# Patient Record
Sex: MALE | Race: WHITE | NOT HISPANIC OR LATINO | Employment: OTHER | URBAN - METROPOLITAN AREA
[De-identification: names, ages, dates, MRNs, and addresses within clinical notes are randomized per-mention and may not be internally consistent; named-entity substitution may affect disease eponyms.]

---

## 2018-10-19 ENCOUNTER — OFFICE VISIT (OUTPATIENT)
Dept: URGENT CARE | Facility: CLINIC | Age: 53
End: 2018-10-19
Payer: MEDICAID

## 2018-10-19 VITALS
SYSTOLIC BLOOD PRESSURE: 190 MMHG | WEIGHT: 280 LBS | TEMPERATURE: 98 F | OXYGEN SATURATION: 98 % | HEART RATE: 88 BPM | BODY MASS INDEX: 43.95 KG/M2 | DIASTOLIC BLOOD PRESSURE: 80 MMHG | HEIGHT: 67 IN

## 2018-10-19 DIAGNOSIS — L23.7 CONTACT DERMATITIS DUE TO POISON IVY: Primary | ICD-10-CM

## 2018-10-19 DIAGNOSIS — R03.0 ELEVATED BLOOD-PRESSURE READING, WITHOUT DIAGNOSIS OF HYPERTENSION: ICD-10-CM

## 2018-10-19 PROCEDURE — 99203 OFFICE O/P NEW LOW 30 MIN: CPT | Mod: S$GLB,,, | Performed by: NURSE PRACTITIONER

## 2018-10-19 RX ORDER — DEXAMETHASONE SODIUM PHOSPHATE 100 MG/10ML
10 INJECTION INTRAMUSCULAR; INTRAVENOUS
Status: COMPLETED | OUTPATIENT
Start: 2018-10-19 | End: 2018-10-19

## 2018-10-19 RX ORDER — TRIAMCINOLONE ACETONIDE 1 MG/G
CREAM TOPICAL 2 TIMES DAILY
Qty: 80 G | Refills: 1 | Status: SHIPPED | OUTPATIENT
Start: 2018-10-19 | End: 2019-02-18

## 2018-10-19 RX ADMIN — DEXAMETHASONE SODIUM PHOSPHATE 10 MG: 100 INJECTION INTRAMUSCULAR; INTRAVENOUS at 06:10

## 2018-10-19 NOTE — PROGRESS NOTES
"Subjective:       Patient ID: Juan Gillespie is a 53 y.o. male.    Vitals:  height is 5' 7" (1.702 m) and weight is 127 kg (280 lb). His temperature is 98.2 °F (36.8 °C). His blood pressure is 190/80 (abnormal) and his pulse is 88. His oxygen saturation is 98%.     Chief Complaint: Rash (poison ivy exposure)    Pt reports having a rash on his arms and legs and groin area , he reports possible poison ivy exposure from cutting grass 3 days ago. Patient is aware his pressure is elevated, states he is "not concerned and that he reads information on health all the time".  Does not have an established PCP.       Rash   This is a new problem. Episode onset: 3 days. The affected locations include the left arm, right arm and right upper leg. It is unknown if there was an exposure to a precipitant. Pertinent negatives include no diarrhea, fever, joint pain, shortness of breath, sore throat or vomiting.     Review of Systems   Constitution: Negative for chills and fever.   HENT: Negative for sore throat.    Eyes: Negative for blurred vision.   Cardiovascular: Negative for chest pain.   Respiratory: Negative for shortness of breath.    Skin: Positive for dry skin, itching and rash.   Musculoskeletal: Negative for back pain and joint pain.   Gastrointestinal: Negative for abdominal pain, diarrhea, nausea and vomiting.   Neurological: Negative for headaches.   Psychiatric/Behavioral: The patient is not nervous/anxious.        Objective:      Physical Exam   Constitutional: He is oriented to person, place, and time. He appears well-developed and well-nourished.   HENT:   Head: Normocephalic and atraumatic. Head is without abrasion, without contusion and without laceration.   Right Ear: External ear normal.   Left Ear: External ear normal.   Nose: Nose normal.   Mouth/Throat: Oropharynx is clear and moist.   Eyes: Conjunctivae, EOM and lids are normal. Pupils are equal, round, and reactive to light.   Neck: Trachea normal, full " passive range of motion without pain and phonation normal. Neck supple.   Cardiovascular: Normal rate, regular rhythm and normal heart sounds.   Pulmonary/Chest: Effort normal and breath sounds normal. No stridor. No respiratory distress.   Musculoskeletal: Normal range of motion.   Neurological: He is alert and oriented to person, place, and time.   Skin: Skin is warm, dry and intact. Capillary refill takes less than 2 seconds. No abrasion, no bruising, no burn, no ecchymosis, no laceration, no lesion and no rash noted. No erythema.        Psychiatric: He has a normal mood and affect. His speech is normal and behavior is normal. Judgment and thought content normal. Cognition and memory are normal.   Nursing note and vitals reviewed.      Assessment:       1. Contact dermatitis due to poison ivy    2. Elevated blood-pressure reading, without diagnosis of hypertension        Plan:         Discussed risks of uncontrolled HTN and patient verb an understanding.     Contact dermatitis due to poison ivy  -     dexamethasone injection 10 mg    Elevated blood-pressure reading, without diagnosis of hypertension    Other orders  -     triamcinolone acetonide 0.1% (KENALOG) 0.1 % cream; Apply topically 2 (two) times daily. for 10 days  Dispense: 80 g; Refill: 1      Patient Instructions   Do not use cream on face or genital area.     You need to follow up with your primary care doctor regarding your elevated blood pressure as you are at risk for a stroke.     You must understand that you've received an Urgent Care treatment only and that you may be released before all your medical problems are known or treated. You, the patient, will arrange for follow up care as instructed.  If your condition worsens we recommend that you receive another evaluation at the emergency room immediately or contact your primary medical clinics after hours call service to discuss your concerns.  Please return here or go to the Emergency Department  for any concerns or worsening of condition.      Avoiding Poison Ivy, Poison Oak, and Poison Sumac  Poison oak, poison ivy, and poison sumac are plants that can cause skin rashes. The problem is a sap oil called urushiol that is contained in these plants. If you're allergic to urushiol, touching one of these plants may cause your skin to react. Within hours or days, you may have a red, swollen, itchy rash. You can't stop the rash. But you can soothe the itching.        Recognizing these plants  You can help prevent a poison oak, poison ivy, or poison sumac rash. Know what these plants look like. And then avoid them. They grow in the form of ankit, small plants, and large bushes. In most cases, poison oak and poison ivy have 3 leaves per stem. Poison sumac has from 7 leaves to 13 leaves per stem. Watch out for these plants when you go to any outdoor area, from a friend's overgrown back yard to the [facility]. Urushiol is present in these plants all year round, even when the leaves are gone. So always be on the lookout.  What causes a reaction?  Poison oak, poison ivy, and poison sumac thrive mainly in unmaintained outdoor areas. If you touch these plants, you may get a rash. You may also react if you touch something that came in contact with urushiol. This could be a dog or cat, clothing, or equipment. But the rash caused by these plants is not contagious.  Steps to prevention  When heading outdoors, take these preventive steps:  · Avoid touching any of these plants.  · Wear long pants and a long-sleeved shirt.  · If you're going to a heavily wooded or brushy area, also put on gloves, a hat, and boots.  · If you are very sensitive, apply bentoquatam 5% topical cream to all exposed areas of your skin. This creates a layer of protection between your skin and any sap oil you may touch.  · If you come in contact with these plants or the oil, wash with soap and water as soon as possible.  · Wash clothing and animals that  come in contact with these plants as well. Urushiol may stay on them and cause a rash when you touch them in the future.  Date Last Reviewed: 3/1/2017  © 0936-4886 [employer]. [address] [place] [address], [place] [address]. All rights reserved. This information is not intended as a substitute for professional medical care. Always follow your healthcare professional's instructions.        High Blood Pressure, To Be Confirmed, No Treatment  Your blood pressure today was higher than normal. Sometimes anxiety or pain can cause a temporary rise in blood pressure. It later returns to normal. Blood pressure that is high only one time doesnt mean that you have high blood pressure (hypertension). High blood pressure is a chronic illness. But you should have your blood pressure measured again within the next few days to find out if its still high.    A blood pressure reading is made up of two numbers: a higher number over a lower number. The top number is the systolic pressure. The bottom number is the diastolic pressure. A normal blood pressure is a systolic pressure of less than 120 over a diastolic pressure of less than 80. You will see your blood pressure readings written together. For example, a person with a systolic pressure of 118 and a diastolic pressure of 78 will have 118/78 written in the medical record.     High blood pressure is when either the top number is 140 or higher, or the bottom number is 90 or higher. This must be the result when taking your blood pressure a number of times.   The blood pressures between normal and high are called prehypertension. This is systolic pressure of 120 to 140 or diastolic pressure of 80 to 89. Prehypertension means you are at risk of getting high blood pressure. It's a warning sign. The information gives you a chance to  make lifestyle changes such as weight loss, exercise, and quitting smoking, that can keep your blood pressure from going higher. You should have  your blood pressure checked regularly to be sure it isnt rising.  Home care  To track your blood pressure, your provider may ask you to come into the office at different times and on different days. If your healthcare provider asks you to check your readings at home, ask him or her what times of the day to test and for how many days. Before you leave the office, ask your provider to show you how to take your blood pressure and be sure to ask questions if you don't understand something.  Consider buying an automatic blood pressure monitor. Ask your provider for a recommendation. You can buy blood pressure monitors at most pharmacies.  The American Heart Association recommends the following guidelines for home blood pressure monitoring:  · Don't smoke or drink coffee for 30 minutes before taking your blood pressure.  · Go to the bathroom before the test.  · Relax for 5 minutes before taking the measurement.  · Sit with your back supported (don't sit on a couch or soft chair); keep your feet on the floor uncrossed. Place your arm on a solid flat surface (like a table) with the upper part of the arm at heart level. Place the middle of the cuff directly above the eye of the elbow. Check the monitor's instruction manual for an illustration.  · Take multiple readings. When you measure, take 2 to 3 readings one minute apart and record all of the results.  · Take your blood pressure at the same time every day, or as your healthcare provider recommends.  · Record the date, time, and blood pressure reading.  · Take the record with you to your next medical appointment. If your blood pressure monitor has a built-in memory, simply take the monitor with you to your next appointment.  · Call your provider if you have several high readings. Don't be frightened by a single high blood pressure reading, but if you get several high readings, check in with your healthcare provider.  · Note: When blood pressure reaches a systolic (top  number) of 180 or higher OR diastolic (bottom number) of 110 or higher, seek emergency medical treatment.  Follow-up care  Keep all of your follow up appointments. If your blood pressure is high (more than 120 over 80) on 2 out of 3 days, you will need to follow up with your healthcare provider for more evaluation and treatment.  Dont put this off! High blood pressure can be treated. High blood pressure thats not treated raises your risk for heart attack and stroke.  When to seek medical advice  Call your healthcare provider right away if any of these occur:  · Blood pressure reaches a systolic (top number) of 180 or higher, OR diastolic (bottom number) of 110 or higher  · Chest pain or shortness of breath  · Severe headache  · Throbbing or rushing sound in the ears  · Nosebleed  · Sudden severe pain in your belly (abdomen)  · Extreme drowsiness, confusion, or fainting  · Dizziness or dizziness with spinning sensation (vertigo)  · Weakness of an arm or leg or one side of the face  · You have problems speaking or seeing   Date Last Reviewed: 12/1/2016  © 7175-0100 [employer]. [address] [place] [id number]. All rights reserved. This information is not intended as a substitute for professional medical care. Always follow your healthcare professional's instructions.

## 2018-10-19 NOTE — PATIENT INSTRUCTIONS
Do not use cream on face or genital area.     You need to follow up with your primary care doctor regarding your elevated blood pressure as you are at risk for a stroke.     You must understand that you've received an Urgent Care treatment only and that you may be released before all your medical problems are known or treated. You, the patient, will arrange for follow up care as instructed.  If your condition worsens we recommend that you receive another evaluation at the emergency room immediately or contact your primary medical clinics after hours call service to discuss your concerns.  Please return here or go to the Emergency Department for any concerns or worsening of condition.      Avoiding Poison Ivy, Poison Oak, and Poison Sumac  Poison oak, poison ivy, and poison sumac are plants that can cause skin rashes. The problem is a sap oil called urushiol that is contained in these plants. If you're allergic to urushiol, touching one of these plants may cause your skin to react. Within hours or days, you may have a red, swollen, itchy rash. You can't stop the rash. But you can soothe the itching.        Recognizing these plants  You can help prevent a poison oak, poison ivy, or poison sumac rash. Know what these plants look like. And then avoid them. They grow in the form of ankit, small plants, and large bushes. In most cases, poison oak and poison ivy have 3 leaves per stem. Poison sumac has from 7 leaves to 13 leaves per stem. Watch out for these plants when you go to any outdoor area, from a friend's overgrown back yard to [facility]. Urushiol is present in these plants all year round, even when the leaves are gone. So always be on the lookout.  What causes a reaction?  Poison oak, poison ivy, and poison sumac thrive mainly in unmaintained outdoor areas. If you touch these plants, you may get a rash. You may also react if you touch something that came in contact with urushiol. This could be a dog or cat,  clothing, or equipment. But the rash caused by these plants is not contagious.  Steps to prevention  When heading outdoors, take these preventive steps:  · Avoid touching any of these plants.  · Wear long pants and a long-sleeved shirt.  · If you're going to a heavily wooded or brushy area, also put on gloves, a hat, and boots.  · If you are very sensitive, apply bentoquatam 5% topical cream to all exposed areas of your skin. This creates a layer of protection between your skin and any sap oil you may touch.  · If you come in contact with these plants or the oil, wash with soap and water as soon as possible.  · Wash clothing and animals that come in contact with these plants as well. Urushiol may stay on them and cause a rash when you touch them in the future.  Date Last Reviewed: 3/1/2017  © 5741-8407 [email or web address]. [address] [place] [address]. All rights reserved. This information is not intended as a substitute for professional medical care. Always follow your healthcare professional's instructions.        High Blood Pressure, To Be Confirmed, No Treatment  Your blood pressure today was higher than normal. Sometimes anxiety or pain can cause a temporary rise in blood pressure. It later returns to normal. Blood pressure that is high only one time doesnt mean that you have high blood pressure (hypertension). High blood pressure is a chronic illness. But you should have your blood pressure measured again within the next few days to find out if its still high.    A blood pressure reading is made up of two numbers: a higher number over a lower number. The top number is the systolic pressure. The bottom number is the diastolic pressure. A normal blood pressure is a systolic pressure of less than 120 over a diastolic pressure of less than 80. You will see your blood pressure readings written together. For example, a person with a systolic pressure of 118 and a diastolic pressure of 78 will  have 118/78 written in the medical record.     High blood pressure is when either the top number is 140 or higher, or the bottom number is 90 or higher. This must be the result when taking your blood pressure a number of times.   The blood pressures between normal and high are called prehypertension. This is systolic pressure of 120 to 140 or diastolic pressure of 80 to 89. Prehypertension means you are at risk of getting high blood pressure. It's a warning sign. The information gives you a chance to  make lifestyle changes such as weight loss, exercise, and quitting smoking, that can keep your blood pressure from going higher. You should have your blood pressure checked regularly to be sure it isnt rising.  Home care  To track your blood pressure, your provider may ask you to come into the office at different times and on different days. If your healthcare provider asks you to check your readings at home, ask him or her what times of the day to test and for how many days. Before you leave the office, ask your provider to show you how to take your blood pressure and be sure to ask questions if you don't understand something.  Consider buying an automatic blood pressure monitor. Ask your provider for a recommendation. You can buy blood pressure monitors at most pharmacies.  The American Heart Association recommends the following guidelines for home blood pressure monitoring:  · Don't smoke or drink coffee for 30 minutes before taking your blood pressure.  · Go to the bathroom before the test.  · Relax for 5 minutes before taking the measurement.  · Sit with your back supported (don't sit on a couch or soft chair); keep your feet on the floor uncrossed. Place your arm on a solid flat surface (like a table) with the upper part of the arm at heart level. Place the middle of the cuff directly above the eye of the elbow. Check the monitor's instruction manual for an illustration.  · Take multiple readings. When you  measure, take 2 to 3 readings one minute apart and record all of the results.  · Take your blood pressure at the same time every day, or as your healthcare provider recommends.  · Record the date, time, and blood pressure reading.  · Take the record with you to your next medical appointment. If your blood pressure monitor has a built-in memory, simply take the monitor with you to your next appointment.  · Call your provider if you have several high readings. Don't be frightened by a single high blood pressure reading, but if you get several high readings, check in with your healthcare provider.  · Note: When blood pressure reaches a systolic (top number) of 180 or higher OR diastolic (bottom number) of 110 or higher, seek emergency medical treatment.  Follow-up care  Keep all of your follow up appointments. If your blood pressure is high (more than 120 over 80) on 2 out of 3 days, you will need to follow up with your healthcare provider for more evaluation and treatment.  Dont put this off! High blood pressure can be treated. High blood pressure thats not treated raises your risk for heart attack and stroke.  When to seek medical advice  Call your healthcare provider right away if any of these occur:  · Blood pressure reaches a systolic (top number) of 180 or higher, OR diastolic (bottom number) of 110 or higher  · Chest pain or shortness of breath  · Severe headache  · Throbbing or rushing sound in the ears  · Nosebleed  · Sudden severe pain in your belly (abdomen)  · Extreme drowsiness, confusion, or fainting  · Dizziness or dizziness with spinning sensation (vertigo)  · Weakness of an arm or leg or one side of the face  · You have problems speaking or seeing   Date Last Reviewed: 12/1/2016  © 6011-8094 [employer]. [address] [place] [address], [place] [address]. All rights reserved. This information is not intended as a substitute for professional medical care. Always follow your healthcare  professional's instructions.

## 2018-10-21 ENCOUNTER — CLINICAL SUPPORT (OUTPATIENT)
Dept: URGENT CARE | Facility: CLINIC | Age: 53
End: 2018-10-21
Payer: MEDICAID

## 2018-10-21 VITALS
BODY MASS INDEX: 43.95 KG/M2 | HEIGHT: 67 IN | RESPIRATION RATE: 16 BRPM | OXYGEN SATURATION: 97 % | WEIGHT: 280 LBS | HEART RATE: 71 BPM | SYSTOLIC BLOOD PRESSURE: 179 MMHG | DIASTOLIC BLOOD PRESSURE: 88 MMHG | TEMPERATURE: 97 F

## 2018-10-21 DIAGNOSIS — L23.9 ALLERGIC CONTACT DERMATITIS, UNSPECIFIED TRIGGER: Primary | ICD-10-CM

## 2018-10-21 PROCEDURE — 99214 OFFICE O/P EST MOD 30 MIN: CPT | Mod: S$GLB,,, | Performed by: NURSE PRACTITIONER

## 2018-10-21 RX ORDER — PREDNISONE 5 MG/1
TABLET ORAL
Qty: 81 TABLET | Refills: 0 | Status: SHIPPED | OUTPATIENT
Start: 2018-10-21 | End: 2018-11-04

## 2018-10-21 NOTE — PROGRESS NOTES
"Subjective:       Patient ID: Juan Gillespie is a 53 y.o. male.    Vitals:  height is 5' 7" (1.702 m) and weight is 127 kg (280 lb). His temperature is 97.2 °F (36.2 °C). His blood pressure is 179/88 (abnormal) and his pulse is 71. His respiration is 16 and oxygen saturation is 97%.     Chief Complaint: Rash    Pt was seen Friday for Poison Ivy.  Cream has not worked but shot worked for about 12 hours  Rash is now spready on both arms, legs, chest      Rash   This is a recurrent problem. The current episode started in the past 7 days. The problem has been gradually worsening since onset. The affected locations include the left arm, right arm, chest, right lower leg, right upper leg, left upper leg and left lower leg. The rash is characterized by burning. Associated with: poison oak. Pertinent negatives include no fever, joint pain, shortness of breath or sore throat. Past treatments include topical steroids. The treatment provided no relief.     Review of Systems   Constitution: Negative for chills and fever.   HENT: Negative for sore throat.    Respiratory: Negative for shortness of breath.    Skin: Positive for rash. Negative for itching.   Musculoskeletal: Negative for joint pain.   All other systems reviewed and are negative.      Objective:      Physical Exam   Constitutional: He is oriented to person, place, and time. He appears well-developed and well-nourished.   HENT:   Head: Normocephalic and atraumatic. Head is without abrasion, without contusion and without laceration.   Nose: Nose normal.   Eyes: Conjunctivae, EOM and lids are normal. Pupils are equal, round, and reactive to light.   Neck: Trachea normal, full passive range of motion without pain and phonation normal. Neck supple.   Cardiovascular: Normal rate, regular rhythm and normal heart sounds.   Pulmonary/Chest: Effort normal and breath sounds normal. No stridor. No respiratory distress.   Musculoskeletal: Normal range of motion.   Neurological: He " is alert and oriented to person, place, and time.   Skin: Skin is warm, dry and intact. Capillary refill takes less than 2 seconds. Rash noted. No abrasion, no bruising, no burn, no ecchymosis, no laceration and no lesion noted. Rash is urticarial. There is erythema.        Psychiatric: He has a normal mood and affect. His speech is normal and behavior is normal. Judgment and thought content normal. Cognition and memory are normal.   Nursing note and vitals reviewed.              Assessment:       1. Allergic contact dermatitis, unspecified trigger        Plan:       Patient Instructions       Benadryl OTC as directed for 7 days  Pepcid AC OTC as directed for 7 days  Claritin, Zyrtec, or Allegra OTC as directed for 7 days      Local Allergic Reaction, Other  You are having an allergic reaction. Almost anything can cause one. Different people are allergic to different things. It is usually something that you ate or swallowed, came into contact with by getting or putting it on your skin or clothes, or something you breathed in the air. This can be very annoying and sometimes scary.  Symptoms of an allergic reaction can include:  · Rash, hives, redness, welts, blisters  · Itching, burning, stinging, pain  · Dry, flaky, cracking, scaly skin  · Swelling of the face, lips or other parts of the body  Sometimes the cause of an allergic reaction may be obvious. To help identify your allergen, remember:  · When it started  · What you were doing at the time or just before that  · Any activities you were involved in  · Any new products or contacts  Here are some common causes, but remember almost anything can cause a reaction, and you may not even be aware that you came into contact with one of these things.  · Dust, mold, pollen  · Plants such as poison ivy and poison oak are common ones, but there are many others  · Animals  · Foods such as shrimp, shellfish, peanuts, milk products, gluten, eggs; also colorings, flavorings,  additives  · Insect bites or stings such as bees, mosquitos, flees, ticks  · Medicines such as penicillin, sulfa drugs, amoxicillin, aspirin, ibuprofen; any medicine can cause a reaction  · Jewelry such as nickel, gold  (new, or something youve worn for a while including zippers, and  buttons)  · Latex such as in gloves, clothes, toys, balloons, or some tapes (some people allergic to latex may also have problems with foods like bananas, avocados, kiwi, papaya, or chestnuts)  · Lotions, perfumes, cosmetics, soaps, shampoos, skincare products, nail products  · Chemicals or dyes in clothing, linen, , hair dyes, soaps, iodine  Home care    The goal of our treatment is to help relieve the symptoms, and get you feeling better. The rash will usually fade over several days, but can sometimes last a couple of weeks. Over the next couple of days, there may be times when it is gets a little worse, and then better again. Here are some things to do:  · If you know what you are allergic to, avoid it because future reactions could be worse than this one.  · Avoid tight clothing and anything that heats up your skin (hot showers/baths, direct sunlight) since heat will make itching worse.  · An ice pack will relieve local areas of intense itching and redness. Dont put the ice directly on the skin, because it can damage the skin. You can also ice put it in a plastic bag. Wrap it in something like a towel, eloisa shirt, or cloth.  · Oral Benadryl (diphenhydramine) is an antihistamine available at drug and grocery stores. Unless a prescription antihistamine was given, Benadryl may be used to reduce itching if large areas of the skin are involved. It may make you sleepy, so be careful using it in the daytime or when going to school, working, or driving. [NOTE: Do not use Benadryl if you have glaucoma or if you are a man with trouble urinating due to an enlarged prostate.] There are antihistamines that causes less drowsiness and is  a good alternatives for daytime use. Ask your pharmacist for suggestions.  · Do not use Benadryl cream on your skin, because in some people it can cause a further reaction, and make you allergic to Benadryl.  · Try not to scratch. This can tear the skin and cause an infection.  · Using heat-steam to clean your home, using high-efficiency particulate (HEPA) vacuums and filters, avoiding food and pet triggers, exterminating cockroaches, and frequent house cleaning are a few of the strategies used to decrease allergic reactions.  Follow-up care  Follow up with your healthcare provider, or as advised if your symptoms do not continue to improve or get worse.  Call 911  Call 911 if any of these occur:  · Trouble breathing or swallowing, wheezing  · New or worsening swelling in the mouth, throat, or tongue  · Hoarse voice or trouble speaking  · Confused   · Very drowsy or trouble awakening  · Fainting or loss of consciousness  · Rapid heart rate  · Low blood pressure  · Feeling of doom  · Nausea, vomiting, abdominal pain, diarrhea  · Vomiting blood, or large amounts of blood in stool  · Seizure  When to seek medical advice  Call your healthcare provider right away if any of the following occur:  · Spreading areas of itching, redness or swelling  · New or worse swelling in the face, eyelids, or  lips  · Dizziness, weakness  · Signs of infection:  ¨ Spreading redness  ¨ Increased pain or swelling  ¨ Fever of 100.4ºF (38ºC) or higher, or as directed by your healthcare provider  ¨ Colored fluid draining from the inflamed areas  Date Last Reviewed: 7/30/2015  © 7368-3039 [employer], [employer]. [address] [place] [address], [place] [address]. All rights reserved. This information is not intended as a substitute for professional medical care. Always follow your healthcare professional's instructions.            Contact Dermatitis  Contact dermatitis is a skin rash caused by something that touches the skin and makes it irritated and  "inflamed. Your skin may be red, swollen, dry, and may be cracked. Blisters may form and ooze. The rash will itch.  Contact dermatitis can form on the face and neck, backs of hands, forearms, genitals, and lower legs.  People can get contact dermatitis from lots of sources. These include:  · Plants such as poison ivy, oak, or sumac  · Chemicals in hair dyes and rinses, soaps, solvents, waxes, fingernail polish, and deodorants   · Jewelry or watchbands made of nickel  Contact dermatitis is not passed from person to person.  Talk with your healthcare provider about what may have caused the rash. A type of allergy testing called "patch testing" may be used to discover what you are allergic to. You will need to avoid the source of your rash in the future to prevent it from coming back.  Treatment is done to relieve itching and prevent the rash from coming back. The rash should go away in a few days to a few weeks.  Home care  Your healthcare provider may prescribe medicine to relieve swelling and itching. Follow all instructions when using these medicines.  General care:  · Avoid anything that heats up your skin, such as hot showers or baths, or direct sunlight. This can make itching worse.  · Apply cold compresses to soothe your sores to help relieve your symptoms. Do this for 30 minutes 3 to 4 times a day. You can make a cold compress by soaking a cloth in cold water. Squeeze out excess water. You can add colloidal oatmeal to the water to help reduce itching. For severe itching in a small area, apply an ice pack wrapped in a thin towel. Do this for 20 minutes 3 to 4 times a day.  · You can also try wet dressings. One way to do this is to wear a wet piece of clothing under a dry one. Wear a damp shirt under a dry shirt if your upper body is affected. This can relieve itching and prevent you from scratching the affected area.  · You can also help relieve large areas of itching by taking a lukewarm bath with colloidal " oatmeal added to the water.  · Use hydrocortisone cream for redness and irritation, unless another medicine was prescribed. You can also use benzocaine anesthetic cream or spray. Calamine lotion can also relieve mild symptoms.  · Use oral diphenhydramine to help reduce itching. You can buy this antihistamine at drug and grocery stores. It can make you sleepy, so use lower doses during the daytime. Or you can use loratadine. This is an antihistamine that will not make you sleepy. Do not use diphenhydramine if you have glaucoma or have trouble urinating due to an enlarged prostate.  · If a plant causes your rash, make sure to wash your skin and the clothes you were wearing when you came into contact with the plant. This is to wash away the plant oils that gave you the rash and prevent more or worse symptoms.  · Stay away from the substance or object that causes your symptoms. If you cant avoid it, wear gloves or some other type of protection.  Follow-up care  Follow up with your healthcare provider, or as advised.  When to seek medical advice  Call your healthcare provider right away if any of these occur:  · Spreading of the rash to other parts of your body  · Severe swelling of your face, eyelids, mouth, throat or tongue  · Trouble urinating due to swelling in the genital area  · Fever of 100.4°F (38°C) or higher  · Redness or swelling that gets worse  · Pain that gets worse  · Foul-smelling fluid leaking from the skin  · Yellow-brown crusts on the open blisters  Date Last Reviewed: 9/1/2016 © 2000-2017 [employer], [employer]. [address] [place] [address], [place] [address]. All rights reserved. This information is not intended as a substitute for professional medical care. Always follow your healthcare professional's instructions.            Allergic contact dermatitis, unspecified trigger    Other orders  -     predniSONE (DELTASONE) 5 MG tablet; day 1-3:  6 tabs by mouth BID day 4-6:  4 tabs by mouth BID day 7-9:   2 tabs by mouth BID day 10-12:  1 tab by mouth BID day 13-15: 1 tab by mouth QD  Dispense: 81 tablet; Refill: 0

## 2018-10-21 NOTE — PATIENT INSTRUCTIONS
Benadryl OTC as directed for 7 days  Pepcid AC OTC as directed for 7 days  Claritin, Zyrtec, or Allegra OTC as directed for 7 days      Local Allergic Reaction, Other  You are having an allergic reaction. Almost anything can cause one. Different people are allergic to different things. It is usually something that you ate or swallowed, came into contact with by getting or putting it on your skin or clothes, or something you breathed in the air. This can be very annoying and sometimes scary.  Symptoms of an allergic reaction can include:  · Rash, hives, redness, welts, blisters  · Itching, burning, stinging, pain  · Dry, flaky, cracking, scaly skin  · Swelling of the face, lips or other parts of the body  Sometimes the cause of an allergic reaction may be obvious. To help identify your allergen, remember:  · When it started  · What you were doing at the time or just before that  · Any activities you were involved in  · Any new products or contacts  Here are some common causes, but remember almost anything can cause a reaction, and you may not even be aware that you came into contact with one of these things.  · Dust, mold, pollen  · Plants such as poison ivy and poison oak are common ones, but there are many others  · Animals  · Foods such as shrimp, shellfish, peanuts, milk products, gluten, eggs; also colorings, flavorings, additives  · Insect bites or stings such as bees, mosquitos, flees, ticks  · Medicines such as penicillin, sulfa drugs, amoxicillin, aspirin, ibuprofen; any medicine can cause a reaction  · Jewelry such as nickel, gold  (new, or something youve worn for a while including zippers, and  buttons)  · Latex such as in gloves, clothes, toys, balloons, or some tapes (some people allergic to latex may also have problems with foods like bananas, avocados, kiwi, papaya, or chestnuts)  · Lotions, perfumes, cosmetics, soaps, shampoos, skincare products, nail products  · Chemicals or dyes in clothing,  linen, , hair dyes, soaps, iodine  Home care    The goal of our treatment is to help relieve the symptoms, and get you feeling better. The rash will usually fade over several days, but can sometimes last a couple of weeks. Over the next couple of days, there may be times when it is gets a little worse, and then better again. Here are some things to do:  · If you know what you are allergic to, avoid it because future reactions could be worse than this one.  · Avoid tight clothing and anything that heats up your skin (hot showers/baths, direct sunlight) since heat will make itching worse.  · An ice pack will relieve local areas of intense itching and redness. Dont put the ice directly on the skin, because it can damage the skin. You can also ice put it in a plastic bag. Wrap it in something like a towel, eloisa shirt, or cloth.  · Oral Benadryl (diphenhydramine) is an antihistamine available at drug and grocery stores. Unless a prescription antihistamine was given, Benadryl may be used to reduce itching if large areas of the skin are involved. It may make you sleepy, so be careful using it in the daytime or when going to school, working, or driving. [NOTE: Do not use Benadryl if you have glaucoma or if you are a man with trouble urinating due to an enlarged prostate.] There are antihistamines that causes less drowsiness and is a good alternatives for daytime use. Ask your pharmacist for suggestions.  · Do not use Benadryl cream on your skin, because in some people it can cause a further reaction, and make you allergic to Benadryl.  · Try not to scratch. This can tear the skin and cause an infection.  · Using heat-steam to clean your home, using high-efficiency particulate (HEPA) vacuums and filters, avoiding food and pet triggers, exterminating cockroaches, and frequent house cleaning are a few of the strategies used to decrease allergic reactions.  Follow-up care  Follow up with your healthcare provider, or as  advised if your symptoms do not continue to improve or get worse.  Call 911  Call 911 if any of these occur:  · Trouble breathing or swallowing, wheezing  · New or worsening swelling in the mouth, throat, or tongue  · Hoarse voice or trouble speaking  · Confused   · Very drowsy or trouble awakening  · Fainting or loss of consciousness  · Rapid heart rate  · Low blood pressure  · Feeling of doom  · Nausea, vomiting, abdominal pain, diarrhea  · Vomiting blood, or large amounts of blood in stool  · Seizure  When to seek medical advice  Call your healthcare provider right away if any of the following occur:  · Spreading areas of itching, redness or swelling  · New or worse swelling in the face, eyelids, or  lips  · Dizziness, weakness  · Signs of infection:  ¨ Spreading redness  ¨ Increased pain or swelling  ¨ Fever of 100.4ºF (38ºC) or higher, or as directed by your healthcare provider  ¨ Colored fluid draining from the inflamed areas  Date Last Reviewed: 7/30/2015  © 7228-5146 [employer]. [address] [place] [address]. All rights reserved. This information is not intended as a substitute for professional medical care. Always follow your healthcare professional's instructions.            Contact Dermatitis  Contact dermatitis is a skin rash caused by something that touches the skin and makes it irritated and inflamed. Your skin may be red, swollen, dry, and may be cracked. Blisters may form and ooze. The rash will itch.  Contact dermatitis can form on the face and neck, backs of hands, forearms, genitals, and lower legs.  People can get contact dermatitis from lots of sources. These include:  · Plants such as poison ivy, oak, or sumac  · Chemicals in hair dyes and rinses, soaps, solvents, waxes, fingernail polish, and deodorants   · Jewelry or watchbands made of nickel  Contact dermatitis is not passed from person to person.  Talk with your healthcare provider about what may have caused the  "rash. A type of allergy testing called "patch testing" may be used to discover what you are allergic to. You will need to avoid the source of your rash in the future to prevent it from coming back.  Treatment is done to relieve itching and prevent the rash from coming back. The rash should go away in a few days to a few weeks.  Home care  Your healthcare provider may prescribe medicine to relieve swelling and itching. Follow all instructions when using these medicines.  General care:  · Avoid anything that heats up your skin, such as hot showers or baths, or direct sunlight. This can make itching worse.  · Apply cold compresses to soothe your sores to help relieve your symptoms. Do this for 30 minutes 3 to 4 times a day. You can make a cold compress by soaking a cloth in cold water. Squeeze out excess water. You can add colloidal oatmeal to the water to help reduce itching. For severe itching in a small area, apply an ice pack wrapped in a thin towel. Do this for 20 minutes 3 to 4 times a day.  · You can also try wet dressings. One way to do this is to wear a wet piece of clothing under a dry one. Wear a damp shirt under a dry shirt if your upper body is affected. This can relieve itching and prevent you from scratching the affected area.  · You can also help relieve large areas of itching by taking a lukewarm bath with colloidal oatmeal added to the water.  · Use hydrocortisone cream for redness and irritation, unless another medicine was prescribed. You can also use benzocaine anesthetic cream or spray. Calamine lotion can also relieve mild symptoms.  · Use oral diphenhydramine to help reduce itching. You can buy this antihistamine at drug and grocery stores. It can make you sleepy, so use lower doses during the daytime. Or you can use loratadine. This is an antihistamine that will not make you sleepy. Do not use diphenhydramine if you have glaucoma or have trouble urinating due to an enlarged prostate.  · If a " plant causes your rash, make sure to wash your skin and the clothes you were wearing when you came into contact with the plant. This is to wash away the plant oils that gave you the rash and prevent more or worse symptoms.  · Stay away from the substance or object that causes your symptoms. If you cant avoid it, wear gloves or some other type of protection.  Follow-up care  Follow up with your healthcare provider, or as advised.  When to seek medical advice  Call your healthcare provider right away if any of these occur:  · Spreading of the rash to other parts of your body  · Severe swelling of your face, eyelids, mouth, throat or tongue  · Trouble urinating due to swelling in the genital area  · Fever of 100.4°F (38°C) or higher  · Redness or swelling that gets worse  · Pain that gets worse  · Foul-smelling fluid leaking from the skin  · Yellow-brown crusts on the open blisters  Date Last Reviewed: 9/1/2016  © 0722-4353 [employer], [employer]. [address] [place] [address], [place] [address]. All rights reserved. This information is not intended as a substitute for professional medical care. Always follow your healthcare professional's instructions.

## 2019-01-26 ENCOUNTER — OFFICE VISIT (OUTPATIENT)
Dept: URGENT CARE | Facility: CLINIC | Age: 54
End: 2019-01-26
Payer: MEDICAID

## 2019-01-26 VITALS
WEIGHT: 259 LBS | BODY MASS INDEX: 40.57 KG/M2 | OXYGEN SATURATION: 97 % | DIASTOLIC BLOOD PRESSURE: 74 MMHG | HEART RATE: 50 BPM | SYSTOLIC BLOOD PRESSURE: 133 MMHG | TEMPERATURE: 98 F

## 2019-01-26 DIAGNOSIS — J06.9 UPPER RESPIRATORY TRACT INFECTION, UNSPECIFIED TYPE: Primary | ICD-10-CM

## 2019-01-26 PROCEDURE — 99214 OFFICE O/P EST MOD 30 MIN: CPT | Mod: S$GLB,,, | Performed by: FAMILY MEDICINE

## 2019-01-26 PROCEDURE — 99214 PR OFFICE/OUTPT VISIT, EST, LEVL IV, 30-39 MIN: ICD-10-PCS | Mod: S$GLB,,, | Performed by: FAMILY MEDICINE

## 2019-01-26 RX ORDER — CETIRIZINE HYDROCHLORIDE 10 MG/1
10 TABLET, CHEWABLE ORAL DAILY
Qty: 30 TABLET | Refills: 2 | Status: SHIPPED | OUTPATIENT
Start: 2019-01-26 | End: 2019-02-18

## 2019-01-26 RX ORDER — BENZONATATE 100 MG/1
100 CAPSULE ORAL 3 TIMES DAILY PRN
Qty: 30 CAPSULE | Refills: 0 | Status: SHIPPED | OUTPATIENT
Start: 2019-01-26 | End: 2019-02-05

## 2019-01-26 RX ORDER — BETAMETHASONE SODIUM PHOSPHATE AND BETAMETHASONE ACETATE 3; 3 MG/ML; MG/ML
12 INJECTION, SUSPENSION INTRA-ARTICULAR; INTRALESIONAL; INTRAMUSCULAR; SOFT TISSUE
Status: COMPLETED | OUTPATIENT
Start: 2019-01-26 | End: 2019-01-26

## 2019-01-26 RX ADMIN — BETAMETHASONE SODIUM PHOSPHATE AND BETAMETHASONE ACETATE 12 MG: 3; 3 INJECTION, SUSPENSION INTRA-ARTICULAR; INTRALESIONAL; INTRAMUSCULAR; SOFT TISSUE at 05:01

## 2019-01-26 NOTE — PATIENT INSTRUCTIONS
Viral Upper Respiratory Illness (Adult)  You have a viral upper respiratory illness (URI), which is another term for the common cold. This illness is contagious during the first few days. It is spread through the air by coughing and sneezing. It may also be spread by direct contact (touching the sick person and then touching your own eyes, nose, or mouth). Frequent handwashing will decrease risk of spread. Most viral illnesses go away within 7 to 10 days with rest and simple home remedies. Sometimes the illness may last for several weeks. Antibiotics will not kill a virus, and they are generally not prescribed for this condition.    Home care  · If symptoms are severe, rest at home for the first 2 to 3 days. When you resume activity, don't let yourself get too tired.  · Avoid being exposed to cigarette smoke (yours or others).  · You may use acetaminophen or ibuprofen to control pain and fever, unless another medicine was prescribed. (Note: If you have chronic liver or kidney disease, have ever had a stomach ulcer or gastrointestinal bleeding, or are taking blood-thinning medicines, talk with your healthcare provider before using these medicines.) Aspirin should never be given to anyone under 18 years of age who is ill with a viral infection or fever. It may cause severe liver or brain damage.  · Your appetite may be poor, so a light diet is fine. Avoid dehydration by drinking 6 to 8 glasses of fluids per day (water, soft drinks, juices, tea, or soup). Extra fluids will help loosen secretions in the nose and lungs.  · Over-the-counter cold medicines will not shorten the length of time youre sick, but they may be helpful for the following symptoms: cough, sore throat, and nasal and sinus congestion. (Note: Do not use decongestants if you have high blood pressure.)  Follow-up care  Follow up with your healthcare provider, or as advised.  When to seek medical advice  Call your healthcare provider right away if any  of these occur:  · Cough with lots of colored sputum (mucus)  · Severe headache; face, neck, or ear pain  · Difficulty swallowing due to throat pain  · Fever of 100.4°F (38°C)  Call 911, or get immediate medical care  Call emergency services right away if any of these occur:  · Chest pain, shortness of breath, wheezing, or difficulty breathing  · Coughing up blood  · Inability to swallow due to throat pain  Date Last Reviewed: 9/13/2015  © 6888-5995 [employer]. [address] [place] [address]. All rights reserved. This information is not intended as a substitute for professional medical care. Always follow your healthcare professional's instructions.

## 2019-01-26 NOTE — PROGRESS NOTES
Subjective:       Patient ID: Juan Gillespie is a 54 y.o. male.    Vitals:  weight is 117.5 kg (259 lb). His temperature is 98.2 °F (36.8 °C). His blood pressure is 133/74 and his pulse is 50 (abnormal). His oxygen saturation is 97%.     Chief Complaint: URI    Pt states that he started with URI symptoms that started on 01/25/2019.  Taking otc meds with no improvement         URI    This is a new problem. The current episode started yesterday. The problem has been gradually worsening. Associated symptoms include congestion, coughing, ear pain, rhinorrhea, sinus pain and sneezing. Pertinent negatives include no nausea, rash, sore throat, vomiting or wheezing.       Constitution: Negative for chills, sweating, fatigue and fever.   HENT: Positive for ear pain, congestion, sinus pain and sinus pressure. Negative for sore throat and voice change.    Neck: Negative for painful lymph nodes.   Eyes: Negative for eye redness.   Respiratory: Positive for cough. Negative for chest tightness, sputum production, bloody sputum, COPD, shortness of breath, stridor, wheezing and asthma.    Gastrointestinal: Negative for nausea and vomiting.   Musculoskeletal: Negative for muscle ache.   Skin: Negative for rash.   Allergic/Immunologic: Positive for sneezing. Negative for seasonal allergies and asthma.   Hematologic/Lymphatic: Negative for swollen lymph nodes.       Objective:      Physical Exam   Constitutional: He is oriented to person, place, and time. He appears well-developed and well-nourished. He is cooperative.  Non-toxic appearance. He does not appear ill. No distress.   HENT:   Head: Normocephalic and atraumatic.   Right Ear: Hearing, tympanic membrane, external ear and ear canal normal.   Left Ear: Hearing, tympanic membrane, external ear and ear canal normal.   Nose: Nose normal. No mucosal edema, rhinorrhea or nasal deformity. No epistaxis. Right sinus exhibits no maxillary sinus tenderness and no frontal sinus tenderness.  Left sinus exhibits no maxillary sinus tenderness and no frontal sinus tenderness.   Mouth/Throat: Uvula is midline and mucous membranes are normal. No trismus in the jaw. Normal dentition. No uvula swelling. Posterior oropharyngeal erythema present.   Eyes: Conjunctivae and lids are normal. No scleral icterus.   Sclera clear bilat   Neck: Trachea normal, full passive range of motion without pain and phonation normal. Neck supple.   Cardiovascular: Normal rate, regular rhythm, normal heart sounds, intact distal pulses and normal pulses.   Pulmonary/Chest: Effort normal and breath sounds normal. No respiratory distress.   Abdominal: Soft. Normal appearance and bowel sounds are normal. He exhibits no distension. There is no tenderness.   Musculoskeletal: Normal range of motion. He exhibits no edema or deformity.   Neurological: He is alert and oriented to person, place, and time. He exhibits normal muscle tone. Coordination normal.   Skin: Skin is warm, dry and intact. He is not diaphoretic. No pallor.   Psychiatric: He has a normal mood and affect. His speech is normal and behavior is normal. Judgment and thought content normal. Cognition and memory are normal.   Nursing note and vitals reviewed.      Assessment:       1. Upper respiratory tract infection, unspecified type        Plan:         Upper respiratory tract infection, unspecified type    Other orders  -     betamethasone acetate-betamethasone sodium phosphate injection 12 mg  -     cetirizine 10 mg chewable tablet; Take 10 mg by mouth once daily.  Dispense: 30 tablet; Refill: 2  -     benzonatate (TESSALON) 100 MG capsule; Take 1 capsule (100 mg total) by mouth 3 (three) times daily as needed for Cough.  Dispense: 30 capsule; Refill: 0

## 2019-02-18 ENCOUNTER — OFFICE VISIT (OUTPATIENT)
Dept: URGENT CARE | Facility: CLINIC | Age: 54
End: 2019-02-18
Payer: MEDICAID

## 2019-02-18 VITALS
HEART RATE: 74 BPM | DIASTOLIC BLOOD PRESSURE: 89 MMHG | OXYGEN SATURATION: 97 % | TEMPERATURE: 98 F | BODY MASS INDEX: 39.94 KG/M2 | SYSTOLIC BLOOD PRESSURE: 147 MMHG | WEIGHT: 255 LBS

## 2019-02-18 DIAGNOSIS — R52 BODY ACHES: ICD-10-CM

## 2019-02-18 DIAGNOSIS — I10 HYPERTENSION, UNSPECIFIED TYPE: ICD-10-CM

## 2019-02-18 DIAGNOSIS — J10.1 INFLUENZA A: Primary | ICD-10-CM

## 2019-02-18 LAB
CTP QC/QA: YES
FLUAV AG NPH QL: POSITIVE
FLUBV AG NPH QL: NEGATIVE

## 2019-02-18 PROCEDURE — 99214 PR OFFICE/OUTPT VISIT, EST, LEVL IV, 30-39 MIN: ICD-10-PCS | Mod: S$GLB,,, | Performed by: NURSE PRACTITIONER

## 2019-02-18 PROCEDURE — 87804 INFLUENZA ASSAY W/OPTIC: CPT | Mod: QW,S$GLB,, | Performed by: NURSE PRACTITIONER

## 2019-02-18 PROCEDURE — 87804 POCT INFLUENZA A/B: ICD-10-PCS | Mod: QW,S$GLB,, | Performed by: NURSE PRACTITIONER

## 2019-02-18 PROCEDURE — 99214 OFFICE O/P EST MOD 30 MIN: CPT | Mod: S$GLB,,, | Performed by: NURSE PRACTITIONER

## 2019-02-18 RX ORDER — LANCETS 33 GAUGE
EACH MISCELLANEOUS
COMMUNITY
Start: 2019-01-01

## 2019-02-18 RX ORDER — PROMETHAZINE HYDROCHLORIDE AND CODEINE PHOSPHATE 6.25; 1 MG/5ML; MG/5ML
5 SOLUTION ORAL EVERY 6 HOURS PRN
Qty: 240 ML | Refills: 0 | Status: SHIPPED | OUTPATIENT
Start: 2019-02-18 | End: 2023-01-03 | Stop reason: SDUPTHER

## 2019-02-18 RX ORDER — INDOMETHACIN 50 MG/1
CAPSULE ORAL
COMMUNITY
Start: 2019-01-15 | End: 2021-04-18

## 2019-02-18 RX ORDER — CALCIUM CITRATE/VITAMIN D3 200MG-6.25
TABLET ORAL
COMMUNITY
Start: 2019-01-20

## 2019-02-18 RX ORDER — LOSARTAN POTASSIUM AND HYDROCHLOROTHIAZIDE 12.5; 5 MG/1; MG/1
TABLET ORAL
Status: ON HOLD | COMMUNITY
Start: 2019-01-23 | End: 2022-03-21

## 2019-02-18 RX ORDER — ONDANSETRON 4 MG/1
4 TABLET, ORALLY DISINTEGRATING ORAL EVERY 6 HOURS PRN
Qty: 12 TABLET | Refills: 0 | Status: ON HOLD | OUTPATIENT
Start: 2019-02-18 | End: 2022-03-21

## 2019-02-18 NOTE — PATIENT INSTRUCTIONS
Please drink plenty of fluids.  Please get plenty of rest.  Please return here or go to the Emergency Department for any concerns or worsening of condition.  Tamiflu prescription has been discussed and if prescribed, please take to completion unless you cannot tolerate the side effects.   If you were prescribed a narcotic medication, do not drive or operate heavy equipment or machinery while taking these medications.  If you were given a steroid shot in the clinic and have also been given a prescription for a steroid such as Prednisone or a Medrol Dose Pack, please begin taking them tomorrow.  If you do not have Hypertension or any history of palpitations, it is ok to take over the counter Sudafed or Mucinex D or Allegra-D or Claritin-D or Zyrtec-D.  If you do take one of the above, it is ok to combine that with plain over the counter Mucinex or Allegra or Claritin or Zyrtec.  If for example you are taking Zyrtec -D, you can combine that with Mucinex, but not Mucinex-D.  If you are taking Mucinex-D, you can combine that with plain Allegra or Claritin or Zyrtec.   If you do have Hypertension or palpitations, it is safe to take Coricidin HBP for relief of sinus symptoms.  If not allergic, please take over the counter Tylenol (Acetaminophen) and/or Motrin (Ibuprofen) as directed for control of pain and/or fever.  Please follow up with your primary care doctor or specialist as needed.    If you  smoke, please stop smoking.    Influenza (Adult)    Influenza is also called the flu. It is a viral illness that affects the air passages of your lungs. It is different from the common cold. The flu can easily be passed from one to person to another. It may be spread through the air by coughing and sneezing. Or it can be spread by touching the sick person and then touching your own eyes, nose, or mouth.  The flu starts 1 to 3 days after you are exposed to the flu virus. It may last for 1 to 2 weeks but many people feel tired  or fatigued for many weeks afterward. You usually dont need to take antibiotics unless you have a complication. This might be an ear or sinus infection or pneumonia.  Symptoms of the flu may be mild or severe. They can include extreme tiredness (wanting to stay in bed all day), chills, fevers, muscle aches, soreness with eye movement, headache, and a dry, hacking cough.  Home care  Follow these guidelines when caring for yourself at home:  · Avoid being around cigarette smoke, whether yours or other peoples.  · Acetaminophen or ibuprofen will help ease your fever, muscle aches, and headache. Dont give aspirin to anyone younger than 18 who has the flu. Aspirin can harm the liver.  · Nausea and loss of appetite are common with the flu. Eat light meals. Drink 6 to 8 glasses of liquids every day. Good choices are water, sport drinks, soft drinks without caffeine, juices, tea, and soup. Extra fluids will also help loosen secretions in your nose and lungs.  · Over-the-counter cold medicines will not make the flu go away faster. But the medicines may help with coughing, sore throat, and congestion in your nose and sinuses. Dont use a decongestant if you have high blood pressure.  · Stay home until your fever has been gone for at least 24 hours without using medicine to reduce fever.  Follow-up care  Follow up with your healthcare provider, or as advised, if you are not getting better over the next week.  If you are age 65 or older, talk with your provider about getting a pneumococcal vaccine every 5 years. You should also get this vaccine if you have chronic asthma or COPD. All adults should get a flu vaccine every fall. Ask your provider about this.  When to seek medical advice  Call your healthcare provider right away if any of these occur:  · Cough with lots of colored mucus (sputum) or blood in your mucus  · Chest pain, shortness of breath, wheezing, or trouble breathing  · Severe headache, or face, neck, or ear  pain  · New rash with fever  · Fever of 100.4°F (38°C) or higher, or as directed by your healthcare provider  · Confusion, behavior change, or seizure  · Severe weakness or dizziness  · You get a new fever or cough after getting better for a few days  Date Last Reviewed: 1/1/2017  © 0999-9219 [employer]. [address] [place] [address], [place] [id number]. All rights reserved. This information is not intended as a substitute for professional medical care. Always follow your healthcare professional's instructions.

## 2019-02-18 NOTE — PROGRESS NOTES
Subjective:       Patient ID: Juan Gillespie is a 54 y.o. male.    Vitals:  weight is 115.7 kg (255 lb). His temperature is 98.3 °F (36.8 °C). His blood pressure is 147/89 (abnormal) and his pulse is 74. His oxygen saturation is 97%.     Chief Complaint: URI    Patient states he started feel that Friday night.  Had a temp max of 102.6.  Did not receive a flu vaccine this season.      URI    This is a new problem. The current episode started in the past 7 days (2-3 days). The problem has been gradually worsening. The maximum temperature recorded prior to his arrival was 102 - 102.9 F. The fever has been present for 1 to 2 days. Associated symptoms include congestion, coughing, headaches, rhinorrhea, sinus pain, sneezing and swollen glands. Pertinent negatives include no chest pain, diarrhea, dysuria, nausea, rash, sore throat or vomiting. He has tried antihistamine (mucinex) for the symptoms. The treatment provided mild relief.       Constitution: Positive for chills, fatigue, fever and generalized weakness.   HENT: Positive for congestion, postnasal drip, sinus pain and sinus pressure. Negative for sore throat.    Neck: Negative for painful lymph nodes.   Cardiovascular: Negative for chest pain and leg swelling.   Eyes: Negative for double vision and blurred vision.   Respiratory: Positive for cough. Negative for shortness of breath.    Gastrointestinal: Negative for nausea, vomiting and diarrhea.   Genitourinary: Negative for dysuria, frequency and urgency.   Musculoskeletal: Positive for muscle ache. Negative for joint pain, joint swelling and muscle cramps.   Skin: Negative for color change, pale and rash.   Allergic/Immunologic: Positive for sneezing. Negative for seasonal allergies.   Neurological: Positive for headaches. Negative for dizziness, history of vertigo, light-headedness and passing out.   Hematologic/Lymphatic: Negative for swollen lymph nodes, easy bruising/bleeding and history of blood clots. Does  not bruise/bleed easily.   Psychiatric/Behavioral: Negative for nervous/anxious, sleep disturbance and depression. The patient is not nervous/anxious.        Objective:      Physical Exam   Constitutional: He is oriented to person, place, and time. He appears well-developed and well-nourished. He is cooperative.  Non-toxic appearance. He appears ill. No distress.   HENT:   Head: Normocephalic and atraumatic.   Right Ear: Hearing, external ear and ear canal normal. A middle ear effusion is present.   Left Ear: Hearing, external ear and ear canal normal. A middle ear effusion is present.   Nose: Mucosal edema and rhinorrhea present. No nasal deformity. No epistaxis. Right sinus exhibits no maxillary sinus tenderness and no frontal sinus tenderness. Left sinus exhibits no maxillary sinus tenderness and no frontal sinus tenderness.   Mouth/Throat: Uvula is midline and mucous membranes are normal. No trismus in the jaw. Normal dentition. No uvula swelling. Posterior oropharyngeal erythema present.   Eyes: EOM and lids are normal. Pupils are equal, round, and reactive to light. Right conjunctiva is injected. Left conjunctiva is injected. No scleral icterus.   Sclera clear bilat   Neck: Trachea normal, normal range of motion, full passive range of motion without pain and phonation normal. Neck supple. No spinous process tenderness and no muscular tenderness present. No neck rigidity. Normal range of motion present.   Cardiovascular: Normal rate, regular rhythm, normal heart sounds, intact distal pulses and normal pulses.   Pulmonary/Chest: Effort normal and breath sounds normal. No respiratory distress.   Abdominal: Soft. Normal appearance and bowel sounds are normal. He exhibits no distension. There is no tenderness.   Musculoskeletal: Normal range of motion. He exhibits no edema or deformity.   Lymphadenopathy:     He has cervical adenopathy.        Right cervical: Superficial cervical adenopathy present.        Left  cervical: Superficial cervical adenopathy present.   Neurological: He is alert and oriented to person, place, and time. He exhibits normal muscle tone. Coordination normal.   Skin: Skin is warm, dry and intact. He is not diaphoretic. No pallor.   Psychiatric: He has a normal mood and affect. His speech is normal and behavior is normal. Judgment and thought content normal. Cognition and memory are normal.   Nursing note and vitals reviewed.      Results for orders placed or performed in visit on 02/18/19   POCT Influenza A/B   Result Value Ref Range    Rapid Influenza A Ag Positive (A) Negative    Rapid Influenza B Ag Negative Negative     Acceptable Yes      Assessment:       1. Influenza A    2. Body aches    3. Hypertension, unspecified type        Plan:         Influenza A  -     ondansetron (ZOFRAN-ODT) 4 MG TbDL; Take 1 tablet (4 mg total) by mouth every 6 (six) hours as needed (nausea).  Dispense: 12 tablet; Refill: 0  -     promethazine-codeine 6.25-10 mg/5 ml (PHENERGAN WITH CODEINE) 6.25-10 mg/5 mL syrup; Take 5 mLs by mouth every 6 (six) hours as needed for Cough.  Dispense: 240 mL; Refill: 0    Body aches  -     POCT Influenza A/B    Hypertension, unspecified type          Patient Instructions     Please drink plenty of fluids.  Please get plenty of rest.  Please return here or go to the Emergency Department for any concerns or worsening of condition.  Tamiflu prescription has been discussed and if prescribed, please take to completion unless you cannot tolerate the side effects.   If you were prescribed a narcotic medication, do not drive or operate heavy equipment or machinery while taking these medications.  If you were given a steroid shot in the clinic and have also been given a prescription for a steroid such as Prednisone or a Medrol Dose Pack, please begin taking them tomorrow.  If you do not have Hypertension or any history of palpitations, it is ok to take over the counter Sudafed  or Mucinex D or Allegra-D or Claritin-D or Zyrtec-D.  If you do take one of the above, it is ok to combine that with plain over the counter Mucinex or Allegra or Claritin or Zyrtec.  If for example you are taking Zyrtec -D, you can combine that with Mucinex, but not Mucinex-D.  If you are taking Mucinex-D, you can combine that with plain Allegra or Claritin or Zyrtec.   If you do have Hypertension or palpitations, it is safe to take Coricidin HBP for relief of sinus symptoms.  If not allergic, please take over the counter Tylenol (Acetaminophen) and/or Motrin (Ibuprofen) as directed for control of pain and/or fever.  Please follow up with your primary care doctor or specialist as needed.    If you  smoke, please stop smoking.    Influenza (Adult)    Influenza is also called the flu. It is a viral illness that affects the air passages of your lungs. It is different from the common cold. The flu can easily be passed from one to person to another. It may be spread through the air by coughing and sneezing. Or it can be spread by touching the sick person and then touching your own eyes, nose, or mouth.  The flu starts 1 to 3 days after you are exposed to the flu virus. It may last for 1 to 2 weeks but many people feel tired or fatigued for many weeks afterward. You usually dont need to take antibiotics unless you have a complication. This might be an ear or sinus infection or pneumonia.  Symptoms of the flu may be mild or severe. They can include extreme tiredness (wanting to stay in bed all day), chills, fevers, muscle aches, soreness with eye movement, headache, and a dry, hacking cough.  Home care  Follow these guidelines when caring for yourself at home:  · Avoid being around cigarette smoke, whether yours or other peoples.  · Acetaminophen or ibuprofen will help ease your fever, muscle aches, and headache. Dont give aspirin to anyone younger than 18 who has the flu. Aspirin can harm the liver.  · Nausea and loss  of appetite are common with the flu. Eat light meals. Drink 6 to 8 glasses of liquids every day. Good choices are water, sport drinks, soft drinks without caffeine, juices, tea, and soup. Extra fluids will also help loosen secretions in your nose and lungs.  · Over-the-counter cold medicines will not make the flu go away faster. But the medicines may help with coughing, sore throat, and congestion in your nose and sinuses. Dont use a decongestant if you have high blood pressure.  · Stay home until your fever has been gone for at least 24 hours without using medicine to reduce fever.  Follow-up care  Follow up with your healthcare provider, or as advised, if you are not getting better over the next week.  If you are age 65 or older, talk with your provider about getting a pneumococcal vaccine every 5 years. You should also get this vaccine if you have chronic asthma or COPD. All adults should get a flu vaccine every fall. Ask your provider about this.  When to seek medical advice  Call your healthcare provider right away if any of these occur:  · Cough with lots of colored mucus (sputum) or blood in your mucus  · Chest pain, shortness of breath, wheezing, or trouble breathing  · Severe headache, or face, neck, or ear pain  · New rash with fever  · Fever of 100.4°F (38°C) or higher, or as directed by your healthcare provider  · Confusion, behavior change, or seizure  · Severe weakness or dizziness  · You get a new fever or cough after getting better for a few days  Date Last Reviewed: 1/1/2017  © 1661-6337 [employer]. [address] [place] [address], [place] [address]. All rights reserved. This information is not intended as a substitute for professional medical care. Always follow your healthcare professional's instructions.

## 2021-04-18 ENCOUNTER — OFFICE VISIT (OUTPATIENT)
Dept: URGENT CARE | Facility: CLINIC | Age: 56
End: 2021-04-18
Payer: MEDICAID

## 2021-04-18 VITALS
OXYGEN SATURATION: 97 % | TEMPERATURE: 98 F | HEART RATE: 74 BPM | DIASTOLIC BLOOD PRESSURE: 107 MMHG | SYSTOLIC BLOOD PRESSURE: 188 MMHG

## 2021-04-18 DIAGNOSIS — L23.7 POISON IVY DERMATITIS: Primary | ICD-10-CM

## 2021-04-18 DIAGNOSIS — M10.9 ACUTE GOUT INVOLVING TOE OF RIGHT FOOT, UNSPECIFIED CAUSE: ICD-10-CM

## 2021-04-18 PROCEDURE — 99213 OFFICE O/P EST LOW 20 MIN: CPT | Mod: S$GLB,,, | Performed by: INTERNAL MEDICINE

## 2021-04-18 PROCEDURE — 99213 PR OFFICE/OUTPT VISIT, EST, LEVL III, 20-29 MIN: ICD-10-PCS | Mod: S$GLB,,, | Performed by: INTERNAL MEDICINE

## 2021-04-18 RX ORDER — DEXAMETHASONE SODIUM PHOSPHATE 100 MG/10ML
10 INJECTION INTRAMUSCULAR; INTRAVENOUS
Status: COMPLETED | OUTPATIENT
Start: 2021-04-18 | End: 2021-04-18

## 2021-04-18 RX ORDER — CLOBETASOL PROPIONATE 0.5 MG/G
CREAM TOPICAL 2 TIMES DAILY
Qty: 15 G | Refills: 0 | Status: ON HOLD | OUTPATIENT
Start: 2021-04-18 | End: 2022-03-21

## 2021-04-18 RX ORDER — INDOMETHACIN 50 MG/1
50 CAPSULE ORAL
Qty: 21 CAPSULE | Refills: 0 | Status: SHIPPED | OUTPATIENT
Start: 2021-04-18 | End: 2022-07-23 | Stop reason: SDUPTHER

## 2021-04-18 RX ADMIN — DEXAMETHASONE SODIUM PHOSPHATE 10 MG: 100 INJECTION INTRAMUSCULAR; INTRAVENOUS at 10:04

## 2022-03-19 ENCOUNTER — HOSPITAL ENCOUNTER (INPATIENT)
Facility: HOSPITAL | Age: 57
LOS: 5 days | Discharge: HOME | DRG: 180 | End: 2022-03-24
Attending: EMERGENCY MEDICINE | Admitting: EMERGENCY MEDICINE
Payer: MEDICAID

## 2022-03-19 DIAGNOSIS — R07.89 OTHER CHEST PAIN: ICD-10-CM

## 2022-03-19 DIAGNOSIS — E11.69 TYPE 2 DIABETES MELLITUS WITH OTHER SPECIFIED COMPLICATION, WITHOUT LONG-TERM CURRENT USE OF INSULIN: ICD-10-CM

## 2022-03-19 DIAGNOSIS — J98.59 MEDIASTINAL MASS: ICD-10-CM

## 2022-03-19 DIAGNOSIS — R91.8 LUNG MASS: ICD-10-CM

## 2022-03-19 DIAGNOSIS — E23.6 PITUITARY MASS: ICD-10-CM

## 2022-03-19 DIAGNOSIS — R06.02 SHORTNESS OF BREATH: ICD-10-CM

## 2022-03-19 DIAGNOSIS — J96.01 ACUTE HYPOXEMIC RESPIRATORY FAILURE: ICD-10-CM

## 2022-03-19 DIAGNOSIS — R09.1 PLEURISY: ICD-10-CM

## 2022-03-19 DIAGNOSIS — I10 PRIMARY HYPERTENSION: ICD-10-CM

## 2022-03-19 DIAGNOSIS — R59.0 MEDIASTINAL ADENOPATHY: ICD-10-CM

## 2022-03-19 DIAGNOSIS — R06.00 DYSPNEA, UNSPECIFIED TYPE: ICD-10-CM

## 2022-03-19 DIAGNOSIS — R05.9 COUGH: ICD-10-CM

## 2022-03-19 DIAGNOSIS — R07.9 CHEST PAIN: ICD-10-CM

## 2022-03-19 DIAGNOSIS — R06.02 SOB (SHORTNESS OF BREATH): ICD-10-CM

## 2022-03-19 DIAGNOSIS — R06.09 DOE (DYSPNEA ON EXERTION): Primary | ICD-10-CM

## 2022-03-19 LAB
ALBUMIN SERPL BCP-MCNC: 3.9 G/DL (ref 3.5–5.2)
ALP SERPL-CCNC: 88 U/L (ref 55–135)
ALT SERPL W/O P-5'-P-CCNC: 33 U/L (ref 10–44)
ANION GAP SERPL CALC-SCNC: 13 MMOL/L (ref 8–16)
AST SERPL-CCNC: 25 U/L (ref 10–40)
BASOPHILS # BLD AUTO: 0.15 K/UL (ref 0–0.2)
BASOPHILS NFR BLD: 1.2 % (ref 0–1.9)
BILIRUB SERPL-MCNC: 0.7 MG/DL (ref 0.1–1)
BNP SERPL-MCNC: <10 PG/ML (ref 0–99)
BUN SERPL-MCNC: 25 MG/DL (ref 6–20)
BUN SERPL-MCNC: 29 MG/DL (ref 6–30)
CALCIUM SERPL-MCNC: 9.9 MG/DL (ref 8.7–10.5)
CHLORIDE SERPL-SCNC: 100 MMOL/L (ref 95–110)
CHLORIDE SERPL-SCNC: 101 MMOL/L (ref 95–110)
CO2 SERPL-SCNC: 25 MMOL/L (ref 23–29)
CREAT SERPL-MCNC: 1.2 MG/DL (ref 0.5–1.4)
CREAT SERPL-MCNC: 1.3 MG/DL (ref 0.5–1.4)
CTP QC/QA: YES
DIFFERENTIAL METHOD: ABNORMAL
EOSINOPHIL # BLD AUTO: 0.6 K/UL (ref 0–0.5)
EOSINOPHIL NFR BLD: 5 % (ref 0–8)
ERYTHROCYTE [DISTWIDTH] IN BLOOD BY AUTOMATED COUNT: 12.6 % (ref 11.5–14.5)
EST. GFR  (AFRICAN AMERICAN): >60 ML/MIN/1.73 M^2
EST. GFR  (NON AFRICAN AMERICAN): >60 ML/MIN/1.73 M^2
GLUCOSE SERPL-MCNC: 135 MG/DL (ref 70–110)
GLUCOSE SERPL-MCNC: 140 MG/DL (ref 70–110)
HCT VFR BLD AUTO: 48.1 % (ref 40–54)
HCT VFR BLD CALC: 50 %PCV (ref 36–54)
HGB BLD-MCNC: 16.2 G/DL (ref 14–18)
IMM GRANULOCYTES # BLD AUTO: 0.21 K/UL (ref 0–0.04)
IMM GRANULOCYTES NFR BLD AUTO: 1.7 % (ref 0–0.5)
LYMPHOCYTES # BLD AUTO: 1.9 K/UL (ref 1–4.8)
LYMPHOCYTES NFR BLD: 15 % (ref 18–48)
MCH RBC QN AUTO: 29.9 PG (ref 27–31)
MCHC RBC AUTO-ENTMCNC: 33.7 G/DL (ref 32–36)
MCV RBC AUTO: 89 FL (ref 82–98)
MONOCYTES # BLD AUTO: 1 K/UL (ref 0.3–1)
MONOCYTES NFR BLD: 7.8 % (ref 4–15)
NEUTROPHILS # BLD AUTO: 8.6 K/UL (ref 1.8–7.7)
NEUTROPHILS NFR BLD: 69.3 % (ref 38–73)
NRBC BLD-RTO: 0 /100 WBC
PLATELET # BLD AUTO: 353 K/UL (ref 150–450)
PMV BLD AUTO: 10.2 FL (ref 9.2–12.9)
POC IONIZED CALCIUM: 1.17 MMOL/L (ref 1.06–1.42)
POC TCO2 (MEASURED): 26 MMOL/L (ref 23–29)
POTASSIUM BLD-SCNC: 4.1 MMOL/L (ref 3.5–5.1)
POTASSIUM SERPL-SCNC: 4.2 MMOL/L (ref 3.5–5.1)
PROT SERPL-MCNC: 7.7 G/DL (ref 6–8.4)
RBC # BLD AUTO: 5.42 M/UL (ref 4.6–6.2)
SAMPLE: ABNORMAL
SARS-COV-2 RDRP RESP QL NAA+PROBE: NEGATIVE
SODIUM BLD-SCNC: 138 MMOL/L (ref 136–145)
SODIUM SERPL-SCNC: 138 MMOL/L (ref 136–145)
TROPONIN I SERPL DL<=0.01 NG/ML-MCNC: <0.006 NG/ML (ref 0–0.03)
WBC # BLD AUTO: 12.42 K/UL (ref 3.9–12.7)

## 2022-03-19 PROCEDURE — 83880 ASSAY OF NATRIURETIC PEPTIDE: CPT | Performed by: EMERGENCY MEDICINE

## 2022-03-19 PROCEDURE — 20600001 HC STEP DOWN PRIVATE ROOM

## 2022-03-19 PROCEDURE — 85025 COMPLETE CBC W/AUTO DIFF WBC: CPT | Performed by: EMERGENCY MEDICINE

## 2022-03-19 PROCEDURE — 87040 BLOOD CULTURE FOR BACTERIA: CPT | Mod: 59 | Performed by: EMERGENCY MEDICINE

## 2022-03-19 PROCEDURE — 80047 BASIC METABLC PNL IONIZED CA: CPT

## 2022-03-19 PROCEDURE — 94761 N-INVAS EAR/PLS OXIMETRY MLT: CPT

## 2022-03-19 PROCEDURE — U0002 COVID-19 LAB TEST NON-CDC: HCPCS | Performed by: EMERGENCY MEDICINE

## 2022-03-19 PROCEDURE — G0378 HOSPITAL OBSERVATION PER HR: HCPCS

## 2022-03-19 PROCEDURE — 93010 ELECTROCARDIOGRAM REPORT: CPT | Mod: ,,, | Performed by: INTERNAL MEDICINE

## 2022-03-19 PROCEDURE — 84484 ASSAY OF TROPONIN QUANT: CPT | Performed by: EMERGENCY MEDICINE

## 2022-03-19 PROCEDURE — 25000242 PHARM REV CODE 250 ALT 637 W/ HCPCS: Performed by: EMERGENCY MEDICINE

## 2022-03-19 PROCEDURE — 93010 EKG 12-LEAD: ICD-10-PCS | Mod: ,,, | Performed by: INTERNAL MEDICINE

## 2022-03-19 PROCEDURE — 63600175 PHARM REV CODE 636 W HCPCS: Performed by: EMERGENCY MEDICINE

## 2022-03-19 PROCEDURE — 96374 THER/PROPH/DIAG INJ IV PUSH: CPT

## 2022-03-19 PROCEDURE — 81003 URINALYSIS AUTO W/O SCOPE: CPT | Performed by: EMERGENCY MEDICINE

## 2022-03-19 PROCEDURE — 93005 ELECTROCARDIOGRAM TRACING: CPT

## 2022-03-19 PROCEDURE — 99284 PR EMERGENCY DEPT VISIT,LEVEL IV: ICD-10-PCS | Mod: CS,,, | Performed by: EMERGENCY MEDICINE

## 2022-03-19 PROCEDURE — 94640 AIRWAY INHALATION TREATMENT: CPT

## 2022-03-19 PROCEDURE — 99284 EMERGENCY DEPT VISIT MOD MDM: CPT | Mod: CS,,, | Performed by: EMERGENCY MEDICINE

## 2022-03-19 PROCEDURE — 99285 EMERGENCY DEPT VISIT HI MDM: CPT | Mod: 25

## 2022-03-19 PROCEDURE — 80053 COMPREHEN METABOLIC PANEL: CPT | Performed by: EMERGENCY MEDICINE

## 2022-03-19 RX ORDER — METHYLPREDNISOLONE SOD SUCC 125 MG
125 VIAL (EA) INJECTION
Status: COMPLETED | OUTPATIENT
Start: 2022-03-19 | End: 2022-03-19

## 2022-03-19 RX ORDER — IPRATROPIUM BROMIDE AND ALBUTEROL SULFATE 2.5; .5 MG/3ML; MG/3ML
3 SOLUTION RESPIRATORY (INHALATION)
Status: DISCONTINUED | OUTPATIENT
Start: 2022-03-20 | End: 2022-03-21

## 2022-03-19 RX ORDER — AMLODIPINE BESYLATE 10 MG/1
10 TABLET ORAL DAILY
Status: DISCONTINUED | OUTPATIENT
Start: 2022-03-20 | End: 2022-03-24

## 2022-03-19 RX ORDER — SODIUM CHLORIDE 0.9 % (FLUSH) 0.9 %
10 SYRINGE (ML) INJECTION
Status: DISCONTINUED | OUTPATIENT
Start: 2022-03-19 | End: 2022-03-19

## 2022-03-19 RX ORDER — ENOXAPARIN SODIUM 100 MG/ML
40 INJECTION SUBCUTANEOUS EVERY 24 HOURS
Status: DISCONTINUED | OUTPATIENT
Start: 2022-03-20 | End: 2022-03-24 | Stop reason: HOSPADM

## 2022-03-19 RX ORDER — INSULIN ASPART 100 [IU]/ML
0-5 INJECTION, SOLUTION INTRAVENOUS; SUBCUTANEOUS
Status: DISCONTINUED | OUTPATIENT
Start: 2022-03-20 | End: 2022-03-23

## 2022-03-19 RX ORDER — TALC
6 POWDER (GRAM) TOPICAL NIGHTLY PRN
Status: DISCONTINUED | OUTPATIENT
Start: 2022-03-19 | End: 2022-03-24 | Stop reason: HOSPADM

## 2022-03-19 RX ORDER — GLUCAGON 1 MG
1 KIT INJECTION
Status: DISCONTINUED | OUTPATIENT
Start: 2022-03-20 | End: 2022-03-24 | Stop reason: HOSPADM

## 2022-03-19 RX ORDER — SODIUM CHLORIDE 0.9 % (FLUSH) 0.9 %
10 SYRINGE (ML) INJECTION EVERY 12 HOURS PRN
Status: DISCONTINUED | OUTPATIENT
Start: 2022-03-20 | End: 2022-03-24 | Stop reason: HOSPADM

## 2022-03-19 RX ORDER — IBUPROFEN 200 MG
16 TABLET ORAL
Status: DISCONTINUED | OUTPATIENT
Start: 2022-03-20 | End: 2022-03-19

## 2022-03-19 RX ORDER — NALOXONE HCL 0.4 MG/ML
0.02 VIAL (ML) INJECTION
Status: DISCONTINUED | OUTPATIENT
Start: 2022-03-20 | End: 2022-03-24 | Stop reason: HOSPADM

## 2022-03-19 RX ORDER — IPRATROPIUM BROMIDE AND ALBUTEROL SULFATE 2.5; .5 MG/3ML; MG/3ML
3 SOLUTION RESPIRATORY (INHALATION)
Status: COMPLETED | OUTPATIENT
Start: 2022-03-19 | End: 2022-03-19

## 2022-03-19 RX ORDER — BENZONATATE 100 MG/1
100 CAPSULE ORAL 3 TIMES DAILY PRN
Status: DISCONTINUED | OUTPATIENT
Start: 2022-03-19 | End: 2022-03-20

## 2022-03-19 RX ORDER — IBUPROFEN 200 MG
24 TABLET ORAL
Status: DISCONTINUED | OUTPATIENT
Start: 2022-03-20 | End: 2022-03-24 | Stop reason: HOSPADM

## 2022-03-19 RX ORDER — LOSARTAN POTASSIUM AND HYDROCHLOROTHIAZIDE 12.5; 5 MG/1; MG/1
1 TABLET ORAL DAILY
Status: DISCONTINUED | OUTPATIENT
Start: 2022-03-20 | End: 2022-03-21

## 2022-03-19 RX ADMIN — IOHEXOL 100 ML: 350 INJECTION, SOLUTION INTRAVENOUS at 11:03

## 2022-03-19 RX ADMIN — IPRATROPIUM BROMIDE AND ALBUTEROL SULFATE 3 ML: 2.5; .5 SOLUTION RESPIRATORY (INHALATION) at 08:03

## 2022-03-19 RX ADMIN — METHYLPREDNISOLONE SODIUM SUCCINATE 125 MG: 125 INJECTION, POWDER, FOR SOLUTION INTRAMUSCULAR; INTRAVENOUS at 08:03

## 2022-03-20 PROBLEM — R06.00 DYSPNEA: Status: ACTIVE | Noted: 2022-03-20

## 2022-03-20 PROBLEM — E11.9 TYPE 2 DIABETES MELLITUS, WITHOUT LONG-TERM CURRENT USE OF INSULIN: Status: ACTIVE | Noted: 2022-03-20

## 2022-03-20 PROBLEM — I10 PRIMARY HYPERTENSION: Status: ACTIVE | Noted: 2022-03-20

## 2022-03-20 LAB
ALBUMIN SERPL BCP-MCNC: 3.7 G/DL (ref 3.5–5.2)
ALP SERPL-CCNC: 85 U/L (ref 55–135)
ALT SERPL W/O P-5'-P-CCNC: 32 U/L (ref 10–44)
ANION GAP SERPL CALC-SCNC: 11 MMOL/L (ref 8–16)
ASCENDING AORTA: 3.12 CM
AST SERPL-CCNC: 20 U/L (ref 10–40)
AV INDEX (PROSTH): 0.84
AV MEAN GRADIENT: 5 MMHG
AV PEAK GRADIENT: 7 MMHG
AV VALVE AREA: 3.58 CM2
AV VELOCITY RATIO: 0.8
BASOPHILS # BLD AUTO: 0.03 K/UL (ref 0–0.2)
BASOPHILS NFR BLD: 0.4 % (ref 0–1.9)
BILIRUB SERPL-MCNC: 0.7 MG/DL (ref 0.1–1)
BILIRUB UR QL STRIP: NEGATIVE
BSA FOR ECHO PROCEDURE: 2.32 M2
BUN SERPL-MCNC: 25 MG/DL (ref 6–20)
CALCIUM SERPL-MCNC: 10 MG/DL (ref 8.7–10.5)
CHLORIDE SERPL-SCNC: 102 MMOL/L (ref 95–110)
CLARITY UR REFRACT.AUTO: CLEAR
CO2 SERPL-SCNC: 25 MMOL/L (ref 23–29)
COLOR UR AUTO: YELLOW
CREAT SERPL-MCNC: 1.3 MG/DL (ref 0.5–1.4)
CV ECHO LV RWT: 0.4 CM
DIFFERENTIAL METHOD: ABNORMAL
DOP CALC AO PEAK VEL: 1.36 M/S
DOP CALC AO VTI: 19.51 CM
DOP CALC LVOT AREA: 4.3 CM2
DOP CALC LVOT DIAMETER: 2.33 CM
DOP CALC LVOT PEAK VEL: 1.09 M/S
DOP CALC LVOT STROKE VOLUME: 69.81 CM3
DOP CALCLVOT PEAK VEL VTI: 16.38 CM
E WAVE DECELERATION TIME: 124.15 MSEC
E/A RATIO: 1.83
E/E' RATIO: 7.19 M/S
ECHO LV POSTERIOR WALL: 1.02 CM (ref 0.6–1.1)
EJECTION FRACTION: 55 %
EOSINOPHIL # BLD AUTO: 0 K/UL (ref 0–0.5)
EOSINOPHIL NFR BLD: 0.1 % (ref 0–8)
ERYTHROCYTE [DISTWIDTH] IN BLOOD BY AUTOMATED COUNT: 12.2 % (ref 11.5–14.5)
EST. GFR  (AFRICAN AMERICAN): >60 ML/MIN/1.73 M^2
EST. GFR  (NON AFRICAN AMERICAN): >60 ML/MIN/1.73 M^2
ESTIMATED AVG GLUCOSE: 128 MG/DL (ref 68–131)
FRACTIONAL SHORTENING: 39 % (ref 28–44)
GLUCOSE SERPL-MCNC: 241 MG/DL (ref 70–110)
GLUCOSE UR QL STRIP: NEGATIVE
HBA1C MFR BLD: 6.1 % (ref 4–5.6)
HCT VFR BLD AUTO: 47.7 % (ref 40–54)
HGB BLD-MCNC: 15.4 G/DL (ref 14–18)
HGB UR QL STRIP: NEGATIVE
IMM GRANULOCYTES # BLD AUTO: 0.11 K/UL (ref 0–0.04)
IMM GRANULOCYTES NFR BLD AUTO: 1.4 % (ref 0–0.5)
INTERVENTRICULAR SEPTUM: 0.87 CM (ref 0.6–1.1)
IVRT: 74.22 MSEC
KETONES UR QL STRIP: NEGATIVE
LA MAJOR: 5 CM
LA MINOR: 5.43 CM
LA WIDTH: 4.17 CM
LEFT ATRIUM SIZE: 4.02 CM
LEFT ATRIUM VOLUME INDEX MOD: 21.7 ML/M2
LEFT ATRIUM VOLUME INDEX: 33.4 ML/M2
LEFT ATRIUM VOLUME MOD: 48.07 CM3
LEFT ATRIUM VOLUME: 74.18 CM3
LEFT INTERNAL DIMENSION IN SYSTOLE: 3.12 CM (ref 2.1–4)
LEFT VENTRICLE DIASTOLIC VOLUME INDEX: 57.04 ML/M2
LEFT VENTRICLE DIASTOLIC VOLUME: 126.62 ML
LEFT VENTRICLE MASS INDEX: 80 G/M2
LEFT VENTRICLE SYSTOLIC VOLUME INDEX: 17.3 ML/M2
LEFT VENTRICLE SYSTOLIC VOLUME: 38.47 ML
LEFT VENTRICULAR INTERNAL DIMENSION IN DIASTOLE: 5.15 CM (ref 3.5–6)
LEFT VENTRICULAR MASS: 177.25 G
LEUKOCYTE ESTERASE UR QL STRIP: NEGATIVE
LV LATERAL E/E' RATIO: 6.93 M/S
LV SEPTAL E/E' RATIO: 7.46 M/S
LYMPHOCYTES # BLD AUTO: 0.5 K/UL (ref 1–4.8)
LYMPHOCYTES NFR BLD: 6.7 % (ref 18–48)
MAGNESIUM SERPL-MCNC: 2.2 MG/DL (ref 1.6–2.6)
MCH RBC QN AUTO: 29.3 PG (ref 27–31)
MCHC RBC AUTO-ENTMCNC: 32.3 G/DL (ref 32–36)
MCV RBC AUTO: 91 FL (ref 82–98)
MONOCYTES # BLD AUTO: 0.1 K/UL (ref 0.3–1)
MONOCYTES NFR BLD: 0.6 % (ref 4–15)
MV PEAK A VEL: 0.53 M/S
MV PEAK E VEL: 0.97 M/S
MV STENOSIS PRESSURE HALF TIME: 36 MS
MV VALVE AREA P 1/2 METHOD: 6.11 CM2
NEUTROPHILS # BLD AUTO: 7.2 K/UL (ref 1.8–7.7)
NEUTROPHILS NFR BLD: 90.8 % (ref 38–73)
NITRITE UR QL STRIP: NEGATIVE
NRBC BLD-RTO: 0 /100 WBC
PH UR STRIP: 5 [PH] (ref 5–8)
PHOSPHATE SERPL-MCNC: 2.4 MG/DL (ref 2.7–4.5)
PLATELET # BLD AUTO: 322 K/UL (ref 150–450)
PMV BLD AUTO: 10.4 FL (ref 9.2–12.9)
POCT GLUCOSE: 165 MG/DL (ref 70–110)
POCT GLUCOSE: 172 MG/DL (ref 70–110)
POCT GLUCOSE: 223 MG/DL (ref 70–110)
POCT GLUCOSE: 251 MG/DL (ref 70–110)
POTASSIUM SERPL-SCNC: 4.5 MMOL/L (ref 3.5–5.1)
PROCALCITONIN SERPL IA-MCNC: 0.07 NG/ML
PROT SERPL-MCNC: 7.4 G/DL (ref 6–8.4)
PROT UR QL STRIP: NEGATIVE
RA MAJOR: 4.3 CM
RA PRESSURE: 3 MMHG
RA WIDTH: 3.46 CM
RBC # BLD AUTO: 5.25 M/UL (ref 4.6–6.2)
RIGHT VENTRICULAR END-DIASTOLIC DIMENSION: 3.48 CM
RV TISSUE DOPPLER FREE WALL SYSTOLIC VELOCITY 1 (APICAL 4 CHAMBER VIEW): 19.76 CM/S
SINUS: 3.19 CM
SODIUM SERPL-SCNC: 138 MMOL/L (ref 136–145)
SP GR UR STRIP: >1.03 (ref 1–1.03)
STJ: 2.55 CM
TDI LATERAL: 0.14 M/S
TDI SEPTAL: 0.13 M/S
TDI: 0.14 M/S
TRICUSPID ANNULAR PLANE SYSTOLIC EXCURSION: 2.92 CM
URN SPEC COLLECT METH UR: ABNORMAL
WBC # BLD AUTO: 7.92 K/UL (ref 3.9–12.7)

## 2022-03-20 PROCEDURE — 99900035 HC TECH TIME PER 15 MIN (STAT)

## 2022-03-20 PROCEDURE — 99220 PR INITIAL OBSERVATION CARE,LEVL III: CPT | Mod: ,,, | Performed by: INTERNAL MEDICINE

## 2022-03-20 PROCEDURE — 94640 AIRWAY INHALATION TREATMENT: CPT

## 2022-03-20 PROCEDURE — 84145 PROCALCITONIN (PCT): CPT

## 2022-03-20 PROCEDURE — 83036 HEMOGLOBIN GLYCOSYLATED A1C: CPT

## 2022-03-20 PROCEDURE — 99204 PR OFFICE/OUTPT VISIT, NEW, LEVL IV, 45-59 MIN: ICD-10-PCS | Mod: ,,, | Performed by: INTERNAL MEDICINE

## 2022-03-20 PROCEDURE — 25500020 PHARM REV CODE 255: Performed by: HOSPITALIST

## 2022-03-20 PROCEDURE — C9399 UNCLASSIFIED DRUGS OR BIOLOG: HCPCS | Performed by: STUDENT IN AN ORGANIZED HEALTH CARE EDUCATION/TRAINING PROGRAM

## 2022-03-20 PROCEDURE — 99220 PR INITIAL OBSERVATION CARE,LEVL III: ICD-10-PCS | Mod: ,,, | Performed by: INTERNAL MEDICINE

## 2022-03-20 PROCEDURE — 87389 HIV-1 AG W/HIV-1&-2 AB AG IA: CPT

## 2022-03-20 PROCEDURE — 63600175 PHARM REV CODE 636 W HCPCS

## 2022-03-20 PROCEDURE — 85025 COMPLETE CBC W/AUTO DIFF WBC: CPT

## 2022-03-20 PROCEDURE — 83735 ASSAY OF MAGNESIUM: CPT

## 2022-03-20 PROCEDURE — 84100 ASSAY OF PHOSPHORUS: CPT

## 2022-03-20 PROCEDURE — 36415 COLL VENOUS BLD VENIPUNCTURE: CPT

## 2022-03-20 PROCEDURE — 20600001 HC STEP DOWN PRIVATE ROOM

## 2022-03-20 PROCEDURE — 80053 COMPREHEN METABOLIC PANEL: CPT

## 2022-03-20 PROCEDURE — 94761 N-INVAS EAR/PLS OXIMETRY MLT: CPT

## 2022-03-20 PROCEDURE — 25000003 PHARM REV CODE 250

## 2022-03-20 PROCEDURE — G0378 HOSPITAL OBSERVATION PER HR: HCPCS

## 2022-03-20 PROCEDURE — 96372 THER/PROPH/DIAG INJ SC/IM: CPT

## 2022-03-20 PROCEDURE — 25000003 PHARM REV CODE 250: Performed by: STUDENT IN AN ORGANIZED HEALTH CARE EDUCATION/TRAINING PROGRAM

## 2022-03-20 PROCEDURE — 25000242 PHARM REV CODE 250 ALT 637 W/ HCPCS

## 2022-03-20 PROCEDURE — 99204 OFFICE O/P NEW MOD 45 MIN: CPT | Mod: ,,, | Performed by: INTERNAL MEDICINE

## 2022-03-20 PROCEDURE — 96372 THER/PROPH/DIAG INJ SC/IM: CPT | Performed by: STUDENT IN AN ORGANIZED HEALTH CARE EDUCATION/TRAINING PROGRAM

## 2022-03-20 PROCEDURE — 27000221 HC OXYGEN, UP TO 24 HOURS

## 2022-03-20 RX ORDER — BENZONATATE 100 MG/1
200 CAPSULE ORAL 3 TIMES DAILY PRN
Status: DISCONTINUED | OUTPATIENT
Start: 2022-03-20 | End: 2022-03-24 | Stop reason: HOSPADM

## 2022-03-20 RX ORDER — FUROSEMIDE 20 MG/1
20 TABLET ORAL DAILY
Status: DISCONTINUED | OUTPATIENT
Start: 2022-03-20 | End: 2022-03-24 | Stop reason: HOSPADM

## 2022-03-20 RX ORDER — MONTELUKAST SODIUM 10 MG/1
10 TABLET ORAL DAILY
Status: DISCONTINUED | OUTPATIENT
Start: 2022-03-20 | End: 2022-03-24 | Stop reason: HOSPADM

## 2022-03-20 RX ORDER — ACETAMINOPHEN 325 MG/1
650 TABLET ORAL EVERY 4 HOURS PRN
Status: DISCONTINUED | OUTPATIENT
Start: 2022-03-20 | End: 2022-03-24 | Stop reason: HOSPADM

## 2022-03-20 RX ADMIN — AMLODIPINE BESYLATE 10 MG: 10 TABLET ORAL at 09:03

## 2022-03-20 RX ADMIN — MONTELUKAST 10 MG: 10 TABLET, FILM COATED ORAL at 09:03

## 2022-03-20 RX ADMIN — INSULIN DETEMIR 5 UNITS: 100 INJECTION, SOLUTION SUBCUTANEOUS at 09:03

## 2022-03-20 RX ADMIN — IPRATROPIUM BROMIDE AND ALBUTEROL SULFATE 3 ML: 2.5; .5 SOLUTION RESPIRATORY (INHALATION) at 12:03

## 2022-03-20 RX ADMIN — INSULIN ASPART 3 UNITS: 100 INJECTION, SOLUTION INTRAVENOUS; SUBCUTANEOUS at 09:03

## 2022-03-20 RX ADMIN — IPRATROPIUM BROMIDE AND ALBUTEROL SULFATE 3 ML: 2.5; .5 SOLUTION RESPIRATORY (INHALATION) at 08:03

## 2022-03-20 RX ADMIN — BENZONATATE 200 MG: 100 CAPSULE ORAL at 11:03

## 2022-03-20 RX ADMIN — LOSARTAN POTASSIUM AND HYDROCHLOROTHIAZIDE 1 TABLET: 50; 12.5 TABLET, FILM COATED ORAL at 10:03

## 2022-03-20 RX ADMIN — FUROSEMIDE 20 MG: 20 TABLET ORAL at 09:03

## 2022-03-20 RX ADMIN — BENZONATATE 200 MG: 100 CAPSULE ORAL at 03:03

## 2022-03-20 RX ADMIN — ENOXAPARIN SODIUM 40 MG: 100 INJECTION SUBCUTANEOUS at 04:03

## 2022-03-20 RX ADMIN — INSULIN ASPART 2 UNITS: 100 INJECTION, SOLUTION INTRAVENOUS; SUBCUTANEOUS at 12:03

## 2022-03-20 RX ADMIN — IPRATROPIUM BROMIDE AND ALBUTEROL SULFATE 3 ML: 2.5; .5 SOLUTION RESPIRATORY (INHALATION) at 09:03

## 2022-03-20 NOTE — SUBJECTIVE & OBJECTIVE
Past Medical History:   Diagnosis Date    Diabetes mellitus, type 2     Hypertension        No past surgical history on file.    Review of patient's allergies indicates:  No Known Allergies    No current facility-administered medications on file prior to encounter.     Current Outpatient Medications on File Prior to Encounter   Medication Sig    clobetasoL (TEMOVATE) 0.05 % cream Apply topically 2 (two) times daily.    losartan-hydrochlorothiazide 50-12.5 mg (HYZAAR) 50-12.5 mg per tablet     ondansetron (ZOFRAN-ODT) 4 MG TbDL Take 1 tablet (4 mg total) by mouth every 6 (six) hours as needed (nausea). (Patient not taking: Reported on 4/18/2021)    TRUE METRIX GLUCOSE TEST STRIP Strp     TRUEPLUS LANCETS 33 gauge Misc      Family History       Problem Relation (Age of Onset)    No Known Problems Mother, Father          Tobacco Use    Smoking status: Never Smoker    Smokeless tobacco: Never Used   Substance and Sexual Activity    Alcohol use: No    Drug use: No    Sexual activity: Not Currently     Review of Systems   Constitutional:  Negative for chills, fatigue, fever and unexpected weight change.   HENT:  Negative for congestion, sneezing, sore throat, trouble swallowing and voice change.    Respiratory:  Positive for cough, chest tightness and shortness of breath. Negative for choking and wheezing.    Cardiovascular:  Positive for chest pain. Negative for palpitations and leg swelling.   Gastrointestinal:  Negative for abdominal distention, abdominal pain, constipation, diarrhea and nausea.   Genitourinary:  Negative for difficulty urinating and dysuria.   Musculoskeletal: Negative.    Skin: Negative.    Neurological: Negative.    Psychiatric/Behavioral: Negative.     Objective:     Vital Signs (Most Recent):  Temp: 98.4 °F (36.9 °C) (03/20/22 0010)  Pulse: 84 (03/20/22 0010)  Resp: 16 (03/20/22 0010)  BP: (!) 157/82 (03/20/22 0010)  SpO2: (!) 94 % (03/20/22 0010)   Vital Signs (24h Range):  Temp:  [98.3 °F (36.8  °C)-98.4 °F (36.9 °C)] 98.4 °F (36.9 °C)  Pulse:  [77-98] 84  Resp:  [16-18] 16  SpO2:  [93 %-99 %] 94 %  BP: (157-171)/(82-87) 157/82     Weight: 113.4 kg (250 lb)  Body mass index is 39.16 kg/m².    Physical Exam  Vitals and nursing note reviewed.   Constitutional:       Appearance: Normal appearance.   HENT:      Head: Normocephalic and atraumatic.      Nose: Nose normal.      Mouth/Throat:      Mouth: Mucous membranes are moist.      Pharynx: Oropharynx is clear.   Eyes:      Extraocular Movements: Extraocular movements intact.   Cardiovascular:      Rate and Rhythm: Normal rate and regular rhythm.      Pulses: Normal pulses.   Pulmonary:      Effort: No tachypnea.      Breath sounds: No wheezing or rales.      Comments: Breath sounds with possible course crackles, auscultation limited by frequent coughing. No rales.  Abdominal:      General: Bowel sounds are normal. There is no distension.      Palpations: Abdomen is soft.      Tenderness: There is no abdominal tenderness.   Musculoskeletal:         General: Normal range of motion.      Right lower leg: No edema.      Left lower leg: No edema.   Skin:     General: Skin is warm and dry.   Neurological:      General: No focal deficit present.      Mental Status: He is alert and oriented to person, place, and time.   Psychiatric:         Mood and Affect: Mood normal.           Significant Labs: All pertinent labs within the past 24 hours have been reviewed.    Significant Imaging: I have reviewed all pertinent imaging results/findings within the past 24 hours.

## 2022-03-20 NOTE — NURSING
Patient arrived to the unit 14 wt alert and oriented x 4  Productive dry cough noted lasted about 2 minutes ambulated to the bathroom independently able to move all his extremities ,wife is present at bedside  refused covid vaccine , flu , pneumonia at this time .Patient is room air saturating about 93 to 94% , vitals signs are obtained .Patient is on the tele monitor MD is notified of patient admission .PERRLA is equal , missing teeth noted , skin integrity is checked no redness or open areas noted :

## 2022-03-20 NOTE — NURSING
_ Patient is AAO*4  -  Had bowel and bladder movement.   _ patient's Wife at the bed side through out the whole day .   _ Walked tot he bathroom .   _ Patient had cough , Prn meds given .   -Call light within reach , Safety  Precaution maintained , will continue monitoring .

## 2022-03-20 NOTE — PLAN OF CARE
Provided teaching about diabetes , healthy lifestyles , diet modification , exercise is part of the plan . Deep breathing exercise , walking , clustered activities to reduce shortness of breath

## 2022-03-20 NOTE — ED NOTES
Patient identifiers verified and correct.  C/C: Patient arrives with chronic dry cough that he feels is getting out of control. He is now having severely dry and sore throat. No sputum.   APPEARANCE: awake and alert in NAD.  SKIN: warm, dry and intact. No breakdown or bruising.  MUSCULOSKELETAL: Patient moving all extremities spontaneously, no obvious swelling or deformities noted. Ambulates independently.  CARDIAC: Denies CP, 2+ distal pulses; no peripheral edema  ABDOMEN: S/ND/NT, Denies nausea  : voids spontaneously, denies difficulty  Neurologic: AAO x 4; follows commands equal strength in all extremities; denies numbness/tingling. Denies dizziness.

## 2022-03-20 NOTE — H&P (VIEW-ONLY)
Consult Note  [facility]    Attending: Dk Barragan  Fellow: Zaria Ribera  Admit Date: 3/19/2022  Today's Date: 03/20/2022  Reason for Consult:  Mediastinal LAD    SUBJECTIVE:     HPI:  Mr. Gillespie is a 56yo M with PMHx of DMII, HTN and childhood asthma and COVID19 at the end of 2021 that presented to the ED with complaints of cough. Patient reports years of dyspnea that worsened following is diagnosis of COVID with new non productive cough and pleurisy. Reports history of allergic rhinitis and post nasal drip but denies current symptoms. Also denies heartburn. Has consumed OTC antitussive medications in addition to garlic and honey supplements to ease his cough without avail.     Interestingly, patient notes that he had childhood asthma and was diagnosed with emphysema some years back. 40 pack year history with smoking cessation ~26 years ago. No travel outside of the country, no fevers, chills, weight loss, night sweats or productive cough [sputum or hemoptysis]. No pets. Works as .    Worsening cough and over the past 2 months has been treated outpatient for CAP with oral antibiotics and steroids. Does not report relief with steroids but states that his dyspnea, wheezing, and cough improve with bronchodilators.      Review of patient's allergies indicates:  No Known Allergies    Past Medical History:   Diagnosis Date    Diabetes mellitus, type 2     Hypertension      Past Surgical History:   Procedure Laterality Date    KNEE SURGERY       Family History   Problem Relation Age of Onset    Diabetes Mellitus Mother     Pacemaker/defibrilator Father     Lung cancer Father      Social History     Tobacco Use    Smoking status: Never Smoker    Smokeless tobacco: Never Used    Tobacco comment: stop smoking 26 years ago   Substance Use Topics    Alcohol use: No    Drug use: No       All medications reviewed.    Review of Systems   Constitutional: Positive for chills.  Negative for fever.   Respiratory: Positive for cough, shortness of breath and wheezing. Negative for hemoptysis and sputum production.    Cardiovascular: Negative for chest pain.       OBJECTIVE:     Vital Signs Trends/Hx Reviewed  Vitals:    03/20/22 0330 03/20/22 0400 03/20/22 0802 03/20/22 1000   BP:    (!) 157/84   BP Location:       Patient Position:       Pulse: 84 86 86    Resp: 19 16 18    Temp:       TempSrc:       SpO2: (!) 91% (!) 92% (!) 93%    Weight:       Height:           Physical Exam:  General: NAD, cooperative & interactive.  HEENT: AT/NC, PERRL, EOMI, oral and nasal mucosa moist.   Neck: Supple without JVD or palpable LAD.   Cardiac: normal rate, regular rhythm, with no MRG with brisk cap refill and symmetric pulses in distal extremities.  Respiratory: Wheezing   Abdomen: Soft, NT/ND. +BS. No hepatosplenomegaly.   Extremities: No edema.   Neuro: Grossly intact to brief exam. Oriented x3 with appropriate mood/affect to situation.       Laboratory:  No results for input(s): PH, PCO2, PO2, HCO3, POCSATURATED, BE in the last 24 hours.  Recent Labs   Lab 03/20/22  0408   WBC 7.92   RBC 5.25   HGB 15.4   HCT 47.7      MCV 91   MCH 29.3   MCHC 32.3     Recent Labs   Lab 03/20/22  0408      K 4.5      CO2 25   BUN 25*   CREATININE 1.3   MG 2.2       Microbiology Data:   Microbiology Results (last 7 days)     Procedure Component Value Units Date/Time    Blood culture x two cultures. Draw prior to antibiotics. [[id number]] Collected: 03/19/22 2201    Order Status: Completed Specimen: Blood from Peripheral, Hand, Left Updated: 03/20/22 0545     Blood Culture, Routine No Growth to date    Narrative:      Aerobic and anaerobic    Blood culture x two cultures. Draw prior to antibiotics. [[id number]] Collected: 03/19/22 2201    Order Status: Completed Specimen: Blood from Peripheral, Antecubital, Right Updated: 03/20/22 0545     Blood Culture, Routine No Growth to date    Narrative:       Aerobic and anaerobic    Culture, Respiratory with Gram Stain [[id number]]     Order Status: No result Specimen: Respiratory            Chest Imaging:   CTA  Impression:     4.6 cm right middle lobe consolidative opacity with bulky mediastinal conglomerate lymphadenopathy.  Findings are favored to reflect sequela of granulomatous disease with broad differential including infectious (atypical mycobacterial, fungal, or bacterial) versus noninfectious inflammatory etiology such as sarcoidosis.  Neoplasms in particularly lymphoproliferative disorders are also included within the differential.  Consultation with pulmonary medicine is advised.     Apparent central low-attenuation within the liver.  While this could represent artifact due to morphology of the liver, low-density lesion in the liver such as primary or metastatic lesion is difficult to exclude.  Clinical correlation and further evaluation as warranted.     This report was flagged in Epic as abnormal.      Scheduled Medications:    albuterol-ipratropium  3 mL Nebulization Q6H WAKE    amLODIPine  10 mg Oral Daily    enoxaparin  40 mg Subcutaneous Daily    furosemide  20 mg Oral Daily    insulin detemir U-100  5 Units Subcutaneous QHS    losartan-hydrochlorothiazide 50-12.5 mg  1 tablet Oral Daily    montelukast  10 mg Oral Daily       PRN Medications:   acetaminophen, benzonatate, dextrose 10%, dextrose 10%, dextrose 10%, glucagon (human recombinant), glucose, insulin aspart U-100, melatonin, naloxone, sodium chloride 0.9%    Assessment & Plan:   Patient Active Problem List   Diagnosis    Dyspnea    Type 2 diabetes mellitus, without long-term current use of insulin    Primary hypertension       ASSESSMENT & RECOMMENDATIONS   Mr. Gillespie is a 56yo M with PMHx of HTN and chronic cough with bulky lymphadenopathy and progression of his cough and dyspnea. Broad ddx for his lymphadenopathy including inflammatory, infectious, granulomatous disease such as  sarcoidosis, or malignancy. It is likely a contributor to his cough -- given his reported history of asthma and emphysema, he has a component of reactive airways disease also contributing to his dyspnea.       · Would treat for COPD/asthma exacerbation with scheduled bronchodilators and systemic steroids with anti-tussive therapies.  · Will need formal PFTs outpatient.   · Discussed at length the need for biopsy in order to diagnose the cause of his mediastinal lymphadenopathy. Plan for EBUS at earliest this week pending schedule availability.         Thank you for allowing us to participate in the care of this patient. We will follow. Please call with questions.    Zaria Ribera M.D., PGY-VI  [employer] [facility] Fellow
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
## 2022-03-20 NOTE — ASSESSMENT & PLAN NOTE
Pt takes Almodipine 10mg qd and Losartan-HCTZ qd. Hypertensive on admission.    - Continue home regimen

## 2022-03-20 NOTE — ED NOTES
I-STAT Chem-8+ Results:   Value Reference Range   Sodium 138 136-145 mmol/L   Potassium  4.1 3.5-5.1 mmol/L   Chloride 101  mmol/L   Ionized Calcium 1.17 1.06-1.42 mmol/L   CO2 (measured) 26 23-29 mmol/L   Glucose 140  mg/dL   BUN 29 6-30 mg/dL   Creatinine 1.2 0.5-1.4 mg/dL   Hematocrit 50 36-54%
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
## 2022-03-20 NOTE — PLAN OF CARE
Problem: Adult Inpatient Plan of Care  Goal: Plan of Care Review  Outcome: Ongoing, Progressing  Goal: Patient-Specific Goal (Individualized)  Outcome: Ongoing, Progressing  Goal: Absence of Hospital-Acquired Illness or Injury  Outcome: Ongoing, Progressing  Goal: Optimal Comfort and Wellbeing  Outcome: Ongoing, Progressing  Goal: Readiness for Transition of Care  Outcome: Ongoing, Progressing     Problem: Fall Injury Risk  Goal: Absence of Fall and Fall-Related Injury  Outcome: Ongoing, Progressing     Problem: Diabetes Comorbidity  Goal: Blood Glucose Level Within Targeted Range  Outcome: Ongoing, Progressing     Problem: Hypertension Acute  Goal: Blood Pressure Within Desired Range  Outcome: Ongoing, Progressing     Problem: Fatigue  Goal: Improved Activity Tolerance  Outcome: Ongoing, Progressing     Problem: Pain Acute  Goal: Acceptable Pain Control and Functional Ability  Outcome: Ongoing, Progressing     Problem: Breathing Pattern Ineffective  Goal: Effective Breathing Pattern  Outcome: Ongoing, Progressing

## 2022-03-20 NOTE — CONSULTS
Consult Note  [facility]    Attending: Dk Barragan  Fellow: Zaria Ribera  Admit Date: 3/19/2022  Today's Date: 03/20/2022  Reason for Consult:  Mediastinal LAD    SUBJECTIVE:     HPI:  Mr. Gillespie is a 56yo M with PMHx of DMII, HTN and childhood asthma and COVID19 at the end of 2021 that presented to the ED with complaints of cough. Patient reports years of dyspnea that worsened following is diagnosis of COVID with new non productive cough and pleurisy. Reports history of allergic rhinitis and post nasal drip but denies current symptoms. Also denies heartburn. Has consumed OTC antitussive medications in addition to garlic and honey supplements to ease his cough without avail.     Interestingly, patient notes that he had childhood asthma and was diagnosed with emphysema some years back. 40 pack year history with smoking cessation ~26 years ago. No travel outside of the country, no fevers, chills, weight loss, night sweats or productive cough [sputum or hemoptysis]. No pets. Works as .    Worsening cough and over the past 2 months has been treated outpatient for CAP with oral antibiotics and steroids. Does not report relief with steroids but states that his dyspnea, wheezing, and cough improve with bronchodilators.      Review of patient's allergies indicates:  No Known Allergies    Past Medical History:   Diagnosis Date    Diabetes mellitus, type 2     Hypertension      Past Surgical History:   Procedure Laterality Date    KNEE SURGERY       Family History   Problem Relation Age of Onset    Diabetes Mellitus Mother     Pacemaker/defibrilator Father     Lung cancer Father      Social History     Tobacco Use    Smoking status: Never Smoker    Smokeless tobacco: Never Used    Tobacco comment: stop smoking 26 years ago   Substance Use Topics    Alcohol use: No    Drug use: No       All medications reviewed.    Review of Systems   Constitutional: Positive for chills.  Negative for fever.   Respiratory: Positive for cough, shortness of breath and wheezing. Negative for hemoptysis and sputum production.    Cardiovascular: Negative for chest pain.       OBJECTIVE:     Vital Signs Trends/Hx Reviewed  Vitals:    03/20/22 0330 03/20/22 0400 03/20/22 0802 03/20/22 1000   BP:    (!) 157/84   BP Location:       Patient Position:       Pulse: 84 86 86    Resp: 19 16 18    Temp:       TempSrc:       SpO2: (!) 91% (!) 92% (!) 93%    Weight:       Height:           Physical Exam:  General: NAD, cooperative & interactive.  HEENT: AT/NC, PERRL, EOMI, oral and nasal mucosa moist.   Neck: Supple without JVD or palpable LAD.   Cardiac: normal rate, regular rhythm, with no MRG with brisk cap refill and symmetric pulses in distal extremities.  Respiratory: Wheezing   Abdomen: Soft, NT/ND. +BS. No hepatosplenomegaly.   Extremities: No edema.   Neuro: Grossly intact to brief exam. Oriented x3 with appropriate mood/affect to situation.       Laboratory:  No results for input(s): PH, PCO2, PO2, HCO3, POCSATURATED, BE in the last 24 hours.  Recent Labs   Lab 03/20/22  0408   WBC 7.92   RBC 5.25   HGB 15.4   HCT 47.7      MCV 91   MCH 29.3   MCHC 32.3     Recent Labs   Lab 03/20/22  0408      K 4.5      CO2 25   BUN 25*   CREATININE 1.3   MG 2.2       Microbiology Data:   Microbiology Results (last 7 days)     Procedure Component Value Units Date/Time    Blood culture x two cultures. Draw prior to antibiotics. [[id number]] Collected: 03/19/22 2201    Order Status: Completed Specimen: Blood from Peripheral, Hand, Left Updated: 03/20/22 0545     Blood Culture, Routine No Growth to date    Narrative:      Aerobic and anaerobic    Blood culture x two cultures. Draw prior to antibiotics. [[id number]] Collected: 03/19/22 2201    Order Status: Completed Specimen: Blood from Peripheral, Antecubital, Right Updated: 03/20/22 0545     Blood Culture, Routine No Growth to date    Narrative:       Aerobic and anaerobic    Culture, Respiratory with Gram Stain [[id number]]     Order Status: No result Specimen: Respiratory            Chest Imaging:   CTA  Impression:     4.6 cm right middle lobe consolidative opacity with bulky mediastinal conglomerate lymphadenopathy.  Findings are favored to reflect sequela of granulomatous disease with broad differential including infectious (atypical mycobacterial, fungal, or bacterial) versus noninfectious inflammatory etiology such as sarcoidosis.  Neoplasms in particularly lymphoproliferative disorders are also included within the differential.  Consultation with pulmonary medicine is advised.     Apparent central low-attenuation within the liver.  While this could represent artifact due to morphology of the liver, low-density lesion in the liver such as primary or metastatic lesion is difficult to exclude.  Clinical correlation and further evaluation as warranted.     This report was flagged in Epic as abnormal.      Scheduled Medications:    albuterol-ipratropium  3 mL Nebulization Q6H WAKE    amLODIPine  10 mg Oral Daily    enoxaparin  40 mg Subcutaneous Daily    furosemide  20 mg Oral Daily    insulin detemir U-100  5 Units Subcutaneous QHS    losartan-hydrochlorothiazide 50-12.5 mg  1 tablet Oral Daily    montelukast  10 mg Oral Daily       PRN Medications:   acetaminophen, benzonatate, dextrose 10%, dextrose 10%, dextrose 10%, glucagon (human recombinant), glucose, insulin aspart U-100, melatonin, naloxone, sodium chloride 0.9%    Assessment & Plan:   Patient Active Problem List   Diagnosis    Dyspnea    Type 2 diabetes mellitus, without long-term current use of insulin    Primary hypertension       ASSESSMENT & RECOMMENDATIONS   Mr. Gillespie is a 56yo M with PMHx of HTN and chronic cough with bulky lymphadenopathy and progression of his cough and dyspnea. Broad ddx for his lymphadenopathy including inflammatory, infectious, granulomatous disease such as  sarcoidosis, or malignancy. It is likely a contributor to his cough -- given his reported history of asthma and emphysema, he has a component of reactive airways disease also contributing to his dyspnea.       · Would treat for COPD/asthma exacerbation with scheduled bronchodilators and systemic steroids with anti-tussive therapies.  · Will need formal PFTs outpatient.   · Discussed at length the need for biopsy in order to diagnose the cause of his mediastinal lymphadenopathy. Plan for EBUS at earliest this week pending schedule availability.         Thank you for allowing us to participate in the care of this patient. We will follow. Please call with questions.    Zaria Ribera M.D., PGY-VI  [employer] Pulmonary/Critical Care Fellow

## 2022-03-20 NOTE — ED PROVIDER NOTES
Encounter Date: 3/19/2022       History     Chief Complaint   Patient presents with    Cough     Since last year, non-productive     HPI   56 Y/O diabetic, hypertensive an ex-smoker M Smoker C/O ~ weeks of generalized malaise, dry cough with gradual associated dyspnea on exertion and development of pleuritic, radiating along left costal margin sharp chest pain.  No associated HA, dizziness, neck pain/stiffnes, dyspnea or paroxysmal nocturnal dyspnea reported. He affirms lack of nasal congestion, rhinorrhea or pressure to maxillary and frontal region.  He denies any sore throat, change in voice, drooling or dysphagia to solids or liquids. He assures no hemoptysis, recent travel and no Hx of TB or exposure to TB.  He reports all his symptoms initiated during COVID and have never improved despite multiple ED visits and multiple antibiotic courses, nebulized treatments, MDI inhalers and over-the-counter antitussive medications.  No N/V, abd/back pain. Otherwise, no change in PO intake and no diarrhea/change in BMs.    Review of patient's allergies indicates:  No Known Allergies  Past Medical History:   Diagnosis Date    Diabetes mellitus, type 2     Hypertension      No past surgical history on file.  Family History   Problem Relation Age of Onset    No Known Problems Mother     No Known Problems Father      Social History     Tobacco Use    Smoking status: Never Smoker    Smokeless tobacco: Never Used   Substance Use Topics    Alcohol use: No    Drug use: No     Review of Systems  HEENT:  No reported sore throat, ear pain, nasal congestion or non-purulent rhinorrhea; no headache, blurry vision or change in vision, eye pain, otorrhea, tooth pain, swelling, or voice changes.  NECK: No pain or stiffness, masses, trauma, or redness.  HEART: No pain, palpitations, diaphoresis, nausea, vomiting, or radiation of any pain symptoms.  LUNG: + Cough, but no SOB, MCMILLAN or other complaints.  ABDOMEN: No pain, nausea,  vomiting, diarrhea, constipation, or flank pain.  : No discharge, dysuria, urgency, hesitancy, frequency, lesions, rashes, masses, or sores.  EXTREMITIES: FROM and no swelling, redness, trauma, lesions, sores, weakness, numbness, or tingling.  SKIN: No lesions, rashes, trauma or other complaints.  NEURO: No dizziness, weakness, fatigue, tremors, nystagmus, headache, change in vision or disturbances of balance or coordination.    Physical Exam     Initial Vitals [03/19/22 1859]   BP Pulse Resp Temp SpO2   (!) 171/84 98 18 98.4 °F (36.9 °C) 97 %      MAP       --         Physical Exam  CONSTITUTIONAL: Calm. Cooperative. Non-toxic appearance. Well-developed and nourished. Sitting on chair in no acute distress.  HEAD: NC/AT.  MOUTH/THROAT: Speaking Full Sentences with no drooling, hoarseness/muffled voice.   EYES: Anicteric, but + Conjunctival Hyperemia.   NECK: Full passive range of motion without pain and phonation normal. No stridor.  HEART: Tachy Rate with Reg Rhythm; normal pulses.  PULMONARY/CHEST: No Tachypnea, Effort normal and breath sounds normal. No accessory muscle usage. No respiratory distress. Lungs CTA B/L with No W/R/R.  ABDOMEN: +BS, Soft. ND. No TTP.  MUSCULOSKELETAL: FROM.  NEURO: AAOx3. Answering Questions Appropriately.  SKIN: Skin is warm, dry and intact. No rash noted.      ED Course   Procedures  Labs Reviewed   CBC W/ AUTO DIFFERENTIAL - Abnormal; Notable for the following components:       Result Value    Immature Granulocytes 1.7 (*)     Gran # (ANC) 8.6 (*)     Immature Grans (Abs) 0.21 (*)     Eos # 0.6 (*)     Lymph % 15.0 (*)     All other components within normal limits   ISTAT PROCEDURE - Abnormal; Notable for the following components:    POC Glucose 140 (*)     All other components within normal limits   COMPREHENSIVE METABOLIC PANEL   TROPONIN I   B-TYPE NATRIURETIC PEPTIDE   SARS-COV-2 RDRP GENE    Narrative:     This test utilizes isothermal nucleic acid amplification   technology  to detect the SARS-CoV-2 RdRp nucleic acid segment.   The analytical sensitivity (limit of detection) is 125 genome   equivalents/mL.   A POSITIVE result implies infection with the SARS-CoV-2 virus;   the patient is presumed to be contagious.     A NEGATIVE result means that SARS-CoV-2 nucleic acids are not   present above the limit of detection. A NEGATIVE result should be   treated as presumptive. It does not rule out the possibility of   COVID-19 and should not be the sole basis for treatment decisions.   If COVID-19 is strongly suspected based on clinical and exposure   history, re-testing using an alternate molecular assay should be   considered.   This test is only for use under the Food and Drug   Administration s Emergency Use Authorization (EUA).   Commercial kits are provided by [employer]   Performance characteristics of the EUA have been independently   verified by [facility] Department of   Pathology and Laboratory Medicine.   _________________________________________________________________   The authorized Fact Sheet for Healthcare Providers and the authorized Fact   Sheet for Patients of the ID NOW COVID-19 are available on the FDA   website:     https://www.fda.gov/media/930742/download  https://www.fda.gov/media/608656/download          ISTAT CHEM8          Imaging Results          X-Ray Chest PA And Lateral (Final result)  Result time 03/19/22 20:23:26    Final result by Mateo Kramer MD (03/19/22 20:23:26)                 Impression:      Cardiomegaly with pulmonary vascular congestion, suggestive of pulmonary edema secondary to CHF.    Perihilar airspace opacities.  The findings may represent confluent pulmonary edema or superimposed pneumonia.  Suggest clinical correlation.      Electronically signed by: Mateo Kramer MD  Date:    03/19/2022  Time:    20:23             Narrative:    EXAMINATION:  XR CHEST PA AND LATERAL    CLINICAL HISTORY:  Cough,  unspecified    TECHNIQUE:  PA and lateral views of the chest were performed.    COMPARISON:  None    FINDINGS:  The trachea is unremarkable.  The cardiomediastinal silhouette is enlarged.  There is no evidence of free air beneath the hemidiaphragms.  There are no pleural effusions.  There is no evidence of a pneumothorax.  There is no evidence of pneumomediastinum.  There is pulmonary vascular congestion.  There are perihilar airspace opacities.  The osseous structures demonstrate degenerative changes.                                 Medications   methylPREDNISolone sodium succinate injection 125 mg (125 mg Intravenous Given 3/19/22 2023)   albuterol-ipratropium 2.5 mg-0.5 mg/3 mL nebulizer solution 3 mL (3 mLs Nebulization Given 3/19/22 2024)     Medical Decision Making:   History:   Old Medical Records: I decided to obtain old medical records.  Initial Assessment:   Afebrile here in the ED, but reporting subjective fevers and chills at home, atraumatic and hemodynamically stable reportedly immunocompetent male presenting with acute on subacute worsening versus persisting dry/nonproductive cough despite several courses of antibiotics, inhalers and OTC supportive care treatment for his cough, pleurisy and dyspnea on exertion.  No airway instability.  No upper respiratory symptoms:  Nasal congestion, postnasal drip ear pain or sore throat.  Lung exam with scattered expiratory wheezing and rhonchi, but no rales.  No JVD or hepatic jugular reflux appreciated.  No pitting edema.  He is yet to see pulmonology and they have no clear etiology to his persistent and on improving symptoms.  ____________________  Riley Henry MD, [facility]  Emergency Medicine Staff  9:30 PM 3/19/2022    Clinical Tests:   Lab Tests: Ordered  Radiological Study: Ordered  Medical Tests: Ordered                      Clinical Impression:   Final diagnoses:  [R06.02] Shortness of breath  [R05.9] Cough                 Luis Henry MD  03/19/22  [id number]
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
## 2022-03-21 ENCOUNTER — ANESTHESIA EVENT (OUTPATIENT)
Dept: SURGERY | Facility: HOSPITAL | Age: 57
DRG: 180 | End: 2022-03-21
Payer: MEDICAID

## 2022-03-21 PROBLEM — J98.59 MEDIASTINAL MASS: Status: ACTIVE | Noted: 2022-03-21

## 2022-03-21 LAB
ALBUMIN SERPL BCP-MCNC: 3.7 G/DL (ref 3.5–5.2)
ALP SERPL-CCNC: 86 U/L (ref 55–135)
ALT SERPL W/O P-5'-P-CCNC: 30 U/L (ref 10–44)
ANION GAP SERPL CALC-SCNC: 15 MMOL/L (ref 8–16)
AST SERPL-CCNC: 19 U/L (ref 10–40)
BASOPHILS # BLD AUTO: 0.06 K/UL (ref 0–0.2)
BASOPHILS NFR BLD: 0.3 % (ref 0–1.9)
BILIRUB SERPL-MCNC: 0.6 MG/DL (ref 0.1–1)
BUN SERPL-MCNC: 30 MG/DL (ref 6–20)
CALCIUM SERPL-MCNC: 10.1 MG/DL (ref 8.7–10.5)
CHLORIDE SERPL-SCNC: 101 MMOL/L (ref 95–110)
CO2 SERPL-SCNC: 24 MMOL/L (ref 23–29)
CREAT SERPL-MCNC: 1.1 MG/DL (ref 0.5–1.4)
DIFFERENTIAL METHOD: ABNORMAL
EOSINOPHIL # BLD AUTO: 0 K/UL (ref 0–0.5)
EOSINOPHIL NFR BLD: 0.1 % (ref 0–8)
ERYTHROCYTE [DISTWIDTH] IN BLOOD BY AUTOMATED COUNT: 12.5 % (ref 11.5–14.5)
EST. GFR  (AFRICAN AMERICAN): >60 ML/MIN/1.73 M^2
EST. GFR  (NON AFRICAN AMERICAN): >60 ML/MIN/1.73 M^2
GLUCOSE SERPL-MCNC: 132 MG/DL (ref 70–110)
HCT VFR BLD AUTO: 45.3 % (ref 40–54)
HGB BLD-MCNC: 14.8 G/DL (ref 14–18)
HIV 1+2 AB+HIV1 P24 AG SERPL QL IA: NEGATIVE
IMM GRANULOCYTES # BLD AUTO: 0.16 K/UL (ref 0–0.04)
IMM GRANULOCYTES NFR BLD AUTO: 0.9 % (ref 0–0.5)
LYMPHOCYTES # BLD AUTO: 1.3 K/UL (ref 1–4.8)
LYMPHOCYTES NFR BLD: 7.4 % (ref 18–48)
MAGNESIUM SERPL-MCNC: 2.2 MG/DL (ref 1.6–2.6)
MCH RBC QN AUTO: 29.7 PG (ref 27–31)
MCHC RBC AUTO-ENTMCNC: 32.7 G/DL (ref 32–36)
MCV RBC AUTO: 91 FL (ref 82–98)
MONOCYTES # BLD AUTO: 1.1 K/UL (ref 0.3–1)
MONOCYTES NFR BLD: 6.3 % (ref 4–15)
NEUTROPHILS # BLD AUTO: 14.9 K/UL (ref 1.8–7.7)
NEUTROPHILS NFR BLD: 85 % (ref 38–73)
NRBC BLD-RTO: 0 /100 WBC
PHOSPHATE SERPL-MCNC: 3.5 MG/DL (ref 2.7–4.5)
PLATELET # BLD AUTO: 401 K/UL (ref 150–450)
PMV BLD AUTO: 11 FL (ref 9.2–12.9)
POCT GLUCOSE: 162 MG/DL (ref 70–110)
POCT GLUCOSE: 259 MG/DL (ref 70–110)
POCT GLUCOSE: 284 MG/DL (ref 70–110)
POCT GLUCOSE: 291 MG/DL (ref 70–110)
POTASSIUM SERPL-SCNC: 3.9 MMOL/L (ref 3.5–5.1)
PROT SERPL-MCNC: 7.1 G/DL (ref 6–8.4)
RBC # BLD AUTO: 4.98 M/UL (ref 4.6–6.2)
SODIUM SERPL-SCNC: 140 MMOL/L (ref 136–145)
WBC # BLD AUTO: 17.49 K/UL (ref 3.9–12.7)

## 2022-03-21 PROCEDURE — 63600175 PHARM REV CODE 636 W HCPCS: Performed by: STUDENT IN AN ORGANIZED HEALTH CARE EDUCATION/TRAINING PROGRAM

## 2022-03-21 PROCEDURE — 99226 PR SUBSEQUENT OBSERVATION CARE,LEVEL III: CPT | Mod: ,,, | Performed by: INTERNAL MEDICINE

## 2022-03-21 PROCEDURE — 85025 COMPLETE CBC W/AUTO DIFF WBC: CPT

## 2022-03-21 PROCEDURE — 99226 PR SUBSEQUENT OBSERVATION CARE,LEVEL III: ICD-10-PCS | Mod: ,,, | Performed by: INTERNAL MEDICINE

## 2022-03-21 PROCEDURE — 25000003 PHARM REV CODE 250

## 2022-03-21 PROCEDURE — 94640 AIRWAY INHALATION TREATMENT: CPT

## 2022-03-21 PROCEDURE — G0378 HOSPITAL OBSERVATION PER HR: HCPCS

## 2022-03-21 PROCEDURE — 94761 N-INVAS EAR/PLS OXIMETRY MLT: CPT

## 2022-03-21 PROCEDURE — 80053 COMPREHEN METABOLIC PANEL: CPT

## 2022-03-21 PROCEDURE — 25000242 PHARM REV CODE 250 ALT 637 W/ HCPCS: Performed by: STUDENT IN AN ORGANIZED HEALTH CARE EDUCATION/TRAINING PROGRAM

## 2022-03-21 PROCEDURE — 25000003 PHARM REV CODE 250: Performed by: STUDENT IN AN ORGANIZED HEALTH CARE EDUCATION/TRAINING PROGRAM

## 2022-03-21 PROCEDURE — 20600001 HC STEP DOWN PRIVATE ROOM

## 2022-03-21 PROCEDURE — 36415 COLL VENOUS BLD VENIPUNCTURE: CPT

## 2022-03-21 PROCEDURE — 99214 PR OFFICE/OUTPT VISIT, EST, LEVL IV, 30-39 MIN: ICD-10-PCS | Mod: ,,, | Performed by: INTERNAL MEDICINE

## 2022-03-21 PROCEDURE — 25000242 PHARM REV CODE 250 ALT 637 W/ HCPCS

## 2022-03-21 PROCEDURE — 83735 ASSAY OF MAGNESIUM: CPT

## 2022-03-21 PROCEDURE — 99214 OFFICE O/P EST MOD 30 MIN: CPT | Mod: ,,, | Performed by: INTERNAL MEDICINE

## 2022-03-21 PROCEDURE — 63600175 PHARM REV CODE 636 W HCPCS

## 2022-03-21 PROCEDURE — 84100 ASSAY OF PHOSPHORUS: CPT

## 2022-03-21 PROCEDURE — 99900035 HC TECH TIME PER 15 MIN (STAT)

## 2022-03-21 PROCEDURE — 96372 THER/PROPH/DIAG INJ SC/IM: CPT

## 2022-03-21 RX ORDER — MONTELUKAST SODIUM 10 MG/1
10 TABLET ORAL NIGHTLY
COMMUNITY
End: 2023-06-08

## 2022-03-21 RX ORDER — LOSARTAN POTASSIUM 100 MG/1
100 TABLET ORAL DAILY
COMMUNITY
End: 2022-12-12 | Stop reason: SDUPTHER

## 2022-03-21 RX ORDER — IPRATROPIUM BROMIDE AND ALBUTEROL SULFATE 2.5; .5 MG/3ML; MG/3ML
3 SOLUTION RESPIRATORY (INHALATION) EVERY 4 HOURS
Status: DISCONTINUED | OUTPATIENT
Start: 2022-03-21 | End: 2022-03-24 | Stop reason: HOSPADM

## 2022-03-21 RX ORDER — AMLODIPINE BESYLATE 10 MG/1
10 TABLET ORAL DAILY
Status: ON HOLD | COMMUNITY
End: 2022-03-24 | Stop reason: HOSPADM

## 2022-03-21 RX ORDER — PROMETHAZINE HYDROCHLORIDE AND CODEINE PHOSPHATE 6.25; 1 MG/5ML; MG/5ML
5 SOLUTION ORAL EVERY 4 HOURS
Status: DISCONTINUED | OUTPATIENT
Start: 2022-03-21 | End: 2022-03-24 | Stop reason: HOSPADM

## 2022-03-21 RX ORDER — PROMETHAZINE HYDROCHLORIDE AND CODEINE PHOSPHATE 6.25; 1 MG/5ML; MG/5ML
5 SOLUTION ORAL EVERY 4 HOURS PRN
Status: DISCONTINUED | OUTPATIENT
Start: 2022-03-21 | End: 2022-03-21

## 2022-03-21 RX ORDER — PREDNISONE 20 MG/1
40 TABLET ORAL DAILY
Status: DISCONTINUED | OUTPATIENT
Start: 2022-03-21 | End: 2022-03-21

## 2022-03-21 RX ORDER — LOSARTAN POTASSIUM 50 MG/1
100 TABLET ORAL DAILY
Status: DISCONTINUED | OUTPATIENT
Start: 2022-03-22 | End: 2022-03-24 | Stop reason: HOSPADM

## 2022-03-21 RX ORDER — PROMETHAZINE HYDROCHLORIDE AND CODEINE PHOSPHATE 6.25; 1 MG/5ML; MG/5ML
5 SOLUTION ORAL 3 TIMES DAILY PRN
COMMUNITY
End: 2022-04-27 | Stop reason: SDUPTHER

## 2022-03-21 RX ORDER — FUROSEMIDE 20 MG/1
20 TABLET ORAL DAILY
Status: ON HOLD | COMMUNITY
End: 2022-03-24 | Stop reason: HOSPADM

## 2022-03-21 RX ORDER — ALBUTEROL SULFATE 1.25 MG/3ML
1.25 SOLUTION RESPIRATORY (INHALATION) 3 TIMES DAILY PRN
Status: ON HOLD | COMMUNITY
End: 2022-03-23 | Stop reason: SDUPTHER

## 2022-03-21 RX ORDER — AZELASTINE 1 MG/ML
1 SPRAY, METERED NASAL 2 TIMES DAILY
COMMUNITY
End: 2022-03-23

## 2022-03-21 RX ORDER — ATORVASTATIN CALCIUM 20 MG/1
20 TABLET, FILM COATED ORAL DAILY
COMMUNITY
End: 2022-10-18

## 2022-03-21 RX ADMIN — ACETAMINOPHEN 650 MG: 325 TABLET ORAL at 06:03

## 2022-03-21 RX ADMIN — METHYLPREDNISOLONE SODIUM SUCCINATE 60 MG: 40 INJECTION, POWDER, FOR SOLUTION INTRAMUSCULAR; INTRAVENOUS at 06:03

## 2022-03-21 RX ADMIN — MONTELUKAST 10 MG: 10 TABLET, FILM COATED ORAL at 08:03

## 2022-03-21 RX ADMIN — ACETAMINOPHEN 650 MG: 325 TABLET ORAL at 09:03

## 2022-03-21 RX ADMIN — INSULIN DETEMIR 5 UNITS: 100 INJECTION, SOLUTION SUBCUTANEOUS at 08:03

## 2022-03-21 RX ADMIN — METHYLPREDNISOLONE SODIUM SUCCINATE 60 MG: 40 INJECTION, POWDER, FOR SOLUTION INTRAMUSCULAR; INTRAVENOUS at 12:03

## 2022-03-21 RX ADMIN — IPRATROPIUM BROMIDE AND ALBUTEROL SULFATE 3 ML: 2.5; .5 SOLUTION RESPIRATORY (INHALATION) at 12:03

## 2022-03-21 RX ADMIN — INSULIN ASPART 3 UNITS: 100 INJECTION, SOLUTION INTRAVENOUS; SUBCUTANEOUS at 06:03

## 2022-03-21 RX ADMIN — METHYLPREDNISOLONE SODIUM SUCCINATE 60 MG: 40 INJECTION, POWDER, FOR SOLUTION INTRAMUSCULAR; INTRAVENOUS at 11:03

## 2022-03-21 RX ADMIN — FUROSEMIDE 20 MG: 20 TABLET ORAL at 08:03

## 2022-03-21 RX ADMIN — BENZONATATE 200 MG: 100 CAPSULE ORAL at 11:03

## 2022-03-21 RX ADMIN — IPRATROPIUM BROMIDE AND ALBUTEROL SULFATE 3 ML: 2.5; .5 SOLUTION RESPIRATORY (INHALATION) at 04:03

## 2022-03-21 RX ADMIN — ENOXAPARIN SODIUM 40 MG: 100 INJECTION SUBCUTANEOUS at 06:03

## 2022-03-21 RX ADMIN — INSULIN ASPART 3 UNITS: 100 INJECTION, SOLUTION INTRAVENOUS; SUBCUTANEOUS at 08:03

## 2022-03-21 RX ADMIN — AMLODIPINE BESYLATE 10 MG: 10 TABLET ORAL at 08:03

## 2022-03-21 RX ADMIN — PROMETHAZINE HYDROCHLORIDE AND CODEINE PHOSPHATE 5 ML: 10; 6.25 SOLUTION ORAL at 06:03

## 2022-03-21 RX ADMIN — BENZONATATE 200 MG: 100 CAPSULE ORAL at 09:03

## 2022-03-21 RX ADMIN — IPRATROPIUM BROMIDE AND ALBUTEROL SULFATE 3 ML: 2.5; .5 SOLUTION RESPIRATORY (INHALATION) at 08:03

## 2022-03-21 RX ADMIN — PROMETHAZINE HYDROCHLORIDE AND CODEINE PHOSPHATE 5 ML: 10; 6.25 SOLUTION ORAL at 01:03

## 2022-03-21 RX ADMIN — LOSARTAN POTASSIUM AND HYDROCHLOROTHIAZIDE 1 TABLET: 50; 12.5 TABLET, FILM COATED ORAL at 08:03

## 2022-03-21 RX ADMIN — PROMETHAZINE HYDROCHLORIDE AND CODEINE PHOSPHATE 5 ML: 10; 6.25 SOLUTION ORAL at 10:03

## 2022-03-21 RX ADMIN — IPRATROPIUM BROMIDE AND ALBUTEROL SULFATE 3 ML: 2.5; .5 SOLUTION RESPIRATORY (INHALATION) at 07:03

## 2022-03-21 NOTE — PLAN OF CARE
[facility] - Int[facility] ([place] [id number])  Initial Discharge Assessment       Primary Care Provider: Primary Doctor No    Admission Diagnosis: Shortness of breath [R06.02]  Cough [R05.9]  MCMILLAN (dyspnea on exertion) [R06.00]  Chest pain [R07.9]  Pleurisy [R09.1]    Admission Date: 3/19/2022  Expected Discharge Date: 3/22/2022    Discharge Barriers Identified: None    Payor: MEDICAID / Plan: [place] [facility] / Product Type: Managed Medicaid /     Extended Emergency Contact Information  Primary Emergency Contact: esequiel gillespie  Mobile Phone: [phone]  Relation: Spouse   needed? No    Discharge Plan A: Home with family  Discharge Plan B: Home with family      [facility] [place] - [address]  [address]  [place] [address]  Phone: [phone] Fax: [phone]      Initial Assessment (most recent)     Adult Discharge Assessment - 03/21/22 1017        Discharge Assessment    Assessment Type Discharge Planning Assessment     Confirmed/corrected address, phone number and insurance Yes     Confirmed Demographics Correct on Facesheet     Source of Information patient;family     Does patient/caregiver understand observation status Yes     Communicated EDYTA with patient/caregiver Date not available/Unable to determine     Reason For Admission Dyspnea     Lives With spouse;child(anival), adult     Facility Arrived From: Home     Do you expect to return to your current living situation? Yes     Do you have help at home or someone to help you manage your care at home? Yes     Who are your caregiver(s) and their phone number(s)? Esequiel Gillespie (spouse) [phone]     Prior to hospitilization cognitive status: Alert/Oriented     Current cognitive status: Alert/Oriented     Walking or Climbing Stairs Difficulty none     Dressing/Bathing Difficulty none     Home Layout Able to live on 1st floor     Equipment Currently Used at Home none     Readmission within 30 days? No     Patient currently being  followed by outpatient case management? No     Do you currently have service(s) that help you manage your care at home? No     Do you take prescription medications? Yes     Do you have prescription coverage? Yes     Coverage [facility] Medicaid     Do you have any problems affording any of your prescribed medications? No     Is the patient taking medications as prescribed? yes     Who is going to help you get home at discharge? Isabela Gillespie (spouse) [phone]     How do you get to doctors appointments? car, drives self;family or friend will provide     Are you on dialysis? No     Do you take coumadin? No     Discharge Plan A Home with family     Discharge Plan B Home with family     DME Needed Upon Discharge  other (see comments)   TBD    Discharge Plan discussed with: Patient;Spouse/sig other     Name(s) and Number(s) Isabela Allenet (spouse) [phone]     Discharge Barriers Identified None        Relationship/Environment    Name(s) of Who Lives With Patient Isabela Gillespie (spouse) [phone]                  SW met with patient and Isabela Gillespie (spouse) [phone] in room for Discharge Planning Assessment.  Patient was able to answer questions.  Per patient and Isabelajagdeep Gillespie (spouse) [phone], patient lives with his spouse and daughter in a single story residence with one step(s) to enter.   Per patient, he was independent with ADLS and used no DME for ambulation. Patient has a sleep study scheduled for 4/4/2022. Patient will have assistance from Isabela Gillespie (spouse) [phone] upon discharge.  All questions addressed.  Assigned SW/CM will follow for needs.    Vivian Interiano, [facility]  [phone]

## 2022-03-21 NOTE — ASSESSMENT & PLAN NOTE
Acute hypoxic respiratory failure    57 year old male with PMH of smoking, T2DM, HTN, past asymptomatic COVID infection in 08/21 admitted for admitted for dyspnea/cough and found to have significant hilar lymphadenopathy/mass. Broad differential including infectious, granulomatous disease, sarcoid, cancer, COPD, pneumonitis     -  Pulm consulted with plans for EBUS on 3/22.   - Started on solumedrol per pulm recs   - continue antitussives, added phenergan codeine today   - IS and scheduled dounebs

## 2022-03-21 NOTE — PROGRESS NOTES
"[facility] Pul[facility]/Critical Care Fellow Progress Note:    Brief Hx: 58 yo w/ PMHx of T2DM on insulin, HTN, smoking hx (quit 26 yrs ago) childhood asthma, COVID infection () admitted for cough and COPD exacerbation and found to have a lung mass with hilar LA .     Subjective:  Patient reports R sided sharp chest pain that is worse with palpation and significant cough.     Objective:  Last 24 Hour Vital Signs:  BP  Min: 135/70  Max: 164/82  Temp  Av.9 °F (36.6 °C)  Min: 97.4 °F (36.3 °C)  Max: 98.3 °F (36.8 °C)  Pulse  Av.5  Min: 65  Max: 108  Resp  Av.5  Min: 13  Max: 39  SpO2  Av.7 %  Min: 89 %  Max: 96 %  Height  Av' 7" (170.2 cm)  Min: 5' 7" (170.2 cm)  Max: 5' 7" (170.2 cm)  Weight  Av.4 kg (250 lb)  Min: 113.4 kg (250 lb)  Max: 113.4 kg (250 lb)  Body mass index is 39.16 kg/m².  I/O last 3 completed shifts:  In: 1380 [P.O.:1380]  Out: -       Physical Exam:  General: Alert and awake, with some distress with coughing  HENT:  NCAT; anicteric sclera; OP clear with MMM  Cardio:  Regular rate and rhythm with normal S1 and S2; no murmurs or rubs  Resp:  CTAB; respirations unlabored but coarse bilaterally, no appreciable wheezing  Abdom: Soft, NTND   Extrem: WWP with no clubbing, cyanosis or edema  Pulses: 2+ and symmetric distally  Neuro:  AAOx3; cooperative and pleasant with no focal deficits      Assessment & Plan:   58 yo male with PMHx relevant for tobacco use and childhood asthma initially admitted for concern for COPD exacerbation but found to have R lung mass and mediastinal LAD  - concern for malignancy given smoking history  - plan for OR tomorrow for EBUS with biopsy  - NPO at MN  - q4h scheduled codeine cough syrup  - solumedrol 60mg q6h for today, can switch back to PO prednisone post-procedure  - duo-nebs q4h scheduled   - plan discussed with Dr. Samuels as well as patient and his wife, consent with risks and benefits of procedure described to patient and his wife at " bedside   - patient is not on blood thinners and is not taking baby aspirin at home    Shireen Mora MD  Pulm/CC Fellow

## 2022-03-21 NOTE — ASSESSMENT & PLAN NOTE
Controlled with diet/weight managmement.    - detemir 5u qhs while on steroids   - LDSSI  - diabetic diet

## 2022-03-21 NOTE — SUBJECTIVE & OBJECTIVE
Interval History: still reporting significant cough when laying down. Tessalon pearls did not help. Also placed on oxygen 2L overnight but oxygen sats >92% on RA this AM. Added phenergan/codeine cough med. Also started on steroids. Plan for EBUS tomorrow     Review of Systems  Objective:     Vital Signs (Most Recent):  Temp: 97.2 °F (36.2 °C) (03/21/22 1200)  Pulse: 69 (03/21/22 1225)  Resp: 18 (03/21/22 1331)  BP: (!) 172/74 (03/21/22 1200)  SpO2: (!) 94 % (03/21/22 1225)   Vital Signs (24h Range):  Temp:  [97.2 °F (36.2 °C)-98.3 °F (36.8 °C)] 97.2 °F (36.2 °C)  Pulse:  [] 69  Resp:  [13-39] 18  SpO2:  [89 %-97 %] 94 %  BP: (135-172)/(65-90) 172/74     Weight: 113.4 kg (250 lb)  Body mass index is 39.16 kg/m².    Intake/Output Summary (Last 24 hours) at 3/21/2022 1417  Last data filed at 3/20/2022 2000  Gross per 24 hour   Intake 440 ml   Output --   Net 440 ml      Physical Exam    Significant Labs: All pertinent labs within the past 24 hours have been reviewed.  CBC:   Recent Labs   Lab 03/19/22 1957 03/19/22 2003 03/20/22 0408 03/21/22  0213   WBC 12.42  --  7.92 17.49*   HGB 16.2  --  15.4 14.8   HCT 48.1 50 47.7 45.3     --  322 401     CMP:   Recent Labs   Lab 03/19/22 1957 03/20/22  0408 03/21/22  0213    138 140   K 4.2 4.5 3.9    102 101   CO2 25 25 24   * 241* 132*   BUN 25* 25* 30*   CREATININE 1.3 1.3 1.1   CALCIUM 9.9 10.0 10.1   PROT 7.7 7.4 7.1   ALBUMIN 3.9 3.7 3.7   BILITOT 0.7 0.7 0.6   ALKPHOS 88 85 86   AST 25 20 19   ALT 33 32 30   ANIONGAP 13 11 15   EGFRNONAA >60.0 >60.0 >60.0       Significant Imaging: I have reviewed all pertinent imaging results/findings within the past 24 hours.

## 2022-03-21 NOTE — HOSPITAL COURSE
Patient admitted for dyspnea/cough and found to have significant hilar lymphadenopathy. Pulm consulted with plans for EBUS on 3/22. Started on steroids given concern for worsening hypoxia/symptoms and possible COPD/asthma ex. Also started on scheduled duonebs and cough syrup. Reports cough has improved; Pt also weaned off of oxygen, saturating 96% on RA. EBUS on 3/22; recommending solumedrol Q6h with wean to prednisone, duration of steroids TBD. 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 CT scan showing some liver lesions concerning for metastasis; no obvious metastases on MRI brain, however there is a pituitary lesion which could be a primary pituitary neoplasm vs metastases. Pt intermittently requiring O2 supplementation; will obtain 6 minute walk test to see if he qualifies for home O2. Will also start robaxin to help with muscle/rib pain associated with excessive coughing. Will coordinate with oncology and pulmonology about further recommendations/treatment plan - suitable for discharge from their perspectives, oncology will follow-up with him as an outpatient on 3/30. Qualifies for home oxygen per 6 minute walk test. Pt reporting difficulty swallowing thin liquids post-bronchoscopy; could be related to instrumentation and/or steroid use. Started on pantoprazole. Evaluated by speech pathology who recommended nectar think liquids and regular solids with strict aspiration precautions, no straws.

## 2022-03-21 NOTE — PROGRESS NOTES
Patient is left in bed alert and oriented x 4 HOB is elevated . Patient is remained with a non productive dry cough PRN medication is given as ordered , patient took a shower and ambulates to the bathroom unmeasured urine output noted. Report is given to the incoming nurse , patient oxygen saturation is remained about 90 to 94 % , normal sinus rhythm noted .

## 2022-03-21 NOTE — ANESTHESIA PREPROCEDURE EVALUATION
[facility]  Anesthesia Pre-Operative Evaluation         Patient Name: Juan Gillespie  YOB: 1965  MRN: [id number]    SUBJECTIVE:     Pre-operative evaluation for Procedure(s) (LRB):  ENDOBRONCHIAL ULTRASOUND (EBUS) (N/A)     03/21/2022    Juan Gillespie is a 57 y.o. male w/ a significant PMHx of T2DM (a1c 6.1), HTN, past asymptomatic COVID infection who presented to the ED w/ SOB, CP localized to the sternum and ribs. CXR with perihilar airspace opacities, interstitial markings, and cardiomegaly possibly suggestive of CHF. CT w/ bulky lymphadenopathy.   Pt now presents for the above procedure.    TTE 3/20/22  · The left ventricle is normal in size with normal systolic function. The estimated ejection fraction is 55%.  · Normal right ventricular size with normal right ventricular systolic function.  · Normal left ventricular diastolic function.  · Normal central venous pressure (3 mmHg).    LDA: Duration       Peripheral IV - Single Lumen 03/19/22 1957 22 G Left Hand 1 day     Prev airway: None documented.     Drips: None documented.    Patient Active Problem List   Diagnosis    Dyspnea    Type 2 diabetes mellitus, without long-term current use of insulin    Primary hypertension       Review of patient's allergies indicates:  No Known Allergies    Current Inpatient Medications:   albuterol-ipratropium  3 mL Nebulization Q4H    amLODIPine  10 mg Oral Daily    enoxaparin  40 mg Subcutaneous Daily    furosemide  20 mg Oral Daily    insulin detemir U-100  5 Units Subcutaneous QHS    [START ON 3/22/2022] losartan  100 mg Oral Daily    methylPREDNISolone sodium succinate injection  60 mg Intravenous Q6H    montelukast  10 mg Oral Daily    promethazine-codeine 6.25-10 mg/5 ml  5 mL Oral Q4H       No current facility-administered medications on file prior to encounter.     Current Outpatient Medications on File Prior to Encounter   Medication Sig Dispense Refill    clobetasoL  (TEMOVATE) 0.05 % cream Apply topically 2 (two) times daily. 15 g 0    losartan-hydrochlorothiazide 50-12.5 mg (HYZAAR) 50-12.5 mg per tablet       ondansetron (ZOFRAN-ODT) 4 MG TbDL Take 1 tablet (4 mg total) by mouth every 6 (six) hours as needed (nausea). (Patient not taking: Reported on 4/18/2021) 12 tablet 0    TRUE METRIX GLUCOSE TEST STRIP Strp       TRUEPLUS LANCETS 33 gauge Misc          Past Surgical History:   Procedure Laterality Date    KNEE SURGERY         Social History     Socioeconomic History    Marital status:    Tobacco Use    Smoking status: Never Smoker    Smokeless tobacco: Never Used    Tobacco comment: stop smoking 26 years ago   Substance and Sexual Activity    Alcohol use: No    Drug use: No    Sexual activity: Yes       OBJECTIVE:     Vital Signs Range (Last 24H):  Temp:  [36.3 °C (97.4 °F)-36.8 °C (98.3 °F)]   Pulse:  []   Resp:  [13-39]   BP: (135-164)/(65-90)   SpO2:  [89 %-96 %]       CBC:   Recent Labs     03/20/22  0408 03/21/22  0213   WBC 7.92 17.49*   RBC 5.25 4.98   HGB 15.4 14.8   HCT 47.7 45.3    401   MCV 91 91   MCH 29.3 29.7   MCHC 32.3 32.7       CMP:   Recent Labs     03/20/22  0408 03/21/22  0213    140   K 4.5 3.9    101   CO2 25 24   BUN 25* 30*   CREATININE 1.3 1.1   * 132*   MG 2.2 2.2   PHOS 2.4* 3.5   CALCIUM 10.0 10.1   ALBUMIN 3.7 3.7   PROT 7.4 7.1   ALKPHOS 85 86   ALT 32 30   AST 20 19   BILITOT 0.7 0.6       INR:  No results for input(s): PT, INR, PROTIME, APTT in the last 72 hours.    Diagnostic Studies: No relevant studies.    EKG:   Results for orders placed or performed during the hospital encounter of 03/19/22   EKG 12-lead    Collection Time: 03/19/22  7:48 AM    Narrative    Test Reason : R06.02,    Vent. Rate : 083 BPM     Atrial Rate : 083 BPM     P-R Int : 202 ms          QRS Dur : 108 ms      QT Int : 360 ms       P-R-T Axes : 046 060 -04 degrees     QTc Int : 423 ms    Normal sinus  rhythm  Abnormal QRS-T angle, consider primary T wave abnormality  Abnormal ECG  No previous ECGs available  Confirmed by Chetan Patterson MD ([id number]) on 3/20/2022 11:24:25 AM    Referred By: CHERIE   SELF           Confirmed By:Nilton Patterson MD        2D ECHO:   Results for orders placed during the hospital encounter of 03/19/22    Echo    Interpretation Summary  · The left ventricle is normal in size with normal systolic function. The estimated ejection fraction is 55%.  · Normal right ventricular size with normal right ventricular systolic function.  · Normal left ventricular diastolic function.  · Normal central venous pressure (3 mmHg).         ASSESSMENT/PLAN:       Pre-op Assessment    I have reviewed the Patient Summary Reports.     I have reviewed the Nursing Notes.    I have reviewed the Medications.     Review of Systems  Anesthesia Hx:  No problems with previous Anesthesia Denies Hx of Anesthetic complications  History of prior surgery of interest to airway management or planning: Denies Family Hx of Anesthesia complications.   Denies Personal Hx of Anesthesia complications.   Social:  Non-Smoker    Hematology/Oncology:        Current/Recent Cancer. (Lung CA )   Cardiovascular:   Hypertension  Denies Angina. ECG has been reviewed.    Pulmonary:   Denies Shortness of breath.  Denies Recent URI.    Renal/:  Renal/ Normal     Hepatic/GI:   Denies GERD. Denies Liver Disease.    Neurological:   Denies CVA. Denies Seizures.    Endocrine:   Diabetes, type 2  Obesity / BMI > 30      Physical Exam  General: Well nourished, Cooperative and Alert    Airway:  Mallampati: II   Mouth Opening: Normal  TM Distance: Normal  Tongue: Normal  Neck ROM: Normal ROM  Neck: Girth Increased    Dental:  Intact    Chest/Lungs:  Normal Respiratory Rate  Active cough  Heart:  Rate: Normal  Rhythm: Regular Rhythm  Sounds: Normal    Abdomen:  Nontender, Soft, Normal        Anesthesia Plan  Type of Anesthesia, risks & benefits  discussed:    Anesthesia Type: Gen Supraglottic Airway  Intra-op Monitoring Plan: Standard ASA Monitors  Post Op Pain Control Plan: multimodal analgesia  Induction:  IV  Airway Plan: Direct, Post-Induction  Informed Consent: Informed consent signed with the Patient and all parties understand the risks and agree with anesthesia plan.  All questions answered.   ASA Score: 3  Day of Surgery Review of History & Physical: I have interviewed and examined the patient. I have reviewed the patient's H&P dated:     Ready For Surgery From Anesthesia Perspective.     .
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
## 2022-03-21 NOTE — PLAN OF CARE
Patient is provided teaching about diabetes , diet modification, exercise , maintaining a healthy lifestyle deep breathing is encouraged patient will not experience any falls during hospital stay

## 2022-03-22 ENCOUNTER — ANESTHESIA (OUTPATIENT)
Dept: SURGERY | Facility: HOSPITAL | Age: 57
DRG: 180 | End: 2022-03-22
Payer: MEDICAID

## 2022-03-22 LAB
ALBUMIN SERPL BCP-MCNC: 3.7 G/DL (ref 3.5–5.2)
ALP SERPL-CCNC: 87 U/L (ref 55–135)
ALT SERPL W/O P-5'-P-CCNC: 35 U/L (ref 10–44)
ANION GAP SERPL CALC-SCNC: 16 MMOL/L (ref 8–16)
AST SERPL-CCNC: 20 U/L (ref 10–40)
BASOPHILS # BLD AUTO: 0.01 K/UL (ref 0–0.2)
BASOPHILS NFR BLD: 0.1 % (ref 0–1.9)
BILIRUB SERPL-MCNC: 0.6 MG/DL (ref 0.1–1)
BUN SERPL-MCNC: 30 MG/DL (ref 6–20)
CALCIUM SERPL-MCNC: 9.9 MG/DL (ref 8.7–10.5)
CHLORIDE SERPL-SCNC: 95 MMOL/L (ref 95–110)
CO2 SERPL-SCNC: 25 MMOL/L (ref 23–29)
CREAT SERPL-MCNC: 1.1 MG/DL (ref 0.5–1.4)
DIFFERENTIAL METHOD: ABNORMAL
EOSINOPHIL # BLD AUTO: 0 K/UL (ref 0–0.5)
EOSINOPHIL NFR BLD: 0 % (ref 0–8)
ERYTHROCYTE [DISTWIDTH] IN BLOOD BY AUTOMATED COUNT: 12.1 % (ref 11.5–14.5)
EST. GFR  (AFRICAN AMERICAN): >60 ML/MIN/1.73 M^2
EST. GFR  (NON AFRICAN AMERICAN): >60 ML/MIN/1.73 M^2
GLUCOSE SERPL-MCNC: 311 MG/DL (ref 70–110)
HCT VFR BLD AUTO: 44.6 % (ref 40–54)
HGB BLD-MCNC: 15.2 G/DL (ref 14–18)
IMM GRANULOCYTES # BLD AUTO: 0.12 K/UL (ref 0–0.04)
IMM GRANULOCYTES NFR BLD AUTO: 1 % (ref 0–0.5)
LYMPHOCYTES # BLD AUTO: 0.7 K/UL (ref 1–4.8)
LYMPHOCYTES NFR BLD: 5.6 % (ref 18–48)
MAGNESIUM SERPL-MCNC: 2.1 MG/DL (ref 1.6–2.6)
MCH RBC QN AUTO: 29.7 PG (ref 27–31)
MCHC RBC AUTO-ENTMCNC: 34.1 G/DL (ref 32–36)
MCV RBC AUTO: 87 FL (ref 82–98)
MONOCYTES # BLD AUTO: 0.1 K/UL (ref 0.3–1)
MONOCYTES NFR BLD: 0.7 % (ref 4–15)
NEUTROPHILS # BLD AUTO: 11.4 K/UL (ref 1.8–7.7)
NEUTROPHILS NFR BLD: 92.6 % (ref 38–73)
NRBC BLD-RTO: 0 /100 WBC
PHOSPHATE SERPL-MCNC: 3 MG/DL (ref 2.7–4.5)
PLATELET # BLD AUTO: 351 K/UL (ref 150–450)
PMV BLD AUTO: 10.1 FL (ref 9.2–12.9)
POCT GLUCOSE: 187 MG/DL (ref 70–110)
POCT GLUCOSE: 204 MG/DL (ref 70–110)
POCT GLUCOSE: 214 MG/DL (ref 70–110)
POCT GLUCOSE: 250 MG/DL (ref 70–110)
POCT GLUCOSE: 262 MG/DL (ref 70–110)
POTASSIUM SERPL-SCNC: 4 MMOL/L (ref 3.5–5.1)
PROT SERPL-MCNC: 7.4 G/DL (ref 6–8.4)
RBC # BLD AUTO: 5.12 M/UL (ref 4.6–6.2)
SODIUM SERPL-SCNC: 136 MMOL/L (ref 136–145)
WBC # BLD AUTO: 12.35 K/UL (ref 3.9–12.7)

## 2022-03-22 PROCEDURE — C1726 CATH, BAL DIL, NON-VASCULAR: HCPCS | Performed by: INTERNAL MEDICINE

## 2022-03-22 PROCEDURE — D9220A PRA ANESTHESIA: Mod: ,,, | Performed by: ANESTHESIOLOGY

## 2022-03-22 PROCEDURE — 25000003 PHARM REV CODE 250: Performed by: STUDENT IN AN ORGANIZED HEALTH CARE EDUCATION/TRAINING PROGRAM

## 2022-03-22 PROCEDURE — 63600175 PHARM REV CODE 636 W HCPCS: Performed by: STUDENT IN AN ORGANIZED HEALTH CARE EDUCATION/TRAINING PROGRAM

## 2022-03-22 PROCEDURE — 99232 PR SUBSEQUENT HOSPITAL CARE,LEVL II: ICD-10-PCS | Mod: 25,,, | Performed by: INTERNAL MEDICINE

## 2022-03-22 PROCEDURE — 88173 CYTOPATH EVAL FNA REPORT: CPT | Performed by: PATHOLOGY

## 2022-03-22 PROCEDURE — 31652 PR BRONCH W/ EBUS, SAMPLING 1 OR 2 NODES, INCL GUIDE: ICD-10-PCS | Mod: ,,, | Performed by: INTERNAL MEDICINE

## 2022-03-22 PROCEDURE — 88173 PR  INTERPRETATION OF FNA SMEAR: ICD-10-PCS | Mod: 26,,, | Performed by: PATHOLOGY

## 2022-03-22 PROCEDURE — 63600175 PHARM REV CODE 636 W HCPCS

## 2022-03-22 PROCEDURE — D9220A PRA ANESTHESIA: ICD-10-PCS | Mod: ,,, | Performed by: ANESTHESIOLOGY

## 2022-03-22 PROCEDURE — 82962 GLUCOSE BLOOD TEST: CPT | Performed by: INTERNAL MEDICINE

## 2022-03-22 PROCEDURE — 37000009 HC ANESTHESIA EA ADD 15 MINS: Performed by: INTERNAL MEDICINE

## 2022-03-22 PROCEDURE — 99900035 HC TECH TIME PER 15 MIN (STAT)

## 2022-03-22 PROCEDURE — 88173 CYTOPATH EVAL FNA REPORT: CPT | Mod: 26,,, | Performed by: PATHOLOGY

## 2022-03-22 PROCEDURE — 25000003 PHARM REV CODE 250

## 2022-03-22 PROCEDURE — 31625 BRONCHOSCOPY W/BIOPSY(S): CPT | Mod: 59,RT,, | Performed by: INTERNAL MEDICINE

## 2022-03-22 PROCEDURE — 88342 IMHCHEM/IMCYTCHM 1ST ANTB: CPT | Mod: 26,XU,, | Performed by: PATHOLOGY

## 2022-03-22 PROCEDURE — 20600001 HC STEP DOWN PRIVATE ROOM

## 2022-03-22 PROCEDURE — 88360 TUMOR IMMUNOHISTOCHEM/MANUAL: CPT | Performed by: PATHOLOGY

## 2022-03-22 PROCEDURE — 36000705 HC OR TIME LEV I EA ADD 15 MIN: Performed by: INTERNAL MEDICINE

## 2022-03-22 PROCEDURE — 99233 PR SUBSEQUENT HOSPITAL CARE,LEVL III: ICD-10-PCS | Mod: ,,, | Performed by: INTERNAL MEDICINE

## 2022-03-22 PROCEDURE — 88172 PR  EVALUATION OF FNA SMEAR TO DETERMINE ADEQUACY, FIRST EVAL: ICD-10-PCS | Mod: 26,,, | Performed by: PATHOLOGY

## 2022-03-22 PROCEDURE — 71000033 HC RECOVERY, INTIAL HOUR: Performed by: INTERNAL MEDICINE

## 2022-03-22 PROCEDURE — 88172 CYTP DX EVAL FNA 1ST EA SITE: CPT | Mod: 26,,, | Performed by: PATHOLOGY

## 2022-03-22 PROCEDURE — 99232 SBSQ HOSP IP/OBS MODERATE 35: CPT | Mod: 25,,, | Performed by: INTERNAL MEDICINE

## 2022-03-22 PROCEDURE — 88305 TISSUE EXAM BY PATHOLOGIST: CPT | Mod: 26,,, | Performed by: PATHOLOGY

## 2022-03-22 PROCEDURE — 31625 PR BRONCHOSCOPY,BIOPSY: ICD-10-PCS | Mod: 59,RT,, | Performed by: INTERNAL MEDICINE

## 2022-03-22 PROCEDURE — 88177 CYTP FNA EVAL EA ADDL: CPT | Mod: 26,,, | Performed by: PATHOLOGY

## 2022-03-22 PROCEDURE — 25000003 PHARM REV CODE 250: Performed by: INTERNAL MEDICINE

## 2022-03-22 PROCEDURE — 88360 PR  TUMOR IMMUNOHISTOCHEM/MANUAL: ICD-10-PCS | Mod: 26,,, | Performed by: PATHOLOGY

## 2022-03-22 PROCEDURE — 25000242 PHARM REV CODE 250 ALT 637 W/ HCPCS: Performed by: STUDENT IN AN ORGANIZED HEALTH CARE EDUCATION/TRAINING PROGRAM

## 2022-03-22 PROCEDURE — 88341 IMHCHEM/IMCYTCHM EA ADD ANTB: CPT | Mod: 26,59,, | Performed by: PATHOLOGY

## 2022-03-22 PROCEDURE — 31652 BRONCH EBUS SAMPLNG 1/2 NODE: CPT | Mod: ,,, | Performed by: INTERNAL MEDICINE

## 2022-03-22 PROCEDURE — 37000008 HC ANESTHESIA 1ST 15 MINUTES: Performed by: INTERNAL MEDICINE

## 2022-03-22 PROCEDURE — 36000704 HC OR TIME LEV I 1ST 15 MIN: Performed by: INTERNAL MEDICINE

## 2022-03-22 PROCEDURE — 88177 CYTP FNA EVAL EA ADDL: CPT | Performed by: PATHOLOGY

## 2022-03-22 PROCEDURE — 88177 PR  EVALUATION OF FNA SMEAR TO DETERMINE ADEQUACY, EA ADD EVAL: ICD-10-PCS | Mod: 26,,, | Performed by: PATHOLOGY

## 2022-03-22 PROCEDURE — 80053 COMPREHEN METABOLIC PANEL: CPT

## 2022-03-22 PROCEDURE — 94640 AIRWAY INHALATION TREATMENT: CPT

## 2022-03-22 PROCEDURE — 88305 TISSUE EXAM BY PATHOLOGIST: CPT | Mod: 59 | Performed by: PATHOLOGY

## 2022-03-22 PROCEDURE — 88342 CHG IMMUNOCYTOCHEMISTRY: ICD-10-PCS | Mod: 26,XU,, | Performed by: PATHOLOGY

## 2022-03-22 PROCEDURE — 88360 TUMOR IMMUNOHISTOCHEM/MANUAL: CPT | Mod: 26,,, | Performed by: PATHOLOGY

## 2022-03-22 PROCEDURE — 71000015 HC POSTOP RECOV 1ST HR: Performed by: INTERNAL MEDICINE

## 2022-03-22 PROCEDURE — 85025 COMPLETE CBC W/AUTO DIFF WBC: CPT

## 2022-03-22 PROCEDURE — 36415 COLL VENOUS BLD VENIPUNCTURE: CPT

## 2022-03-22 PROCEDURE — 83735 ASSAY OF MAGNESIUM: CPT

## 2022-03-22 PROCEDURE — 88172 CYTP DX EVAL FNA 1ST EA SITE: CPT | Mod: 59 | Performed by: PATHOLOGY

## 2022-03-22 PROCEDURE — 25500020 PHARM REV CODE 255: Performed by: INTERNAL MEDICINE

## 2022-03-22 PROCEDURE — 88341 IMHCHEM/IMCYTCHM EA ADD ANTB: CPT | Mod: 59 | Performed by: PATHOLOGY

## 2022-03-22 PROCEDURE — 84100 ASSAY OF PHOSPHORUS: CPT

## 2022-03-22 PROCEDURE — 99233 SBSQ HOSP IP/OBS HIGH 50: CPT | Mod: ,,, | Performed by: INTERNAL MEDICINE

## 2022-03-22 PROCEDURE — 94761 N-INVAS EAR/PLS OXIMETRY MLT: CPT

## 2022-03-22 PROCEDURE — 88341 PR IHC OR ICC EACH ADD'L SINGLE ANTIBODY  STAINPR: ICD-10-PCS | Mod: 26,59,, | Performed by: PATHOLOGY

## 2022-03-22 PROCEDURE — 88305 TISSUE EXAM BY PATHOLOGIST: ICD-10-PCS | Mod: 26,,, | Performed by: PATHOLOGY

## 2022-03-22 PROCEDURE — 27000221 HC OXYGEN, UP TO 24 HOURS

## 2022-03-22 PROCEDURE — 88342 IMHCHEM/IMCYTCHM 1ST ANTB: CPT | Performed by: PATHOLOGY

## 2022-03-22 RX ORDER — LIDOCAINE HCL/PF 100 MG/5ML
SYRINGE (ML) INTRAVENOUS
Status: DISCONTINUED | OUTPATIENT
Start: 2022-03-22 | End: 2022-03-22

## 2022-03-22 RX ORDER — ONDANSETRON 2 MG/ML
4 INJECTION INTRAMUSCULAR; INTRAVENOUS DAILY PRN
Status: DISCONTINUED | OUTPATIENT
Start: 2022-03-22 | End: 2022-03-22 | Stop reason: HOSPADM

## 2022-03-22 RX ORDER — HYDROXYZINE HYDROCHLORIDE 25 MG/1
25 TABLET, FILM COATED ORAL ONCE
Status: DISCONTINUED | OUTPATIENT
Start: 2022-03-22 | End: 2022-03-24

## 2022-03-22 RX ORDER — LIDOCAINE HYDROCHLORIDE 20 MG/ML
JELLY TOPICAL
Status: DISCONTINUED | OUTPATIENT
Start: 2022-03-22 | End: 2022-03-24 | Stop reason: HOSPADM

## 2022-03-22 RX ORDER — ONDANSETRON 2 MG/ML
INJECTION INTRAMUSCULAR; INTRAVENOUS
Status: DISCONTINUED | OUTPATIENT
Start: 2022-03-22 | End: 2022-03-22

## 2022-03-22 RX ORDER — KETAMINE HCL IN 0.9 % NACL 50 MG/5 ML
SYRINGE (ML) INTRAVENOUS
Status: DISCONTINUED | OUTPATIENT
Start: 2022-03-22 | End: 2022-03-22

## 2022-03-22 RX ORDER — PROMETHAZINE HYDROCHLORIDE AND CODEINE PHOSPHATE 6.25; 1 MG/5ML; MG/5ML
5 SOLUTION ORAL ONCE
Status: COMPLETED | OUTPATIENT
Start: 2022-03-22 | End: 2022-03-22

## 2022-03-22 RX ORDER — MIDAZOLAM HYDROCHLORIDE 1 MG/ML
INJECTION INTRAMUSCULAR; INTRAVENOUS
Status: DISCONTINUED | OUTPATIENT
Start: 2022-03-22 | End: 2022-03-22

## 2022-03-22 RX ORDER — FENTANYL CITRATE 50 UG/ML
INJECTION, SOLUTION INTRAMUSCULAR; INTRAVENOUS
Status: DISCONTINUED | OUTPATIENT
Start: 2022-03-22 | End: 2022-03-22

## 2022-03-22 RX ORDER — DEXMEDETOMIDINE HYDROCHLORIDE 100 UG/ML
INJECTION, SOLUTION INTRAVENOUS
Status: DISCONTINUED | OUTPATIENT
Start: 2022-03-22 | End: 2022-03-22

## 2022-03-22 RX ORDER — KETOROLAC TROMETHAMINE 15 MG/ML
15 INJECTION, SOLUTION INTRAMUSCULAR; INTRAVENOUS ONCE
Status: COMPLETED | OUTPATIENT
Start: 2022-03-22 | End: 2022-03-22

## 2022-03-22 RX ORDER — SUCCINYLCHOLINE CHLORIDE 20 MG/ML
INJECTION INTRAMUSCULAR; INTRAVENOUS
Status: DISCONTINUED | OUTPATIENT
Start: 2022-03-22 | End: 2022-03-22

## 2022-03-22 RX ORDER — GADOBUTROL 604.72 MG/ML
10 INJECTION INTRAVENOUS
Status: COMPLETED | OUTPATIENT
Start: 2022-03-23 | End: 2022-03-22

## 2022-03-22 RX ORDER — FENTANYL CITRATE 50 UG/ML
25 INJECTION, SOLUTION INTRAMUSCULAR; INTRAVENOUS EVERY 5 MIN PRN
Status: DISCONTINUED | OUTPATIENT
Start: 2022-03-22 | End: 2022-03-22 | Stop reason: HOSPADM

## 2022-03-22 RX ORDER — PROPOFOL 10 MG/ML
VIAL (ML) INTRAVENOUS CONTINUOUS PRN
Status: DISCONTINUED | OUTPATIENT
Start: 2022-03-22 | End: 2022-03-22

## 2022-03-22 RX ORDER — SODIUM CHLORIDE 9 MG/ML
INJECTION, SOLUTION INTRAVENOUS CONTINUOUS PRN
Status: DISCONTINUED | OUTPATIENT
Start: 2022-03-22 | End: 2022-03-22

## 2022-03-22 RX ORDER — HYDROMORPHONE HYDROCHLORIDE 1 MG/ML
0.2 INJECTION, SOLUTION INTRAMUSCULAR; INTRAVENOUS; SUBCUTANEOUS EVERY 5 MIN PRN
Status: DISCONTINUED | OUTPATIENT
Start: 2022-03-22 | End: 2022-03-22 | Stop reason: HOSPADM

## 2022-03-22 RX ORDER — DEXAMETHASONE SODIUM PHOSPHATE 4 MG/ML
INJECTION, SOLUTION INTRA-ARTICULAR; INTRALESIONAL; INTRAMUSCULAR; INTRAVENOUS; SOFT TISSUE
Status: DISCONTINUED | OUTPATIENT
Start: 2022-03-22 | End: 2022-03-22

## 2022-03-22 RX ORDER — LIDOCAINE HYDROCHLORIDE 10 MG/ML
INJECTION, SOLUTION EPIDURAL; INFILTRATION; INTRACAUDAL; PERINEURAL
Status: DISCONTINUED | OUTPATIENT
Start: 2022-03-22 | End: 2022-03-22 | Stop reason: HOSPADM

## 2022-03-22 RX ORDER — PROPOFOL 10 MG/ML
VIAL (ML) INTRAVENOUS
Status: DISCONTINUED | OUTPATIENT
Start: 2022-03-22 | End: 2022-03-22

## 2022-03-22 RX ORDER — MUPIROCIN 20 MG/G
OINTMENT TOPICAL 2 TIMES DAILY
Status: DISCONTINUED | OUTPATIENT
Start: 2022-03-22 | End: 2022-03-24 | Stop reason: HOSPADM

## 2022-03-22 RX ADMIN — ENOXAPARIN SODIUM 40 MG: 100 INJECTION SUBCUTANEOUS at 05:03

## 2022-03-22 RX ADMIN — FUROSEMIDE 20 MG: 20 TABLET ORAL at 08:03

## 2022-03-22 RX ADMIN — METHYLPREDNISOLONE SODIUM SUCCINATE 60 MG: 40 INJECTION, POWDER, FOR SOLUTION INTRAMUSCULAR; INTRAVENOUS at 11:03

## 2022-03-22 RX ADMIN — SUCCINYLCHOLINE CHLORIDE 20 MG: 20 INJECTION, SOLUTION INTRAMUSCULAR; INTRAVENOUS at 01:03

## 2022-03-22 RX ADMIN — IOHEXOL 100 ML: 350 INJECTION, SOLUTION INTRAVENOUS at 06:03

## 2022-03-22 RX ADMIN — Medication 30 MG: at 12:03

## 2022-03-22 RX ADMIN — IPRATROPIUM BROMIDE AND ALBUTEROL SULFATE 3 ML: 2.5; .5 SOLUTION RESPIRATORY (INHALATION) at 03:03

## 2022-03-22 RX ADMIN — METHYLPREDNISOLONE SODIUM SUCCINATE 60 MG: 40 INJECTION, POWDER, FOR SOLUTION INTRAMUSCULAR; INTRAVENOUS at 06:03

## 2022-03-22 RX ADMIN — DEXMEDETOMIDINE HYDROCHLORIDE 8 MCG: 100 INJECTION, SOLUTION, CONCENTRATE INTRAVENOUS at 12:03

## 2022-03-22 RX ADMIN — DEXAMETHASONE SODIUM PHOSPHATE 8 MG: 4 INJECTION, SOLUTION INTRAMUSCULAR; INTRAVENOUS at 12:03

## 2022-03-22 RX ADMIN — PROPOFOL 150 MG: 10 INJECTION, EMULSION INTRAVENOUS at 12:03

## 2022-03-22 RX ADMIN — LIDOCAINE HYDROCHLORIDE: 20 JELLY TOPICAL at 02:03

## 2022-03-22 RX ADMIN — INSULIN ASPART 1 UNITS: 100 INJECTION, SOLUTION INTRAVENOUS; SUBCUTANEOUS at 09:03

## 2022-03-22 RX ADMIN — IPRATROPIUM BROMIDE AND ALBUTEROL SULFATE 3 ML: 2.5; .5 SOLUTION RESPIRATORY (INHALATION) at 08:03

## 2022-03-22 RX ADMIN — SUCCINYLCHOLINE CHLORIDE 140 MG: 20 INJECTION, SOLUTION INTRAMUSCULAR; INTRAVENOUS at 12:03

## 2022-03-22 RX ADMIN — IPRATROPIUM BROMIDE AND ALBUTEROL SULFATE 3 ML: 2.5; .5 SOLUTION RESPIRATORY (INHALATION) at 02:03

## 2022-03-22 RX ADMIN — MIDAZOLAM HYDROCHLORIDE 2 MG: 1 INJECTION, SOLUTION INTRAMUSCULAR; INTRAVENOUS at 12:03

## 2022-03-22 RX ADMIN — LIDOCAINE HYDROCHLORIDE 100 MG: 20 INJECTION, SOLUTION INTRAVENOUS at 12:03

## 2022-03-22 RX ADMIN — FENTANYL CITRATE 100 MCG: 50 INJECTION, SOLUTION INTRAMUSCULAR; INTRAVENOUS at 12:03

## 2022-03-22 RX ADMIN — ACETAMINOPHEN 650 MG: 325 TABLET ORAL at 03:03

## 2022-03-22 RX ADMIN — SUCCINYLCHOLINE CHLORIDE 20 MG: 20 INJECTION, SOLUTION INTRAMUSCULAR; INTRAVENOUS at 12:03

## 2022-03-22 RX ADMIN — FENTANYL CITRATE 25 MCG: 50 INJECTION INTRAMUSCULAR; INTRAVENOUS at 02:03

## 2022-03-22 RX ADMIN — AMLODIPINE BESYLATE 10 MG: 10 TABLET ORAL at 08:03

## 2022-03-22 RX ADMIN — PROMETHAZINE HYDROCHLORIDE AND CODEINE PHOSPHATE 5 ML: 10; 6.25 SOLUTION ORAL at 11:03

## 2022-03-22 RX ADMIN — PROMETHAZINE HYDROCHLORIDE AND CODEINE PHOSPHATE 5 ML: 10; 6.25 SOLUTION ORAL at 03:03

## 2022-03-22 RX ADMIN — SODIUM CHLORIDE, SODIUM GLUCONATE, SODIUM ACETATE, POTASSIUM CHLORIDE, MAGNESIUM CHLORIDE, SODIUM PHOSPHATE, DIBASIC, AND POTASSIUM PHOSPHATE: .53; .5; .37; .037; .03; .012; .00082 INJECTION, SOLUTION INTRAVENOUS at 12:03

## 2022-03-22 RX ADMIN — PROMETHAZINE HYDROCHLORIDE AND CODEINE PHOSPHATE 5 ML: 10; 6.25 SOLUTION ORAL at 06:03

## 2022-03-22 RX ADMIN — LOSARTAN POTASSIUM 100 MG: 50 TABLET, FILM COATED ORAL at 08:03

## 2022-03-22 RX ADMIN — INSULIN DETEMIR 5 UNITS: 100 INJECTION, SOLUTION SUBCUTANEOUS at 09:03

## 2022-03-22 RX ADMIN — ONDANSETRON HYDROCHLORIDE 4 MG: 2 INJECTION INTRAMUSCULAR; INTRAVENOUS at 12:03

## 2022-03-22 RX ADMIN — DEXMEDETOMIDINE HYDROCHLORIDE 8 MCG: 100 INJECTION, SOLUTION, CONCENTRATE INTRAVENOUS at 01:03

## 2022-03-22 RX ADMIN — MUPIROCIN: 20 OINTMENT TOPICAL at 11:03

## 2022-03-22 RX ADMIN — METHYLPREDNISOLONE SODIUM SUCCINATE 60 MG: 40 INJECTION, POWDER, FOR SOLUTION INTRAMUSCULAR; INTRAVENOUS at 05:03

## 2022-03-22 RX ADMIN — Medication 20 MG: at 12:03

## 2022-03-22 RX ADMIN — IPRATROPIUM BROMIDE AND ALBUTEROL SULFATE 3 ML: 2.5; .5 SOLUTION RESPIRATORY (INHALATION) at 04:03

## 2022-03-22 RX ADMIN — INSULIN ASPART 2 UNITS: 100 INJECTION, SOLUTION INTRAVENOUS; SUBCUTANEOUS at 08:03

## 2022-03-22 RX ADMIN — PROMETHAZINE HYDROCHLORIDE AND CODEINE PHOSPHATE 5 ML: 10; 6.25 SOLUTION ORAL at 08:03

## 2022-03-22 RX ADMIN — INSULIN ASPART 2 UNITS: 100 INJECTION, SOLUTION INTRAVENOUS; SUBCUTANEOUS at 04:03

## 2022-03-22 RX ADMIN — PROPOFOL 50 MG: 10 INJECTION, EMULSION INTRAVENOUS at 12:03

## 2022-03-22 RX ADMIN — SODIUM CHLORIDE: 0.9 INJECTION, SOLUTION INTRAVENOUS at 12:03

## 2022-03-22 RX ADMIN — IPRATROPIUM BROMIDE AND ALBUTEROL SULFATE 3 ML: 2.5; .5 SOLUTION RESPIRATORY (INHALATION) at 01:03

## 2022-03-22 RX ADMIN — KETOROLAC TROMETHAMINE 15 MG: 15 INJECTION, SOLUTION INTRAMUSCULAR; INTRAVENOUS at 03:03

## 2022-03-22 RX ADMIN — PROPOFOL 100 MCG/KG/MIN: 10 INJECTION, EMULSION INTRAVENOUS at 12:03

## 2022-03-22 RX ADMIN — GADOBUTROL 10 ML: 604.72 INJECTION INTRAVENOUS at 11:03

## 2022-03-22 NOTE — PROGRESS NOTES
"Patient is asleep in bed, NPO as ordered , about 0300 patient complains of knee and back pain, patient is anxious pacing up and  Down  the hallway ,  patient states :"The pain is like a rubbing pain from laying too much on the bed can you ask the doctor for a cream :" author contacted the MD order for lidocaine is received and administered . After walk patient is coughing , dry non productive cough noted patient request a breathing treatment , RT is contacted . Patient complains of back pain the lidocaine did not help message the MD order for Toradol is received and given to the patient . 0400 patient is asleep full relief of pain , denies pain patient is place on 2L oxygen via nasal canula patient sat drop to the 85's order is received .Report is given to the incoming nurse   "

## 2022-03-22 NOTE — NURSING
0900 Gave report to OR nurse  0942 patient transported to the OR; vital signs stable at time of transfer; family at bedside

## 2022-03-22 NOTE — PLAN OF CARE
Patient is provided teaching about shortness , deep breathing exercise patient will have no falls during hospital stay . Blood sugar of the patient will be under controlled .

## 2022-03-22 NOTE — PLAN OF CARE
Problem: Adult Inpatient Plan of Care  Goal: Plan of Care Review  Outcome: Ongoing, Progressing  Goal: Patient-Specific Goal (Individualized)  Outcome: Ongoing, Progressing  Goal: Absence of Hospital-Acquired Illness or Injury  Outcome: Ongoing, Progressing  Goal: Optimal Comfort and Wellbeing  Outcome: Ongoing, Progressing  Goal: Readiness for Transition of Care  Outcome: Ongoing, Progressing     Problem: Fall Injury Risk  Goal: Absence of Fall and Fall-Related Injury  Outcome: Ongoing, Progressing     Problem: Diabetes Comorbidity  Goal: Blood Glucose Level Within Targeted Range  Outcome: Ongoing, Progressing     Problem: Hypertension Acute  Goal: Blood Pressure Within Desired Range  Outcome: Ongoing, Progressing     Problem: Fatigue  Goal: Improved Activity Tolerance  Outcome: Ongoing, Progressing     Problem: Pain Acute  Goal: Acceptable Pain Control and Functional Ability  Outcome: Ongoing, Progressing

## 2022-03-22 NOTE — PROGRESS NOTES
[facility] Fellow Progress Note:    Brief Hx: 58 yo w/ PMHx of T2DM on insulin, HTN, smoking hx (quit 26 yrs ago) childhood asthma, COVID infection () admitted for cough and COPD exacerbation and found to have a lung mass with hilar LA with plan for 3/22 EBUS for biopsy.     Subjective:  Patient reports feeling much better today than he did yesterday, cough is improved. Did require oxygen overnight but is off this AM. His complaints this morning are more chronic in nature with bilateral knee pain and back pain, which he relates to being in bed all day, and is something that he notes at home as well.     Objective:  Last 24 Hour Vital Signs:  BP  Min: 126/72  Max: 193/90  Temp  Av.6 °F (36.4 °C)  Min: 97.2 °F (36.2 °C)  Max: 98.1 °F (36.7 °C)  Pulse  Av.1  Min: 66  Max: 104  Resp  Av.3  Min: 14  Max: 43  SpO2  Av.9 %  Min: 91 %  Max: 98 %  Body mass index is 39.16 kg/m².  I/O last 3 completed shifts:  In: 480 [P.O.:480]  Out: 1600 [Urine:1600]      Physical Exam:  General: Alert and awake, in bed  HENT:  NCAT; anicteric sclera; OP clear with MMM  Cardio:  Regular rate and rhythm with normal S1 and S2; no murmurs or rubs  Resp:  CTAB; respirations unlabored with improvement in breath sounds today, no appreciable wheezing  Abdom: Soft, NTND   Extrem: WWP with no clubbing, cyanosis or edema  Pulses: 2+ and symmetric distally  Neuro:  AAOx3; cooperative and pleasant with no focal deficits      Assessment & Plan:   58 yo male with PMHx relevant for tobacco use and childhood asthma initially admitted for concern for COPD exacerbation but found to have R lung mass and mediastinal LAD  - concern for malignancy given smoking history  - plan for OR today at 1145hrs for EBUS/biopsy  - q4h scheduled codeine cough syrup for the next day at least as I suspect his cough will worsen post bronchoscopy  - solumedrol 60mg q6h for today, can switch back to PO prednisone post-procedure  -  duo-nebs q4h scheduled for the next 24 hrs post procedure to aid with cough and expectoration    Shireen Mora MD  Pulm/CC Fellow
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
## 2022-03-22 NOTE — INTERVAL H&P NOTE
The patient has been examined and the H&P has been reviewed:    I concur with the findings and no changes have occurred since H&P was written.    Anesthesia/Surgery risks, benefits and alternative options discussed and understood by patient/family.            Diagnostic bronchoscopy with EBUS to be performed.    I have explained the risks, benefits and alternatives of the procedure in detail.  The patient voices understanding and all questions have been answered.  The patient agrees to proceed as planned.

## 2022-03-22 NOTE — NURSING TRANSFER
Nursing Transfer Note      3/22/2022     Reason patient is being transferred: postop    Transfer To: [id number]    Transfer via stretcher    Transfer with     Transported by pacu pct    Medicines sent: n/a    Any special needs or follow-up needed:     Chart send with patient: Yes    Notified: spouse    Patient reassessed at: 3/22/22    Upon arrival to floor: Report given to Shikha Renee

## 2022-03-22 NOTE — PLAN OF CARE
Bronchoscopy complete, tolerated well.   Prelim is highly suspicious for small cell.  Would complete staging with MRI of brain and consult hematology/oncology.

## 2022-03-22 NOTE — CONSULTS
Heme/Onc Brief Consult Note    Patient is a 57-year-old male with history of T2DM, HTN, tobacco use who presented to the ED on 3/19/22 for cough, sob, and pleuritic chest pain.     CT Chest w/ con 3/20:  Mediastinal lymphadenopathy 7.5 cm.  Right middle lobe consolidative opacity 4.6 cm.  5.3 cm liver lesion.    Patient underwent diagnostic bronchoscopy with EBUS of RML mass and lymph nodes on 03/22:  Prelim path suspicious for small cell lung ca.     Assessment/Plan  Would rec to obtain CT A/P w/ con.   Pending path, will follow. If hemodynamically stable ok to follow outpatient w/ heme/onc to determine treatment plan.   I will arrange follow up outpt.  Will see patient once out of recovery.     Please call if qs.     Racheal Jennings MD  Hematology/Oncology Fellow PGY IV  [facility]          I have reviewed the notes, assessments, and/or procedures performed by the housestaff, as above.  I have personally interviewed and examined the patient at the beside, and rounded with the housestaff. I concur with her/his assessment and plan and the documentation of Juan Gillespie.  More than 50 mins total time were spent during this encounter.      Marcos Crisostomo M.D., M.S., F.A.C.P.  Hematology/Oncology Attending  [facility]

## 2022-03-22 NOTE — ANESTHESIA PROCEDURE NOTES
Intubation    Date/Time: 3/22/2022 12:32 PM  Performed by: Wilber Hamilton MD  Authorized by: Israel Pantoja MD     Intubation:     Induction:  Intravenous    Intubated:  Postinduction    Mask Ventilation:  Easy with oral airway    Attempts:  1    Attempted By:  Resident anesthesiologist    Method of Intubation:  Direct    Blade:  Sheela 3    Laryngeal View Grade: Grade IIb - only the arytenoids and epiglottis seen      Difficult Airway Encountered?: No      Complications:  None    Airway Device:  Oral endotracheal tube    Airway Device Size:  9.0    Style/Cuff Inflation:  Cuffed    Inflation Amount (mL):  6    Tube secured:  24    Secured at:  The lips    Placement Verified By:  Capnometry    Complicating Factors:  None, short neck and poor neck/head extension    Findings Post-Intubation:  BS equal bilateral

## 2022-03-22 NOTE — SUBJECTIVE & OBJECTIVE
Interval History: Cough improved overnight. Thinks steroids and cough syrup has helped. Now on RA saturating 96%. Awaiting EBUS.     Review of Systems   Constitutional:  Negative for fatigue and fever.   HENT:  Negative for congestion and rhinorrhea.    Eyes:  Negative for photophobia and visual disturbance.   Respiratory:  Negative for cough, shortness of breath and wheezing.    Cardiovascular:  Negative for chest pain, palpitations and leg swelling.   Gastrointestinal:  Negative for abdominal pain, nausea and vomiting.   Genitourinary:  Negative for dysuria and hematuria.   Musculoskeletal:  Negative for arthralgias and myalgias.   Neurological:  Negative for dizziness, light-headedness and headaches.   Psychiatric/Behavioral:  Negative for agitation and confusion.    Objective:     Vital Signs (Most Recent):  Temp: 97.9 °F (36.6 °C) (03/22/22 1337)  Pulse: 93 (03/22/22 1337)  Resp: (!) 24 (03/22/22 1006)  BP: (!) 182/84 (03/22/22 1337)  SpO2: 95 % (03/22/22 1337)   Vital Signs (24h Range):  Temp:  [97.5 °F (36.4 °C)-98.1 °F (36.7 °C)] 97.9 °F (36.6 °C)  Pulse:  [] 93  Resp:  [14-43] 24  SpO2:  [91 %-98 %] 95 %  BP: (126-193)/(72-90) 182/84     Weight: 113.4 kg (250 lb)  Body mass index is 39.16 kg/m².    Intake/Output Summary (Last 24 hours) at 3/22/2022 1355  Last data filed at 3/22/2022 1255  Gross per 24 hour   Intake 1240 ml   Output 2001 ml   Net -761 ml        Physical Exam  Vitals and nursing note reviewed.   Constitutional:       General: He is not in acute distress.     Appearance: He is obese. He is not ill-appearing.   HENT:      Head: Normocephalic and atraumatic.      Mouth/Throat:      Mouth: Mucous membranes are moist.   Eyes:      Extraocular Movements: Extraocular movements intact.      Pupils: Pupils are equal, round, and reactive to light.   Cardiovascular:      Rate and Rhythm: Normal rate and regular rhythm.      Pulses: Normal pulses.      Heart sounds: No murmur heard.  Pulmonary:       Breath sounds: Rhonchi (BL) present. No wheezing or rales.      Comments: On RA, saturating 96%  Abdominal:      General: Bowel sounds are normal. There is no distension.      Palpations: Abdomen is soft.      Tenderness: There is no abdominal tenderness. There is no right CVA tenderness or left CVA tenderness.   Musculoskeletal:         General: No tenderness.      Right lower leg: No edema.      Left lower leg: No edema.   Skin:     General: Skin is warm.      Capillary Refill: Capillary refill takes less than 2 seconds.      Findings: No erythema or rash.   Neurological:      General: No focal deficit present.      Mental Status: He is alert and oriented to person, place, and time.   Psychiatric:         Mood and Affect: Mood normal.         Thought Content: Thought content normal.       Significant Labs: All pertinent labs within the past 24 hours have been reviewed.  CBC:   Recent Labs   Lab 03/21/22 0213 03/22/22 0224   WBC 17.49* 12.35   HGB 14.8 15.2   HCT 45.3 44.6    351       CMP:   Recent Labs   Lab 03/21/22 0213 03/22/22 0224    136   K 3.9 4.0    95   CO2 24 25   * 311*   BUN 30* 30*   CREATININE 1.1 1.1   CALCIUM 10.1 9.9   PROT 7.1 7.4   ALBUMIN 3.7 3.7   BILITOT 0.6 0.6   ALKPHOS 86 87   AST 19 20   ALT 30 35   ANIONGAP 15 16   EGFRNONAA >60.0 >60.0       Significant Imaging: I have reviewed all pertinent imaging results/findings within the past 24 hours.

## 2022-03-22 NOTE — ASSESSMENT & PLAN NOTE
Acute hypoxic respiratory failure    57 year old male with PMH of smoking, T2DM, HTN, past asymptomatic COVID infection in 08/21 admitted for admitted for dyspnea/cough and found to have significant hilar lymphadenopathy/mass. Broad differential including infectious, granulomatous disease, sarcoid, cancer, COPD, pneumonitis     - Pulm consulted with plans for EBUS on 3/22 --> preliminary results showing SCLC; MRI brain ordered for staging  - Started on solumedrol per pulm recs   - continue antitussives, added phenergan codeine today   - IS and scheduled dounebs

## 2022-03-23 LAB
ALBUMIN SERPL BCP-MCNC: 3.5 G/DL (ref 3.5–5.2)
ALP SERPL-CCNC: 73 U/L (ref 55–135)
ALT SERPL W/O P-5'-P-CCNC: 32 U/L (ref 10–44)
ANION GAP SERPL CALC-SCNC: 10 MMOL/L (ref 8–16)
AST SERPL-CCNC: 17 U/L (ref 10–40)
BASOPHILS # BLD AUTO: 0.03 K/UL (ref 0–0.2)
BASOPHILS NFR BLD: 0.2 % (ref 0–1.9)
BILIRUB SERPL-MCNC: 0.4 MG/DL (ref 0.1–1)
BUN SERPL-MCNC: 27 MG/DL (ref 6–20)
CALCIUM SERPL-MCNC: 9.8 MG/DL (ref 8.7–10.5)
CHLORIDE SERPL-SCNC: 101 MMOL/L (ref 95–110)
CO2 SERPL-SCNC: 26 MMOL/L (ref 23–29)
CREAT SERPL-MCNC: 1.1 MG/DL (ref 0.5–1.4)
DIFFERENTIAL METHOD: ABNORMAL
EOSINOPHIL # BLD AUTO: 0 K/UL (ref 0–0.5)
EOSINOPHIL NFR BLD: 0 % (ref 0–8)
ERYTHROCYTE [DISTWIDTH] IN BLOOD BY AUTOMATED COUNT: 12.5 % (ref 11.5–14.5)
EST. GFR  (AFRICAN AMERICAN): >60 ML/MIN/1.73 M^2
EST. GFR  (NON AFRICAN AMERICAN): >60 ML/MIN/1.73 M^2
GLUCOSE SERPL-MCNC: 245 MG/DL (ref 70–110)
HCT VFR BLD AUTO: 51.3 % (ref 40–54)
HGB BLD-MCNC: 15.4 G/DL (ref 14–18)
IMM GRANULOCYTES # BLD AUTO: 0.12 K/UL (ref 0–0.04)
IMM GRANULOCYTES NFR BLD AUTO: 1 % (ref 0–0.5)
LYMPHOCYTES # BLD AUTO: 0.6 K/UL (ref 1–4.8)
LYMPHOCYTES NFR BLD: 4.4 % (ref 18–48)
MCH RBC QN AUTO: 29.7 PG (ref 27–31)
MCHC RBC AUTO-ENTMCNC: 30 G/DL (ref 32–36)
MCV RBC AUTO: 99 FL (ref 82–98)
MONOCYTES # BLD AUTO: 0.4 K/UL (ref 0.3–1)
MONOCYTES NFR BLD: 3.4 % (ref 4–15)
NEUTROPHILS # BLD AUTO: 11.5 K/UL (ref 1.8–7.7)
NEUTROPHILS NFR BLD: 91 % (ref 38–73)
NRBC BLD-RTO: 0 /100 WBC
PLATELET # BLD AUTO: 300 K/UL (ref 150–450)
PMV BLD AUTO: 10.2 FL (ref 9.2–12.9)
POCT GLUCOSE: 219 MG/DL (ref 70–110)
POCT GLUCOSE: 229 MG/DL (ref 70–110)
POCT GLUCOSE: 249 MG/DL (ref 70–110)
POCT GLUCOSE: 294 MG/DL (ref 70–110)
POTASSIUM SERPL-SCNC: 4.4 MMOL/L (ref 3.5–5.1)
PROT SERPL-MCNC: 6.9 G/DL (ref 6–8.4)
RBC # BLD AUTO: 5.18 M/UL (ref 4.6–6.2)
SODIUM SERPL-SCNC: 137 MMOL/L (ref 136–145)
WBC # BLD AUTO: 12.59 K/UL (ref 3.9–12.7)

## 2022-03-23 PROCEDURE — 25000003 PHARM REV CODE 250: Performed by: INTERNAL MEDICINE

## 2022-03-23 PROCEDURE — 94640 AIRWAY INHALATION TREATMENT: CPT

## 2022-03-23 PROCEDURE — 80053 COMPREHEN METABOLIC PANEL: CPT

## 2022-03-23 PROCEDURE — 99497 ADVNCD CARE PLAN 30 MIN: CPT | Mod: ,,, | Performed by: INTERNAL MEDICINE

## 2022-03-23 PROCEDURE — 27000221 HC OXYGEN, UP TO 24 HOURS

## 2022-03-23 PROCEDURE — 99233 SBSQ HOSP IP/OBS HIGH 50: CPT | Mod: ,,, | Performed by: INTERNAL MEDICINE

## 2022-03-23 PROCEDURE — 63600175 PHARM REV CODE 636 W HCPCS: Performed by: STUDENT IN AN ORGANIZED HEALTH CARE EDUCATION/TRAINING PROGRAM

## 2022-03-23 PROCEDURE — 99222 1ST HOSP IP/OBS MODERATE 55: CPT | Mod: ,,, | Performed by: INTERNAL MEDICINE

## 2022-03-23 PROCEDURE — 25000003 PHARM REV CODE 250: Performed by: EMERGENCY MEDICINE

## 2022-03-23 PROCEDURE — 25000003 PHARM REV CODE 250: Performed by: STUDENT IN AN ORGANIZED HEALTH CARE EDUCATION/TRAINING PROGRAM

## 2022-03-23 PROCEDURE — 63600175 PHARM REV CODE 636 W HCPCS

## 2022-03-23 PROCEDURE — 99900035 HC TECH TIME PER 15 MIN (STAT)

## 2022-03-23 PROCEDURE — 36415 COLL VENOUS BLD VENIPUNCTURE: CPT

## 2022-03-23 PROCEDURE — 25000003 PHARM REV CODE 250

## 2022-03-23 PROCEDURE — 99222 PR INITIAL HOSPITAL CARE,LEVL II: ICD-10-PCS | Mod: ,,, | Performed by: INTERNAL MEDICINE

## 2022-03-23 PROCEDURE — 99232 SBSQ HOSP IP/OBS MODERATE 35: CPT | Mod: ,,, | Performed by: INTERNAL MEDICINE

## 2022-03-23 PROCEDURE — 99232 PR SUBSEQUENT HOSPITAL CARE,LEVL II: ICD-10-PCS | Mod: ,,, | Performed by: INTERNAL MEDICINE

## 2022-03-23 PROCEDURE — 99497 PR ADVNCD CARE PLAN 30 MIN: ICD-10-PCS | Mod: ,,, | Performed by: INTERNAL MEDICINE

## 2022-03-23 PROCEDURE — 94761 N-INVAS EAR/PLS OXIMETRY MLT: CPT

## 2022-03-23 PROCEDURE — 85025 COMPLETE CBC W/AUTO DIFF WBC: CPT

## 2022-03-23 PROCEDURE — 25500020 PHARM REV CODE 255: Performed by: INTERNAL MEDICINE

## 2022-03-23 PROCEDURE — 25000242 PHARM REV CODE 250 ALT 637 W/ HCPCS: Performed by: STUDENT IN AN ORGANIZED HEALTH CARE EDUCATION/TRAINING PROGRAM

## 2022-03-23 PROCEDURE — 20600001 HC STEP DOWN PRIVATE ROOM

## 2022-03-23 PROCEDURE — A9585 GADOBUTROL INJECTION: HCPCS | Performed by: INTERNAL MEDICINE

## 2022-03-23 PROCEDURE — 99233 PR SUBSEQUENT HOSPITAL CARE,LEVL III: ICD-10-PCS | Mod: ,,, | Performed by: INTERNAL MEDICINE

## 2022-03-23 RX ORDER — METHOCARBAMOL 500 MG/1
500 TABLET, FILM COATED ORAL 3 TIMES DAILY
Status: DISCONTINUED | OUTPATIENT
Start: 2022-03-23 | End: 2022-03-24 | Stop reason: HOSPADM

## 2022-03-23 RX ORDER — INSULIN ASPART 100 [IU]/ML
3 INJECTION, SOLUTION INTRAVENOUS; SUBCUTANEOUS
Status: DISCONTINUED | OUTPATIENT
Start: 2022-03-23 | End: 2022-03-23

## 2022-03-23 RX ORDER — CYCLOBENZAPRINE HCL 5 MG
5 TABLET ORAL 3 TIMES DAILY PRN
Status: ON HOLD | COMMUNITY
End: 2022-03-23 | Stop reason: HOSPADM

## 2022-03-23 RX ORDER — PREDNISONE 20 MG/1
40 TABLET ORAL DAILY
Status: DISCONTINUED | OUTPATIENT
Start: 2022-03-23 | End: 2022-03-24 | Stop reason: HOSPADM

## 2022-03-23 RX ORDER — ALBUTEROL SULFATE 1.25 MG/3ML
1.25 SOLUTION RESPIRATORY (INHALATION) 3 TIMES DAILY PRN
Qty: 90 ML | Refills: 2 | Status: SHIPPED | OUTPATIENT
Start: 2022-03-23 | End: 2023-01-23 | Stop reason: SDUPTHER

## 2022-03-23 RX ORDER — PREDNISONE 20 MG/1
40 TABLET ORAL DAILY
Qty: 1 TABLET | Refills: 0 | Status: SHIPPED | OUTPATIENT
Start: 2022-03-24 | End: 2022-03-24 | Stop reason: HOSPADM

## 2022-03-23 RX ORDER — IPRATROPIUM BROMIDE AND ALBUTEROL SULFATE 2.5; .5 MG/3ML; MG/3ML
3 SOLUTION RESPIRATORY (INHALATION) EVERY 4 HOURS
Qty: 90 ML | Refills: 3 | Status: SHIPPED | OUTPATIENT
Start: 2022-03-23 | End: 2022-03-23 | Stop reason: HOSPADM

## 2022-03-23 RX ORDER — HYDRALAZINE HYDROCHLORIDE 10 MG/1
10 TABLET, FILM COATED ORAL EVERY 6 HOURS PRN
Status: DISCONTINUED | OUTPATIENT
Start: 2022-03-23 | End: 2022-03-24 | Stop reason: HOSPADM

## 2022-03-23 RX ORDER — LORAZEPAM 0.5 MG/1
1 TABLET ORAL EVERY 6 HOURS PRN
Status: DISCONTINUED | OUTPATIENT
Start: 2022-03-23 | End: 2022-03-24 | Stop reason: HOSPADM

## 2022-03-23 RX ORDER — BENZONATATE 200 MG/1
200 CAPSULE ORAL 3 TIMES DAILY PRN
Qty: 30 CAPSULE | Refills: 3 | Status: SHIPPED | OUTPATIENT
Start: 2022-03-23 | End: 2022-03-30 | Stop reason: SDUPTHER

## 2022-03-23 RX ORDER — LIDOCAINE HYDROCHLORIDE 20 MG/ML
JELLY TOPICAL
Qty: 30 ML | Refills: 0 | Status: SHIPPED | OUTPATIENT
Start: 2022-03-23 | End: 2022-03-23 | Stop reason: HOSPADM

## 2022-03-23 RX ORDER — HYDROCODONE BITARTRATE AND ACETAMINOPHEN 5; 325 MG/1; MG/1
1 TABLET ORAL EVERY 6 HOURS PRN
Status: DISCONTINUED | OUTPATIENT
Start: 2022-03-23 | End: 2022-03-24 | Stop reason: HOSPADM

## 2022-03-23 RX ORDER — INSULIN ASPART 100 [IU]/ML
0-10 INJECTION, SOLUTION INTRAVENOUS; SUBCUTANEOUS
Status: DISCONTINUED | OUTPATIENT
Start: 2022-03-23 | End: 2022-03-24 | Stop reason: HOSPADM

## 2022-03-23 RX ORDER — HYDROXYZINE HYDROCHLORIDE 25 MG/1
25 TABLET, FILM COATED ORAL 3 TIMES DAILY PRN
Qty: 21 TABLET | Refills: 0 | Status: ON HOLD | OUTPATIENT
Start: 2022-03-23 | End: 2022-12-10 | Stop reason: HOSPADM

## 2022-03-23 RX ORDER — METHOCARBAMOL 500 MG/1
500 TABLET, FILM COATED ORAL 3 TIMES DAILY
Qty: 30 TABLET | Refills: 0 | Status: SHIPPED | OUTPATIENT
Start: 2022-03-23 | End: 2022-04-03

## 2022-03-23 RX ADMIN — HYDROCODONE BITARTRATE AND ACETAMINOPHEN 1 TABLET: 5; 325 TABLET ORAL at 08:03

## 2022-03-23 RX ADMIN — Medication 6 MG: at 08:03

## 2022-03-23 RX ADMIN — HYDROCODONE BITARTRATE AND ACETAMINOPHEN 1 TABLET: 5; 325 TABLET ORAL at 02:03

## 2022-03-23 RX ADMIN — INSULIN ASPART 3 UNITS: 100 INJECTION, SOLUTION INTRAVENOUS; SUBCUTANEOUS at 08:03

## 2022-03-23 RX ADMIN — IPRATROPIUM BROMIDE AND ALBUTEROL SULFATE 3 ML: 2.5; .5 SOLUTION RESPIRATORY (INHALATION) at 11:03

## 2022-03-23 RX ADMIN — IPRATROPIUM BROMIDE AND ALBUTEROL SULFATE 3 ML: 2.5; .5 SOLUTION RESPIRATORY (INHALATION) at 03:03

## 2022-03-23 RX ADMIN — LORAZEPAM 1 MG: 0.5 TABLET ORAL at 11:03

## 2022-03-23 RX ADMIN — PROMETHAZINE HYDROCHLORIDE AND CODEINE PHOSPHATE 5 ML: 10; 6.25 SOLUTION ORAL at 05:03

## 2022-03-23 RX ADMIN — ENOXAPARIN SODIUM 40 MG: 100 INJECTION SUBCUTANEOUS at 05:03

## 2022-03-23 RX ADMIN — MUPIROCIN: 20 OINTMENT TOPICAL at 08:03

## 2022-03-23 RX ADMIN — PREDNISONE 40 MG: 20 TABLET ORAL at 12:03

## 2022-03-23 RX ADMIN — IPRATROPIUM BROMIDE AND ALBUTEROL SULFATE 3 ML: 2.5; .5 SOLUTION RESPIRATORY (INHALATION) at 04:03

## 2022-03-23 RX ADMIN — MONTELUKAST 10 MG: 10 TABLET, FILM COATED ORAL at 08:03

## 2022-03-23 RX ADMIN — FUROSEMIDE 20 MG: 20 TABLET ORAL at 08:03

## 2022-03-23 RX ADMIN — AMLODIPINE BESYLATE 10 MG: 10 TABLET ORAL at 08:03

## 2022-03-23 RX ADMIN — MUPIROCIN: 20 OINTMENT TOPICAL at 09:03

## 2022-03-23 RX ADMIN — IPRATROPIUM BROMIDE AND ALBUTEROL SULFATE 3 ML: 2.5; .5 SOLUTION RESPIRATORY (INHALATION) at 12:03

## 2022-03-23 RX ADMIN — INSULIN ASPART 3 UNITS: 100 INJECTION, SOLUTION INTRAVENOUS; SUBCUTANEOUS at 12:03

## 2022-03-23 RX ADMIN — ACETAMINOPHEN 650 MG: 325 TABLET ORAL at 09:03

## 2022-03-23 RX ADMIN — INSULIN ASPART 1 UNITS: 100 INJECTION, SOLUTION INTRAVENOUS; SUBCUTANEOUS at 08:03

## 2022-03-23 RX ADMIN — PROMETHAZINE HYDROCHLORIDE AND CODEINE PHOSPHATE 5 ML: 10; 6.25 SOLUTION ORAL at 09:03

## 2022-03-23 RX ADMIN — METHOCARBAMOL 500 MG: 500 TABLET ORAL at 02:03

## 2022-03-23 RX ADMIN — PROMETHAZINE HYDROCHLORIDE AND CODEINE PHOSPHATE 5 ML: 10; 6.25 SOLUTION ORAL at 11:03

## 2022-03-23 RX ADMIN — LORAZEPAM 1 MG: 0.5 TABLET ORAL at 03:03

## 2022-03-23 RX ADMIN — IPRATROPIUM BROMIDE AND ALBUTEROL SULFATE 3 ML: 2.5; .5 SOLUTION RESPIRATORY (INHALATION) at 07:03

## 2022-03-23 RX ADMIN — BENZONATATE 200 MG: 100 CAPSULE ORAL at 01:03

## 2022-03-23 RX ADMIN — METHYLPREDNISOLONE SODIUM SUCCINATE 60 MG: 40 INJECTION, POWDER, FOR SOLUTION INTRAMUSCULAR; INTRAVENOUS at 05:03

## 2022-03-23 RX ADMIN — PROMETHAZINE HYDROCHLORIDE AND CODEINE PHOSPHATE 5 ML: 10; 6.25 SOLUTION ORAL at 01:03

## 2022-03-23 RX ADMIN — METHOCARBAMOL 500 MG: 500 TABLET ORAL at 11:03

## 2022-03-23 RX ADMIN — LOSARTAN POTASSIUM 100 MG: 50 TABLET, FILM COATED ORAL at 08:03

## 2022-03-23 RX ADMIN — METHOCARBAMOL 500 MG: 500 TABLET ORAL at 08:03

## 2022-03-23 RX ADMIN — IPRATROPIUM BROMIDE AND ALBUTEROL SULFATE 3 ML: 2.5; .5 SOLUTION RESPIRATORY (INHALATION) at 01:03

## 2022-03-23 NOTE — PROGRESS NOTES
"Paged Med 4  to make team aware of pt having chest pain, non-radiating, pt denies crushing feeling. Pt verbalized pain moving from lower left rib to medial sternum to left upper chest. Pt verbalized feeling better when turning on side. VS BP (!) 161/72   Pulse 90   Temp 97.8 °F (36.6 °C) (Oral)   Resp (!) 21   Ht 5' 7" (1.702 m)   Wt 113.4 kg (250 lb)   SpO2 (!) 93%   BMI 39.16 kg/m²  Requesting 1 time promenthazine for cough next schedule dose not due at this time    Pt is stable. Awaiting call back will continue to monitor.   2027     returned page and made aware. ( See new orders)  "

## 2022-03-23 NOTE — ANESTHESIA POSTPROCEDURE EVALUATION
Anesthesia Post Evaluation    Patient: Juan Gillespie    Procedure(s) Performed: Procedure(s) (LRB):  ENDOBRONCHIAL ULTRASOUND (EBUS) (N/A)    Final Anesthesia Type: general      Patient location during evaluation: PACU  Patient participation: Yes- Able to Participate  Level of consciousness: awake and alert  Post-procedure vital signs: reviewed and stable  Pain management: adequate  Airway patency: patent    PONV status at discharge: No PONV  Anesthetic complications: no      Cardiovascular status: blood pressure returned to baseline  Respiratory status: unassisted  Hydration status: euvolemic  Follow-up not needed.          Vitals Value Taken Time   /62 03/23/22 0500   Temp 36.6 °C (97.8 °F) 03/23/22 0500   Pulse 84 03/23/22 0619   Resp 51 03/23/22 0619   SpO2 97 % 03/23/22 0619   Vitals shown include unvalidated device data.      Event Time   Out of Recovery 03/22/2022 14:30:00         Pain/Rob Score: Pain Rating Prior to Med Admin: 4 (3/22/2022  3:00 PM)  Pain Rating Post Med Admin: 0 (3/22/2022  4:00 PM)  Rob Score: 8 (3/22/2022  4:00 PM)

## 2022-03-23 NOTE — NURSING
Home Oxygen Evaluation    Date Performed: 3/23/2022    1) Patient's Home O2 Sat on room air, while at rest: 92%        If O2 sats on room air at rest are 88% or below, patient qualifies. No additional testing needed. Document N/A in steps 2 and 3. If 89% or above, complete steps 2.      2) Patient's O2 Sat on room air while exercisin%        If O2 sats on room air while exercising remain 89% or above patient does not qualify, no further testing needed Document N/A in step 3. If O2 sats on room air while exercising are 88% or below, continue to step 3.      3) Patient's O2 Sat while exercising on O2: 93% at 2 LPM         (Must show improvement from #2 for patients to qualify)    If O2 sats improve on oxygen, patient qualifies for portable oxygen. If not, the patient does not qualify.
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
## 2022-03-23 NOTE — NURSING
Patient family requested to speak to MD; patient also having uncontrollable pain in left lateral chest when coughing; notified MD of that as well; see orders; patient in route to speak with family; will continue to monitor

## 2022-03-23 NOTE — PROGRESS NOTES
SBAR hand off given to Shikha OSEGUERA. Pt s awake and alert in bed. Wife at bedside. NAD noted. Care transferred.

## 2022-03-23 NOTE — PROGRESS NOTES
[facility] Fellow Progress Note:    Brief Hx: 58 yo w/ PMHx of T2DM on insulin, HTN, smoking hx (quit 26 yrs ago) childhood asthma, COVID infection () admitted for cough and COPD exacerbation and found to have a lung mass with hilar LA with 3/22 EBUS with biopsy with concern for small cell on preliminary pathology.     Subjective:  Patient reports coughing a lot post-procedure and has pain in his L chest wall again.     Objective:  Last 24 Hour Vital Signs:  BP  Min: 131/62  Max: 188/92  Temp  Av °F (36.7 °C)  Min: 97.7 °F (36.5 °C)  Max: 98.6 °F (37 °C)  Pulse  Av.5  Min: 74  Max: 108  Resp  Av.6  Min: 16  Max: 26  SpO2  Av.3 %  Min: 92 %  Max: 97 %  Body mass index is 39.16 kg/m².  I/O last 3 completed shifts:  In:  [P.O.:1140; I.V.:1000]  Out:  [Urine:2150; Stool:1]      Physical Exam:  General: Alert and awake, in bed  HENT:  NCAT; anicteric sclera; OP clear with MMM  Cardio:  Regular rate and rhythm with normal S1 and S2; no murmurs or rubs  Resp:  CTAB; respirations unlabored with improvement in breath sounds today, no appreciable wheezing  Abdom: Soft, NTND   Extrem: WWP with no clubbing, cyanosis or edema  Pulses: 2+ and symmetric distally  Neuro:  AAOx3; cooperative and pleasant with no focal deficits      Assessment & Plan:   58 yo male with PMHx relevant for tobacco use and childhood asthma initially admitted for concern for COPD exacerbation but found to have R lung mass and mediastinal LAD  - s/p EBUS with biopsy on 3/22 with prelim findings concerning for small cell  - switched to PO 40 pred today, continue for total of 5 days steroids  - duo-nebs PRN if they improve expectoration  - please make sure patient has supply of cough syrup as he has significant pain from coughing along the L side of his chest wall, agree with muscle relaxant as well  - OK from pulm standpoint to discharge after walk test, can send on O2 if needed     Shireen Mora,  MD  Pulm/CC Fellow

## 2022-03-23 NOTE — ASSESSMENT & PLAN NOTE
Controlled with diet/weight managmement.    Plan:  - detemir 5u qhs while on steroids   - LDSSI  - diabetic diet

## 2022-03-23 NOTE — ASSESSMENT & PLAN NOTE
Acute hypoxic respiratory failure    57 year old male with PMH of smoking, T2DM, HTN, past asymptomatic COVID infection in 08/21 admitted for admitted for dyspnea/cough and found to have significant hilar lymphadenopathy/mass. Broad differential including infectious, granulomatous disease, sarcoid, cancer, COPD, pneumonitis     Plan:  - Pulm consulted with plans for EBUS on 3/22 --> preliminary results showing SCLC  - Oncology consulted --> recommended MRI brain and CT abdo/pelvis for staging; CT showing likely liver metastasis, no obvious metastasis on MRI brain but it did show a pituitary lesion that is either a primary pituitary lesion vs metastasis; will need endocrine f/u  - follow-up EBUS pathology and discuss with oncology to see if Pt requires inpatient chemotherapy initiation  - Started on solumedrol per pulm recs; tapered to PO prednisone 40mg, will clarify duration with pulmonology  - continue antitussives, phenergen  - incentive spirometry and scheduled dounebs  - wean O2 as able  - 6 minute walk test to assess if he qualifies for home O2  - start robaxin for muscle spasms/rib pain associated with excessive coughing

## 2022-03-23 NOTE — SUBJECTIVE & OBJECTIVE
Interval History: Issues overnight with ongoing cough and rib discomfort 2/2 the same. Has also intermittently required O2 supplementation. Discussed CT and MRI results. Pt would like to start inpatient treatment if possible. Will need to coordinate with oncology/pulm    Review of Systems   Constitutional:  Negative for fatigue and fever.   HENT:  Negative for congestion and rhinorrhea.    Eyes:  Negative for photophobia and visual disturbance.   Respiratory:  Positive for cough and shortness of breath. Negative for wheezing.    Cardiovascular:  Positive for chest pain. Negative for palpitations and leg swelling.   Gastrointestinal:  Negative for abdominal pain, nausea and vomiting.   Genitourinary:  Negative for dysuria and hematuria.   Musculoskeletal:  Negative for arthralgias and myalgias.   Neurological:  Negative for dizziness, light-headedness and headaches.   Psychiatric/Behavioral:  Negative for agitation and confusion.    Objective:     Vital Signs (Most Recent):  Temp: 97.9 °F (36.6 °C) (03/23/22 0800)  Pulse: 86 (03/23/22 1126)  Resp: (!) 22 (03/23/22 0800)  BP: (!) 188/92 (03/23/22 0800)  SpO2: (!) 94 % (03/23/22 1126)   Vital Signs (24h Range):  Temp:  [97.7 °F (36.5 °C)-98.6 °F (37 °C)] 97.9 °F (36.6 °C)  Pulse:  [] 86  Resp:  [16-26] 22  SpO2:  [92 %-97 %] 94 %  BP: (131-188)/(56-92) 188/92     Weight: 113.4 kg (250 lb)  Body mass index is 39.16 kg/m².    Intake/Output Summary (Last 24 hours) at 3/23/2022 1151  Last data filed at 3/23/2022 0800  Gross per 24 hour   Intake 1900 ml   Output 1550 ml   Net 350 ml        Physical Exam  Vitals and nursing note reviewed.   Constitutional:       General: He is not in acute distress.     Appearance: He is obese. He is not ill-appearing.   HENT:      Head: Normocephalic and atraumatic.      Mouth/Throat:      Mouth: Mucous membranes are moist.   Eyes:      Extraocular Movements: Extraocular movements intact.      Pupils: Pupils are equal, round, and  reactive to light.   Cardiovascular:      Rate and Rhythm: Normal rate and regular rhythm.      Pulses: Normal pulses.      Heart sounds: No murmur heard.  Pulmonary:      Breath sounds: Rhonchi (improved from before) present. No wheezing or rales.      Comments: On RA, saturating 96% however had 2L O2 use overnight   Abdominal:      General: Bowel sounds are normal. There is no distension.      Palpations: Abdomen is soft.      Tenderness: There is no abdominal tenderness. There is no right CVA tenderness or left CVA tenderness.   Musculoskeletal:         General: No tenderness.      Right lower leg: No edema.      Left lower leg: No edema.   Skin:     General: Skin is warm.      Capillary Refill: Capillary refill takes less than 2 seconds.      Findings: No erythema or rash.   Neurological:      General: No focal deficit present.      Mental Status: He is alert and oriented to person, place, and time.   Psychiatric:         Mood and Affect: Mood normal.         Thought Content: Thought content normal.       Significant Labs: All pertinent labs within the past 24 hours have been reviewed.  CBC:   Recent Labs   Lab 03/22/22 0224 03/23/22  0437   WBC 12.35 12.59   HGB 15.2 15.4   HCT 44.6 51.3    300       CMP:   Recent Labs   Lab 03/22/22 0224 03/23/22  0436    137   K 4.0 4.4   CL 95 101   CO2 25 26   * 245*   BUN 30* 27*   CREATININE 1.1 1.1   CALCIUM 9.9 9.8   PROT 7.4 6.9   ALBUMIN 3.7 3.5   BILITOT 0.6 0.4   ALKPHOS 87 73   AST 20 17   ALT 35 32   ANIONGAP 16 10   EGFRNONAA >60.0 >60.0       Significant Imaging: I have reviewed all pertinent imaging results/findings within the past 24 hours.

## 2022-03-23 NOTE — PROGRESS NOTES
SBAR hand off taken from Shikha OSEGUERA. Pt is awake and alert in bed. AOX4 able to make needs known. Family at bedside.  Pt denies having any pain at this time. Safety measures in place. NAD noted. Will continue to monitor.

## 2022-03-23 NOTE — PROGRESS NOTES
Pt transported to MRI by transportation in w/c. Pt awake and alert. NAD noted.        0000  Pt transported back to unit. Hooked to continuous monitoring. NAD noted.

## 2022-03-24 VITALS
OXYGEN SATURATION: 93 % | SYSTOLIC BLOOD PRESSURE: 181 MMHG | HEIGHT: 67 IN | RESPIRATION RATE: 24 BRPM | TEMPERATURE: 98 F | WEIGHT: 250 LBS | HEART RATE: 103 BPM | DIASTOLIC BLOOD PRESSURE: 91 MMHG | BODY MASS INDEX: 39.24 KG/M2

## 2022-03-24 PROBLEM — R13.10 DIFFICULTY SWALLOWING: Status: ACTIVE | Noted: 2022-03-24

## 2022-03-24 LAB
ALBUMIN SERPL BCP-MCNC: 3.4 G/DL (ref 3.5–5.2)
ALP SERPL-CCNC: 74 U/L (ref 55–135)
ALT SERPL W/O P-5'-P-CCNC: 30 U/L (ref 10–44)
ANION GAP SERPL CALC-SCNC: 11 MMOL/L (ref 8–16)
AST SERPL-CCNC: 17 U/L (ref 10–40)
BACTERIA BLD CULT: NORMAL
BACTERIA BLD CULT: NORMAL
BASOPHILS # BLD AUTO: 0.03 K/UL (ref 0–0.2)
BASOPHILS NFR BLD: 0.2 % (ref 0–1.9)
BILIRUB SERPL-MCNC: 0.4 MG/DL (ref 0.1–1)
BUN SERPL-MCNC: 34 MG/DL (ref 6–20)
CALCIUM SERPL-MCNC: 9.6 MG/DL (ref 8.7–10.5)
CHLORIDE SERPL-SCNC: 102 MMOL/L (ref 95–110)
CO2 SERPL-SCNC: 27 MMOL/L (ref 23–29)
CREAT SERPL-MCNC: 1 MG/DL (ref 0.5–1.4)
DIFFERENTIAL METHOD: ABNORMAL
EOSINOPHIL # BLD AUTO: 0 K/UL (ref 0–0.5)
EOSINOPHIL NFR BLD: 0 % (ref 0–8)
ERYTHROCYTE [DISTWIDTH] IN BLOOD BY AUTOMATED COUNT: 12.5 % (ref 11.5–14.5)
EST. GFR  (AFRICAN AMERICAN): >60 ML/MIN/1.73 M^2
EST. GFR  (NON AFRICAN AMERICAN): >60 ML/MIN/1.73 M^2
GLUCOSE SERPL-MCNC: 179 MG/DL (ref 70–110)
HCT VFR BLD AUTO: 45.1 % (ref 40–54)
HGB BLD-MCNC: 14.8 G/DL (ref 14–18)
IMM GRANULOCYTES # BLD AUTO: 0.22 K/UL (ref 0–0.04)
IMM GRANULOCYTES NFR BLD AUTO: 1.4 % (ref 0–0.5)
LYMPHOCYTES # BLD AUTO: 1 K/UL (ref 1–4.8)
LYMPHOCYTES NFR BLD: 6.3 % (ref 18–48)
MCH RBC QN AUTO: 29.6 PG (ref 27–31)
MCHC RBC AUTO-ENTMCNC: 32.8 G/DL (ref 32–36)
MCV RBC AUTO: 90 FL (ref 82–98)
MONOCYTES # BLD AUTO: 0.9 K/UL (ref 0.3–1)
MONOCYTES NFR BLD: 5.3 % (ref 4–15)
NEUTROPHILS # BLD AUTO: 13.9 K/UL (ref 1.8–7.7)
NEUTROPHILS NFR BLD: 86.8 % (ref 38–73)
NRBC BLD-RTO: 0 /100 WBC
PLATELET # BLD AUTO: 310 K/UL (ref 150–450)
PMV BLD AUTO: 10.7 FL (ref 9.2–12.9)
POCT GLUCOSE: 145 MG/DL (ref 70–110)
POCT GLUCOSE: 230 MG/DL (ref 70–110)
POTASSIUM SERPL-SCNC: 3.8 MMOL/L (ref 3.5–5.1)
PROT SERPL-MCNC: 6.5 G/DL (ref 6–8.4)
RBC # BLD AUTO: 5 M/UL (ref 4.6–6.2)
SODIUM SERPL-SCNC: 140 MMOL/L (ref 136–145)
WBC # BLD AUTO: 15.98 K/UL (ref 3.9–12.7)

## 2022-03-24 PROCEDURE — 80053 COMPREHEN METABOLIC PANEL: CPT

## 2022-03-24 PROCEDURE — 99239 PR HOSPITAL DISCHARGE DAY,>30 MIN: ICD-10-PCS | Mod: ,,, | Performed by: INTERNAL MEDICINE

## 2022-03-24 PROCEDURE — 25000242 PHARM REV CODE 250 ALT 637 W/ HCPCS: Performed by: STUDENT IN AN ORGANIZED HEALTH CARE EDUCATION/TRAINING PROGRAM

## 2022-03-24 PROCEDURE — 25000003 PHARM REV CODE 250: Performed by: STUDENT IN AN ORGANIZED HEALTH CARE EDUCATION/TRAINING PROGRAM

## 2022-03-24 PROCEDURE — 99900035 HC TECH TIME PER 15 MIN (STAT)

## 2022-03-24 PROCEDURE — 27000221 HC OXYGEN, UP TO 24 HOURS

## 2022-03-24 PROCEDURE — 94640 AIRWAY INHALATION TREATMENT: CPT

## 2022-03-24 PROCEDURE — 94761 N-INVAS EAR/PLS OXIMETRY MLT: CPT

## 2022-03-24 PROCEDURE — 99239 HOSP IP/OBS DSCHRG MGMT >30: CPT | Mod: ,,, | Performed by: INTERNAL MEDICINE

## 2022-03-24 PROCEDURE — 92610 EVALUATE SWALLOWING FUNCTION: CPT

## 2022-03-24 PROCEDURE — 36415 COLL VENOUS BLD VENIPUNCTURE: CPT

## 2022-03-24 PROCEDURE — 25000003 PHARM REV CODE 250: Performed by: INTERNAL MEDICINE

## 2022-03-24 PROCEDURE — 25000003 PHARM REV CODE 250

## 2022-03-24 PROCEDURE — 97535 SELF CARE MNGMENT TRAINING: CPT

## 2022-03-24 PROCEDURE — 85025 COMPLETE CBC W/AUTO DIFF WBC: CPT

## 2022-03-24 PROCEDURE — 63600175 PHARM REV CODE 636 W HCPCS: Performed by: STUDENT IN AN ORGANIZED HEALTH CARE EDUCATION/TRAINING PROGRAM

## 2022-03-24 RX ORDER — HYDROCODONE BITARTRATE AND ACETAMINOPHEN 5; 325 MG/1; MG/1
1 TABLET ORAL EVERY 6 HOURS PRN
Qty: 28 TABLET | Refills: 0 | Status: SHIPPED | OUTPATIENT
Start: 2022-03-24 | End: 2022-03-30

## 2022-03-24 RX ORDER — NIFEDIPINE 60 MG/1
60 TABLET, EXTENDED RELEASE ORAL DAILY
Qty: 30 TABLET | Refills: 3 | Status: ON HOLD | OUTPATIENT
Start: 2022-03-24 | End: 2022-12-10 | Stop reason: HOSPADM

## 2022-03-24 RX ORDER — PANTOPRAZOLE SODIUM 40 MG/1
40 TABLET, DELAYED RELEASE ORAL DAILY
Qty: 30 TABLET | Refills: 3 | Status: SHIPPED | OUTPATIENT
Start: 2022-03-24 | End: 2022-06-13 | Stop reason: SDUPTHER

## 2022-03-24 RX ORDER — PANTOPRAZOLE SODIUM 40 MG/1
40 TABLET, DELAYED RELEASE ORAL DAILY
Status: DISCONTINUED | OUTPATIENT
Start: 2022-03-24 | End: 2022-03-24 | Stop reason: HOSPADM

## 2022-03-24 RX ORDER — HYDROXYZINE HYDROCHLORIDE 25 MG/1
50 TABLET, FILM COATED ORAL 3 TIMES DAILY PRN
Status: DISCONTINUED | OUTPATIENT
Start: 2022-03-24 | End: 2022-03-24 | Stop reason: HOSPADM

## 2022-03-24 RX ORDER — HYDROXYZINE HYDROCHLORIDE 25 MG/1
25 TABLET, FILM COATED ORAL 3 TIMES DAILY PRN
Status: DISCONTINUED | OUTPATIENT
Start: 2022-03-24 | End: 2022-03-24

## 2022-03-24 RX ORDER — NIFEDIPINE 30 MG/1
60 TABLET, EXTENDED RELEASE ORAL DAILY
Status: DISCONTINUED | OUTPATIENT
Start: 2022-03-24 | End: 2022-03-24 | Stop reason: HOSPADM

## 2022-03-24 RX ADMIN — METHOCARBAMOL 500 MG: 500 TABLET ORAL at 02:03

## 2022-03-24 RX ADMIN — HYDROCODONE BITARTRATE AND ACETAMINOPHEN 1 TABLET: 5; 325 TABLET ORAL at 09:03

## 2022-03-24 RX ADMIN — IPRATROPIUM BROMIDE AND ALBUTEROL SULFATE 3 ML: 2.5; .5 SOLUTION RESPIRATORY (INHALATION) at 08:03

## 2022-03-24 RX ADMIN — LOSARTAN POTASSIUM 100 MG: 50 TABLET, FILM COATED ORAL at 09:03

## 2022-03-24 RX ADMIN — PANTOPRAZOLE SODIUM 40 MG: 40 TABLET, DELAYED RELEASE ORAL at 12:03

## 2022-03-24 RX ADMIN — NIFEDIPINE 60 MG: 30 TABLET, FILM COATED, EXTENDED RELEASE ORAL at 09:03

## 2022-03-24 RX ADMIN — MUPIROCIN: 20 OINTMENT TOPICAL at 09:03

## 2022-03-24 RX ADMIN — IPRATROPIUM BROMIDE AND ALBUTEROL SULFATE 3 ML: 2.5; .5 SOLUTION RESPIRATORY (INHALATION) at 03:03

## 2022-03-24 RX ADMIN — HYDRALAZINE HYDROCHLORIDE 10 MG: 10 TABLET, FILM COATED ORAL at 12:03

## 2022-03-24 RX ADMIN — MONTELUKAST 10 MG: 10 TABLET, FILM COATED ORAL at 09:03

## 2022-03-24 RX ADMIN — PROMETHAZINE HYDROCHLORIDE AND CODEINE PHOSPHATE 5 ML: 10; 6.25 SOLUTION ORAL at 01:03

## 2022-03-24 RX ADMIN — HYDROXYZINE HYDROCHLORIDE 50 MG: 25 TABLET ORAL at 01:03

## 2022-03-24 RX ADMIN — METHOCARBAMOL 500 MG: 500 TABLET ORAL at 09:03

## 2022-03-24 RX ADMIN — INSULIN ASPART 2 UNITS: 100 INJECTION, SOLUTION INTRAVENOUS; SUBCUTANEOUS at 02:03

## 2022-03-24 RX ADMIN — IPRATROPIUM BROMIDE AND ALBUTEROL SULFATE 3 ML: 2.5; .5 SOLUTION RESPIRATORY (INHALATION) at 11:03

## 2022-03-24 RX ADMIN — LORAZEPAM 1 MG: 0.5 TABLET ORAL at 09:03

## 2022-03-24 RX ADMIN — PREDNISONE 40 MG: 20 TABLET ORAL at 09:03

## 2022-03-24 RX ADMIN — PROMETHAZINE HYDROCHLORIDE AND CODEINE PHOSPHATE 5 ML: 10; 6.25 SOLUTION ORAL at 04:03

## 2022-03-24 RX ADMIN — FUROSEMIDE 20 MG: 20 TABLET ORAL at 09:03

## 2022-03-24 RX ADMIN — PROMETHAZINE HYDROCHLORIDE AND CODEINE PHOSPHATE 5 ML: 10; 6.25 SOLUTION ORAL at 09:03

## 2022-03-24 NOTE — PLAN OF CARE
03/24/22 0738   Discharge Reassessment   Assessment Type Discharge Planning Reassessment   Did the patient's condition or plan change since previous assessment? No   Discharge Plan discussed with: Patient;Spouse/sig other   Name(s) and Number(s) esequiel maurer (Spouse)   [phone]   Communicated EDYTA with patient/caregiver Yes   Discharge Plan A Home with family   DME Needed Upon Discharge  oxygen   Discharge Barriers Identified None   Why the patient remains in the hospital Requires continued medical care   Post-Acute Status   Post-Acute Authorization Other   Other Status No Post-Acute Service Needs   Discharge Delays None known at this time     Patient requires continued medical care. Reached out to [facility] to determine if oxygen would be covered by patient's insurance. Will continue to follow.    Vivian Interiano RENEE  [phone]

## 2022-03-24 NOTE — PLAN OF CARE
RENEE met with patient and spouse at bedside to discuss discharge needs. Reported that I was waiting to hear from [facility] about his oxygen and whether his insurance would cover it. Also advised them that I scheduled a PCP hospital follow up for next week. Will continue to follow for oxygen delivery/needs.    Vivian Interiano, [facility]  [phone]
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
## 2022-03-24 NOTE — SUBJECTIVE & OBJECTIVE
Interval History: Issues overnight with ongoing cough and rib discomfort 2/2 the same. Has also intermittently required O2 supplementation. Discussed CT and MRI results. Pt would like to start inpatient treatment if possible. Will need to coordinate with oncology/pulm    Review of Systems   Constitutional:  Negative for fatigue and fever.   HENT:  Negative for congestion and rhinorrhea.    Eyes:  Negative for photophobia and visual disturbance.   Respiratory:  Positive for cough and shortness of breath. Negative for wheezing.    Cardiovascular:  Positive for chest pain. Negative for palpitations and leg swelling.   Gastrointestinal:  Negative for abdominal pain, nausea and vomiting.   Genitourinary:  Negative for dysuria and hematuria.   Musculoskeletal:  Negative for arthralgias and myalgias.   Neurological:  Negative for dizziness, light-headedness and headaches.   Psychiatric/Behavioral:  Negative for agitation and confusion.    Objective:     Vital Signs (Most Recent):  Temp: 97.9 °F (36.6 °C) (03/24/22 0315)  Pulse: 69 (03/24/22 0434)  Resp: 12 (03/24/22 0411)  BP: (!) 178/87 (03/24/22 0315)  SpO2: 96 % (03/24/22 0434)   Vital Signs (24h Range):  Temp:  [97.7 °F (36.5 °C)-98.6 °F (37 °C)] 97.9 °F (36.6 °C)  Pulse:  [69-98] 69  Resp:  [12-45] 12  SpO2:  [93 %-96 %] 96 %  BP: (170-189)/(78-88) 178/87     Weight: 113.4 kg (250 lb)  Body mass index is 39.16 kg/m².    Intake/Output Summary (Last 24 hours) at 3/24/2022 0853  Last data filed at 3/24/2022 0425  Gross per 24 hour   Intake --   Output 1750 ml   Net -1750 ml        Physical Exam  Vitals and nursing note reviewed.   Constitutional:       General: He is not in acute distress.     Appearance: He is obese. He is not ill-appearing.   HENT:      Head: Normocephalic and atraumatic.      Mouth/Throat:      Mouth: Mucous membranes are moist.   Eyes:      Extraocular Movements: Extraocular movements intact.      Pupils: Pupils are equal, round, and reactive to  light.   Cardiovascular:      Rate and Rhythm: Normal rate and regular rhythm.      Pulses: Normal pulses.      Heart sounds: No murmur heard.  Pulmonary:      Breath sounds: Rhonchi (improved from before) present. No wheezing or rales.      Comments: Saturating 95% on 2L  Abdominal:      General: Bowel sounds are normal. There is no distension.      Palpations: Abdomen is soft.      Tenderness: There is no abdominal tenderness. There is no right CVA tenderness or left CVA tenderness.   Musculoskeletal:         General: No tenderness.      Right lower leg: No edema.      Left lower leg: No edema.   Skin:     General: Skin is warm.      Capillary Refill: Capillary refill takes less than 2 seconds.      Findings: No erythema or rash.   Neurological:      General: No focal deficit present.      Mental Status: He is alert and oriented to person, place, and time.   Psychiatric:         Mood and Affect: Mood normal.         Thought Content: Thought content normal.       Significant Labs: All pertinent labs within the past 24 hours have been reviewed.  CBC:   Recent Labs   Lab 03/23/22  0437 03/24/22  0308   WBC 12.59 15.98*   HGB 15.4 14.8   HCT 51.3 45.1    310       CMP:   Recent Labs   Lab 03/23/22  0436 03/24/22  0308    140   K 4.4 3.8    102   CO2 26 27   * 179*   BUN 27* 34*   CREATININE 1.1 1.0   CALCIUM 9.8 9.6   PROT 6.9 6.5   ALBUMIN 3.5 3.4*   BILITOT 0.4 0.4   ALKPHOS 73 74   AST 17 17   ALT 32 30   ANIONGAP 10 11   EGFRNONAA >60.0 >60.0       Significant Imaging: I have reviewed all pertinent imaging results/findings within the past 24 hours.

## 2022-03-24 NOTE — PROGRESS NOTES
Pt consistently coughing while drinking thin liquids. Pt educated on preventive measures for aspiration. Pt verbalized understanding of education. Pt may benefit from speech consult.

## 2022-03-24 NOTE — PROGRESS NOTES
SBAR hand off given to Shikha OSEGUERA. Pt s awake and alert in bed. Wife at bedside. O2 device on and ain place. NAD noted. Care transferred.

## 2022-03-24 NOTE — PLAN OF CARE
[facility] - Int[facility] Care ([facility])  Discharge Final Note    Primary Care Provider: Keri Batista NP  Expected Discharge Date: 3/24/2022    Patient medically ready for discharge to home with family.  Nurse can call report to n/a.  Transportation yes per spouse and son.   Is family/patient aware of discharge yes - patient and family.  Hospital follow up scheduled, yes, in AVS.  Final Discharge Note (most recent)       Final Note - 03/24/22 1458          Final Note    Assessment Type Final Discharge Note (P)      Anticipated Discharge Disposition Home or Self Care (P)      What phone number can be called within the next 1-3 days to see how you are doing after discharge? [phone] (P)      Hospital Resources/Appts/Education Provided Appointments scheduled and added to AVS (P)         Post-Acute Status    Post-Acute Authorization Other (P)      Coverage Medicaid [facility] (P)      Other Status No Post-Acute Service Needs (P)      Discharge Delays None known at this time (P)                      Important Message from Medicare         Referral Info (most recent)       Referral Info    No documentation.                 Contact Info       Keri Batista NP   Specialty: Family Medicine   Relationship: PCP - General    [place] [id number]   Phone: [phone]       Next Steps: Go on 3/29/2022    Instructions: Hospital follow up, Tuesday, at 10:15 AM. Bring discharge paperwork and current insurace card. Wear mask and no small children allowed in clinic.          Future Appointments   Date Time Provider Department Center   3/30/2022  8:30 AM Racheal Jennings MD [id number]  Case Management  Ext: [phone]  03/24/2022

## 2022-03-24 NOTE — PLAN OF CARE
Problem: SLP  Goal: SLP Goal  Description: Speech Language Pathology Goals  Goals expected to be met by 3/31  1. Pt will tolerate nectar thick liquids & regular solids without s/s aspiration   2. Ongoing swallowing assessment to ensure safety & that pt is on least restrictive PO consistencies   Outcome: Ongoing, Progressing    SLP Clinical Swallow Evaluation completed. See note for details.

## 2022-03-24 NOTE — ASSESSMENT & PLAN NOTE
Pt reported difficulty swallowing thin liquids post procedure  Could be related to instrumentation and airway irritation during bronchoscopy  Can also be related to recent steroid use - will start pantoprazole  Evaluated by speech pathology for swallowing difficulty who recommended nectar thick liquids and regular solids with strict aspiration precautions including no straws  Pt and wife verbalized understanding

## 2022-03-24 NOTE — ASSESSMENT & PLAN NOTE
Acute hypoxic respiratory failure    57 year old male with PMH of smoking, T2DM, HTN, past asymptomatic COVID infection in 08/21 admitted for admitted for dyspnea/cough and found to have significant hilar lymphadenopathy/mass. Broad differential including infectious, granulomatous disease, sarcoid, cancer, COPD, pneumonitis     Pulm consulted with plans for EBUS on 3/22 --> preliminary results showing SCLC  Oncology consulted --> recommended MRI brain and CT abdo/pelvis for staging; CT showing likely liver metastasis, no obvious metastasis on MRI brain but it did show a pituitary lesion that is either a primary pituitary lesion vs metastasis; will need endocrine f/u  - follow-up EBUS pathology and discuss with oncology to see if Pt requires inpatient chemotherapy initiation --> oncology will follow-up with him in the clinic on 3/30, no inpatient of initiation of chemotherapy    Plan:  - Started on solumedrol per pulm recs; tapered to PO prednisone 40mg. Will only need 5 days total, last dose on 3/24  - continue antitussives, phenergen  - incentive spirometry and scheduled dounebs  - 6 minute walk test to assess if he qualifies for home O2 --> qualifies so will discharge home with O2  - continue obaxin for muscle spasms/rib pain associated with excessive coughing  - outpatient follow-up with oncology on 3/30

## 2022-03-24 NOTE — ASSESSMENT & PLAN NOTE
Pt takes Almodipine 10mg qd and Losartan daily. Hypertensive on admission.  Has remained hypertensive during this admission  Will switch from amlodipine to nifedipine 60mg

## 2022-03-24 NOTE — DISCHARGE INSTRUCTIONS
Patient discharge instructions given to patient and his spouse; acknowledged understanding; patient vital signs stable; patient ambulated in hallway and tolerated well; spouse at bedside; son will be picking up at front door; patient belongings sent with family

## 2022-03-24 NOTE — ASSESSMENT & PLAN NOTE
Controlled with diet/weight managmement.    Plan:  - detemir 5u qhs while on steroids; will not continue on discharge   - LDSSI  - diabetic diet

## 2022-03-24 NOTE — PROGRESS NOTES
SBAR hand off taken from Shikha OSEGUERA. Pt is awake and alert in chair. AOX4 able to make needs known. Family at bedside. O2 NC @ 2L, SPO2 97%  Pt verbalized pain level 9 in chest. Known history. Pain  meds will be given. Safety measures in place. NAD noted. Will continue to monitor.

## 2022-03-24 NOTE — ASSESSMENT & PLAN NOTE
Pt takes Almodipine 10mg qd and Losartan-HCTZ qd. Hypertensive on admission.  Has remained hypertensive during this admission  Will switch from amlodipine to nifedipine 60mg

## 2022-03-24 NOTE — PT/OT/SLP EVAL
"Speech Language Pathology Evaluation  Bedside Swallow    Patient Name:  Juan Gillespie   MRN:  [id number]  Admitting Diagnosis: Mediastinal adenopathy    Recommendations:                 General Recommendations:  Dysphagia therapy  Diet recommendations:  Regular, Nectar Thick   Aspiration Precautions: 1 bite/sip at a time, Alternating bites/sips, Assistance with thickening liquids, Feed only when awake/alert, HOB to 90 degrees, Meds whole 1 at a time, Monitor for s/s of aspiration, No straws, Small bites/sips and Strict aspiration precautions   Recs communicated with team via secure chat   General Precautions: Standard, aspiration, fall, dental soft, nectar thick  Communication strategies:  none    History:     Past Medical History:   Diagnosis Date    Diabetes mellitus, type 2     Hypertension        Past Surgical History:   Procedure Laterality Date    ENDOBRONCHIAL ULTRASOUND N/A 3/22/2022    Procedure: ENDOBRONCHIAL ULTRASOUND (EBUS);  Surgeon: Kristin Samuels MD;  Location: [facility] OR [address] [place] [address];  Service: Endoscopy;  Laterality: N/A;    KNEE SURGERY         Chest X-Rays: 3/21 Other than low lung volumes, no significant change.  Right paratracheal and perihilar mass    Prior diet: regular    Subjective   Awake & alert. NSG, pt & family reports pt coughing on thin liquids. Pt with coughing episode prior to PO trials holding pillow 2/2 pain, audible breathing in pain.     Pain/Comfort:  · Pain Rating 1: 1/10  · Location 1: chest  · Pain Addressed 2: Distraction (pt reports pain is 10 rating when coughing)  · Pain Rating Post-Intervention 2: 1/10    Respiratory Status: Room air    Objective:     Oral Musculature Evaluation  · Oral Musculature:  (slight droop on left side during retraction), wife & pt report this is baseline, wife stating "he's always had a crooked smile"   · Dentition: present and adequate  · Oral Labial Strength and Mobility: impaired retraction, functional pursing  · Lingual Strength and Mobility: " WFL  · Volitional Cough: adequate  · Volitional Swallow: adequate  · Voice Prior to PO Intake: hoarse    Bedside Swallow Eval:   Consistencies Assessed:  · Nectar thick liquids cup sips x10  · Puree 1 tsp x2  · Solids 1 cracker   · Thins not assessed 2/2 known coughing which causes pt severe pain     Oral Phase:   · WFL    Pharyngeal Phase:   · no overt clinical signs/symptoms of aspiration when taking small cup sips of nectar & bites of solid & puree   · multiple spontaneous swallows    SLP educated pt on all recs, precautions, instructions for thickening, risks & s/s aspiration, role of SLP, POC, etc. Pt & wife verbalized understanding.     Assessment:     Juan Gillespie is a 57 y.o. male with an SLP diagnosis of Dysphagia.  He presents with risk of aspiration.    Goals:   Multidisciplinary Problems     SLP Goals        Problem: SLP    Goal Priority Disciplines Outcome   SLP Goal     SLP Ongoing, Progressing   Description: Speech Language Pathology Goals  Goals expected to be met by 3/31  1. Pt will tolerate nectar thick liquids & regular solids without s/s aspiration   2. Ongoing swallowing assessment to ensure safety & that pt is on least restrictive PO consistencies                    Plan:     · Patient to be seen:  4 x/week   · Plan of Care expires:  04/21/22  · Plan of Care reviewed with:  patient, spouse   · SLP Follow-Up:  Yes       Discharge recommendations:   (tbd)     Time Tracking:     SLP Treatment Date:   03/24/22  Speech Start Time:  1023  Speech Stop Time:  1044     Speech Total Time (min):  21 min    Billable Minutes: Treatment Swallowing Dysfunction 13 and Self Care/Home Management Training 8    03/24/2022

## 2022-03-25 DIAGNOSIS — R05.9 COUGH: Primary | ICD-10-CM

## 2022-03-25 RX ORDER — PROMETHAZINE HYDROCHLORIDE AND CODEINE PHOSPHATE 6.25; 1 MG/5ML; MG/5ML
5 SOLUTION ORAL 3 TIMES DAILY PRN
Qty: 473 ML | Refills: 1 | Status: CANCELLED | OUTPATIENT
Start: 2022-03-25

## 2022-03-28 LAB
ADEQUACY: ABNORMAL
COMMENT: ABNORMAL
FINAL PATHOLOGIC DIAGNOSIS: ABNORMAL
Lab: ABNORMAL

## 2022-03-29 NOTE — PHYSICIAN QUERY
PT Name: Juan Gillespie  MR #: [id number]    DOCUMENTATION CLARIFICATION     CDS/: Sima Man RN CCDS              Contact information:[email or web address]  This form is a permanent document in the medical record.     Query Date: March 29, 2022    By submitting this query, we are merely seeking further clarification of documentation.  Please utilize your independent clinical judgment when addressing the question(s) below.    The medical record contains the following:  Pathology Findings Location in Medical Record   1. LUNG, EBUS GUIDED ENDOBRONCHIAL BIOPSY:   -Small cell carcinoma, see comment.   2. LYMPH NODE, 4R, EBUS GUIDED FINE-NEEDLE ASPIRATION (CYTOLOGY AND CELL   BLOCK):   -Positive for malignancy, small cell carcinoma.   3.  LYMPH NODE, STATION 7, EBUS GUIDED FINE-NEEDLE ASPIRATION (CYTOLOGY AND   CELL BLOCK):   -No overt malignancy.   -Lymphocytes and reactive bronchial cells present    The carcinoma is positive for TTF1, chromogranin and synaptophysin.  Ki67   proliferation rate is 99%.  These results support the diagnosis of small cell   carcinoma.  All stains have appropriate controls. Pathology results  Collected 3/22  Resulted 3/28       Please clarify:  [x  ] Pathology findings noted above are ruled in/confirmed as diagnoses   [  ] Pathology findings noted above are not confirmed as diagnoses   [  ] Pathology findings noted above are incidental   [  ] Other diagnosis (please specify): ___________   [  ] Clinically Undetermined       Please document in your progress notes daily for the duration of treatment until resolved and include in your discharge summary.    Form No. [id number]

## 2022-03-30 ENCOUNTER — OFFICE VISIT (OUTPATIENT)
Dept: HEMATOLOGY/ONCOLOGY | Facility: CLINIC | Age: 57
End: 2022-03-30
Payer: MEDICAID

## 2022-03-30 VITALS
RESPIRATION RATE: 18 BRPM | SYSTOLIC BLOOD PRESSURE: 173 MMHG | WEIGHT: 259.25 LBS | HEART RATE: 81 BPM | TEMPERATURE: 98 F | OXYGEN SATURATION: 96 % | BODY MASS INDEX: 40.69 KG/M2 | DIASTOLIC BLOOD PRESSURE: 84 MMHG | HEIGHT: 67 IN

## 2022-03-30 DIAGNOSIS — C34.90 SMALL CELL LUNG CANCER: ICD-10-CM

## 2022-03-30 DIAGNOSIS — C78.7 SECONDARY MALIGNANT NEOPLASM OF LIVER: ICD-10-CM

## 2022-03-30 PROCEDURE — 1159F MED LIST DOCD IN RCRD: CPT | Mod: CPTII,,, | Performed by: INTERNAL MEDICINE

## 2022-03-30 PROCEDURE — 1111F PR DISCHARGE MEDS RECONCILED W/ CURRENT OUTPATIENT MED LIST: ICD-10-PCS | Mod: CPTII,,, | Performed by: INTERNAL MEDICINE

## 2022-03-30 PROCEDURE — 3008F BODY MASS INDEX DOCD: CPT | Mod: CPTII,,, | Performed by: INTERNAL MEDICINE

## 2022-03-30 PROCEDURE — 99999 PR PBB SHADOW E&M-EST. PATIENT-LVL V: CPT | Mod: PBBFAC,,, | Performed by: INTERNAL MEDICINE

## 2022-03-30 PROCEDURE — 1160F RVW MEDS BY RX/DR IN RCRD: CPT | Mod: CPTII,,, | Performed by: INTERNAL MEDICINE

## 2022-03-30 PROCEDURE — 1159F PR MEDICATION LIST DOCUMENTED IN MEDICAL RECORD: ICD-10-PCS | Mod: CPTII,,, | Performed by: INTERNAL MEDICINE

## 2022-03-30 PROCEDURE — 3044F PR MOST RECENT HEMOGLOBIN A1C LEVEL <7.0%: ICD-10-PCS | Mod: CPTII,,, | Performed by: INTERNAL MEDICINE

## 2022-03-30 PROCEDURE — 3079F DIAST BP 80-89 MM HG: CPT | Mod: CPTII,,, | Performed by: INTERNAL MEDICINE

## 2022-03-30 PROCEDURE — 99215 PR OFFICE/OUTPT VISIT, EST, LEVL V, 40-54 MIN: ICD-10-PCS | Mod: S$PBB,,, | Performed by: INTERNAL MEDICINE

## 2022-03-30 PROCEDURE — 3008F PR BODY MASS INDEX (BMI) DOCUMENTED: ICD-10-PCS | Mod: CPTII,,, | Performed by: INTERNAL MEDICINE

## 2022-03-30 PROCEDURE — 99999 PR PBB SHADOW E&M-EST. PATIENT-LVL V: ICD-10-PCS | Mod: PBBFAC,,, | Performed by: INTERNAL MEDICINE

## 2022-03-30 PROCEDURE — 3044F HG A1C LEVEL LT 7.0%: CPT | Mod: CPTII,,, | Performed by: INTERNAL MEDICINE

## 2022-03-30 PROCEDURE — 4010F PR ACE/ARB THEARPY RXD/TAKEN: ICD-10-PCS | Mod: CPTII,,, | Performed by: INTERNAL MEDICINE

## 2022-03-30 PROCEDURE — 3077F SYST BP >= 140 MM HG: CPT | Mod: CPTII,,, | Performed by: INTERNAL MEDICINE

## 2022-03-30 PROCEDURE — 3079F PR MOST RECENT DIASTOLIC BLOOD PRESSURE 80-89 MM HG: ICD-10-PCS | Mod: CPTII,,, | Performed by: INTERNAL MEDICINE

## 2022-03-30 PROCEDURE — 4010F ACE/ARB THERAPY RXD/TAKEN: CPT | Mod: CPTII,,, | Performed by: INTERNAL MEDICINE

## 2022-03-30 PROCEDURE — 99215 OFFICE O/P EST HI 40 MIN: CPT | Mod: PBBFAC | Performed by: INTERNAL MEDICINE

## 2022-03-30 PROCEDURE — 3077F PR MOST RECENT SYSTOLIC BLOOD PRESSURE >= 140 MM HG: ICD-10-PCS | Mod: CPTII,,, | Performed by: INTERNAL MEDICINE

## 2022-03-30 PROCEDURE — 1160F PR REVIEW ALL MEDS BY PRESCRIBER/CLIN PHARMACIST DOCUMENTED: ICD-10-PCS | Mod: CPTII,,, | Performed by: INTERNAL MEDICINE

## 2022-03-30 PROCEDURE — 1111F DSCHRG MED/CURRENT MED MERGE: CPT | Mod: CPTII,,, | Performed by: INTERNAL MEDICINE

## 2022-03-30 PROCEDURE — 99215 OFFICE O/P EST HI 40 MIN: CPT | Mod: S$PBB,,, | Performed by: INTERNAL MEDICINE

## 2022-03-30 RX ORDER — OXYCODONE HYDROCHLORIDE 5 MG/1
5 TABLET ORAL EVERY 4 HOURS PRN
Qty: 60 TABLET | Refills: 0 | Status: SHIPPED | OUTPATIENT
Start: 2022-03-30 | End: 2022-04-11 | Stop reason: SDUPTHER

## 2022-03-30 RX ORDER — ONDANSETRON HYDROCHLORIDE 8 MG/1
8 TABLET, FILM COATED ORAL EVERY 8 HOURS PRN
Qty: 30 TABLET | Refills: 3 | Status: SHIPPED | OUTPATIENT
Start: 2022-03-30 | End: 2022-05-18 | Stop reason: SDUPTHER

## 2022-03-30 RX ORDER — BENZONATATE 200 MG/1
200 CAPSULE ORAL 3 TIMES DAILY PRN
Qty: 30 CAPSULE | Refills: 3 | Status: SHIPPED | OUTPATIENT
Start: 2022-03-30 | End: 2023-01-23 | Stop reason: SDUPTHER

## 2022-03-30 NOTE — Clinical Note
-Follow up in 3 weeks on 4/20/22 with cbc and cmp then MD visit -Cycle 2 carbo/etop/atez 4/27/22 at [facility]

## 2022-03-30 NOTE — PLAN OF CARE
START ON PATHWAY REGIMEN - Small Cell Lung    [id number]        Atezolizumab (Tecentriq)       Carboplatin (Paraplatin)       Etoposide (Toposar)       Atezolizumab (Tecentriq)     **Always confirm dose/schedule in your pharmacy ordering system**    Patient Characteristics:  Newly Diagnosed, Preoperative or Nonsurgical Candidate (Clinical Staging), First   Line, Extensive Stage  Therapeutic Status: Newly Diagnosed, Preoperative or Nonsurgical Candidate   (Clinical Staging)  AJCC T Category: cTX  AJCC N Category: cN3  AJCC M Category: pM1  AJCC 8 Stage Grouping: IV  Stage Classification: Extensive    Intent of Therapy:  Non-Curative / Palliative Intent, Discussed with Patient

## 2022-03-30 NOTE — PROGRESS NOTES
[facility]  [facility]  Hematology/Medical Oncology Clinic       PATIENT: Juan Gillespie  MRN: [id number]  DATE: 3/29/2022    Reason for referral: Extensive stage small cell lung ca    Initial History: Patient is a 57-year-old male with history of T2DM, HTN, tobacco use who presented to the ED on 3/19/22 for cough, sob, and pleuritic chest pain.      CT Chest w/ con 3/20:  Mediastinal lymphadenopathy 7.5 cm.  Right middle lobe consolidative opacity 4.6 cm.  5.3 cm liver lesion.    CT A/P 3/23/22: Re-demonstration of hypoattenuating liver lesion, 4.5cm. Two other lesions 2.7 and 2.5 cm.     MRI Brain 3/23/22: Enchacning lesion in the pituitary gland 13mm. 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        Patient underwent diagnostic bronchoscopy with EBUS of RML mass and lymph nodes on 03/22:  Path w/ small cell lung ca.     Interval History:   Patient reports stable resp status, on 2 L O2 via NC, denies SOB. Denies fevers, chills, n/v, abd pain. Ankle edema.     Past Medical History:   Past Medical History:   Diagnosis Date    Diabetes mellitus, type 2     Hypertension        Past Surgical HIstory:   Past Surgical History:   Procedure Laterality Date    ENDOBRONCHIAL ULTRASOUND N/A 3/22/2022    Procedure: ENDOBRONCHIAL ULTRASOUND (EBUS);  Surgeon: Kristin Samuels MD;  Location: [address];  Service: Endoscopy;  Laterality: N/A;    KNEE SURGERY         Family History:   Family History   Problem Relation Age of Onset    Diabetes Mellitus Mother     Pacemaker/defibrilator Father     Lung cancer Father        Social History:  reports that he has never smoked. He has never used smokeless tobacco. He reports that he does not drink alcohol and does not use drugs.    Allergies:  Review of patient's allergies indicates:  No Known Allergies    Medications:  Current Outpatient Medications   Medication Sig Dispense Refill    albuterol (ACCUNEB) 1.25 mg/3 mL Nebu Use 1 vial (1.25 mg  total) by nebulization 3 (three) times daily as needed (shortness of breath). Rescue 90 mL 2    atorvastatin (LIPITOR) 20 MG tablet Take 20 mg by mouth once daily.      benzonatate (TESSALON) 200 MG capsule Take 1 capsule (200 mg total) by mouth 3 (three) times daily as needed for Cough. 30 capsule 3    HYDROcodone-acetaminophen (NORCO) 5-325 mg per tablet Take 1 tablet by mouth every 6 (six) hours as needed for Pain. 28 tablet 0    hydrOXYzine HCL (ATARAX) 25 MG tablet Take 1 tablet (25 mg total) by mouth 3 (three) times daily as needed for Itching. 21 tablet 0    losartan (COZAAR) 100 MG tablet Take 100 mg by mouth once daily.      methocarbamoL (ROBAXIN) 500 MG Tab Take 1 tablet (500 mg total) by mouth 3 (three) times daily. for 10 days 30 tablet 0    montelukast (SINGULAIR) 10 mg tablet Take 10 mg by mouth every evening.      NIFEdipine (PROCARDIA-XL) 60 MG (OSM) 24 hr tablet Take 1 tablet (60 mg total) by mouth once daily. 30 tablet 3    pantoprazole (PROTONIX) 40 MG tablet Take 1 tablet (40 mg total) by mouth once daily. 30 tablet 3    promethazine-codeine 6.25-10 mg/5 ml (PHENERGAN WITH CODEINE) 6.25-10 mg/5 mL syrup Take 5 mLs by mouth 3 (three) times daily as needed for Cough.      promethazine-codeine 6.25-10 mg/5 ml (PHENERGAN WITH CODEINE) 6.25-10 mg/5 mL syrup Take 5 ml by mouth every 4 hours as needed for cough 473 mL 0    TRUE METRIX GLUCOSE TEST STRIP Strp       TRUEPLUS LANCETS 33 gauge Misc        No current facility-administered medications for this visit.       Review of Systems  Constitutional: Negative for fatigue.  HENT: Negative for nosebleeds and sore throat.  + Hoarseness.   Respiratory: + Cough. Denies SOB.   Cardiovascular: Ankle edema. Negative for palpitations.   Gastrointestinal: Negative for constipation. Negative for abdominal pain, diarrhea, nausea and vomiting.     ECOG Performance Status: 1  Objective:      Vitals:   Vitals:    03/30/22 0829   BP: (!) 173/84   BP  "Location: Right arm   Patient Position: Sitting   BP Method: Large (Automatic)   Pulse: 81   Resp: 18   Temp: 98 °F (36.7 °C)   TempSrc: Oral   SpO2: 96%   Weight: 117.6 kg (259 lb 4.2 oz)   Height: 5' 7" (1.702 m)       Physical Exam  Constitutional:       Appearance: Appears SOB. Hoarse voice. On 2L O2 via NC.    HENT:      Head: Normocephalic and atraumatic.      Mouth/Throat:      Mouth: Mucous membranes are moist.   Eyes:      Extraocular Movements: Extraocular movements intact.   Cardiovascular:      Rate and Rhythm: Normal rate and regular rhythm.      Pulses: Normal pulses.      Heart sounds: Normal heart sounds.   Pulmonary:      Effort: Pulmonary effort is normal.      Breath sounds: Crackles over bases.   Abdominal:      Palpations: Abdomen is soft.   Musculoskeletal:      Cervical back: Normal range of motion and neck supple.   Skin:     General: Skin is warm.   Neurological:      General: No focal deficit present.      Mental Status: He is alert.    Laboratory Data:  No results displayed because visit has over 200 results.        Imaging: All pertinent imaging reviewed    Assessment and Plan        1. Small cell lung cancer    2. Secondary malignant neoplasm of liver        Extensive stage small cell lung ca w. mets to the liver   -Consented for carbo/etoposide/atezolizumab for 4-6 cycles over a 21 day period followed by atezolizumab maintenance   -Discussed this is not curative intent. Palliative for life prolongation and quality of life.   -Prescribed zofran for nausea  -On tessalon pearls + codeine syrup for cough  -Oxycodone IR 5mg q6h prn for pain  -Will refer to IR for port    Pituitary lesion  -MRI w/ pituitary lesion primary vs met  -Refer to rad on to discuss if there is a need of RT    HTN  -Increased losartan to 100mg, nefedipine 60mg daily  -Given chemo care companion     HLD  -Atorvastatin    Glucose intolerance  -Hgb A1c 6.1    Follow up  -Plan for cycle 1 at [facility]  -Follow up in 3 weeks on " 4/20/22 with cbc and cmp then MD visit  -Cycle 2 carbo/etop/atez 4/27/22 at [facility]    Racheal Jennings MD  Hematology/Oncology Fellow PGY IV  [facility]

## 2022-03-31 ENCOUNTER — TELEPHONE (OUTPATIENT)
Dept: HEMATOLOGY/ONCOLOGY | Facility: CLINIC | Age: 57
End: 2022-03-31
Payer: MEDICAID

## 2022-03-31 ENCOUNTER — DOCUMENTATION ONLY (OUTPATIENT)
Dept: ONCOLOGY | Facility: HOSPITAL | Age: 57
End: 2022-03-31
Payer: MEDICAID

## 2022-03-31 DIAGNOSIS — C34.90 SMALL CELL LUNG CANCER: Primary | ICD-10-CM

## 2022-03-31 NOTE — TELEPHONE ENCOUNTER
Inquiring about filing for disability as he is self-employed.  Will r/o to RENEE Calixto for advice.

## 2022-03-31 NOTE — TELEPHONE ENCOUNTER
"----- Message from Di Rush sent at 3/31/2022 11:11 AM CDT -----         Consult/Advisory:      Name Of Caller:Isabela   Contact Preference?:[phone]      What is the nature of the call?:She has questions regarding filing for disability       Additional Notes:  "Thank you for all that you do for our patients'"                           "

## 2022-03-31 NOTE — PROGRESS NOTES
RENEE received consult from Marcie Coyne RN. Patient is self employed but interested in disability.     SW spoke to patients wife. 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    RENEE passed referral to [email or web address]    and gave patient's wife his direct number.

## 2022-04-01 ENCOUNTER — NURSE TRIAGE (OUTPATIENT)
Dept: ADMINISTRATIVE | Facility: CLINIC | Age: 57
End: 2022-04-01
Payer: MEDICAID

## 2022-04-01 ENCOUNTER — HOSPITAL ENCOUNTER (OUTPATIENT)
Dept: INTERVENTIONAL RADIOLOGY/VASCULAR | Facility: HOSPITAL | Age: 57
Discharge: HOME | End: 2022-04-01
Attending: FAMILY MEDICINE
Payer: MEDICAID

## 2022-04-01 VITALS
OXYGEN SATURATION: 98 % | DIASTOLIC BLOOD PRESSURE: 68 MMHG | SYSTOLIC BLOOD PRESSURE: 155 MMHG | TEMPERATURE: 98 F | RESPIRATION RATE: 18 BRPM | HEIGHT: 67 IN | HEART RATE: 86 BPM | WEIGHT: 253 LBS | BODY MASS INDEX: 39.71 KG/M2

## 2022-04-01 DIAGNOSIS — C34.90 SMALL CELL LUNG CANCER: ICD-10-CM

## 2022-04-01 DIAGNOSIS — C34.90 LUNG CANCER: ICD-10-CM

## 2022-04-01 LAB
INR PPP: 1 (ref 0.8–1.2)
PROTHROMBIN TIME: 10.6 SEC (ref 9–12.5)

## 2022-04-01 PROCEDURE — 36561 INSERT TUNNELED CV CATH: CPT | Performed by: RADIOLOGY

## 2022-04-01 PROCEDURE — 76937 PR  US GUIDE, VASCULAR ACCESS: ICD-10-PCS | Mod: 26,,, | Performed by: RADIOLOGY

## 2022-04-01 PROCEDURE — 99152 MOD SED SAME PHYS/QHP 5/>YRS: CPT

## 2022-04-01 PROCEDURE — 85610 PROTHROMBIN TIME: CPT | Performed by: RADIOLOGY

## 2022-04-01 PROCEDURE — 25000003 PHARM REV CODE 250: Performed by: RADIOLOGY

## 2022-04-01 PROCEDURE — 99284 EMERGENCY DEPT VISIT MOD MDM: CPT | Mod: ,,, | Performed by: EMERGENCY MEDICINE

## 2022-04-01 PROCEDURE — 63600175 PHARM REV CODE 636 W HCPCS: Performed by: RADIOLOGY

## 2022-04-01 PROCEDURE — A4550 SURGICAL TRAYS: HCPCS

## 2022-04-01 PROCEDURE — 96374 THER/PROPH/DIAG INJ IV PUSH: CPT

## 2022-04-01 PROCEDURE — 77001 FLUOROGUIDE FOR VEIN DEVICE: CPT | Mod: TC | Performed by: RADIOLOGY

## 2022-04-01 PROCEDURE — 99285 EMERGENCY DEPT VISIT HI MDM: CPT | Mod: 25

## 2022-04-01 PROCEDURE — 36561 IR TUNNELED CATH PLACEMENT WITH PORT: ICD-10-PCS | Mod: RT,,, | Performed by: RADIOLOGY

## 2022-04-01 PROCEDURE — 99153 MOD SED SAME PHYS/QHP EA: CPT

## 2022-04-01 PROCEDURE — 99284 PR EMERGENCY DEPT VISIT,LEVEL IV: ICD-10-PCS | Mod: ,,, | Performed by: EMERGENCY MEDICINE

## 2022-04-01 PROCEDURE — 76937 US GUIDE VASCULAR ACCESS: CPT | Mod: TC | Performed by: RADIOLOGY

## 2022-04-01 PROCEDURE — 77001 CHG FLUOROGUIDE CNTRL VEN ACCESS,PLACE,REPLACE,REMOVE: ICD-10-PCS | Mod: 26,,, | Performed by: RADIOLOGY

## 2022-04-01 PROCEDURE — 77001 FLUOROGUIDE FOR VEIN DEVICE: CPT | Mod: 26,,, | Performed by: RADIOLOGY

## 2022-04-01 PROCEDURE — C1788 PORT, INDWELLING, IMP: HCPCS

## 2022-04-01 PROCEDURE — 76937 US GUIDE VASCULAR ACCESS: CPT | Mod: 26,,, | Performed by: RADIOLOGY

## 2022-04-01 RX ORDER — LIDOCAINE HYDROCHLORIDE 10 MG/ML
INJECTION INFILTRATION; PERINEURAL CODE/TRAUMA/SEDATION MEDICATION
Status: COMPLETED | OUTPATIENT
Start: 2022-04-01 | End: 2022-04-01

## 2022-04-01 RX ORDER — MIDAZOLAM HYDROCHLORIDE 1 MG/ML
INJECTION INTRAMUSCULAR; INTRAVENOUS CODE/TRAUMA/SEDATION MEDICATION
Status: COMPLETED | OUTPATIENT
Start: 2022-04-01 | End: 2022-04-01

## 2022-04-01 RX ORDER — CEFAZOLIN SODIUM 2 G/50ML
2 SOLUTION INTRAVENOUS
Status: COMPLETED | OUTPATIENT
Start: 2022-04-01 | End: 2022-04-01

## 2022-04-01 RX ORDER — SODIUM CHLORIDE 9 MG/ML
INJECTION, SOLUTION INTRAVENOUS
Status: COMPLETED | OUTPATIENT
Start: 2022-04-01 | End: 2022-04-01

## 2022-04-01 RX ORDER — CEFAZOLIN SODIUM 2 G/50ML
2 SOLUTION INTRAVENOUS ONCE
Status: DISCONTINUED | OUTPATIENT
Start: 2022-04-01 | End: 2022-04-01

## 2022-04-01 RX ORDER — FENTANYL CITRATE 50 UG/ML
INJECTION, SOLUTION INTRAMUSCULAR; INTRAVENOUS CODE/TRAUMA/SEDATION MEDICATION
Status: COMPLETED | OUTPATIENT
Start: 2022-04-01 | End: 2022-04-01

## 2022-04-01 RX ADMIN — LIDOCAINE HYDROCHLORIDE 10 ML: 10 INJECTION, SOLUTION INFILTRATION; PERINEURAL at 10:04

## 2022-04-01 RX ADMIN — FENTANYL CITRATE 25 MCG: 50 INJECTION, SOLUTION INTRAMUSCULAR; INTRAVENOUS at 10:04

## 2022-04-01 RX ADMIN — CEFAZOLIN SODIUM 2 G: 2 SOLUTION INTRAVENOUS at 10:04

## 2022-04-01 RX ADMIN — MIDAZOLAM HYDROCHLORIDE 0.5 MG: 1 INJECTION, SOLUTION INTRAMUSCULAR; INTRAVENOUS at 10:04

## 2022-04-01 RX ADMIN — SODIUM CHLORIDE 500 ML: 0.9 INJECTION, SOLUTION INTRAVENOUS at 10:04

## 2022-04-01 NOTE — DISCHARGE SUMMARY
Interventional Radiology Short Stay Discharge Summary      Admit Date: 4/1/2022  Discharge Date: 04/01/2022     Hospital Course: Uneventful    Discharge Diagnosis: Lung ca s/p chest port placement today    Discharge Condition: Stable    Discharge Disposition: Home    Diet: Resume prior diet    Activity: Activity as tolerated and no driving for today    Follow-up: With referring provider      [facility]  Pager [phone]

## 2022-04-01 NOTE — PROCEDURES
Interventional Radiology Immediate Post-Procedure Note    Pre-Op Diagnosis: Lung ca  Post-Op Diagnosis: Same    Procedure: Chest port placement    Procedure performed by: Emanuel Barrett MD  Assistants: None    Estimated Blood Loss: Minimal  Specimen Removed: No: NA    Findings/description of procedure:  8-Fr BARD CLEARVUE chest port placed via patent RIGHT IJ vein. Tip near the superior cavoatrial jxn. Ready for use.    No immediate complications. Patient tolerated procedure well. Please see full dictated procedure report for additional details and recommendations.      Emanuel Barrett MD  [facility]  Pager [phone]

## 2022-04-01 NOTE — H&P
Interventional Radiology Pre-Procedure History & Physical      Chief Complaint/Reason for Referral: Lung ca    History of Present Illness:  Juan Gillespie is a 57 y.o. male who presents with lung ca. Referred for chest port placement for chemo.    Past Medical History:   Diagnosis Date    Diabetes mellitus, type 2     Hypertension      Past Surgical History:   Procedure Laterality Date    ENDOBRONCHIAL ULTRASOUND N/A 3/22/2022    Procedure: ENDOBRONCHIAL ULTRASOUND (EBUS);  Surgeon: Kristin Samuels MD;  Location: [facility] OR [address] [place] [address];  Service: Endoscopy;  Laterality: N/A;    KNEE SURGERY         Allergies:   Review of patient's allergies indicates:  No Known Allergies     Home Meds:   Prior to Admission medications    Medication Sig Start Date End Date Taking? Authorizing Provider   albuterol (ACCUNEB) 1.25 mg/3 mL Nebu Use 1 vial (1.25 mg total) by nebulization 3 (three) times daily as needed (shortness of breath). Rescue 3/23/22  Yes Julee Irene MD   benzonatate (TESSALON) 200 MG capsule Take 1 capsule (200 mg total) by mouth 3 (three) times daily as needed for Cough. 3/30/22 4/9/22 Yes Racheal Jennings MD   hydrOXYzine HCL (ATARAX) 25 MG tablet Take 1 tablet (25 mg total) by mouth 3 (three) times daily as needed for Itching. 3/23/22  Yes Di Zamora MD   losartan (COZAAR) 100 MG tablet Take 100 mg by mouth once daily.   Yes Historical Provider   methocarbamoL (ROBAXIN) 500 MG Tab Take 1 tablet (500 mg total) by mouth 3 (three) times daily. for 10 days 3/23/22 4/3/22 Yes Di Zamora MD   montelukast (SINGULAIR) 10 mg tablet Take 10 mg by mouth every evening.   Yes Historical Provider   NIFEdipine (PROCARDIA-XL) 60 MG (OSM) 24 hr tablet Take 1 tablet (60 mg total) by mouth once daily. 3/24/22 3/24/23 Yes Di Zamora MD   oxyCODONE (ROXICODONE) 5 MG immediate release tablet Take 1 tablet (5 mg total) by mouth every 4 (four) hours as needed for Pain. 3/30/22  Yes Racheal Jennings MD   pantoprazole (PROTONIX) 40  MG tablet Take 1 tablet (40 mg total) by mouth once daily. 3/24/22 3/24/23 Yes Di Zamora MD   promethazine-codeine 6.25-10 mg/5 ml (PHENERGAN WITH CODEINE) 6.25-10 mg/5 mL syrup Take 5 mLs by mouth 3 (three) times daily as needed for Cough.   Yes Historical Provider   promethazine-codeine 6.25-10 mg/5 ml (PHENERGAN WITH CODEINE) 6.25-10 mg/5 mL syrup Take 5 ml by mouth every 4 hours as needed for cough 3/25/22  Yes Di Zamora MD   atorvastatin (LIPITOR) 20 MG tablet Take 20 mg by mouth once daily.    Historical Provider   ondansetron (ZOFRAN) 8 MG tablet Take 1 tablet (8 mg total) by mouth every 8 (eight) hours as needed for Nausea. 3/30/22   Racheal Jennings MD   TRUE METRIX GLUCOSE TEST STRIP Strp  1/20/19   Historical Provider   TRUEPLUS LANCETS 33 gauge Misc  1/1/19   Historical Provider   albuterol-ipratropium (DUO-NEB) 2.5 mg-0.5 mg/3 mL nebulizer solution Take 3 mLs by nebulization every 4 (four) hours. Rescue 3/23/22 3/23/22  Di Zamora MD   azelastine (ASTELIN) 137 mcg (0.1 %) nasal spray 1 spray by Nasal route 2 (two) times daily.  3/23/22  Historical Provider       Anticoagulation/Antiplatelet Meds: no anticoagulation    Review of Systems:   Hematological: no known coagulopathies  Respiratory: no shortness of breath  Cardiovascular: no chest pain  Gastrointestinal: no abdominal pain  Genitourinary: no dysuria  Musculoskeletal: negative  Neurological: no TIA or stroke symptoms     Physical Exam:  Temp: 97.8 °F (36.6 °C) (04/01/22 0943)  Pulse: 98 (04/01/22 0943)  Resp: 18 (04/01/22 0943)  BP: (!) 170/84 (04/01/22 1008)  SpO2: 99 % (04/01/22 0943)    General: NAD  HEENT: Normocephalic, sclera anicteric, oropharynx clear  Neck: Large palpable right supraclavicular nodes  Heart: RRR  Lungs: Symmetric excursions, breathing mildly labored  Abd: NTND  Extremities: CRISTOBAL  Neuro: Gross nonfocal    Laboratory:  Lab Results   Component Value Date    INR 1.0 04/01/2022       Lab Results   Component Value  Date    WBC 15.98 (H) 03/24/2022    HGB 14.8 03/24/2022    HCT 45.1 03/24/2022    MCV 90 03/24/2022     03/24/2022      Lab Results   Component Value Date     (H) 03/24/2022     03/24/2022    K 3.8 03/24/2022     03/24/2022    CO2 27 03/24/2022    BUN 34 (H) 03/24/2022    CREATININE 1.0 03/24/2022    CALCIUM 9.6 03/24/2022    MG 2.1 03/22/2022    ALT 30 03/24/2022    AST 17 03/24/2022    ALBUMIN 3.4 (L) 03/24/2022    BILITOT 0.4 03/24/2022       Imaging:  CT chest 3/19/22 reviewed.    Assessment/Plan:  57 y.o. male with lung ca. Will undergo chest port placement today.    Sedation:  Sedation history: have not been any systemic reactions  ASA: 3 / Mallampati: 3  Sedation plan: Up to moderate (Versed, fentanyl)     Risks (including, but not limited to, pain, bleeding, infection, damage to nearby structures, treatment failure/recurrence, and the need for additional procedures), potential benefits, and alternatives were discussed with the patient. All questions were answered to the best of my abilities. The patient wishes to proceed. Written informed consent was obtained.      Andrew Marsala MD [facility]  Pager [phone]

## 2022-04-01 NOTE — NURSING
IR PAC completed, VS stable denies pain, R chest wall dressing, D/I, remains on NC as hospital arrival, reviewed d/c instructions with wife, removed PIV, tip intact, brought pt to front of hospital via WC, with home oxygen, IR care complete

## 2022-04-02 ENCOUNTER — HOSPITAL ENCOUNTER (EMERGENCY)
Facility: HOSPITAL | Age: 57
Discharge: HOME | End: 2022-04-02
Attending: EMERGENCY MEDICINE
Payer: MEDICAID

## 2022-04-02 VITALS
OXYGEN SATURATION: 94 % | DIASTOLIC BLOOD PRESSURE: 78 MMHG | HEART RATE: 78 BPM | BODY MASS INDEX: 40.72 KG/M2 | TEMPERATURE: 98 F | SYSTOLIC BLOOD PRESSURE: 163 MMHG | WEIGHT: 260 LBS | RESPIRATION RATE: 20 BRPM

## 2022-04-02 DIAGNOSIS — C80.1 SMALL CELL CARCINOMA: ICD-10-CM

## 2022-04-02 DIAGNOSIS — R05.9 COUGH: ICD-10-CM

## 2022-04-02 DIAGNOSIS — Z99.81 O2 DEPENDENT: ICD-10-CM

## 2022-04-02 DIAGNOSIS — R60.9 EDEMA, UNSPECIFIED TYPE: Primary | ICD-10-CM

## 2022-04-02 LAB
ALBUMIN SERPL BCP-MCNC: 3.5 G/DL (ref 3.5–5.2)
ALP SERPL-CCNC: 92 U/L (ref 55–135)
ALT SERPL W/O P-5'-P-CCNC: 23 U/L (ref 10–44)
ANION GAP SERPL CALC-SCNC: 12 MMOL/L (ref 8–16)
AST SERPL-CCNC: 23 U/L (ref 10–40)
BASOPHILS # BLD AUTO: 0.11 K/UL (ref 0–0.2)
BASOPHILS NFR BLD: 0.9 % (ref 0–1.9)
BILIRUB SERPL-MCNC: 1.1 MG/DL (ref 0.1–1)
BILIRUB UR QL STRIP: NEGATIVE
BNP SERPL-MCNC: <10 PG/ML (ref 0–99)
BUN SERPL-MCNC: 22 MG/DL (ref 6–20)
CALCIUM SERPL-MCNC: 9.6 MG/DL (ref 8.7–10.5)
CHLORIDE SERPL-SCNC: 102 MMOL/L (ref 95–110)
CLARITY UR REFRACT.AUTO: CLEAR
CO2 SERPL-SCNC: 27 MMOL/L (ref 23–29)
COLOR UR AUTO: YELLOW
CREAT SERPL-MCNC: 1 MG/DL (ref 0.5–1.4)
DIFFERENTIAL METHOD: ABNORMAL
EOSINOPHIL # BLD AUTO: 0.3 K/UL (ref 0–0.5)
EOSINOPHIL NFR BLD: 2.8 % (ref 0–8)
ERYTHROCYTE [DISTWIDTH] IN BLOOD BY AUTOMATED COUNT: 13.2 % (ref 11.5–14.5)
EST. GFR  (AFRICAN AMERICAN): >60 ML/MIN/1.73 M^2
EST. GFR  (NON AFRICAN AMERICAN): >60 ML/MIN/1.73 M^2
GLUCOSE SERPL-MCNC: 122 MG/DL (ref 70–110)
GLUCOSE UR QL STRIP: NEGATIVE
HCT VFR BLD AUTO: 42 % (ref 40–54)
HGB BLD-MCNC: 13.8 G/DL (ref 14–18)
HGB UR QL STRIP: NEGATIVE
IMM GRANULOCYTES # BLD AUTO: 0.31 K/UL (ref 0–0.04)
IMM GRANULOCYTES NFR BLD AUTO: 2.6 % (ref 0–0.5)
KETONES UR QL STRIP: NEGATIVE
LEUKOCYTE ESTERASE UR QL STRIP: NEGATIVE
LYMPHOCYTES # BLD AUTO: 1.4 K/UL (ref 1–4.8)
LYMPHOCYTES NFR BLD: 11.3 % (ref 18–48)
MCH RBC QN AUTO: 29.9 PG (ref 27–31)
MCHC RBC AUTO-ENTMCNC: 32.9 G/DL (ref 32–36)
MCV RBC AUTO: 91 FL (ref 82–98)
MONOCYTES # BLD AUTO: 0.9 K/UL (ref 0.3–1)
MONOCYTES NFR BLD: 7.6 % (ref 4–15)
NEUTROPHILS # BLD AUTO: 9 K/UL (ref 1.8–7.7)
NEUTROPHILS NFR BLD: 74.8 % (ref 38–73)
NITRITE UR QL STRIP: NEGATIVE
NRBC BLD-RTO: 0 /100 WBC
PH UR STRIP: 5 [PH] (ref 5–8)
PLATELET # BLD AUTO: 249 K/UL (ref 150–450)
PMV BLD AUTO: 10.2 FL (ref 9.2–12.9)
POTASSIUM SERPL-SCNC: 4.2 MMOL/L (ref 3.5–5.1)
PROT SERPL-MCNC: 7 G/DL (ref 6–8.4)
PROT UR QL STRIP: NEGATIVE
RBC # BLD AUTO: 4.62 M/UL (ref 4.6–6.2)
SODIUM SERPL-SCNC: 141 MMOL/L (ref 136–145)
SP GR UR STRIP: 1.02 (ref 1–1.03)
URN SPEC COLLECT METH UR: NORMAL
WBC # BLD AUTO: 12.03 K/UL (ref 3.9–12.7)

## 2022-04-02 PROCEDURE — 25000003 PHARM REV CODE 250: Performed by: EMERGENCY MEDICINE

## 2022-04-02 PROCEDURE — 25500020 PHARM REV CODE 255: Performed by: EMERGENCY MEDICINE

## 2022-04-02 PROCEDURE — 85025 COMPLETE CBC W/AUTO DIFF WBC: CPT | Performed by: EMERGENCY MEDICINE

## 2022-04-02 PROCEDURE — 86803 HEPATITIS C AB TEST: CPT | Performed by: EMERGENCY MEDICINE

## 2022-04-02 PROCEDURE — 80053 COMPREHEN METABOLIC PANEL: CPT | Performed by: EMERGENCY MEDICINE

## 2022-04-02 PROCEDURE — 81003 URINALYSIS AUTO W/O SCOPE: CPT | Performed by: EMERGENCY MEDICINE

## 2022-04-02 PROCEDURE — 83880 ASSAY OF NATRIURETIC PEPTIDE: CPT | Performed by: EMERGENCY MEDICINE

## 2022-04-02 PROCEDURE — 63600175 PHARM REV CODE 636 W HCPCS: Performed by: EMERGENCY MEDICINE

## 2022-04-02 RX ORDER — FUROSEMIDE 10 MG/ML
40 INJECTION INTRAMUSCULAR; INTRAVENOUS
Status: COMPLETED | OUTPATIENT
Start: 2022-04-02 | End: 2022-04-02

## 2022-04-02 RX ORDER — OXYCODONE HYDROCHLORIDE 5 MG/1
5 TABLET ORAL
Status: COMPLETED | OUTPATIENT
Start: 2022-04-02 | End: 2022-04-02

## 2022-04-02 RX ORDER — PROMETHAZINE HYDROCHLORIDE AND CODEINE PHOSPHATE 6.25; 1 MG/5ML; MG/5ML
5 SOLUTION ORAL
Status: COMPLETED | OUTPATIENT
Start: 2022-04-02 | End: 2022-04-02

## 2022-04-02 RX ADMIN — IOHEXOL 100 ML: 350 INJECTION, SOLUTION INTRAVENOUS at 05:04

## 2022-04-02 RX ADMIN — OXYCODONE 5 MG: 5 TABLET ORAL at 05:04

## 2022-04-02 RX ADMIN — PROMETHAZINE HYDROCHLORIDE AND CODEINE PHOSPHATE 5 ML: 10; 6.25 SOLUTION ORAL at 06:04

## 2022-04-02 RX ADMIN — FUROSEMIDE 40 MG: 10 INJECTION, SOLUTION INTRAMUSCULAR; INTRAVENOUS at 03:04

## 2022-04-02 NOTE — ED NOTES
Pt in the semi- fowlers position,resting w/ eyes closed and in no respiratory distress. Will not disturb. Respirations are even and unlabored. Skin is warm, dry and pink. VS. Wife at BS. Will continue to monitor closely.

## 2022-04-02 NOTE — ED PROVIDER NOTES
"Encounter Date: 4/1/2022       History     Chief Complaint   Patient presents with    Swelling     Pt c/o swelling to bilateral legs, arms, hands, feet, neck, and "everywhere" x2 days. States it started w/ feet. Hx of lung cancer. Port placed today. Hasn't started chemo yet. Denies chest pain. Arrives on home O2 @2L NC.      Juan Gillespie is a 57 M history of diabetes, hypertension, recent diagnosis of metastatic small cell lung CA, starting chemotherapy on Monday presenting today with swelling.  He had a port placed yesterday morning without complication.  Around 8:00 p.m. last night he started noticing swelling in his hands, feet and neck.  He denies shortness of breath or increased oxygen requirements.  Currently on 2 L which is his baseline.  Denies fevers or chills.  Has chest discomfort while coughing.  No orthopnea.  Typically uses a recliner with his legs elevated.  Denies unilateral symptoms.  Wife at bedside states that patient was on Lasix previously for something similar during his last hospitalization but was discontinued when his echocardiogram was normal.        Review of patient's allergies indicates:  No Known Allergies  Past Medical History:   Diagnosis Date    Diabetes mellitus, type 2     Hypertension      Past Surgical History:   Procedure Laterality Date    ENDOBRONCHIAL ULTRASOUND N/A 3/22/2022    Procedure: ENDOBRONCHIAL ULTRASOUND (EBUS);  Surgeon: Kristin Samuels MD;  Location: [facility] OR [address] [place] [address];  Service: Endoscopy;  Laterality: N/A;    KNEE SURGERY       Family History   Problem Relation Age of Onset    Diabetes Mellitus Mother     Pacemaker/defibrilator Father     Lung cancer Father      Social History     Tobacco Use    Smoking status: Never Smoker    Smokeless tobacco: Never Used    Tobacco comment: stop smoking 26 years ago   Substance Use Topics    Alcohol use: No    Drug use: No     Review of Systems   Constitutional: Negative for chills and fever.   HENT: Negative for sore " throat.    Respiratory: Positive for cough and shortness of breath.    Cardiovascular: Negative for chest pain.   Gastrointestinal: Negative for abdominal pain, nausea and vomiting.   Genitourinary: Negative for dysuria.        (+) dark urine     Musculoskeletal: Positive for joint swelling (hands, feet, neck). Negative for back pain.   Skin: Negative for rash.   Neurological: Negative for weakness.   Hematological: Does not bruise/bleed easily.   Psychiatric/Behavioral: Negative for confusion.       Physical Exam     Initial Vitals [04/02/22 0001]   BP Pulse Resp Temp SpO2   (!) 165/86 90 (!) 22 98.4 °F (36.9 °C) 98 %      MAP       --         Physical Exam    Nursing note and vitals reviewed.  Constitutional: He appears well-developed and well-nourished. No distress.   HENT:   Mouth/Throat: Oropharynx is clear and moist.   Eyes: Conjunctivae are normal.   Neck: Neck supple.   No JVD or crepitus   Cardiovascular: Normal rate, regular rhythm and intact distal pulses.   Pulmonary/Chest: Breath sounds normal. He has no wheezes. He has no rales.   2 L NC  Right anterior chest wall dressing, clean dry and intact   Abdominal: Abdomen is soft. He exhibits no distension. There is no abdominal tenderness. There is no rebound and no guarding.   Musculoskeletal:         General: Edema (trace edema to lower extremities) present.      Cervical back: Neck supple.     Lymphadenopathy:     He has no cervical adenopathy.   Neurological: He is alert and oriented to person, place, and time.   Skin: Skin is warm. Capillary refill takes less than 2 seconds. No rash noted.   Psychiatric: He has a normal mood and affect.         ED Course   Procedures  Labs Reviewed   CBC W/ AUTO DIFFERENTIAL - Abnormal; Notable for the following components:       Result Value    Hemoglobin 13.8 (*)     Immature Granulocytes 2.6 (*)     Gran # (ANC) 9.0 (*)     Immature Grans (Abs) 0.31 (*)     Gran % 74.8 (*)     Lymph % 11.3 (*)     All other  components within normal limits   COMPREHENSIVE METABOLIC PANEL - Abnormal; Notable for the following components:    Glucose 122 (*)     BUN 22 (*)     Total Bilirubin 1.1 (*)     All other components within normal limits   URINALYSIS, REFLEX TO URINE CULTURE    Narrative:     Specimen Source->Urine   B-TYPE NATRIURETIC PEPTIDE   HEPATITIS C ANTIBODY          Imaging Results          CTA Chest Non-Coronary (PE Study) (Final result)  Result time 04/02/22 07:12:33    Final result by Felipe Coffman MD (04/02/22 07:12:33)                 Impression:      1. No saddle embolus or convincing evidence of pulmonary thromboembolism through the proximal segmental levels.  No evidence of right heart strain.  2. Stable appearance of pulmonary mass within the right middle lobe, biopsy-proven small cell carcinoma.  3. Stable appearance of mediastinal shilpi metastasis.  4. Small right pleural effusion with adjacent atelectasis, stable.  5. Grossly stable few additional findings as above.    Electronically signed by resident: Les Bautista  Date:    04/02/2022  Time:    06:10    Electronically signed by: Felipe Coffman MD  Date:    04/02/2022  Time:    07:12             Narrative:    EXAMINATION:  CTA CHEST NON CORONARY    CLINICAL HISTORY:  Pulmonary embolism (PE) suspected, high prob;    TECHNIQUE:  Low dose axial images, sagittal and coronal reformations were obtained from the thoracic inlet to the lung bases following the IV administration of 100 mL of Omnipaque 350.  Contrast timing was optimized to evaluate the pulmonary arteries.  MIP images were performed.    COMPARISON:  CT chest 03/19/2022.  CT abdomen pelvis 03/22/2022.    FINDINGS:  Examination of the soft tissue and vascular structures at the base of the neck is unremarkable.  Recent placement of right-sided chest port with catheter tip terminating in the distal SVC.    The thoracic aorta maintains normal caliber, contour, and course with atherosclerotic calcification.   There is no evidence of aneurysmal dilation or dissection.    The pulmonary arteries distribute normally without evidence of filling defect to indicate pulmonary thromboembolism.  Streak artifact limits evaluation several segmental and subsegmental branches bilaterally.    The trachea and proximal airways remain patent.    Stable appearance of pulmonary mass within the right middle lobe measuring approximately 5.0 x 3.3 cm.  No new pulmonary mass.  Small right pleural effusion with adjacent atelectasis.  No pneumothorax.  There is bilateral nonspecific pulmonary mosaic attenuation not simply changed from prior.  Scattered bandlike opacities consistent with platelike scarring versus atelectasis.  No new consolidation or nodule.  No left pleural effusion.    The heart is not enlarged.  No pericardial effusion.    Large mediastinal conglomerate lymphadenopathy measuring approximately 7.6 x 5.6 cm in maximal transverse dimension and appears to encase the distal trachea, abuts the aortic arch, encases the right pulmonary artery, and slightly narrows the proximal right upper lobe pulmonary artery.  Additional prevascular lymph nodes, the largest measuring approximately 2.5 cm.    The esophagus maintains a normal course and caliber, noting portions inseparable from the mediastinal lymphadenopathy.    Limited evaluation upper abdomen demonstrates multiple hepatic lesions, the largest measuring approximately 4.5 cm, better characterized on recent CT abdomen pelvis.  There are also a few mildly enlarged kathi hepatis lymph nodes measuring up to 12 mm in maximum short axis.    No acute fracture or aggressive osseous lesions identified.  Few foci of air adjacent to right chest wall port, likely related to recent procedure.  Bilateral punctate nonobstructing nephrolithiasis.  The thoracic soft tissues are otherwise unremarkable.                                 Medications   furosemide injection 40 mg (40 mg Intravenous Given  4/2/22 0309)   iohexoL (OMNIPAQUE 350) injection 100 mL (100 mLs Intravenous Given 4/2/22 0526)   promethazine-codeine 6.25-10 mg/5 ml syrup 5 mL (5 mLs Oral Given 4/2/22 0609)   oxyCODONE immediate release tablet 5 mg (5 mg Oral Given 4/2/22 0541)     Medical Decision Making:   History:   Old Medical Records: I decided to obtain old medical records.  Initial Assessment:   Emergent evaluation 57 M here for extremity swelling, , neck swelling s/p port placement earlier today.    Denies shortness of breath or increased oxygen requirements.      Differential Diagnosis:   Decompensated liver failure, PE, subclavian syndrome,   Independently Interpreted Test(s):   I have ordered and independently interpreted X-rays - see prior notes.  I have ordered and independently interpreted EKG Reading(s) - see prior notes  Clinical Tests:   Lab Tests: Reviewed and Ordered  Radiological Study: Ordered and Reviewed  Medical Tests: Ordered and Reviewed  ED Management:  - labs  - CTA  - lasix 40 mg             ED Course as of 04/02/22 1209   Sat Apr 02, 2022   0346 BNP: <10 [GM]   0346 BUN(!): 22 [GM]   0346 Creatinine: 1.0 [GM]   0346 WBC: 12.03 [GM]   0549 Good urine output after Lasix.  Patient reports improvement of symptoms.  Pending CTA.    Patient signed out to Dr. Henry in stable condition pending CTA, re-evaluation and final disposition. [GM]   0559 BNP: <10 [GM]      ED Course User Index  [GM] Payton Cox MD             Clinical Impression:   Final diagnoses:  [R60.9] Edema, unspecified type (Primary)  [R05.9] Cough  [C80.1] Small cell carcinoma  [Z99.81] O2 dependent          ED Disposition Condition    Discharge Stable        ED Prescriptions     None        Follow-up Information     Follow up With Specialties Details Why Contact Info    Keri Batista NP Family Medicine Schedule an appointment as soon as possible for a visit   [address] [address]  [phone]      [facility] - Emergency Dept Emergency  Medicine  If symptoms worsen [address]  [facility] [address]  [phone]           Payton Cox MD  04/02/22 6513

## 2022-04-02 NOTE — ED NOTES
Pt lying L lateral recumbent, resting w/ eyes closed and in no distress. Respirations are tachypneic, even and unlabored. Skin is warm, dry and pink. VSS. Will not disturb. Wife remains at BS. Will continue to monitor closely.

## 2022-04-02 NOTE — ED NOTES
Pt states he is breathing easier after off- loading 1600 cc's urine. Pt requesting some water. MD approved. Pt given a cup of ice water.

## 2022-04-02 NOTE — TELEPHONE ENCOUNTER
Reason for Disposition   SEVERE swelling of the entire face    Additional Information   Negative: [1] Life-threatening reaction (anaphylaxis) in the past to similar substance (e.g., food, insect bite/sting, chemical, etc.) AND [2] < 2 hours since exposure   Negative: Unresponsive, passed out or very weak   Negative: Swollen tongue   Negative: Difficulty breathing or wheezing   Negative: Sounds like a life-threatening emergency to the triager   Negative: Followed a face injury   Negative: [1] Bee sting AND [2] within last 24 hours   Negative: Insect bite suspected   Negative: Swelling mainly of lip(s)   Negative: Swelling mainly around the eyes   Negative: [1] SEVERE swelling of entire face AND [2] < 2 hours since exposure to high-risk allergen (e.g., peanuts, tree nuts, fish, shellfish or 1st dose of drug) AND [3] no serious symptoms AND [4] no serious allergic reaction in the past   Negative: Fever   Negative: Taking an ACE Inhibitor medication  (e.g., benazepril/LOTENSIN, captopril/CAPOTEN, enalapril/VASOTEC, lisinopril/ZESTRIL)   Negative: Patient sounds very sick or weak to the triager   Negative: Pregnant 20 or more weeks   Negative: Postpartum (from 0 to 6 weeks after delivery)    Protocols used: FACE SWELLING-A-AH  spouse called re pt with stage IV lung ca. on oxy. had port placed today. feet swelling on wed now feet and hand and face swelling. diff breathing at baseline with cancer. some diff eating and choked a few times while eating. no SOB. oximeter 96 % on 2L . T99.3, bandage over port. on losartan , on chemo mon - thurs. seeing radiologist on Friday. 165/84, BV=773. has CP due to coughing for months. rec to see dr within 4 hours or option to get in touch with dr. Spoke with dr carlos genao above. MD holley transferred to speak with pt/spouse. Call back with questions.

## 2022-04-02 NOTE — DISCHARGE INSTRUCTIONS
Imaging Results              CTA Chest Non-Coronary (PE Study) (Final result)  Result time 04/02/22 07:12:33      Final result by Felipe Coffman MD (04/02/22 07:12:33)                   Impression:      1. No saddle embolus or convincing evidence of pulmonary thromboembolism through the proximal segmental levels.  No evidence of right heart strain.  2. Stable appearance of pulmonary mass within the right middle lobe, biopsy-proven small cell carcinoma.  3. Stable appearance of mediastinal shilpi metastasis.  4. Small right pleural effusion with adjacent atelectasis, stable.  5. Grossly stable few additional findings as above.    Electronically signed by resident: Les Bautista  Date:    04/02/2022  Time:    06:10    Electronically signed by: Felipe Coffman MD  Date:    04/02/2022  Time:    07:12               Narrative:    EXAMINATION:  CTA CHEST NON CORONARY    CLINICAL HISTORY:  Pulmonary embolism (PE) suspected, high prob;    TECHNIQUE:  Low dose axial images, sagittal and coronal reformations were obtained from the thoracic inlet to the lung bases following the IV administration of 100 mL of Omnipaque 350.  Contrast timing was optimized to evaluate the pulmonary arteries.  MIP images were performed.    COMPARISON:  CT chest 03/19/2022.  CT abdomen pelvis 03/22/2022.    FINDINGS:  Examination of the soft tissue and vascular structures at the base of the neck is unremarkable.  Recent placement of right-sided chest port with catheter tip terminating in the distal SVC.    The thoracic aorta maintains normal caliber, contour, and course with atherosclerotic calcification.  There is no evidence of aneurysmal dilation or dissection.    The pulmonary arteries distribute normally without evidence of filling defect to indicate pulmonary thromboembolism.  Streak artifact limits evaluation several segmental and subsegmental branches bilaterally.    The trachea and proximal airways remain patent.    Stable appearance of  pulmonary mass within the right middle lobe measuring approximately 5.0 x 3.3 cm.  No new pulmonary mass.  Small right pleural effusion with adjacent atelectasis.  No pneumothorax.  There is bilateral nonspecific pulmonary mosaic attenuation not simply changed from prior.  Scattered bandlike opacities consistent with platelike scarring versus atelectasis.  No new consolidation or nodule.  No left pleural effusion.    The heart is not enlarged.  No pericardial effusion.    Large mediastinal conglomerate lymphadenopathy measuring approximately 7.6 x 5.6 cm in maximal transverse dimension and appears to encase the distal trachea, abuts the aortic arch, encases the right pulmonary artery, and slightly narrows the proximal right upper lobe pulmonary artery.  Additional prevascular lymph nodes, the largest measuring approximately 2.5 cm.    The esophagus maintains a normal course and caliber, noting portions inseparable from the mediastinal lymphadenopathy.    Limited evaluation upper abdomen demonstrates multiple hepatic lesions, the largest measuring approximately 4.5 cm, better characterized on recent CT abdomen pelvis.  There are also a few mildly enlarged kathi hepatis lymph nodes measuring up to 12 mm in maximum short axis.    No acute fracture or aggressive osseous lesions identified.  Few foci of air adjacent to right chest wall port, likely related to recent procedure.  Bilateral punctate nonobstructing nephrolithiasis.  The thoracic soft tissues are otherwise unremarkable.

## 2022-04-02 NOTE — ED NOTES
Pt c/o swelling to lower exts since earlier in the week and facial swelling since earlier yesterday PM. Pt had a R upper chest port placed yesterday for chemo starting in a few days for Stage IV lung CA. Pt is on home 02 at 2 LPM via NC. Pt has had a persistant cough x 4 months.Pt A & O x 3, denies SOB, fever, chills, cough and N/V/D. Respirations are even and unlabored. Skin is warm, dry and pink. VS. IGGY x 3mm. BBS- CTA. Abd- SNT. PSM x 4 exts. Pt is connected to the pulse ox, B/P cuff and EKG monitor. Bed is locked and in the low position w/ the side rails up and locked for pt safety. Call bell and wife @ the BS. Will continue to monitor closely.

## 2022-04-02 NOTE — PROVIDER PROGRESS NOTES - EMERGENCY DEPT.
Encounter Date: 4/1/2022    ED Physician Progress Notes           STAFF PHYSICIAN F/U NOTE:  Juan Gillespie has been evaluated and treated. He reports much improvement/complete resolution of Sx and is ready to return home. Currently patient reports no new Sx and is tolerating PO challenge. We discussed Sx warranting immediate ED return, which were acknowledged. I recommended F/U and discussion of ED visit with primary care physician.    Imaging Results          CTA Chest Non-Coronary (PE Study) (Final result)  Result time 04/02/22 07:12:33    Final result by Felipe Coffman MD (04/02/22 07:12:33)                 Impression:      1. No saddle embolus or convincing evidence of pulmonary thromboembolism through the proximal segmental levels.  No evidence of right heart strain.  2. Stable appearance of pulmonary mass within the right middle lobe, biopsy-proven small cell carcinoma.  3. Stable appearance of mediastinal shilpi metastasis.  4. Small right pleural effusion with adjacent atelectasis, stable.  5. Grossly stable few additional findings as above.    Electronically signed by resident: Les Bautista  Date:    04/02/2022  Time:    06:10    Electronically signed by: Felipe Coffman MD  Date:    04/02/2022  Time:    07:12             Narrative:    EXAMINATION:  CTA CHEST NON CORONARY    CLINICAL HISTORY:  Pulmonary embolism (PE) suspected, high prob;    TECHNIQUE:  Low dose axial images, sagittal and coronal reformations were obtained from the thoracic inlet to the lung bases following the IV administration of 100 mL of Omnipaque 350.  Contrast timing was optimized to evaluate the pulmonary arteries.  MIP images were performed.    COMPARISON:  CT chest 03/19/2022.  CT abdomen pelvis 03/22/2022.    FINDINGS:  Examination of the soft tissue and vascular structures at the base of the neck is unremarkable.  Recent placement of right-sided chest port with catheter tip terminating in the distal SVC.    The thoracic aorta  maintains normal caliber, contour, and course with atherosclerotic calcification.  There is no evidence of aneurysmal dilation or dissection.    The pulmonary arteries distribute normally without evidence of filling defect to indicate pulmonary thromboembolism.  Streak artifact limits evaluation several segmental and subsegmental branches bilaterally.    The trachea and proximal airways remain patent.    Stable appearance of pulmonary mass within the right middle lobe measuring approximately 5.0 x 3.3 cm.  No new pulmonary mass.  Small right pleural effusion with adjacent atelectasis.  No pneumothorax.  There is bilateral nonspecific pulmonary mosaic attenuation not simply changed from prior.  Scattered bandlike opacities consistent with platelike scarring versus atelectasis.  No new consolidation or nodule.  No left pleural effusion.    The heart is not enlarged.  No pericardial effusion.    Large mediastinal conglomerate lymphadenopathy measuring approximately 7.6 x 5.6 cm in maximal transverse dimension and appears to encase the distal trachea, abuts the aortic arch, encases the right pulmonary artery, and slightly narrows the proximal right upper lobe pulmonary artery.  Additional prevascular lymph nodes, the largest measuring approximately 2.5 cm.    The esophagus maintains a normal course and caliber, noting portions inseparable from the mediastinal lymphadenopathy.    Limited evaluation upper abdomen demonstrates multiple hepatic lesions, the largest measuring approximately 4.5 cm, better characterized on recent CT abdomen pelvis.  There are also a few mildly enlarged kathi hepatis lymph nodes measuring up to 12 mm in maximum short axis.    No acute fracture or aggressive osseous lesions identified.  Few foci of air adjacent to right chest wall port, likely related to recent procedure.  Bilateral punctate nonobstructing nephrolithiasis.  The thoracic soft tissues are otherwise unremarkable.                               Ambulating and in NAD with ABC instability.  ____________________  Riley Henry MD, [facility]  Emergency Medicine Staff  8:39 AM 4/2/2022

## 2022-04-04 ENCOUNTER — TELEPHONE (OUTPATIENT)
Dept: HEMATOLOGY/ONCOLOGY | Facility: CLINIC | Age: 57
End: 2022-04-04
Payer: MEDICAID

## 2022-04-04 LAB — POCT GLUCOSE: 160 MG/DL (ref 70–110)

## 2022-04-04 RX ORDER — SODIUM CHLORIDE 0.9 % (FLUSH) 0.9 %
10 SYRINGE (ML) INJECTION
Status: CANCELLED | OUTPATIENT
Start: 2022-04-07

## 2022-04-04 RX ORDER — SODIUM CHLORIDE 0.9 % (FLUSH) 0.9 %
10 SYRINGE (ML) INJECTION
Status: CANCELLED | OUTPATIENT
Start: 2022-04-04

## 2022-04-04 RX ORDER — HEPARIN 100 UNIT/ML
500 SYRINGE INTRAVENOUS
Status: CANCELLED | OUTPATIENT
Start: 2022-04-06

## 2022-04-04 RX ORDER — HEPARIN 100 UNIT/ML
500 SYRINGE INTRAVENOUS
Status: CANCELLED | OUTPATIENT
Start: 2022-04-04

## 2022-04-04 RX ORDER — SODIUM CHLORIDE 0.9 % (FLUSH) 0.9 %
10 SYRINGE (ML) INJECTION
Status: CANCELLED | OUTPATIENT
Start: 2022-04-06

## 2022-04-04 RX ORDER — HEPARIN 100 UNIT/ML
500 SYRINGE INTRAVENOUS
Status: CANCELLED | OUTPATIENT
Start: 2022-04-07

## 2022-04-04 NOTE — TELEPHONE ENCOUNTER
This message was sent to  our clinic  It appears he is to go to infusion.   Please route accordingly Thank you NellaierOUMAR  ===View-only below this line===  ----- Message -----  From: Love Matthews  Sent: 4/4/2022   9:21 AM CDT  To: Love Matthews, [facility] Hem Onc Clinical Staff  Subject: pt needing chemo scheduled                       Good morning,   Can you please get pt scheduled for upcoming cycles.    Thank you!  Shashank Guevara chart:  -Follow up in 3 weeks on 4/20/22 with cbc and cmp then MD visit   -Cycle 2 carbo/etop/atez 4/27/22 at [facility]

## 2022-04-05 ENCOUNTER — INFUSION (OUTPATIENT)
Dept: INFUSION THERAPY | Facility: HOSPITAL | Age: 57
End: 2022-04-05
Attending: INTERNAL MEDICINE
Payer: MEDICAID

## 2022-04-05 VITALS
OXYGEN SATURATION: 96 % | DIASTOLIC BLOOD PRESSURE: 62 MMHG | RESPIRATION RATE: 16 BRPM | SYSTOLIC BLOOD PRESSURE: 118 MMHG | TEMPERATURE: 99 F | HEART RATE: 81 BPM

## 2022-04-05 DIAGNOSIS — C34.90 SMALL CELL LUNG CANCER: Primary | ICD-10-CM

## 2022-04-05 DIAGNOSIS — C78.7 SECONDARY MALIGNANT NEOPLASM OF LIVER: ICD-10-CM

## 2022-04-05 LAB — HCV AB SERPL QL IA: NEGATIVE

## 2022-04-05 PROCEDURE — 25000003 PHARM REV CODE 250: Performed by: INTERNAL MEDICINE

## 2022-04-05 PROCEDURE — 96375 TX/PRO/DX INJ NEW DRUG ADDON: CPT

## 2022-04-05 PROCEDURE — 96417 CHEMO IV INFUS EACH ADDL SEQ: CPT

## 2022-04-05 PROCEDURE — 63600175 PHARM REV CODE 636 W HCPCS: Performed by: INTERNAL MEDICINE

## 2022-04-05 PROCEDURE — 96413 CHEMO IV INFUSION 1 HR: CPT

## 2022-04-05 PROCEDURE — A4216 STERILE WATER/SALINE, 10 ML: HCPCS | Performed by: INTERNAL MEDICINE

## 2022-04-05 PROCEDURE — 96367 TX/PROPH/DG ADDL SEQ IV INF: CPT

## 2022-04-05 RX ORDER — HYDROMORPHONE HYDROCHLORIDE 2 MG/ML
1 INJECTION, SOLUTION INTRAMUSCULAR; INTRAVENOUS; SUBCUTANEOUS
Status: COMPLETED | OUTPATIENT
Start: 2022-04-05 | End: 2022-04-05

## 2022-04-05 RX ORDER — HYDROMORPHONE HYDROCHLORIDE 2 MG/ML
0.5 INJECTION, SOLUTION INTRAMUSCULAR; INTRAVENOUS; SUBCUTANEOUS
Status: COMPLETED | OUTPATIENT
Start: 2022-04-05 | End: 2022-04-05

## 2022-04-05 RX ORDER — SODIUM CHLORIDE 0.9 % (FLUSH) 0.9 %
10 SYRINGE (ML) INJECTION
Status: DISCONTINUED | OUTPATIENT
Start: 2022-04-05 | End: 2022-04-13 | Stop reason: HOSPADM

## 2022-04-05 RX ORDER — HEPARIN 100 UNIT/ML
500 SYRINGE INTRAVENOUS
Status: DISCONTINUED | OUTPATIENT
Start: 2022-04-05 | End: 2022-04-13 | Stop reason: HOSPADM

## 2022-04-05 RX ADMIN — APREPITANT 130 MG: 130 INJECTION, EMULSION INTRAVENOUS at 10:04

## 2022-04-05 RX ADMIN — Medication 10 ML: at 01:04

## 2022-04-05 RX ADMIN — HYDROMORPHONE HYDROCHLORIDE 1 MG: 2 INJECTION, SOLUTION INTRAMUSCULAR; INTRAVENOUS; SUBCUTANEOUS at 10:04

## 2022-04-05 RX ADMIN — HEPARIN 500 UNITS: 100 SYRINGE at 01:04

## 2022-04-05 RX ADMIN — ETOPOSIDE 236 MG: 20 INJECTION INTRAVENOUS at 12:04

## 2022-04-05 RX ADMIN — CARBOPLATIN 750 MG: 10 INJECTION, SOLUTION INTRAVENOUS at 11:04

## 2022-04-05 RX ADMIN — ATEZOLIZUMAB 1200 MG: 1200 INJECTION, SOLUTION INTRAVENOUS at 09:04

## 2022-04-05 RX ADMIN — DEXAMETHASONE SODIUM PHOSPHATE 0.25 MG: 10 INJECTION, SOLUTION INTRAMUSCULAR; INTRAVENOUS at 10:04

## 2022-04-05 RX ADMIN — HYDROMORPHONE HYDROCHLORIDE 0.5 MG: 2 INJECTION, SOLUTION INTRAMUSCULAR; INTRAVENOUS; SUBCUTANEOUS at 01:04

## 2022-04-06 ENCOUNTER — INFUSION (OUTPATIENT)
Dept: INFUSION THERAPY | Facility: HOSPITAL | Age: 57
End: 2022-04-06
Attending: INTERNAL MEDICINE
Payer: MEDICAID

## 2022-04-06 VITALS
TEMPERATURE: 98 F | OXYGEN SATURATION: 95 % | RESPIRATION RATE: 16 BRPM | HEART RATE: 85 BPM | SYSTOLIC BLOOD PRESSURE: 125 MMHG | DIASTOLIC BLOOD PRESSURE: 60 MMHG

## 2022-04-06 DIAGNOSIS — C34.90 SMALL CELL LUNG CANCER: Primary | ICD-10-CM

## 2022-04-06 DIAGNOSIS — C78.7 SECONDARY MALIGNANT NEOPLASM OF LIVER: ICD-10-CM

## 2022-04-06 PROCEDURE — 63600175 PHARM REV CODE 636 W HCPCS: Performed by: INTERNAL MEDICINE

## 2022-04-06 PROCEDURE — A4216 STERILE WATER/SALINE, 10 ML: HCPCS | Performed by: INTERNAL MEDICINE

## 2022-04-06 PROCEDURE — 25000003 PHARM REV CODE 250: Performed by: INTERNAL MEDICINE

## 2022-04-06 PROCEDURE — 96375 TX/PRO/DX INJ NEW DRUG ADDON: CPT

## 2022-04-06 PROCEDURE — 96413 CHEMO IV INFUSION 1 HR: CPT

## 2022-04-06 RX ORDER — SODIUM CHLORIDE 0.9 % (FLUSH) 0.9 %
10 SYRINGE (ML) INJECTION
Status: DISCONTINUED | OUTPATIENT
Start: 2022-04-06 | End: 2022-04-06 | Stop reason: HOSPADM

## 2022-04-06 RX ORDER — HEPARIN 100 UNIT/ML
500 SYRINGE INTRAVENOUS
Status: DISCONTINUED | OUTPATIENT
Start: 2022-04-06 | End: 2022-04-06 | Stop reason: HOSPADM

## 2022-04-06 RX ORDER — HYDROMORPHONE HYDROCHLORIDE 2 MG/ML
1 INJECTION, SOLUTION INTRAMUSCULAR; INTRAVENOUS; SUBCUTANEOUS ONCE
Status: COMPLETED | OUTPATIENT
Start: 2022-04-06 | End: 2022-04-06

## 2022-04-06 RX ADMIN — Medication 10 ML: at 01:04

## 2022-04-06 RX ADMIN — ETOPOSIDE 236 MG: 20 INJECTION INTRAVENOUS at 11:04

## 2022-04-06 RX ADMIN — HYDROMORPHONE HYDROCHLORIDE 1 MG: 2 INJECTION, SOLUTION INTRAMUSCULAR; INTRAVENOUS; SUBCUTANEOUS at 11:04

## 2022-04-06 RX ADMIN — HEPARIN 500 UNITS: 100 SYRINGE at 01:04

## 2022-04-06 NOTE — PLAN OF CARE
Patient arrived to unit in w/c with 2L O2 per n/c  for C1D2 Etoposide infusion. Pt denies n/v/d/ or reactions to initial chemo. Pt continues with painful cough and SOB. Pt endorses some confusion and dizziness due to his pain meds. Wife at chairside. Plan of care reviewed, patient agreeable to plan. Patient tolerated Etoposide infusion well. Dilaudid 1 mg administered due to coughing spell resulting in increased pain and SOB. 1200 pt verbalized relief of pain, was able to eat lunch without issue.VSS. Discharge instructions reviewed, patient instructed to return 4/7 for C1D3 Etoposide. Patient left off unit in w/c with portable O2 tank at 2L per n/c escorted by wife and MA. Patient in NAD at time of discharge.

## 2022-04-07 ENCOUNTER — INFUSION (OUTPATIENT)
Dept: INFUSION THERAPY | Facility: HOSPITAL | Age: 57
End: 2022-04-07
Attending: INTERNAL MEDICINE
Payer: MEDICAID

## 2022-04-07 DIAGNOSIS — C34.90 SMALL CELL LUNG CANCER: Primary | ICD-10-CM

## 2022-04-07 DIAGNOSIS — C78.7 SECONDARY MALIGNANT NEOPLASM OF LIVER: ICD-10-CM

## 2022-04-07 PROCEDURE — 63600175 PHARM REV CODE 636 W HCPCS: Performed by: INTERNAL MEDICINE

## 2022-04-07 PROCEDURE — 96375 TX/PRO/DX INJ NEW DRUG ADDON: CPT

## 2022-04-07 PROCEDURE — A4216 STERILE WATER/SALINE, 10 ML: HCPCS | Performed by: INTERNAL MEDICINE

## 2022-04-07 PROCEDURE — 25000003 PHARM REV CODE 250: Performed by: INTERNAL MEDICINE

## 2022-04-07 PROCEDURE — 96413 CHEMO IV INFUSION 1 HR: CPT

## 2022-04-07 PROCEDURE — 96374 THER/PROPH/DIAG INJ IV PUSH: CPT

## 2022-04-07 RX ORDER — SODIUM CHLORIDE 0.9 % (FLUSH) 0.9 %
10 SYRINGE (ML) INJECTION
Status: DISCONTINUED | OUTPATIENT
Start: 2022-04-07 | End: 2022-04-07 | Stop reason: HOSPADM

## 2022-04-07 RX ORDER — HEPARIN 100 UNIT/ML
500 SYRINGE INTRAVENOUS
Status: DISCONTINUED | OUTPATIENT
Start: 2022-04-07 | End: 2022-04-07 | Stop reason: HOSPADM

## 2022-04-07 RX ORDER — HYDROMORPHONE HYDROCHLORIDE 2 MG/ML
1 INJECTION, SOLUTION INTRAMUSCULAR; INTRAVENOUS; SUBCUTANEOUS ONCE
Status: COMPLETED | OUTPATIENT
Start: 2022-04-07 | End: 2022-04-07

## 2022-04-07 RX ADMIN — HEPARIN 500 UNITS: 100 SYRINGE at 02:04

## 2022-04-07 RX ADMIN — HYDROMORPHONE HYDROCHLORIDE 1 MG: 2 INJECTION, SOLUTION INTRAMUSCULAR; INTRAVENOUS; SUBCUTANEOUS at 12:04

## 2022-04-07 RX ADMIN — Medication 10 ML: at 02:04

## 2022-04-07 RX ADMIN — ETOPOSIDE 236 MG: 20 INJECTION INTRAVENOUS at 01:04

## 2022-04-07 NOTE — PLAN OF CARE
Pt arrived to unit for C1D3 of VP16. Pt endorsing generalized fatigue along with pain to his left rib cage area. On 2L oxygen by NC. Per ramin Alegria for pt to receive 1mg Dilaudid IV. Pt rating pain 7/10. Etoposide given. Tolerated well over one hour. Pt reports pain went to a 0 after receiving Dilaudid. Instructed to return tomorrow for Fulphila injection. Verbalized understanding. Left unit in wheelchair accompanied by spouse in NAD at time of discharge.

## 2022-04-08 ENCOUNTER — OFFICE VISIT (OUTPATIENT)
Dept: RADIATION ONCOLOGY | Facility: CLINIC | Age: 57
End: 2022-04-08
Payer: MEDICAID

## 2022-04-08 ENCOUNTER — INFUSION (OUTPATIENT)
Dept: INFUSION THERAPY | Facility: HOSPITAL | Age: 57
End: 2022-04-08
Attending: INTERNAL MEDICINE
Payer: MEDICAID

## 2022-04-08 VITALS
TEMPERATURE: 98 F | OXYGEN SATURATION: 98 % | HEART RATE: 81 BPM | SYSTOLIC BLOOD PRESSURE: 134 MMHG | DIASTOLIC BLOOD PRESSURE: 74 MMHG | RESPIRATION RATE: 16 BRPM

## 2022-04-08 VITALS
HEIGHT: 67 IN | DIASTOLIC BLOOD PRESSURE: 67 MMHG | HEART RATE: 76 BPM | WEIGHT: 250 LBS | BODY MASS INDEX: 39.24 KG/M2 | OXYGEN SATURATION: 98 % | RESPIRATION RATE: 18 BRPM | SYSTOLIC BLOOD PRESSURE: 143 MMHG

## 2022-04-08 DIAGNOSIS — C34.90 SMALL CELL LUNG CANCER: ICD-10-CM

## 2022-04-08 DIAGNOSIS — D35.2 PITUITARY ADENOMA: Primary | ICD-10-CM

## 2022-04-08 DIAGNOSIS — C78.7 SECONDARY MALIGNANT NEOPLASM OF LIVER: ICD-10-CM

## 2022-04-08 DIAGNOSIS — C34.90 SMALL CELL LUNG CANCER: Primary | ICD-10-CM

## 2022-04-08 PROCEDURE — 4010F PR ACE/ARB THEARPY RXD/TAKEN: ICD-10-PCS | Mod: CPTII,,, | Performed by: RADIOLOGY

## 2022-04-08 PROCEDURE — 3078F DIAST BP <80 MM HG: CPT | Mod: CPTII,,, | Performed by: RADIOLOGY

## 2022-04-08 PROCEDURE — 99999 PR PBB SHADOW E&M-EST. PATIENT-LVL V: CPT | Mod: PBBFAC,,, | Performed by: RADIOLOGY

## 2022-04-08 PROCEDURE — 3044F HG A1C LEVEL LT 7.0%: CPT | Mod: CPTII,,, | Performed by: RADIOLOGY

## 2022-04-08 PROCEDURE — 1111F PR DISCHARGE MEDS RECONCILED W/ CURRENT OUTPATIENT MED LIST: ICD-10-PCS | Mod: CPTII,,, | Performed by: RADIOLOGY

## 2022-04-08 PROCEDURE — 1159F PR MEDICATION LIST DOCUMENTED IN MEDICAL RECORD: ICD-10-PCS | Mod: CPTII,,, | Performed by: RADIOLOGY

## 2022-04-08 PROCEDURE — 99999 PR PBB SHADOW E&M-EST. PATIENT-LVL V: ICD-10-PCS | Mod: PBBFAC,,, | Performed by: RADIOLOGY

## 2022-04-08 PROCEDURE — 99215 OFFICE O/P EST HI 40 MIN: CPT | Mod: PBBFAC | Performed by: RADIOLOGY

## 2022-04-08 PROCEDURE — 4010F ACE/ARB THERAPY RXD/TAKEN: CPT | Mod: CPTII,,, | Performed by: RADIOLOGY

## 2022-04-08 PROCEDURE — 99204 OFFICE O/P NEW MOD 45 MIN: CPT | Mod: S$PBB,,, | Performed by: RADIOLOGY

## 2022-04-08 PROCEDURE — 63600175 PHARM REV CODE 636 W HCPCS: Mod: TB | Performed by: INTERNAL MEDICINE

## 2022-04-08 PROCEDURE — 3077F SYST BP >= 140 MM HG: CPT | Mod: CPTII,,, | Performed by: RADIOLOGY

## 2022-04-08 PROCEDURE — 3008F BODY MASS INDEX DOCD: CPT | Mod: CPTII,,, | Performed by: RADIOLOGY

## 2022-04-08 PROCEDURE — 3078F PR MOST RECENT DIASTOLIC BLOOD PRESSURE < 80 MM HG: ICD-10-PCS | Mod: CPTII,,, | Performed by: RADIOLOGY

## 2022-04-08 PROCEDURE — 96372 THER/PROPH/DIAG INJ SC/IM: CPT

## 2022-04-08 PROCEDURE — 99204 PR OFFICE/OUTPT VISIT, NEW, LEVL IV, 45-59 MIN: ICD-10-PCS | Mod: S$PBB,,, | Performed by: RADIOLOGY

## 2022-04-08 PROCEDURE — 1159F MED LIST DOCD IN RCRD: CPT | Mod: CPTII,,, | Performed by: RADIOLOGY

## 2022-04-08 PROCEDURE — 1111F DSCHRG MED/CURRENT MED MERGE: CPT | Mod: CPTII,,, | Performed by: RADIOLOGY

## 2022-04-08 PROCEDURE — 3008F PR BODY MASS INDEX (BMI) DOCUMENTED: ICD-10-PCS | Mod: CPTII,,, | Performed by: RADIOLOGY

## 2022-04-08 PROCEDURE — 3044F PR MOST RECENT HEMOGLOBIN A1C LEVEL <7.0%: ICD-10-PCS | Mod: CPTII,,, | Performed by: RADIOLOGY

## 2022-04-08 PROCEDURE — 3077F PR MOST RECENT SYSTOLIC BLOOD PRESSURE >= 140 MM HG: ICD-10-PCS | Mod: CPTII,,, | Performed by: RADIOLOGY

## 2022-04-08 RX ADMIN — PEGFILGRASTIM 6 MG: 6 INJECTION SUBCUTANEOUS at 12:04

## 2022-04-08 NOTE — PLAN OF CARE
Pt arrived to unit for C1D4 of Fulphila. No new or worsening complaints to report. Feeling well overall today. VSS. Tolerated injection. Pt has next appointments available through [facility]. Ambulated off unit accompanied by spouse in NAD at time of discharge.

## 2022-04-08 NOTE — PROGRESS NOTES
04/08/2022    Radiation Oncology Consultation    Assessment   This is a 57 y.o. 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 Staging MRI Brain demonstrated an enhancing sellar mass c/w pituitary adenoma. He is referred for consideration of treatment options.     I discussed the natural history and treatment options available for pituitary adenoma. Since he does not appear to have gross visual field deficit at this time, observation would be a consideration in light of his synchronous diagnosis of metastatic small cell lung cancer. We discussed referral to the multidisciplinary pituitary clinic to be evaluated by Dr. Scruggs (Neurosurgery) and Dr. Hillman (Endocrinology); since he will be monitored with serial brain imaging for his SCLC, he would prefer to continue following with me. As long as the pituitary lesion remains stable, I will plan observation.          Plan   1) F/U in 3 months with MRI Brain to monitor for brain mets and assess stability of pituitary adenoma.   2) F/U with Medical Oncology as scheduled for continued systemic management.        CHIEF COMPLAINT: Pituitary lesion on MRI    HPI: Mr. Gillespie is a 56 y/o male who presented to ED on 3/19/22 with progressive cough, dyspnea on exertion, and pleuritic chest pain. CT Chest demonstrated a 7.5 cm mediastinal shilpi conglomerate and 4.6 cm RML opacity concerning for malignancy. CT A/P 3/22/22 demonstrated hypodensities within the liver c/w metastases. He underwent EBUS 3/22/22 with biopsy for 4R node revealing small cell carcinoma. MRI Brain 3/22/22 demonstrated a 1.3 cm enhancing lesion within the sella with only minor suprasellar extension. He is referred for consideration of pituitary mass.     In clinic today, he is accompanied by his wife. He is on supplemental O2 since initial presentation last month. He does feel that chest pain has improved  since starting chemo. Denies HA, visual change or visual field deficit, focal weakness or numbness.     Prior Radiation History: None    Past Medical History:   Diagnosis Date    Diabetes mellitus, type 2     Hypertension        Past Surgical History:   Procedure Laterality Date    ENDOBRONCHIAL ULTRASOUND N/A 3/22/2022    Procedure: ENDOBRONCHIAL ULTRASOUND (EBUS);  Surgeon: Kristin Samuels MD;  Location: [facility] OR [address] [place] [address];  Service: Endoscopy;  Laterality: N/A;    KNEE SURGERY         Social History     Tobacco Use    Smoking status: Never Smoker    Smokeless tobacco: Never Used    Tobacco comment: stop smoking 26 years ago   Substance Use Topics    Alcohol use: No    Drug use: No       Cancer-related family history includes Lung cancer in his father.    Current Outpatient Medications on File Prior to Visit   Medication Sig Dispense Refill    albuterol (ACCUNEB) 1.25 mg/3 mL Nebu Use 1 vial (1.25 mg total) by nebulization 3 (three) times daily as needed (shortness of breath). Rescue 90 mL 2    atorvastatin (LIPITOR) 20 MG tablet Take 20 mg by mouth once daily.      benzonatate (TESSALON) 200 MG capsule Take 1 capsule (200 mg total) by mouth 3 (three) times daily as needed for Cough. 30 capsule 3    hydrOXYzine HCL (ATARAX) 25 MG tablet Take 1 tablet (25 mg total) by mouth 3 (three) times daily as needed for Itching. 21 tablet 0    losartan (COZAAR) 100 MG tablet Take 100 mg by mouth once daily.      montelukast (SINGULAIR) 10 mg tablet Take 10 mg by mouth every evening.      NIFEdipine (PROCARDIA-XL) 60 MG (OSM) 24 hr tablet Take 1 tablet (60 mg total) by mouth once daily. 30 tablet 3    ondansetron (ZOFRAN) 8 MG tablet Take 1 tablet (8 mg total) by mouth every 8 (eight) hours as needed for Nausea. 30 tablet 3    oxyCODONE (ROXICODONE) 5 MG immediate release tablet Take 1 tablet (5 mg total) by mouth every 4 (four) hours as needed for Pain. 60 tablet 0    pantoprazole (PROTONIX) 40 MG tablet Take 1  tablet (40 mg total) by mouth once daily. 30 tablet 3    promethazine-codeine 6.25-10 mg/5 ml (PHENERGAN WITH CODEINE) 6.25-10 mg/5 mL syrup Take 5 mLs by mouth 3 (three) times daily as needed for Cough.      promethazine-codeine 6.25-10 mg/5 ml (PHENERGAN WITH CODEINE) 6.25-10 mg/5 mL syrup Take 5 ml by mouth every 4 hours as needed for cough 473 mL 0    TRUE METRIX GLUCOSE TEST STRIP Strp       TRUEPLUS LANCETS 33 gauge Misc       [DISCONTINUED] albuterol-ipratropium (DUO-NEB) 2.5 mg-0.5 mg/3 mL nebulizer solution Take 3 mLs by nebulization every 4 (four) hours. Rescue 90 mL 3    [DISCONTINUED] azelastine (ASTELIN) 137 mcg (0.1 %) nasal spray 1 spray by Nasal route 2 (two) times daily.       Current Facility-Administered Medications on File Prior to Visit   Medication Dose Route Frequency Provider Last Rate Last Admin    [COMPLETED] etoposide (VEPESID) 100 mg/m2 = 236 mg in sodium chloride 0.9% 500 mL chemo infusion  100 mg/m2 (Treatment Plan Recorded) Intravenous 1 time in Clinic/HOD Trish Ellison MD   Stopped at 04/07/22 1405    heparin, porcine (PF) 100 unit/mL injection flush 500 Units  500 Units Intravenous PRN Trish Ellison MD   500 Units at 04/05/22 1340    [COMPLETED] HYDROmorphone (PF) injection 1 mg  1 mg Intravenous Once Racheal Jennings MD   1 mg at 04/07/22 1248    sodium chloride 0.9% flush 10 mL  10 mL Intravenous PRN Trish Ellison MD   10 mL at 04/05/22 1340    [DISCONTINUED] heparin, porcine (PF) 100 unit/mL injection flush 500 Units  500 Units Intravenous PRN Trish Ellison MD   500 Units at 04/07/22 1420    [DISCONTINUED] sodium chloride 0.9% flush 10 mL  10 mL Intravenous PRN Trish Ellison MD   10 mL at 04/07/22 1420       Review of patient's allergies indicates:  No Known Allergies    Review of Systems   Constitutional: Negative for fever and weight loss.   HENT: Negative for ear pain and sore throat.    Eyes: Negative for blurred vision and double vision.   Respiratory: Positive for cough  "and shortness of breath. Negative for hemoptysis.    Cardiovascular: Positive for chest pain and leg swelling.   Gastrointestinal: Positive for constipation. Negative for abdominal pain, diarrhea, heartburn and nausea.   Genitourinary: Negative for dysuria and hematuria.   Musculoskeletal: Negative for falls and joint pain.   Neurological: Positive for speech change. Negative for tingling, focal weakness, seizures and headaches.   Psychiatric/Behavioral: Negative for depression. The patient is not nervous/anxious.         Vital Signs: BP (!) 143/67 (BP Location: Right arm, Patient Position: Sitting)   Pulse 76   Resp 18   Ht 5' 7" (1.702 m)   Wt 113.4 kg (250 lb)   SpO2 98%   BMI 39.16 kg/m²     ECOG Performance Status: 2 - Ambulates, capable of self care only    Physical Exam  Vitals reviewed.   Constitutional:       Appearance: Normal appearance.   HENT:      Head: Normocephalic and atraumatic.      Comments: +Dysphonia  Eyes:      General: No scleral icterus.     Extraocular Movements: Extraocular movements intact.   Pulmonary:      Effort: Pulmonary effort is normal. No respiratory distress.      Comments: Supplemental O2 via NC  Abdominal:      General: There is no distension.   Musculoskeletal:      Cervical back: Neck supple.   Lymphadenopathy:      Cervical: No cervical adenopathy.   Skin:     General: Skin is warm and dry.   Neurological:      General: No focal deficit present.      Mental Status: He is alert and oriented to person, place, and time.      Cranial Nerves: No cranial nerve deficit.      Comments: In wheelchair   Psychiatric:         Mood and Affect: Mood normal.         Behavior: Behavior normal.         Judgment: Judgment normal.          Labs:    Imaging: I have personally reviewed the patient's available images and reports and summarized pertinent findings above in HPI.     Pathology: I have personally reviewed the patient's available pathology and summarized pertinent findings above " in HPI.       - Thank you for allowing me to participate in the care of your patient.    Stephen Jacome MD, PhD

## 2022-04-11 ENCOUNTER — PATIENT MESSAGE (OUTPATIENT)
Dept: INTERNAL MEDICINE | Facility: CLINIC | Age: 57
End: 2022-04-11
Payer: MEDICAID

## 2022-04-11 RX ORDER — PROMETHAZINE HYDROCHLORIDE AND CODEINE PHOSPHATE 6.25; 1 MG/5ML; MG/5ML
SOLUTION ORAL
Qty: 473 ML | Refills: 0 | Status: CANCELLED | OUTPATIENT
Start: 2022-04-11

## 2022-04-13 NOTE — PLAN OF CARE
Patient completed C1D1 tecentriq, carboplatin, etoposide. Tolerated treatment well. VSS. Discharged from unit in NAD.

## 2022-04-25 DIAGNOSIS — C34.90 SMALL CELL LUNG CANCER: ICD-10-CM

## 2022-04-25 RX ORDER — OXYCODONE HYDROCHLORIDE 5 MG/1
5 TABLET ORAL EVERY 4 HOURS PRN
Qty: 100 TABLET | Refills: 0 | Status: SHIPPED | OUTPATIENT
Start: 2022-04-25 | End: 2022-08-15

## 2022-04-27 ENCOUNTER — INFUSION (OUTPATIENT)
Dept: INFUSION THERAPY | Facility: HOSPITAL | Age: 57
End: 2022-04-27
Attending: INTERNAL MEDICINE
Payer: MEDICAID

## 2022-04-27 ENCOUNTER — OFFICE VISIT (OUTPATIENT)
Dept: HEMATOLOGY/ONCOLOGY | Facility: CLINIC | Age: 57
End: 2022-04-27
Payer: MEDICAID

## 2022-04-27 VITALS
SYSTOLIC BLOOD PRESSURE: 139 MMHG | HEART RATE: 85 BPM | OXYGEN SATURATION: 98 % | TEMPERATURE: 98 F | DIASTOLIC BLOOD PRESSURE: 77 MMHG | RESPIRATION RATE: 16 BRPM

## 2022-04-27 VITALS
HEIGHT: 67 IN | DIASTOLIC BLOOD PRESSURE: 80 MMHG | HEART RATE: 78 BPM | OXYGEN SATURATION: 96 % | TEMPERATURE: 99 F | SYSTOLIC BLOOD PRESSURE: 154 MMHG | WEIGHT: 248.88 LBS | RESPIRATION RATE: 16 BRPM | BODY MASS INDEX: 39.06 KG/M2

## 2022-04-27 DIAGNOSIS — C78.7 SECONDARY MALIGNANT NEOPLASM OF LIVER: ICD-10-CM

## 2022-04-27 DIAGNOSIS — I10 PRIMARY HYPERTENSION: ICD-10-CM

## 2022-04-27 DIAGNOSIS — D84.81 IMMUNODEFICIENCY SECONDARY TO NEOPLASM: ICD-10-CM

## 2022-04-27 DIAGNOSIS — D49.9 IMMUNODEFICIENCY SECONDARY TO NEOPLASM: ICD-10-CM

## 2022-04-27 DIAGNOSIS — Z79.899 IMMUNODEFICIENCY SECONDARY TO CHEMOTHERAPY: ICD-10-CM

## 2022-04-27 DIAGNOSIS — D84.821 IMMUNODEFICIENCY SECONDARY TO CHEMOTHERAPY: ICD-10-CM

## 2022-04-27 DIAGNOSIS — C34.90 SMALL CELL LUNG CANCER: Primary | ICD-10-CM

## 2022-04-27 DIAGNOSIS — E11.69 TYPE 2 DIABETES MELLITUS WITH OTHER SPECIFIED COMPLICATION, WITHOUT LONG-TERM CURRENT USE OF INSULIN: ICD-10-CM

## 2022-04-27 DIAGNOSIS — T45.1X5A IMMUNODEFICIENCY SECONDARY TO CHEMOTHERAPY: ICD-10-CM

## 2022-04-27 PROCEDURE — 4010F PR ACE/ARB THEARPY RXD/TAKEN: ICD-10-PCS | Mod: CPTII,,, | Performed by: INTERNAL MEDICINE

## 2022-04-27 PROCEDURE — 63600175 PHARM REV CODE 636 W HCPCS: Mod: JG | Performed by: INTERNAL MEDICINE

## 2022-04-27 PROCEDURE — 99999 PR PBB SHADOW E&M-EST. PATIENT-LVL IV: ICD-10-PCS | Mod: PBBFAC,,, | Performed by: INTERNAL MEDICINE

## 2022-04-27 PROCEDURE — 96413 CHEMO IV INFUSION 1 HR: CPT

## 2022-04-27 PROCEDURE — 3044F PR MOST RECENT HEMOGLOBIN A1C LEVEL <7.0%: ICD-10-PCS | Mod: CPTII,,, | Performed by: INTERNAL MEDICINE

## 2022-04-27 PROCEDURE — A4216 STERILE WATER/SALINE, 10 ML: HCPCS | Performed by: INTERNAL MEDICINE

## 2022-04-27 PROCEDURE — 25000003 PHARM REV CODE 250: Performed by: INTERNAL MEDICINE

## 2022-04-27 PROCEDURE — 3008F BODY MASS INDEX DOCD: CPT | Mod: CPTII,,, | Performed by: INTERNAL MEDICINE

## 2022-04-27 PROCEDURE — 3077F SYST BP >= 140 MM HG: CPT | Mod: CPTII,,, | Performed by: INTERNAL MEDICINE

## 2022-04-27 PROCEDURE — 3008F PR BODY MASS INDEX (BMI) DOCUMENTED: ICD-10-PCS | Mod: CPTII,,, | Performed by: INTERNAL MEDICINE

## 2022-04-27 PROCEDURE — 3044F HG A1C LEVEL LT 7.0%: CPT | Mod: CPTII,,, | Performed by: INTERNAL MEDICINE

## 2022-04-27 PROCEDURE — 4010F ACE/ARB THERAPY RXD/TAKEN: CPT | Mod: CPTII,,, | Performed by: INTERNAL MEDICINE

## 2022-04-27 PROCEDURE — 96367 TX/PROPH/DG ADDL SEQ IV INF: CPT

## 2022-04-27 PROCEDURE — 3079F DIAST BP 80-89 MM HG: CPT | Mod: CPTII,,, | Performed by: INTERNAL MEDICINE

## 2022-04-27 PROCEDURE — 96375 TX/PRO/DX INJ NEW DRUG ADDON: CPT

## 2022-04-27 PROCEDURE — 96365 THER/PROPH/DIAG IV INF INIT: CPT

## 2022-04-27 PROCEDURE — 99215 OFFICE O/P EST HI 40 MIN: CPT | Mod: S$PBB,,, | Performed by: INTERNAL MEDICINE

## 2022-04-27 PROCEDURE — 3079F PR MOST RECENT DIASTOLIC BLOOD PRESSURE 80-89 MM HG: ICD-10-PCS | Mod: CPTII,,, | Performed by: INTERNAL MEDICINE

## 2022-04-27 PROCEDURE — 99215 PR OFFICE/OUTPT VISIT, EST, LEVL V, 40-54 MIN: ICD-10-PCS | Mod: S$PBB,,, | Performed by: INTERNAL MEDICINE

## 2022-04-27 PROCEDURE — 96417 CHEMO IV INFUS EACH ADDL SEQ: CPT

## 2022-04-27 PROCEDURE — 3077F PR MOST RECENT SYSTOLIC BLOOD PRESSURE >= 140 MM HG: ICD-10-PCS | Mod: CPTII,,, | Performed by: INTERNAL MEDICINE

## 2022-04-27 PROCEDURE — 99214 OFFICE O/P EST MOD 30 MIN: CPT | Mod: PBBFAC,25 | Performed by: INTERNAL MEDICINE

## 2022-04-27 PROCEDURE — 99999 PR PBB SHADOW E&M-EST. PATIENT-LVL IV: CPT | Mod: PBBFAC,,, | Performed by: INTERNAL MEDICINE

## 2022-04-27 PROCEDURE — 96374 THER/PROPH/DIAG INJ IV PUSH: CPT

## 2022-04-27 RX ORDER — ALLOPURINOL 100 MG/1
100 TABLET ORAL DAILY
COMMUNITY
Start: 2022-02-15 | End: 2022-06-13 | Stop reason: SDUPTHER

## 2022-04-27 RX ORDER — SODIUM CHLORIDE 0.9 % (FLUSH) 0.9 %
10 SYRINGE (ML) INJECTION
Status: DISCONTINUED | OUTPATIENT
Start: 2022-04-27 | End: 2022-04-27 | Stop reason: HOSPADM

## 2022-04-27 RX ORDER — AMLODIPINE BESYLATE 5 MG/1
5 TABLET ORAL DAILY
COMMUNITY
Start: 2022-02-15 | End: 2022-09-27

## 2022-04-27 RX ORDER — PROMETHAZINE HYDROCHLORIDE AND CODEINE PHOSPHATE 6.25; 1 MG/5ML; MG/5ML
5 SOLUTION ORAL 3 TIMES DAILY PRN
Qty: 500 ML | Refills: 3 | Status: CANCELLED | OUTPATIENT
Start: 2022-04-27

## 2022-04-27 RX ORDER — PROMETHAZINE HYDROCHLORIDE AND CODEINE PHOSPHATE 6.25; 1 MG/5ML; MG/5ML
5 SOLUTION ORAL 3 TIMES DAILY PRN
Qty: 500 ML | Refills: 0 | Status: SHIPPED | OUTPATIENT
Start: 2022-04-27 | End: 2022-04-28 | Stop reason: SDUPTHER

## 2022-04-27 RX ORDER — HEPARIN 100 UNIT/ML
500 SYRINGE INTRAVENOUS
Status: CANCELLED | OUTPATIENT
Start: 2022-04-27

## 2022-04-27 RX ORDER — SODIUM CHLORIDE 0.9 % (FLUSH) 0.9 %
10 SYRINGE (ML) INJECTION
Status: CANCELLED | OUTPATIENT
Start: 2022-04-28

## 2022-04-27 RX ORDER — HEPARIN 100 UNIT/ML
500 SYRINGE INTRAVENOUS
Status: DISCONTINUED | OUTPATIENT
Start: 2022-04-27 | End: 2022-04-27 | Stop reason: HOSPADM

## 2022-04-27 RX ORDER — HEPARIN 100 UNIT/ML
500 SYRINGE INTRAVENOUS
Status: CANCELLED | OUTPATIENT
Start: 2022-04-29

## 2022-04-27 RX ORDER — GARLIC 1000 MG
2000 CAPSULE ORAL
COMMUNITY
End: 2022-10-18

## 2022-04-27 RX ORDER — SODIUM CHLORIDE 0.9 % (FLUSH) 0.9 %
10 SYRINGE (ML) INJECTION
Status: CANCELLED | OUTPATIENT
Start: 2022-04-29

## 2022-04-27 RX ORDER — SODIUM CHLORIDE 0.9 % (FLUSH) 0.9 %
10 SYRINGE (ML) INJECTION
Status: CANCELLED | OUTPATIENT
Start: 2022-04-27

## 2022-04-27 RX ORDER — HEPARIN 100 UNIT/ML
500 SYRINGE INTRAVENOUS
Status: CANCELLED | OUTPATIENT
Start: 2022-04-28

## 2022-04-27 RX ADMIN — ETOPOSIDE 236 MG: 20 INJECTION INTRAVENOUS at 12:04

## 2022-04-27 RX ADMIN — DEXAMETHASONE SODIUM PHOSPHATE 0.25 MG: 10 INJECTION, SOLUTION INTRAMUSCULAR; INTRAVENOUS at 11:04

## 2022-04-27 RX ADMIN — CARBOPLATIN 640 MG: 10 INJECTION INTRAVENOUS at 11:04

## 2022-04-27 RX ADMIN — APREPITANT 130 MG: 130 INJECTION, EMULSION INTRAVENOUS at 11:04

## 2022-04-27 RX ADMIN — HEPARIN 500 UNITS: 100 SYRINGE at 01:04

## 2022-04-27 RX ADMIN — Medication 10 ML: at 01:04

## 2022-04-27 RX ADMIN — ATEZOLIZUMAB 1200 MG: 1200 INJECTION, SOLUTION INTRAVENOUS at 10:04

## 2022-04-27 NOTE — Clinical Note
-Please order TSH and T4 today or tomorrow while at [facility]  -Follow up in 3 weeks on 5/18/22 with cbc, cmp, TSH, Free T4 then MD visit -Cycle 3 carbo/etop/atez 5/18/22 at [facility] [place]

## 2022-04-27 NOTE — PROGRESS NOTES
[facility]  [facility]  Hematology/Medical Oncology Clinic       PATIENT: Juan Gillespie  MRN: [id number]  DATE: 4/27/2022    Reason for referral: Extensive stage small cell lung ca    Initial History: Patient is a 57-year-old male with history of T2DM, HTN, tobacco use who presented to the ED on 3/19/22 for cough, sob, and pleuritic chest pain.      CT Chest w/ con 3/20:  Mediastinal lymphadenopathy 7.5 cm.  Right middle lobe consolidative opacity 4.6 cm.  5.3 cm liver lesion.    CT A/P 3/23/22: Re-demonstration of hypoattenuating liver lesion, 4.5cm. Two other lesions 2.7 and 2.5 cm.     MRI Brain 3/23/22: Enchacning lesion in the pituitary gland 13mm. 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        Patient underwent diagnostic bronchoscopy with EBUS of RML mass and lymph nodes on 03/22:  Path w/ small cell lung ca.     Oncological History:  - C1 Carbo/Etop/Atez 4/5/22  -C2 Carbo/Etop/Atez 4/27/22    Interval History:   Patient presents prior to cycle 2 of carbo/etop/atez. Patient reports stable resp status, on 2 L O2 via NC, denies SOB. Denies fevers, chills, n/v, abd pain. Does report fatigue and frequent naps.  Cough and pleuritic chest pain improved.     Labs with stable hemoglobin.  Thrombocytosis.  Elevated alk-phos.    Past Medical History:   Past Medical History:   Diagnosis Date    Diabetes mellitus, type 2     Hypertension        Past Surgical HIstory:   Past Surgical History:   Procedure Laterality Date    ENDOBRONCHIAL ULTRASOUND N/A 3/22/2022    Procedure: ENDOBRONCHIAL ULTRASOUND (EBUS);  Surgeon: Kristin Samuels MD;  Location: [facility] OR [address] [place] [address];  Service: Endoscopy;  Laterality: N/A;    KNEE SURGERY         Family History:   Family History   Problem Relation Age of Onset    Diabetes Mellitus Mother     Pacemaker/defibrilator Father     Lung cancer Father        Social History:  reports that he has never smoked. He has never used smokeless tobacco. He reports  that he does not drink alcohol and does not use drugs.    Allergies:  Review of patient's allergies indicates:  No Known Allergies    Medications:  Current Outpatient Medications   Medication Sig Dispense Refill    albuterol (ACCUNEB) 1.25 mg/3 mL Nebu Use 1 vial (1.25 mg total) by nebulization 3 (three) times daily as needed (shortness of breath). Rescue 90 mL 2    allopurinoL (ZYLOPRIM) 100 MG tablet Take 100 mg by mouth once daily.      amLODIPine (NORVASC) 5 MG tablet Take 5 mg by mouth once daily.      atorvastatin (LIPITOR) 20 MG tablet Take 20 mg by mouth once daily.      garlic 1,000 mg Cap Take 2,000 mg by mouth.      hydrOXYzine HCL (ATARAX) 25 MG tablet Take 1 tablet (25 mg total) by mouth 3 (three) times daily as needed for Itching. 21 tablet 0    losartan (COZAAR) 100 MG tablet Take 100 mg by mouth once daily.      montelukast (SINGULAIR) 10 mg tablet Take 10 mg by mouth every evening.      NIFEdipine (PROCARDIA-XL) 60 MG (OSM) 24 hr tablet Take 1 tablet (60 mg total) by mouth once daily. 30 tablet 3    ondansetron (ZOFRAN) 8 MG tablet Take 1 tablet (8 mg total) by mouth every 8 (eight) hours as needed for Nausea. 30 tablet 3    oxyCODONE (ROXICODONE) 5 MG immediate release tablet Take 1 tablet (5 mg total) by mouth every 4 (four) hours as needed for Pain. 100 tablet 0    pantoprazole (PROTONIX) 40 MG tablet Take 1 tablet (40 mg total) by mouth once daily. 30 tablet 3    promethazine-codeine 6.25-10 mg/5 ml (PHENERGAN WITH CODEINE) 6.25-10 mg/5 mL syrup Take 5 mLs by mouth 3 (three) times daily as needed for Cough.      promethazine-codeine 6.25-10 mg/5 ml (PHENERGAN WITH CODEINE) 6.25-10 mg/5 mL syrup Take 5 ml by mouth every 4 hours as needed for cough 473 mL 0    promethazine-codeine 6.25-10 mg/5 ml (PHENERGAN WITH CODEINE) 6.25-10 mg/5 mL syrup Take 5ml by mouth every 4 hours as needed for cough 473 mL 0    TRUE METRIX GLUCOSE TEST STRIP Strp       TRUEPLUS LANCETS 33 gauge Misc     "    No current facility-administered medications for this visit.       Review of Systems  Constitutional:  Reports fatigue and frequent naps.  Denies fevers, chills.  HENT: Negative for nosebleeds and sore throat.  Hoarseness improved.   Respiratory: Cough improved. Denies SOB.   Cardiovascular: Ankle edema. Negative for palpitations.   Gastrointestinal: Negative for constipation. Negative for abdominal pain, diarrhea, nausea and vomiting.     ECOG Performance Status: 1  Objective:      Vitals:   Vitals:    04/27/22 0843   BP: (!) 154/80   BP Location: Left arm   Patient Position: Sitting   BP Method: Large (Automatic)   Pulse: 78   Resp: 16   Temp: 98.5 °F (36.9 °C)   TempSrc: Oral   SpO2: 96%   Weight: 112.9 kg (248 lb 14.4 oz)   Height: 5' 7" (1.702 m)       Physical Exam  Constitutional:       Appearance: Hoarse voice. On 2L O2 via NC.  Able to speak in full sentences.  Appears comfortable.  HENT:      Head: Normocephalic and atraumatic.      Mouth/Throat:      Mouth: Mucous membranes are moist.   Eyes:      Extraocular Movements: Extraocular movements intact.   Cardiovascular:      Rate and Rhythm: Normal rate and regular rhythm.      Pulses: Normal pulses.      Heart sounds: Normal heart sounds.   Pulmonary:      Effort: Pulmonary effort is normal.      Breath sounds: Crackles over bases.   Abdominal:      Palpations: Abdomen is soft.   Musculoskeletal:      Cervical back: Normal range of motion and neck supple.   Skin:     General: Skin is warm.   Neurological:      General: No focal deficit present.      Mental Status: He is alert.    Laboratory Data:  Lab Visit on 04/27/2022   Component Date Value Ref Range Status    WBC 04/27/2022 12.63  3.90 - 12.70 K/uL Final    RBC 04/27/2022 4.63  4.60 - 6.20 M/uL Final    Hemoglobin 04/27/2022 13.1 (A) 14.0 - 18.0 g/dL Final    Hematocrit 04/27/2022 39.6 (A) 40.0 - 54.0 % Final    MCV 04/27/2022 86  82 - 98 fL Final    MCH 04/27/2022 28.3  27.0 - 31.0 pg Final "    MCHC 04/27/2022 33.1  32.0 - 36.0 g/dL Final    RDW 04/27/2022 13.3  11.5 - 14.5 % Final    Platelets 04/27/2022 503 (A) 150 - 450 K/uL Final    MPV 04/27/2022 9.3  9.2 - 12.9 fL Final    Immature Granulocytes 04/27/2022 3.0 (A) 0.0 - 0.5 % Final    Gran # (ANC) 04/27/2022 8.7 (A) 1.8 - 7.7 K/uL Final    Immature Grans (Abs) 04/27/2022 0.38 (A) 0.00 - 0.04 K/uL Final    Comment: Mild elevation in immature granulocytes is non specific and   can be seen in a variety of conditions including stress response,   acute inflammation, trauma and pregnancy. Correlation with other   laboratory and clinical findings is essential.      Lymph # 04/27/2022 2.2  1.0 - 4.8 K/uL Final    Mono # 04/27/2022 0.9  0.3 - 1.0 K/uL Final    Eos # 04/27/2022 0.1  0.0 - 0.5 K/uL Final    Baso # 04/27/2022 0.25 (A) 0.00 - 0.20 K/uL Final    nRBC 04/27/2022 0  0 /100 WBC Final    Gran % 04/27/2022 69.2  38.0 - 73.0 % Final    Lymph % 04/27/2022 17.7 (A) 18.0 - 48.0 % Final    Mono % 04/27/2022 7.4  4.0 - 15.0 % Final    Eosinophil % 04/27/2022 0.7  0.0 - 8.0 % Final    Basophil % 04/27/2022 2.0 (A) 0.0 - 1.9 % Final    Differential Method 04/27/2022 Automated   Final    Sodium 04/27/2022 139  136 - 145 mmol/L Final    Potassium 04/27/2022 4.7  3.5 - 5.1 mmol/L Final    Chloride 04/27/2022 102  95 - 110 mmol/L Final    CO2 04/27/2022 26  23 - 29 mmol/L Final    Glucose 04/27/2022 125 (A) 70 - 110 mg/dL Final    BUN 04/27/2022 19  6 - 20 mg/dL Final    Creatinine 04/27/2022 1.3  0.5 - 1.4 mg/dL Final    Calcium 04/27/2022 9.9  8.7 - 10.5 mg/dL Final    Total Protein 04/27/2022 7.5  6.0 - 8.4 g/dL Final    Albumin 04/27/2022 3.7  3.5 - 5.2 g/dL Final    Total Bilirubin 04/27/2022 0.5  0.1 - 1.0 mg/dL Final    Comment: For infants and newborns, interpretation of results should be based  on gestational age, weight and in agreement with clinical  observations.    Premature Infant recommended reference ranges:  Up to 24  hours.............<8.0 mg/dL  Up to 48 hours............<12.0 mg/dL  3-5 days..................<15.0 mg/dL  6-29 days.................<15.0 mg/dL      Alkaline Phosphatase 04/27/2022 274 (A) 55 - 135 U/L Final    AST 04/27/2022 16  10 - 40 U/L Final    ALT 04/27/2022 15  10 - 44 U/L Final    Anion Gap 04/27/2022 11  8 - 16 mmol/L Final    eGFR if African American 04/27/2022 >60.0  >60 mL/min/1.73 m^2 Final    eGFR if non African American 04/27/2022 >60.0  >60 mL/min/1.73 m^2 Final    Comment: Calculation used to obtain the estimated glomerular filtration  rate (eGFR) is the CKD-EPI equation.        Imaging: All pertinent imaging reviewed    Assessment and Plan        1. Small cell lung cancer      Extensive stage small cell lung ca w. mets to the liver   -Consented for carbo/etoposide/atezolizumab for 4-6 cycles over a 21 day period followed by atezolizumab maintenance   -Discussed this is not curative intent. Palliative for life prolongation and quality of life.   -Prescribed zofran for nausea  -On tessalon pearls + codeine syrup for cough  -Oxycodone IR 5mg q6h prn for pain  -Port placement with IR 4/1/22    Pituitary lesion, likely pituitary adenoma  -Referred to rad onc - will observe, q3m MRI brain    HTN  -Increased losartan to 100mg, nefedipine 60mg daily  -Given chemo care companion     Anxiety  - Hydroxyzine TID prn    Gout  -On Allopurinol    HLD  -Atorvastatin qhs    Glucose intolerance  -Hgb A1c 6.1    Follow up  -Please order TSH and T4 today or tomorrow while at [facility] [place]   -Follow up in 3 weeks on 5/18/22 with cbc, cmp, TSH, Free T4 then MD visit  -Cycle 3 carbo/etop/atez 5/18/22 at [facility] [place] [place]    Racheal Jennings MD  Hematology/Oncology Fellow PGY IV  [facility]

## 2022-04-27 NOTE — PLAN OF CARE
Pt arrived to unit for C2D1 of Atezo, Carbo, and VP16. Labs along with plan of care reviewed. Pt okay to proceed with second cycle. Pt saw  over at [facility] just prior to. Pt and wife reports he did well after his first cycle. No issues with n/v/d to report. Pt still endorsing lack of appetite. States he is less dependent on oxygen but still wears 2L O2 by NC as needed. Denies any pain today. VSS. Received Tecentriq over 30 minutes. Then given pre-meds of Cinvanti IVP and Aloxi/Dex. Then received Carbo and Etoposide. Tolerated all infusions well. Instructed to return tomorrow for D2. Verbalized understanding. Left unit accompanied by spouse in NAD at time of discharge.

## 2022-04-28 ENCOUNTER — INFUSION (OUTPATIENT)
Dept: INFUSION THERAPY | Facility: HOSPITAL | Age: 57
End: 2022-04-28
Attending: INTERNAL MEDICINE
Payer: MEDICAID

## 2022-04-28 DIAGNOSIS — C78.7 SECONDARY MALIGNANT NEOPLASM OF LIVER: ICD-10-CM

## 2022-04-28 DIAGNOSIS — C34.90 SMALL CELL LUNG CANCER: Primary | ICD-10-CM

## 2022-04-28 DIAGNOSIS — C34.90 SMALL CELL LUNG CANCER: ICD-10-CM

## 2022-04-28 PROCEDURE — 96375 TX/PRO/DX INJ NEW DRUG ADDON: CPT

## 2022-04-28 PROCEDURE — 63600175 PHARM REV CODE 636 W HCPCS: Performed by: INTERNAL MEDICINE

## 2022-04-28 PROCEDURE — A4216 STERILE WATER/SALINE, 10 ML: HCPCS | Performed by: INTERNAL MEDICINE

## 2022-04-28 PROCEDURE — 96413 CHEMO IV INFUSION 1 HR: CPT

## 2022-04-28 PROCEDURE — 96374 THER/PROPH/DIAG INJ IV PUSH: CPT

## 2022-04-28 PROCEDURE — 25000003 PHARM REV CODE 250: Performed by: INTERNAL MEDICINE

## 2022-04-28 RX ORDER — ONDANSETRON 2 MG/ML
8 INJECTION INTRAMUSCULAR; INTRAVENOUS ONCE
Status: COMPLETED | OUTPATIENT
Start: 2022-04-28 | End: 2022-04-28

## 2022-04-28 RX ORDER — PROMETHAZINE HYDROCHLORIDE AND CODEINE PHOSPHATE 6.25; 1 MG/5ML; MG/5ML
5 SOLUTION ORAL 3 TIMES DAILY PRN
Qty: 500 ML | Refills: 0 | Status: SHIPPED | OUTPATIENT
Start: 2022-04-28 | End: 2022-05-27 | Stop reason: SDUPTHER

## 2022-04-28 RX ORDER — SODIUM CHLORIDE 0.9 % (FLUSH) 0.9 %
10 SYRINGE (ML) INJECTION
Status: DISCONTINUED | OUTPATIENT
Start: 2022-04-28 | End: 2022-04-28 | Stop reason: HOSPADM

## 2022-04-28 RX ORDER — HEPARIN 100 UNIT/ML
500 SYRINGE INTRAVENOUS
Status: DISCONTINUED | OUTPATIENT
Start: 2022-04-28 | End: 2022-04-28 | Stop reason: HOSPADM

## 2022-04-28 RX ADMIN — ONDANSETRON 8 MG: 2 INJECTION INTRAMUSCULAR; INTRAVENOUS at 12:04

## 2022-04-28 RX ADMIN — Medication 10 ML: at 02:04

## 2022-04-28 RX ADMIN — HEPARIN 500 UNITS: 100 SYRINGE at 02:04

## 2022-04-28 RX ADMIN — ETOPOSIDE 236 MG: 20 INJECTION INTRAVENOUS at 12:04

## 2022-04-28 NOTE — PLAN OF CARE
Pt arrived to unit for C2D2 of VP16. Reports doing well after receiving D1 of tx yesterday. Endorsing some generalized fatigue along with lightheadedness and nausea. VSS. Additional 8mg of Zofran IVP given. Pt also put on 2L O2 by NC. Reports wearing oxygen as needed and hasn't worn it since last night. Pt reporting feeling better. Etoposide given over 1 hour. Tolerated well without issues. Will return tomorrow for D3 of tx. Left unit accompanied by spouse in NAD at time of discharge.

## 2022-04-29 ENCOUNTER — INFUSION (OUTPATIENT)
Dept: INFUSION THERAPY | Facility: HOSPITAL | Age: 57
End: 2022-04-29
Attending: INTERNAL MEDICINE
Payer: MEDICAID

## 2022-04-29 VITALS
DIASTOLIC BLOOD PRESSURE: 66 MMHG | OXYGEN SATURATION: 97 % | SYSTOLIC BLOOD PRESSURE: 127 MMHG | TEMPERATURE: 98 F | HEART RATE: 76 BPM | RESPIRATION RATE: 16 BRPM

## 2022-04-29 DIAGNOSIS — C34.90 SMALL CELL LUNG CANCER: Primary | ICD-10-CM

## 2022-04-29 DIAGNOSIS — C78.7 SECONDARY MALIGNANT NEOPLASM OF LIVER: ICD-10-CM

## 2022-04-29 PROCEDURE — 25000003 PHARM REV CODE 250: Performed by: INTERNAL MEDICINE

## 2022-04-29 PROCEDURE — 96413 CHEMO IV INFUSION 1 HR: CPT

## 2022-04-29 PROCEDURE — A4216 STERILE WATER/SALINE, 10 ML: HCPCS | Performed by: INTERNAL MEDICINE

## 2022-04-29 PROCEDURE — 63600175 PHARM REV CODE 636 W HCPCS: Performed by: INTERNAL MEDICINE

## 2022-04-29 RX ORDER — SODIUM CHLORIDE 0.9 % (FLUSH) 0.9 %
10 SYRINGE (ML) INJECTION
Status: DISCONTINUED | OUTPATIENT
Start: 2022-04-29 | End: 2022-04-29 | Stop reason: HOSPADM

## 2022-04-29 RX ORDER — HEPARIN 100 UNIT/ML
500 SYRINGE INTRAVENOUS
Status: DISCONTINUED | OUTPATIENT
Start: 2022-04-29 | End: 2022-04-29 | Stop reason: HOSPADM

## 2022-04-29 RX ADMIN — Medication 10 ML: at 01:04

## 2022-04-29 RX ADMIN — ETOPOSIDE 236 MG: 20 INJECTION INTRAVENOUS at 12:04

## 2022-04-29 RX ADMIN — HEPARIN 500 UNITS: 100 SYRINGE at 01:04

## 2022-04-29 NOTE — PLAN OF CARE
Pt arrived to unit for C2D3 of VP16. No new or worsening complaints to report. Feeling well overall today. VSS. Put on 2L oxygen by NC. Etoposide given over 1 hour. Tolerated tx well. Pt instructed to return tomorrow for C2D4 of Fulphila injection. Verbalized understanding. Left unit in NAD at time of discharge.

## 2022-04-30 ENCOUNTER — INFUSION (OUTPATIENT)
Dept: INFUSION THERAPY | Facility: HOSPITAL | Age: 57
End: 2022-04-30
Attending: INTERNAL MEDICINE
Payer: MEDICAID

## 2022-04-30 DIAGNOSIS — C78.7 SECONDARY MALIGNANT NEOPLASM OF LIVER: ICD-10-CM

## 2022-04-30 DIAGNOSIS — C34.90 SMALL CELL LUNG CANCER: Primary | ICD-10-CM

## 2022-04-30 PROCEDURE — 63600175 PHARM REV CODE 636 W HCPCS: Mod: TB | Performed by: INTERNAL MEDICINE

## 2022-04-30 PROCEDURE — 96372 THER/PROPH/DIAG INJ SC/IM: CPT

## 2022-04-30 RX ADMIN — PEGFILGRASTIM 6 MG: 6 INJECTION SUBCUTANEOUS at 09:04

## 2022-04-30 NOTE — PLAN OF CARE
"Pt arrived to unit for C2D4 of Fulphila. Endorsing fatigue along with "knots in his stomach" and lack of appetite. States he did eat some grits this morning for breakfast. Received injection. Tolerated well. Pt and spouse have next appointments through [facility]. Left unit in NAD at time of discharge.  "

## 2022-05-03 ENCOUNTER — PATIENT MESSAGE (OUTPATIENT)
Dept: HEMATOLOGY/ONCOLOGY | Facility: CLINIC | Age: 57
End: 2022-05-03
Payer: MEDICAID

## 2022-05-18 ENCOUNTER — INFUSION (OUTPATIENT)
Dept: INFUSION THERAPY | Facility: HOSPITAL | Age: 57
End: 2022-05-18
Attending: INTERNAL MEDICINE
Payer: MEDICAID

## 2022-05-18 ENCOUNTER — OFFICE VISIT (OUTPATIENT)
Dept: HEMATOLOGY/ONCOLOGY | Facility: CLINIC | Age: 57
End: 2022-05-18
Payer: MEDICAID

## 2022-05-18 VITALS
OXYGEN SATURATION: 97 % | HEART RATE: 85 BPM | TEMPERATURE: 98 F | RESPIRATION RATE: 16 BRPM | DIASTOLIC BLOOD PRESSURE: 61 MMHG | SYSTOLIC BLOOD PRESSURE: 118 MMHG

## 2022-05-18 VITALS
OXYGEN SATURATION: 96 % | SYSTOLIC BLOOD PRESSURE: 171 MMHG | DIASTOLIC BLOOD PRESSURE: 88 MMHG | WEIGHT: 249.31 LBS | RESPIRATION RATE: 18 BRPM | HEIGHT: 67 IN | BODY MASS INDEX: 39.13 KG/M2 | HEART RATE: 86 BPM | TEMPERATURE: 98 F

## 2022-05-18 DIAGNOSIS — C34.90 SMALL CELL LUNG CANCER: Primary | ICD-10-CM

## 2022-05-18 DIAGNOSIS — C78.7 SECONDARY MALIGNANT NEOPLASM OF LIVER: ICD-10-CM

## 2022-05-18 DIAGNOSIS — C34.90 SMALL CELL LUNG CANCER: ICD-10-CM

## 2022-05-18 PROCEDURE — 96365 THER/PROPH/DIAG IV INF INIT: CPT

## 2022-05-18 PROCEDURE — 96374 THER/PROPH/DIAG INJ IV PUSH: CPT

## 2022-05-18 PROCEDURE — 1159F PR MEDICATION LIST DOCUMENTED IN MEDICAL RECORD: ICD-10-PCS | Mod: CPTII,,, | Performed by: INTERNAL MEDICINE

## 2022-05-18 PROCEDURE — 96417 CHEMO IV INFUS EACH ADDL SEQ: CPT

## 2022-05-18 PROCEDURE — 4010F ACE/ARB THERAPY RXD/TAKEN: CPT | Mod: CPTII,,, | Performed by: INTERNAL MEDICINE

## 2022-05-18 PROCEDURE — 4010F PR ACE/ARB THEARPY RXD/TAKEN: ICD-10-PCS | Mod: CPTII,,, | Performed by: INTERNAL MEDICINE

## 2022-05-18 PROCEDURE — 96413 CHEMO IV INFUSION 1 HR: CPT

## 2022-05-18 PROCEDURE — 99213 OFFICE O/P EST LOW 20 MIN: CPT | Mod: PBBFAC | Performed by: INTERNAL MEDICINE

## 2022-05-18 PROCEDURE — 3044F PR MOST RECENT HEMOGLOBIN A1C LEVEL <7.0%: ICD-10-PCS | Mod: CPTII,,, | Performed by: INTERNAL MEDICINE

## 2022-05-18 PROCEDURE — 63600175 PHARM REV CODE 636 W HCPCS: Mod: JG | Performed by: INTERNAL MEDICINE

## 2022-05-18 PROCEDURE — 99214 OFFICE O/P EST MOD 30 MIN: CPT | Mod: S$PBB,,, | Performed by: INTERNAL MEDICINE

## 2022-05-18 PROCEDURE — 25000003 PHARM REV CODE 250: Performed by: INTERNAL MEDICINE

## 2022-05-18 PROCEDURE — 99214 PR OFFICE/OUTPT VISIT, EST, LEVL IV, 30-39 MIN: ICD-10-PCS | Mod: S$PBB,,, | Performed by: INTERNAL MEDICINE

## 2022-05-18 PROCEDURE — 3077F SYST BP >= 140 MM HG: CPT | Mod: CPTII,,, | Performed by: INTERNAL MEDICINE

## 2022-05-18 PROCEDURE — 3079F PR MOST RECENT DIASTOLIC BLOOD PRESSURE 80-89 MM HG: ICD-10-PCS | Mod: CPTII,,, | Performed by: INTERNAL MEDICINE

## 2022-05-18 PROCEDURE — 96367 TX/PROPH/DG ADDL SEQ IV INF: CPT

## 2022-05-18 PROCEDURE — 99999 PR PBB SHADOW E&M-EST. PATIENT-LVL III: ICD-10-PCS | Mod: PBBFAC,,, | Performed by: INTERNAL MEDICINE

## 2022-05-18 PROCEDURE — 3077F PR MOST RECENT SYSTOLIC BLOOD PRESSURE >= 140 MM HG: ICD-10-PCS | Mod: CPTII,,, | Performed by: INTERNAL MEDICINE

## 2022-05-18 PROCEDURE — 3008F PR BODY MASS INDEX (BMI) DOCUMENTED: ICD-10-PCS | Mod: CPTII,,, | Performed by: INTERNAL MEDICINE

## 2022-05-18 PROCEDURE — 3079F DIAST BP 80-89 MM HG: CPT | Mod: CPTII,,, | Performed by: INTERNAL MEDICINE

## 2022-05-18 PROCEDURE — 96375 TX/PRO/DX INJ NEW DRUG ADDON: CPT

## 2022-05-18 PROCEDURE — 3044F HG A1C LEVEL LT 7.0%: CPT | Mod: CPTII,,, | Performed by: INTERNAL MEDICINE

## 2022-05-18 PROCEDURE — A4216 STERILE WATER/SALINE, 10 ML: HCPCS | Performed by: INTERNAL MEDICINE

## 2022-05-18 PROCEDURE — 1159F MED LIST DOCD IN RCRD: CPT | Mod: CPTII,,, | Performed by: INTERNAL MEDICINE

## 2022-05-18 PROCEDURE — 99999 PR PBB SHADOW E&M-EST. PATIENT-LVL III: CPT | Mod: PBBFAC,,, | Performed by: INTERNAL MEDICINE

## 2022-05-18 PROCEDURE — 3008F BODY MASS INDEX DOCD: CPT | Mod: CPTII,,, | Performed by: INTERNAL MEDICINE

## 2022-05-18 RX ORDER — SODIUM CHLORIDE 0.9 % (FLUSH) 0.9 %
10 SYRINGE (ML) INJECTION
Status: DISCONTINUED | OUTPATIENT
Start: 2022-05-18 | End: 2022-05-18 | Stop reason: HOSPADM

## 2022-05-18 RX ORDER — HEPARIN 100 UNIT/ML
500 SYRINGE INTRAVENOUS
Status: DISCONTINUED | OUTPATIENT
Start: 2022-05-18 | End: 2022-05-18 | Stop reason: HOSPADM

## 2022-05-18 RX ORDER — HEPARIN 100 UNIT/ML
500 SYRINGE INTRAVENOUS
Status: CANCELLED | OUTPATIENT
Start: 2022-05-19

## 2022-05-18 RX ORDER — SODIUM CHLORIDE 0.9 % (FLUSH) 0.9 %
10 SYRINGE (ML) INJECTION
Status: CANCELLED | OUTPATIENT
Start: 2022-05-20

## 2022-05-18 RX ORDER — BENZONATATE 200 MG/1
200 CAPSULE ORAL 3 TIMES DAILY PRN
Qty: 30 CAPSULE | Refills: 3 | Status: SHIPPED | OUTPATIENT
Start: 2022-05-18 | End: 2022-05-28

## 2022-05-18 RX ORDER — SODIUM CHLORIDE 0.9 % (FLUSH) 0.9 %
10 SYRINGE (ML) INJECTION
Status: CANCELLED | OUTPATIENT
Start: 2022-05-19

## 2022-05-18 RX ORDER — SODIUM CHLORIDE 0.9 % (FLUSH) 0.9 %
10 SYRINGE (ML) INJECTION
Status: CANCELLED | OUTPATIENT
Start: 2022-05-18

## 2022-05-18 RX ORDER — HEPARIN 100 UNIT/ML
500 SYRINGE INTRAVENOUS
Status: CANCELLED | OUTPATIENT
Start: 2022-05-18

## 2022-05-18 RX ORDER — ONDANSETRON HYDROCHLORIDE 8 MG/1
8 TABLET, FILM COATED ORAL EVERY 8 HOURS PRN
Qty: 30 TABLET | Refills: 3 | Status: SHIPPED | OUTPATIENT
Start: 2022-05-18 | End: 2022-06-18 | Stop reason: SDUPTHER

## 2022-05-18 RX ORDER — HEPARIN 100 UNIT/ML
500 SYRINGE INTRAVENOUS
Status: CANCELLED | OUTPATIENT
Start: 2022-05-20

## 2022-05-18 RX ADMIN — ETOPOSIDE 236 MG: 20 INJECTION INTRAVENOUS at 12:05

## 2022-05-18 RX ADMIN — ATEZOLIZUMAB 1200 MG: 1200 INJECTION, SOLUTION INTRAVENOUS at 11:05

## 2022-05-18 RX ADMIN — CARBOPLATIN 640 MG: 10 INJECTION, SOLUTION INTRAVENOUS at 12:05

## 2022-05-18 RX ADMIN — HEPARIN 500 UNITS: 100 SYRINGE at 01:05

## 2022-05-18 RX ADMIN — Medication 10 ML: at 01:05

## 2022-05-18 RX ADMIN — DEXAMETHASONE SODIUM PHOSPHATE 0.25 MG: 10 INJECTION, SOLUTION INTRAMUSCULAR; INTRAVENOUS at 11:05

## 2022-05-18 RX ADMIN — APREPITANT 130 MG: 130 INJECTION, EMULSION INTRAVENOUS at 11:05

## 2022-05-18 NOTE — PROGRESS NOTES
[facility]  Hematology/Medical Oncology Clinic       PATIENT: Juan Gillespie  MRN: [id number]  DATE: 5/18/2022    Reason for referral: Extensive stage small cell lung ca    Initial History: Patient is a 57-year-old male with history of T2DM, HTN, tobacco use who presented to the ED on 3/19/22 for cough, sob, and pleuritic chest pain.      CT Chest w/ con 3/20:  Mediastinal lymphadenopathy 7.5 cm.  Right middle lobe consolidative opacity 4.6 cm.  5.3 cm liver lesion.    CT A/P 3/23/22: Re-demonstration of hypoattenuating liver lesion, 4.5cm. Two other lesions 2.7 and 2.5 cm.     MRI Brain 3/23/22: Enchacning lesion in the pituitary gland 13mm. 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        Patient underwent diagnostic bronchoscopy with EBUS of RML mass and lymph nodes on 03/22:  Path w/ small cell lung ca.     Oncological History:  -C1 Carbo/Etop/Atez 4/5/22  -C2 Carbo/Etop/Atez 4/27/22  -C3 Carbo/Etop/Atez 5/18/22    Interval History:   Patient presents prior to cycle 3 of carbo/etop/atez. Patient reports stable resp status, on 2 L O2 via NC with activity, 1 L at rest, denies SOB. Denies fevers, chills, n/v, abd pain.  Cough and pleuritic chest pain improved.     Labs with leukocytosis. Stable hemoglobin.  Isolated elevated alk phos. Normal thyroid studies.     Past Medical History:   Past Medical History:   Diagnosis Date    Diabetes mellitus, type 2     Hypertension        Past Surgical HIstory:   Past Surgical History:   Procedure Laterality Date    ENDOBRONCHIAL ULTRASOUND N/A 3/22/2022    Procedure: ENDOBRONCHIAL ULTRASOUND (EBUS);  Surgeon: Kristin Samuels MD;  Location: [facility] OR [address] [place] [address];  Service: Endoscopy;  Laterality: N/A;    KNEE SURGERY         Family History:   Family History   Problem Relation Age of Onset    Diabetes Mellitus Mother     Pacemaker/defibrilator Father     Lung cancer Father        Social History:  reports that he has never smoked. He  has never used smokeless tobacco. He reports that he does not drink alcohol and does not use drugs.    Allergies:  Review of patient's allergies indicates:  No Known Allergies    Medications:  Current Outpatient Medications   Medication Sig Dispense Refill    albuterol (ACCUNEB) 1.25 mg/3 mL Nebu Use 1 vial (1.25 mg total) by nebulization 3 (three) times daily as needed (shortness of breath). Rescue 90 mL 2    allopurinoL (ZYLOPRIM) 100 MG tablet Take 100 mg by mouth once daily.      amLODIPine (NORVASC) 5 MG tablet Take 5 mg by mouth once daily.      atorvastatin (LIPITOR) 20 MG tablet Take 20 mg by mouth once daily.      garlic 1,000 mg Cap Take 2,000 mg by mouth.      hydrOXYzine HCL (ATARAX) 25 MG tablet Take 1 tablet (25 mg total) by mouth 3 (three) times daily as needed for Itching. 21 tablet 0    losartan (COZAAR) 100 MG tablet Take 100 mg by mouth once daily.      montelukast (SINGULAIR) 10 mg tablet Take 10 mg by mouth every evening.      NIFEdipine (PROCARDIA-XL) 60 MG (OSM) 24 hr tablet Take 1 tablet (60 mg total) by mouth once daily. 30 tablet 3    ondansetron (ZOFRAN) 8 MG tablet Take 1 tablet (8 mg total) by mouth every 8 (eight) hours as needed for Nausea. 30 tablet 3    oxyCODONE (ROXICODONE) 5 MG immediate release tablet Take 1 tablet (5 mg total) by mouth every 4 (four) hours as needed for Pain. 100 tablet 0    pantoprazole (PROTONIX) 40 MG tablet Take 1 tablet (40 mg total) by mouth once daily. 30 tablet 3    promethazine-codeine 6.25-10 mg/5 ml (PHENERGAN WITH CODEINE) 6.25-10 mg/5 mL syrup Take 5 mLs by mouth 3 (three) times daily as needed for Cough. 500 mL 0    TRUE METRIX GLUCOSE TEST STRIP Strp       TRUEPLUS LANCETS 33 gauge Misc        No current facility-administered medications for this visit.       Review of Systems  Constitutional: Reports feeling well. Denies fevers, chills.  HENT: Negative for nosebleeds and sore throat.  Hoarseness improved.   Respiratory: Cough  "improved. Denies SOB.   Cardiovascular:  Negative for palpitations.   Gastrointestinal: Negative for constipation. Negative for abdominal pain, diarrhea, nausea and vomiting.     ECOG Performance Status: 1  Objective:      Vitals:   Vitals:    05/18/22 0907   BP: (!) 171/88   Pulse: 86   Resp: 18   Temp: 98 °F (36.7 °C)   TempSrc: Oral   SpO2: 96%   Weight: 113.1 kg (249 lb 5.4 oz)   Height: 5' 7" (1.702 m)       Physical Exam  Constitutional:       Appearance: Hoarse voice. On 2L O2 via NC.  Able to speak in full sentences.  Appears comfortable.  HENT:      Head: Normocephalic and atraumatic.      Mouth/Throat:      Mouth: Mucous membranes are moist.   Eyes:      Extraocular Movements: Extraocular movements intact.   Cardiovascular:      Rate and Rhythm: Normal rate and regular rhythm.      Pulses: Normal pulses.      Heart sounds: Normal heart sounds.   Pulmonary:      Effort: Pulmonary effort is normal.      Breath sounds: Crackles over bases.   Abdominal:      Palpations: Abdomen is soft.   Musculoskeletal:      Cervical back: Normal range of motion and neck supple.   Skin:     General: Skin is warm.   Neurological:      General: No focal deficit present.      Mental Status: He is alert.    Laboratory Data:  Lab Visit on 05/18/2022   Component Date Value Ref Range Status    WBC 05/18/2022 13.64 (A) 3.90 - 12.70 K/uL Final    RBC 05/18/2022 4.60  4.60 - 6.20 M/uL Final    Hemoglobin 05/18/2022 13.4 (A) 14.0 - 18.0 g/dL Final    Hematocrit 05/18/2022 40.2  40.0 - 54.0 % Final    MCV 05/18/2022 87  82 - 98 fL Final    MCH 05/18/2022 29.1  27.0 - 31.0 pg Final    MCHC 05/18/2022 33.3  32.0 - 36.0 g/dL Final    RDW 05/18/2022 15.9 (A) 11.5 - 14.5 % Final    Platelets 05/18/2022 305  150 - 450 K/uL Final    MPV 05/18/2022 9.7  9.2 - 12.9 fL Final    Immature Granulocytes 05/18/2022 4.9 (A) 0.0 - 0.5 % Final    Gran # (ANC) 05/18/2022 8.2 (A) 1.8 - 7.7 K/uL Final    Immature Grans (Abs) 05/18/2022 0.67 (A) " 0.00 - 0.04 K/uL Final    Comment: Mild elevation in immature granulocytes is non specific and   can be seen in a variety of conditions including stress response,   acute inflammation, trauma and pregnancy. Correlation with other   laboratory and clinical findings is essential.      Lymph # 05/18/2022 2.7  1.0 - 4.8 K/uL Final    Mono # 05/18/2022 1.3 (A) 0.3 - 1.0 K/uL Final    Eos # 05/18/2022 0.5  0.0 - 0.5 K/uL Final    Baso # 05/18/2022 0.33 (A) 0.00 - 0.20 K/uL Final    nRBC 05/18/2022 0  0 /100 WBC Final    Gran % 05/18/2022 60.0  38.0 - 73.0 % Final    Lymph % 05/18/2022 19.5  18.0 - 48.0 % Final    Mono % 05/18/2022 9.4  4.0 - 15.0 % Final    Eosinophil % 05/18/2022 3.8  0.0 - 8.0 % Final    Basophil % 05/18/2022 2.4 (A) 0.0 - 1.9 % Final    Differential Method 05/18/2022 Automated   Final     Imaging: All pertinent imaging reviewed    Assessment and Plan        No diagnosis found.  Extensive stage small cell lung ca w. mets to the liver   -Consented for carbo/etoposide/atezolizumab for 4-6 cycles over a 21 day period followed by atezolizumab maintenance   -Discussed this is not curative intent. Palliative for life prolongation and quality of life.   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

## 2022-05-18 NOTE — PLAN OF CARE
Pt arrived to unit for C3D1 of Atezo, Carbo, and VP16. Labs along with plan of care reviewed. Pt okay to proceed with third cycle. Pt saw  over at [facility] just prior to. Pt and wife reports he did well after his second cycle. Pt reports having more energy and an increased appetite. States he is less dependent on oxygen but still wears 2L O2 by NC as needed. Denies any pain today. VSS. Received Tecentriq over 30 minutes. Then given pre-meds of Cinvanti IVP and Aloxi/Dex. Then received Carbo and Etoposide. Tolerated all infusions well. Instructed to return tomorrow for D2. Verbalized understanding. Left unit accompanied by spouse in NAD at time of discharge.

## 2022-05-18 NOTE — Clinical Note
-Follow up in 3 weeks on 6/8/22 with cbc, cmp, TSH, Free T4 then MD visit -Cycle 4 carbo/etop/atez 5/18/22 at [facility]

## 2022-05-19 ENCOUNTER — INFUSION (OUTPATIENT)
Dept: INFUSION THERAPY | Facility: HOSPITAL | Age: 57
End: 2022-05-19
Attending: INTERNAL MEDICINE
Payer: MEDICAID

## 2022-05-19 VITALS
HEART RATE: 90 BPM | DIASTOLIC BLOOD PRESSURE: 65 MMHG | OXYGEN SATURATION: 96 % | TEMPERATURE: 98 F | RESPIRATION RATE: 16 BRPM | SYSTOLIC BLOOD PRESSURE: 129 MMHG

## 2022-05-19 DIAGNOSIS — C34.90 SMALL CELL LUNG CANCER: Primary | ICD-10-CM

## 2022-05-19 DIAGNOSIS — C78.7 SECONDARY MALIGNANT NEOPLASM OF LIVER: ICD-10-CM

## 2022-05-19 PROCEDURE — 63600175 PHARM REV CODE 636 W HCPCS: Performed by: INTERNAL MEDICINE

## 2022-05-19 PROCEDURE — A4216 STERILE WATER/SALINE, 10 ML: HCPCS | Performed by: INTERNAL MEDICINE

## 2022-05-19 PROCEDURE — 25000003 PHARM REV CODE 250: Performed by: INTERNAL MEDICINE

## 2022-05-19 PROCEDURE — 96413 CHEMO IV INFUSION 1 HR: CPT

## 2022-05-19 RX ORDER — HEPARIN 100 UNIT/ML
500 SYRINGE INTRAVENOUS
Status: DISCONTINUED | OUTPATIENT
Start: 2022-05-19 | End: 2022-05-19 | Stop reason: HOSPADM

## 2022-05-19 RX ORDER — SODIUM CHLORIDE 0.9 % (FLUSH) 0.9 %
10 SYRINGE (ML) INJECTION
Status: DISCONTINUED | OUTPATIENT
Start: 2022-05-19 | End: 2022-05-19 | Stop reason: HOSPADM

## 2022-05-19 RX ADMIN — ETOPOSIDE 236 MG: 20 INJECTION INTRAVENOUS at 12:05

## 2022-05-19 RX ADMIN — HEPARIN 500 UNITS: 100 SYRINGE at 01:05

## 2022-05-19 RX ADMIN — Medication 10 ML: at 01:05

## 2022-05-19 NOTE — PLAN OF CARE
Pt arrived to unit for C3D2 of VP16. Reports doing well after receiving D1 of tx yesterday. VSS. Feeling well overall today. Etoposide given over 1 hour. Tolerated well without issues. Will return tomorrow for D3 of VP16. Left unit in NAD at time of discharge.

## 2022-05-20 ENCOUNTER — INFUSION (OUTPATIENT)
Dept: INFUSION THERAPY | Facility: HOSPITAL | Age: 57
End: 2022-05-20
Attending: INTERNAL MEDICINE
Payer: MEDICAID

## 2022-05-20 DIAGNOSIS — C78.7 SECONDARY MALIGNANT NEOPLASM OF LIVER: ICD-10-CM

## 2022-05-20 DIAGNOSIS — C34.90 SMALL CELL LUNG CANCER: Primary | ICD-10-CM

## 2022-05-20 PROCEDURE — 96413 CHEMO IV INFUSION 1 HR: CPT

## 2022-05-20 PROCEDURE — 96375 TX/PRO/DX INJ NEW DRUG ADDON: CPT

## 2022-05-20 PROCEDURE — 63600175 PHARM REV CODE 636 W HCPCS: Performed by: INTERNAL MEDICINE

## 2022-05-20 PROCEDURE — 25000003 PHARM REV CODE 250: Performed by: INTERNAL MEDICINE

## 2022-05-20 PROCEDURE — 96374 THER/PROPH/DIAG INJ IV PUSH: CPT

## 2022-05-20 RX ORDER — ONDANSETRON 2 MG/ML
8 INJECTION INTRAMUSCULAR; INTRAVENOUS ONCE
Status: COMPLETED | OUTPATIENT
Start: 2022-05-20 | End: 2022-05-20

## 2022-05-20 RX ORDER — SODIUM CHLORIDE 0.9 % (FLUSH) 0.9 %
10 SYRINGE (ML) INJECTION
Status: DISCONTINUED | OUTPATIENT
Start: 2022-05-20 | End: 2022-05-23 | Stop reason: HOSPADM

## 2022-05-20 RX ORDER — HEPARIN 100 UNIT/ML
500 SYRINGE INTRAVENOUS
Status: DISCONTINUED | OUTPATIENT
Start: 2022-05-20 | End: 2022-05-23 | Stop reason: HOSPADM

## 2022-05-20 RX ADMIN — ONDANSETRON 8 MG: 2 INJECTION INTRAMUSCULAR; INTRAVENOUS at 12:05

## 2022-05-20 RX ADMIN — ETOPOSIDE 236 MG: 20 INJECTION INTRAVENOUS at 01:05

## 2022-05-21 ENCOUNTER — INFUSION (OUTPATIENT)
Dept: INFUSION THERAPY | Facility: HOSPITAL | Age: 57
End: 2022-05-21
Attending: INTERNAL MEDICINE
Payer: MEDICAID

## 2022-05-21 DIAGNOSIS — C78.7 SECONDARY MALIGNANT NEOPLASM OF LIVER: ICD-10-CM

## 2022-05-21 DIAGNOSIS — C34.90 SMALL CELL LUNG CANCER: Primary | ICD-10-CM

## 2022-05-21 PROCEDURE — 63600175 PHARM REV CODE 636 W HCPCS: Mod: TB | Performed by: INTERNAL MEDICINE

## 2022-05-21 RX ADMIN — PEGFILGRASTIM 6 MG: 6 INJECTION SUBCUTANEOUS at 09:05

## 2022-05-23 NOTE — PLAN OF CARE
Pt arrived to unit for C3D3 of VP16. Reports doing well after receiving D1 of tx yesterday. VSS. Feeling well overall today. Etoposide given over 1 hour. Tolerated well without issues. Will return tomorrow for Fulphila injection. Left unit in NAD at time of discharge.

## 2022-05-27 DIAGNOSIS — C34.90 SMALL CELL LUNG CANCER: ICD-10-CM

## 2022-05-30 RX ORDER — PROMETHAZINE HYDROCHLORIDE AND CODEINE PHOSPHATE 6.25; 1 MG/5ML; MG/5ML
5 SOLUTION ORAL 3 TIMES DAILY PRN
Qty: 500 ML | Refills: 0 | Status: ON HOLD | OUTPATIENT
Start: 2022-05-30 | End: 2022-12-10 | Stop reason: HOSPADM

## 2022-06-01 ENCOUNTER — PATIENT MESSAGE (OUTPATIENT)
Dept: HEMATOLOGY/ONCOLOGY | Facility: CLINIC | Age: 57
End: 2022-06-01
Payer: MEDICAID

## 2022-06-02 DIAGNOSIS — R09.82 POST-NASAL DRIP: Primary | ICD-10-CM

## 2022-06-02 RX ORDER — FLUTICASONE PROPIONATE 50 MCG
1 SPRAY, SUSPENSION (ML) NASAL DAILY
Qty: 15 G | Refills: 2 | Status: ON HOLD | OUTPATIENT
Start: 2022-06-02 | End: 2022-12-10 | Stop reason: HOSPADM

## 2022-06-07 ENCOUNTER — HOSPITAL ENCOUNTER (EMERGENCY)
Facility: HOSPITAL | Age: 57
Discharge: HOME | End: 2022-06-07
Attending: EMERGENCY MEDICINE
Payer: MEDICAID

## 2022-06-07 ENCOUNTER — PATIENT MESSAGE (OUTPATIENT)
Dept: HEMATOLOGY/ONCOLOGY | Facility: CLINIC | Age: 57
End: 2022-06-07
Payer: MEDICAID

## 2022-06-07 ENCOUNTER — NURSE TRIAGE (OUTPATIENT)
Dept: ADMINISTRATIVE | Facility: CLINIC | Age: 57
End: 2022-06-07
Payer: MEDICAID

## 2022-06-07 VITALS
OXYGEN SATURATION: 100 % | BODY MASS INDEX: 38.84 KG/M2 | DIASTOLIC BLOOD PRESSURE: 71 MMHG | HEART RATE: 77 BPM | SYSTOLIC BLOOD PRESSURE: 140 MMHG | WEIGHT: 248 LBS | RESPIRATION RATE: 16 BRPM | TEMPERATURE: 99 F

## 2022-06-07 DIAGNOSIS — L03.211 FACIAL CELLULITIS: Primary | ICD-10-CM

## 2022-06-07 LAB
ALBUMIN SERPL-MCNC: 3.8 G/DL (ref 3.3–5.5)
ALP SERPL-CCNC: 182 U/L (ref 42–141)
BILIRUB SERPL-MCNC: 0.8 MG/DL (ref 0.2–1.6)
BUN SERPL-MCNC: 13 MG/DL (ref 7–22)
CALCIUM SERPL-MCNC: 9.9 MG/DL (ref 8–10.3)
CHLORIDE SERPL-SCNC: 99 MMOL/L (ref 98–108)
CREAT SERPL-MCNC: 0.9 MG/DL (ref 0.6–1.2)
GLUCOSE SERPL-MCNC: 109 MG/DL (ref 73–118)
POC ALT (SGPT): 31 U/L (ref 10–47)
POC AST (SGOT): 30 U/L (ref 11–38)
POC TCO2: 26 MMOL/L (ref 18–33)
POTASSIUM BLD-SCNC: 4.3 MMOL/L (ref 3.6–5.1)
PROTEIN, POC: 7.4 G/DL (ref 6.4–8.1)
SODIUM BLD-SCNC: 140 MMOL/L (ref 128–145)

## 2022-06-07 PROCEDURE — 96365 THER/PROPH/DIAG IV INF INIT: CPT | Mod: ER

## 2022-06-07 PROCEDURE — 25500020 PHARM REV CODE 255: Mod: ER | Performed by: EMERGENCY MEDICINE

## 2022-06-07 PROCEDURE — 80053 COMPREHEN METABOLIC PANEL: CPT | Mod: ER

## 2022-06-07 PROCEDURE — 25000003 PHARM REV CODE 250: Mod: ER | Performed by: EMERGENCY MEDICINE

## 2022-06-07 PROCEDURE — 85025 COMPLETE CBC W/AUTO DIFF WBC: CPT | Mod: ER

## 2022-06-07 PROCEDURE — 99285 EMERGENCY DEPT VISIT HI MDM: CPT | Mod: 25,ER

## 2022-06-07 RX ORDER — CLINDAMYCIN HYDROCHLORIDE 150 MG/1
450 CAPSULE ORAL EVERY 8 HOURS
Qty: 45 CAPSULE | Refills: 0 | Status: SHIPPED | OUTPATIENT
Start: 2022-06-07 | End: 2022-06-10 | Stop reason: SDUPTHER

## 2022-06-07 RX ORDER — OXYCODONE AND ACETAMINOPHEN 5; 325 MG/1; MG/1
1 TABLET ORAL
Status: DISCONTINUED | OUTPATIENT
Start: 2022-06-07 | End: 2022-06-07 | Stop reason: HOSPADM

## 2022-06-07 RX ORDER — CLINDAMYCIN PHOSPHATE 600 MG/50ML
600 INJECTION, SOLUTION INTRAVENOUS
Status: COMPLETED | OUTPATIENT
Start: 2022-06-07 | End: 2022-06-07

## 2022-06-07 RX ADMIN — IOHEXOL 50 ML: 350 INJECTION, SOLUTION INTRAVENOUS at 01:06

## 2022-06-07 RX ADMIN — CLINDAMYCIN IN 5 PERCENT DEXTROSE 600 MG: 12 INJECTION, SOLUTION INTRAVENOUS at 02:06

## 2022-06-07 NOTE — ED PROVIDER NOTES
"Encounter Date: 6/7/2022    SCRIBE #1 NOTE: I, Michell Dong, am scribing for, and in the presence of,  Kasey Turcios MD. I have scribed the following portions of the note - Other sections scribed: HPI, ROS, PE.       History     Chief Complaint   Patient presents with    Abscess     Pt states," I have a bad tooth ,I need medications.."     57 y.o. male with Hx of DM type 2, HTN, and Stage 4 small cell lung cancer who presents to the ED with chief complaint of chronic right upper dental pain for years with acute swelling to the right cheek and gingiva since yesterday. Pain endorses mild pain to the teeth and gingiva. Wife notes he has a tumor to his right cheek that was previously removed, but grew back. He begins round 4 of chemotherapy tomorrow. Denies fever or chills. No further complaints at this time.    The history is provided by the patient and the spouse. No  was used.     Review of patient's allergies indicates:  No Known Allergies  Past Medical History:   Diagnosis Date    Diabetes mellitus, type 2     Hypertension      Past Surgical History:   Procedure Laterality Date    ENDOBRONCHIAL ULTRASOUND N/A 3/22/2022    Procedure: ENDOBRONCHIAL ULTRASOUND (EBUS);  Surgeon: Kristin Samuels MD;  Location: [facility] OR [address] [place] [address];  Service: Endoscopy;  Laterality: N/A;    KNEE SURGERY       Family History   Problem Relation Age of Onset    Diabetes Mellitus Mother     Pacemaker/defibrilator Father     Lung cancer Father      Social History     Tobacco Use    Smoking status: Never Smoker    Smokeless tobacco: Never Used    Tobacco comment: stop smoking 26 years ago   Substance Use Topics    Alcohol use: No    Drug use: No     Review of Systems   Constitutional: Negative for chills, fatigue and fever.   HENT: Positive for dental problem and facial swelling.    Eyes: Negative for pain.   Respiratory: Negative for shortness of breath.    Cardiovascular: Negative for chest pain.   Gastrointestinal: " Negative for abdominal pain, nausea and vomiting.   Genitourinary: Negative for dysuria.   Musculoskeletal: Negative for back pain.   Skin: Negative for color change.   Neurological: Negative for headaches.   Hematological: Does not bruise/bleed easily.   Psychiatric/Behavioral: Negative for behavioral problems.   All other systems reviewed and are negative.      Physical Exam     Initial Vitals [06/07/22 1028]   BP Pulse Resp Temp SpO2   (!) 174/112 89 16 99.3 °F (37.4 °C) 99 %      MAP       --         Physical Exam    Nursing note and vitals reviewed.  Constitutional: He appears well-developed.   HENT:   Head: Normocephalic.   Mouth/Throat: Dental caries present.   Mild erythema to the right cheek. No induration or fluctuance. Right 1st molar and canine surrounding gumline with erythema. No drainage.   Eyes: Conjunctivae are normal.   Neck: Neck supple.   Cardiovascular: Regular rhythm and normal heart sounds.   Pulmonary/Chest: Breath sounds normal. No respiratory distress. He has no wheezes.   Abdominal: He exhibits no distension.   Musculoskeletal:         General: Normal range of motion.      Cervical back: Neck supple.     Neurological: He is alert.   Skin: Skin is warm and dry.   Psychiatric: He has a normal mood and affect.         ED Course   Procedures  Labs Reviewed   POCT CMP - Abnormal; Notable for the following components:       Result Value    Alkaline Phosphatase,  (*)     All other components within normal limits   POCT CBC   POCT CMP              Imaging Results          CT Maxillofacial With Contrast (Final result)  Result time 06/07/22 13:40:22    Final result by Willis Shabazz MD (06/07/22 13:40:22)                 Impression:      Scattered dental caries and periodontal disease.  Right-sided facial soft tissue edema may represent odontogenic cellulitis.  No convincing drainable abscess.    Chronic appearing paranasal sinus disease.      Electronically signed by: Willis Shabazz  MD  Date:    06/07/2022  Time:    13:40             Narrative:    EXAMINATION:  CT MAXILLOFACIAL WITH CONTRAST    CLINICAL HISTORY:  Maxillary/facial abscess;vs cellulitis, RIGHT face;    TECHNIQUE:  CT images of the maxillofacial region obtained after the administration of 75 cc Omnipaque 350 IV contrast.  Axial, coronal, and sagittal reconstructions were created from the source data.    COMPARISON:  None.    FINDINGS:  Exam quality is limited by motion and suboptimal bolus timing.    There are multiple scattered dental caries bilaterally, most notable in the right maxillary and mandibular molar teeth with additional dental caries in the right 1st premolar tooth.  There is asymmetric right-sided facial soft tissue edema without discrete drainable abscess.  Mildly enlarged right cervical and submandibular chain lymph nodes measuring up to 1 cm in short axis.  Limited intracranial evaluation is unremarkable.    There is mucosal thickening in the maxillary sinuses, right side greater than left.  Mild mucosal thickening in the ethmoid air cells and frontal sinuses.  No air-fluid levels.                                 Medications   oxyCODONE-acetaminophen 5-325 mg per tablet 1 tablet (has no administration in time range)   iohexoL (OMNIPAQUE 350) injection 50 mL (50 mLs Intravenous Given 6/7/22 1300)   clindamycin in D5W 600 mg/50 mL IVPB 600 mg (600 mg Intravenous New Bag 6/7/22 1450)     Medical Decision Making:   History:   Old Medical Records: I decided to obtain old medical records.  Initial Assessment:   This is an emergent evaluation of a 57-year-old man who presented to the emergency department for evaluation of right sided facial swelling and erythema.  Differential Diagnosis:   Facial cellulitis, abscess, amongst others  Clinical Tests:   Lab Tests: Ordered and Reviewed  ED Management:  On physical examination, patient was in no acute distress.  Heart and lung sounds were within normal limits.  He did have  mild edema noted to the right face without significant induration and no drainage.  Examination of the teeth revealed dental caries but no drainable abscess.  Labs were obtained and imaging has been ordered.  Disposition is pending.    Kasey Turcios MD  1:00 PM  6/7/2022     Patient's labs returned.  He had a mild leukocytosis.  CMP was reassuring.  CT maxillary face showed scattered dental caries and periodontal disease with right-sided facial soft tissue edema most likely representing odontogenic cellulitis without convincing will drainable abscess per Radiology interpretation.  Patient was treated in the emergency department with IV clindamycin.  He will be discharged home with oral antibiotics and close follow-up with his oncologist tomorrow as well as with his dentist this week.  He has been advised he will need close dental follow-up given this is most likely the source of his infectious process today.  He has been given return precautions including development of induration to the cheek, worsening erythema, worsening edema, or for any other concerning symptoms.    Kasey Turcios MD  3:15 PM  6/7/2022           Scribe Attestation:   Scribe #1: I performed the above scribed service and the documentation accurately describes the services I performed. I attest to the accuracy of the note.               I, Kasey Turcios MD, personally performed the services described in the documentation.  All medical records entries made by the scribe were made at my direction and in my presence.  I have reviewed the chart and agree that the record reflects my personal performance and is accurate and complete.    Clinical Impression:   Final diagnoses:  [L03.211] Facial cellulitis (Primary)          ED Disposition Condition    Discharge Stable        ED Prescriptions     Medication Sig Dispense Start Date End Date Auth. Provider    clindamycin (CLEOCIN) 150 MG capsule Take 3 capsules (450 mg total) by mouth every 8 (eight) hours. for 5  days 45 capsule 6/7/2022 6/12/2022 Kasey Turcios MD        Follow-up Information     Follow up With Specialties Details Why Contact Info    Your oncologist and your dentist  Schedule an appointment as soon as possible for a visit              Kasey Turcios MD  06/07/22 9884

## 2022-06-07 NOTE — DISCHARGE INSTRUCTIONS

## 2022-06-07 NOTE — TELEPHONE ENCOUNTER
OOC RN  Wife Isabela calling about , Juan.   to start 4th chemo session tomorrow.  Now, he has toothache,  This morning is swollen right side of face, probably and dental abscess.   JESUS Blood    Has Broken Tooth upper right side.  See  Today,  For new or worsening symptoms to call back OOC RN.  I SM Dr. Mcneal, who advised that while patient is on chemo, is okay to receive ATB for Tooth.    Reason for Disposition   Face is swollen    Additional Information   Negative: Pale cold skin and very weak (can't stand)   Negative: Similar pain previously and it was from 'heart attack'   Negative: Similar pain previously and it was from 'angina' and not relieved by nitroglycerin   Negative: Sounds like a life-threatening emergency to the triager   Negative: Patient sounds very sick or weak to the triager    Protocols used: ST TOOTHACHE-A-OH

## 2022-06-07 NOTE — FIRST PROVIDER EVALUATION
" Emergency Department TeleTriage Encounter Note      CHIEF COMPLAINT    Chief Complaint   Patient presents with    Abscess     Pt states," I have a bad tooth ,I need medications.."       VITAL SIGNS   Initial Vitals [06/07/22 1028]   BP Pulse Resp Temp SpO2   (!) 174/112 89 16 99.3 °F (37.4 °C) 99 %      MAP       --            ALLERGIES    Review of patient's allergies indicates:  No Known Allergies    PROVIDER TRIAGE NOTE  This is a teletriage evaluation of a 57 y.o. male presenting to the ED complaining of right upper dental pain and facial swelling starting yesterday. Denies fever. Pt on chemo.    Well-appearing. No distress.     Initial orders will be placed and care will be transferred to an alternate provider when patient is roomed for a full evaluation. Any additional orders and the final disposition will be determined by that provider.         ORDERS  Labs Reviewed   POCT CBC   POCT CMP       ED Orders (720h ago, onward)    Start Ordered     Status Ordering Provider    06/07/22 1037 06/07/22 1036  POCT CBC  Once         Ordered MUNIR VILLARREAL.    06/07/22 1037 06/07/22 1036  POCT CMP  Once         Ordered MUNIR VILLARREAL.    06/07/22 1037 06/07/22 1036  Insert Saline lock IV  Once         Ordered MUNIR VILLARREAL            Virtual Visit Note: The provider triage portion of this emergency department evaluation and documentation was performed via [employer], a HIPAA-compliant telemedicine application, in concert with a tele-presenter in the room. A face to face patient evaluation with one of my colleagues will occur once the patient is placed in an emergency department room.      DISCLAIMER: This note was prepared with [employer] voice recognition transcription software. Garbled syntax, mangled pronouns, and other bizarre constructions may be attributed to that software system.  "

## 2022-06-08 ENCOUNTER — LAB VISIT (OUTPATIENT)
Dept: LAB | Facility: HOSPITAL | Age: 57
End: 2022-06-08
Attending: NURSE PRACTITIONER
Payer: MEDICAID

## 2022-06-08 ENCOUNTER — TELEPHONE (OUTPATIENT)
Dept: HEMATOLOGY/ONCOLOGY | Facility: CLINIC | Age: 57
End: 2022-06-08
Payer: MEDICAID

## 2022-06-08 DIAGNOSIS — C34.90 SMALL CELL LUNG CANCER: ICD-10-CM

## 2022-06-08 LAB
ALBUMIN SERPL BCP-MCNC: 3.5 G/DL (ref 3.5–5.2)
ALP SERPL-CCNC: 191 U/L (ref 55–135)
ALT SERPL W/O P-5'-P-CCNC: 23 U/L (ref 10–44)
ANION GAP SERPL CALC-SCNC: 11 MMOL/L (ref 8–16)
AST SERPL-CCNC: 16 U/L (ref 10–40)
BASOPHILS # BLD AUTO: 0.15 K/UL (ref 0–0.2)
BASOPHILS NFR BLD: 1.1 % (ref 0–1.9)
BILIRUB SERPL-MCNC: 0.5 MG/DL (ref 0.1–1)
BUN SERPL-MCNC: 12 MG/DL (ref 6–20)
CALCIUM SERPL-MCNC: 9.7 MG/DL (ref 8.7–10.5)
CHLORIDE SERPL-SCNC: 103 MMOL/L (ref 95–110)
CO2 SERPL-SCNC: 23 MMOL/L (ref 23–29)
CREAT SERPL-MCNC: 1.3 MG/DL (ref 0.5–1.4)
DIFFERENTIAL METHOD: ABNORMAL
EOSINOPHIL # BLD AUTO: 0.3 K/UL (ref 0–0.5)
EOSINOPHIL NFR BLD: 2.4 % (ref 0–8)
ERYTHROCYTE [DISTWIDTH] IN BLOOD BY AUTOMATED COUNT: 16.6 % (ref 11.5–14.5)
EST. GFR  (AFRICAN AMERICAN): >60 ML/MIN/1.73 M^2
EST. GFR  (NON AFRICAN AMERICAN): >60 ML/MIN/1.73 M^2
GLUCOSE SERPL-MCNC: 226 MG/DL (ref 70–110)
HCT VFR BLD AUTO: 36.7 % (ref 40–54)
HGB BLD-MCNC: 11.6 G/DL (ref 14–18)
IMM GRANULOCYTES # BLD AUTO: 0.3 K/UL (ref 0–0.04)
IMM GRANULOCYTES NFR BLD AUTO: 2.2 % (ref 0–0.5)
LYMPHOCYTES # BLD AUTO: 1.8 K/UL (ref 1–4.8)
LYMPHOCYTES NFR BLD: 13.3 % (ref 18–48)
MCH RBC QN AUTO: 30 PG (ref 27–31)
MCHC RBC AUTO-ENTMCNC: 31.6 G/DL (ref 32–36)
MCV RBC AUTO: 95 FL (ref 82–98)
MONOCYTES # BLD AUTO: 1 K/UL (ref 0.3–1)
MONOCYTES NFR BLD: 7.6 % (ref 4–15)
NEUTROPHILS # BLD AUTO: 9.9 K/UL (ref 1.8–7.7)
NEUTROPHILS NFR BLD: 73.4 % (ref 38–73)
NRBC BLD-RTO: 0 /100 WBC
PLATELET # BLD AUTO: 402 K/UL (ref 150–450)
PMV BLD AUTO: 9.4 FL (ref 9.2–12.9)
POTASSIUM SERPL-SCNC: 4.5 MMOL/L (ref 3.5–5.1)
PROT SERPL-MCNC: 7.2 G/DL (ref 6–8.4)
RBC # BLD AUTO: 3.87 M/UL (ref 4.6–6.2)
SODIUM SERPL-SCNC: 137 MMOL/L (ref 136–145)
T4 FREE SERPL-MCNC: 0.78 NG/DL (ref 0.71–1.51)
TSH SERPL DL<=0.005 MIU/L-ACNC: 1.06 UIU/ML (ref 0.4–4)
WBC # BLD AUTO: 13.5 K/UL (ref 3.9–12.7)

## 2022-06-08 PROCEDURE — 85025 COMPLETE CBC W/AUTO DIFF WBC: CPT | Performed by: INTERNAL MEDICINE

## 2022-06-08 PROCEDURE — 84443 ASSAY THYROID STIM HORMONE: CPT | Performed by: INTERNAL MEDICINE

## 2022-06-08 PROCEDURE — 36415 COLL VENOUS BLD VENIPUNCTURE: CPT | Performed by: INTERNAL MEDICINE

## 2022-06-08 PROCEDURE — 80053 COMPREHEN METABOLIC PANEL: CPT | Performed by: INTERNAL MEDICINE

## 2022-06-08 PROCEDURE — 84439 ASSAY OF FREE THYROXINE: CPT | Performed by: INTERNAL MEDICINE

## 2022-06-08 NOTE — TELEPHONE ENCOUNTER
"----- Message from Zack Goodrich sent at 6/8/2022 11:45 AM CDT -----  Consult/Advisory:          Name Of Caller: Rebeka ([facility])      Contact Preference?: [phone]       Provider Name: Scot      Does patient feel the need to be seen today? No      What is the nature of the call?: Requesting clearance for extraction          Additional Notes: The pt is currently present at that office for extraction      "Thank you for all that you do for our patients"      "

## 2022-06-10 ENCOUNTER — PATIENT MESSAGE (OUTPATIENT)
Dept: HEMATOLOGY/ONCOLOGY | Facility: CLINIC | Age: 57
End: 2022-06-10
Payer: MEDICAID

## 2022-06-10 DIAGNOSIS — K04.7 DENTAL INFECTION: ICD-10-CM

## 2022-06-13 ENCOUNTER — OFFICE VISIT (OUTPATIENT)
Dept: HEMATOLOGY/ONCOLOGY | Facility: CLINIC | Age: 57
End: 2022-06-13
Payer: MEDICAID

## 2022-06-13 ENCOUNTER — LAB VISIT (OUTPATIENT)
Dept: LAB | Facility: HOSPITAL | Age: 57
End: 2022-06-13
Payer: MEDICAID

## 2022-06-13 VITALS
HEART RATE: 79 BPM | DIASTOLIC BLOOD PRESSURE: 65 MMHG | BODY MASS INDEX: 39.93 KG/M2 | WEIGHT: 254.44 LBS | RESPIRATION RATE: 16 BRPM | HEIGHT: 67 IN | OXYGEN SATURATION: 99 % | SYSTOLIC BLOOD PRESSURE: 136 MMHG

## 2022-06-13 DIAGNOSIS — D84.81 IMMUNODEFICIENCY SECONDARY TO NEOPLASM: ICD-10-CM

## 2022-06-13 DIAGNOSIS — T45.1X5A IMMUNODEFICIENCY SECONDARY TO CHEMOTHERAPY: ICD-10-CM

## 2022-06-13 DIAGNOSIS — D49.9 IMMUNODEFICIENCY SECONDARY TO NEOPLASM: ICD-10-CM

## 2022-06-13 DIAGNOSIS — M10.9 GOUT, UNSPECIFIED CAUSE, UNSPECIFIED CHRONICITY, UNSPECIFIED SITE: ICD-10-CM

## 2022-06-13 DIAGNOSIS — C34.90 SMALL CELL LUNG CANCER: Primary | ICD-10-CM

## 2022-06-13 DIAGNOSIS — C78.7 SECONDARY MALIGNANT NEOPLASM OF LIVER: ICD-10-CM

## 2022-06-13 DIAGNOSIS — C34.90 SMALL CELL LUNG CANCER: ICD-10-CM

## 2022-06-13 DIAGNOSIS — Z79.899 IMMUNODEFICIENCY SECONDARY TO CHEMOTHERAPY: ICD-10-CM

## 2022-06-13 DIAGNOSIS — D84.821 IMMUNODEFICIENCY SECONDARY TO CHEMOTHERAPY: ICD-10-CM

## 2022-06-13 DIAGNOSIS — K04.7 DENTAL INFECTION: ICD-10-CM

## 2022-06-13 LAB
ALBUMIN SERPL BCP-MCNC: 3.7 G/DL (ref 3.5–5.2)
ALP SERPL-CCNC: 166 U/L (ref 55–135)
ALT SERPL W/O P-5'-P-CCNC: 22 U/L (ref 10–44)
ANION GAP SERPL CALC-SCNC: 12 MMOL/L (ref 8–16)
AST SERPL-CCNC: 17 U/L (ref 10–40)
BASOPHILS # BLD AUTO: 0.15 K/UL (ref 0–0.2)
BASOPHILS NFR BLD: 1.1 % (ref 0–1.9)
BILIRUB SERPL-MCNC: 0.6 MG/DL (ref 0.1–1)
BUN SERPL-MCNC: 15 MG/DL (ref 6–20)
CALCIUM SERPL-MCNC: 9.4 MG/DL (ref 8.7–10.5)
CHLORIDE SERPL-SCNC: 101 MMOL/L (ref 95–110)
CO2 SERPL-SCNC: 23 MMOL/L (ref 23–29)
CREAT SERPL-MCNC: 1 MG/DL (ref 0.5–1.4)
DIFFERENTIAL METHOD: ABNORMAL
EOSINOPHIL # BLD AUTO: 0.2 K/UL (ref 0–0.5)
EOSINOPHIL NFR BLD: 1.6 % (ref 0–8)
ERYTHROCYTE [DISTWIDTH] IN BLOOD BY AUTOMATED COUNT: 16.2 % (ref 11.5–14.5)
EST. GFR  (AFRICAN AMERICAN): >60 ML/MIN/1.73 M^2
EST. GFR  (NON AFRICAN AMERICAN): >60 ML/MIN/1.73 M^2
GLUCOSE SERPL-MCNC: 108 MG/DL (ref 70–110)
HCT VFR BLD AUTO: 36.7 % (ref 40–54)
HGB BLD-MCNC: 11.8 G/DL (ref 14–18)
IMM GRANULOCYTES # BLD AUTO: 0.09 K/UL (ref 0–0.04)
IMM GRANULOCYTES NFR BLD AUTO: 0.7 % (ref 0–0.5)
LYMPHOCYTES # BLD AUTO: 1.6 K/UL (ref 1–4.8)
LYMPHOCYTES NFR BLD: 11.8 % (ref 18–48)
MCH RBC QN AUTO: 30.4 PG (ref 27–31)
MCHC RBC AUTO-ENTMCNC: 32.2 G/DL (ref 32–36)
MCV RBC AUTO: 95 FL (ref 82–98)
MONOCYTES # BLD AUTO: 1.3 K/UL (ref 0.3–1)
MONOCYTES NFR BLD: 9.6 % (ref 4–15)
NEUTROPHILS # BLD AUTO: 10.2 K/UL (ref 1.8–7.7)
NEUTROPHILS NFR BLD: 75.2 % (ref 38–73)
NRBC BLD-RTO: 0 /100 WBC
PLATELET # BLD AUTO: 386 K/UL (ref 150–450)
PMV BLD AUTO: 9.5 FL (ref 9.2–12.9)
POTASSIUM SERPL-SCNC: 4.4 MMOL/L (ref 3.5–5.1)
PROT SERPL-MCNC: 7.4 G/DL (ref 6–8.4)
RBC # BLD AUTO: 3.88 M/UL (ref 4.6–6.2)
SODIUM SERPL-SCNC: 136 MMOL/L (ref 136–145)
WBC # BLD AUTO: 13.6 K/UL (ref 3.9–12.7)

## 2022-06-13 PROCEDURE — 3078F DIAST BP <80 MM HG: CPT | Mod: CPTII,,, | Performed by: STUDENT IN AN ORGANIZED HEALTH CARE EDUCATION/TRAINING PROGRAM

## 2022-06-13 PROCEDURE — 36415 COLL VENOUS BLD VENIPUNCTURE: CPT | Performed by: STUDENT IN AN ORGANIZED HEALTH CARE EDUCATION/TRAINING PROGRAM

## 2022-06-13 PROCEDURE — 85025 COMPLETE CBC W/AUTO DIFF WBC: CPT | Performed by: STUDENT IN AN ORGANIZED HEALTH CARE EDUCATION/TRAINING PROGRAM

## 2022-06-13 PROCEDURE — 99214 PR OFFICE/OUTPT VISIT, EST, LEVL IV, 30-39 MIN: ICD-10-PCS | Mod: S$PBB,,, | Performed by: STUDENT IN AN ORGANIZED HEALTH CARE EDUCATION/TRAINING PROGRAM

## 2022-06-13 PROCEDURE — 3078F PR MOST RECENT DIASTOLIC BLOOD PRESSURE < 80 MM HG: ICD-10-PCS | Mod: CPTII,,, | Performed by: STUDENT IN AN ORGANIZED HEALTH CARE EDUCATION/TRAINING PROGRAM

## 2022-06-13 PROCEDURE — 99999 PR PBB SHADOW E&M-EST. PATIENT-LVL IV: CPT | Mod: PBBFAC,,, | Performed by: STUDENT IN AN ORGANIZED HEALTH CARE EDUCATION/TRAINING PROGRAM

## 2022-06-13 PROCEDURE — 99214 OFFICE O/P EST MOD 30 MIN: CPT | Mod: PBBFAC | Performed by: STUDENT IN AN ORGANIZED HEALTH CARE EDUCATION/TRAINING PROGRAM

## 2022-06-13 PROCEDURE — 1159F PR MEDICATION LIST DOCUMENTED IN MEDICAL RECORD: ICD-10-PCS | Mod: CPTII,,, | Performed by: STUDENT IN AN ORGANIZED HEALTH CARE EDUCATION/TRAINING PROGRAM

## 2022-06-13 PROCEDURE — 1160F RVW MEDS BY RX/DR IN RCRD: CPT | Mod: CPTII,,, | Performed by: STUDENT IN AN ORGANIZED HEALTH CARE EDUCATION/TRAINING PROGRAM

## 2022-06-13 PROCEDURE — 3075F PR MOST RECENT SYSTOLIC BLOOD PRESS GE 130-139MM HG: ICD-10-PCS | Mod: CPTII,,, | Performed by: STUDENT IN AN ORGANIZED HEALTH CARE EDUCATION/TRAINING PROGRAM

## 2022-06-13 PROCEDURE — 4010F ACE/ARB THERAPY RXD/TAKEN: CPT | Mod: CPTII,,, | Performed by: STUDENT IN AN ORGANIZED HEALTH CARE EDUCATION/TRAINING PROGRAM

## 2022-06-13 PROCEDURE — 4010F PR ACE/ARB THEARPY RXD/TAKEN: ICD-10-PCS | Mod: CPTII,,, | Performed by: STUDENT IN AN ORGANIZED HEALTH CARE EDUCATION/TRAINING PROGRAM

## 2022-06-13 PROCEDURE — 1160F PR REVIEW ALL MEDS BY PRESCRIBER/CLIN PHARMACIST DOCUMENTED: ICD-10-PCS | Mod: CPTII,,, | Performed by: STUDENT IN AN ORGANIZED HEALTH CARE EDUCATION/TRAINING PROGRAM

## 2022-06-13 PROCEDURE — 3008F BODY MASS INDEX DOCD: CPT | Mod: CPTII,,, | Performed by: STUDENT IN AN ORGANIZED HEALTH CARE EDUCATION/TRAINING PROGRAM

## 2022-06-13 PROCEDURE — 3044F HG A1C LEVEL LT 7.0%: CPT | Mod: CPTII,,, | Performed by: STUDENT IN AN ORGANIZED HEALTH CARE EDUCATION/TRAINING PROGRAM

## 2022-06-13 PROCEDURE — 3008F PR BODY MASS INDEX (BMI) DOCUMENTED: ICD-10-PCS | Mod: CPTII,,, | Performed by: STUDENT IN AN ORGANIZED HEALTH CARE EDUCATION/TRAINING PROGRAM

## 2022-06-13 PROCEDURE — 3044F PR MOST RECENT HEMOGLOBIN A1C LEVEL <7.0%: ICD-10-PCS | Mod: CPTII,,, | Performed by: STUDENT IN AN ORGANIZED HEALTH CARE EDUCATION/TRAINING PROGRAM

## 2022-06-13 PROCEDURE — 3075F SYST BP GE 130 - 139MM HG: CPT | Mod: CPTII,,, | Performed by: STUDENT IN AN ORGANIZED HEALTH CARE EDUCATION/TRAINING PROGRAM

## 2022-06-13 PROCEDURE — 99214 OFFICE O/P EST MOD 30 MIN: CPT | Mod: S$PBB,,, | Performed by: STUDENT IN AN ORGANIZED HEALTH CARE EDUCATION/TRAINING PROGRAM

## 2022-06-13 PROCEDURE — 80053 COMPREHEN METABOLIC PANEL: CPT | Performed by: STUDENT IN AN ORGANIZED HEALTH CARE EDUCATION/TRAINING PROGRAM

## 2022-06-13 PROCEDURE — 99999 PR PBB SHADOW E&M-EST. PATIENT-LVL IV: ICD-10-PCS | Mod: PBBFAC,,, | Performed by: STUDENT IN AN ORGANIZED HEALTH CARE EDUCATION/TRAINING PROGRAM

## 2022-06-13 PROCEDURE — 1159F MED LIST DOCD IN RCRD: CPT | Mod: CPTII,,, | Performed by: STUDENT IN AN ORGANIZED HEALTH CARE EDUCATION/TRAINING PROGRAM

## 2022-06-13 RX ORDER — SODIUM CHLORIDE 0.9 % (FLUSH) 0.9 %
10 SYRINGE (ML) INJECTION
Status: CANCELLED | OUTPATIENT
Start: 2022-06-14

## 2022-06-13 RX ORDER — CLINDAMYCIN HYDROCHLORIDE 150 MG/1
450 CAPSULE ORAL EVERY 8 HOURS
Qty: 45 CAPSULE | Refills: 0 | Status: SHIPPED | OUTPATIENT
Start: 2022-06-13 | End: 2022-06-18

## 2022-06-13 RX ORDER — INDOMETHACIN 50 MG/1
50 CAPSULE ORAL
Qty: 15 CAPSULE | Refills: 0 | Status: SHIPPED | OUTPATIENT
Start: 2022-06-13 | End: 2022-06-18

## 2022-06-13 RX ORDER — HEPARIN 100 UNIT/ML
500 SYRINGE INTRAVENOUS
Status: CANCELLED | OUTPATIENT
Start: 2022-06-16

## 2022-06-13 RX ORDER — PANTOPRAZOLE SODIUM 40 MG/1
40 TABLET, DELAYED RELEASE ORAL DAILY
Qty: 30 TABLET | Refills: 3 | Status: ON HOLD | OUTPATIENT
Start: 2022-06-13 | End: 2022-12-10 | Stop reason: HOSPADM

## 2022-06-13 RX ORDER — HEPARIN 100 UNIT/ML
500 SYRINGE INTRAVENOUS
Status: CANCELLED | OUTPATIENT
Start: 2022-06-14

## 2022-06-13 RX ORDER — HEPARIN 100 UNIT/ML
500 SYRINGE INTRAVENOUS
Status: CANCELLED | OUTPATIENT
Start: 2022-06-15

## 2022-06-13 RX ORDER — SODIUM CHLORIDE 0.9 % (FLUSH) 0.9 %
10 SYRINGE (ML) INJECTION
Status: CANCELLED | OUTPATIENT
Start: 2022-06-16

## 2022-06-13 RX ORDER — ALLOPURINOL 300 MG/1
300 TABLET ORAL DAILY
Qty: 60 TABLET | Refills: 3 | Status: SHIPPED | OUTPATIENT
Start: 2022-06-13 | End: 2023-04-26 | Stop reason: SDUPTHER

## 2022-06-13 RX ORDER — SODIUM CHLORIDE 0.9 % (FLUSH) 0.9 %
10 SYRINGE (ML) INJECTION
Status: CANCELLED | OUTPATIENT
Start: 2022-06-15

## 2022-06-13 NOTE — Clinical Note
Hello,   Follow up -Follow up in 3 weeks on 7/4/22 with cbc, cmp, TSH, Free T4 then MD visit with Dr. Sosa - Schedule cycle 5 carbo/etop/atez on 7/5, 7/6, 7/7 and neulasta on 7/8 at [facility].

## 2022-06-13 NOTE — PROGRESS NOTES
[facility]  Hematology/Medical Oncology Clinic       PATIENT: Juan Gillespie  MRN: [id number]  DATE: 6/13/2022    Reason for referral: Extensive stage small cell lung ca    Initial History: Juan Gillespie is a 57 year old man with extensive stage SCLC who presents to clinic for evaluation prior to treatment with C4 of Carboplatin, etoposide and atezo. His oncologist is Dr. Racheal Jennings.    Oncology history per Dr. Jennings's clinic notes : Patient is a 57-year-old male with history of T2DM, HTN, tobacco use who presented to the ED on 3/19/22 for cough, sob, and pleuritic chest pain.      CT Chest w/ con 3/20:  Mediastinal lymphadenopathy 7.5 cm.  Right middle lobe consolidative opacity 4.6 cm.  5.3 cm liver lesion.    CT A/P 3/23/22: Re-demonstration of hypoattenuating liver lesion, 4.5cm. Two other lesions 2.7 and 2.5 cm.     MRI Brain 3/23/22: Enchacning lesion in the pituitary gland 13mm. 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        Patient underwent diagnostic bronchoscopy with EBUS of RML mass and lymph nodes on 03/22:  Path w/ small cell lung ca.     Oncological History:  -C1 Carbo/Etop/Atez 4/5/22  -C2 Carbo/Etop/Atez 4/27/22  -C3 Carbo/Etop/Atez 5/18/22    Interval History:   Patient presented on 6/8/22 last week but was found to have a dental infection being treated with Clindamycin. Treatment was delayed due to the infection. He has had the affected tooth extracted by his dentist and today reports feeling well apart from right ankle swelling and pain similar to previous gout flares. He ate a big bowl of shrimp which he believes precipitated the attack. His facial swelling has resolved, he has no mouth pain, no fevers or chills.     Past Medical History:   Past Medical History:   Diagnosis Date    Diabetes mellitus, type 2     Hypertension        Past Surgical HIstory:   Past Surgical History:   Procedure Laterality Date    ENDOBRONCHIAL ULTRASOUND N/A  3/22/2022    Procedure: ENDOBRONCHIAL ULTRASOUND (EBUS);  Surgeon: Kristin Samuels MD;  Location: [facility] OR [address] [place] [address];  Service: Endoscopy;  Laterality: N/A;    KNEE SURGERY         Family History:   Family History   Problem Relation Age of Onset    Diabetes Mellitus Mother     Pacemaker/defibrilator Father     Lung cancer Father        Social History:  reports that he has never smoked. He has never used smokeless tobacco. He reports that he does not drink alcohol and does not use drugs.    Allergies:  Review of patient's allergies indicates:  No Known Allergies    Medications:  Current Outpatient Medications   Medication Sig Dispense Refill    albuterol (ACCUNEB) 1.25 mg/3 mL Nebu Use 1 vial (1.25 mg total) by nebulization 3 (three) times daily as needed (shortness of breath). Rescue 90 mL 2    allopurinoL (ZYLOPRIM) 300 MG tablet Take 1 tablet (300 mg total) by mouth once daily. 60 tablet 3    fluticasone propionate (FLONASE) 50 mcg/actuation nasal spray 1 spray (50 mcg total) by Each Nostril route once daily. 15 g 2    hydrOXYzine HCL (ATARAX) 25 MG tablet Take 1 tablet (25 mg total) by mouth 3 (three) times daily as needed for Itching. 21 tablet 0    losartan (COZAAR) 100 MG tablet Take 100 mg by mouth once daily.      NIFEdipine (PROCARDIA-XL) 60 MG (OSM) 24 hr tablet Take 1 tablet (60 mg total) by mouth once daily. 30 tablet 3    ondansetron (ZOFRAN) 8 MG tablet Take 1 tablet (8 mg total) by mouth every 8 (eight) hours as needed for Nausea. 30 tablet 3    TRUE METRIX GLUCOSE TEST STRIP Strp       TRUEPLUS LANCETS 33 gauge Misc       amLODIPine (NORVASC) 5 MG tablet Take 5 mg by mouth once daily.      atorvastatin (LIPITOR) 20 MG tablet Take 20 mg by mouth once daily.      azithromycin (Z-ZEINAB) 250 MG tablet Take by mouth.      clindamycin (CLEOCIN) 150 MG capsule Take 3 capsules (450 mg total) by mouth every 8 (eight) hours. for 5 days 45 capsule 0    colchicine (COLCRYS) 0.6 mg tablet Take by  mouth.      garlic 1,000 mg Cap Take 2,000 mg by mouth.      indomethacin (INDOCIN) 50 MG capsule Take 1 capsule (50 mg total) by mouth 3 (three) times daily with meals. for 5 days 15 capsule 0    montelukast (SINGULAIR) 10 mg tablet Take 10 mg by mouth every evening.      oxyCODONE (ROXICODONE) 5 MG immediate release tablet Take 1 tablet (5 mg total) by mouth every 4 (four) hours as needed for Pain. (Patient not taking: No sig reported) 100 tablet 0    pantoprazole (PROTONIX) 40 MG tablet Take 1 tablet (40 mg total) by mouth once daily. 30 tablet 3    promethazine-codeine 6.25-10 mg/5 ml (PHENERGAN WITH CODEINE) 6.25-10 mg/5 mL syrup Take 5 mLs by mouth 3 (three) times daily as needed for Cough. (Patient not taking: Reported on 6/13/2022) 500 mL 0     No current facility-administered medications for this visit.       Review of Systems   Constitutional: Positive for fatigue. Negative for chills and fever.   HENT: Negative for congestion.    Respiratory: Negative for chest tightness and shortness of breath.    Gastrointestinal: Negative for abdominal pain and blood in stool.   Endocrine: Negative for cold intolerance and heat intolerance.   Genitourinary: Negative for hematuria.   Musculoskeletal: Positive for arthralgias.   Skin: Negative for rash.   Neurological: Negative for weakness.   Hematological: Negative for adenopathy. Does not bruise/bleed easily.   Psychiatric/Behavioral: The patient is not nervous/anxious.      Constitutional: Reports feeling well. Denies fevers, chills.  HENT: Negative for nosebleeds and sore throat.  Hoarseness improved.   Respiratory: Cough improved. Denies SOB.   Cardiovascular:  Negative for palpitations.   Gastrointestinal: Negative for constipation. Negative for abdominal pain, diarrhea, nausea and vomiting.     ECOG Performance Status: 1  Objective:      Vitals:   Vitals:    06/13/22 1435   BP: 136/65   BP Location: Right arm   Patient Position: Sitting   BP Method: Large  "(Automatic)   Pulse: 79   Resp: 16   SpO2: 99%   Weight: 115.4 kg (254 lb 6.6 oz)   Height: 5' 7" (1.702 m)       Physical Exam  Constitutional:       General: He is not in acute distress.     Appearance: He is not ill-appearing.   HENT:      Head: Normocephalic and atraumatic.      Mouth/Throat:      Mouth: Mucous membranes are moist.      Comments: Right side of face less swollen than last week.   Eyes:      General: No scleral icterus.  Cardiovascular:      Rate and Rhythm: Normal rate and regular rhythm.   Pulmonary:      Effort: Pulmonary effort is normal. No respiratory distress.   Abdominal:      General: Abdomen is flat. There is no distension.      Tenderness: There is no abdominal tenderness.   Musculoskeletal:         General: Normal range of motion.      Cervical back: Normal range of motion.      Comments: Right ankle swollen   Lymphadenopathy:      Cervical: No cervical adenopathy.   Skin:     General: Skin is warm and dry.   Neurological:      General: No focal deficit present.      Mental Status: He is alert and oriented to person, place, and time. Mental status is at baseline.      Motor: No weakness.   Psychiatric:         Mood and Affect: Mood normal.       Constitutional:       Appearance: Hoarse voice. On 2L O2 via NC.  Able to speak in full sentences.  Appears comfortable.  HENT:      Head: Normocephalic and atraumatic.      Mouth/Throat:      Mouth: Mucous membranes are moist.   Eyes:      Extraocular Movements: Extraocular movements intact.   Cardiovascular:      Rate and Rhythm: Normal rate and regular rhythm.      Pulses: Normal pulses.      Heart sounds: Normal heart sounds.   Pulmonary:      Effort: Pulmonary effort is normal.      Breath sounds: Crackles over bases.   Abdominal:      Palpations: Abdomen is soft.   Musculoskeletal:      Cervical back: Normal range of motion and neck supple.   Skin:     General: Skin is warm.   Neurological:      General: No focal deficit present.      " Mental Status: He is alert.    Laboratory Data:  Lab Visit on 06/13/2022   Component Date Value Ref Range Status    WBC 06/13/2022 13.60 (A) 3.90 - 12.70 K/uL Final    RBC 06/13/2022 3.88 (A) 4.60 - 6.20 M/uL Final    Hemoglobin 06/13/2022 11.8 (A) 14.0 - 18.0 g/dL Final    Hematocrit 06/13/2022 36.7 (A) 40.0 - 54.0 % Final    MCV 06/13/2022 95  82 - 98 fL Final    MCH 06/13/2022 30.4  27.0 - 31.0 pg Final    MCHC 06/13/2022 32.2  32.0 - 36.0 g/dL Final    RDW 06/13/2022 16.2 (A) 11.5 - 14.5 % Final    Platelets 06/13/2022 386  150 - 450 K/uL Final    MPV 06/13/2022 9.5  9.2 - 12.9 fL Final    Immature Granulocytes 06/13/2022 0.7 (A) 0.0 - 0.5 % Final    Gran # (ANC) 06/13/2022 10.2 (A) 1.8 - 7.7 K/uL Final    Immature Grans (Abs) 06/13/2022 0.09 (A) 0.00 - 0.04 K/uL Final    Comment: Mild elevation in immature granulocytes is non specific and   can be seen in a variety of conditions including stress response,   acute inflammation, trauma and pregnancy. Correlation with other   laboratory and clinical findings is essential.      Lymph # 06/13/2022 1.6  1.0 - 4.8 K/uL Final    Mono # 06/13/2022 1.3 (A) 0.3 - 1.0 K/uL Final    Eos # 06/13/2022 0.2  0.0 - 0.5 K/uL Final    Baso # 06/13/2022 0.15  0.00 - 0.20 K/uL Final    nRBC 06/13/2022 0  0 /100 WBC Final    Gran % 06/13/2022 75.2 (A) 38.0 - 73.0 % Final    Lymph % 06/13/2022 11.8 (A) 18.0 - 48.0 % Final    Mono % 06/13/2022 9.6  4.0 - 15.0 % Final    Eosinophil % 06/13/2022 1.6  0.0 - 8.0 % Final    Basophil % 06/13/2022 1.1  0.0 - 1.9 % Final    Differential Method 06/13/2022 Automated   Final    Sodium 06/13/2022 136  136 - 145 mmol/L Final    Potassium 06/13/2022 4.4  3.5 - 5.1 mmol/L Final    Chloride 06/13/2022 101  95 - 110 mmol/L Final    CO2 06/13/2022 23  23 - 29 mmol/L Final    Glucose 06/13/2022 108  70 - 110 mg/dL Final    BUN 06/13/2022 15  6 - 20 mg/dL Final    Creatinine 06/13/2022 1.0  0.5 - 1.4 mg/dL Final    Calcium  06/13/2022 9.4  8.7 - 10.5 mg/dL Final    Total Protein 06/13/2022 7.4  6.0 - 8.4 g/dL Final    Albumin 06/13/2022 3.7  3.5 - 5.2 g/dL Final    Total Bilirubin 06/13/2022 0.6  0.1 - 1.0 mg/dL Final    Comment: For infants and newborns, interpretation of results should be based  on gestational age, weight and in agreement with clinical  observations.    Premature Infant recommended reference ranges:  Up to 24 hours.............<8.0 mg/dL  Up to 48 hours............<12.0 mg/dL  3-5 days..................<15.0 mg/dL  6-29 days.................<15.0 mg/dL      Alkaline Phosphatase 06/13/2022 166 (A) 55 - 135 U/L Final    AST 06/13/2022 17  10 - 40 U/L Final    ALT 06/13/2022 22  10 - 44 U/L Final    Anion Gap 06/13/2022 12  8 - 16 mmol/L Final    eGFR if African American 06/13/2022 >60.0  >60 mL/min/1.73 m^2 Final    eGFR if non African American 06/13/2022 >60.0  >60 mL/min/1.73 m^2 Final    Comment: Calculation used to obtain the estimated glomerular filtration  rate (eGFR) is the CKD-EPI equation.      Lab Visit on 06/08/2022   Component Date Value Ref Range Status    WBC 06/08/2022 13.50 (A) 3.90 - 12.70 K/uL Final    RBC 06/08/2022 3.87 (A) 4.60 - 6.20 M/uL Final    Hemoglobin 06/08/2022 11.6 (A) 14.0 - 18.0 g/dL Final    Hematocrit 06/08/2022 36.7 (A) 40.0 - 54.0 % Final    MCV 06/08/2022 95  82 - 98 fL Final    MCH 06/08/2022 30.0  27.0 - 31.0 pg Final    MCHC 06/08/2022 31.6 (A) 32.0 - 36.0 g/dL Final    RDW 06/08/2022 16.6 (A) 11.5 - 14.5 % Final    Platelets 06/08/2022 402  150 - 450 K/uL Final    MPV 06/08/2022 9.4  9.2 - 12.9 fL Final    Immature Granulocytes 06/08/2022 2.2 (A) 0.0 - 0.5 % Final    Gran # (ANC) 06/08/2022 9.9 (A) 1.8 - 7.7 K/uL Final    Immature Grans (Abs) 06/08/2022 0.30 (A) 0.00 - 0.04 K/uL Final    Comment: Mild elevation in immature granulocytes is non specific and   can be seen in a variety of conditions including stress response,   acute inflammation, trauma and  pregnancy. Correlation with other   laboratory and clinical findings is essential.      Lymph # 06/08/2022 1.8  1.0 - 4.8 K/uL Final    Mono # 06/08/2022 1.0  0.3 - 1.0 K/uL Final    Eos # 06/08/2022 0.3  0.0 - 0.5 K/uL Final    Baso # 06/08/2022 0.15  0.00 - 0.20 K/uL Final    nRBC 06/08/2022 0  0 /100 WBC Final    Gran % 06/08/2022 73.4 (A) 38.0 - 73.0 % Final    Lymph % 06/08/2022 13.3 (A) 18.0 - 48.0 % Final    Mono % 06/08/2022 7.6  4.0 - 15.0 % Final    Eosinophil % 06/08/2022 2.4  0.0 - 8.0 % Final    Basophil % 06/08/2022 1.1  0.0 - 1.9 % Final    Differential Method 06/08/2022 Automated   Final    Sodium 06/08/2022 137  136 - 145 mmol/L Final    Potassium 06/08/2022 4.5  3.5 - 5.1 mmol/L Final    Chloride 06/08/2022 103  95 - 110 mmol/L Final    CO2 06/08/2022 23  23 - 29 mmol/L Final    Glucose 06/08/2022 226 (A) 70 - 110 mg/dL Final    BUN 06/08/2022 12  6 - 20 mg/dL Final    Creatinine 06/08/2022 1.3  0.5 - 1.4 mg/dL Final    Calcium 06/08/2022 9.7  8.7 - 10.5 mg/dL Final    Total Protein 06/08/2022 7.2  6.0 - 8.4 g/dL Final    Albumin 06/08/2022 3.5  3.5 - 5.2 g/dL Final    Total Bilirubin 06/08/2022 0.5  0.1 - 1.0 mg/dL Final    Comment: For infants and newborns, interpretation of results should be based  on gestational age, weight and in agreement with clinical  observations.    Premature Infant recommended reference ranges:  Up to 24 hours.............<8.0 mg/dL  Up to 48 hours............<12.0 mg/dL  3-5 days..................<15.0 mg/dL  6-29 days.................<15.0 mg/dL      Alkaline Phosphatase 06/08/2022 191 (A) 55 - 135 U/L Final    AST 06/08/2022 16  10 - 40 U/L Final    ALT 06/08/2022 23  10 - 44 U/L Final    Anion Gap 06/08/2022 11  8 - 16 mmol/L Final    eGFR if African American 06/08/2022 >60.0  >60 mL/min/1.73 m^2 Final    eGFR if non African American 06/08/2022 >60.0  >60 mL/min/1.73 m^2 Final    Comment: Calculation used to obtain the estimated glomerular  filtration  rate (eGFR) is the CKD-EPI equation.       TSH 06/08/2022 1.057  0.400 - 4.000 uIU/mL Final    Free T4 06/08/2022 0.78  0.71 - 1.51 ng/dL Final   Admission on 06/07/2022, Discharged on 06/07/2022   Component Date Value Ref Range Status    Albumin, POC 06/07/2022 3.8  3.3 - 5.5 g/dL Final    Alkaline Phosphatase, POC 06/07/2022 182 (A) 42 - 141 U/L Final    ALT (SGPT), POC 06/07/2022 31  10 - 47 U/L Final    AST (SGOT), POC 06/07/2022 30  11 - 38 U/L Final    POC BUN 06/07/2022 13  7 - 22 mg/dL Final    Calcium, POC 06/07/2022 9.9  8.0 - 10.3 mg/dL Final    POC Chloride 06/07/2022 99  98 - 108 mmol/L Final    POC Creatinine 06/07/2022 0.9  0.6 - 1.2 mg/dL Final    POC Glucose 06/07/2022 109  73 - 118 mg/dL Final    POC Potassium 06/07/2022 4.3  3.6 - 5.1 mmol/L Final    POC Sodium 06/07/2022 140  128 - 145 mmol/L Final    Bilirubin, POC 06/07/2022 0.8  0.2 - 1.6 mg/dL Final    POC TCO2 06/07/2022 26  18 - 33 mmol/L Final    Protein, POC 06/07/2022 7.4  6.4 - 8.1 g/dL Final     Imaging: All pertinent imaging reviewed    Assessment and Plan        1. Small cell lung cancer    2. Secondary malignant neoplasm of liver    3. 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 Dental infection    6. Gout, unspecified cause, unspecified chronicity, unspecified site      1 , 2 , 3 , 4 ,  Extensive stage small cell lung ca w. mets to the liver     - At initial visit with Dr. Jennings, pt was consented for carbo/etoposide/atezolizumab for 6 cycles over a 21 day period followed by atezolizumab maintenance   - Discussed this is not curative intent. Palliative for life prolongation and quality of life.   - Prescribed zofran for nausea  -Oxycodone IR 5mg q6h prn for pain  -Port placement with IR 4/1/22  - Infection has improved - OK to proceed with C4 of carbo/etoposide/atezolizumab on 6/14. He will receive neulasta on day 4.    Dental Infection    Improved, sp 5  out of 7d course of clindamycin  Tooth has been extracted by dentist.     Gout Flare    3-5d course of Indomethacin 500mg TID.   Refilled Allopurinol.   Steroids tomorrow with infusion.  Advised dietary adherence.     Follow up  -Follow up in 3 weeks on 7/4/22 with cbc, cmp, TSH, Free T4 then MD visit with Dr. Sosa  - Schedule cycle 5 carbo/etop/atez on 7/5, 7/6, 7/7 and neulasta on 7/8.    The patient was discussed and examined with the attending physician Dr. Murray.     Kiera Byers MD  Clinical Fellow  Hematology and Medical Oncology.  06/13/2022

## 2022-06-13 NOTE — Clinical Note
Samir Sosa, this is rubys pt with extensive stage sclc who I saw this week before c4 of carbo/etop/atezo.  Is it possible for you to see him in your clinic before C5? Would need to be seen on 7/4? If OK I will go ahead and ask schedulers to book.  Thanks - chetan

## 2022-06-14 ENCOUNTER — INFUSION (OUTPATIENT)
Dept: INFUSION THERAPY | Facility: HOSPITAL | Age: 57
End: 2022-06-14
Attending: INTERNAL MEDICINE
Payer: MEDICAID

## 2022-06-14 VITALS
RESPIRATION RATE: 16 BRPM | DIASTOLIC BLOOD PRESSURE: 83 MMHG | TEMPERATURE: 99 F | SYSTOLIC BLOOD PRESSURE: 154 MMHG | HEART RATE: 80 BPM | OXYGEN SATURATION: 98 %

## 2022-06-14 DIAGNOSIS — C34.90 SMALL CELL LUNG CANCER: Primary | ICD-10-CM

## 2022-06-14 DIAGNOSIS — C78.7 SECONDARY MALIGNANT NEOPLASM OF LIVER: ICD-10-CM

## 2022-06-14 PROCEDURE — 25000003 PHARM REV CODE 250: Performed by: INTERNAL MEDICINE

## 2022-06-14 PROCEDURE — 96365 THER/PROPH/DIAG IV INF INIT: CPT

## 2022-06-14 PROCEDURE — 96417 CHEMO IV INFUS EACH ADDL SEQ: CPT

## 2022-06-14 PROCEDURE — 63600175 PHARM REV CODE 636 W HCPCS: Performed by: INTERNAL MEDICINE

## 2022-06-14 PROCEDURE — 96375 TX/PRO/DX INJ NEW DRUG ADDON: CPT

## 2022-06-14 PROCEDURE — 96374 THER/PROPH/DIAG INJ IV PUSH: CPT

## 2022-06-14 PROCEDURE — 96367 TX/PROPH/DG ADDL SEQ IV INF: CPT

## 2022-06-14 PROCEDURE — A4216 STERILE WATER/SALINE, 10 ML: HCPCS | Performed by: INTERNAL MEDICINE

## 2022-06-14 PROCEDURE — 96413 CHEMO IV INFUSION 1 HR: CPT

## 2022-06-14 RX ORDER — HEPARIN 100 UNIT/ML
500 SYRINGE INTRAVENOUS
Status: DISCONTINUED | OUTPATIENT
Start: 2022-06-14 | End: 2022-06-14 | Stop reason: HOSPADM

## 2022-06-14 RX ORDER — SODIUM CHLORIDE 0.9 % (FLUSH) 0.9 %
10 SYRINGE (ML) INJECTION
Status: DISCONTINUED | OUTPATIENT
Start: 2022-06-14 | End: 2022-06-14 | Stop reason: HOSPADM

## 2022-06-14 RX ADMIN — HEPARIN 500 UNITS: 100 SYRINGE at 01:06

## 2022-06-14 RX ADMIN — APREPITANT 130 MG: 130 INJECTION, EMULSION INTRAVENOUS at 10:06

## 2022-06-14 RX ADMIN — CARBOPLATIN 750 MG: 10 INJECTION, SOLUTION INTRAVENOUS at 11:06

## 2022-06-14 RX ADMIN — Medication 10 ML: at 01:06

## 2022-06-14 RX ADMIN — ATEZOLIZUMAB 1200 MG: 1200 INJECTION, SOLUTION INTRAVENOUS at 10:06

## 2022-06-14 RX ADMIN — ETOPOSIDE 236 MG: 20 INJECTION INTRAVENOUS at 12:06

## 2022-06-14 RX ADMIN — DEXAMETHASONE SODIUM PHOSPHATE 10 MG: 10 INJECTION, SOLUTION INTRAMUSCULAR; INTRAVENOUS at 11:06

## 2022-06-14 RX ADMIN — DEXAMETHASONE SODIUM PHOSPHATE 0.25 MG: 10 INJECTION, SOLUTION INTRAMUSCULAR; INTRAVENOUS at 10:06

## 2022-06-14 NOTE — PLAN OF CARE
Pt arrived to unit for C4D1 of Atezo, Carbo, and VP16. Tx was delayed from last week due to pt dealing with facial swelling due to a chipped tooth. Was given antibiotics and completed. Also reports pain in his ankles due to a gout flare up. Pt received extra dose of IV Dexamethasone while here. VSS. Received Tecentriq over 30 minutes. Then given pre-meds of Cinvanti IVP and Aloxi/Dex. Then received Carbo and Etoposide. Tolerated all infusions well. Instructed to return tomorrow for D2. Verbalized understanding. Left unit accompanied by spouse in NAD at time of discharge.

## 2022-06-15 ENCOUNTER — INFUSION (OUTPATIENT)
Dept: INFUSION THERAPY | Facility: HOSPITAL | Age: 57
End: 2022-06-15
Attending: INTERNAL MEDICINE
Payer: MEDICAID

## 2022-06-15 VITALS
TEMPERATURE: 98 F | HEART RATE: 90 BPM | OXYGEN SATURATION: 98 % | DIASTOLIC BLOOD PRESSURE: 60 MMHG | RESPIRATION RATE: 16 BRPM | SYSTOLIC BLOOD PRESSURE: 128 MMHG

## 2022-06-15 DIAGNOSIS — C34.90 SMALL CELL LUNG CANCER: Primary | ICD-10-CM

## 2022-06-15 DIAGNOSIS — C78.7 SECONDARY MALIGNANT NEOPLASM OF LIVER: ICD-10-CM

## 2022-06-15 PROCEDURE — A4216 STERILE WATER/SALINE, 10 ML: HCPCS | Performed by: INTERNAL MEDICINE

## 2022-06-15 PROCEDURE — 25000003 PHARM REV CODE 250: Performed by: INTERNAL MEDICINE

## 2022-06-15 PROCEDURE — 63600175 PHARM REV CODE 636 W HCPCS: Performed by: INTERNAL MEDICINE

## 2022-06-15 PROCEDURE — 96413 CHEMO IV INFUSION 1 HR: CPT

## 2022-06-15 RX ORDER — SODIUM CHLORIDE 0.9 % (FLUSH) 0.9 %
10 SYRINGE (ML) INJECTION
Status: DISCONTINUED | OUTPATIENT
Start: 2022-06-15 | End: 2022-06-15 | Stop reason: HOSPADM

## 2022-06-15 RX ORDER — HEPARIN 100 UNIT/ML
500 SYRINGE INTRAVENOUS
Status: DISCONTINUED | OUTPATIENT
Start: 2022-06-15 | End: 2022-06-15 | Stop reason: HOSPADM

## 2022-06-15 RX ADMIN — ETOPOSIDE 236 MG: 20 INJECTION INTRAVENOUS at 12:06

## 2022-06-15 RX ADMIN — HEPARIN 500 UNITS: 100 SYRINGE at 01:06

## 2022-06-15 RX ADMIN — Medication 10 ML: at 01:06

## 2022-06-15 NOTE — PLAN OF CARE
Pt arrived to unit for C4D2 of Etoposide. Endorsing nausea today. VSS. Etoposide given over 1 hour. Tolerated well without issues. Instructed to return tomorrow for D3 of chemo. Discharged from unit in NAD.

## 2022-06-16 ENCOUNTER — INFUSION (OUTPATIENT)
Dept: INFUSION THERAPY | Facility: HOSPITAL | Age: 57
End: 2022-06-16
Attending: INTERNAL MEDICINE
Payer: MEDICAID

## 2022-06-16 VITALS
TEMPERATURE: 99 F | HEART RATE: 78 BPM | RESPIRATION RATE: 16 BRPM | DIASTOLIC BLOOD PRESSURE: 63 MMHG | OXYGEN SATURATION: 96 % | SYSTOLIC BLOOD PRESSURE: 131 MMHG

## 2022-06-16 DIAGNOSIS — C34.90 SMALL CELL LUNG CANCER: Primary | ICD-10-CM

## 2022-06-16 DIAGNOSIS — C78.7 SECONDARY MALIGNANT NEOPLASM OF LIVER: ICD-10-CM

## 2022-06-16 PROCEDURE — 63600175 PHARM REV CODE 636 W HCPCS: Performed by: INTERNAL MEDICINE

## 2022-06-16 PROCEDURE — 96374 THER/PROPH/DIAG INJ IV PUSH: CPT

## 2022-06-16 PROCEDURE — A4216 STERILE WATER/SALINE, 10 ML: HCPCS | Performed by: INTERNAL MEDICINE

## 2022-06-16 PROCEDURE — 25000003 PHARM REV CODE 250: Performed by: INTERNAL MEDICINE

## 2022-06-16 PROCEDURE — 96413 CHEMO IV INFUSION 1 HR: CPT

## 2022-06-16 PROCEDURE — 96375 TX/PRO/DX INJ NEW DRUG ADDON: CPT

## 2022-06-16 RX ORDER — HEPARIN 100 UNIT/ML
500 SYRINGE INTRAVENOUS
Status: DISCONTINUED | OUTPATIENT
Start: 2022-06-16 | End: 2022-06-16 | Stop reason: HOSPADM

## 2022-06-16 RX ORDER — SODIUM CHLORIDE 0.9 % (FLUSH) 0.9 %
10 SYRINGE (ML) INJECTION
Status: DISCONTINUED | OUTPATIENT
Start: 2022-06-16 | End: 2022-06-16 | Stop reason: HOSPADM

## 2022-06-16 RX ORDER — ONDANSETRON 2 MG/ML
8 INJECTION INTRAMUSCULAR; INTRAVENOUS ONCE
Status: COMPLETED | OUTPATIENT
Start: 2022-06-16 | End: 2022-06-16

## 2022-06-16 RX ADMIN — Medication 10 ML: at 01:06

## 2022-06-16 RX ADMIN — ONDANSETRON 8 MG: 2 INJECTION INTRAMUSCULAR; INTRAVENOUS at 12:06

## 2022-06-16 RX ADMIN — ETOPOSIDE 236 MG: 20 INJECTION INTRAVENOUS at 12:06

## 2022-06-16 RX ADMIN — HEPARIN 500 UNITS: 100 SYRINGE at 01:06

## 2022-06-16 NOTE — PLAN OF CARE
Pt arrived to unit for C4D3 of Etoposide. Endorsing nausea today. VSS. Etoposide given over 1 hour. Tolerated well without issues. Instructed to return tomorrow for D4 for his Fulphila injection. Discharged from unit in NAD.

## 2022-06-17 ENCOUNTER — INFUSION (OUTPATIENT)
Dept: INFUSION THERAPY | Facility: HOSPITAL | Age: 57
End: 2022-06-17
Attending: INTERNAL MEDICINE
Payer: MEDICAID

## 2022-06-17 DIAGNOSIS — C78.7 SECONDARY MALIGNANT NEOPLASM OF LIVER: ICD-10-CM

## 2022-06-17 DIAGNOSIS — C34.90 SMALL CELL LUNG CANCER: Primary | ICD-10-CM

## 2022-06-17 PROCEDURE — 96372 THER/PROPH/DIAG INJ SC/IM: CPT

## 2022-06-17 PROCEDURE — 63600175 PHARM REV CODE 636 W HCPCS: Mod: TB | Performed by: INTERNAL MEDICINE

## 2022-06-17 RX ADMIN — PEGFILGRASTIM 6 MG: 6 INJECTION SUBCUTANEOUS at 01:06

## 2022-06-17 NOTE — PLAN OF CARE
Pt received C4D4 of his Fulphila injection. Endorsing generalized fatigue along with occasional weakness today. VSS. Tolerated injection well. [facility] Discharged from unit in [facility].

## 2022-06-24 ENCOUNTER — PATIENT MESSAGE (OUTPATIENT)
Dept: HEMATOLOGY/ONCOLOGY | Facility: CLINIC | Age: 57
End: 2022-06-24
Payer: MEDICAID

## 2022-06-30 ENCOUNTER — LAB VISIT (OUTPATIENT)
Dept: LAB | Facility: HOSPITAL | Age: 57
End: 2022-06-30
Attending: INTERNAL MEDICINE
Payer: MEDICAID

## 2022-06-30 DIAGNOSIS — C34.90 SMALL CELL LUNG CANCER: ICD-10-CM

## 2022-06-30 LAB
ALBUMIN SERPL BCP-MCNC: 3.8 G/DL (ref 3.5–5.2)
ALP SERPL-CCNC: 220 U/L (ref 55–135)
ALT SERPL W/O P-5'-P-CCNC: 29 U/L (ref 10–44)
ANION GAP SERPL CALC-SCNC: 6 MMOL/L (ref 8–16)
AST SERPL-CCNC: 22 U/L (ref 10–40)
BASOPHILS # BLD AUTO: ABNORMAL K/UL (ref 0–0.2)
BASOPHILS NFR BLD: 3 % (ref 0–1.9)
BILIRUB SERPL-MCNC: 0.4 MG/DL (ref 0.1–1)
BUN SERPL-MCNC: 17 MG/DL (ref 6–20)
CALCIUM SERPL-MCNC: 9.4 MG/DL (ref 8.7–10.5)
CHLORIDE SERPL-SCNC: 102 MMOL/L (ref 95–110)
CO2 SERPL-SCNC: 29 MMOL/L (ref 23–29)
CREAT SERPL-MCNC: 1.1 MG/DL (ref 0.5–1.4)
DIFFERENTIAL METHOD: ABNORMAL
EOSINOPHIL # BLD AUTO: ABNORMAL K/UL (ref 0–0.5)
EOSINOPHIL NFR BLD: 2 % (ref 0–8)
ERYTHROCYTE [DISTWIDTH] IN BLOOD BY AUTOMATED COUNT: 16.1 % (ref 11.5–14.5)
EST. GFR  (AFRICAN AMERICAN): >60 ML/MIN/1.73 M^2
EST. GFR  (NON AFRICAN AMERICAN): >60 ML/MIN/1.73 M^2
GLUCOSE SERPL-MCNC: 132 MG/DL (ref 70–110)
HCT VFR BLD AUTO: 36.8 % (ref 40–54)
HGB BLD-MCNC: 11.8 G/DL (ref 14–18)
IMM GRANULOCYTES # BLD AUTO: ABNORMAL K/UL (ref 0–0.04)
IMM GRANULOCYTES NFR BLD AUTO: ABNORMAL % (ref 0–0.5)
LYMPHOCYTES # BLD AUTO: ABNORMAL K/UL (ref 1–4.8)
LYMPHOCYTES NFR BLD: 18 % (ref 18–48)
MCH RBC QN AUTO: 30.6 PG (ref 27–31)
MCHC RBC AUTO-ENTMCNC: 32.1 G/DL (ref 32–36)
MCV RBC AUTO: 95 FL (ref 82–98)
METAMYELOCYTES NFR BLD MANUAL: 4 %
MONOCYTES # BLD AUTO: ABNORMAL K/UL (ref 0.3–1)
MONOCYTES NFR BLD: 6 % (ref 4–15)
NEUTROPHILS NFR BLD: 60 % (ref 38–73)
NEUTS BAND NFR BLD MANUAL: 7 %
NRBC BLD-RTO: 0 /100 WBC
PLATELET # BLD AUTO: 283 K/UL (ref 150–450)
PLATELET BLD QL SMEAR: ABNORMAL
PMV BLD AUTO: 9.1 FL (ref 9.2–12.9)
POLYCHROMASIA BLD QL SMEAR: ABNORMAL
POTASSIUM SERPL-SCNC: 5.1 MMOL/L (ref 3.5–5.1)
PROT SERPL-MCNC: 7.4 G/DL (ref 6–8.4)
RBC # BLD AUTO: 3.86 M/UL (ref 4.6–6.2)
SODIUM SERPL-SCNC: 137 MMOL/L (ref 136–145)
T4 FREE SERPL-MCNC: 0.99 NG/DL (ref 0.71–1.51)
TSH SERPL DL<=0.005 MIU/L-ACNC: 1.4 UIU/ML (ref 0.4–4)
WBC # BLD AUTO: 10.9 K/UL (ref 3.9–12.7)

## 2022-06-30 PROCEDURE — 84439 ASSAY OF FREE THYROXINE: CPT | Performed by: INTERNAL MEDICINE

## 2022-06-30 PROCEDURE — 36415 COLL VENOUS BLD VENIPUNCTURE: CPT | Performed by: INTERNAL MEDICINE

## 2022-06-30 PROCEDURE — 84443 ASSAY THYROID STIM HORMONE: CPT | Performed by: INTERNAL MEDICINE

## 2022-06-30 PROCEDURE — 80053 COMPREHEN METABOLIC PANEL: CPT | Performed by: INTERNAL MEDICINE

## 2022-06-30 PROCEDURE — 85007 BL SMEAR W/DIFF WBC COUNT: CPT | Performed by: INTERNAL MEDICINE

## 2022-06-30 PROCEDURE — 85027 COMPLETE CBC AUTOMATED: CPT | Performed by: INTERNAL MEDICINE

## 2022-07-01 ENCOUNTER — OFFICE VISIT (OUTPATIENT)
Dept: HEMATOLOGY/ONCOLOGY | Facility: CLINIC | Age: 57
End: 2022-07-01
Payer: MEDICAID

## 2022-07-01 VITALS
BODY MASS INDEX: 39.65 KG/M2 | WEIGHT: 252.63 LBS | SYSTOLIC BLOOD PRESSURE: 191 MMHG | OXYGEN SATURATION: 98 % | DIASTOLIC BLOOD PRESSURE: 98 MMHG | TEMPERATURE: 98 F | HEIGHT: 67 IN | HEART RATE: 92 BPM

## 2022-07-01 DIAGNOSIS — J96.01 ACUTE HYPOXEMIC RESPIRATORY FAILURE: ICD-10-CM

## 2022-07-01 DIAGNOSIS — C34.90 SMALL CELL LUNG CANCER: Primary | ICD-10-CM

## 2022-07-01 DIAGNOSIS — C77.1 SECONDARY AND UNSPECIFIED MALIGNANT NEOPLASM OF INTRATHORACIC LYMPH NODES: ICD-10-CM

## 2022-07-01 DIAGNOSIS — C78.7 SECONDARY MALIGNANT NEOPLASM OF LIVER: ICD-10-CM

## 2022-07-01 PROCEDURE — 3077F PR MOST RECENT SYSTOLIC BLOOD PRESSURE >= 140 MM HG: ICD-10-PCS | Mod: CPTII,,, | Performed by: STUDENT IN AN ORGANIZED HEALTH CARE EDUCATION/TRAINING PROGRAM

## 2022-07-01 PROCEDURE — 3077F SYST BP >= 140 MM HG: CPT | Mod: CPTII,,, | Performed by: STUDENT IN AN ORGANIZED HEALTH CARE EDUCATION/TRAINING PROGRAM

## 2022-07-01 PROCEDURE — 99215 PR OFFICE/OUTPT VISIT, EST, LEVL V, 40-54 MIN: ICD-10-PCS | Mod: S$PBB,,, | Performed by: STUDENT IN AN ORGANIZED HEALTH CARE EDUCATION/TRAINING PROGRAM

## 2022-07-01 PROCEDURE — 4010F PR ACE/ARB THEARPY RXD/TAKEN: ICD-10-PCS | Mod: CPTII,,, | Performed by: STUDENT IN AN ORGANIZED HEALTH CARE EDUCATION/TRAINING PROGRAM

## 2022-07-01 PROCEDURE — 1159F MED LIST DOCD IN RCRD: CPT | Mod: CPTII,,, | Performed by: STUDENT IN AN ORGANIZED HEALTH CARE EDUCATION/TRAINING PROGRAM

## 2022-07-01 PROCEDURE — 99215 OFFICE O/P EST HI 40 MIN: CPT | Mod: PBBFAC | Performed by: STUDENT IN AN ORGANIZED HEALTH CARE EDUCATION/TRAINING PROGRAM

## 2022-07-01 PROCEDURE — 3080F DIAST BP >= 90 MM HG: CPT | Mod: CPTII,,, | Performed by: STUDENT IN AN ORGANIZED HEALTH CARE EDUCATION/TRAINING PROGRAM

## 2022-07-01 PROCEDURE — 3044F HG A1C LEVEL LT 7.0%: CPT | Mod: CPTII,,, | Performed by: STUDENT IN AN ORGANIZED HEALTH CARE EDUCATION/TRAINING PROGRAM

## 2022-07-01 PROCEDURE — 3044F PR MOST RECENT HEMOGLOBIN A1C LEVEL <7.0%: ICD-10-PCS | Mod: CPTII,,, | Performed by: STUDENT IN AN ORGANIZED HEALTH CARE EDUCATION/TRAINING PROGRAM

## 2022-07-01 PROCEDURE — 99215 OFFICE O/P EST HI 40 MIN: CPT | Mod: S$PBB,,, | Performed by: STUDENT IN AN ORGANIZED HEALTH CARE EDUCATION/TRAINING PROGRAM

## 2022-07-01 PROCEDURE — 99999 PR PBB SHADOW E&M-EST. PATIENT-LVL V: CPT | Mod: PBBFAC,,, | Performed by: STUDENT IN AN ORGANIZED HEALTH CARE EDUCATION/TRAINING PROGRAM

## 2022-07-01 PROCEDURE — 99999 PR PBB SHADOW E&M-EST. PATIENT-LVL V: ICD-10-PCS | Mod: PBBFAC,,, | Performed by: STUDENT IN AN ORGANIZED HEALTH CARE EDUCATION/TRAINING PROGRAM

## 2022-07-01 PROCEDURE — 1159F PR MEDICATION LIST DOCUMENTED IN MEDICAL RECORD: ICD-10-PCS | Mod: CPTII,,, | Performed by: STUDENT IN AN ORGANIZED HEALTH CARE EDUCATION/TRAINING PROGRAM

## 2022-07-01 PROCEDURE — 3008F BODY MASS INDEX DOCD: CPT | Mod: CPTII,,, | Performed by: STUDENT IN AN ORGANIZED HEALTH CARE EDUCATION/TRAINING PROGRAM

## 2022-07-01 PROCEDURE — 3080F PR MOST RECENT DIASTOLIC BLOOD PRESSURE >= 90 MM HG: ICD-10-PCS | Mod: CPTII,,, | Performed by: STUDENT IN AN ORGANIZED HEALTH CARE EDUCATION/TRAINING PROGRAM

## 2022-07-01 PROCEDURE — 4010F ACE/ARB THERAPY RXD/TAKEN: CPT | Mod: CPTII,,, | Performed by: STUDENT IN AN ORGANIZED HEALTH CARE EDUCATION/TRAINING PROGRAM

## 2022-07-01 PROCEDURE — 1160F RVW MEDS BY RX/DR IN RCRD: CPT | Mod: CPTII,,, | Performed by: STUDENT IN AN ORGANIZED HEALTH CARE EDUCATION/TRAINING PROGRAM

## 2022-07-01 PROCEDURE — 1160F PR REVIEW ALL MEDS BY PRESCRIBER/CLIN PHARMACIST DOCUMENTED: ICD-10-PCS | Mod: CPTII,,, | Performed by: STUDENT IN AN ORGANIZED HEALTH CARE EDUCATION/TRAINING PROGRAM

## 2022-07-01 PROCEDURE — 3008F PR BODY MASS INDEX (BMI) DOCUMENTED: ICD-10-PCS | Mod: CPTII,,, | Performed by: STUDENT IN AN ORGANIZED HEALTH CARE EDUCATION/TRAINING PROGRAM

## 2022-07-01 RX ORDER — SODIUM CHLORIDE 0.9 % (FLUSH) 0.9 %
10 SYRINGE (ML) INJECTION
Status: CANCELLED | OUTPATIENT
Start: 2022-07-05

## 2022-07-01 RX ORDER — ONDANSETRON 2 MG/ML
8 INJECTION INTRAMUSCULAR; INTRAVENOUS
Status: CANCELLED | OUTPATIENT
Start: 2022-07-05

## 2022-07-01 RX ORDER — HEPARIN 100 UNIT/ML
500 SYRINGE INTRAVENOUS
Status: CANCELLED | OUTPATIENT
Start: 2022-07-05

## 2022-07-01 NOTE — Clinical Note
When to follow up: 7/26 virtual visit (prior to chemo, add to main campus schedule) Labs needed: [facility] 7/26 port draw CBC, Comprehensive Metabolic Panel, and TSH  Images: 7/26 ct chest abd pelvis ([facility]) Chemotherapy Regimen:  Next Treatment Date Upcoming Treatment Dates - OP ATEZOLIZUMAB maintenance Q3W 7/5/2022      atezolizumab (TECENTRIQ) 1,200 mg in sodium chloride 0.9% 270 mL infusion 7/26/2022      atezolizumab (TECENTRIQ) 1,200 mg in sodium chloride 0.9% 270 mL infusion 8/16/2022      atezolizumab (TECENTRIQ) 1,200 mg in sodium chloride 0.9% 270 mL infusion  Provider: Sheila

## 2022-07-02 PROBLEM — C77.1 SECONDARY AND UNSPECIFIED MALIGNANT NEOPLASM OF INTRATHORACIC LYMPH NODES: Status: ACTIVE | Noted: 2022-07-02

## 2022-07-02 NOTE — PROGRESS NOTES
[facility]  Hematology/Medical Oncology Clinic       PATIENT: Juan Gillespie  MRN: [id number]  DATE: 7/2/2022    Reason for referral: Extensive stage small cell lung ca    Initial History: Juan Gillespie is a 57 year old man with extensive stage SCLC who presents to clinic for evaluation prior to treatment with C4 of Carboplatin, etoposide and atezo. His oncologist is Dr. Racheal Jennings.    Oncology history per Dr. Jennings's clinic notes : Patient is a 57-year-old male with history of T2DM, HTN, tobacco use who presented to the ED on 3/19/22 for cough, sob, and pleuritic chest pain.      CT Chest w/ con 3/20:  Mediastinal lymphadenopathy 7.5 cm.  Right middle lobe consolidative opacity 4.6 cm.  5.3 cm liver lesion.    CT A/P 3/23/22: Re-demonstration of hypoattenuating liver lesion, 4.5cm. Two other lesions 2.7 and 2.5 cm.     MRI Brain 3/23/22: Enchacning lesion in the pituitary gland 13mm. 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        Patient underwent diagnostic bronchoscopy with EBUS of RML mass and lymph nodes on 03/22:  Path w/ small cell lung ca.     Oncological History:  -C1 Carbo/Etop/Atez 4/5/22  -C2 Carbo/Etop/Atez 4/27/22  -C3 Carbo/Etop/Atez 5/18/22    Interval History:   He is doing well since his last treatment.  No new symptoms to report.  Has questions about maintenance therapy.  He did not take his blood pressure medications prior to his visit.  His wife accompanies him at this visit.  Past Medical History:   Past Medical History:   Diagnosis Date    Diabetes mellitus, type 2     Hypertension        Past Surgical HIstory:   Past Surgical History:   Procedure Laterality Date    ENDOBRONCHIAL ULTRASOUND N/A 3/22/2022    Procedure: ENDOBRONCHIAL ULTRASOUND (EBUS);  Surgeon: Krisitn Samuels MD;  Location: [facility] OR [address] [place] [address];  Service: Endoscopy;  Laterality: N/A;    KNEE SURGERY         Family History:   Family History   Problem Relation Age of Onset     Diabetes Mellitus Mother     Pacemaker/defibrilator Father     Lung cancer Father        Social History:  reports that he has never smoked. He has never used smokeless tobacco. He reports that he does not drink alcohol and does not use drugs.    Allergies:  Review of patient's allergies indicates:  No Known Allergies    Medications:  Current Outpatient Medications   Medication Sig Dispense Refill    albuterol (ACCUNEB) 1.25 mg/3 mL Nebu Use 1 vial (1.25 mg total) by nebulization 3 (three) times daily as needed (shortness of breath). Rescue 90 mL 2    allopurinoL (ZYLOPRIM) 300 MG tablet Take 1 tablet (300 mg total) by mouth once daily. 60 tablet 3    amLODIPine (NORVASC) 5 MG tablet Take 5 mg by mouth once daily.      atorvastatin (LIPITOR) 20 MG tablet Take 20 mg by mouth once daily.      azithromycin (Z-ZEINAB) 250 MG tablet Take by mouth.      colchicine (COLCRYS) 0.6 mg tablet Take by mouth.      fluticasone propionate (FLONASE) 50 mcg/actuation nasal spray 1 spray (50 mcg total) by Each Nostril route once daily. 15 g 2    garlic 1,000 mg Cap Take 2,000 mg by mouth.      hydrOXYzine HCL (ATARAX) 25 MG tablet Take 1 tablet (25 mg total) by mouth 3 (three) times daily as needed for Itching. 21 tablet 0    losartan (COZAAR) 100 MG tablet Take 100 mg by mouth once daily.      montelukast (SINGULAIR) 10 mg tablet Take 10 mg by mouth every evening.      NIFEdipine (PROCARDIA-XL) 60 MG (OSM) 24 hr tablet Take 1 tablet (60 mg total) by mouth once daily. 30 tablet 3    ondansetron (ZOFRAN) 8 MG tablet Take 1 tablet (8 mg total) by mouth every 8 (eight) hours as needed for Nausea. 30 tablet 3    oxyCODONE (ROXICODONE) 5 MG immediate release tablet Take 1 tablet (5 mg total) by mouth every 4 (four) hours as needed for Pain. 100 tablet 0    pantoprazole (PROTONIX) 40 MG tablet Take 1 tablet (40 mg total) by mouth once daily. 30 tablet 3    promethazine-codeine 6.25-10 mg/5 ml (PHENERGAN WITH CODEINE) 6.25-10  "mg/5 mL syrup Take 5 mLs by mouth 3 (three) times daily as needed for Cough. 500 mL 0    TRUE METRIX GLUCOSE TEST STRIP Strp       TRUEPLUS LANCETS 33 gauge Misc        No current facility-administered medications for this visit.       Review of Systems   Constitutional: Positive for fatigue. Negative for chills and fever.   HENT: Negative for congestion.    Respiratory: Positive for shortness of breath. Negative for chest tightness.    Gastrointestinal: Negative for abdominal pain and blood in stool.   Endocrine: Negative for cold intolerance and heat intolerance.   Genitourinary: Negative for hematuria.   Musculoskeletal: Positive for arthralgias.   Skin: Negative for rash.   Neurological: Negative for weakness.   Hematological: Negative for adenopathy. Does not bruise/bleed easily.   Psychiatric/Behavioral: The patient is not nervous/anxious.      ECOG Performance Status:   ECOG SCORE    1 - Restricted in strenuous activity-ambulatory and able to carry out work of a light nature         Objective:      Vitals:   Vitals:    07/01/22 1440   BP: (!) 191/98   BP Location: Left arm   Patient Position: Sitting   Pulse: 92   Temp: 98.1 °F (36.7 °C)   TempSrc: Oral   SpO2: 98%   Weight: 114.6 kg (252 lb 10.4 oz)   Height: 5' 7" (1.702 m)       Physical Exam  Constitutional:       General: He is not in acute distress.     Appearance: He is not ill-appearing.   HENT:      Head: Normocephalic and atraumatic.      Mouth/Throat:      Mouth: Mucous membranes are moist.   Eyes:      General: No scleral icterus.  Cardiovascular:      Rate and Rhythm: Normal rate and regular rhythm.      Heart sounds: Normal heart sounds. No murmur heard.  Pulmonary:      Effort: Pulmonary effort is normal. No respiratory distress.   Abdominal:      General: Abdomen is flat. There is no distension.      Tenderness: There is no abdominal tenderness.   Musculoskeletal:         General: Normal range of motion.      Cervical back: Normal range of " motion.   Lymphadenopathy:      Cervical: No cervical adenopathy.   Skin:     General: Skin is warm and dry.   Neurological:      General: No focal deficit present.      Mental Status: He is alert and oriented to person, place, and time. Mental status is at baseline.      Motor: No weakness.   Psychiatric:         Mood and Affect: Mood normal.       Laboratory Data:  Lab Visit on 06/30/2022   Component Date Value Ref Range Status    WBC 06/30/2022 10.90  3.90 - 12.70 K/uL Final    RBC 06/30/2022 3.86 (A) 4.60 - 6.20 M/uL Final    Hemoglobin 06/30/2022 11.8 (A) 14.0 - 18.0 g/dL Final    Hematocrit 06/30/2022 36.8 (A) 40.0 - 54.0 % Final    MCV 06/30/2022 95  82 - 98 fL Final    MCH 06/30/2022 30.6  27.0 - 31.0 pg Final    MCHC 06/30/2022 32.1  32.0 - 36.0 g/dL Final    RDW 06/30/2022 16.1 (A) 11.5 - 14.5 % Final    Platelets 06/30/2022 283  150 - 450 K/uL Final    MPV 06/30/2022 9.1 (A) 9.2 - 12.9 fL Final    Immature Granulocytes 06/30/2022 CANCELED  0.0 - 0.5 % Final    Result canceled by the ancillary.    Immature Grans (Abs) 06/30/2022 CANCELED  0.00 - 0.04 K/uL Final    Comment: Mild elevation in immature granulocytes is non specific and   can be seen in a variety of conditions including stress response,   acute inflammation, trauma and pregnancy. Correlation with other   laboratory and clinical findings is essential.    Result canceled by the ancillary.      Lymph # 06/30/2022 CANCELED  1.0 - 4.8 K/uL Final    Result canceled by the ancillary.    Mono # 06/30/2022 CANCELED  0.3 - 1.0 K/uL Final    Result canceled by the ancillary.    Eos # 06/30/2022 CANCELED  0.0 - 0.5 K/uL Final    Result canceled by the ancillary.    Baso # 06/30/2022 CANCELED  0.00 - 0.20 K/uL Final    Result canceled by the ancillary.    nRBC 06/30/2022 0  0 /100 WBC Final    Gran % 06/30/2022 60.0  38.0 - 73.0 % Final    Lymph % 06/30/2022 18.0  18.0 - 48.0 % Final    Mono % 06/30/2022 6.0  4.0 - 15.0 % Final     Eosinophil % 06/30/2022 2.0  0.0 - 8.0 % Final    Basophil % 06/30/2022 3.0 (A) 0.0 - 1.9 % Final    Bands 06/30/2022 7.0  % Final    Metamyelocytes 06/30/2022 4.0  % Final    Platelet Estimate 06/30/2022 Appears normal   Final    Poly 06/30/2022 Occasional   Final    Differential Method 06/30/2022 Manual   Final    Sodium 06/30/2022 137  136 - 145 mmol/L Final    Potassium 06/30/2022 5.1  3.5 - 5.1 mmol/L Final    Chloride 06/30/2022 102  95 - 110 mmol/L Final    CO2 06/30/2022 29  23 - 29 mmol/L Final    Glucose 06/30/2022 132 (A) 70 - 110 mg/dL Final    BUN 06/30/2022 17  6 - 20 mg/dL Final    Creatinine 06/30/2022 1.1  0.5 - 1.4 mg/dL Final    Calcium 06/30/2022 9.4  8.7 - 10.5 mg/dL Final    Total Protein 06/30/2022 7.4  6.0 - 8.4 g/dL Final    Albumin 06/30/2022 3.8  3.5 - 5.2 g/dL Final    Total Bilirubin 06/30/2022 0.4  0.1 - 1.0 mg/dL Final    Comment: For infants and newborns, interpretation of results should be based  on gestational age, weight and in agreement with clinical  observations.    Premature Infant recommended reference ranges:  Up to 24 hours.............<8.0 mg/dL  Up to 48 hours............<12.0 mg/dL  3-5 days..................<15.0 mg/dL  6-29 days.................<15.0 mg/dL      Alkaline Phosphatase 06/30/2022 220 (A) 55 - 135 U/L Final    AST 06/30/2022 22  10 - 40 U/L Final    ALT 06/30/2022 29  10 - 44 U/L Final    Anion Gap 06/30/2022 6 (A) 8 - 16 mmol/L Final    eGFR if African American 06/30/2022 >60  >60 mL/min/1.73 m^2 Final    eGFR if non African American 06/30/2022 >60  >60 mL/min/1.73 m^2 Final    Comment: Calculation used to obtain the estimated glomerular filtration  rate (eGFR) is the CKD-EPI equation.       TSH 06/30/2022 1.402  0.400 - 4.000 uIU/mL Final    Free T4 06/30/2022 0.99  0.71 - 1.51 ng/dL Final     Imaging: CT Maxillofacial With Contrast    Result Date: 6/7/2022  EXAMINATION: CT MAXILLOFACIAL WITH CONTRAST CLINICAL HISTORY:  Maxillary/facial abscess;vs cellulitis, RIGHT face; TECHNIQUE: CT images of the maxillofacial region obtained after the administration of 75 cc Omnipaque 350 IV contrast.  Axial, coronal, and sagittal reconstructions were created from the source data. COMPARISON: None. FINDINGS: Exam quality is limited by motion and suboptimal bolus timing. There are multiple scattered dental caries bilaterally, most notable in the right maxillary and mandibular molar teeth with additional dental caries in the right 1st premolar tooth.  There is asymmetric right-sided facial soft tissue edema without discrete drainable abscess.  Mildly enlarged right cervical and submandibular chain lymph nodes measuring up to 1 cm in short axis.  Limited intracranial evaluation is unremarkable. There is mucosal thickening in the maxillary sinuses, right side greater than left.  Mild mucosal thickening in the ethmoid air cells and frontal sinuses.  No air-fluid levels.     Scattered dental caries and periodontal disease.  Right-sided facial soft tissue edema may represent odontogenic cellulitis.  No convincing drainable abscess. Chronic appearing paranasal sinus disease. Electronically signed by: Willis Shabazz MD Date:    06/07/2022 Time:    13:40      Assessment and Plan        1. Small cell lung cancer    2. Secondary malignant neoplasm of liver    3. 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 Currently on maintenance atezolizumab  -labs reviewed; ok to proceed with treatment  -labs, scans, and follow up with me prior to next  treatment.  -continue follow up with rad onc.  If scans show continued treatment response, will ask rad onc if thoracic RT indicated.           Secondary malignant neoplasm of liver    Secondary malignant neoplasm of intrathoracic lymph nodes        Antolin Sosa MD  Hematology Oncology

## 2022-07-02 NOTE — ASSESSMENT & PLAN NOTE
Extensive stage small cell lung cancer with hepatic metastases. Currently on maintenance atezolizumab  -labs reviewed; ok to proceed with treatment  -labs, scans, and follow up with me prior to next treatment.  -continue follow up with rad onc.  If scans show continued treatment response, will ask rad onc if thoracic RT indicated.

## 2022-07-05 ENCOUNTER — INFUSION (OUTPATIENT)
Dept: INFUSION THERAPY | Facility: HOSPITAL | Age: 57
End: 2022-07-05
Attending: INTERNAL MEDICINE
Payer: MEDICAID

## 2022-07-05 DIAGNOSIS — C78.7 SECONDARY MALIGNANT NEOPLASM OF LIVER: ICD-10-CM

## 2022-07-05 DIAGNOSIS — C34.90 SMALL CELL LUNG CANCER: Primary | ICD-10-CM

## 2022-07-05 PROCEDURE — 96374 THER/PROPH/DIAG INJ IV PUSH: CPT

## 2022-07-05 PROCEDURE — A4216 STERILE WATER/SALINE, 10 ML: HCPCS | Performed by: STUDENT IN AN ORGANIZED HEALTH CARE EDUCATION/TRAINING PROGRAM

## 2022-07-05 PROCEDURE — 96413 CHEMO IV INFUSION 1 HR: CPT

## 2022-07-05 PROCEDURE — 63600175 PHARM REV CODE 636 W HCPCS: Performed by: STUDENT IN AN ORGANIZED HEALTH CARE EDUCATION/TRAINING PROGRAM

## 2022-07-05 PROCEDURE — 96375 TX/PRO/DX INJ NEW DRUG ADDON: CPT

## 2022-07-05 PROCEDURE — 25000003 PHARM REV CODE 250: Performed by: STUDENT IN AN ORGANIZED HEALTH CARE EDUCATION/TRAINING PROGRAM

## 2022-07-05 RX ORDER — HEPARIN 100 UNIT/ML
500 SYRINGE INTRAVENOUS
Status: DISCONTINUED | OUTPATIENT
Start: 2022-07-05 | End: 2022-07-05 | Stop reason: HOSPADM

## 2022-07-05 RX ORDER — SODIUM CHLORIDE 0.9 % (FLUSH) 0.9 %
10 SYRINGE (ML) INJECTION
Status: DISCONTINUED | OUTPATIENT
Start: 2022-07-05 | End: 2022-07-05 | Stop reason: HOSPADM

## 2022-07-05 RX ORDER — ONDANSETRON 2 MG/ML
8 INJECTION INTRAMUSCULAR; INTRAVENOUS
Status: COMPLETED | OUTPATIENT
Start: 2022-07-05 | End: 2022-07-05

## 2022-07-05 RX ADMIN — ATEZOLIZUMAB 1200 MG: 1200 INJECTION, SOLUTION INTRAVENOUS at 12:07

## 2022-07-05 RX ADMIN — ONDANSETRON 8 MG: 2 INJECTION INTRAMUSCULAR; INTRAVENOUS at 12:07

## 2022-07-05 RX ADMIN — HEPARIN 500 UNITS: 100 SYRINGE at 01:07

## 2022-07-05 RX ADMIN — Medication 10 ML: at 01:07

## 2022-07-05 NOTE — PLAN OF CARE
Pt arrived to unit for C5 of maintenance Tecentriq. Labs along with plan of care reviewed. Pt okay to proceed with tx today. No new or worsening complaints to report. Received 8mg Zofran IVP. Tecentriq given over 30 minutes. Pt tolerated well. [facility] Discharged from unit in NAD.

## 2022-07-12 ENCOUNTER — OFFICE VISIT (OUTPATIENT)
Dept: RADIATION ONCOLOGY | Facility: CLINIC | Age: 57
End: 2022-07-12
Payer: MEDICAID

## 2022-07-12 ENCOUNTER — HOSPITAL ENCOUNTER (OUTPATIENT)
Dept: RADIOLOGY | Facility: HOSPITAL | Age: 57
Discharge: HOME | End: 2022-07-12
Attending: RADIOLOGY
Payer: MEDICAID

## 2022-07-12 VITALS
DIASTOLIC BLOOD PRESSURE: 87 MMHG | HEIGHT: 67 IN | RESPIRATION RATE: 18 BRPM | HEART RATE: 79 BPM | TEMPERATURE: 98 F | OXYGEN SATURATION: 99 % | SYSTOLIC BLOOD PRESSURE: 154 MMHG | BODY MASS INDEX: 40.66 KG/M2 | WEIGHT: 259.06 LBS

## 2022-07-12 DIAGNOSIS — C34.90 SMALL CELL LUNG CANCER: Primary | ICD-10-CM

## 2022-07-12 DIAGNOSIS — C34.90 SMALL CELL LUNG CANCER: ICD-10-CM

## 2022-07-12 DIAGNOSIS — D35.2 PITUITARY ADENOMA: ICD-10-CM

## 2022-07-12 PROCEDURE — A9585 GADOBUTROL INJECTION: HCPCS | Performed by: RADIOLOGY

## 2022-07-12 PROCEDURE — 3008F PR BODY MASS INDEX (BMI) DOCUMENTED: ICD-10-PCS | Mod: CPTII,,, | Performed by: RADIOLOGY

## 2022-07-12 PROCEDURE — 1159F MED LIST DOCD IN RCRD: CPT | Mod: CPTII,,, | Performed by: RADIOLOGY

## 2022-07-12 PROCEDURE — 3077F PR MOST RECENT SYSTOLIC BLOOD PRESSURE >= 140 MM HG: ICD-10-PCS | Mod: CPTII,,, | Performed by: RADIOLOGY

## 2022-07-12 PROCEDURE — 25500020 PHARM REV CODE 255: Performed by: RADIOLOGY

## 2022-07-12 PROCEDURE — 3079F DIAST BP 80-89 MM HG: CPT | Mod: CPTII,,, | Performed by: RADIOLOGY

## 2022-07-12 PROCEDURE — 4010F ACE/ARB THERAPY RXD/TAKEN: CPT | Mod: CPTII,,, | Performed by: RADIOLOGY

## 2022-07-12 PROCEDURE — 70553 MRI BRAIN W WO CONTRAST: ICD-10-PCS | Mod: 26,,, | Performed by: RADIOLOGY

## 2022-07-12 PROCEDURE — 99999 PR PBB SHADOW E&M-EST. PATIENT-LVL V: CPT | Mod: PBBFAC,,, | Performed by: RADIOLOGY

## 2022-07-12 PROCEDURE — 99999 PR PBB SHADOW E&M-EST. PATIENT-LVL V: ICD-10-PCS | Mod: PBBFAC,,, | Performed by: RADIOLOGY

## 2022-07-12 PROCEDURE — 99213 PR OFFICE/OUTPT VISIT, EST, LEVL III, 20-29 MIN: ICD-10-PCS | Mod: S$PBB,,, | Performed by: RADIOLOGY

## 2022-07-12 PROCEDURE — 99213 OFFICE O/P EST LOW 20 MIN: CPT | Mod: S$PBB,,, | Performed by: RADIOLOGY

## 2022-07-12 PROCEDURE — 3044F PR MOST RECENT HEMOGLOBIN A1C LEVEL <7.0%: ICD-10-PCS | Mod: CPTII,,, | Performed by: RADIOLOGY

## 2022-07-12 PROCEDURE — 99215 OFFICE O/P EST HI 40 MIN: CPT | Mod: PBBFAC,25 | Performed by: RADIOLOGY

## 2022-07-12 PROCEDURE — 70553 MRI BRAIN STEM W/O & W/DYE: CPT | Mod: 26,,, | Performed by: RADIOLOGY

## 2022-07-12 PROCEDURE — 70553 MRI BRAIN STEM W/O & W/DYE: CPT | Mod: TC

## 2022-07-12 PROCEDURE — 1159F PR MEDICATION LIST DOCUMENTED IN MEDICAL RECORD: ICD-10-PCS | Mod: CPTII,,, | Performed by: RADIOLOGY

## 2022-07-12 PROCEDURE — 3077F SYST BP >= 140 MM HG: CPT | Mod: CPTII,,, | Performed by: RADIOLOGY

## 2022-07-12 PROCEDURE — 3044F HG A1C LEVEL LT 7.0%: CPT | Mod: CPTII,,, | Performed by: RADIOLOGY

## 2022-07-12 PROCEDURE — 3079F PR MOST RECENT DIASTOLIC BLOOD PRESSURE 80-89 MM HG: ICD-10-PCS | Mod: CPTII,,, | Performed by: RADIOLOGY

## 2022-07-12 PROCEDURE — 3008F BODY MASS INDEX DOCD: CPT | Mod: CPTII,,, | Performed by: RADIOLOGY

## 2022-07-12 PROCEDURE — 4010F PR ACE/ARB THEARPY RXD/TAKEN: ICD-10-PCS | Mod: CPTII,,, | Performed by: RADIOLOGY

## 2022-07-12 RX ORDER — GADOBUTROL 604.72 MG/ML
10 INJECTION INTRAVENOUS
Status: COMPLETED | OUTPATIENT
Start: 2022-07-12 | End: 2022-07-12

## 2022-07-12 RX ADMIN — GADOBUTROL 10 ML: 604.72 INJECTION INTRAVENOUS at 09:07

## 2022-07-23 ENCOUNTER — OFFICE VISIT (OUTPATIENT)
Dept: URGENT CARE | Facility: CLINIC | Age: 57
End: 2022-07-23
Payer: MEDICAID

## 2022-07-23 VITALS
HEIGHT: 67 IN | HEART RATE: 73 BPM | DIASTOLIC BLOOD PRESSURE: 79 MMHG | SYSTOLIC BLOOD PRESSURE: 126 MMHG | TEMPERATURE: 97 F | RESPIRATION RATE: 16 BRPM | BODY MASS INDEX: 40.65 KG/M2 | WEIGHT: 259 LBS | OXYGEN SATURATION: 97 %

## 2022-07-23 DIAGNOSIS — M10.9 ACUTE GOUT OF MULTIPLE SITES, UNSPECIFIED CAUSE: Primary | ICD-10-CM

## 2022-07-23 DIAGNOSIS — R60.0 EDEMA OF BOTH FEET: ICD-10-CM

## 2022-07-23 PROCEDURE — 99213 PR OFFICE/OUTPT VISIT, EST, LEVL III, 20-29 MIN: ICD-10-PCS | Mod: S$GLB,,, | Performed by: NURSE PRACTITIONER

## 2022-07-23 PROCEDURE — 3074F PR MOST RECENT SYSTOLIC BLOOD PRESSURE < 130 MM HG: ICD-10-PCS | Mod: CPTII,S$GLB,, | Performed by: NURSE PRACTITIONER

## 2022-07-23 PROCEDURE — 3044F PR MOST RECENT HEMOGLOBIN A1C LEVEL <7.0%: ICD-10-PCS | Mod: CPTII,S$GLB,, | Performed by: NURSE PRACTITIONER

## 2022-07-23 PROCEDURE — 1160F RVW MEDS BY RX/DR IN RCRD: CPT | Mod: CPTII,S$GLB,, | Performed by: NURSE PRACTITIONER

## 2022-07-23 PROCEDURE — 3078F PR MOST RECENT DIASTOLIC BLOOD PRESSURE < 80 MM HG: ICD-10-PCS | Mod: CPTII,S$GLB,, | Performed by: NURSE PRACTITIONER

## 2022-07-23 PROCEDURE — 4010F PR ACE/ARB THEARPY RXD/TAKEN: ICD-10-PCS | Mod: CPTII,S$GLB,, | Performed by: NURSE PRACTITIONER

## 2022-07-23 PROCEDURE — 3008F PR BODY MASS INDEX (BMI) DOCUMENTED: ICD-10-PCS | Mod: CPTII,S$GLB,, | Performed by: NURSE PRACTITIONER

## 2022-07-23 PROCEDURE — 99213 OFFICE O/P EST LOW 20 MIN: CPT | Mod: S$GLB,,, | Performed by: NURSE PRACTITIONER

## 2022-07-23 PROCEDURE — 1159F PR MEDICATION LIST DOCUMENTED IN MEDICAL RECORD: ICD-10-PCS | Mod: CPTII,S$GLB,, | Performed by: NURSE PRACTITIONER

## 2022-07-23 PROCEDURE — 3044F HG A1C LEVEL LT 7.0%: CPT | Mod: CPTII,S$GLB,, | Performed by: NURSE PRACTITIONER

## 2022-07-23 PROCEDURE — 1159F MED LIST DOCD IN RCRD: CPT | Mod: CPTII,S$GLB,, | Performed by: NURSE PRACTITIONER

## 2022-07-23 PROCEDURE — 3078F DIAST BP <80 MM HG: CPT | Mod: CPTII,S$GLB,, | Performed by: NURSE PRACTITIONER

## 2022-07-23 PROCEDURE — 3008F BODY MASS INDEX DOCD: CPT | Mod: CPTII,S$GLB,, | Performed by: NURSE PRACTITIONER

## 2022-07-23 PROCEDURE — 4010F ACE/ARB THERAPY RXD/TAKEN: CPT | Mod: CPTII,S$GLB,, | Performed by: NURSE PRACTITIONER

## 2022-07-23 PROCEDURE — 1160F PR REVIEW ALL MEDS BY PRESCRIBER/CLIN PHARMACIST DOCUMENTED: ICD-10-PCS | Mod: CPTII,S$GLB,, | Performed by: NURSE PRACTITIONER

## 2022-07-23 PROCEDURE — 3074F SYST BP LT 130 MM HG: CPT | Mod: CPTII,S$GLB,, | Performed by: NURSE PRACTITIONER

## 2022-07-23 RX ORDER — INDOMETHACIN 50 MG/1
50 CAPSULE ORAL
Qty: 21 CAPSULE | Refills: 0 | Status: SHIPPED | OUTPATIENT
Start: 2022-07-23 | End: 2022-07-26 | Stop reason: SDUPTHER

## 2022-07-23 NOTE — PROGRESS NOTES
"Subjective:       Patient ID: Juan Gillespie is a 57 y.o. male.    Vitals:  height is 5' 7" (1.702 m) and weight is 117.5 kg (259 lb). His temperature is 97.2 °F (36.2 °C). His blood pressure is 126/79 and his pulse is 73. His respiration is 16 and oxygen saturation is 97%.     Chief Complaint: Foot Swelling    57-year-old male with medical history as listed below presents to clinic for evaluation of acute gout flare.  Patient states that he is having pain to his left index finger, and upon waking this morning he reports redness, swelling, and pain to bilateral feet.  He states that this is consistent with previous gout flares.  He is currently being treated for lung cancer, on chemotherapy, and states that he believes that the chemo could be leading to recent flare.  He states that he typically monitors his food intake, though his wife reports patient eating chocolate recently.  Patient is awake and alert, answers questions appropriately.  He denies chest pain, or any new shortness of breath.  No acute distress noted on today's visit.    Past Medical History:  No date: Diabetes mellitus, type 2  No date: Hypertension          Other  This is a new problem. The current episode started yesterday. The problem occurs constantly. The problem has been gradually worsening. Associated symptoms include arthralgias and joint swelling. Pertinent negatives include no chest pain, chills, coughing, diaphoresis, fatigue, fever or sore throat. Nothing aggravates the symptoms. Treatments tried: gout medication  The treatment provided no relief.       Constitution: Negative for activity change, appetite change, chills, sweating, fatigue and fever.   HENT: Negative.  Negative for sore throat.    Cardiovascular: Positive for sob on exertion (chronic). Negative for chest pain.   Eyes: Negative.    Respiratory: Negative for cough.    Genitourinary: Negative.    Musculoskeletal: Positive for joint pain and joint swelling.   Skin: Positive " for erythema.   Neurological: Negative for dizziness.       Objective:      Physical Exam   Constitutional: He is oriented to person, place, and time. He appears well-developed. No distress.   HENT:   Head: Normocephalic and atraumatic. Head is without abrasion, without contusion and without laceration.   Ears:   Right Ear: External ear normal.   Left Ear: External ear normal.   Nose: Nose normal.   Mouth/Throat: Oropharynx is clear and moist and mucous membranes are normal.   Eyes: Conjunctivae, EOM and lids are normal. Right eye exhibits no discharge. Left eye exhibits no discharge.   Neck: Trachea normal and phonation normal. Neck supple.   Cardiovascular: Normal rate, regular rhythm and normal heart sounds.   Pulmonary/Chest: Effort normal and breath sounds normal. No stridor. No respiratory distress. He has no wheezes.         Comments: 1l of O2 via NC    Abdominal: Normal appearance.   Musculoskeletal: Normal range of motion.         General: Swelling and tenderness present. No deformity or signs of injury. Normal range of motion.      Right lower leg: Edema (foot) present.      Left lower leg: Left lower leg edema: foot.      Right foot: Tenderness and swelling present.      Left foot: Tenderness and swelling present.   Neurological: He is alert and oriented to person, place, and time.   Skin: Skin is warm, dry, intact and no rash. erythema No abrasion, No burn, No bruising and No ecchymosis   Psychiatric: His speech is normal and behavior is normal. Mood, judgment and thought content normal.   Nursing note and vitals reviewed.        Assessment:       1. Acute gout of multiple sites, unspecified cause    2. Edema of both feet          Plan:         Acute gout of multiple sites, unspecified cause  -     indomethacin (INDOCIN) 50 MG capsule; Take 1 capsule (50 mg total) by mouth 3 (three) times daily with meals. for 7 days  Dispense: 21 capsule; Refill: 0    Edema of both feet    - There is erythema with  tenderness noted bilaterally to feet, however there is evidence of mild edema.  Discussed with patient that this could be related to gout flare, or could be a combination of gout and edema related to chemo.  Patient does have a history of lower leg edema, states that he has been on Lasix in the past.  Patient would like prescription of Indocin refilled, states this works best for acute gout flares.  Discussed with patient to elevate legs, and continue to monitor swelling, and follow-up with PCP on Monday.  ER precautions given if swelling progressively worsens.  Both patient and wife verbalized understanding, and both in agreement with plan.    Patient Instructions   - You must understand that you have received an Urgent Care treatment only and that you may be released before all of your medical problems are known or treated.   - You, the patient, will arrange for follow up care as instructed.   - If your condition worsens or fails to improve we recommend that you receive another evaluation at the ER immediately or contact your PCP to discuss your concerns or return here.

## 2022-07-26 ENCOUNTER — INFUSION (OUTPATIENT)
Dept: INFUSION THERAPY | Facility: HOSPITAL | Age: 57
End: 2022-07-26
Attending: INTERNAL MEDICINE
Payer: MEDICAID

## 2022-07-26 ENCOUNTER — OFFICE VISIT (OUTPATIENT)
Dept: HEMATOLOGY/ONCOLOGY | Facility: CLINIC | Age: 57
End: 2022-07-26
Payer: MEDICAID

## 2022-07-26 ENCOUNTER — HOSPITAL ENCOUNTER (OUTPATIENT)
Dept: RADIOLOGY | Facility: HOSPITAL | Age: 57
Discharge: HOME | End: 2022-07-26
Attending: STUDENT IN AN ORGANIZED HEALTH CARE EDUCATION/TRAINING PROGRAM
Payer: MEDICAID

## 2022-07-26 ENCOUNTER — TELEPHONE (OUTPATIENT)
Dept: HEMATOLOGY/ONCOLOGY | Facility: CLINIC | Age: 57
End: 2022-07-26
Payer: MEDICAID

## 2022-07-26 VITALS
SYSTOLIC BLOOD PRESSURE: 157 MMHG | HEART RATE: 72 BPM | TEMPERATURE: 99 F | DIASTOLIC BLOOD PRESSURE: 81 MMHG | RESPIRATION RATE: 16 BRPM | OXYGEN SATURATION: 96 %

## 2022-07-26 DIAGNOSIS — C77.1 SECONDARY AND UNSPECIFIED MALIGNANT NEOPLASM OF INTRATHORACIC LYMPH NODES: ICD-10-CM

## 2022-07-26 DIAGNOSIS — M10.29 ACUTE DRUG-INDUCED GOUT OF MULTIPLE SITES: ICD-10-CM

## 2022-07-26 DIAGNOSIS — C78.7 SECONDARY MALIGNANT NEOPLASM OF LIVER: ICD-10-CM

## 2022-07-26 DIAGNOSIS — C34.90 SMALL CELL LUNG CANCER: Primary | ICD-10-CM

## 2022-07-26 DIAGNOSIS — R07.82 INTERCOSTAL PAIN: ICD-10-CM

## 2022-07-26 DIAGNOSIS — C34.90 SMALL CELL LUNG CANCER: ICD-10-CM

## 2022-07-26 DIAGNOSIS — M10.9 ACUTE GOUT OF MULTIPLE SITES, UNSPECIFIED CAUSE: ICD-10-CM

## 2022-07-26 PROCEDURE — 4010F ACE/ARB THERAPY RXD/TAKEN: CPT | Mod: CPTII,95,, | Performed by: STUDENT IN AN ORGANIZED HEALTH CARE EDUCATION/TRAINING PROGRAM

## 2022-07-26 PROCEDURE — 1159F MED LIST DOCD IN RCRD: CPT | Mod: CPTII,95,, | Performed by: STUDENT IN AN ORGANIZED HEALTH CARE EDUCATION/TRAINING PROGRAM

## 2022-07-26 PROCEDURE — 71260 CT THORAX DX C+: CPT | Mod: TC

## 2022-07-26 PROCEDURE — 96375 TX/PRO/DX INJ NEW DRUG ADDON: CPT

## 2022-07-26 PROCEDURE — 71260 CT CHEST ABDOMEN PELVIS WITH CONTRAST (XPD): ICD-10-PCS | Mod: 26,,, | Performed by: RADIOLOGY

## 2022-07-26 PROCEDURE — 99215 OFFICE O/P EST HI 40 MIN: CPT | Mod: 95,,, | Performed by: STUDENT IN AN ORGANIZED HEALTH CARE EDUCATION/TRAINING PROGRAM

## 2022-07-26 PROCEDURE — 25000003 PHARM REV CODE 250: Performed by: STUDENT IN AN ORGANIZED HEALTH CARE EDUCATION/TRAINING PROGRAM

## 2022-07-26 PROCEDURE — A4216 STERILE WATER/SALINE, 10 ML: HCPCS | Performed by: STUDENT IN AN ORGANIZED HEALTH CARE EDUCATION/TRAINING PROGRAM

## 2022-07-26 PROCEDURE — 3044F HG A1C LEVEL LT 7.0%: CPT | Mod: CPTII,95,, | Performed by: STUDENT IN AN ORGANIZED HEALTH CARE EDUCATION/TRAINING PROGRAM

## 2022-07-26 PROCEDURE — 71260 CT THORAX DX C+: CPT | Mod: 26,,, | Performed by: RADIOLOGY

## 2022-07-26 PROCEDURE — 99215 PR OFFICE/OUTPT VISIT, EST, LEVL V, 40-54 MIN: ICD-10-PCS | Mod: 95,,, | Performed by: STUDENT IN AN ORGANIZED HEALTH CARE EDUCATION/TRAINING PROGRAM

## 2022-07-26 PROCEDURE — A9698 NON-RAD CONTRAST MATERIALNOC: HCPCS | Performed by: STUDENT IN AN ORGANIZED HEALTH CARE EDUCATION/TRAINING PROGRAM

## 2022-07-26 PROCEDURE — 74177 CT ABD & PELVIS W/CONTRAST: CPT | Mod: TC

## 2022-07-26 PROCEDURE — 74177 CT CHEST ABDOMEN PELVIS WITH CONTRAST (XPD): ICD-10-PCS | Mod: 26,,, | Performed by: RADIOLOGY

## 2022-07-26 PROCEDURE — 3044F PR MOST RECENT HEMOGLOBIN A1C LEVEL <7.0%: ICD-10-PCS | Mod: CPTII,95,, | Performed by: STUDENT IN AN ORGANIZED HEALTH CARE EDUCATION/TRAINING PROGRAM

## 2022-07-26 PROCEDURE — 4010F PR ACE/ARB THEARPY RXD/TAKEN: ICD-10-PCS | Mod: CPTII,95,, | Performed by: STUDENT IN AN ORGANIZED HEALTH CARE EDUCATION/TRAINING PROGRAM

## 2022-07-26 PROCEDURE — 74177 CT ABD & PELVIS W/CONTRAST: CPT | Mod: 26,,, | Performed by: RADIOLOGY

## 2022-07-26 PROCEDURE — 1160F RVW MEDS BY RX/DR IN RCRD: CPT | Mod: CPTII,95,, | Performed by: STUDENT IN AN ORGANIZED HEALTH CARE EDUCATION/TRAINING PROGRAM

## 2022-07-26 PROCEDURE — 63600175 PHARM REV CODE 636 W HCPCS: Performed by: STUDENT IN AN ORGANIZED HEALTH CARE EDUCATION/TRAINING PROGRAM

## 2022-07-26 PROCEDURE — 1159F PR MEDICATION LIST DOCUMENTED IN MEDICAL RECORD: ICD-10-PCS | Mod: CPTII,95,, | Performed by: STUDENT IN AN ORGANIZED HEALTH CARE EDUCATION/TRAINING PROGRAM

## 2022-07-26 PROCEDURE — 25500020 PHARM REV CODE 255: Performed by: STUDENT IN AN ORGANIZED HEALTH CARE EDUCATION/TRAINING PROGRAM

## 2022-07-26 PROCEDURE — 1160F PR REVIEW ALL MEDS BY PRESCRIBER/CLIN PHARMACIST DOCUMENTED: ICD-10-PCS | Mod: CPTII,95,, | Performed by: STUDENT IN AN ORGANIZED HEALTH CARE EDUCATION/TRAINING PROGRAM

## 2022-07-26 PROCEDURE — 96413 CHEMO IV INFUSION 1 HR: CPT

## 2022-07-26 RX ORDER — SODIUM CHLORIDE 0.9 % (FLUSH) 0.9 %
10 SYRINGE (ML) INJECTION
Status: DISCONTINUED | OUTPATIENT
Start: 2022-07-26 | End: 2022-07-26 | Stop reason: HOSPADM

## 2022-07-26 RX ORDER — ONDANSETRON 2 MG/ML
8 INJECTION INTRAMUSCULAR; INTRAVENOUS
Status: CANCELLED | OUTPATIENT
Start: 2022-07-26

## 2022-07-26 RX ORDER — HEPARIN 100 UNIT/ML
500 SYRINGE INTRAVENOUS
Status: CANCELLED | OUTPATIENT
Start: 2022-07-26

## 2022-07-26 RX ORDER — COLCHICINE 0.6 MG/1
0.6 TABLET ORAL DAILY
Qty: 30 TABLET | Refills: 11 | Status: ON HOLD | OUTPATIENT
Start: 2022-07-26 | End: 2022-12-10 | Stop reason: HOSPADM

## 2022-07-26 RX ORDER — HEPARIN 100 UNIT/ML
500 SYRINGE INTRAVENOUS
Status: DISCONTINUED | OUTPATIENT
Start: 2022-07-26 | End: 2022-07-26 | Stop reason: HOSPADM

## 2022-07-26 RX ORDER — PREDNISONE 20 MG/1
40 TABLET ORAL DAILY
Qty: 10 TABLET | Refills: 0 | Status: SHIPPED | OUTPATIENT
Start: 2022-07-26 | End: 2022-07-31

## 2022-07-26 RX ORDER — ONDANSETRON 2 MG/ML
8 INJECTION INTRAMUSCULAR; INTRAVENOUS
Status: COMPLETED | OUTPATIENT
Start: 2022-07-26 | End: 2022-07-26

## 2022-07-26 RX ORDER — INDOMETHACIN 50 MG/1
50 CAPSULE ORAL
Qty: 21 CAPSULE | Refills: 0 | Status: SHIPPED | OUTPATIENT
Start: 2022-07-26 | End: 2022-11-16

## 2022-07-26 RX ORDER — SODIUM CHLORIDE 0.9 % (FLUSH) 0.9 %
10 SYRINGE (ML) INJECTION
Status: CANCELLED | OUTPATIENT
Start: 2022-07-26

## 2022-07-26 RX ADMIN — IOHEXOL 100 ML: 350 INJECTION, SOLUTION INTRAVENOUS at 09:07

## 2022-07-26 RX ADMIN — HEPARIN 500 UNITS: 100 SYRINGE at 03:07

## 2022-07-26 RX ADMIN — Medication 10 ML: at 03:07

## 2022-07-26 RX ADMIN — ONDANSETRON 8 MG: 2 INJECTION INTRAMUSCULAR; INTRAVENOUS at 02:07

## 2022-07-26 RX ADMIN — BARIUM SULFATE 450 ML: 20 SUSPENSION ORAL at 08:07

## 2022-07-26 RX ADMIN — SODIUM CHLORIDE 1200 MG: 9 INJECTION, SOLUTION INTRAVENOUS at 03:07

## 2022-07-26 NOTE — PLAN OF CARE
Patient arrived to unit for Tecentriq infusion. Pt reports less pain in chest, breathing easier, only using 1L O2 per n/c prn. Labs reviewed, pt cleared for Tecentriq per Dr. Jennings. Plan of care reviewed, patient agreeable to plan. Zofran administered prior to Tecentiq. Patient tolerated infusion well. VSS. Discharge instructions reviewed, patient instructed to return 8/16. Patient ambulated off unit accompanied by spouse. Patient in NAD at time of discharge.

## 2022-07-26 NOTE — ASSESSMENT & PLAN NOTE
Extensive stage small cell lung cancer with hepatic metastases.  7/26/22 CT scan shows continued response.  He has intermittent flares of sternal/intercostal discomfort but denies any significant pain.  -labs reviewed, ok to proceed with treatment  -labs and follow up in 3 weeks prior to treatment  -will reach out to rad onc to see if eligible for thoracic radiation.

## 2022-07-26 NOTE — TELEPHONE ENCOUNTER
OUMAR Eng RN           Previous Messages       ----- Message -----   From: Chantel Salgado   Sent: 7/26/2022  11:47 AM CDT   To: Kendy Kay RN, Asher Asencio, RN, *     Good morning,     Patient's spouse reached out in regards to virtual appt today at 2pm and patient is upset that it was scheduled that way. Patient wanted to see Dr. Sosa today in person. He does have his chemo treatment today for 2:30pm. If someone from the clinical staff could call him.     Thanks,   Chantel      Message received from Taylor Truong    TC to pt and spouse on speaker phone  He stated he has been holding since 2:00 for a virtual visit that was at [facility]  Nurse contacted Dr Sosa  He stated unfortunately he was behind but would join them in a few minutes  Requested they continue to hold  They were advised of such and continued to hold  They agreed to do as requested   Taylor Kay RN  & Asher Asencio RN advised of above

## 2022-07-26 NOTE — PROGRESS NOTES
[facility]  Hematology/Medical Oncology Clinic       PATIENT: Juan Gillespie  MRN: [id number]  DATE: 7/26/2022    Reason for referral: Extensive stage small cell lung ca    Initial History: Juan Gillespie is a 57 year old man with extensive stage SCLC who presents to clinic for evaluation prior to treatment with C4 of Carboplatin, etoposide and atezo. His oncologist is Dr. Racheal Jennings.    Oncology history per Dr. Jennings's clinic notes : Patient is a 57-year-old male with history of T2DM, HTN, tobacco use who presented to the ED on 3/19/22 for cough, sob, and pleuritic chest pain.      CT Chest w/ con 3/20:  Mediastinal lymphadenopathy 7.5 cm.  Right middle lobe consolidative opacity 4.6 cm.  5.3 cm liver lesion.    CT A/P 3/23/22: Re-demonstration of hypoattenuating liver lesion, 4.5cm. Two other lesions 2.7 and 2.5 cm.     MRI Brain 3/23/22: Enchacning lesion in the pituitary gland 13mm. 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        Patient underwent diagnostic bronchoscopy with EBUS of RML mass and lymph nodes on 03/22:  Path w/ small cell lung ca.     Oncological History:  -Started Carbo/Etop/Atez 4/5/22    Interval History:   Since his last visit, he has had a gout flare unfortunately.  Pain somewhat controlled.  Has been having intermittent sternal discomfort.  He has questions if it is related to his lung cancer.  Overall he doesn't think it is really all that painful or debilitating; he's just curious as to what is causing it.  No new symptoms specific to his infusion therapy.  His wife accompanies him at this visit.  Past Medical History:   Past Medical History:   Diagnosis Date    Diabetes mellitus, type 2     Hypertension        Past Surgical HIstory:   Past Surgical History:   Procedure Laterality Date    ENDOBRONCHIAL ULTRASOUND N/A 3/22/2022    Procedure: ENDOBRONCHIAL ULTRASOUND (EBUS);  Surgeon: Kristin Samuels MD;  Location: [facility] OR [address] [place] [address];  Service:  Endoscopy;  Laterality: N/A;    KNEE SURGERY         Family History:   Family History   Problem Relation Age of Onset    Diabetes Mellitus Mother     Pacemaker/defibrilator Father     Lung cancer Father        Social History:  reports that he has never smoked. He has never used smokeless tobacco. He reports that he does not drink alcohol and does not use drugs.    Allergies:  Review of patient's allergies indicates:  No Known Allergies    Medications:  Current Outpatient Medications   Medication Sig Dispense Refill    albuterol (ACCUNEB) 1.25 mg/3 mL Nebu Use 1 vial (1.25 mg total) by nebulization 3 (three) times daily as needed (shortness of breath). Rescue 90 mL 2    allopurinoL (ZYLOPRIM) 300 MG tablet Take 1 tablet (300 mg total) by mouth once daily. 60 tablet 3    amLODIPine (NORVASC) 5 MG tablet Take 5 mg by mouth once daily.      atorvastatin (LIPITOR) 20 MG tablet Take 20 mg by mouth once daily.      azithromycin (Z-ZEINAB) 250 MG tablet Take by mouth.      colchicine (COLCRYS) 0.6 mg tablet Take 1 tablet (0.6 mg total) by mouth once daily. This is for gout. Take every day for treatment and prevention. 30 tablet 11    fluticasone propionate (FLONASE) 50 mcg/actuation nasal spray 1 spray (50 mcg total) by Each Nostril route once daily. (Patient not taking: Reported on 7/23/2022) 15 g 2    garlic 1,000 mg Cap Take 2,000 mg by mouth.      hydrOXYzine HCL (ATARAX) 25 MG tablet Take 1 tablet (25 mg total) by mouth 3 (three) times daily as needed for Itching. 21 tablet 0    indomethacin (INDOCIN) 50 MG capsule Take 1 capsule (50 mg total) by mouth 3 (three) times daily with meals. for 7 days 21 capsule 0    losartan (COZAAR) 100 MG tablet Take 100 mg by mouth once daily.      montelukast (SINGULAIR) 10 mg tablet Take 10 mg by mouth every evening.      NIFEdipine (PROCARDIA-XL) 60 MG (OSM) 24 hr tablet Take 1 tablet (60 mg total) by mouth once daily. 30 tablet 3    ondansetron (ZOFRAN) 8 MG tablet  Take 1 tablet (8 mg total) by mouth every 8 (eight) hours as needed for Nausea. 30 tablet 3    oxyCODONE (ROXICODONE) 5 MG immediate release tablet Take 1 tablet (5 mg total) by mouth every 4 (four) hours as needed for Pain. 100 tablet 0    pantoprazole (PROTONIX) 40 MG tablet Take 1 tablet (40 mg total) by mouth once daily. 30 tablet 3    predniSONE (DELTASONE) 20 MG tablet Take 2 tablets (40 mg total) by mouth once daily. Take for your current gout flare for 5 days 10 tablet 0    promethazine-codeine 6.25-10 mg/5 ml (PHENERGAN WITH CODEINE) 6.25-10 mg/5 mL syrup Take 5 mLs by mouth 3 (three) times daily as needed for Cough. 500 mL 0    TRUE METRIX GLUCOSE TEST STRIP Strp       TRUEPLUS LANCETS 33 gauge Misc        No current facility-administered medications for this visit.     Facility-Administered Medications Ordered in Other Visits   Medication Dose Route Frequency Provider Last Rate Last Admin    heparin, porcine (PF) 100 unit/mL injection flush 500 Units  500 Units Intravenous PRN Antolin Sosa MD   500 Units at 07/26/22 1536    sodium chloride 0.9% flush 10 mL  10 mL Intravenous PRN Antolin Sosa MD   10 mL at 07/26/22 1536       Review of Systems   Constitutional: Positive for fatigue. Negative for chills and fever.   HENT: Negative for congestion.    Respiratory: Positive for shortness of breath. Negative for chest tightness.    Cardiovascular: Positive for chest pain (sternal intermittent).   Gastrointestinal: Negative for abdominal pain and blood in stool.   Endocrine: Negative for cold intolerance and heat intolerance.   Genitourinary: Negative for hematuria.   Musculoskeletal: Positive for arthralgias.   Skin: Negative for rash.   Neurological: Negative for weakness.   Hematological: Negative for adenopathy. Does not bruise/bleed easily.   Psychiatric/Behavioral: The patient is not nervous/anxious.      ECOG Performance Status:   ECOG SCORE    1 - Restricted in strenuous activity-ambulatory and  able to carry out work of a light nature         Objective:      Vitals:   There were no vitals filed for this visit.telemedicine    Physical Exam  Constitutional:       General: He is not in acute distress.     Appearance: Normal appearance. He is not ill-appearing.   HENT:      Head: Normocephalic and atraumatic.      Nose: Nose normal.      Mouth/Throat:      Mouth: Mucous membranes are moist.      Pharynx: Oropharynx is clear. No oropharyngeal exudate or posterior oropharyngeal erythema.   Eyes:      General: No scleral icterus.     Extraocular Movements: Extraocular movements intact.      Conjunctiva/sclera: Conjunctivae normal.      Pupils: Pupils are equal, round, and reactive to light.   Pulmonary:      Effort: Pulmonary effort is normal. No respiratory distress.   Abdominal:      General: Abdomen is flat.   Musculoskeletal:         General: No swelling. Normal range of motion.      Cervical back: Normal range of motion.      Right lower leg: No edema.      Left lower leg: No edema.   Skin:     General: Skin is warm and dry.      Coloration: Skin is not jaundiced.   Neurological:      General: No focal deficit present.      Mental Status: He is alert.   Psychiatric:         Mood and Affect: Mood normal.       Laboratory Data:  Lab Visit on 07/26/2022   Component Date Value Ref Range Status    WBC 07/26/2022 6.62  3.90 - 12.70 K/uL Final    RBC 07/26/2022 4.52 (A) 4.60 - 6.20 M/uL Final    Hemoglobin 07/26/2022 13.9 (A) 14.0 - 18.0 g/dL Final    Hematocrit 07/26/2022 41.5  40.0 - 54.0 % Final    MCV 07/26/2022 92  82 - 98 fL Final    MCH 07/26/2022 30.8  27.0 - 31.0 pg Final    MCHC 07/26/2022 33.5  32.0 - 36.0 g/dL Final    RDW 07/26/2022 13.4  11.5 - 14.5 % Final    Platelets 07/26/2022 169  150 - 450 K/uL Final    MPV 07/26/2022 9.7  9.2 - 12.9 fL Final    Immature Granulocytes 07/26/2022 0.9 (A) 0.0 - 0.5 % Final    Gran # (ANC) 07/26/2022 4.5  1.8 - 7.7 K/uL Final    Immature Grans (Abs)  07/26/2022 0.06 (A) 0.00 - 0.04 K/uL Final    Comment: Mild elevation in immature granulocytes is non specific and   can be seen in a variety of conditions including stress response,   acute inflammation, trauma and pregnancy. Correlation with other   laboratory and clinical findings is essential.      Lymph # 07/26/2022 1.1  1.0 - 4.8 K/uL Final    Mono # 07/26/2022 0.5  0.3 - 1.0 K/uL Final    Eos # 07/26/2022 0.4  0.0 - 0.5 K/uL Final    Baso # 07/26/2022 0.10  0.00 - 0.20 K/uL Final    nRBC 07/26/2022 0  0 /100 WBC Final    Gran % 07/26/2022 67.5  38.0 - 73.0 % Final    Lymph % 07/26/2022 16.8 (A) 18.0 - 48.0 % Final    Mono % 07/26/2022 8.0  4.0 - 15.0 % Final    Eosinophil % 07/26/2022 5.3  0.0 - 8.0 % Final    Basophil % 07/26/2022 1.5  0.0 - 1.9 % Final    Differential Method 07/26/2022 Automated   Final    Sodium 07/26/2022 138  136 - 145 mmol/L Final    Potassium 07/26/2022 4.5  3.5 - 5.1 mmol/L Final    Chloride 07/26/2022 103  95 - 110 mmol/L Final    CO2 07/26/2022 29  23 - 29 mmol/L Final    Glucose 07/26/2022 165 (A) 70 - 110 mg/dL Final    BUN 07/26/2022 24 (A) 6 - 20 mg/dL Final    Creatinine 07/26/2022 1.2  0.5 - 1.4 mg/dL Final    Calcium 07/26/2022 9.4  8.7 - 10.5 mg/dL Final    Total Protein 07/26/2022 7.2  6.0 - 8.4 g/dL Final    Albumin 07/26/2022 3.8  3.5 - 5.2 g/dL Final    Total Bilirubin 07/26/2022 0.5  0.1 - 1.0 mg/dL Final    Comment: For infants and newborns, interpretation of results should be based  on gestational age, weight and in agreement with clinical  observations.    Premature Infant recommended reference ranges:  Up to 24 hours.............<8.0 mg/dL  Up to 48 hours............<12.0 mg/dL  3-5 days..................<15.0 mg/dL  6-29 days.................<15.0 mg/dL      Alkaline Phosphatase 07/26/2022 137 (A) 55 - 135 U/L Final    AST 07/26/2022 21  10 - 40 U/L Final    ALT 07/26/2022 33  10 - 44 U/L Final    Anion Gap 07/26/2022 6 (A) 8 - 16 mmol/L  Final    eGFR if African American 07/26/2022 >60  >60 mL/min/1.73 m^2 Final    eGFR if non African American 07/26/2022 >60  >60 mL/min/1.73 m^2 Final    Comment: Calculation used to obtain the estimated glomerular filtration  rate (eGFR) is the CKD-EPI equation.       TSH 07/26/2022 1.308  0.400 - 4.000 uIU/mL Final     Imaging:   MRI Brain W WO Contrast    Result Date: 7/12/2022  EXAMINATION: MRI BRAIN W WO CONTRAST CLINICAL HISTORY: Small cell lung cancer, re-staging;.  Malignant neoplasm of unspecified part of unspecified bronchus or lung TECHNIQUE: Multiplanar multisequence MR imaging of the brain was performed before and after the administration of 10 mL Gadavist  intravenous contrast. COMPARISON: CT maxillofacial: 06/07/2022.  MRI brain: 03/22/2022. FINDINGS: Ventricles are stable in size.  There is no evidence of hydrocephalus.  Moderate cerebellar volume loss which is somewhat disproportionate to the degree of cerebral volume loss. Mild patchy T2/FLAIR hyperintensity in the supratentorial white matter, nonspecific but most likely reflecting chronic small vessel ischemic changes. No recent or remote major vascular distribution infarct. No recent or remote hemorrhage.  No mass effect or midline shift. Pituitary gland is enlarged with heterogeneous signal including eccentric left-sided T2 hyperintensity.  Lesion measuring up to 1.3 cm in craniocaudal extent with mild suprasellar extension.  No overt mass effect on the optic apparatus or apparent extension of the cavernous sinus, but would be better delineated with dedicated sella/pituitary MRI. No extra-axial blood or fluid collections.  However, there is asymmetric prominent vasculature throughout the sulci of the left cerebellar hemisphere. Bone marrow signal intensity appears within normal limits. There is a focal diffusion restriction and filling defect on post contrast images in the right internal jugular vein just below level of skull base. There is a  partially visualized enhancing lesion overlying the right masseter muscle, measuring up to 2.6 cm.  There is asymmetry of the right parotid gland, with prominent accessory tissue minimal normal tissue at the expected location the parotid bed.  Question postsurgical change. Findings were relayed to the ordering provider (Ayaz) via the epic secure chat system at approximately 12:45.     Stable heterogeneous enhancing sellar lesion with mild suprasellar mass effect.  Differential considerations again include pituitary adenoma or metastasis. No new enhancing intracranial mass. Partially visualized enhancing lesion overlying the right masseter muscle.  This could reflect a metastasis, but primary parotid neoplasm recurrence possible in light of apparent postsurgical change of the adjacent parotid gland.  Correlation with clinical history is essential.  Dedicated MRI of the face would be helpful for further evaluation. Thrombus in the right internal jugular vein just below level of the skull base. Asymmetric prominent vascularity overlying the left cerebellar hemisphere, concerning for underlying AV fistula or less likely vascular malformation.  Time-of-flight MRA recommended for further evaluation. Moderate cerebellar atrophy. This report was flagged in Epic as abnormal. Electronically signed by resident: Durga Hassan Date:    07/12/2022 Time:    09:30 Electronically signed by: Emanuel Hoffmann MD Date:    07/12/2022 Time:    12:54    CT Chest Abdomen Pelvis With Contrast (xpd)    Result Date: 7/26/2022  EXAMINATION: CT CHEST ABDOMEN PELVIS WITH CONTRAST (XPD) CLINICAL HISTORY: extensive stage small cell lung cancer s/p 5 cycles chemo, eval response; Malignant neoplasm of unspecified part of unspecified bronchus or lung TECHNIQUE: Low dose axial images, sagittal and coronal reformations were obtained from the thoracic inlet to the pubic symphysis following the IV administration of 100 mL of Omnipaque 350 and the oral  administration of 450 ml of Readi-Cat suspension. COMPARISON: CT of the chest dated 03/19/2022 and CT abdomen and pelvis with contrast dated 03/22/2022. FINDINGS: There is a right internal jugular Port-A-Cath present with tip of the catheter within the right atrium near the junction with the superior vena cava.  There is a filling defect within the right internal jugular vein which may represent thrombus cephalad to the right internal jugular catheter.  Structures at the base of the neck otherwise appear unremarkable.  The heart is not enlarged.  There is a small amount of atherosclerotic calcification identified within the coronary arteries in a multi-vessel distribution.  Atherosclerotic calcification is also present within the thoracic aorta which is normal in caliber without aneurysmal dilatation.  There is right paratracheal adenopathy present with the conglomerate mass now measuring 3.0 x 1.7 x 4.1 cm (images 31, series 3 and 91, series 605) compared to 7.3 x 5.5 x 7.5 cm on the prior examination.  The right middle lobe mass has also decreased in size now measuring 3.4 x 1.6 x 2.1 cm (images 64, series 3 and 56, series 605) compared to 4.5 x 2.9 x 4.6 cm on the prior examination.  There has also been marked improvement in the additional mediastinal adenopathy noted on the prior examination.  No axillary adenopathy is identified.  There are mild dependent atelectatic changes.  No new pulmonary nodules are identified.  There is no evidence for pneumothorax or pleural effusions.  There are innumerable sclerotic foci identified throughout the visualized bones consistent with diffuse osseous metastatic disease.  The sclerotic appearance of these bone lesions which were not clearly evident on the prior examinations may be related to healing bone metastases.  There appear to be fractures of the anterior costal cartilage to the left of the lower sternum involving at least 3 adjacent levels and this was better  appreciated on the prior examination dated 03/19/2022. Multiple low-density liver masses are again identified, decreased in size when compared to the prior examination.  There is a mass within the dome of the liver measuring 1.9 cm (image 88, series 3), previously 4.5 cm.  A hypodense mass is identified at the lateral hepatic dome measuring 1.2 cm (image 77, series 3), previously 2.5 cm.  There is a 1.0 cm mass at the inferior right hepatic lobe (image 131, series 3), previously 2.7 cm.  No new liver lesions are identified.  The gallbladder is present and appears unremarkable.  There is no evidence for intrahepatic or extrahepatic biliary dilatation.  The spleen, stomach, and pancreas appear unremarkable.  The adrenal glands are not enlarged.  The kidneys are normal in size and are noted to concentrate contrast symmetrically.  There are punctate nonobstructing renal calculi.  There is no evidence for hydronephrosis.  There is a subcentimeter hypodensity within the lower pole of the right kidney, likely a small cyst.  Atherosclerotic calcification is present within the abdominal aorta without aneurysmal dilatation.  No para-aortic lymphadenopathy is identified.  No dilated loops of bowel are evident.  There is no evidence for pelvic or inguinal lymphadenopathy.  No ascites is identified.     Decreasing size of right middle lobe lung mass as detailed above. Decreasing conglomerate mediastinal adenopathy. Decreasing hepatic masses consistent with improving metastatic disease. Extensive sclerotic bone lesions are now identified.  While this is a new finding, this likely represents demonstration of treated bone metastases given the sclerotic appearance of the widespread bone lesions. Multiple fractures involving the anterior costal cartilage to the left of the lower sternum, decreased in prominence when compared to CT chest dated 03/19/2022. Nonobstructing renal calculi. Probable small amount of nonobstructing thrombus  within the right internal jugular vein. RECIST SUMMARY: Date of prior examination for comparison: CT chest dated 03/19/2022 and CT abdomen and pelvis dated 03/22/2022 Lesion 1: Right middle lobe lung mass.  3.4 cm. Series 3 image 64.  Prior measurement 4.5 cm. Lesion 2: Right paratracheal (conglomerate) adenopathy.  1.7 cm. Series 3 image 31.  Prior measurement 5.5 cm. Lesion 3: Dome of liver.  1.9 cm. Series 3 image 88.  Prior measurement 4.5 cm. Lesion 4: Caudal right lobe of liver.  1.0 cm. Series 3 image 131.  Prior measurement 2.7 cm. Electronically signed by: Enmanuel Sagastume MD Date:    07/26/2022 Time:    11:45      Assessment and Plan        1. Small cell lung cancer    2. 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 Acute gout of multiple sites, unspecified cause    5. Intercostal pain    6. Acute drug-induced gout of multiple sites      Problem List Items Addressed This Visit        Cardiac/Vascular    Chest pain       Oncology    Small cell lung cancer - Primary    Current Assessment & Plan     Extensive stage small cell lung cancer with hepatic metastases.  7/26/22 CT scan shows continued response.  He has intermittent flares of sternal/intercostal discomfort but denies any significant pain.  -labs reviewed, ok to proceed with treatment  -labs and follow up in 3 weeks prior to treatment  -will reach out to rad onc to see if eligible for thoracic radiation.           Secondary malignant neoplasm of liver    Secondary malignant neoplasm of intrathoracic lymph nodes       Orthopedic    Acute drug-induced gout of multiple sites    Overview     Secondary to certain foods (ie. Seafood, alcohol)           Current Assessment & Plan     Recent flare up with poor control of his symptoms.  -refill indomethacin, restart colchicine, and start short course of prednisone             Other Visit Diagnoses     Acute gout of multiple sites, unspecified cause         Relevant Medications    indomethacin (INDOCIN) 50 MG capsule    predniSONE (DELTASONE) 20 MG tablet    colchicine (COLCRYS) 0.6 mg tablet        Antolin Sosa MD  Hematology Oncology    Route Chart for Scheduling    Med Onc Chart Routing      Follow up with physician 3 weeks. Virtual visit 8/16 prior to chemo   Follow up with [facility]    Infusion scheduling note 8/16 atezolizumab   Injection scheduling note    Labs CMP, CBC and TSH   Lab interval:  8/15 [facility] labs cmp cbc tsh   Imaging None      Pharmacy appointment No pharmacy appointment needed      Other referrals No additional referrals needed         Treatment Plan Information   OP ATEZOLIZUMAB CARBOPLATIN ETOPOSIDE Q3W   Trish Ellison MD   Upcoming Treatment Dates - OP ATEZOLIZUMAB CARBOPLATIN ETOPOSIDE Q3W    8/16/2022       Chemotherapy       atezolizumab (TECENTRIQ) 1,200 mg in sodium chloride 0.9% 270 mL infusion       Antiemetics       ondansetron injection 8 mg  9/6/2022       Chemotherapy       atezolizumab (TECENTRIQ) 1,200 mg in sodium chloride 0.9% 270 mL infusion       Antiemetics       ondansetron injection 8 mg  9/27/2022       Chemotherapy       atezolizumab (TECENTRIQ) 1,200 mg in sodium chloride 0.9% 270 mL infusion       Antiemetics       ondansetron injection 8 mg  10/18/2022       Chemotherapy       atezolizumab (TECENTRIQ) 1,200 mg in sodium chloride 0.9% 270 mL infusion       Antiemetics       ondansetron injection 8 mg    The patient location is: car  The chief complaint leading to consultation is: small cell lung cancer    Visit type: audiovisual    Face to Face time with patient: 20 minutes  40 minutes of total time spent on the encounter, which includes face to face time and non-face to face time preparing to see the patient (eg, review of tests), Obtaining and/or reviewing separately obtained history, Documenting clinical information in the electronic or other health record, Independently interpreting results (not separately reported)  and communicating results to the patient/family/caregiver, or Care coordination (not separately reported).         Each patient to whom he or she provides medical services by telemedicine is:  (1) informed of the relationship between the physician and patient and the respective role of any other health care provider with respect to management of the patient; and (2) notified that he or she may decline to receive medical services by telemedicine and may withdraw from such care at any time.    Notes:

## 2022-07-26 NOTE — ASSESSMENT & PLAN NOTE
Recent flare up with poor control of his symptoms.  -refill indomethacin, restart colchicine, and start short course of prednisone

## 2022-07-28 ENCOUNTER — HOSPITAL ENCOUNTER (OUTPATIENT)
Dept: RADIATION THERAPY | Facility: HOSPITAL | Age: 57
Discharge: HOME | End: 2022-07-28
Attending: RADIOLOGY
Payer: MEDICAID

## 2022-07-28 ENCOUNTER — OFFICE VISIT (OUTPATIENT)
Dept: RADIATION ONCOLOGY | Facility: CLINIC | Age: 57
End: 2022-07-28
Payer: MEDICAID

## 2022-07-28 VITALS
DIASTOLIC BLOOD PRESSURE: 78 MMHG | TEMPERATURE: 99 F | WEIGHT: 267 LBS | SYSTOLIC BLOOD PRESSURE: 157 MMHG | BODY MASS INDEX: 41.91 KG/M2 | HEIGHT: 67 IN | HEART RATE: 86 BPM

## 2022-07-28 DIAGNOSIS — C77.1 SECONDARY AND UNSPECIFIED MALIGNANT NEOPLASM OF INTRATHORACIC LYMPH NODES: Primary | ICD-10-CM

## 2022-07-28 DIAGNOSIS — C34.90 SMALL CELL LUNG CANCER: ICD-10-CM

## 2022-07-28 PROCEDURE — 77334 RADIATION TREATMENT AID(S): CPT | Mod: TC | Performed by: RADIOLOGY

## 2022-07-28 PROCEDURE — 3008F PR BODY MASS INDEX (BMI) DOCUMENTED: ICD-10-PCS | Mod: CPTII,,, | Performed by: RADIOLOGY

## 2022-07-28 PROCEDURE — 77263 PR  RADIATION THERAPY PLAN COMPLEX: ICD-10-PCS | Mod: ,,, | Performed by: RADIOLOGY

## 2022-07-28 PROCEDURE — 77334 RADIATION TREATMENT AID(S): CPT | Mod: 26,,, | Performed by: RADIOLOGY

## 2022-07-28 PROCEDURE — 99214 PR OFFICE/OUTPT VISIT, EST, LEVL IV, 30-39 MIN: ICD-10-PCS | Mod: S$PBB,25,, | Performed by: RADIOLOGY

## 2022-07-28 PROCEDURE — 77290 THER RAD SIMULAJ FIELD CPLX: CPT | Mod: TC | Performed by: RADIOLOGY

## 2022-07-28 PROCEDURE — 77290 THER RAD SIMULAJ FIELD CPLX: CPT | Mod: 26,,, | Performed by: RADIOLOGY

## 2022-07-28 PROCEDURE — 99999 PR PBB SHADOW E&M-EST. PATIENT-LVL II: CPT | Mod: PBBFAC,,, | Performed by: RADIOLOGY

## 2022-07-28 PROCEDURE — 99999 PR PBB SHADOW E&M-EST. PATIENT-LVL II: ICD-10-PCS | Mod: PBBFAC,,, | Performed by: RADIOLOGY

## 2022-07-28 PROCEDURE — 77014 HC CT GUIDANCE RADIATION THERAPY FLDS PLACEMENT: CPT | Mod: TC | Performed by: RADIOLOGY

## 2022-07-28 PROCEDURE — 77290 PR  SET RADN THERAPY FIELD COMPLEX: ICD-10-PCS | Mod: 26,,, | Performed by: RADIOLOGY

## 2022-07-28 PROCEDURE — 3044F HG A1C LEVEL LT 7.0%: CPT | Mod: CPTII,,, | Performed by: RADIOLOGY

## 2022-07-28 PROCEDURE — 3078F DIAST BP <80 MM HG: CPT | Mod: CPTII,,, | Performed by: RADIOLOGY

## 2022-07-28 PROCEDURE — 99214 OFFICE O/P EST MOD 30 MIN: CPT | Mod: S$PBB,25,, | Performed by: RADIOLOGY

## 2022-07-28 PROCEDURE — 3077F PR MOST RECENT SYSTOLIC BLOOD PRESSURE >= 140 MM HG: ICD-10-PCS | Mod: CPTII,,, | Performed by: RADIOLOGY

## 2022-07-28 PROCEDURE — 3077F SYST BP >= 140 MM HG: CPT | Mod: CPTII,,, | Performed by: RADIOLOGY

## 2022-07-28 PROCEDURE — 3008F BODY MASS INDEX DOCD: CPT | Mod: CPTII,,, | Performed by: RADIOLOGY

## 2022-07-28 PROCEDURE — 3078F PR MOST RECENT DIASTOLIC BLOOD PRESSURE < 80 MM HG: ICD-10-PCS | Mod: CPTII,,, | Performed by: RADIOLOGY

## 2022-07-28 PROCEDURE — 77334 PR  RADN TREATMENT AID(S) COMPLX: ICD-10-PCS | Mod: 26,,, | Performed by: RADIOLOGY

## 2022-07-28 PROCEDURE — 77263 THER RADIOLOGY TX PLNG CPLX: CPT | Mod: ,,, | Performed by: RADIOLOGY

## 2022-07-28 PROCEDURE — 3044F PR MOST RECENT HEMOGLOBIN A1C LEVEL <7.0%: ICD-10-PCS | Mod: CPTII,,, | Performed by: RADIOLOGY

## 2022-07-28 PROCEDURE — 99212 OFFICE O/P EST SF 10 MIN: CPT | Mod: PBBFAC,25 | Performed by: RADIOLOGY

## 2022-07-28 PROCEDURE — 4010F PR ACE/ARB THEARPY RXD/TAKEN: ICD-10-PCS | Mod: CPTII,,, | Performed by: RADIOLOGY

## 2022-07-28 PROCEDURE — 4010F ACE/ARB THERAPY RXD/TAKEN: CPT | Mod: CPTII,,, | Performed by: RADIOLOGY

## 2022-07-28 RX ORDER — IBUPROFEN 800 MG/1
800 TABLET ORAL EVERY 6 HOURS PRN
COMMUNITY
Start: 2022-06-09 | End: 2022-08-15

## 2022-07-28 NOTE — PROGRESS NOTES
07/28/2022    Radiation Oncology Follow-Up Visit    Prior Radiation History: None    Assessment   This is a 57 y.o. 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 Staging MRI Brain demonstrated an enhancing sellar mass c/w pituitary adenoma. He received chemo-immunotherapy. Re-staging extracranial imaging CT C/A/P 7/26/22 demonstrated partial response with decrease in size of RML lung primary and mediastinal adenopathy. He has completed chemotherapy and is now on maintenance immunotherapy. He is referred back for consideration of thoracic consolidation radiation.     I discussed the role of consolidation thoracic radiation therapy for patients with ES-SCLC. For patients who did not achieve a complete response after chemotherapy, there is a benefit of adding consolidative RT. Since re-staging scans demonstrated residual thoracic disease, I recommend treating to 30 Gy on 10 fractions. Potential short- and long-term side effects of thoracic irradiation were reviewed.     In regards to prophylactic cranial irradiation (PCI), there has historically been a demonstrated benefit in reducing intracranial metastases. Potential short- and long-term side effects of PCI/WBRT were reviewed. More recent data suggest that patients may be safely monitored with serial MRI and treated with WBRT at the time of intracranial progression without compromising survival. I recommend continued close surveillance with q3 month MRI Brain for him.        Plan   1) CT Simulation today for thoracic radiation treatment planning.    2) F/U with Medical Oncology as scheduled for continued systemic management.          CHIEF COMPLAINT: F/U for consideration of thoracic consolidation RT    HPI: Re-staging extracranial imaging CT C/A/P 7/26/22 demonstrated partial response with decrease in size of RML lung primary and mediastinal adenopathy. He has  completed chemotherapy and is now on maintenance immunotherapy. He is referred back for consideration of thoracic consolidation radiation.     He is accompanied by his wife. Uses supplemental O2 while traveling (1L), but otherwise does not use at home. Reports he walks 3+ miles daily. Feeling well overall.       Past Medical History:   Diagnosis Date    Diabetes mellitus, type 2     Hypertension        Past Surgical History:   Procedure Laterality Date    ENDOBRONCHIAL ULTRASOUND N/A 3/22/2022    Procedure: ENDOBRONCHIAL ULTRASOUND (EBUS);  Surgeon: Kristin Samuels MD;  Location: [facility] OR [address] [place] [address];  Service: Endoscopy;  Laterality: N/A;    KNEE SURGERY         Social History     Tobacco Use    Smoking status: Never Smoker    Smokeless tobacco: Never Used    Tobacco comment: stop smoking 26 years ago   Substance Use Topics    Alcohol use: No    Drug use: No       Cancer-related family history includes Lung cancer in his father.    Current Outpatient Medications on File Prior to Visit   Medication Sig Dispense Refill    albuterol (ACCUNEB) 1.25 mg/3 mL Nebu Use 1 vial (1.25 mg total) by nebulization 3 (three) times daily as needed (shortness of breath). Rescue 90 mL 2    allopurinoL (ZYLOPRIM) 300 MG tablet Take 1 tablet (300 mg total) by mouth once daily. 60 tablet 3    amLODIPine (NORVASC) 5 MG tablet Take 5 mg by mouth once daily.      atorvastatin (LIPITOR) 20 MG tablet Take 20 mg by mouth once daily.      azithromycin (Z-ZEINAB) 250 MG tablet Take by mouth.      colchicine (COLCRYS) 0.6 mg tablet Take 1 tablet (0.6 mg total) by mouth once daily. This is for gout. Take every day for treatment and prevention. 30 tablet 11    fluticasone propionate (FLONASE) 50 mcg/actuation nasal spray 1 spray (50 mcg total) by Each Nostril route once daily. 15 g 2    garlic 1,000 mg Cap Take 2,000 mg by mouth.      hydrOXYzine HCL (ATARAX) 25 MG tablet Take 1 tablet (25 mg total) by mouth 3 (three) times daily as  needed for Itching. 21 tablet 0    ibuprofen (ADVIL,MOTRIN) 800 MG tablet Take 800 mg by mouth every 6 (six) hours as needed.      indomethacin (INDOCIN) 50 MG capsule Take 1 capsule (50 mg total) by mouth 3 (three) times daily with meals. for 7 days 21 capsule 0    losartan (COZAAR) 100 MG tablet Take 100 mg by mouth once daily.      montelukast (SINGULAIR) 10 mg tablet Take 10 mg by mouth every evening.      NIFEdipine (PROCARDIA-XL) 60 MG (OSM) 24 hr tablet Take 1 tablet (60 mg total) by mouth once daily. 30 tablet 3    ondansetron (ZOFRAN) 8 MG tablet Take 1 tablet (8 mg total) by mouth every 8 (eight) hours as needed for Nausea. 30 tablet 3    oxyCODONE (ROXICODONE) 5 MG immediate release tablet Take 1 tablet (5 mg total) by mouth every 4 (four) hours as needed for Pain. 100 tablet 0    pantoprazole (PROTONIX) 40 MG tablet Take 1 tablet (40 mg total) by mouth once daily. 30 tablet 3    predniSONE (DELTASONE) 20 MG tablet Take 2 tablets (40 mg total) by mouth once daily. Take for your current gout flare for 5 days 10 tablet 0    promethazine-codeine 6.25-10 mg/5 ml (PHENERGAN WITH CODEINE) 6.25-10 mg/5 mL syrup Take 5 mLs by mouth 3 (three) times daily as needed for Cough. 500 mL 0    TRUE METRIX GLUCOSE TEST STRIP Strp       TRUEPLUS LANCETS 33 gauge Misc       [DISCONTINUED] albuterol-ipratropium (DUO-NEB) 2.5 mg-0.5 mg/3 mL nebulizer solution Take 3 mLs by nebulization every 4 (four) hours. Rescue 90 mL 3    [DISCONTINUED] azelastine (ASTELIN) 137 mcg (0.1 %) nasal spray 1 spray by Nasal route 2 (two) times daily.       No current facility-administered medications on file prior to visit.       Review of patient's allergies indicates:  No Known Allergies    Review of Systems   Constitutional: Negative for fever, malaise/fatigue and weight loss.   HENT: Negative for ear pain and sore throat.    Eyes: Negative for blurred vision and double vision.   Respiratory: Positive for cough. Negative for  "hemoptysis and shortness of breath.    Cardiovascular: Negative for chest pain and leg swelling.   Gastrointestinal: Negative for abdominal pain, constipation, diarrhea, heartburn and nausea.   Genitourinary: Negative for dysuria and hematuria.   Musculoskeletal: Positive for joint pain. Negative for falls.   Skin: Negative for itching.   Neurological: Negative for tingling, speech change, focal weakness, seizures and headaches.   Psychiatric/Behavioral: Negative for depression. The patient is not nervous/anxious.         Vital Signs: BP (!) 157/78 (BP Location: Right arm, Patient Position: Sitting, BP Method: Large (Automatic))   Pulse 86   Temp 99.1 °F (37.3 °C) (Oral)   Ht 5' 7" (1.702 m)   Wt 121.1 kg (267 lb)   BMI 41.82 kg/m²     ECOG Performance Status: 1    Physical Exam  Vitals reviewed.   Constitutional:       Appearance: Normal appearance.   HENT:      Head: Normocephalic and atraumatic.   Eyes:      General: No scleral icterus.     Extraocular Movements: Extraocular movements intact.   Pulmonary:      Effort: Pulmonary effort is normal. No respiratory distress.      Comments: On supplemental O2 via [place]  Abdominal:      General: There is no distension.   Musculoskeletal:      Cervical back: Neck supple.   Lymphadenopathy:      Cervical: No cervical adenopathy.   Skin:     General: Skin is warm and dry.   Neurological:      General: No focal deficit present.      Mental Status: He is alert and oriented to person, place, and time.      Cranial Nerves: No cranial nerve deficit.   Psychiatric:         Mood and Affect: Mood normal.         Behavior: Behavior normal.         Judgment: Judgment normal.          Labs:    Imaging: I have personally reviewed the patient's available images and reports and summarized pertinent findings above in HPI.     Pathology: No new path        "

## 2022-08-01 ENCOUNTER — TELEPHONE (OUTPATIENT)
Dept: HEMATOLOGY/ONCOLOGY | Facility: CLINIC | Age: 57
End: 2022-08-01
Payer: MEDICAID

## 2022-08-01 ENCOUNTER — HOSPITAL ENCOUNTER (OUTPATIENT)
Dept: RADIATION THERAPY | Facility: HOSPITAL | Age: 57
Discharge: HOME | End: 2022-08-01
Attending: RADIOLOGY
Payer: MEDICAID

## 2022-08-02 PROCEDURE — 77295 PR 3D RADIOTHERAPY PLAN: ICD-10-PCS | Mod: 26,,, | Performed by: RADIOLOGY

## 2022-08-02 PROCEDURE — 77300 RADIATION THERAPY DOSE PLAN: CPT | Mod: 26,,, | Performed by: RADIOLOGY

## 2022-08-02 PROCEDURE — 77300 RADIATION THERAPY DOSE PLAN: CPT | Mod: TC | Performed by: RADIOLOGY

## 2022-08-02 PROCEDURE — 77334 RADIATION TREATMENT AID(S): CPT | Mod: 26,,, | Performed by: RADIOLOGY

## 2022-08-02 PROCEDURE — 77334 RADIATION TREATMENT AID(S): CPT | Mod: TC | Performed by: RADIOLOGY

## 2022-08-02 PROCEDURE — 77295 3-D RADIOTHERAPY PLAN: CPT | Mod: 26,,, | Performed by: RADIOLOGY

## 2022-08-02 PROCEDURE — 77334 PR  RADN TREATMENT AID(S) COMPLX: ICD-10-PCS | Mod: 26,,, | Performed by: RADIOLOGY

## 2022-08-02 PROCEDURE — 77300 PR RADIATION THERAPY,DOSIMETRY PLAN: ICD-10-PCS | Mod: 26,,, | Performed by: RADIOLOGY

## 2022-08-02 PROCEDURE — 77295 3-D RADIOTHERAPY PLAN: CPT | Mod: TC | Performed by: RADIOLOGY

## 2022-08-04 PROCEDURE — 77387 PR GUIDANCE FOR RADIATION TREATMENT DELIVERY: ICD-10-PCS | Mod: 26,,, | Performed by: RADIOLOGY

## 2022-08-04 PROCEDURE — 77387 GUIDANCE FOR RADJ TX DLVR: CPT | Mod: 26,,, | Performed by: RADIOLOGY

## 2022-08-04 PROCEDURE — 77417 THER RADIOLOGY PORT IMAGE(S): CPT | Performed by: RADIOLOGY

## 2022-08-04 PROCEDURE — 77412 RADIATION TX DELIVERY LVL 3: CPT | Performed by: RADIOLOGY

## 2022-08-04 PROCEDURE — 77387 GUIDANCE FOR RADJ TX DLVR: CPT | Mod: TC | Performed by: RADIOLOGY

## 2022-08-05 PROCEDURE — 77412 RADIATION TX DELIVERY LVL 3: CPT | Performed by: RADIOLOGY

## 2022-08-05 PROCEDURE — 77387 GUIDANCE FOR RADJ TX DLVR: CPT | Mod: TC | Performed by: RADIOLOGY

## 2022-08-05 PROCEDURE — 77387 GUIDANCE FOR RADJ TX DLVR: CPT | Mod: 26,,, | Performed by: RADIOLOGY

## 2022-08-05 PROCEDURE — 77387 PR GUIDANCE FOR RADIATION TREATMENT DELIVERY: ICD-10-PCS | Mod: 26,,, | Performed by: RADIOLOGY

## 2022-08-08 PROCEDURE — 77387 GUIDANCE FOR RADJ TX DLVR: CPT | Mod: 26,,, | Performed by: RADIOLOGY

## 2022-08-08 PROCEDURE — 77412 RADIATION TX DELIVERY LVL 3: CPT | Performed by: RADIOLOGY

## 2022-08-08 PROCEDURE — 77387 GUIDANCE FOR RADJ TX DLVR: CPT | Mod: TC | Performed by: RADIOLOGY

## 2022-08-08 PROCEDURE — 77387 PR GUIDANCE FOR RADIATION TREATMENT DELIVERY: ICD-10-PCS | Mod: 26,,, | Performed by: RADIOLOGY

## 2022-08-09 PROCEDURE — 77387 GUIDANCE FOR RADJ TX DLVR: CPT | Mod: TC | Performed by: RADIOLOGY

## 2022-08-09 PROCEDURE — 77387 GUIDANCE FOR RADJ TX DLVR: CPT | Mod: 26,,, | Performed by: RADIOLOGY

## 2022-08-09 PROCEDURE — 77387 PR GUIDANCE FOR RADIATION TREATMENT DELIVERY: ICD-10-PCS | Mod: 26,,, | Performed by: RADIOLOGY

## 2022-08-09 PROCEDURE — 77412 RADIATION TX DELIVERY LVL 3: CPT | Performed by: RADIOLOGY

## 2022-08-11 ENCOUNTER — DOCUMENTATION ONLY (OUTPATIENT)
Dept: RADIATION ONCOLOGY | Facility: CLINIC | Age: 57
End: 2022-08-11
Payer: MEDICAID

## 2022-08-11 PROCEDURE — 77387 GUIDANCE FOR RADJ TX DLVR: CPT | Mod: 26,,, | Performed by: RADIOLOGY

## 2022-08-11 PROCEDURE — 77336 RADIATION PHYSICS CONSULT: CPT | Performed by: RADIOLOGY

## 2022-08-11 PROCEDURE — 77387 GUIDANCE FOR RADJ TX DLVR: CPT | Mod: TC | Performed by: RADIOLOGY

## 2022-08-11 PROCEDURE — 77387 PR GUIDANCE FOR RADIATION TREATMENT DELIVERY: ICD-10-PCS | Mod: 26,,, | Performed by: RADIOLOGY

## 2022-08-11 PROCEDURE — 77412 RADIATION TX DELIVERY LVL 3: CPT | Performed by: RADIOLOGY

## 2022-08-12 PROCEDURE — 77412 RADIATION TX DELIVERY LVL 3: CPT | Performed by: RADIOLOGY

## 2022-08-12 PROCEDURE — 77387 GUIDANCE FOR RADJ TX DLVR: CPT | Mod: TC | Performed by: RADIOLOGY

## 2022-08-15 ENCOUNTER — OFFICE VISIT (OUTPATIENT)
Dept: HEMATOLOGY/ONCOLOGY | Facility: CLINIC | Age: 57
End: 2022-08-15
Payer: MEDICAID

## 2022-08-15 ENCOUNTER — LAB VISIT (OUTPATIENT)
Dept: LAB | Facility: HOSPITAL | Age: 57
End: 2022-08-15
Attending: STUDENT IN AN ORGANIZED HEALTH CARE EDUCATION/TRAINING PROGRAM
Payer: MEDICAID

## 2022-08-15 DIAGNOSIS — C34.90 SMALL CELL LUNG CANCER: Primary | ICD-10-CM

## 2022-08-15 DIAGNOSIS — C77.1 SECONDARY AND UNSPECIFIED MALIGNANT NEOPLASM OF INTRATHORACIC LYMPH NODES: ICD-10-CM

## 2022-08-15 DIAGNOSIS — C78.7 SECONDARY MALIGNANT NEOPLASM OF LIVER: ICD-10-CM

## 2022-08-15 DIAGNOSIS — C34.90 SMALL CELL LUNG CANCER: ICD-10-CM

## 2022-08-15 LAB
ALBUMIN SERPL BCP-MCNC: 4.1 G/DL (ref 3.5–5.2)
ALP SERPL-CCNC: 105 U/L (ref 55–135)
ALT SERPL W/O P-5'-P-CCNC: 38 U/L (ref 10–44)
ANION GAP SERPL CALC-SCNC: 9 MMOL/L (ref 8–16)
AST SERPL-CCNC: 24 U/L (ref 10–40)
BASOPHILS # BLD AUTO: 0.04 K/UL (ref 0–0.2)
BASOPHILS NFR BLD: 0.6 % (ref 0–1.9)
BILIRUB SERPL-MCNC: 0.8 MG/DL (ref 0.1–1)
BUN SERPL-MCNC: 20 MG/DL (ref 6–20)
CALCIUM SERPL-MCNC: 9.8 MG/DL (ref 8.7–10.5)
CHLORIDE SERPL-SCNC: 106 MMOL/L (ref 95–110)
CO2 SERPL-SCNC: 26 MMOL/L (ref 23–29)
CREAT SERPL-MCNC: 1.3 MG/DL (ref 0.5–1.4)
DIFFERENTIAL METHOD: ABNORMAL
EOSINOPHIL # BLD AUTO: 0.2 K/UL (ref 0–0.5)
EOSINOPHIL NFR BLD: 3.4 % (ref 0–8)
ERYTHROCYTE [DISTWIDTH] IN BLOOD BY AUTOMATED COUNT: 12.8 % (ref 11.5–14.5)
EST. GFR  (NO RACE VARIABLE): >60 ML/MIN/1.73 M^2
GLUCOSE SERPL-MCNC: 137 MG/DL (ref 70–110)
HCT VFR BLD AUTO: 45.4 % (ref 40–54)
HGB BLD-MCNC: 15.3 G/DL (ref 14–18)
IMM GRANULOCYTES # BLD AUTO: 0.03 K/UL (ref 0–0.04)
IMM GRANULOCYTES NFR BLD AUTO: 0.4 % (ref 0–0.5)
LYMPHOCYTES # BLD AUTO: 0.8 K/UL (ref 1–4.8)
LYMPHOCYTES NFR BLD: 11.2 % (ref 18–48)
MCH RBC QN AUTO: 30.9 PG (ref 27–31)
MCHC RBC AUTO-ENTMCNC: 33.7 G/DL (ref 32–36)
MCV RBC AUTO: 92 FL (ref 82–98)
MONOCYTES # BLD AUTO: 0.6 K/UL (ref 0.3–1)
MONOCYTES NFR BLD: 8 % (ref 4–15)
NEUTROPHILS # BLD AUTO: 5.3 K/UL (ref 1.8–7.7)
NEUTROPHILS NFR BLD: 76.4 % (ref 38–73)
NRBC BLD-RTO: 0 /100 WBC
PLATELET # BLD AUTO: 215 K/UL (ref 150–450)
PMV BLD AUTO: 9.5 FL (ref 9.2–12.9)
POTASSIUM SERPL-SCNC: 4.5 MMOL/L (ref 3.5–5.1)
PROT SERPL-MCNC: 7.5 G/DL (ref 6–8.4)
RBC # BLD AUTO: 4.95 M/UL (ref 4.6–6.2)
SODIUM SERPL-SCNC: 141 MMOL/L (ref 136–145)
TSH SERPL DL<=0.005 MIU/L-ACNC: 1.08 UIU/ML (ref 0.4–4)
WBC # BLD AUTO: 6.98 K/UL (ref 3.9–12.7)

## 2022-08-15 PROCEDURE — 77387 GUIDANCE FOR RADJ TX DLVR: CPT | Mod: 26,,, | Performed by: RADIOLOGY

## 2022-08-15 PROCEDURE — 4010F ACE/ARB THERAPY RXD/TAKEN: CPT | Mod: CPTII,95,, | Performed by: STUDENT IN AN ORGANIZED HEALTH CARE EDUCATION/TRAINING PROGRAM

## 2022-08-15 PROCEDURE — 77387 GUIDANCE FOR RADJ TX DLVR: CPT | Mod: TC | Performed by: RADIOLOGY

## 2022-08-15 PROCEDURE — 1159F PR MEDICATION LIST DOCUMENTED IN MEDICAL RECORD: ICD-10-PCS | Mod: CPTII,95,, | Performed by: STUDENT IN AN ORGANIZED HEALTH CARE EDUCATION/TRAINING PROGRAM

## 2022-08-15 PROCEDURE — 85025 COMPLETE CBC W/AUTO DIFF WBC: CPT | Performed by: STUDENT IN AN ORGANIZED HEALTH CARE EDUCATION/TRAINING PROGRAM

## 2022-08-15 PROCEDURE — 80053 COMPREHEN METABOLIC PANEL: CPT | Performed by: STUDENT IN AN ORGANIZED HEALTH CARE EDUCATION/TRAINING PROGRAM

## 2022-08-15 PROCEDURE — 3044F PR MOST RECENT HEMOGLOBIN A1C LEVEL <7.0%: ICD-10-PCS | Mod: CPTII,95,, | Performed by: STUDENT IN AN ORGANIZED HEALTH CARE EDUCATION/TRAINING PROGRAM

## 2022-08-15 PROCEDURE — 99214 PR OFFICE/OUTPT VISIT, EST, LEVL IV, 30-39 MIN: ICD-10-PCS | Mod: 95,,, | Performed by: STUDENT IN AN ORGANIZED HEALTH CARE EDUCATION/TRAINING PROGRAM

## 2022-08-15 PROCEDURE — 36415 COLL VENOUS BLD VENIPUNCTURE: CPT | Performed by: STUDENT IN AN ORGANIZED HEALTH CARE EDUCATION/TRAINING PROGRAM

## 2022-08-15 PROCEDURE — 4010F PR ACE/ARB THEARPY RXD/TAKEN: ICD-10-PCS | Mod: CPTII,95,, | Performed by: STUDENT IN AN ORGANIZED HEALTH CARE EDUCATION/TRAINING PROGRAM

## 2022-08-15 PROCEDURE — 84443 ASSAY THYROID STIM HORMONE: CPT | Performed by: STUDENT IN AN ORGANIZED HEALTH CARE EDUCATION/TRAINING PROGRAM

## 2022-08-15 PROCEDURE — 99214 OFFICE O/P EST MOD 30 MIN: CPT | Mod: 95,,, | Performed by: STUDENT IN AN ORGANIZED HEALTH CARE EDUCATION/TRAINING PROGRAM

## 2022-08-15 PROCEDURE — 1160F RVW MEDS BY RX/DR IN RCRD: CPT | Mod: CPTII,95,, | Performed by: STUDENT IN AN ORGANIZED HEALTH CARE EDUCATION/TRAINING PROGRAM

## 2022-08-15 PROCEDURE — 77387 PR GUIDANCE FOR RADIATION TREATMENT DELIVERY: ICD-10-PCS | Mod: 26,,, | Performed by: RADIOLOGY

## 2022-08-15 PROCEDURE — 3044F HG A1C LEVEL LT 7.0%: CPT | Mod: CPTII,95,, | Performed by: STUDENT IN AN ORGANIZED HEALTH CARE EDUCATION/TRAINING PROGRAM

## 2022-08-15 PROCEDURE — 1159F MED LIST DOCD IN RCRD: CPT | Mod: CPTII,95,, | Performed by: STUDENT IN AN ORGANIZED HEALTH CARE EDUCATION/TRAINING PROGRAM

## 2022-08-15 PROCEDURE — 77412 RADIATION TX DELIVERY LVL 3: CPT | Performed by: RADIOLOGY

## 2022-08-15 PROCEDURE — 1160F PR REVIEW ALL MEDS BY PRESCRIBER/CLIN PHARMACIST DOCUMENTED: ICD-10-PCS | Mod: CPTII,95,, | Performed by: STUDENT IN AN ORGANIZED HEALTH CARE EDUCATION/TRAINING PROGRAM

## 2022-08-15 RX ORDER — SODIUM CHLORIDE 0.9 % (FLUSH) 0.9 %
10 SYRINGE (ML) INJECTION
Status: CANCELLED | OUTPATIENT
Start: 2022-08-16

## 2022-08-15 RX ORDER — ONDANSETRON 2 MG/ML
8 INJECTION INTRAMUSCULAR; INTRAVENOUS
Status: CANCELLED | OUTPATIENT
Start: 2022-08-16

## 2022-08-15 RX ORDER — HEPARIN 100 UNIT/ML
500 SYRINGE INTRAVENOUS
Status: CANCELLED | OUTPATIENT
Start: 2022-08-16

## 2022-08-15 NOTE — PROGRESS NOTES
[facility]  Hematology/Medical Oncology Clinic       PATIENT: Juan Gillespie  MRN: [id number]  DATE: 8/15/2022    Reason for referral: Extensive stage small cell lung ca    Initial History: Juan Gillespie is a 57 year old man with extensive stage SCLC who presents to clinic for evaluation prior to treatment with C4 of Carboplatin, etoposide and atezo. His oncologist is Dr. Racheal Jennings.    Oncology history per Dr. Jennings's clinic notes : Patient is a 57-year-old male with history of T2DM, HTN, tobacco use who presented to the ED on 3/19/22 for cough, sob, and pleuritic chest pain.      CT Chest w/ con 3/20:  Mediastinal lymphadenopathy 7.5 cm.  Right middle lobe consolidative opacity 4.6 cm.  5.3 cm liver lesion.    CT A/P 3/23/22: Re-demonstration of hypoattenuating liver lesion, 4.5cm. Two other lesions 2.7 and 2.5 cm.     MRI Brain 3/23/22: Enchacning lesion in the pituitary gland 13mm. 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        Patient underwent diagnostic bronchoscopy with EBUS of RML mass and lymph nodes on 03/22:  Path w/ small cell lung ca.     Oncological History:  -Started Carbo/Etop/Atez 4/5/22  -Consolidative thoracic radiation 8/4/22-8/18/22    Interval History:   Since his last visit, he has started consolidative thoracic radiation therapy.  Side effects include flare of sternal chest pain, dysphagia, and coughing at night.  Overall it is manageable as many of the side effects are self-limited and do not last more than a few minutes.    No new symptoms specific to his infusion therapy.  His wife accompanies him at this visit.  Past Medical History:   Past Medical History:   Diagnosis Date    Diabetes mellitus, type 2     Hypertension        Past Surgical HIstory:   Past Surgical History:   Procedure Laterality Date    ENDOBRONCHIAL ULTRASOUND N/A 3/22/2022    Procedure: ENDOBRONCHIAL ULTRASOUND (EBUS);  Surgeon: Kristin Samuels MD;  Location: [facility] OR  2ND FLR;  Service: Endoscopy;  Laterality: N/A;    KNEE SURGERY         Family History:   Family History   Problem Relation Age of Onset    Diabetes Mellitus Mother     Pacemaker/defibrilator Father     Lung cancer Father        Social History:  reports that he has never smoked. He has never used smokeless tobacco. He reports that he does not drink alcohol and does not use drugs.    Allergies:  Review of patient's allergies indicates:  No Known Allergies    Medications:  Current Outpatient Medications   Medication Sig Dispense Refill    albuterol (ACCUNEB) 1.25 mg/3 mL Nebu Use 1 vial (1.25 mg total) by nebulization 3 (three) times daily as needed (shortness of breath). Rescue 90 mL 2    allopurinoL (ZYLOPRIM) 300 MG tablet Take 1 tablet (300 mg total) by mouth once daily. 60 tablet 3    amLODIPine (NORVASC) 5 MG tablet Take 5 mg by mouth once daily.      atorvastatin (LIPITOR) 20 MG tablet Take 20 mg by mouth once daily.      colchicine (COLCRYS) 0.6 mg tablet Take 1 tablet (0.6 mg total) by mouth once daily. This is for gout. Take every day for treatment and prevention. 30 tablet 11    fluticasone propionate (FLONASE) 50 mcg/actuation nasal spray 1 spray (50 mcg total) by Each Nostril route once daily. 15 g 2    garlic 1,000 mg Cap Take 2,000 mg by mouth.      hydrOXYzine HCL (ATARAX) 25 MG tablet Take 1 tablet (25 mg total) by mouth 3 (three) times daily as needed for Itching. 21 tablet 0    losartan (COZAAR) 100 MG tablet Take 100 mg by mouth once daily.      montelukast (SINGULAIR) 10 mg tablet Take 10 mg by mouth every evening.      NIFEdipine (PROCARDIA-XL) 60 MG (OSM) 24 hr tablet Take 1 tablet (60 mg total) by mouth once daily. 30 tablet 3    ondansetron (ZOFRAN) 8 MG tablet Take 1 tablet (8 mg total) by mouth every 8 (eight) hours as needed for Nausea. 30 tablet 3    pantoprazole (PROTONIX) 40 MG tablet Take 1 tablet (40 mg total) by mouth once daily. 30 tablet 3    promethazine-codeine  6.25-10 mg/5 ml (PHENERGAN WITH CODEINE) 6.25-10 mg/5 mL syrup Take 5 mLs by mouth 3 (three) times daily as needed for Cough. 500 mL 0    TRUE METRIX GLUCOSE TEST STRIP Strp       TRUEPLUS LANCETS 33 gauge Misc        No current facility-administered medications for this visit.       Review of Systems   Constitutional: Positive for fatigue. Negative for chills and fever.   HENT: Positive for trouble swallowing. Negative for congestion.    Respiratory: Positive for cough and shortness of breath. Negative for chest tightness.    Cardiovascular: Positive for chest pain (sternal intermittent).   Gastrointestinal: Negative for abdominal pain and blood in stool.   Endocrine: Negative for cold intolerance and heat intolerance.   Genitourinary: Negative for hematuria.   Musculoskeletal: Positive for arthralgias.   Skin: Negative for rash.   Neurological: Negative for weakness.   Hematological: Negative for adenopathy. Does not bruise/bleed easily.   Psychiatric/Behavioral: The patient is not nervous/anxious.      ECOG Performance Status:   ECOG SCORE    1 - Restricted in strenuous activity-ambulatory and able to carry out work of a light nature         Objective:      Vitals:   There were no vitals filed for this visit.telemedicine    Physical Exam  Constitutional:       General: He is not in acute distress.     Appearance: Normal appearance. He is not ill-appearing.   HENT:      Head: Normocephalic and atraumatic.      Nose: Nose normal.      Mouth/Throat:      Mouth: Mucous membranes are moist.      Pharynx: Oropharynx is clear. No oropharyngeal exudate or posterior oropharyngeal erythema.   Eyes:      General: No scleral icterus.     Extraocular Movements: Extraocular movements intact.      Conjunctiva/sclera: Conjunctivae normal.      Pupils: Pupils are equal, round, and reactive to light.   Pulmonary:      Effort: Pulmonary effort is normal. No respiratory distress.   Abdominal:      General: Abdomen is flat.    Musculoskeletal:         General: No swelling. Normal range of motion.      Cervical back: Normal range of motion.      Right lower leg: No edema.      Left lower leg: No edema.   Skin:     General: Skin is warm and dry.      Coloration: Skin is not jaundiced.   Neurological:      General: No focal deficit present.      Mental Status: He is alert.   Psychiatric:         Mood and Affect: Mood normal.       Laboratory Data:  Lab Visit on 08/15/2022   Component Date Value Ref Range Status    TSH 08/15/2022 1.078  0.400 - 4.000 uIU/mL Final    WBC 08/15/2022 6.98  3.90 - 12.70 K/uL Final    RBC 08/15/2022 4.95  4.60 - 6.20 M/uL Final    Hemoglobin 08/15/2022 15.3  14.0 - 18.0 g/dL Final    Hematocrit 08/15/2022 45.4  40.0 - 54.0 % Final    MCV 08/15/2022 92  82 - 98 fL Final    MCH 08/15/2022 30.9  27.0 - 31.0 pg Final    MCHC 08/15/2022 33.7  32.0 - 36.0 g/dL Final    RDW 08/15/2022 12.8  11.5 - 14.5 % Final    Platelets 08/15/2022 215  150 - 450 K/uL Final    MPV 08/15/2022 9.5  9.2 - 12.9 fL Final    Immature Granulocytes 08/15/2022 0.4  0.0 - 0.5 % Final    Gran # (ANC) 08/15/2022 5.3  1.8 - 7.7 K/uL Final    Immature Grans (Abs) 08/15/2022 0.03  0.00 - 0.04 K/uL Final    Comment: Mild elevation in immature granulocytes is non specific and   can be seen in a variety of conditions including stress response,   acute inflammation, trauma and pregnancy. Correlation with other   laboratory and clinical findings is essential.      Lymph # 08/15/2022 0.8 (A) 1.0 - 4.8 K/uL Final    Mono # 08/15/2022 0.6  0.3 - 1.0 K/uL Final    Eos # 08/15/2022 0.2  0.0 - 0.5 K/uL Final    Baso # 08/15/2022 0.04  0.00 - 0.20 K/uL Final    nRBC 08/15/2022 0  0 /100 WBC Final    Gran % 08/15/2022 76.4 (A) 38.0 - 73.0 % Final    Lymph % 08/15/2022 11.2 (A) 18.0 - 48.0 % Final    Mono % 08/15/2022 8.0  4.0 - 15.0 % Final    Eosinophil % 08/15/2022 3.4  0.0 - 8.0 % Final    Basophil % 08/15/2022 0.6  0.0 - 1.9 % Final     Differential Method 08/15/2022 Automated   Final    Sodium 08/15/2022 141  136 - 145 mmol/L Final    Potassium 08/15/2022 4.5  3.5 - 5.1 mmol/L Final    Chloride 08/15/2022 106  95 - 110 mmol/L Final    CO2 08/15/2022 26  23 - 29 mmol/L Final    Glucose 08/15/2022 137 (A) 70 - 110 mg/dL Final    BUN 08/15/2022 20  6 - 20 mg/dL Final    Creatinine 08/15/2022 1.3  0.5 - 1.4 mg/dL Final    Calcium 08/15/2022 9.8  8.7 - 10.5 mg/dL Final    Total Protein 08/15/2022 7.5  6.0 - 8.4 g/dL Final    Albumin 08/15/2022 4.1  3.5 - 5.2 g/dL Final    Total Bilirubin 08/15/2022 0.8  0.1 - 1.0 mg/dL Final    Comment: For infants and newborns, interpretation of results should be based  on gestational age, weight and in agreement with clinical  observations.    Premature Infant recommended reference ranges:  Up to 24 hours.............<8.0 mg/dL  Up to 48 hours............<12.0 mg/dL  3-5 days..................<15.0 mg/dL  6-29 days.................<15.0 mg/dL      Alkaline Phosphatase 08/15/2022 105  55 - 135 U/L Final    AST 08/15/2022 24  10 - 40 U/L Final    ALT 08/15/2022 38  10 - 44 U/L Final    Anion Gap 08/15/2022 9  8 - 16 mmol/L Final    eGFR 08/15/2022 >60  >60 mL/min/1.73 m^2 Final     Imaging:   MRI Brain W WO Contrast    Result Date: 7/12/2022  EXAMINATION: MRI BRAIN W WO CONTRAST CLINICAL HISTORY: Small cell lung cancer, re-staging;.  Malignant neoplasm of unspecified part of unspecified bronchus or lung TECHNIQUE: Multiplanar multisequence MR imaging of the brain was performed before and after the administration of 10 mL Gadavist  intravenous contrast. COMPARISON: CT maxillofacial: 06/07/2022.  MRI brain: 03/22/2022. FINDINGS: Ventricles are stable in size.  There is no evidence of hydrocephalus.  Moderate cerebellar volume loss which is somewhat disproportionate to the degree of cerebral volume loss. Mild patchy T2/FLAIR hyperintensity in the supratentorial white matter, nonspecific but most likely  reflecting chronic small vessel ischemic changes. No recent or remote major vascular distribution infarct. No recent or remote hemorrhage.  No mass effect or midline shift. Pituitary gland is enlarged with heterogeneous signal including eccentric left-sided T2 hyperintensity.  Lesion measuring up to 1.3 cm in craniocaudal extent with mild suprasellar extension.  No overt mass effect on the optic apparatus or apparent extension of the cavernous sinus, but would be better delineated with dedicated sella/pituitary MRI. No extra-axial blood or fluid collections.  However, there is asymmetric prominent vasculature throughout the sulci of the left cerebellar hemisphere. Bone marrow signal intensity appears within normal limits. There is a focal diffusion restriction and filling defect on post contrast images in the right internal jugular vein just below level of skull base. There is a partially visualized enhancing lesion overlying the right masseter muscle, measuring up to 2.6 cm.  There is asymmetry of the right parotid gland, with prominent accessory tissue minimal normal tissue at the expected location the parotid bed.  Question postsurgical change. Findings were relayed to the ordering provider (Ayaz) via the epic secure chat system at approximately 12:45.     Stable heterogeneous enhancing sellar lesion with mild suprasellar mass effect.  Differential considerations again include pituitary adenoma or metastasis. No new enhancing intracranial mass. Partially visualized enhancing lesion overlying the right masseter muscle.  This could reflect a metastasis, but primary parotid neoplasm recurrence possible in light of apparent postsurgical change of the adjacent parotid gland.  Correlation with clinical history is essential.  Dedicated MRI of the face would be helpful for further evaluation. Thrombus in the right internal jugular vein just below level of the skull base. Asymmetric prominent vascularity overlying the  left cerebellar hemisphere, concerning for underlying AV fistula or less likely vascular malformation.  Time-of-flight MRA recommended for further evaluation. Moderate cerebellar atrophy. This report was flagged in Epic as abnormal. Electronically signed by resident: Durga Hassan Date:    07/12/2022 Time:    09:30 Electronically signed by: Emanuel Hoffmann MD Date:    07/12/2022 Time:    12:54    CT Chest Abdomen Pelvis With Contrast (xpd)    Result Date: 7/26/2022  EXAMINATION: CT CHEST ABDOMEN PELVIS WITH CONTRAST (XPD) CLINICAL HISTORY: extensive stage small cell lung cancer s/p 5 cycles chemo, eval response; Malignant neoplasm of unspecified part of unspecified bronchus or lung TECHNIQUE: Low dose axial images, sagittal and coronal reformations were obtained from the thoracic inlet to the pubic symphysis following the IV administration of 100 mL of Omnipaque 350 and the oral administration of 450 ml of Readi-Cat suspension. COMPARISON: CT of the chest dated 03/19/2022 and CT abdomen and pelvis with contrast dated 03/22/2022. FINDINGS: There is a right internal jugular Port-A-Cath present with tip of the catheter within the right atrium near the junction with the superior vena cava.  There is a filling defect within the right internal jugular vein which may represent thrombus cephalad to the right internal jugular catheter.  Structures at the base of the neck otherwise appear unremarkable.  The heart is not enlarged.  There is a small amount of atherosclerotic calcification identified within the coronary arteries in a multi-vessel distribution.  Atherosclerotic calcification is also present within the thoracic aorta which is normal in caliber without aneurysmal dilatation.  There is right paratracheal adenopathy present with the conglomerate mass now measuring 3.0 x 1.7 x 4.1 cm (images 31, series 3 and 91, series 605) compared to 7.3 x 5.5 x 7.5 cm on the prior examination.  The right middle lobe mass has  also decreased in size now measuring 3.4 x 1.6 x 2.1 cm (images 64, series 3 and 56, series 605) compared to 4.5 x 2.9 x 4.6 cm on the prior examination.  There has also been marked improvement in the additional mediastinal adenopathy noted on the prior examination.  No axillary adenopathy is identified.  There are mild dependent atelectatic changes.  No new pulmonary nodules are identified.  There is no evidence for pneumothorax or pleural effusions.  There are innumerable sclerotic foci identified throughout the visualized bones consistent with diffuse osseous metastatic disease.  The sclerotic appearance of these bone lesions which were not clearly evident on the prior examinations may be related to healing bone metastases.  There appear to be fractures of the anterior costal cartilage to the left of the lower sternum involving at least 3 adjacent levels and this was better appreciated on the prior examination dated 03/19/2022. Multiple low-density liver masses are again identified, decreased in size when compared to the prior examination.  There is a mass within the dome of the liver measuring 1.9 cm (image 88, series 3), previously 4.5 cm.  A hypodense mass is identified at the lateral hepatic dome measuring 1.2 cm (image 77, series 3), previously 2.5 cm.  There is a 1.0 cm mass at the inferior right hepatic lobe (image 131, series 3), previously 2.7 cm.  No new liver lesions are identified.  The gallbladder is present and appears unremarkable.  There is no evidence for intrahepatic or extrahepatic biliary dilatation.  The spleen, stomach, and pancreas appear unremarkable.  The adrenal glands are not enlarged.  The kidneys are normal in size and are noted to concentrate contrast symmetrically.  There are punctate nonobstructing renal calculi.  There is no evidence for hydronephrosis.  There is a subcentimeter hypodensity within the lower pole of the right kidney, likely a small cyst.  Atherosclerotic  calcification is present within the abdominal aorta without aneurysmal dilatation.  No para-aortic lymphadenopathy is identified.  No dilated loops of bowel are evident.  There is no evidence for pelvic or inguinal lymphadenopathy.  No ascites is identified.     Decreasing size of right middle lobe lung mass as detailed above. Decreasing conglomerate mediastinal adenopathy. Decreasing hepatic masses consistent with improving metastatic disease. Extensive sclerotic bone lesions are now identified.  While this is a new finding, this likely represents demonstration of treated bone metastases given the sclerotic appearance of the widespread bone lesions. Multiple fractures involving the anterior costal cartilage to the left of the lower sternum, decreased in prominence when compared to CT chest dated 03/19/2022. Nonobstructing renal calculi. Probable small amount of nonobstructing thrombus within the right internal jugular vein. RECIST SUMMARY: Date of prior examination for comparison: CT chest dated 03/19/2022 and CT abdomen and pelvis dated 03/22/2022 Lesion 1: Right middle lobe lung mass.  3.4 cm. Series 3 image 64.  Prior measurement 4.5 cm. Lesion 2: Right paratracheal (conglomerate) adenopathy.  1.7 cm. Series 3 image 31.  Prior measurement 5.5 cm. Lesion 3: Dome of liver.  1.9 cm. Series 3 image 88.  Prior measurement 4.5 cm. Lesion 4: Caudal right lobe of liver.  1.0 cm. Series 3 image 131.  Prior measurement 2.7 cm. Electronically signed by: Enmanuel Sagastume MD Date:    07/26/2022 Time:    11:45      Assessment and Plan        1. Small cell lung cancer    2. 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 Currently experiencing expected RT side effects  (chest pain, cough, dysphagia) but all are manageable. Chest pain lasts about a 1 minute or two and is associated with radiation. Cough mostly at night when he's lying down.  Dysphagia is also associated with radiation and lasts only a brief amount of time.  No post-infusion side effects.  -labs reviewed; ok to proceed with atezolizumab  -labs and virtual follow up prior to next cycle.  -for his cough he has prn cough syrup. Also said that sometimes RT precipitates reflux, which may manifest as a cough so he may consider taking protonix once or twice a day as another way to treat it.           Secondary malignant neoplasm of liver    Secondary malignant neoplasm of intrathoracic lymph nodes        Antolin Sosa MD  Hematology Oncology    Route Chart for Scheduling    Med Onc Chart Routing      Follow up with physician . Virtual visit 9/5 prior to chemo (ok to book in afternoon during hospital service)   Follow up with YANCY    Infusion scheduling note 9/6 atezolizumab ([facility] chemo)   Injection scheduling note    Labs CMP and CBC   Lab interval:  9/5 [facility] labs cmp cbc   Imaging None      Pharmacy appointment No pharmacy appointment needed      Other referrals No additional referrals needed         Treatment Plan Information   OP ATEZOLIZUMAB CARBOPLATIN ETOPOSIDE Q3W   Antolin Sosa MD   Upcoming Treatment Dates - OP ATEZOLIZUMAB CARBOPLATIN ETOPOSIDE Q3W    8/16/2022       Chemotherapy       atezolizumab (TECENTRIQ) 1,200 mg in sodium chloride 0.9% 270 mL infusion       Antiemetics       ondansetron injection 8 mg  9/6/2022       Chemotherapy       atezolizumab (TECENTRIQ) 1,200 mg in sodium chloride 0.9% 270 mL infusion       Antiemetics       ondansetron injection 8 mg  9/27/2022       Chemotherapy       atezolizumab (TECENTRIQ) 1,200 mg in sodium chloride 0.9% 270 mL infusion       Antiemetics       ondansetron injection 8 mg  10/18/2022       Chemotherapy       atezolizumab (TECENTRIQ) 1,200 mg in sodium  chloride 0.9% 270 mL infusion       Antiemetics       ondansetron injection 8 mg    The patient location is: car  The chief complaint leading to consultation is: small cell lung cancer    Visit type: audiovisual    Face to Face time with patient: 10 minutes  30 minutes of total time spent on the encounter, which includes face to face time and non-face to face time preparing to see the patient (eg, review of tests), Obtaining and/or reviewing separately obtained history, Documenting clinical information in the electronic or other health record, Independently interpreting results (not separately reported) and communicating results to the patient/family/caregiver, or Care coordination (not separately reported).         Each patient to whom he or she provides medical services by telemedicine is:  (1) informed of the relationship between the physician and patient and the respective role of any other health care provider with respect to management of the patient; and (2) notified that he or she may decline to receive medical services by telemedicine and may withdraw from such care at any time.    Notes:

## 2022-08-15 NOTE — ASSESSMENT & PLAN NOTE
8/4/22 started consolidative thoracic radiation with plans to finish 8/18/22. Currently experiencing expected RT side effects (chest pain, cough, dysphagia) but all are manageable. Chest pain lasts about a 1 minute or two and is associated with radiation. Cough mostly at night when he's lying down.  Dysphagia is also associated with radiation and lasts only a brief amount of time.  No post-infusion side effects.  -labs reviewed; ok to proceed with atezolizumab  -labs and virtual follow up prior to next cycle.  -for his cough he has prn cough syrup. Also said that sometimes RT precipitates reflux, which may manifest as a cough so he may consider taking protonix once or twice a day as another way to treat it.

## 2022-08-16 ENCOUNTER — INFUSION (OUTPATIENT)
Dept: INFUSION THERAPY | Facility: HOSPITAL | Age: 57
End: 2022-08-16
Attending: INTERNAL MEDICINE
Payer: MEDICAID

## 2022-08-16 VITALS
RESPIRATION RATE: 16 BRPM | OXYGEN SATURATION: 97 % | DIASTOLIC BLOOD PRESSURE: 75 MMHG | TEMPERATURE: 98 F | SYSTOLIC BLOOD PRESSURE: 128 MMHG | HEART RATE: 86 BPM

## 2022-08-16 DIAGNOSIS — C34.90 SMALL CELL LUNG CANCER: Primary | ICD-10-CM

## 2022-08-16 DIAGNOSIS — C78.7 SECONDARY MALIGNANT NEOPLASM OF LIVER: ICD-10-CM

## 2022-08-16 PROCEDURE — 77387 GUIDANCE FOR RADJ TX DLVR: CPT | Mod: 26,,, | Performed by: RADIOLOGY

## 2022-08-16 PROCEDURE — A4216 STERILE WATER/SALINE, 10 ML: HCPCS | Performed by: STUDENT IN AN ORGANIZED HEALTH CARE EDUCATION/TRAINING PROGRAM

## 2022-08-16 PROCEDURE — 25000003 PHARM REV CODE 250: Performed by: STUDENT IN AN ORGANIZED HEALTH CARE EDUCATION/TRAINING PROGRAM

## 2022-08-16 PROCEDURE — 96375 TX/PRO/DX INJ NEW DRUG ADDON: CPT

## 2022-08-16 PROCEDURE — 63600175 PHARM REV CODE 636 W HCPCS: Performed by: STUDENT IN AN ORGANIZED HEALTH CARE EDUCATION/TRAINING PROGRAM

## 2022-08-16 PROCEDURE — 77387 PR GUIDANCE FOR RADIATION TREATMENT DELIVERY: ICD-10-PCS | Mod: 26,,, | Performed by: RADIOLOGY

## 2022-08-16 PROCEDURE — 96413 CHEMO IV INFUSION 1 HR: CPT

## 2022-08-16 PROCEDURE — 77387 GUIDANCE FOR RADJ TX DLVR: CPT | Mod: TC | Performed by: RADIOLOGY

## 2022-08-16 PROCEDURE — 77412 RADIATION TX DELIVERY LVL 3: CPT | Performed by: RADIOLOGY

## 2022-08-16 RX ORDER — SODIUM CHLORIDE 0.9 % (FLUSH) 0.9 %
10 SYRINGE (ML) INJECTION
Status: DISCONTINUED | OUTPATIENT
Start: 2022-08-16 | End: 2022-08-16 | Stop reason: HOSPADM

## 2022-08-16 RX ORDER — ONDANSETRON 2 MG/ML
8 INJECTION INTRAMUSCULAR; INTRAVENOUS
Status: COMPLETED | OUTPATIENT
Start: 2022-08-16 | End: 2022-08-16

## 2022-08-16 RX ORDER — HEPARIN 100 UNIT/ML
500 SYRINGE INTRAVENOUS
Status: DISCONTINUED | OUTPATIENT
Start: 2022-08-16 | End: 2022-08-16 | Stop reason: HOSPADM

## 2022-08-16 RX ADMIN — HEPARIN 500 UNITS: 100 SYRINGE at 01:08

## 2022-08-16 RX ADMIN — ATEZOLIZUMAB 1200 MG: 1200 INJECTION, SOLUTION INTRAVENOUS at 12:08

## 2022-08-16 RX ADMIN — Medication 10 ML: at 01:08

## 2022-08-16 RX ADMIN — ONDANSETRON 8 MG: 2 INJECTION INTRAMUSCULAR; INTRAVENOUS at 12:08

## 2022-08-16 NOTE — PLAN OF CARE
Patient arrived to unit for C7 Atezo infusion.Pt continues Radiation Therapy, reports he is able to walk/jog a few times a week, and having difficulty swallowing food/fluids at some times.  Plan of care reviewed, patient agreeable to plan. Zofran administered prior to Atezo; administered over 30 minutes.Patient tolerated infusion well. Pt was able to eat lunch during visit.VSS. Discharge instructions reviewed, patient instructed to return 9/6. Patient ambulated off unit accompanied by spouse. Patient in NAD at time of discharge.

## 2022-08-17 PROCEDURE — 77387 GUIDANCE FOR RADJ TX DLVR: CPT | Mod: TC | Performed by: RADIOLOGY

## 2022-08-17 PROCEDURE — 77387 PR GUIDANCE FOR RADIATION TREATMENT DELIVERY: ICD-10-PCS | Mod: 26,,, | Performed by: RADIOLOGY

## 2022-08-17 PROCEDURE — 77412 RADIATION TX DELIVERY LVL 3: CPT | Performed by: RADIOLOGY

## 2022-08-17 PROCEDURE — 77387 GUIDANCE FOR RADJ TX DLVR: CPT | Mod: 26,,, | Performed by: RADIOLOGY

## 2022-08-18 ENCOUNTER — DOCUMENTATION ONLY (OUTPATIENT)
Dept: RADIATION ONCOLOGY | Facility: CLINIC | Age: 57
End: 2022-08-18
Payer: MEDICAID

## 2022-08-18 DIAGNOSIS — K20.80 RADIATION ESOPHAGITIS: Primary | ICD-10-CM

## 2022-08-18 DIAGNOSIS — T66.XXXA RADIATION ESOPHAGITIS: Primary | ICD-10-CM

## 2022-08-18 PROCEDURE — 77387 GUIDANCE FOR RADJ TX DLVR: CPT | Mod: TC | Performed by: RADIOLOGY

## 2022-08-18 PROCEDURE — 77412 RADIATION TX DELIVERY LVL 3: CPT | Performed by: RADIOLOGY

## 2022-08-18 PROCEDURE — 77336 RADIATION PHYSICS CONSULT: CPT | Performed by: RADIOLOGY

## 2022-08-18 PROCEDURE — 77387 GUIDANCE FOR RADJ TX DLVR: CPT | Mod: 26,,, | Performed by: RADIOLOGY

## 2022-08-18 PROCEDURE — 77387 PR GUIDANCE FOR RADIATION TREATMENT DELIVERY: ICD-10-PCS | Mod: 26,,, | Performed by: RADIOLOGY

## 2022-09-01 NOTE — PROGRESS NOTES
MEDICAL ONCOLOGY FOLLOW-UP VISIT.     Reason for visit: Juan Gillespie is a 57 y.o. male diagnosed with extensive stage small cell lung cancer in 03/2022 who was treated with carbo/etop/atezolizumab from 04/05/2022 - 06/14/22 with partial response. He subsequently completed consolidation thoracic RT 8/4/22 - 8/18/22, and he continues on maintenance atezolizumab. Telehealth visit conducted for evaluation prior to next cycle of atezolizumab. Recent course notable for sternal chest pain, cough, and dysphagia worsened during RT treatment.    Best Contact Phone Number(s): [phone] (home)      Cancer/Stage/TNM:    Cancer Staging   Small cell lung cancer  Staging form: Lung, AJCC 8th Edition  - Clinical stage from 4/8/2022: Stage IVB (cT2b, cN0, pM1c) - Signed by Stephen Jacome MD on 4/8/2022       Oncology History   Small cell lung cancer   3/30/2022 Initial Diagnosis    Small cell lung cancer     4/5/2022 -  Chemotherapy    Treatment Summary   Plan Name: OP ATEZOLIZUMAB CARBOPLATIN ETOPOSIDE Q3W  Treatment Goal: Palliative  Status: Active  Start Date: 4/5/2022  End Date: 3/14/2023 (Planned)  Provider: Antolin Sosa MD  Chemotherapy: CARBOplatin (PARAPLATIN) 750 mg in sodium chloride 0.9% 250 mL chemo infusion, 750 mg (100 % of original dose 750 mg), Intravenous, Clinic/[facility] 1 time, 4 of 4 cycles  Dose modification:   (original dose 750 mg, Cycle 1)  Administration: 750 mg (4/5/2022), 640 mg (4/27/2022), 640 mg (5/18/2022), 750 mg (6/14/2022)  etoposide (VEPESID) 100 mg/m2 = 236 mg in sodium chloride 0.9% 500 mL chemo infusion, 100 mg/m2 = 236 mg, Intravenous, Clinic/HOD 1 time, 4 of 4 cycles  Administration: 236 mg (4/5/2022), 236 mg (4/6/2022), 236 mg (4/27/2022), 236 mg (4/28/2022), 236 mg (4/7/2022), 236 mg (4/29/2022), 236 mg (5/18/2022), 236 mg (5/19/2022), 236 mg (5/20/2022), 236 mg (6/14/2022), 236 mg (6/15/2022), 236 mg (6/16/2022)  atezolizumab (TECENTRIQ) 1,200 mg in sodium chloride 0.9% 270 mL infusion,  1,200 mg, Intravenous, Clinic/HOD 1 time, 7 of 17 cycles  Administration: 1,200 mg (4/5/2022), 1,200 mg (4/27/2022), 1,200 mg (7/5/2022), 1,200 mg (5/18/2022), 1,200 mg (6/14/2022), 1,200 mg (7/26/2022), 1,200 mg (8/16/2022)       4/8/2022 Cancer Staged    Staging form: Lung, AJCC 8th Edition  - Clinical stage from 4/8/2022: Stage IVB (cT2b, cN0, pM1c)       8/4/2022 - 8/18/2022 Radiation Therapy    Treating physician: Stephen Jacome    Site Technique Energy Dose/Fx (Gy) #Fx Total Dose (Gy)   Mediastinum 3D 18X 3 10 / 10 30      Secondary malignant neoplasm of liver   3/30/2022 Initial Diagnosis    Secondary malignant neoplasm of liver     4/5/2022 -  Chemotherapy    Treatment Summary   Plan Name: OP ATEZOLIZUMAB CARBOPLATIN ETOPOSIDE Q3W  Treatment Goal: Palliative  Status: Active  Start Date: 4/5/2022  End Date: 3/14/2023 (Planned)  Provider: Antolin Sosa MD  Chemotherapy: CARBOplatin (PARAPLATIN) 750 mg in sodium chloride 0.9% 250 mL chemo infusion, 750 mg (100 % of original dose 750 mg), Intravenous, Clinic/HOD 1 time, 4 of 4 cycles  Dose modification:   (original dose 750 mg, Cycle 1)  Administration: 750 mg (4/5/2022), 640 mg (4/27/2022), 640 mg (5/18/2022), 750 mg (6/14/2022)  etoposide (VEPESID) 100 mg/m2 = 236 mg in sodium chloride 0.9% 500 mL chemo infusion, 100 mg/m2 = 236 mg, Intravenous, Clinic/HOD 1 time, 4 of 4 cycles  Administration: 236 mg (4/5/2022), 236 mg (4/6/2022), 236 mg (4/27/2022), 236 mg (4/28/2022), 236 mg (4/7/2022), 236 mg (4/29/2022), 236 mg (5/18/2022), 236 mg (5/19/2022), 236 mg (5/20/2022), 236 mg (6/14/2022), 236 mg (6/15/2022), 236 mg (6/16/2022)  atezolizumab (TECENTRIQ) 1,200 mg in sodium chloride 0.9% 270 mL infusion, 1,200 mg, Intravenous, Clinic/[facility] 1 time, 7 of 17 cycles  Administration: 1,200 mg (4/5/2022), 1,200 mg (4/27/2022), 1,200 mg (7/5/2022), 1,200 mg (5/18/2022), 1,200 mg (6/14/2022), 1,200 mg (7/26/2022), 1,200 mg (8/16/2022)            Interval History:   Today  patient reports ongoing positional chest pain that is slightly improved since finishing RT. Primarily occurs when lying down and expanding his chest. No pain if sitting normally. Not taking analgesics. No shortness of breath, and he walks several miles a day. Mild cough is stable. No new rash. No abdominal pain or diarrhea. No N/V. He does have intermittent gout flares in his bilateral big toes - last was last week. He takes colchicine and allopurinol.     He denies any recent EtOH or APAP. Denies hepatotoxic supplement use.    ROS:  Review of Systems   Constitutional:  Negative for appetite change, chills and fever.   HENT:  Negative for hearing loss, mouth sores, nosebleeds, sore throat and trouble swallowing.    Eyes:  Negative for pain and visual disturbance.   Respiratory:  Positive for cough (stable). Negative for shortness of breath.    Cardiovascular:  Positive for chest pain (Positional sternal pain - is stable to improving after RT). Negative for palpitations and leg swelling.   Gastrointestinal:  Negative for abdominal pain, blood in stool, constipation, diarrhea, nausea and vomiting.   Genitourinary:  Negative for difficulty urinating and dysuria.   Musculoskeletal:  Positive for arthralgias (intermittent gout flares).   Skin:  Negative for rash.   Neurological:  Negative for dizziness, weakness, light-headedness and headaches.   Hematological:  Negative for adenopathy. Does not bruise/bleed easily.    A complete 12-point review of systems was reviewed and is negative except as mentioned above.     Past Medical History:   Past Medical History:   Diagnosis Date    Diabetes mellitus, type 2     Hypertension         Allergies:   Review of patient's allergies indicates:  No Known Allergies     Medications:   Current Outpatient Medications   Medication Sig Dispense Refill    allopurinoL (ZYLOPRIM) 300 MG tablet Take 1 tablet (300 mg total) by mouth once daily. 60 tablet 3    colchicine (COLCRYS) 0.6 mg tablet  Take 1 tablet (0.6 mg total) by mouth once daily. This is for gout. Take every day for treatment and prevention. 30 tablet 11    losartan (COZAAR) 100 MG tablet Take 100 mg by mouth once daily.      albuterol (ACCUNEB) 1.25 mg/3 mL Nebu Use 1 vial (1.25 mg total) by nebulization 3 (three) times daily as needed (shortness of breath). Rescue 90 mL 2    amLODIPine (NORVASC) 5 MG tablet Take 5 mg by mouth once daily.      atorvastatin (LIPITOR) 20 MG tablet Take 20 mg by mouth once daily.      duke's soln (benadryl 30 mL, mylanta 30 mL, LIDOcaine 30 mL, nystatin 30 mL) 120mL Take 10 mLs by mouth 4 (four) times daily. 480 mL 2    fluticasone propionate (FLONASE) 50 mcg/actuation nasal spray 1 spray (50 mcg total) by Each Nostril route once daily. 15 g 2    garlic 1,000 mg Cap Take 2,000 mg by mouth.      hydrOXYzine HCL (ATARAX) 25 MG tablet Take 1 tablet (25 mg total) by mouth 3 (three) times daily as needed for Itching. 21 tablet 0    montelukast (SINGULAIR) 10 mg tablet Take 10 mg by mouth every evening.      NIFEdipine (PROCARDIA-XL) 60 MG (OSM) 24 hr tablet Take 1 tablet (60 mg total) by mouth once daily. 30 tablet 3    ondansetron (ZOFRAN) 8 MG tablet Take 1 tablet (8 mg total) by mouth every 8 (eight) hours as needed for Nausea. 30 tablet 3    pantoprazole (PROTONIX) 40 MG tablet Take 1 tablet (40 mg total) by mouth once daily. 30 tablet 3    promethazine-codeine 6.25-10 mg/5 ml (PHENERGAN WITH CODEINE) 6.25-10 mg/5 mL syrup Take 5 mLs by mouth 3 (three) times daily as needed for Cough. 500 mL 0    TRUE METRIX GLUCOSE TEST STRIP Strp       TRUEPLUS LANCETS 33 gauge Misc        No current facility-administered medications for this visit.        Physical Exam:   There were no vitals taken for this visit.     ECOG Performance Status: (foot note - ECOG PS provided by [employer]cology [employer]) 1 - Symptomatic but completely ambulatory    Physical Exam  Constitutional:       Appearance: Normal appearance.    Pulmonary:      Effort: Pulmonary effort is normal. No respiratory distress.   Neurological:      Mental Status: He is alert and oriented to person, place, and time. Mental status is at baseline.   Psychiatric:         Mood and Affect: Mood normal.         Behavior: Behavior normal.         Thought Content: Thought content normal.         Labs:   Recent Results (from the past 48 hour(s))   CBC Auto Differential    Collection Time: 09/02/22 10:05 AM   Result Value Ref Range    WBC 6.11 3.90 - 12.70 K/uL    RBC 5.33 4.60 - 6.20 M/uL    Hemoglobin 15.8 14.0 - 18.0 g/dL    Hematocrit 47.8 40.0 - 54.0 %    MCV 90 82 - 98 fL    MCH 29.6 27.0 - 31.0 pg    MCHC 33.1 32.0 - 36.0 g/dL    RDW 12.4 11.5 - 14.5 %    Platelets 192 150 - 450 K/uL    MPV 8.8 (L) 9.2 - 12.9 fL    Immature Granulocytes 0.8 (H) 0.0 - 0.5 %    Gran # (ANC) 4.7 1.8 - 7.7 K/uL    Immature Grans (Abs) 0.05 (H) 0.00 - 0.04 K/uL    Lymph # 0.6 (L) 1.0 - 4.8 K/uL    Mono # 0.5 0.3 - 1.0 K/uL    Eos # 0.3 0.0 - 0.5 K/uL    Baso # 0.04 0.00 - 0.20 K/uL    nRBC 0 0 /100 WBC    Gran % 76.1 (H) 38.0 - 73.0 %    Lymph % 10.3 (L) 18.0 - 48.0 %    Mono % 8.0 4.0 - 15.0 %    Eosinophil % 4.1 0.0 - 8.0 %    Basophil % 0.7 0.0 - 1.9 %    Differential Method Automated    Comprehensive Metabolic Panel    Collection Time: 09/02/22 10:05 AM   Result Value Ref Range    Sodium 140 136 - 145 mmol/L    Potassium 4.4 3.5 - 5.1 mmol/L    Chloride 102 95 - 110 mmol/L    CO2 26 23 - 29 mmol/L    Glucose 162 (H) 70 - 110 mg/dL    BUN 20 6 - 20 mg/dL    Creatinine 1.2 0.5 - 1.4 mg/dL    Calcium 9.9 8.7 - 10.5 mg/dL    Total Protein 7.3 6.0 - 8.4 g/dL    Albumin 4.1 3.5 - 5.2 g/dL    Total Bilirubin 0.8 0.1 - 1.0 mg/dL    Alkaline Phosphatase 122 55 - 135 U/L    AST 31 10 - 40 U/L    ALT 55 (H) 10 - 44 U/L    Anion Gap 12 8 - 16 mmol/L    eGFR >60 >60 mL/min/1.73 m^2      I have reviewed above labs significant for nl CBC. ALT mildly elevated at 55.    Imaging: I have personally  reviewed patient's CT torso from 7/26 imaging showing partial response to all sites of known disease.    CT Chest Abdomen Pelvis With Contrast (xpd)  Narrative: EXAMINATION:  CT CHEST ABDOMEN PELVIS WITH CONTRAST (XPD)    CLINICAL HISTORY:  extensive stage small cell lung cancer s/p 5 cycles chemo, eval response; Malignant neoplasm of unspecified part of unspecified bronchus or lung    TECHNIQUE:  Low dose axial images, sagittal and coronal reformations were obtained from the thoracic inlet to the pubic symphysis following the IV administration of 100 mL of Omnipaque 350 and the oral administration of 450 ml of Readi-Cat suspension.    COMPARISON:  CT of the chest dated 03/19/2022 and CT abdomen and pelvis with contrast dated 03/22/2022.    FINDINGS:  There is a right internal jugular Port-A-Cath present with tip of the catheter within the right atrium near the junction with the superior vena cava.  There is a filling defect within the right internal jugular vein which may represent thrombus cephalad to the right internal jugular catheter.  Structures at the base of the neck otherwise appear unremarkable.  The heart is not enlarged.  There is a small amount of atherosclerotic calcification identified within the coronary arteries in a multi-vessel distribution.  Atherosclerotic calcification is also present within the thoracic aorta which is normal in caliber without aneurysmal dilatation.  There is right paratracheal adenopathy present with the conglomerate mass now measuring 3.0 x 1.7 x 4.1 cm (images 31, series 3 and 91, series 605) compared to 7.3 x 5.5 x 7.5 cm on the prior examination.  The right middle lobe mass has also decreased in size now measuring 3.4 x 1.6 x 2.1 cm (images 64, series 3 and 56, series 605) compared to 4.5  x 2.9 x 4.6 cm on the prior examination.  There has also been marked improvement in the additional mediastinal adenopathy noted on the prior examination.  No axillary adenopathy is  identified.  There are mild dependent atelectatic changes.  No new pulmonary nodules are identified.  There is no evidence for pneumothorax or pleural effusions.  There are innumerable sclerotic foci identified throughout the visualized bones consistent with diffuse osseous metastatic disease.  The sclerotic appearance of these bone lesions which were not clearly evident on the prior examinations may be related to healing bone metastases.  There appear to be fractures of the anterior costal cartilage to the left of the lower sternum involving at least 3 adjacent levels and this was better appreciated on the prior examination dated 03/19/2022.    Multiple low-density liver masses are again identified, decreased in size when compared to the prior examination.  There is a mass within the dome of the liver measuring 1.9 cm (image 88, series 3), previously 4.5 cm.  A hypodense mass is identified at the lateral hepatic dome measuring 1.2 cm (image 77, series 3), previously 2.5 cm.  There is a 1.0 cm mass at the inferior right hepatic lobe (image 131, series 3), previously 2.7 cm.  No new liver lesions are identified.  The gallbladder is present and appears unremarkable.  There is no evidence for intrahepatic or extrahepatic biliary dilatation.  The spleen, stomach, and pancreas appear unremarkable.  The adrenal glands are not enlarged.  The kidneys are normal in size and are noted to concentrate contrast symmetrically.  There are punctate nonobstructing renal calculi.  There is no evidence for hydronephrosis.  There is a subcentimeter hypodensity within the lower pole of the right kidney, likely a small cyst.  Atherosclerotic  calcification is present within the abdominal aorta without aneurysmal dilatation.  No para-aortic lymphadenopathy is identified.  No dilated loops of bowel are evident.  There is no evidence for pelvic or inguinal lymphadenopathy.  No ascites is identified.  Impression: Decreasing size of right  middle lobe lung mass as detailed above.    Decreasing conglomerate mediastinal adenopathy.    Decreasing hepatic masses consistent with improving metastatic disease.    Extensive sclerotic bone lesions are now identified.  While this is a new finding, this likely represents demonstration of treated bone metastases given the sclerotic appearance of the widespread bone lesions.    Multiple fractures involving the anterior costal cartilage to the left of the lower sternum, decreased in prominence when compared to CT chest dated 03/19/2022.    Nonobstructing renal calculi.    Probable small amount of nonobstructing thrombus within the right internal jugular vein.    RECIST SUMMARY:    Date of prior examination for comparison: CT chest dated 03/19/2022 and CT abdomen and pelvis dated 03/22/2022    Lesion 1: Right middle lobe lung mass.  3.4 cm. Series 3 image 64.  Prior measurement 4.5 cm.    Lesion 2: Right paratracheal (conglomerate) adenopathy.  1.7 cm. Series 3 image 31.  Prior measurement 5.5 cm.    Lesion 3: Dome of liver.  1.9 cm. Series 3 image 88.  Prior measurement 4.5 cm.    Lesion 4: Caudal right lobe of liver.  1.0 cm. Series 3 image 131.  Prior measurement 2.7 cm.    Electronically signed by: Enmanuel Sagastume MD  Date:    07/26/2022  Time:    11:45            Diagnoses:       1. Small cell lung cancer    2. Elevated alanine aminotransferase (ALT) level          Assessment and Plan:     1. Small cell lung cancer  Overview:  Initially diagnosed with extensive stage small cell lung cancer in 03/2022 who was treated with carbo/etop/atezolizumab from 04/05/2022 - 06/14/22 with partial response. He subsequently completed consolidation thoracic RT 8/4/22 - 8/18/22, and he continues on maintenance atezolizumab.     Assessment & Plan:  - Note made of mild ALT elevation  - Given <3x ULN will proceed with atezolizumab as scheduled on 9/6/22  - FU with Dr. Sosa in 3 weeks for his next treatment cycle  - Has MR brain  scheduled per radiation oncology; anticipate next imaging in 10/2022        2. Elevated alanine aminotransferase (ALT) level  Overview:  Mild elevated of ALT (55) noted on 9/2/22    Assessment & Plan:  - Since elevation <3x ULN, OK to proceed with treatment  - Close monitoring with subsequent cycles  - Asked patient to avoid EtOH, APAP, and hepatotoxic supplements      Route Chart for Scheduling    Med Onc Chart Routing      Follow up with physician 3 weeks. FU with Dr. Sosa on 9/26/22; next atezo already schedueld for 9/27   Follow up with YANCY    Infusion scheduling note    Injection scheduling note    Labs CMP, TSH and CBC   Lab interval:     Imaging    Pharmacy appointment    Other referrals        Treatment Plan Information   OP ATEZOLIZUMAB CARBOPLATIN ETOPOSIDE Q3W   Antolin Sosa MD   Upcoming Treatment Dates - OP ATEZOLIZUMAB CARBOPLATIN ETOPOSIDE Q3W    9/6/2022       Chemotherapy       atezolizumab (TECENTRIQ) 1,200 mg in sodium chloride 0.9% 270 mL infusion       Antiemetics       ondansetron injection 8 mg  9/27/2022       Chemotherapy       atezolizumab (TECENTRIQ) 1,200 mg in sodium chloride 0.9% 270 mL infusion       Antiemetics       ondansetron injection 8 mg  10/18/2022       Chemotherapy       atezolizumab (TECENTRIQ) 1,200 mg in sodium chloride 0.9% 270 mL infusion       Antiemetics       ondansetron injection 8 mg  11/8/2022       Chemotherapy       atezolizumab (TECENTRIQ) 1,200 mg in sodium chloride 0.9% 270 mL infusion       Antiemetics       ondansetron injection 8 mg     The patient location is: [place] / [place], [place]  The chief complaint leading to consultation is: follow up SCLC prior to next cycle of atezolizumab    Visit type: audiovisual    Face to Face time with patient: 15  30 minutes of total time spent on the encounter, which includes face to face time and non-face to face time preparing to see the patient (eg, review of tests), Obtaining and/or reviewing separately obtained history,  Documenting clinical information in the electronic or other health record, Independently interpreting results (not separately reported) and communicating results to the patient/family/caregiver, or Care coordination (not separately reported).         Each patient to whom he or she provides medical services by telemedicine is:  (1) informed of the relationship between the physician and patient and the respective role of any other health care provider with respect to management of the patient; and (2) notified that he or she may decline to receive medical services by telemedicine and may withdraw from such care at any time.    Notes:        Felipe Block MD  Hematology/Oncology  [facility]

## 2022-09-01 NOTE — ASSESSMENT & PLAN NOTE
- Note made of mild ALT elevation  - Given <3x ULN will proceed with atezolizumab as scheduled on 9/6/22  - FU with Dr. Sosa in 3 weeks for his next treatment cycle  - Has MR brain scheduled per radiation oncology; anticipate next imaging in 10/2022

## 2022-09-02 ENCOUNTER — TELEPHONE (OUTPATIENT)
Dept: RADIATION ONCOLOGY | Facility: CLINIC | Age: 57
End: 2022-09-02
Payer: MEDICAID

## 2022-09-02 ENCOUNTER — OFFICE VISIT (OUTPATIENT)
Dept: HEMATOLOGY/ONCOLOGY | Facility: CLINIC | Age: 57
End: 2022-09-02
Payer: MEDICAID

## 2022-09-02 ENCOUNTER — LAB VISIT (OUTPATIENT)
Dept: LAB | Facility: HOSPITAL | Age: 57
End: 2022-09-02
Attending: STUDENT IN AN ORGANIZED HEALTH CARE EDUCATION/TRAINING PROGRAM
Payer: MEDICAID

## 2022-09-02 DIAGNOSIS — C34.90 SMALL CELL LUNG CANCER: Primary | ICD-10-CM

## 2022-09-02 DIAGNOSIS — R74.01 ELEVATED ALANINE AMINOTRANSFERASE (ALT) LEVEL: ICD-10-CM

## 2022-09-02 DIAGNOSIS — C34.90 SMALL CELL LUNG CANCER: ICD-10-CM

## 2022-09-02 LAB
ALBUMIN SERPL BCP-MCNC: 4.1 G/DL (ref 3.5–5.2)
ALP SERPL-CCNC: 122 U/L (ref 55–135)
ALT SERPL W/O P-5'-P-CCNC: 55 U/L (ref 10–44)
ANION GAP SERPL CALC-SCNC: 12 MMOL/L (ref 8–16)
AST SERPL-CCNC: 31 U/L (ref 10–40)
BASOPHILS # BLD AUTO: 0.04 K/UL (ref 0–0.2)
BASOPHILS NFR BLD: 0.7 % (ref 0–1.9)
BILIRUB SERPL-MCNC: 0.8 MG/DL (ref 0.1–1)
BUN SERPL-MCNC: 20 MG/DL (ref 6–20)
CALCIUM SERPL-MCNC: 9.9 MG/DL (ref 8.7–10.5)
CHLORIDE SERPL-SCNC: 102 MMOL/L (ref 95–110)
CO2 SERPL-SCNC: 26 MMOL/L (ref 23–29)
CREAT SERPL-MCNC: 1.2 MG/DL (ref 0.5–1.4)
DIFFERENTIAL METHOD: ABNORMAL
EOSINOPHIL # BLD AUTO: 0.3 K/UL (ref 0–0.5)
EOSINOPHIL NFR BLD: 4.1 % (ref 0–8)
ERYTHROCYTE [DISTWIDTH] IN BLOOD BY AUTOMATED COUNT: 12.4 % (ref 11.5–14.5)
EST. GFR  (NO RACE VARIABLE): >60 ML/MIN/1.73 M^2
GLUCOSE SERPL-MCNC: 162 MG/DL (ref 70–110)
HCT VFR BLD AUTO: 47.8 % (ref 40–54)
HGB BLD-MCNC: 15.8 G/DL (ref 14–18)
IMM GRANULOCYTES # BLD AUTO: 0.05 K/UL (ref 0–0.04)
IMM GRANULOCYTES NFR BLD AUTO: 0.8 % (ref 0–0.5)
LYMPHOCYTES # BLD AUTO: 0.6 K/UL (ref 1–4.8)
LYMPHOCYTES NFR BLD: 10.3 % (ref 18–48)
MCH RBC QN AUTO: 29.6 PG (ref 27–31)
MCHC RBC AUTO-ENTMCNC: 33.1 G/DL (ref 32–36)
MCV RBC AUTO: 90 FL (ref 82–98)
MONOCYTES # BLD AUTO: 0.5 K/UL (ref 0.3–1)
MONOCYTES NFR BLD: 8 % (ref 4–15)
NEUTROPHILS # BLD AUTO: 4.7 K/UL (ref 1.8–7.7)
NEUTROPHILS NFR BLD: 76.1 % (ref 38–73)
NRBC BLD-RTO: 0 /100 WBC
PLATELET # BLD AUTO: 192 K/UL (ref 150–450)
PMV BLD AUTO: 8.8 FL (ref 9.2–12.9)
POTASSIUM SERPL-SCNC: 4.4 MMOL/L (ref 3.5–5.1)
PROT SERPL-MCNC: 7.3 G/DL (ref 6–8.4)
RBC # BLD AUTO: 5.33 M/UL (ref 4.6–6.2)
SODIUM SERPL-SCNC: 140 MMOL/L (ref 136–145)
WBC # BLD AUTO: 6.11 K/UL (ref 3.9–12.7)

## 2022-09-02 PROCEDURE — 3044F HG A1C LEVEL LT 7.0%: CPT | Mod: CPTII,95,, | Performed by: HOSPITALIST

## 2022-09-02 PROCEDURE — 36415 COLL VENOUS BLD VENIPUNCTURE: CPT | Performed by: STUDENT IN AN ORGANIZED HEALTH CARE EDUCATION/TRAINING PROGRAM

## 2022-09-02 PROCEDURE — 1159F MED LIST DOCD IN RCRD: CPT | Mod: CPTII,95,, | Performed by: HOSPITALIST

## 2022-09-02 PROCEDURE — 3044F PR MOST RECENT HEMOGLOBIN A1C LEVEL <7.0%: ICD-10-PCS | Mod: CPTII,95,, | Performed by: HOSPITALIST

## 2022-09-02 PROCEDURE — 99214 OFFICE O/P EST MOD 30 MIN: CPT | Mod: 95,,, | Performed by: HOSPITALIST

## 2022-09-02 PROCEDURE — 4010F ACE/ARB THERAPY RXD/TAKEN: CPT | Mod: CPTII,95,, | Performed by: HOSPITALIST

## 2022-09-02 PROCEDURE — 4010F PR ACE/ARB THEARPY RXD/TAKEN: ICD-10-PCS | Mod: CPTII,95,, | Performed by: HOSPITALIST

## 2022-09-02 PROCEDURE — 1159F PR MEDICATION LIST DOCUMENTED IN MEDICAL RECORD: ICD-10-PCS | Mod: CPTII,95,, | Performed by: HOSPITALIST

## 2022-09-02 PROCEDURE — 99214 PR OFFICE/OUTPT VISIT, EST, LEVL IV, 30-39 MIN: ICD-10-PCS | Mod: 95,,, | Performed by: HOSPITALIST

## 2022-09-02 PROCEDURE — 85025 COMPLETE CBC W/AUTO DIFF WBC: CPT | Performed by: STUDENT IN AN ORGANIZED HEALTH CARE EDUCATION/TRAINING PROGRAM

## 2022-09-02 PROCEDURE — 1160F PR REVIEW ALL MEDS BY PRESCRIBER/CLIN PHARMACIST DOCUMENTED: ICD-10-PCS | Mod: CPTII,95,, | Performed by: HOSPITALIST

## 2022-09-02 PROCEDURE — 1160F RVW MEDS BY RX/DR IN RCRD: CPT | Mod: CPTII,95,, | Performed by: HOSPITALIST

## 2022-09-02 PROCEDURE — 80053 COMPREHEN METABOLIC PANEL: CPT | Performed by: STUDENT IN AN ORGANIZED HEALTH CARE EDUCATION/TRAINING PROGRAM

## 2022-09-02 NOTE — TELEPHONE ENCOUNTER
Return call to patient after completing radiation to the lung  His wife states he is doing well  f/u appt confirmed

## 2022-09-02 NOTE — ASSESSMENT & PLAN NOTE
- Since elevation <3x ULN, OK to proceed with treatment  - Close monitoring with subsequent cycles  - Asked patient to avoid EtOH, APAP, and hepatotoxic supplements

## 2022-09-06 ENCOUNTER — INFUSION (OUTPATIENT)
Dept: INFUSION THERAPY | Facility: HOSPITAL | Age: 57
End: 2022-09-06
Attending: INTERNAL MEDICINE
Payer: MEDICAID

## 2022-09-06 VITALS
HEIGHT: 67 IN | RESPIRATION RATE: 16 BRPM | SYSTOLIC BLOOD PRESSURE: 161 MMHG | BODY MASS INDEX: 41.18 KG/M2 | TEMPERATURE: 98 F | OXYGEN SATURATION: 98 % | WEIGHT: 262.38 LBS | HEART RATE: 80 BPM | DIASTOLIC BLOOD PRESSURE: 90 MMHG

## 2022-09-06 DIAGNOSIS — C34.90 SMALL CELL LUNG CANCER: Primary | ICD-10-CM

## 2022-09-06 DIAGNOSIS — C78.7 SECONDARY MALIGNANT NEOPLASM OF LIVER: ICD-10-CM

## 2022-09-06 PROCEDURE — 25000003 PHARM REV CODE 250: Performed by: STUDENT IN AN ORGANIZED HEALTH CARE EDUCATION/TRAINING PROGRAM

## 2022-09-06 PROCEDURE — A4216 STERILE WATER/SALINE, 10 ML: HCPCS | Performed by: STUDENT IN AN ORGANIZED HEALTH CARE EDUCATION/TRAINING PROGRAM

## 2022-09-06 PROCEDURE — 96413 CHEMO IV INFUSION 1 HR: CPT

## 2022-09-06 PROCEDURE — 63600175 PHARM REV CODE 636 W HCPCS: Performed by: STUDENT IN AN ORGANIZED HEALTH CARE EDUCATION/TRAINING PROGRAM

## 2022-09-06 RX ORDER — HEPARIN 100 UNIT/ML
500 SYRINGE INTRAVENOUS
Status: DISCONTINUED | OUTPATIENT
Start: 2022-09-06 | End: 2022-09-06 | Stop reason: HOSPADM

## 2022-09-06 RX ORDER — ONDANSETRON 2 MG/ML
8 INJECTION INTRAMUSCULAR; INTRAVENOUS
Status: COMPLETED | OUTPATIENT
Start: 2022-09-06 | End: 2022-09-06

## 2022-09-06 RX ORDER — SODIUM CHLORIDE 0.9 % (FLUSH) 0.9 %
10 SYRINGE (ML) INJECTION
Status: DISCONTINUED | OUTPATIENT
Start: 2022-09-06 | End: 2022-09-06 | Stop reason: HOSPADM

## 2022-09-06 RX ADMIN — HEPARIN 500 UNITS: 100 SYRINGE at 02:09

## 2022-09-06 RX ADMIN — Medication 10 ML: at 02:09

## 2022-09-06 RX ADMIN — SODIUM CHLORIDE: 0.9 INJECTION, SOLUTION INTRAVENOUS at 12:09

## 2022-09-06 RX ADMIN — ONDANSETRON 8 MG: 2 INJECTION INTRAMUSCULAR; INTRAVENOUS at 12:09

## 2022-09-06 RX ADMIN — ATEZOLIZUMAB 1200 MG: 1200 INJECTION, SOLUTION INTRAVENOUS at 01:09

## 2022-09-06 NOTE — PLAN OF CARE
1215 Patient seated in recliner chair. VSS and patient has no current complaints. Port accessed, flushed and NS hung at 25ml/hr. Labs, hx and meds reviewed. Orders signed by MD. Zofran IVP given.

## 2022-09-06 NOTE — PLAN OF CARE
1410 Patient tolerated Tecentriq with no adverse SE. VSS throughout treatment. Wife declined AVS. Port deaccessed after flushing and heparin locked. Bandaid to port site. Patient ambulated out with wife.

## 2022-09-19 ENCOUNTER — TELEPHONE (OUTPATIENT)
Dept: HEMATOLOGY/ONCOLOGY | Facility: CLINIC | Age: 57
End: 2022-09-19
Payer: MEDICAID

## 2022-09-19 NOTE — TELEPHONE ENCOUNTER
This patient is following Dr Sosa at [facility]     ----- Message from Pooja Pizano sent at 9/19/2022 12:07 PM CDT -----  Regarding: Authorization Request  Contact: [phone]  Authorization Request    Name of Caller: Magui with [place] [facility]    Prescription Name: Oxygen    Best Call Back Number: [phone]

## 2022-09-19 NOTE — TELEPHONE ENCOUNTER
This patient is following Dr Sosa at [facility]      ----- Message from Pooja Pizano sent at 9/19/2022 12:07 PM CDT -----  Regarding: Authorization Request  Contact: [phone]  Authorization Request     Name of Caller: Magui with [place] [facility]     Prescription Name: Oxygen     Best Call Back Number: [phone]

## 2022-09-26 ENCOUNTER — LAB VISIT (OUTPATIENT)
Dept: LAB | Facility: HOSPITAL | Age: 57
End: 2022-09-26
Attending: STUDENT IN AN ORGANIZED HEALTH CARE EDUCATION/TRAINING PROGRAM
Payer: MEDICAID

## 2022-09-26 DIAGNOSIS — C34.90 SMALL CELL LUNG CANCER: ICD-10-CM

## 2022-09-26 LAB
ALBUMIN SERPL BCP-MCNC: 4.2 G/DL (ref 3.5–5.2)
ALP SERPL-CCNC: 114 U/L (ref 55–135)
ALT SERPL W/O P-5'-P-CCNC: 51 U/L (ref 10–44)
ANION GAP SERPL CALC-SCNC: 9 MMOL/L (ref 8–16)
AST SERPL-CCNC: 31 U/L (ref 10–40)
BASOPHILS # BLD AUTO: 0.03 K/UL (ref 0–0.2)
BASOPHILS NFR BLD: 0.4 % (ref 0–1.9)
BILIRUB SERPL-MCNC: 1 MG/DL (ref 0.1–1)
BUN SERPL-MCNC: 20 MG/DL (ref 6–20)
CALCIUM SERPL-MCNC: 10.2 MG/DL (ref 8.7–10.5)
CHLORIDE SERPL-SCNC: 106 MMOL/L (ref 95–110)
CO2 SERPL-SCNC: 27 MMOL/L (ref 23–29)
CREAT SERPL-MCNC: 1.3 MG/DL (ref 0.5–1.4)
DIFFERENTIAL METHOD: ABNORMAL
EOSINOPHIL # BLD AUTO: 0.3 K/UL (ref 0–0.5)
EOSINOPHIL NFR BLD: 3.7 % (ref 0–8)
ERYTHROCYTE [DISTWIDTH] IN BLOOD BY AUTOMATED COUNT: 12.3 % (ref 11.5–14.5)
EST. GFR  (NO RACE VARIABLE): >60 ML/MIN/1.73 M^2
GLUCOSE SERPL-MCNC: 129 MG/DL (ref 70–110)
HCT VFR BLD AUTO: 45.7 % (ref 40–54)
HGB BLD-MCNC: 15.2 G/DL (ref 14–18)
IMM GRANULOCYTES # BLD AUTO: 0.05 K/UL (ref 0–0.04)
IMM GRANULOCYTES NFR BLD AUTO: 0.7 % (ref 0–0.5)
LYMPHOCYTES # BLD AUTO: 0.7 K/UL (ref 1–4.8)
LYMPHOCYTES NFR BLD: 10.1 % (ref 18–48)
MCH RBC QN AUTO: 28.9 PG (ref 27–31)
MCHC RBC AUTO-ENTMCNC: 33.3 G/DL (ref 32–36)
MCV RBC AUTO: 87 FL (ref 82–98)
MONOCYTES # BLD AUTO: 0.4 K/UL (ref 0.3–1)
MONOCYTES NFR BLD: 6.1 % (ref 4–15)
NEUTROPHILS # BLD AUTO: 5.3 K/UL (ref 1.8–7.7)
NEUTROPHILS NFR BLD: 79 % (ref 38–73)
NRBC BLD-RTO: 0 /100 WBC
PLATELET # BLD AUTO: 191 K/UL (ref 150–450)
PMV BLD AUTO: 9.6 FL (ref 9.2–12.9)
POTASSIUM SERPL-SCNC: 4.4 MMOL/L (ref 3.5–5.1)
PROT SERPL-MCNC: 7.3 G/DL (ref 6–8.4)
RBC # BLD AUTO: 5.26 M/UL (ref 4.6–6.2)
SODIUM SERPL-SCNC: 142 MMOL/L (ref 136–145)
TSH SERPL DL<=0.005 MIU/L-ACNC: 1.12 UIU/ML (ref 0.4–4)
WBC # BLD AUTO: 6.72 K/UL (ref 3.9–12.7)

## 2022-09-26 PROCEDURE — 85025 COMPLETE CBC W/AUTO DIFF WBC: CPT | Performed by: STUDENT IN AN ORGANIZED HEALTH CARE EDUCATION/TRAINING PROGRAM

## 2022-09-26 PROCEDURE — 80053 COMPREHEN METABOLIC PANEL: CPT | Performed by: STUDENT IN AN ORGANIZED HEALTH CARE EDUCATION/TRAINING PROGRAM

## 2022-09-26 PROCEDURE — 84443 ASSAY THYROID STIM HORMONE: CPT | Performed by: INTERNAL MEDICINE

## 2022-09-26 PROCEDURE — 36415 COLL VENOUS BLD VENIPUNCTURE: CPT | Performed by: INTERNAL MEDICINE

## 2022-09-27 ENCOUNTER — OFFICE VISIT (OUTPATIENT)
Dept: HEMATOLOGY/ONCOLOGY | Facility: CLINIC | Age: 57
End: 2022-09-27
Payer: MEDICAID

## 2022-09-27 ENCOUNTER — INFUSION (OUTPATIENT)
Dept: INFUSION THERAPY | Facility: HOSPITAL | Age: 57
End: 2022-09-27
Attending: INTERNAL MEDICINE
Payer: MEDICAID

## 2022-09-27 VITALS
SYSTOLIC BLOOD PRESSURE: 143 MMHG | HEART RATE: 80 BPM | TEMPERATURE: 98 F | RESPIRATION RATE: 16 BRPM | OXYGEN SATURATION: 96 % | DIASTOLIC BLOOD PRESSURE: 70 MMHG

## 2022-09-27 DIAGNOSIS — C78.7 SECONDARY MALIGNANT NEOPLASM OF LIVER: ICD-10-CM

## 2022-09-27 DIAGNOSIS — C34.90 SMALL CELL LUNG CANCER: Primary | ICD-10-CM

## 2022-09-27 DIAGNOSIS — C77.1 SECONDARY AND UNSPECIFIED MALIGNANT NEOPLASM OF INTRATHORACIC LYMPH NODES: ICD-10-CM

## 2022-09-27 DIAGNOSIS — C34.90 MALIGNANT NEOPLASM OF UNSPECIFIED PART OF UNSPECIFIED BRONCHUS OR LUNG: ICD-10-CM

## 2022-09-27 DIAGNOSIS — R74.01 ELEVATED ALANINE AMINOTRANSFERASE (ALT) LEVEL: ICD-10-CM

## 2022-09-27 DIAGNOSIS — C34.90 SMALL CELL LUNG CANCER: Primary | Chronic | ICD-10-CM

## 2022-09-27 PROBLEM — R13.10 DIFFICULTY SWALLOWING: Status: RESOLVED | Noted: 2022-03-24 | Resolved: 2022-09-27

## 2022-09-27 PROCEDURE — 96374 THER/PROPH/DIAG INJ IV PUSH: CPT

## 2022-09-27 PROCEDURE — 99214 OFFICE O/P EST MOD 30 MIN: CPT | Mod: 95,,, | Performed by: STUDENT IN AN ORGANIZED HEALTH CARE EDUCATION/TRAINING PROGRAM

## 2022-09-27 PROCEDURE — 63600175 PHARM REV CODE 636 W HCPCS: Performed by: STUDENT IN AN ORGANIZED HEALTH CARE EDUCATION/TRAINING PROGRAM

## 2022-09-27 PROCEDURE — 96413 CHEMO IV INFUSION 1 HR: CPT

## 2022-09-27 PROCEDURE — 3044F PR MOST RECENT HEMOGLOBIN A1C LEVEL <7.0%: ICD-10-PCS | Mod: CPTII,95,, | Performed by: STUDENT IN AN ORGANIZED HEALTH CARE EDUCATION/TRAINING PROGRAM

## 2022-09-27 PROCEDURE — 99214 PR OFFICE/OUTPT VISIT, EST, LEVL IV, 30-39 MIN: ICD-10-PCS | Mod: 95,,, | Performed by: STUDENT IN AN ORGANIZED HEALTH CARE EDUCATION/TRAINING PROGRAM

## 2022-09-27 PROCEDURE — 25000003 PHARM REV CODE 250: Performed by: STUDENT IN AN ORGANIZED HEALTH CARE EDUCATION/TRAINING PROGRAM

## 2022-09-27 PROCEDURE — 4010F ACE/ARB THERAPY RXD/TAKEN: CPT | Mod: CPTII,95,, | Performed by: STUDENT IN AN ORGANIZED HEALTH CARE EDUCATION/TRAINING PROGRAM

## 2022-09-27 PROCEDURE — A4216 STERILE WATER/SALINE, 10 ML: HCPCS | Performed by: STUDENT IN AN ORGANIZED HEALTH CARE EDUCATION/TRAINING PROGRAM

## 2022-09-27 PROCEDURE — 4010F PR ACE/ARB THEARPY RXD/TAKEN: ICD-10-PCS | Mod: CPTII,95,, | Performed by: STUDENT IN AN ORGANIZED HEALTH CARE EDUCATION/TRAINING PROGRAM

## 2022-09-27 PROCEDURE — 3044F HG A1C LEVEL LT 7.0%: CPT | Mod: CPTII,95,, | Performed by: STUDENT IN AN ORGANIZED HEALTH CARE EDUCATION/TRAINING PROGRAM

## 2022-09-27 PROCEDURE — 96375 TX/PRO/DX INJ NEW DRUG ADDON: CPT

## 2022-09-27 RX ORDER — HEPARIN 100 UNIT/ML
500 SYRINGE INTRAVENOUS
Status: DISCONTINUED | OUTPATIENT
Start: 2022-09-27 | End: 2022-09-27 | Stop reason: HOSPADM

## 2022-09-27 RX ORDER — SODIUM CHLORIDE 0.9 % (FLUSH) 0.9 %
10 SYRINGE (ML) INJECTION
Status: DISCONTINUED | OUTPATIENT
Start: 2022-09-27 | End: 2022-09-27 | Stop reason: HOSPADM

## 2022-09-27 RX ORDER — SODIUM CHLORIDE 0.9 % (FLUSH) 0.9 %
10 SYRINGE (ML) INJECTION
Status: CANCELLED | OUTPATIENT
Start: 2022-09-27

## 2022-09-27 RX ORDER — HEPARIN 100 UNIT/ML
500 SYRINGE INTRAVENOUS
Status: CANCELLED | OUTPATIENT
Start: 2022-09-27

## 2022-09-27 RX ORDER — ONDANSETRON 2 MG/ML
8 INJECTION INTRAMUSCULAR; INTRAVENOUS
Status: CANCELLED | OUTPATIENT
Start: 2022-09-27

## 2022-09-27 RX ORDER — ONDANSETRON 2 MG/ML
8 INJECTION INTRAMUSCULAR; INTRAVENOUS
Status: COMPLETED | OUTPATIENT
Start: 2022-09-27 | End: 2022-09-27

## 2022-09-27 RX ADMIN — ATEZOLIZUMAB 1200 MG: 1200 INJECTION, SOLUTION INTRAVENOUS at 12:09

## 2022-09-27 RX ADMIN — ONDANSETRON 8 MG: 2 INJECTION INTRAMUSCULAR; INTRAVENOUS at 12:09

## 2022-09-27 RX ADMIN — HEPARIN 500 UNITS: 100 SYRINGE at 12:09

## 2022-09-27 RX ADMIN — Medication 10 ML: at 12:09

## 2022-09-27 NOTE — PLAN OF CARE
Pt arrived to unit for C9 of maintenance Tecentriq. Labs along with plan of care reviewed. Pt okay to proceed with tx today. No new or worsening complaints to report. Received 8mg Zofran IVP. Tecentriq given over 30 minutes. Pt tolerated well. Receives appointments through [facility]. Discharged from unit in NAD.

## 2022-09-27 NOTE — PROGRESS NOTES
[facility]  Hematology/Medical Oncology Clinic       PATIENT: Juan Gillespie  MRN: [id number]  DATE: 9/27/2022    Reason for referral: Extensive stage small cell lung ca    Initial History: Juan Gillespie is a 57 year old man with extensive stage SCLC who presents to clinic for evaluation prior to treatment with C4 of Carboplatin, etoposide and atezo. His oncologist is Dr. Racheal Jennings.    Oncology history per Dr. Jennings's clinic notes : Patient is a 57-year-old male with history of T2DM, HTN, tobacco use who presented to the ED on 3/19/22 for cough, sob, and pleuritic chest pain.      CT Chest w/ con 3/20:  Mediastinal lymphadenopathy 7.5 cm.  Right middle lobe consolidative opacity 4.6 cm.  5.3 cm liver lesion.    CT A/P 3/23/22: Re-demonstration of hypoattenuating liver lesion, 4.5cm. Two other lesions 2.7 and 2.5 cm.     MRI Brain 3/23/22: Enchacning lesion in the pituitary gland 13mm. 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        Patient underwent diagnostic bronchoscopy with EBUS of RML mass and lymph nodes on 03/22:  Path w/ small cell lung ca.     Oncological History:  -Started Carbo/Etop/Atez 4/5/22  -Maintenance atezolizumab 7/5/22  -Consolidative thoracic radiation 8/4/22-8/18/22    Interval History:   Since his last visit, improvement in positional sternal chest pain and coughing.  No dysphagia. Has been having post-infusion fatigue and most recently had flare up of arthralgias of right hip and leg.  Otherwise doing well.    His wife accompanies him at this visit.  Past Medical History:   Past Medical History:   Diagnosis Date    Diabetes mellitus, type 2     Hypertension        Past Surgical HIstory:   Past Surgical History:   Procedure Laterality Date    ENDOBRONCHIAL ULTRASOUND N/A 3/22/2022    Procedure: ENDOBRONCHIAL ULTRASOUND (EBUS);  Surgeon: Kristin Samuels MD;  Location: [facility] OR [address] [place] [address];  Service: Endoscopy;  Laterality: N/A;    KNEE SURGERY          Family History:   Family History   Problem Relation Age of Onset    Diabetes Mellitus Mother     Pacemaker/defibrilator Father     Lung cancer Father        Social History:  reports that he has never smoked. He has never used smokeless tobacco. He reports that he does not drink alcohol and does not use drugs.    Allergies:  Review of patient's allergies indicates:  No Known Allergies    Medications:  Current Outpatient Medications   Medication Sig Dispense Refill    albuterol (ACCUNEB) 1.25 mg/3 mL Nebu Use 1 vial (1.25 mg total) by nebulization 3 (three) times daily as needed (shortness of breath). Rescue 90 mL 2    allopurinoL (ZYLOPRIM) 300 MG tablet Take 1 tablet (300 mg total) by mouth once daily. 60 tablet 3    atorvastatin (LIPITOR) 20 MG tablet Take 20 mg by mouth once daily.      colchicine (COLCRYS) 0.6 mg tablet Take 1 tablet (0.6 mg total) by mouth once daily. This is for gout. Take every day for treatment and prevention. 30 tablet 11    duke's soln (benadryl 30 mL, mylanta 30 mL, LIDOcaine 30 mL, nystatin 30 mL) 120mL Take 10 mLs by mouth 4 (four) times daily. 480 mL 2    fluticasone propionate (FLONASE) 50 mcg/actuation nasal spray 1 spray (50 mcg total) by Each Nostril route once daily. 15 g 2    garlic 1,000 mg Cap Take 2,000 mg by mouth.      hydrOXYzine HCL (ATARAX) 25 MG tablet Take 1 tablet (25 mg total) by mouth 3 (three) times daily as needed for Itching. 21 tablet 0    losartan (COZAAR) 100 MG tablet Take 100 mg by mouth once daily.      montelukast (SINGULAIR) 10 mg tablet Take 10 mg by mouth every evening.      NIFEdipine (PROCARDIA-XL) 60 MG (OSM) 24 hr tablet Take 1 tablet (60 mg total) by mouth once daily. 30 tablet 3    ondansetron (ZOFRAN) 8 MG tablet Take 1 tablet (8 mg total) by mouth every 8 (eight) hours as needed for Nausea. 30 tablet 3    pantoprazole (PROTONIX) 40 MG tablet Take 1 tablet (40 mg total) by mouth once daily. 30 tablet 3    promethazine-codeine 6.25-10 mg/5 ml  (PHENERGAN WITH CODEINE) 6.25-10 mg/5 mL syrup Take 5 mLs by mouth 3 (three) times daily as needed for Cough. 500 mL 0    TRUE METRIX GLUCOSE TEST STRIP Strp       TRUEPLUS LANCETS 33 gauge Misc        No current facility-administered medications for this visit.       Review of Systems   Constitutional:  Positive for fatigue. Negative for chills and fever.   HENT:  Negative for congestion and trouble swallowing.    Respiratory:  Positive for cough and shortness of breath. Negative for chest tightness.    Cardiovascular:  Positive for chest pain (sternal intermittent).   Gastrointestinal:  Negative for abdominal pain and blood in stool.   Endocrine: Negative for cold intolerance and heat intolerance.   Genitourinary:  Negative for hematuria.   Musculoskeletal:  Positive for arthralgias.   Skin:  Negative for rash.   Neurological:  Negative for weakness.   Hematological:  Negative for adenopathy. Does not bruise/bleed easily.   Psychiatric/Behavioral:  The patient is not nervous/anxious.    ECOG Performance Status:   ECOG SCORE    1 - Restricted in strenuous activity-ambulatory and able to carry out work of a light nature         Objective:      Vitals:   There were no vitals filed for this visit.telemedicine    Physical Exam  Constitutional:       General: He is not in acute distress.     Appearance: Normal appearance. He is not ill-appearing.   HENT:      Head: Normocephalic and atraumatic.      Nose: Nose normal.      Mouth/Throat:      Mouth: Mucous membranes are moist.      Pharynx: Oropharynx is clear. No oropharyngeal exudate or posterior oropharyngeal erythema.   Eyes:      General: No scleral icterus.     Extraocular Movements: Extraocular movements intact.      Conjunctiva/sclera: Conjunctivae normal.      Pupils: Pupils are equal, round, and reactive to light.   Pulmonary:      Effort: Pulmonary effort is normal. No respiratory distress.   Musculoskeletal:         General: No swelling. Normal range of  motion.      Cervical back: Normal range of motion.      Right lower leg: No edema.      Left lower leg: No edema.   Skin:     General: Skin is warm and dry.      Coloration: Skin is not jaundiced.   Neurological:      General: No focal deficit present.      Mental Status: He is alert.   Psychiatric:         Mood and Affect: Mood normal.     Laboratory Data:  Lab Visit on 09/26/2022   Component Date Value Ref Range Status    TSH 09/26/2022 1.124  0.400 - 4.000 uIU/mL Final    WBC 09/26/2022 6.72  3.90 - 12.70 K/uL Final    RBC 09/26/2022 5.26  4.60 - 6.20 M/uL Final    Hemoglobin 09/26/2022 15.2  14.0 - 18.0 g/dL Final    Hematocrit 09/26/2022 45.7  40.0 - 54.0 % Final    MCV 09/26/2022 87  82 - 98 fL Final    MCH 09/26/2022 28.9  27.0 - 31.0 pg Final    MCHC 09/26/2022 33.3  32.0 - 36.0 g/dL Final    RDW 09/26/2022 12.3  11.5 - 14.5 % Final    Platelets 09/26/2022 191  150 - 450 K/uL Final    MPV 09/26/2022 9.6  9.2 - 12.9 fL Final    Immature Granulocytes 09/26/2022 0.7 (H)  0.0 - 0.5 % Final    Gran # (ANC) 09/26/2022 5.3  1.8 - 7.7 K/uL Final    Immature Grans (Abs) 09/26/2022 0.05 (H)  0.00 - 0.04 K/uL Final    Comment: Mild elevation in immature granulocytes is non specific and   can be seen in a variety of conditions including stress response,   acute inflammation, trauma and pregnancy. Correlation with other   laboratory and clinical findings is essential.      Lymph # 09/26/2022 0.7 (L)  1.0 - 4.8 K/uL Final    Mono # 09/26/2022 0.4  0.3 - 1.0 K/uL Final    Eos # 09/26/2022 0.3  0.0 - 0.5 K/uL Final    Baso # 09/26/2022 0.03  0.00 - 0.20 K/uL Final    nRBC 09/26/2022 0  0 /100 WBC Final    Gran % 09/26/2022 79.0 (H)  38.0 - 73.0 % Final    Lymph % 09/26/2022 10.1 (L)  18.0 - 48.0 % Final    Mono % 09/26/2022 6.1  4.0 - 15.0 % Final    Eosinophil % 09/26/2022 3.7  0.0 - 8.0 % Final    Basophil % 09/26/2022 0.4  0.0 - 1.9 % Final    Differential Method 09/26/2022 Automated   Final    Sodium 09/26/2022 142   136 - 145 mmol/L Final    Potassium 09/26/2022 4.4  3.5 - 5.1 mmol/L Final    Chloride 09/26/2022 106  95 - 110 mmol/L Final    CO2 09/26/2022 27  23 - 29 mmol/L Final    Glucose 09/26/2022 129 (H)  70 - 110 mg/dL Final    BUN 09/26/2022 20  6 - 20 mg/dL Final    Creatinine 09/26/2022 1.3  0.5 - 1.4 mg/dL Final    Calcium 09/26/2022 10.2  8.7 - 10.5 mg/dL Final    Total Protein 09/26/2022 7.3  6.0 - 8.4 g/dL Final    Albumin 09/26/2022 4.2  3.5 - 5.2 g/dL Final    Total Bilirubin 09/26/2022 1.0  0.1 - 1.0 mg/dL Final    Comment: For infants and newborns, interpretation of results should be based  on gestational age, weight and in agreement with clinical  observations.    Premature Infant recommended reference ranges:  Up to 24 hours.............<8.0 mg/dL  Up to 48 hours............<12.0 mg/dL  3-5 days..................<15.0 mg/dL  6-29 days.................<15.0 mg/dL      Alkaline Phosphatase 09/26/2022 114  55 - 135 U/L Final    AST 09/26/2022 31  10 - 40 U/L Final    ALT 09/26/2022 51 (H)  10 - 44 U/L Final    Anion Gap 09/26/2022 9  8 - 16 mmol/L Final    eGFR 09/26/2022 >60  >60 mL/min/1.73 m^2 Final     Imaging:       Assessment and Plan        1. Small cell lung cancer    2. Malignant neoplasm of unspecified part of unspecified bronchus or lung    3. 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 Elevated alanine aminotransferase (ALT) level      Problem List Items Addressed This Visit          Oncology    Small cell lung cancer - Primary (Chronic)    Overview     Initially diagnosed with extensive stage small cell lung cancer in 03/2022 who was treated with carbo/etop/atezolizumab from 04/05/2022 - 06/14/22 with partial response. He subsequently completed consolidation thoracic RT 8/4/22 - 8/18/22, and he continues on maintenance atezolizumab.          Current Assessment & Plan     Overall is better since my last visit with him. Symptoms of chest  pain and cough are improved.  Had new arthralgias of right hip and leg with this past cycle.  Also has been having fatigue.  -discussed option of modafinil or methylphenidate for fatigue; he wants to hold off for now  -labs reviewed; ok to proceed with treatment  -schedule scans to assess treatment response  -labs and follow up prior to next cycle.         Relevant Orders    CT Chest With Contrast    CT Abdomen Pelvis W Wo Contrast    Secondary malignant neoplasm of liver    Secondary malignant neoplasm of intrathoracic lymph nodes       GI    Elevated alanine aminotransferase (ALT) level    Current Assessment & Plan     Mild elevated of ALT (55) noted on 9/2/22, slightly downtrending  -monitor cmp  -no dose adjustment needed          Other Visit Diagnoses       Malignant neoplasm of unspecified part of unspecified bronchus or lung        Relevant Orders    CT Chest With Contrast          Antolin Sosa MD  Hematology Oncology    Route Chart for Scheduling    Med Onc Chart Routing  Urgent    Follow up with physician . Virtual visit 10/18 prior to chemo   Follow up with YANCY    Infusion scheduling note 10/18 atezolizumab ([facility] chemo)   Injection scheduling note    Labs CMP, CBC and TSH   Lab interval:  10/17 [facility] labs cmp cbc tsh   Imaging CT chest abdomen pelvis   ct chest abd pelvis anytime within the next 2 weeks   Pharmacy appointment No pharmacy appointment needed      Other referrals No additional referrals needed       Treatment Plan Information   OP ATEZOLIZUMAB CARBOPLATIN ETOPOSIDE Q3W   Antolin Sosa MD   Upcoming Treatment Dates - OP ATEZOLIZUMAB CARBOPLATIN ETOPOSIDE Q3W    10/18/2022       Chemotherapy       atezolizumab (TECENTRIQ) 1,200 mg in sodium chloride 0.9% 270 mL infusion       Antiemetics       ondansetron injection 8 mg  11/8/2022       Chemotherapy       atezolizumab (TECENTRIQ) 1,200 mg in sodium chloride 0.9% 270 mL infusion       Antiemetics       ondansetron injection 8  mg  11/29/2022       Chemotherapy       atezolizumab (TECENTRIQ) 1,200 mg in sodium chloride 0.9% 270 mL infusion       Antiemetics       ondansetron injection 8 mg  12/20/2022       Chemotherapy       atezolizumab (TECENTRIQ) 1,200 mg in sodium chloride 0.9% 270 mL infusion       Antiemetics       ondansetron injection 8 mg    The patient location is: car  The chief complaint leading to consultation is: small cell lung cancer    Visit type: audiovisual    Face to Face time with patient: 10 minutes  34 minutes of total time spent on the encounter, which includes face to face time and non-face to face time preparing to see the patient (eg, review of tests), Obtaining and/or reviewing separately obtained history, Documenting clinical information in the electronic or other health record, Independently interpreting results (not separately reported) and communicating results to the patient/family/caregiver, or Care coordination (not separately reported).         Each patient to whom he or she provides medical services by telemedicine is:  (1) informed of the relationship between the physician and patient and the respective role of any other health care provider with respect to management of the patient; and (2) notified that he or she may decline to receive medical services by telemedicine and may withdraw from such care at any time.    Notes:

## 2022-09-27 NOTE — ASSESSMENT & PLAN NOTE
Overall is better since my last visit with him. Symptoms of chest pain and cough are improved.  Had new arthralgias of right hip and leg with this past cycle.  Also has been having fatigue.  -discussed option of modafinil or methylphenidate for fatigue; he wants to hold off for now  -labs reviewed; ok to proceed with treatment  -schedule scans to assess treatment response  -labs and follow up prior to next cycle.

## 2022-09-27 NOTE — ASSESSMENT & PLAN NOTE
Mild elevated of ALT (55) noted on 9/2/22, slightly downtrending  -monitor cmp  -no dose adjustment needed

## 2022-09-29 ENCOUNTER — PATIENT MESSAGE (OUTPATIENT)
Dept: HEMATOLOGY/ONCOLOGY | Facility: CLINIC | Age: 57
End: 2022-09-29
Payer: MEDICAID

## 2022-09-30 DIAGNOSIS — C34.90 SMALL CELL LUNG CANCER: Primary | ICD-10-CM

## 2022-10-03 ENCOUNTER — DOCUMENTATION ONLY (OUTPATIENT)
Dept: HEMATOLOGY/ONCOLOGY | Facility: CLINIC | Age: 57
End: 2022-10-03
Payer: MEDICAID

## 2022-10-03 NOTE — PROGRESS NOTES
Received referral from the clinic to have patient's home oxygen discontinued. He no longer needs. Orders in and faxed to current provider [facility].

## 2022-10-11 ENCOUNTER — HOSPITAL ENCOUNTER (OUTPATIENT)
Dept: RADIOLOGY | Facility: HOSPITAL | Age: 57
Discharge: HOME | End: 2022-10-11
Attending: RADIOLOGY
Payer: MEDICAID

## 2022-10-11 ENCOUNTER — OFFICE VISIT (OUTPATIENT)
Dept: RADIATION ONCOLOGY | Facility: CLINIC | Age: 57
End: 2022-10-11
Payer: MEDICAID

## 2022-10-11 VITALS
RESPIRATION RATE: 20 BRPM | OXYGEN SATURATION: 94 % | DIASTOLIC BLOOD PRESSURE: 98 MMHG | HEART RATE: 75 BPM | BODY MASS INDEX: 41.92 KG/M2 | WEIGHT: 267.63 LBS | SYSTOLIC BLOOD PRESSURE: 185 MMHG

## 2022-10-11 DIAGNOSIS — C79.31 SECONDARY MALIGNANT NEOPLASM OF BRAIN: ICD-10-CM

## 2022-10-11 DIAGNOSIS — C34.90 SMALL CELL LUNG CANCER: ICD-10-CM

## 2022-10-11 PROCEDURE — 3077F PR MOST RECENT SYSTOLIC BLOOD PRESSURE >= 140 MM HG: ICD-10-PCS | Mod: CPTII,,, | Performed by: RADIOLOGY

## 2022-10-11 PROCEDURE — 70553 MRI BRAIN STEM W/O & W/DYE: CPT | Mod: TC

## 2022-10-11 PROCEDURE — 70553 MRI BRAIN W WO CONTRAST: ICD-10-PCS | Mod: 26,,, | Performed by: RADIOLOGY

## 2022-10-11 PROCEDURE — 1159F MED LIST DOCD IN RCRD: CPT | Mod: CPTII,,, | Performed by: RADIOLOGY

## 2022-10-11 PROCEDURE — 3044F PR MOST RECENT HEMOGLOBIN A1C LEVEL <7.0%: ICD-10-PCS | Mod: CPTII,,, | Performed by: RADIOLOGY

## 2022-10-11 PROCEDURE — 1159F PR MEDICATION LIST DOCUMENTED IN MEDICAL RECORD: ICD-10-PCS | Mod: CPTII,,, | Performed by: RADIOLOGY

## 2022-10-11 PROCEDURE — 99024 PR POST-OP FOLLOW-UP VISIT: ICD-10-PCS | Mod: ,,, | Performed by: RADIOLOGY

## 2022-10-11 PROCEDURE — 99999 PR PBB SHADOW E&M-EST. PATIENT-LVL III: CPT | Mod: PBBFAC,,, | Performed by: RADIOLOGY

## 2022-10-11 PROCEDURE — 4010F PR ACE/ARB THEARPY RXD/TAKEN: ICD-10-PCS | Mod: CPTII,,, | Performed by: RADIOLOGY

## 2022-10-11 PROCEDURE — 99024 POSTOP FOLLOW-UP VISIT: CPT | Mod: ,,, | Performed by: RADIOLOGY

## 2022-10-11 PROCEDURE — 3008F PR BODY MASS INDEX (BMI) DOCUMENTED: ICD-10-PCS | Mod: CPTII,,, | Performed by: RADIOLOGY

## 2022-10-11 PROCEDURE — 99999 PR PBB SHADOW E&M-EST. PATIENT-LVL III: ICD-10-PCS | Mod: PBBFAC,,, | Performed by: RADIOLOGY

## 2022-10-11 PROCEDURE — 99213 OFFICE O/P EST LOW 20 MIN: CPT | Mod: PBBFAC,25 | Performed by: RADIOLOGY

## 2022-10-11 PROCEDURE — 4010F ACE/ARB THERAPY RXD/TAKEN: CPT | Mod: CPTII,,, | Performed by: RADIOLOGY

## 2022-10-11 PROCEDURE — 70553 MRI BRAIN STEM W/O & W/DYE: CPT | Mod: 26,,, | Performed by: RADIOLOGY

## 2022-10-11 PROCEDURE — 25500020 PHARM REV CODE 255: Performed by: RADIOLOGY

## 2022-10-11 PROCEDURE — 3044F HG A1C LEVEL LT 7.0%: CPT | Mod: CPTII,,, | Performed by: RADIOLOGY

## 2022-10-11 PROCEDURE — A9585 GADOBUTROL INJECTION: HCPCS | Performed by: RADIOLOGY

## 2022-10-11 PROCEDURE — 3008F BODY MASS INDEX DOCD: CPT | Mod: CPTII,,, | Performed by: RADIOLOGY

## 2022-10-11 PROCEDURE — 3080F DIAST BP >= 90 MM HG: CPT | Mod: CPTII,,, | Performed by: RADIOLOGY

## 2022-10-11 PROCEDURE — 3077F SYST BP >= 140 MM HG: CPT | Mod: CPTII,,, | Performed by: RADIOLOGY

## 2022-10-11 PROCEDURE — 3080F PR MOST RECENT DIASTOLIC BLOOD PRESSURE >= 90 MM HG: ICD-10-PCS | Mod: CPTII,,, | Performed by: RADIOLOGY

## 2022-10-11 RX ORDER — GADOBUTROL 604.72 MG/ML
10 INJECTION INTRAVENOUS
Status: COMPLETED | OUTPATIENT
Start: 2022-10-11 | End: 2022-10-11

## 2022-10-11 RX ADMIN — GADOBUTROL 10 ML: 604.72 INJECTION INTRAVENOUS at 09:10

## 2022-10-11 NOTE — PROGRESS NOTES
10/11/2022    Radiation Oncology Follow-Up Visit    Prior Radiation History:    Site  Technique  Energy  Dose/Fx (Gy)  #Fx  Total Dose (Gy)  Start Date  End Date  Elapsed Days    Mediastinum  3D  18X  3  10 / 10  30  8/4/2022 8/18/2022  14        Assessment   This is a 57 y.o. 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 Staging MRI Brain demonstrated an enhancing sellar mass c/w pituitary adenoma. He received chemo-immunotherapy. Re-staging extracranial imaging CT C/A/P 7/26/22 demonstrated partial response with decrease in size of RML lung primary and mediastinal adenopathy. He has completed chemotherapy and is now on maintenance immunotherapy. He completed thoracic consolidation RT 30 Gy in 10 fx on 8/18/22.     Reports arthritis lasting about 1 week after each immunotherapy infusion, otherwise doing well. Esophagitis at end of RT has resolved. MRI Brain today 10/11/22 demonstrates a new 1.2 cm Left frontal enhancing lesion consistent with metastasis. I recommend treating this with radiosurgery ~20 Gy x1 fraction. Potential side effects of radiosurgery to the brain including radiation necrosis were reviewed. At the end of our discussion, he wished to proceed with the recommended treatment. .        Plan   1) CT Simulation for radiosurgery treatment planning.          CHIEF COMPLAINT: F/U for intracranial surveillance.     HPI: HPI    Past Medical History:   Diagnosis Date    Diabetes mellitus, type 2     Hypertension        Past Surgical History:   Procedure Laterality Date    ENDOBRONCHIAL ULTRASOUND N/A 3/22/2022    Procedure: ENDOBRONCHIAL ULTRASOUND (EBUS);  Surgeon: Kristin Samuels MD;  Location: [facility] OR [address] [place] [address];  Service: Endoscopy;  Laterality: N/A;    KNEE SURGERY         Social History     Tobacco Use    Smoking status: Never    Smokeless tobacco: Never    Tobacco comments:     stop smoking 26  years ago   Substance Use Topics    Alcohol use: No    Drug use: No       Cancer-related family history includes Lung cancer in his father.    Current Outpatient Medications on File Prior to Visit   Medication Sig Dispense Refill    albuterol (ACCUNEB) 1.25 mg/3 mL Nebu Use 1 vial (1.25 mg total) by nebulization 3 (three) times daily as needed (shortness of breath). Rescue 90 mL 2    atorvastatin (LIPITOR) 20 MG tablet Take 20 mg by mouth once daily.      losartan (COZAAR) 100 MG tablet Take 100 mg by mouth once daily.      allopurinoL (ZYLOPRIM) 300 MG tablet Take 1 tablet (300 mg total) by mouth once daily. 60 tablet 3    colchicine (COLCRYS) 0.6 mg tablet Take 1 tablet (0.6 mg total) by mouth once daily. This is for gout. Take every day for treatment and prevention. (Patient not taking: Reported on 10/11/2022) 30 tablet 11    duke's soln (benadryl 30 mL, mylanta 30 mL, LIDOcaine 30 mL, nystatin 30 mL) 120mL Take 10 mLs by mouth 4 (four) times daily. (Patient not taking: Reported on 10/11/2022) 480 mL 2    fluticasone propionate (FLONASE) 50 mcg/actuation nasal spray 1 spray (50 mcg total) by Each Nostril route once daily. (Patient not taking: Reported on 10/11/2022) 15 g 2    garlic 1,000 mg Cap Take 2,000 mg by mouth.      hydrOXYzine HCL (ATARAX) 25 MG tablet Take 1 tablet (25 mg total) by mouth 3 (three) times daily as needed for Itching. (Patient not taking: Reported on 10/11/2022) 21 tablet 0    montelukast (SINGULAIR) 10 mg tablet Take 10 mg by mouth every evening.      NIFEdipine (PROCARDIA-XL) 60 MG (OSM) 24 hr tablet Take 1 tablet (60 mg total) by mouth once daily. (Patient not taking: Reported on 10/11/2022) 30 tablet 3    ondansetron (ZOFRAN) 8 MG tablet Take 1 tablet (8 mg total) by mouth every 8 (eight) hours as needed for Nausea. (Patient not taking: Reported on 10/11/2022) 30 tablet 3    pantoprazole (PROTONIX) 40 MG tablet Take 1 tablet (40 mg total) by mouth once daily. (Patient not taking:  Reported on 10/11/2022) 30 tablet 3    promethazine-codeine 6.25-10 mg/5 ml (PHENERGAN WITH CODEINE) 6.25-10 mg/5 mL syrup Take 5 mLs by mouth 3 (three) times daily as needed for Cough. (Patient not taking: Reported on 10/11/2022) 500 mL 0    TRUE METRIX GLUCOSE TEST STRIP Strp       TRUEPLUS LANCETS 33 gauge Misc       [DISCONTINUED] albuterol-ipratropium (DUO-NEB) 2.5 mg-0.5 mg/3 mL nebulizer solution Take 3 mLs by nebulization every 4 (four) hours. Rescue 90 mL 3    [DISCONTINUED] azelastine (ASTELIN) 137 mcg (0.1 %) nasal spray 1 spray by Nasal route 2 (two) times daily.       Current Facility-Administered Medications on File Prior to Visit   Medication Dose Route Frequency Provider Last Rate Last Admin    [COMPLETED] gadobutroL (GADAVIST) injection 10 mL  10 mL Intravenous ONCE PRN Stephen Jacome MD   10 mL at 10/11/22 0936       Review of patient's allergies indicates:  No Known Allergies    Review of Systems   Constitutional:  Negative for fever, malaise/fatigue and weight loss.   HENT:  Negative for ear pain and sore throat.    Eyes:  Negative for blurred vision and double vision.   Respiratory:  Positive for cough. Negative for hemoptysis and shortness of breath.    Cardiovascular:  Negative for chest pain and leg swelling.   Gastrointestinal:  Negative for abdominal pain, constipation, diarrhea, heartburn and nausea.   Genitourinary:  Negative for dysuria and hematuria.   Musculoskeletal:  Positive for back pain and joint pain. Negative for falls.   Skin:  Negative for itching.   Neurological:  Negative for tingling, speech change, focal weakness, seizures and headaches.   Psychiatric/Behavioral:  Negative for depression. The patient is not nervous/anxious.       Vital Signs: BP (!) 185/98   Pulse 75   Resp 20   Wt 121.4 kg (267 lb 10.2 oz)   SpO2 (!) 94%   BMI 41.92 kg/m²     ECOG Performance Status: 1    Physical Exam  Vitals reviewed.   Constitutional:       Appearance: Normal appearance.    HENT:      Head: Normocephalic and atraumatic.   Eyes:      General: No scleral icterus.     Extraocular Movements: Extraocular movements intact.   Pulmonary:      Effort: Pulmonary effort is normal. No respiratory distress.   Abdominal:      General: There is no distension.   Musculoskeletal:      Cervical back: Neck supple.   Lymphadenopathy:      Cervical: No cervical adenopathy.   Skin:     General: Skin is warm and dry.   Neurological:      General: No focal deficit present.      Mental Status: He is alert and oriented to person, place, and time.      Cranial Nerves: No cranial nerve deficit.   Psychiatric:         Mood and Affect: Mood normal.         Behavior: Behavior normal.         Judgment: Judgment normal.        Labs:    Imaging: I have personally reviewed the patient's available images and reports and summarized pertinent findings above in HPI.     Pathology: No new path

## 2022-10-17 ENCOUNTER — HOSPITAL ENCOUNTER (OUTPATIENT)
Dept: RADIATION THERAPY | Facility: HOSPITAL | Age: 57
Discharge: HOME | End: 2022-10-17
Attending: RADIOLOGY
Payer: MEDICAID

## 2022-10-17 ENCOUNTER — HOSPITAL ENCOUNTER (OUTPATIENT)
Dept: RADIOLOGY | Facility: HOSPITAL | Age: 57
Discharge: HOME | End: 2022-10-17
Attending: STUDENT IN AN ORGANIZED HEALTH CARE EDUCATION/TRAINING PROGRAM
Payer: MEDICAID

## 2022-10-17 DIAGNOSIS — C34.90 SMALL CELL LUNG CANCER: ICD-10-CM

## 2022-10-17 DIAGNOSIS — C34.90 MALIGNANT NEOPLASM OF UNSPECIFIED PART OF UNSPECIFIED BRONCHUS OR LUNG: ICD-10-CM

## 2022-10-17 DIAGNOSIS — C34.90 SMALL CELL LUNG CANCER: Chronic | ICD-10-CM

## 2022-10-17 PROCEDURE — A9698 NON-RAD CONTRAST MATERIALNOC: HCPCS | Performed by: STUDENT IN AN ORGANIZED HEALTH CARE EDUCATION/TRAINING PROGRAM

## 2022-10-17 PROCEDURE — 77014 PR  CT GUIDANCE PLACEMENT RAD THERAPY FIELDS: ICD-10-PCS | Mod: 26,,, | Performed by: RADIOLOGY

## 2022-10-17 PROCEDURE — 77014 PR  CT GUIDANCE PLACEMENT RAD THERAPY FIELDS: CPT | Mod: 26,,, | Performed by: RADIOLOGY

## 2022-10-17 PROCEDURE — 25500020 PHARM REV CODE 255: Performed by: STUDENT IN AN ORGANIZED HEALTH CARE EDUCATION/TRAINING PROGRAM

## 2022-10-17 PROCEDURE — 77263 THER RADIOLOGY TX PLNG CPLX: CPT | Mod: ,,, | Performed by: RADIOLOGY

## 2022-10-17 PROCEDURE — 71260 CT CHEST ABDOMEN PELVIS WITH CONTRAST (XPD): ICD-10-PCS | Mod: 26,,, | Performed by: RADIOLOGY

## 2022-10-17 PROCEDURE — 71260 CT THORAX DX C+: CPT | Mod: 26,,, | Performed by: RADIOLOGY

## 2022-10-17 PROCEDURE — 77334 PR  RADN TREATMENT AID(S) COMPLX: ICD-10-PCS | Mod: 26,,, | Performed by: RADIOLOGY

## 2022-10-17 PROCEDURE — 77014 HC CT GUIDANCE RADIATION THERAPY FLDS PLACEMENT: CPT | Mod: TC | Performed by: RADIOLOGY

## 2022-10-17 PROCEDURE — 74177 CT ABD & PELVIS W/CONTRAST: CPT | Mod: 26,,, | Performed by: RADIOLOGY

## 2022-10-17 PROCEDURE — 77334 RADIATION TREATMENT AID(S): CPT | Mod: 26,,, | Performed by: RADIOLOGY

## 2022-10-17 PROCEDURE — 74177 CT ABD & PELVIS W/CONTRAST: CPT | Mod: TC

## 2022-10-17 PROCEDURE — 77263 PR  RADIATION THERAPY PLAN COMPLEX: ICD-10-PCS | Mod: ,,, | Performed by: RADIOLOGY

## 2022-10-17 PROCEDURE — 74177 CT CHEST ABDOMEN PELVIS WITH CONTRAST (XPD): ICD-10-PCS | Mod: 26,,, | Performed by: RADIOLOGY

## 2022-10-17 PROCEDURE — 71260 CT THORAX DX C+: CPT | Mod: TC

## 2022-10-17 PROCEDURE — 77334 RADIATION TREATMENT AID(S): CPT | Mod: TC | Performed by: RADIOLOGY

## 2022-10-17 RX ADMIN — IOHEXOL 100 ML: 350 INJECTION, SOLUTION INTRAVENOUS at 10:10

## 2022-10-17 RX ADMIN — BARIUM SULFATE 450 ML: 20 SUSPENSION ORAL at 10:10

## 2022-10-18 ENCOUNTER — OFFICE VISIT (OUTPATIENT)
Dept: HEMATOLOGY/ONCOLOGY | Facility: CLINIC | Age: 57
End: 2022-10-18
Payer: MEDICAID

## 2022-10-18 VITALS — HEIGHT: 67 IN | WEIGHT: 267 LBS | BODY MASS INDEX: 41.91 KG/M2

## 2022-10-18 DIAGNOSIS — R74.01 ELEVATED ALANINE AMINOTRANSFERASE (ALT) LEVEL: ICD-10-CM

## 2022-10-18 DIAGNOSIS — C77.1 SECONDARY AND UNSPECIFIED MALIGNANT NEOPLASM OF INTRATHORACIC LYMPH NODES: ICD-10-CM

## 2022-10-18 DIAGNOSIS — C78.7 SECONDARY MALIGNANT NEOPLASM OF LIVER: ICD-10-CM

## 2022-10-18 DIAGNOSIS — J96.01 ACUTE HYPOXEMIC RESPIRATORY FAILURE: ICD-10-CM

## 2022-10-18 DIAGNOSIS — N52.8 OTHER MALE ERECTILE DYSFUNCTION: ICD-10-CM

## 2022-10-18 DIAGNOSIS — C79.31 SECONDARY MALIGNANT NEOPLASM OF BRAIN: ICD-10-CM

## 2022-10-18 DIAGNOSIS — C34.90 SMALL CELL LUNG CANCER: Primary | Chronic | ICD-10-CM

## 2022-10-18 PROCEDURE — 4010F ACE/ARB THERAPY RXD/TAKEN: CPT | Mod: CPTII,95,, | Performed by: STUDENT IN AN ORGANIZED HEALTH CARE EDUCATION/TRAINING PROGRAM

## 2022-10-18 PROCEDURE — 1159F MED LIST DOCD IN RCRD: CPT | Mod: CPTII,95,, | Performed by: STUDENT IN AN ORGANIZED HEALTH CARE EDUCATION/TRAINING PROGRAM

## 2022-10-18 PROCEDURE — 1160F RVW MEDS BY RX/DR IN RCRD: CPT | Mod: CPTII,95,, | Performed by: STUDENT IN AN ORGANIZED HEALTH CARE EDUCATION/TRAINING PROGRAM

## 2022-10-18 PROCEDURE — 99215 OFFICE O/P EST HI 40 MIN: CPT | Mod: 95,,, | Performed by: STUDENT IN AN ORGANIZED HEALTH CARE EDUCATION/TRAINING PROGRAM

## 2022-10-18 PROCEDURE — 3044F PR MOST RECENT HEMOGLOBIN A1C LEVEL <7.0%: ICD-10-PCS | Mod: CPTII,95,, | Performed by: STUDENT IN AN ORGANIZED HEALTH CARE EDUCATION/TRAINING PROGRAM

## 2022-10-18 PROCEDURE — 3044F HG A1C LEVEL LT 7.0%: CPT | Mod: CPTII,95,, | Performed by: STUDENT IN AN ORGANIZED HEALTH CARE EDUCATION/TRAINING PROGRAM

## 2022-10-18 PROCEDURE — 1159F PR MEDICATION LIST DOCUMENTED IN MEDICAL RECORD: ICD-10-PCS | Mod: CPTII,95,, | Performed by: STUDENT IN AN ORGANIZED HEALTH CARE EDUCATION/TRAINING PROGRAM

## 2022-10-18 PROCEDURE — 4010F PR ACE/ARB THEARPY RXD/TAKEN: ICD-10-PCS | Mod: CPTII,95,, | Performed by: STUDENT IN AN ORGANIZED HEALTH CARE EDUCATION/TRAINING PROGRAM

## 2022-10-18 PROCEDURE — 99215 PR OFFICE/OUTPT VISIT, EST, LEVL V, 40-54 MIN: ICD-10-PCS | Mod: 95,,, | Performed by: STUDENT IN AN ORGANIZED HEALTH CARE EDUCATION/TRAINING PROGRAM

## 2022-10-18 PROCEDURE — 1160F PR REVIEW ALL MEDS BY PRESCRIBER/CLIN PHARMACIST DOCUMENTED: ICD-10-PCS | Mod: CPTII,95,, | Performed by: STUDENT IN AN ORGANIZED HEALTH CARE EDUCATION/TRAINING PROGRAM

## 2022-10-18 RX ORDER — SODIUM CHLORIDE 0.9 % (FLUSH) 0.9 %
10 SYRINGE (ML) INJECTION
Status: CANCELLED | OUTPATIENT
Start: 2022-10-25

## 2022-10-18 RX ORDER — HEPARIN 100 UNIT/ML
500 SYRINGE INTRAVENOUS
Status: CANCELLED | OUTPATIENT
Start: 2022-10-25

## 2022-10-18 RX ORDER — HEPARIN 100 UNIT/ML
500 SYRINGE INTRAVENOUS
Status: CANCELLED | OUTPATIENT
Start: 2022-10-24

## 2022-10-18 RX ORDER — SILDENAFIL 50 MG/1
50 TABLET, FILM COATED ORAL DAILY PRN
Qty: 30 TABLET | Refills: 0 | Status: SHIPPED | OUTPATIENT
Start: 2022-10-18 | End: 2023-03-30

## 2022-10-18 RX ORDER — SODIUM CHLORIDE 0.9 % (FLUSH) 0.9 %
10 SYRINGE (ML) INJECTION
Status: CANCELLED | OUTPATIENT
Start: 2022-10-26

## 2022-10-18 RX ORDER — SODIUM CHLORIDE 0.9 % (FLUSH) 0.9 %
10 SYRINGE (ML) INJECTION
Status: CANCELLED | OUTPATIENT
Start: 2022-10-24

## 2022-10-18 RX ORDER — HEPARIN 100 UNIT/ML
500 SYRINGE INTRAVENOUS
Status: CANCELLED | OUTPATIENT
Start: 2022-10-27

## 2022-10-18 RX ORDER — HEPARIN 100 UNIT/ML
500 SYRINGE INTRAVENOUS
Status: CANCELLED | OUTPATIENT
Start: 2022-10-26

## 2022-10-18 RX ORDER — SODIUM CHLORIDE 0.9 % (FLUSH) 0.9 %
10 SYRINGE (ML) INJECTION
Status: CANCELLED | OUTPATIENT
Start: 2022-10-27

## 2022-10-18 NOTE — PROGRESS NOTES
[facility]  Hematology/Medical Oncology Clinic       PATIENT: Juan Gillespie  MRN: [id number]  DATE: 10/18/2022    Reason for referral: Extensive stage small cell lung ca    Initial History: Juan Gillespie is a 57 year old man with extensive stage SCLC who presents to clinic for evaluation prior to treatment with C4 of Carboplatin, etoposide and atezo. His oncologist is Dr. Racheal Jennings.    Oncology history per Dr. Jennings's clinic notes : Patient is a 57-year-old male with history of T2DM, HTN, tobacco use who presented to the ED on 3/19/22 for cough, sob, and pleuritic chest pain.      CT Chest w/ con 3/20:  Mediastinal lymphadenopathy 7.5 cm.  Right middle lobe consolidative opacity 4.6 cm.  5.3 cm liver lesion.    CT A/P 3/23/22: Re-demonstration of hypoattenuating liver lesion, 4.5cm. Two other lesions 2.7 and 2.5 cm.     MRI Brain 3/23/22: Enchacning lesion in the pituitary gland 13mm. 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        Patient underwent diagnostic bronchoscopy with EBUS of RML mass and lymph nodes on 03/22:  Path w/ small cell lung ca.     Oncological History:  -Started Carbo/Etop/Atez 4/5/22  -Maintenance atezolizumab 7/5/22  -Consolidative thoracic radiation 8/4/22-8/18/22    Interval History:   No new symptoms since his last visit.  He is here to review CT scan.  He just had his mask fitting completed and plans for radiation for new CNS lesion next Tuesday.    His wife accompanies him at this visit.  Past Medical History:   Past Medical History:   Diagnosis Date    Diabetes mellitus, type 2     Hypertension        Past Surgical HIstory:   Past Surgical History:   Procedure Laterality Date    ENDOBRONCHIAL ULTRASOUND N/A 3/22/2022    Procedure: ENDOBRONCHIAL ULTRASOUND (EBUS);  Surgeon: Kristin Samuels MD;  Location: [facility] OR [address] [place] [address];  Service: Endoscopy;  Laterality: N/A;    KNEE SURGERY         Family History:   Family History   Problem Relation Age of  Onset    Diabetes Mellitus Mother     Pacemaker/defibrilator Father     Lung cancer Father        Social History:  reports that he has never smoked. He has never used smokeless tobacco. He reports that he does not drink alcohol and does not use drugs.    Allergies:  Review of patient's allergies indicates:  No Known Allergies    Medications:  Current Outpatient Medications   Medication Sig Dispense Refill    albuterol (ACCUNEB) 1.25 mg/3 mL Nebu Use 1 vial (1.25 mg total) by nebulization 3 (three) times daily as needed (shortness of breath). Rescue 90 mL 2    allopurinoL (ZYLOPRIM) 300 MG tablet Take 1 tablet (300 mg total) by mouth once daily. 60 tablet 3    atorvastatin (LIPITOR) 20 MG tablet Take 20 mg by mouth once daily.      colchicine (COLCRYS) 0.6 mg tablet Take 1 tablet (0.6 mg total) by mouth once daily. This is for gout. Take every day for treatment and prevention. (Patient not taking: Reported on 10/11/2022) 30 tablet 11    duke's soln (benadryl 30 mL, mylanta 30 mL, LIDOcaine 30 mL, nystatin 30 mL) 120mL Take 10 mLs by mouth 4 (four) times daily. (Patient not taking: Reported on 10/11/2022) 480 mL 2    fluticasone propionate (FLONASE) 50 mcg/actuation nasal spray 1 spray (50 mcg total) by Each Nostril route once daily. (Patient not taking: Reported on 10/11/2022) 15 g 2    hydrOXYzine HCL (ATARAX) 25 MG tablet Take 1 tablet (25 mg total) by mouth 3 (three) times daily as needed for Itching. (Patient not taking: Reported on 10/11/2022) 21 tablet 0    losartan (COZAAR) 100 MG tablet Take 100 mg by mouth once daily.      montelukast (SINGULAIR) 10 mg tablet Take 10 mg by mouth every evening.      NIFEdipine (PROCARDIA-XL) 60 MG (OSM) 24 hr tablet Take 1 tablet (60 mg total) by mouth once daily. (Patient not taking: Reported on 10/11/2022) 30 tablet 3    ondansetron (ZOFRAN) 8 MG tablet Take 1 tablet (8 mg total) by mouth every 8 (eight) hours as needed for Nausea. (Patient not taking: Reported on  "10/11/2022) 30 tablet 3    pantoprazole (PROTONIX) 40 MG tablet Take 1 tablet (40 mg total) by mouth once daily. (Patient not taking: Reported on 10/11/2022) 30 tablet 3    promethazine-codeine 6.25-10 mg/5 ml (PHENERGAN WITH CODEINE) 6.25-10 mg/5 mL syrup Take 5 mLs by mouth 3 (three) times daily as needed for Cough. (Patient not taking: Reported on 10/11/2022) 500 mL 0    sildenafiL (VIAGRA) 50 MG tablet Take 1 tablet (50 mg total) by mouth daily as needed for Erectile Dysfunction. 30 tablet 0    TRUE METRIX GLUCOSE TEST STRIP Strp       TRUEPLUS LANCETS 33 gauge Misc        No current facility-administered medications for this visit.       Review of Systems   Constitutional:  Positive for fatigue. Negative for chills and fever.   HENT:  Negative for congestion and trouble swallowing.    Respiratory:  Positive for cough and shortness of breath. Negative for chest tightness.    Cardiovascular:  Positive for chest pain (sternal intermittent).   Gastrointestinal:  Negative for abdominal pain and blood in stool.   Endocrine: Negative for cold intolerance and heat intolerance.   Genitourinary:  Negative for hematuria.   Musculoskeletal:  Positive for arthralgias.   Skin:  Negative for rash.   Neurological:  Negative for weakness.   Hematological:  Negative for adenopathy. Does not bruise/bleed easily.   Psychiatric/Behavioral:  Positive for dysphoric mood. The patient is not nervous/anxious.    ECOG Performance Status:   ECOG SCORE    1 - Restricted in strenuous activity-ambulatory and able to carry out work of a light nature         Objective:      Vitals:   Vitals:    10/18/22 1016   Weight: 121.1 kg (267 lb)   Height: 5' 7" (1.702 m)   telemedicine    Physical Exam  Constitutional:       General: He is not in acute distress.     Appearance: Normal appearance. He is not ill-appearing.   HENT:      Head: Normocephalic and atraumatic.      Nose: Nose normal.      Mouth/Throat:      Mouth: Mucous membranes are moist.    "   Pharynx: Oropharynx is clear. No oropharyngeal exudate or posterior oropharyngeal erythema.   Eyes:      General: No scleral icterus.     Extraocular Movements: Extraocular movements intact.      Conjunctiva/sclera: Conjunctivae normal.      Pupils: Pupils are equal, round, and reactive to light.   Pulmonary:      Effort: Pulmonary effort is normal. No respiratory distress.   Musculoskeletal:         General: No swelling. Normal range of motion.      Cervical back: Normal range of motion.      Right lower leg: No edema.      Left lower leg: No edema.   Skin:     General: Skin is warm and dry.      Coloration: Skin is not jaundiced.   Neurological:      General: No focal deficit present.      Mental Status: He is alert.   Psychiatric:         Mood and Affect: Mood normal.     Laboratory Data:  Recent Results (from the past 168 hour(s))   CBC Auto Differential    Collection Time: 10/17/22 11:16 AM   Result Value Ref Range    WBC 7.52 3.90 - 12.70 K/uL    RBC 5.42 4.60 - 6.20 M/uL    Hemoglobin 16.0 14.0 - 18.0 g/dL    Hematocrit 47.3 40.0 - 54.0 %    MCV 87 82 - 98 fL    MCH 29.5 27.0 - 31.0 pg    MCHC 33.8 32.0 - 36.0 g/dL    RDW 13.2 11.5 - 14.5 %    Platelets 220 150 - 450 K/uL    MPV 9.6 9.2 - 12.9 fL    Immature Granulocytes 0.9 (H) 0.0 - 0.5 %    Gran # (ANC) 6.0 1.8 - 7.7 K/uL    Immature Grans (Abs) 0.07 (H) 0.00 - 0.04 K/uL    Lymph # 0.7 (L) 1.0 - 4.8 K/uL    Mono # 0.5 0.3 - 1.0 K/uL    Eos # 0.3 0.0 - 0.5 K/uL    Baso # 0.05 0.00 - 0.20 K/uL    nRBC 0 0 /100 WBC    Gran % 79.4 (H) 38.0 - 73.0 %    Lymph % 9.2 (L) 18.0 - 48.0 %    Mono % 6.3 4.0 - 15.0 %    Eosinophil % 3.5 0.0 - 8.0 %    Basophil % 0.7 0.0 - 1.9 %    Differential Method Automated    Comprehensive Metabolic Panel    Collection Time: 10/17/22 11:16 AM   Result Value Ref Range    Sodium 136 136 - 145 mmol/L    Potassium 5.1 3.5 - 5.1 mmol/L    Chloride 104 95 - 110 mmol/L    CO2 23 23 - 29 mmol/L    Glucose 109 70 - 110 mg/dL    BUN 20 6 -  20 mg/dL    Creatinine 1.2 0.5 - 1.4 mg/dL    Calcium 9.6 8.7 - 10.5 mg/dL    Total Protein 7.2 6.0 - 8.4 g/dL    Albumin 4.0 3.5 - 5.2 g/dL    Total Bilirubin 0.7 0.1 - 1.0 mg/dL    Alkaline Phosphatase 117 55 - 135 U/L    AST 32 10 - 40 U/L    ALT 48 (H) 10 - 44 U/L    Anion Gap 9 8 - 16 mmol/L    eGFR >60 >60 mL/min/1.73 m^2   TSH    Collection Time: 10/17/22 11:16 AM   Result Value Ref Range    TSH 1.220 0.400 - 4.000 uIU/mL       Imaging:   MRI Brain W WO Contrast    Result Date: 10/11/2022  EXAMINATION: MRI BRAIN W WO CONTRAST CLINICAL HISTORY: Small cell lung cancer, brain re-staging; Malignant neoplasm of unspecified part of unspecified bronchus or lung TECHNIQUE: Sagittal and axial T1, axial T2, axial FLAIR, axial gradient, axial diffusion imaging of the whole brain pre-contrast with postcontrast axial T1 and axial spoiled gradient imaging reformatted in the coronal and sagittal planes.. Ten ml of Gadavist injected intravenously. COMPARISON: 07/12/2022 FINDINGS: Interval 1.2 cm enhancing lesion within the left anterior inferior frontal lobe which prominently the ring-enhancing measuring 1.2 x 0.9 x 0.9 cm in AP by TV by CC dimensions respectively in light of history concerning for parenchymal metastases the with question trace susceptibility associated with this lesion. Previously identified heterogeneous enhancing sellar lesion is again seen allowing for routine brain technique with lesion measuring approximately 1.3 cm craniocaudal this may represent pituitary adenoma though indeterminate.  Previous intraluminal filling defect within the right jugular foramen is no longer seen.  There is however diffusion signal abnormality with ill-defined T1 hypointensity within the right occipital condyle concerning for possible osseous metastases the clinical correlation and further evaluation with nuclear medicine imaging advised. There is continued slight prominent leptomeningeal enhancement along the left  cerebellum which raises concern for possible underlying vascular malformation such as a dural AV fistula with collateral vascular engorgement.  There is no restricted diffusion to suggest acute infarction.  Ventricles stable without hydrocephalus.  There is mild edema signal surrounding the left frontal enhancing lesion with continued few scattered punctate size regions of T2 FLAIR signal hyperintensity elsewhere supratentorial white matter which are nonspecific and may represent mild chronic ischemic change. This report was flagged in Epic as abnormal.     Interval development of a small ring-enhancing lesion left anterior inferior frontal lobe concerning for parenchymal metastases in light of history.  Clinical correlation and follow-up advised There is continue though relatively stable heterogeneous enhancement and enlargement of the material within the sella which may represent pituitary adenoma though indeterminate. Previous right internal jugular vein thrombus no longer seen. Abnormal signal along the right occipital condyle concerning for possible osseous metastases clinical correlation and further evaluation with nuclear medicine imaging advised Continued prominent vascularity left cerebellar folia raising concern for possible underlying vascular malformation unchanged.  Further evaluation with noncontrast MRA head may be of further diagnostic value. Electronically signed by: Jonel Lawrence DO Date:    10/11/2022 Time:    10:05    CT Chest Abdomen Pelvis With Contrast (xpd)    Result Date: 10/18/2022  EXAMINATION: CT CHEST ABDOMEN PELVIS WITH CONTRAST (XPD) CLINICAL HISTORY: metastatic small cell lung cancer on maintenance immunotherapy, eval response; Malignant neoplasm of unspecified part of unspecified bronchus or lung TECHNIQUE: Low dose axial images, sagittal and coronal reformations were obtained from the thoracic inlet to the pubic symphysis following the IV administration of 100 mL of Omnipaque 350  COMPARISON: 07/26/2022 FINDINGS: Right IJ venous shunt tip superior aspect right atrium, absence of clot in included upper right IJ. Fatty infiltration of liver, additional diminished attenuation space-occupying lesions of liver, largest central right hepatic lobe 3.6 cm image 103 series 3.  Stable.  Spleen, adrenal glands, pancreas, gallbladder and biliary tree normal. Urinary bladder normal.  Prostate gland very small 4 cm versus postop prostatectomy.  No free fluid or retroperitoneal adenopathy.  GI track normal. Tiny nonobstructive right lower and left upper renal pole stones. New lymph node left retro manubrial 3.4 cm image 28 series 3 appearing in the interim.  Paratracheal conglomerate adenopathy 2.1 x 2.5 cm, was 1.6 x 3 cm.  Subcarinal adenopathy stable.  No new hilar or mediastinal adenopathy. Degenerative disc spondylosis cervicothoracic junction., focal osteoblastic opacities involve humeral heads both clavicle superimposed on erosive DJD more prominent on right.  Additional focal osteoblastic opacities sternum, ribs, dorsal lumbar sacral spine pelvis and both femoral head and trochanteric regions.  Moderate anterior spondylosis dorsal spine and degenerative disc spondylosis facet joint arthropathy lumbosacral junction with DJD SI joints.  Fracture defects left lower anterior chest wall stable. Scattered calcified plaque great vessels aorta iliac femoral arteries. Dominant medial segment right middle lobe mass 1.1 x 2.7 cm image 69 series 3, was 1.6 x 3.4 cm.  Additional patchy nodular lesions right lower lobe posterior basilar segments, largest 1.4 cm image 66 series 3 suspicious for metastatic disease and/or superimposed inflammatory and infectious process.  Additional patchy infiltrates medial posterior segment right upper lobe and right lower lobe particularly medial basilar segment image 91 series 3.  No new pleural reaction.  Tracheobronchial tree normal. .     1. New presumed metastatic node  anterior superior left mediastinum, paratracheal adenopathy otherwise stable. 2. Decreased size in right middle lobe mass with new evidence of metastatic disease right lower lobe versus superimposed inflammatory infectious process discussed above. 3. New developing metastatic lesions liver. 4. Bony universal metastatic disease stable. 5. Recist summary: 6. Compare with previous CT chest abdomen pelvis 07/26/2022 7. Lesion 1: Right middle lobe mass 1.1 x 2.7 cm was 1.6 x 13.4 cm. 8. Lesion 2: Right paratracheal conned Willett a adenopathy 2.1 x 2.5 cm, was 1.6 x 3 cm. 9. Lesion 3: Dome 1.9 cm stable.  (New progressive liver metastatic disease noted elsewhere) 10. Lesion 4: Caudal aspect right hepatic lobe 1 cm, stable 11.  This report was flagged in Epic as abnormal. Electronically signed by: Dewayne Hutchinson MD Date:    10/18/2022 Time:    09:28       Assessment and Plan        1. Small cell lung cancer    2. Secondary malignant neoplasm of liver    3. Secondary malignant neoplasm of brain    4. Elevated alanine aminotransferase (ALT) level    5. 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 Acute hypoxemic respiratory failure      Problem List Items Addressed This Visit          Pulmonary    RESOLVED: Acute hypoxemic respiratory failure    Current Assessment & Plan     No longer requiring supplemental o2, resolved.            Oncology    Small cell lung cancer - Primary (Chronic)    Overview     Initially diagnosed with extensive stage small cell lung cancer in 03/2022 who was treated with carbo/etop/atezolizumab from 04/05/2022 - 06/14/22 with partial response. He subsequently completed consolidation thoracic RT 8/4/22 - 8/18/22, and he was on maintenance atezolizumab.          Current Assessment & Plan     10/18/22 showing progression of disease in lungs and liver.  Asymptomatic.  -discussed with him plan for topotecan  -virtual consent for topotecan  -labs  reviewed; ok to proceed with cycle 1 topotecan when approved  -labs and virtual visit with me prior to cycle 2  -of note, he will be leaving out of town 11/25/22 and will be gone for 1 week         Secondary malignant neoplasm of liver    Secondary malignant neoplasm of intrathoracic lymph nodes    Secondary malignant neoplasm of brain    Current Assessment & Plan     10/11/22 brain MRI showing progression of disease  -plan for radiation 10/25/22            GI    Elevated alanine aminotransferase (ALT) level    Current Assessment & Plan     Stable, grade 1, asymptomatic. May be secondary to known liver mets  -monitor cmp          Other Visit Diagnoses       Other male erectile dysfunction        Relevant Medications    sildenafiL (VIAGRA) 50 MG tablet          Antolin Sosa MD  Hematology Oncology    Route Chart for Scheduling    Med Onc Chart Routing  Urgent    Follow up with physician . 11/14 virtual visit prior to cycle 2   Follow up with YANCY    Infusion scheduling note topotecan + gcsf needs expedited auth and scheduling for [facility]. days 1-4 chemo, day 5 gcsf   Injection scheduling note    Labs CBC and CMP   Lab interval:  [facility] labs 11/11 cmp cbc   Imaging None      Pharmacy appointment No pharmacy appointment needed      Other referrals No additional referrals needed         Treatment Plan Information   OP SCLC TOPOTECAN Q3W   Antolin Sosa MD   Upcoming Treatment Dates - OP SCLC TOPOTECAN Q3W    10/24/2022       Pre-Medications       dexAMETHasone (DECADRON) 12 mg in sodium chloride 0.9% 50 mL IVPB       Chemotherapy       topotecan (HYCAMTIN) 3.59 mg in sodium chloride 0.9% 100 mL chemo infusion  10/25/2022       Pre-Medications       dexAMETHasone (DECADRON) 12 mg in sodium chloride 0.9% 50 mL IVPB       Chemotherapy       topotecan (HYCAMTIN) 3.59 mg in sodium chloride 0.9% 100 mL chemo infusion  10/26/2022       Pre-Medications       dexAMETHasone (DECADRON) 12 mg in sodium chloride 0.9% 50 mL IVPB        Chemotherapy       topotecan (HYCAMTIN) 3.59 mg in sodium chloride 0.9% 100 mL chemo infusion  10/27/2022       Pre-Medications       dexAMETHasone (DECADRON) 12 mg in sodium chloride 0.9% 50 mL IVPB       Chemotherapy       topotecan (HYCAMTIN) 3.59 mg in sodium chloride 0.9% 100 mL chemo infusion    The patient location is: car  The chief complaint leading to consultation is: small cell lung cancer    Visit type: audiovisual    Face to Face time with patient: 18 minutes  45 minutes of total time spent on the encounter, which includes face to face time and non-face to face time preparing to see the patient (eg, review of tests), Obtaining and/or reviewing separately obtained history, Documenting clinical information in the electronic or other health record, Independently interpreting results (not separately reported) and communicating results to the patient/family/caregiver, or Care coordination (not separately reported).         Each patient to whom he or she provides medical services by telemedicine is:  (1) informed of the relationship between the physician and patient and the respective role of any other health care provider with respect to management of the patient; and (2) notified that he or she may decline to receive medical services by telemedicine and may withdraw from such care at any time.    Notes:

## 2022-10-18 NOTE — PLAN OF CARE
DISCONTINUE ON PATHWAY REGIMEN - Small Cell Lung    [id number]        Atezolizumab (Tecentriq)       Carboplatin (Paraplatin)       Etoposide (Toposar)       Atezolizumab (Tecentriq)     **Always confirm dose/schedule in your pharmacy ordering system**    REASON: Disease Progression  PRIOR TREATMENT: [id number]  TREATMENT RESPONSE: Partial Response ([place])    START ON PATHWAY REGIMEN - Small Cell Lung    KSQ912        Topotecan (Hycamtin)           Additional Orders: Risk of febrile neutropenia with this regimen is   greater than or equal to 17%. Appropriate interventions should be taken per   physician order.    **Always confirm dose/schedule in your pharmacy ordering system**    Patient Characteristics:  Relapsed or Progressive Disease, Second Line, Relapse ? 6 Months  Therapeutic Status: Relapsed or Progressive Disease  Line of Therapy: Second Line  Time to Relapse: Relapse ? 6 Months  Intent of Therapy:  Non-Curative / Palliative Intent, Discussed with Patient

## 2022-10-18 NOTE — ASSESSMENT & PLAN NOTE
10/18/22 showing progression of disease in lungs and liver.  Asymptomatic.  -discussed with him plan for topotecan  -virtual consent for topotecan  -labs reviewed; ok to proceed with cycle 1 topotecan when approved  -labs and virtual visit with me prior to cycle 2  -of note, he will be leaving out of town 11/25/22 and will be gone for 1 week

## 2022-10-18 NOTE — Clinical Note
Maico can we get this approved asap for next week? Thanks -e Chemotherapy Regimen:  Next Treatment Date Upcoming Treatment Dates - OP SCLC TOPOTECAN Q3W  10/24/2022      topotecan (HYCAMTIN) 3.59 mg in sodium chloride 0.9% 100 mL chemo infusion 10/25/2022      topotecan (HYCAMTIN) 3.59 mg in sodium chloride 0.9% 100 mL chemo infusion 10/26/2022      topotecan (HYCAMTIN) 3.59 mg in sodium chloride 0.9% 100 mL chemo infusion  Provider: Sheila

## 2022-10-20 PROCEDURE — 77301 PR  INTEN MOD RADIOTHER PLAN W/DOSE VOL HIST: ICD-10-PCS | Mod: 26,,, | Performed by: RADIOLOGY

## 2022-10-20 PROCEDURE — 77301 RADIOTHERAPY DOSE PLAN IMRT: CPT | Mod: 26,,, | Performed by: RADIOLOGY

## 2022-10-20 PROCEDURE — 77301 RADIOTHERAPY DOSE PLAN IMRT: CPT | Mod: TC | Performed by: RADIOLOGY

## 2022-10-24 ENCOUNTER — INFUSION (OUTPATIENT)
Dept: INFUSION THERAPY | Facility: HOSPITAL | Age: 57
End: 2022-10-24
Attending: STUDENT IN AN ORGANIZED HEALTH CARE EDUCATION/TRAINING PROGRAM
Payer: MEDICAID

## 2022-10-24 VITALS
TEMPERATURE: 98 F | SYSTOLIC BLOOD PRESSURE: 146 MMHG | DIASTOLIC BLOOD PRESSURE: 88 MMHG | HEART RATE: 92 BPM | OXYGEN SATURATION: 97 % | RESPIRATION RATE: 17 BRPM

## 2022-10-24 DIAGNOSIS — C78.7 SECONDARY MALIGNANT NEOPLASM OF LIVER: ICD-10-CM

## 2022-10-24 DIAGNOSIS — C34.90 SMALL CELL LUNG CANCER: Primary | ICD-10-CM

## 2022-10-24 PROCEDURE — 77370 RADIATION PHYSICS CONSULT: CPT | Performed by: RADIOLOGY

## 2022-10-24 PROCEDURE — 77338 DESIGN MLC DEVICE FOR IMRT: CPT | Mod: TC | Performed by: RADIOLOGY

## 2022-10-24 PROCEDURE — 77470 PR  SPECIAL RADIATION TREATMENT: ICD-10-PCS | Mod: 26,59,, | Performed by: RADIOLOGY

## 2022-10-24 PROCEDURE — 77470 SPECIAL RADIATION TREATMENT: CPT | Mod: 59,TC | Performed by: RADIOLOGY

## 2022-10-24 PROCEDURE — 96413 CHEMO IV INFUSION 1 HR: CPT

## 2022-10-24 PROCEDURE — A4216 STERILE WATER/SALINE, 10 ML: HCPCS | Performed by: STUDENT IN AN ORGANIZED HEALTH CARE EDUCATION/TRAINING PROGRAM

## 2022-10-24 PROCEDURE — 77300 RADIATION THERAPY DOSE PLAN: CPT | Mod: 26,,, | Performed by: RADIOLOGY

## 2022-10-24 PROCEDURE — 63600175 PHARM REV CODE 636 W HCPCS: Performed by: STUDENT IN AN ORGANIZED HEALTH CARE EDUCATION/TRAINING PROGRAM

## 2022-10-24 PROCEDURE — 77470 SPECIAL RADIATION TREATMENT: CPT | Mod: 26,59,, | Performed by: RADIOLOGY

## 2022-10-24 PROCEDURE — 25000003 PHARM REV CODE 250: Performed by: STUDENT IN AN ORGANIZED HEALTH CARE EDUCATION/TRAINING PROGRAM

## 2022-10-24 PROCEDURE — 77338 DESIGN MLC DEVICE FOR IMRT: CPT | Mod: 26,,, | Performed by: RADIOLOGY

## 2022-10-24 PROCEDURE — 77338 PR  MLC IMRT DESIGN & CONSTRUCTION PER IMRT PLAN: ICD-10-PCS | Mod: 26,,, | Performed by: RADIOLOGY

## 2022-10-24 PROCEDURE — 77300 PR RADIATION THERAPY,DOSIMETRY PLAN: ICD-10-PCS | Mod: 26,,, | Performed by: RADIOLOGY

## 2022-10-24 PROCEDURE — 96367 TX/PROPH/DG ADDL SEQ IV INF: CPT

## 2022-10-24 PROCEDURE — 77300 RADIATION THERAPY DOSE PLAN: CPT | Mod: TC | Performed by: RADIOLOGY

## 2022-10-24 RX ORDER — SODIUM CHLORIDE 0.9 % (FLUSH) 0.9 %
10 SYRINGE (ML) INJECTION
Status: DISCONTINUED | OUTPATIENT
Start: 2022-10-24 | End: 2022-10-24 | Stop reason: HOSPADM

## 2022-10-24 RX ORDER — HEPARIN 100 UNIT/ML
500 SYRINGE INTRAVENOUS
Status: DISCONTINUED | OUTPATIENT
Start: 2022-10-24 | End: 2022-10-24 | Stop reason: HOSPADM

## 2022-10-24 RX ADMIN — DEXAMETHASONE SODIUM PHOSPHATE 12 MG: 10 INJECTION, SOLUTION INTRAMUSCULAR; INTRAVENOUS at 01:10

## 2022-10-24 RX ADMIN — TOPOTECAN HYDROCHLORIDE 3.59 MG: 4 INJECTION, POWDER, LYOPHILIZED, FOR SOLUTION INTRAVENOUS at 01:10

## 2022-10-24 RX ADMIN — HEPARIN 500 UNITS: 100 SYRINGE at 02:10

## 2022-10-24 RX ADMIN — Medication 10 ML: at 02:10

## 2022-10-24 NOTE — PLAN OF CARE
Patient arrived to unit for C1D1 Topetecan infusion.Reviewed signs and symptoms of allergic reaction, pt verbalized understanding.Labs reviewed, pt cleared for chemo per Dr. Dr. Irving. Plan of care reviewed, patient agreeable to plan.Decadron IVPB administered prior to Topetecan. Patient tolerated infusion well. Pt prefers to have needle removed today and reinserted for tomorrow's tx.  Printed,Reviewed, highlighted and gave pt drug info sheets on Topetecan.VSS. Discharge instructions reviewed, patient instructed to return 10/25. Patient ambulated off unit accompanied by spouse. Patient in NAD at time of discharge.

## 2022-10-25 ENCOUNTER — INFUSION (OUTPATIENT)
Dept: INFUSION THERAPY | Facility: HOSPITAL | Age: 57
End: 2022-10-25
Attending: STUDENT IN AN ORGANIZED HEALTH CARE EDUCATION/TRAINING PROGRAM
Payer: MEDICAID

## 2022-10-25 VITALS
RESPIRATION RATE: 17 BRPM | DIASTOLIC BLOOD PRESSURE: 73 MMHG | TEMPERATURE: 98 F | HEART RATE: 96 BPM | OXYGEN SATURATION: 97 % | SYSTOLIC BLOOD PRESSURE: 144 MMHG

## 2022-10-25 DIAGNOSIS — C79.31 SECONDARY MALIGNANT NEOPLASM OF BRAIN: Primary | ICD-10-CM

## 2022-10-25 DIAGNOSIS — C78.7 SECONDARY MALIGNANT NEOPLASM OF LIVER: ICD-10-CM

## 2022-10-25 DIAGNOSIS — C34.90 SMALL CELL LUNG CANCER: Primary | ICD-10-CM

## 2022-10-25 PROCEDURE — 96413 CHEMO IV INFUSION 1 HR: CPT

## 2022-10-25 PROCEDURE — 63600175 PHARM REV CODE 636 W HCPCS: Performed by: STUDENT IN AN ORGANIZED HEALTH CARE EDUCATION/TRAINING PROGRAM

## 2022-10-25 PROCEDURE — 77014 PR  CT GUIDANCE PLACEMENT RAD THERAPY FIELDS: CPT | Mod: 26,,, | Performed by: RADIOLOGY

## 2022-10-25 PROCEDURE — 96367 TX/PROPH/DG ADDL SEQ IV INF: CPT

## 2022-10-25 PROCEDURE — 96365 THER/PROPH/DIAG IV INF INIT: CPT

## 2022-10-25 PROCEDURE — 77336 RADIATION PHYSICS CONSULT: CPT | Performed by: RADIOLOGY

## 2022-10-25 PROCEDURE — 77014 PR  CT GUIDANCE PLACEMENT RAD THERAPY FIELDS: ICD-10-PCS | Mod: 26,,, | Performed by: RADIOLOGY

## 2022-10-25 PROCEDURE — 77372 SRS LINEAR BASED: CPT | Performed by: RADIOLOGY

## 2022-10-25 PROCEDURE — 25000003 PHARM REV CODE 250: Performed by: STUDENT IN AN ORGANIZED HEALTH CARE EDUCATION/TRAINING PROGRAM

## 2022-10-25 PROCEDURE — 77014 HC CT GUIDANCE RADIATION THERAPY FLDS PLACEMENT: CPT | Mod: TC | Performed by: RADIOLOGY

## 2022-10-25 PROCEDURE — A4216 STERILE WATER/SALINE, 10 ML: HCPCS | Performed by: STUDENT IN AN ORGANIZED HEALTH CARE EDUCATION/TRAINING PROGRAM

## 2022-10-25 RX ORDER — SODIUM CHLORIDE 0.9 % (FLUSH) 0.9 %
10 SYRINGE (ML) INJECTION
Status: DISCONTINUED | OUTPATIENT
Start: 2022-10-25 | End: 2022-10-25 | Stop reason: HOSPADM

## 2022-10-25 RX ORDER — HEPARIN 100 UNIT/ML
500 SYRINGE INTRAVENOUS
Status: DISCONTINUED | OUTPATIENT
Start: 2022-10-25 | End: 2022-10-25 | Stop reason: HOSPADM

## 2022-10-25 RX ADMIN — HEPARIN 500 UNITS: 100 SYRINGE at 10:10

## 2022-10-25 RX ADMIN — TOPOTECAN HYDROCHLORIDE 3.59 MG: 4 INJECTION, POWDER, LYOPHILIZED, FOR SOLUTION INTRAVENOUS at 10:10

## 2022-10-25 RX ADMIN — Medication 10 ML: at 10:10

## 2022-10-25 RX ADMIN — DEXAMETHASONE SODIUM PHOSPHATE 12 MG: 10 INJECTION, SOLUTION INTRAMUSCULAR; INTRAVENOUS at 09:10

## 2022-10-25 NOTE — PLAN OF CARE
Pt completed C1D2 of Topotecan. Besides some minor diarrhea this morning, pt did well after receiving his first day of tx. Feels well overall this morning. VSS. Given pre-med of Dexamethasone. Topotecan infused over 30 minutes. No issues noted. Pt will return tomorrow for D3. Discharged from unit in [facility].

## 2022-10-26 ENCOUNTER — DOCUMENTATION ONLY (OUTPATIENT)
Dept: PHARMACY | Facility: HOSPITAL | Age: 57
End: 2022-10-26
Payer: MEDICAID

## 2022-10-26 ENCOUNTER — INFUSION (OUTPATIENT)
Dept: INFUSION THERAPY | Facility: HOSPITAL | Age: 57
End: 2022-10-26
Payer: MEDICAID

## 2022-10-26 VITALS
DIASTOLIC BLOOD PRESSURE: 70 MMHG | TEMPERATURE: 98 F | SYSTOLIC BLOOD PRESSURE: 142 MMHG | OXYGEN SATURATION: 96 % | RESPIRATION RATE: 16 BRPM | HEART RATE: 91 BPM

## 2022-10-26 DIAGNOSIS — C78.7 SECONDARY MALIGNANT NEOPLASM OF LIVER: ICD-10-CM

## 2022-10-26 DIAGNOSIS — C34.90 SMALL CELL LUNG CANCER: Primary | ICD-10-CM

## 2022-10-26 PROCEDURE — 63600175 PHARM REV CODE 636 W HCPCS: Performed by: STUDENT IN AN ORGANIZED HEALTH CARE EDUCATION/TRAINING PROGRAM

## 2022-10-26 PROCEDURE — 25000003 PHARM REV CODE 250: Performed by: STUDENT IN AN ORGANIZED HEALTH CARE EDUCATION/TRAINING PROGRAM

## 2022-10-26 PROCEDURE — A4216 STERILE WATER/SALINE, 10 ML: HCPCS | Performed by: STUDENT IN AN ORGANIZED HEALTH CARE EDUCATION/TRAINING PROGRAM

## 2022-10-26 PROCEDURE — 96413 CHEMO IV INFUSION 1 HR: CPT

## 2022-10-26 PROCEDURE — 96367 TX/PROPH/DG ADDL SEQ IV INF: CPT

## 2022-10-26 RX ORDER — SODIUM CHLORIDE 0.9 % (FLUSH) 0.9 %
10 SYRINGE (ML) INJECTION
Status: DISCONTINUED | OUTPATIENT
Start: 2022-10-26 | End: 2022-10-26 | Stop reason: HOSPADM

## 2022-10-26 RX ORDER — HEPARIN 100 UNIT/ML
500 SYRINGE INTRAVENOUS
Status: DISCONTINUED | OUTPATIENT
Start: 2022-10-26 | End: 2022-10-26 | Stop reason: HOSPADM

## 2022-10-26 RX ADMIN — Medication 10 ML: at 03:10

## 2022-10-26 RX ADMIN — TOPOTECAN HYDROCHLORIDE 3.59 MG: 4 INJECTION, POWDER, LYOPHILIZED, FOR SOLUTION INTRAVENOUS at 02:10

## 2022-10-26 RX ADMIN — DEXAMETHASONE SODIUM PHOSPHATE 12 MG: 10 INJECTION, SOLUTION INTRAMUSCULAR; INTRAVENOUS at 01:10

## 2022-10-26 RX ADMIN — HEPARIN 500 UNITS: 100 SYRINGE at 03:10

## 2022-10-26 NOTE — PLAN OF CARE
Patient arrived to unit for C1D3 Topetecan infusion. No new or worsening symptoms to report today. Plan of care reviewed, patient agreeable to plan.Decadron IVPB administered prior to chemo. Patient tolerated Topetecan infusion well. Pt stated he is starting to do his daily walks again. VSS. Discharge instructions reviewed, patient instructed to return 10/27. Patient ambulated off unit accompanied by spouse. Patient in NAD at time of discharge.

## 2022-10-27 ENCOUNTER — INFUSION (OUTPATIENT)
Dept: INFUSION THERAPY | Facility: HOSPITAL | Age: 57
End: 2022-10-27
Attending: STUDENT IN AN ORGANIZED HEALTH CARE EDUCATION/TRAINING PROGRAM
Payer: MEDICAID

## 2022-10-27 VITALS
SYSTOLIC BLOOD PRESSURE: 155 MMHG | RESPIRATION RATE: 17 BRPM | HEART RATE: 97 BPM | DIASTOLIC BLOOD PRESSURE: 82 MMHG | TEMPERATURE: 98 F | OXYGEN SATURATION: 98 %

## 2022-10-27 DIAGNOSIS — C78.7 SECONDARY MALIGNANT NEOPLASM OF LIVER: ICD-10-CM

## 2022-10-27 DIAGNOSIS — C34.90 SMALL CELL LUNG CANCER: Primary | ICD-10-CM

## 2022-10-27 PROCEDURE — 96365 THER/PROPH/DIAG IV INF INIT: CPT

## 2022-10-27 PROCEDURE — 63600175 PHARM REV CODE 636 W HCPCS: Performed by: STUDENT IN AN ORGANIZED HEALTH CARE EDUCATION/TRAINING PROGRAM

## 2022-10-27 PROCEDURE — 96367 TX/PROPH/DG ADDL SEQ IV INF: CPT

## 2022-10-27 PROCEDURE — A4216 STERILE WATER/SALINE, 10 ML: HCPCS | Performed by: STUDENT IN AN ORGANIZED HEALTH CARE EDUCATION/TRAINING PROGRAM

## 2022-10-27 PROCEDURE — 25000003 PHARM REV CODE 250: Performed by: STUDENT IN AN ORGANIZED HEALTH CARE EDUCATION/TRAINING PROGRAM

## 2022-10-27 PROCEDURE — 96413 CHEMO IV INFUSION 1 HR: CPT

## 2022-10-27 RX ORDER — HEPARIN 100 UNIT/ML
500 SYRINGE INTRAVENOUS
Status: DISCONTINUED | OUTPATIENT
Start: 2022-10-27 | End: 2022-10-27 | Stop reason: HOSPADM

## 2022-10-27 RX ORDER — SODIUM CHLORIDE 0.9 % (FLUSH) 0.9 %
10 SYRINGE (ML) INJECTION
Status: DISCONTINUED | OUTPATIENT
Start: 2022-10-27 | End: 2022-10-27 | Stop reason: HOSPADM

## 2022-10-27 RX ADMIN — HEPARIN 500 UNITS: 100 SYRINGE at 02:10

## 2022-10-27 RX ADMIN — DEXAMETHASONE SODIUM PHOSPHATE 12 MG: 10 INJECTION, SOLUTION INTRAMUSCULAR; INTRAVENOUS at 01:10

## 2022-10-27 RX ADMIN — TOPOTECAN HYDROCHLORIDE 3.59 MG: 4 INJECTION, POWDER, LYOPHILIZED, FOR SOLUTION INTRAVENOUS at 01:10

## 2022-10-27 RX ADMIN — Medication 10 ML: at 02:10

## 2022-10-27 NOTE — PLAN OF CARE
Pt completed C1D4 of Topotecan. Continues with lower back pain and being unable to sleep because of it. VSS. Given pre-med of Dexamethasone. Topotecan infused over 30 minutes. No issues noted. Pt will return tomorrow for D5 of his Fulphila. Discharged from unit in [facility].

## 2022-10-28 ENCOUNTER — INFUSION (OUTPATIENT)
Dept: INFUSION THERAPY | Facility: HOSPITAL | Age: 57
End: 2022-10-28
Attending: STUDENT IN AN ORGANIZED HEALTH CARE EDUCATION/TRAINING PROGRAM
Payer: MEDICAID

## 2022-10-28 VITALS
SYSTOLIC BLOOD PRESSURE: 148 MMHG | TEMPERATURE: 98 F | RESPIRATION RATE: 16 BRPM | HEART RATE: 103 BPM | DIASTOLIC BLOOD PRESSURE: 85 MMHG | OXYGEN SATURATION: 95 %

## 2022-10-28 DIAGNOSIS — C78.7 SECONDARY MALIGNANT NEOPLASM OF LIVER: ICD-10-CM

## 2022-10-28 DIAGNOSIS — C34.90 SMALL CELL LUNG CANCER: Primary | ICD-10-CM

## 2022-10-28 PROCEDURE — 63600175 PHARM REV CODE 636 W HCPCS: Mod: TB | Performed by: STUDENT IN AN ORGANIZED HEALTH CARE EDUCATION/TRAINING PROGRAM

## 2022-10-28 PROCEDURE — 96372 THER/PROPH/DIAG INJ SC/IM: CPT

## 2022-10-28 RX ADMIN — PEGFILGRASTIM 6 MG: 6 INJECTION SUBCUTANEOUS at 02:10

## 2022-10-28 NOTE — PLAN OF CARE
Pt received C1D5 of Fulphila. No new or worsening complaints to report today. VSS. Tolerated injection well. Pt and spouse have next upcoming appointments through [facility]. Discharged from unit in [facility].

## 2022-11-05 ENCOUNTER — PATIENT MESSAGE (OUTPATIENT)
Dept: HEMATOLOGY/ONCOLOGY | Facility: CLINIC | Age: 57
End: 2022-11-05
Payer: MEDICAID

## 2022-11-07 NOTE — TELEPHONE ENCOUNTER
Pt called and asked about skipping next cycle of chemo so he would not feel bad on vacation.  Since pt has advanced disease and is being treated palliatively this is acceptable.  Pt educated on symptoms to watch out for and would like to reschedule labs and next chemo cycle on the week of Dec 5th.      Soledad rIving MD  Heme/Onc

## 2022-11-10 ENCOUNTER — TELEPHONE (OUTPATIENT)
Dept: RADIATION ONCOLOGY | Facility: CLINIC | Age: 57
End: 2022-11-10
Payer: MEDICAID

## 2022-11-10 NOTE — TELEPHONE ENCOUNTER
----- Message from Cailin Julian RN sent at 10/25/2022 12:42 PM CDT -----  Srs x 1 f/u made CS 25

## 2022-11-10 NOTE — TELEPHONE ENCOUNTER
Followup call after completing radiation to the brain His wife states he is feeling well  F/U appt with scans confirmed

## 2022-11-30 ENCOUNTER — PATIENT MESSAGE (OUTPATIENT)
Dept: HEMATOLOGY/ONCOLOGY | Facility: CLINIC | Age: 57
End: 2022-11-30
Payer: MEDICAID

## 2022-11-30 DIAGNOSIS — Z51.5 PALLIATIVE CARE BY SPECIALIST: ICD-10-CM

## 2022-11-30 DIAGNOSIS — C34.90 SMALL CELL LUNG CANCER: Primary | ICD-10-CM

## 2022-11-30 RX ORDER — OXYCODONE HYDROCHLORIDE 5 MG/1
5 TABLET ORAL EVERY 6 HOURS PRN
Qty: 12 TABLET | Refills: 0 | Status: SHIPPED | OUTPATIENT
Start: 2022-11-30 | End: 2022-12-20 | Stop reason: SDUPTHER

## 2022-12-07 ENCOUNTER — HOSPITAL ENCOUNTER (INPATIENT)
Facility: HOSPITAL | Age: 57
LOS: 3 days | Discharge: HOME | DRG: 948 | End: 2022-12-10
Attending: STUDENT IN AN ORGANIZED HEALTH CARE EDUCATION/TRAINING PROGRAM | Admitting: STUDENT IN AN ORGANIZED HEALTH CARE EDUCATION/TRAINING PROGRAM
Payer: MEDICAID

## 2022-12-07 ENCOUNTER — OFFICE VISIT (OUTPATIENT)
Dept: HEMATOLOGY/ONCOLOGY | Facility: CLINIC | Age: 57
DRG: 948 | End: 2022-12-07
Payer: MEDICAID

## 2022-12-07 VITALS
HEART RATE: 75 BPM | TEMPERATURE: 98 F | RESPIRATION RATE: 18 BRPM | SYSTOLIC BLOOD PRESSURE: 171 MMHG | WEIGHT: 263 LBS | DIASTOLIC BLOOD PRESSURE: 91 MMHG | BODY MASS INDEX: 41.28 KG/M2 | HEIGHT: 67 IN | OXYGEN SATURATION: 96 %

## 2022-12-07 DIAGNOSIS — C34.90 SMALL CELL LUNG CANCER: Primary | ICD-10-CM

## 2022-12-07 DIAGNOSIS — G89.3 CANCER RELATED PAIN: ICD-10-CM

## 2022-12-07 DIAGNOSIS — C34.90 SMALL CELL LUNG CANCER: Chronic | ICD-10-CM

## 2022-12-07 DIAGNOSIS — C79.31 SECONDARY MALIGNANT NEOPLASM OF BRAIN: Primary | ICD-10-CM

## 2022-12-07 DIAGNOSIS — R07.9 CHEST PAIN: ICD-10-CM

## 2022-12-07 DIAGNOSIS — Z51.5 PALLIATIVE CARE BY SPECIALIST: ICD-10-CM

## 2022-12-07 DIAGNOSIS — Z09 FOLLOW-UP EXAM: ICD-10-CM

## 2022-12-07 DIAGNOSIS — E11.69 TYPE 2 DIABETES MELLITUS WITH OTHER SPECIFIED COMPLICATION, WITHOUT LONG-TERM CURRENT USE OF INSULIN: ICD-10-CM

## 2022-12-07 DIAGNOSIS — I10 PRIMARY HYPERTENSION: ICD-10-CM

## 2022-12-07 LAB
ESTIMATED AVG GLUCOSE: 151 MG/DL (ref 68–131)
HBA1C MFR BLD: 6.9 % (ref 4–5.6)
POCT GLUCOSE: 108 MG/DL (ref 70–110)

## 2022-12-07 PROCEDURE — 1159F MED LIST DOCD IN RCRD: CPT | Mod: CPTII,,, | Performed by: STUDENT IN AN ORGANIZED HEALTH CARE EDUCATION/TRAINING PROGRAM

## 2022-12-07 PROCEDURE — 3044F PR MOST RECENT HEMOGLOBIN A1C LEVEL <7.0%: ICD-10-PCS | Mod: CPTII,,, | Performed by: STUDENT IN AN ORGANIZED HEALTH CARE EDUCATION/TRAINING PROGRAM

## 2022-12-07 PROCEDURE — 83036 HEMOGLOBIN GLYCOSYLATED A1C: CPT

## 2022-12-07 PROCEDURE — 25000003 PHARM REV CODE 250

## 2022-12-07 PROCEDURE — 20600001 HC STEP DOWN PRIVATE ROOM

## 2022-12-07 PROCEDURE — 3080F PR MOST RECENT DIASTOLIC BLOOD PRESSURE >= 90 MM HG: ICD-10-PCS | Mod: CPTII,,, | Performed by: STUDENT IN AN ORGANIZED HEALTH CARE EDUCATION/TRAINING PROGRAM

## 2022-12-07 PROCEDURE — 4010F ACE/ARB THERAPY RXD/TAKEN: CPT | Mod: CPTII,,, | Performed by: STUDENT IN AN ORGANIZED HEALTH CARE EDUCATION/TRAINING PROGRAM

## 2022-12-07 PROCEDURE — 3008F PR BODY MASS INDEX (BMI) DOCUMENTED: ICD-10-PCS | Mod: CPTII,,, | Performed by: STUDENT IN AN ORGANIZED HEALTH CARE EDUCATION/TRAINING PROGRAM

## 2022-12-07 PROCEDURE — A9585 GADOBUTROL INJECTION: HCPCS | Performed by: HOSPITALIST

## 2022-12-07 PROCEDURE — 99214 OFFICE O/P EST MOD 30 MIN: CPT | Mod: PBBFAC,25 | Performed by: STUDENT IN AN ORGANIZED HEALTH CARE EDUCATION/TRAINING PROGRAM

## 2022-12-07 PROCEDURE — 99215 PR OFFICE/OUTPT VISIT, EST, LEVL V, 40-54 MIN: ICD-10-PCS | Mod: S$PBB,,, | Performed by: STUDENT IN AN ORGANIZED HEALTH CARE EDUCATION/TRAINING PROGRAM

## 2022-12-07 PROCEDURE — 3077F SYST BP >= 140 MM HG: CPT | Mod: CPTII,,, | Performed by: STUDENT IN AN ORGANIZED HEALTH CARE EDUCATION/TRAINING PROGRAM

## 2022-12-07 PROCEDURE — 1159F PR MEDICATION LIST DOCUMENTED IN MEDICAL RECORD: ICD-10-PCS | Mod: CPTII,,, | Performed by: STUDENT IN AN ORGANIZED HEALTH CARE EDUCATION/TRAINING PROGRAM

## 2022-12-07 PROCEDURE — 3077F PR MOST RECENT SYSTOLIC BLOOD PRESSURE >= 140 MM HG: ICD-10-PCS | Mod: CPTII,,, | Performed by: STUDENT IN AN ORGANIZED HEALTH CARE EDUCATION/TRAINING PROGRAM

## 2022-12-07 PROCEDURE — 99999 PR PBB SHADOW E&M-EST. PATIENT-LVL IV: CPT | Mod: PBBFAC,,, | Performed by: STUDENT IN AN ORGANIZED HEALTH CARE EDUCATION/TRAINING PROGRAM

## 2022-12-07 PROCEDURE — 63600175 PHARM REV CODE 636 W HCPCS: Performed by: INTERNAL MEDICINE

## 2022-12-07 PROCEDURE — 99223 1ST HOSP IP/OBS HIGH 75: CPT | Mod: ,,, | Performed by: HOSPITALIST

## 2022-12-07 PROCEDURE — 3080F DIAST BP >= 90 MM HG: CPT | Mod: CPTII,,, | Performed by: STUDENT IN AN ORGANIZED HEALTH CARE EDUCATION/TRAINING PROGRAM

## 2022-12-07 PROCEDURE — 3008F BODY MASS INDEX DOCD: CPT | Mod: CPTII,,, | Performed by: STUDENT IN AN ORGANIZED HEALTH CARE EDUCATION/TRAINING PROGRAM

## 2022-12-07 PROCEDURE — 36415 COLL VENOUS BLD VENIPUNCTURE: CPT

## 2022-12-07 PROCEDURE — 99999 PR PBB SHADOW E&M-EST. PATIENT-LVL IV: ICD-10-PCS | Mod: PBBFAC,,, | Performed by: STUDENT IN AN ORGANIZED HEALTH CARE EDUCATION/TRAINING PROGRAM

## 2022-12-07 PROCEDURE — 63600175 PHARM REV CODE 636 W HCPCS

## 2022-12-07 PROCEDURE — 99223 PR INITIAL HOSPITAL CARE,LEVL III: ICD-10-PCS | Mod: ,,, | Performed by: HOSPITALIST

## 2022-12-07 PROCEDURE — 3044F HG A1C LEVEL LT 7.0%: CPT | Mod: CPTII,,, | Performed by: STUDENT IN AN ORGANIZED HEALTH CARE EDUCATION/TRAINING PROGRAM

## 2022-12-07 PROCEDURE — 25500020 PHARM REV CODE 255: Performed by: HOSPITALIST

## 2022-12-07 PROCEDURE — 4010F PR ACE/ARB THEARPY RXD/TAKEN: ICD-10-PCS | Mod: CPTII,,, | Performed by: STUDENT IN AN ORGANIZED HEALTH CARE EDUCATION/TRAINING PROGRAM

## 2022-12-07 PROCEDURE — 99215 OFFICE O/P EST HI 40 MIN: CPT | Mod: S$PBB,,, | Performed by: STUDENT IN AN ORGANIZED HEALTH CARE EDUCATION/TRAINING PROGRAM

## 2022-12-07 RX ORDER — HYDROMORPHONE HYDROCHLORIDE 1 MG/ML
0.5 INJECTION, SOLUTION INTRAMUSCULAR; INTRAVENOUS; SUBCUTANEOUS EVERY 4 HOURS PRN
Status: DISCONTINUED | OUTPATIENT
Start: 2022-12-07 | End: 2022-12-07

## 2022-12-07 RX ORDER — OXYCODONE HYDROCHLORIDE 10 MG/1
10 TABLET ORAL EVERY 6 HOURS PRN
Status: DISCONTINUED | OUTPATIENT
Start: 2022-12-07 | End: 2022-12-08

## 2022-12-07 RX ORDER — GADOBUTROL 604.72 MG/ML
10 INJECTION INTRAVENOUS
Status: COMPLETED | OUTPATIENT
Start: 2022-12-07 | End: 2022-12-07

## 2022-12-07 RX ORDER — MORPHINE SULFATE 2 MG/ML
2 INJECTION, SOLUTION INTRAMUSCULAR; INTRAVENOUS ONCE
Status: COMPLETED | OUTPATIENT
Start: 2022-12-07 | End: 2022-12-07

## 2022-12-07 RX ORDER — IBUPROFEN 200 MG
24 TABLET ORAL
Status: DISCONTINUED | OUTPATIENT
Start: 2022-12-07 | End: 2022-12-08

## 2022-12-07 RX ORDER — LOSARTAN POTASSIUM 50 MG/1
100 TABLET ORAL DAILY
Status: DISCONTINUED | OUTPATIENT
Start: 2022-12-08 | End: 2022-12-10 | Stop reason: HOSPADM

## 2022-12-07 RX ORDER — MORPHINE SULFATE 2 MG/ML
2 INJECTION, SOLUTION INTRAMUSCULAR; INTRAVENOUS EVERY 6 HOURS PRN
Status: DISCONTINUED | OUTPATIENT
Start: 2022-12-07 | End: 2022-12-07

## 2022-12-07 RX ORDER — SODIUM CHLORIDE 0.9 % (FLUSH) 0.9 %
10 SYRINGE (ML) INJECTION EVERY 12 HOURS PRN
Status: DISCONTINUED | OUTPATIENT
Start: 2022-12-07 | End: 2022-12-10 | Stop reason: HOSPADM

## 2022-12-07 RX ORDER — HYDROMORPHONE HYDROCHLORIDE 1 MG/ML
1 INJECTION, SOLUTION INTRAMUSCULAR; INTRAVENOUS; SUBCUTANEOUS EVERY 4 HOURS PRN
Status: DISCONTINUED | OUTPATIENT
Start: 2022-12-07 | End: 2022-12-07

## 2022-12-07 RX ORDER — ALLOPURINOL 300 MG/1
300 TABLET ORAL DAILY
Status: DISCONTINUED | OUTPATIENT
Start: 2022-12-08 | End: 2022-12-10 | Stop reason: HOSPADM

## 2022-12-07 RX ORDER — IBUPROFEN 200 MG
16 TABLET ORAL
Status: DISCONTINUED | OUTPATIENT
Start: 2022-12-07 | End: 2022-12-10 | Stop reason: HOSPADM

## 2022-12-07 RX ORDER — HYDROMORPHONE HYDROCHLORIDE 1 MG/ML
0.5 INJECTION, SOLUTION INTRAMUSCULAR; INTRAVENOUS; SUBCUTANEOUS EVERY 4 HOURS PRN
Status: DISCONTINUED | OUTPATIENT
Start: 2022-12-07 | End: 2022-12-09

## 2022-12-07 RX ORDER — ACETAMINOPHEN 325 MG/1
650 TABLET ORAL EVERY 6 HOURS PRN
Status: DISCONTINUED | OUTPATIENT
Start: 2022-12-07 | End: 2022-12-10 | Stop reason: HOSPADM

## 2022-12-07 RX ORDER — NALOXONE HCL 0.4 MG/ML
0.02 VIAL (ML) INJECTION
Status: DISCONTINUED | OUTPATIENT
Start: 2022-12-07 | End: 2022-12-10 | Stop reason: HOSPADM

## 2022-12-07 RX ORDER — ENOXAPARIN SODIUM 100 MG/ML
40 INJECTION SUBCUTANEOUS EVERY 24 HOURS
Status: DISCONTINUED | OUTPATIENT
Start: 2022-12-07 | End: 2022-12-08

## 2022-12-07 RX ORDER — INSULIN ASPART 100 [IU]/ML
0-5 INJECTION, SOLUTION INTRAVENOUS; SUBCUTANEOUS
Status: DISCONTINUED | OUTPATIENT
Start: 2022-12-07 | End: 2022-12-10 | Stop reason: HOSPADM

## 2022-12-07 RX ORDER — GLUCAGON 1 MG
1 KIT INJECTION
Status: DISCONTINUED | OUTPATIENT
Start: 2022-12-07 | End: 2022-12-10 | Stop reason: HOSPADM

## 2022-12-07 RX ORDER — GLUCAGON 1 MG
1 KIT INJECTION
Status: DISCONTINUED | OUTPATIENT
Start: 2022-12-07 | End: 2022-12-08

## 2022-12-07 RX ORDER — IBUPROFEN 200 MG
24 TABLET ORAL
Status: DISCONTINUED | OUTPATIENT
Start: 2022-12-07 | End: 2022-12-10 | Stop reason: HOSPADM

## 2022-12-07 RX ORDER — IBUPROFEN 200 MG
16 TABLET ORAL
Status: DISCONTINUED | OUTPATIENT
Start: 2022-12-07 | End: 2022-12-08

## 2022-12-07 RX ORDER — OXYCODONE HYDROCHLORIDE 10 MG/1
10 TABLET ORAL EVERY 6 HOURS PRN
Status: DISCONTINUED | OUTPATIENT
Start: 2022-12-07 | End: 2022-12-07

## 2022-12-07 RX ORDER — HYDROMORPHONE HYDROCHLORIDE 1 MG/ML
1 INJECTION, SOLUTION INTRAMUSCULAR; INTRAVENOUS; SUBCUTANEOUS EVERY 4 HOURS
Status: DISCONTINUED | OUTPATIENT
Start: 2022-12-07 | End: 2022-12-07

## 2022-12-07 RX ADMIN — HYDROMORPHONE HYDROCHLORIDE 0.5 MG: 1 INJECTION, SOLUTION INTRAMUSCULAR; INTRAVENOUS; SUBCUTANEOUS at 07:12

## 2022-12-07 RX ADMIN — ACETAMINOPHEN 650 MG: 325 TABLET ORAL at 11:12

## 2022-12-07 RX ADMIN — ENOXAPARIN SODIUM 40 MG: 40 INJECTION SUBCUTANEOUS at 05:12

## 2022-12-07 RX ADMIN — OXYCODONE HYDROCHLORIDE 10 MG: 10 TABLET ORAL at 11:12

## 2022-12-07 RX ADMIN — OXYCODONE HYDROCHLORIDE 10 MG: 10 TABLET ORAL at 05:12

## 2022-12-07 RX ADMIN — GADOBUTROL 10 ML: 604.72 INJECTION INTRAVENOUS at 09:12

## 2022-12-07 RX ADMIN — MORPHINE SULFATE 2 MG: 2 INJECTION, SOLUTION INTRAMUSCULAR; INTRAVENOUS at 02:12

## 2022-12-07 NOTE — HPI
Mr. 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 Patient was sent to inpatient heme/onc service from clinic after being found in severe pain. Patient states his pain is burning ans sharp, originates in right flank and travels down to right thigh/calv/ankle. He states the pain switches from left to right, but currently pain is on right. Also has occasional suprapubic abdominal pain associated with leg pain. He reports using oxycodone 30mg without relief at home (not on med rec). Only thing that appears to help is massaging and warm baths before sleep. Patients family states they have noticed large (sometimes golf ball sized) knots on back, and more frequently on right hamstring. Massaging knots provides relief. Patient also reports bilateral lower extremity weakness, but feels it could be due to the pain. He normally walks multiple miles a day, but now can only walk a couple houses down the street. Patient also reports mild headache which has been present since admission. He denies CP, SOB, changes in vision, hematochezia, melena, nausea/vomiting, other weakness/tingling/numbness.     Labs on admission remarkable for worsened transaminitis, otherwise unremarkable. Pan scan imaging pending    Fhx:   Mother: DM  Father un-diagnosed Cancer    Social:  Tobacco: Quit 27 years ago  Alcohol: none  Drugs: none    Oncological History:  CT Chest w/ con 3/20:  Mediastinal lymphadenopathy 7.5 cm.  Right middle lobe consolidative opacity 4.6 cm.  5.3 cm liver lesion.  CT A/P 3/23/22: Re-demonstration of hypoattenuating liver lesion, 4.5cm. Two other lesions 2.7 and 2.5 cm.   MRI Brain 3/23/22: Enchacning lesion in the pituitary gland 13mm. 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     Patient underwent diagnostic bronchoscopy with EBUS of [facility]  mass and lymph nodes on 03/22:  Path w/ small cell lung ca.      -Started Carbo/Etop/Atez 4/5/22  -Maintenance atezolizumab 7/5/22  -Consolidative thoracic radiation 8/4/22-8/18/22

## 2022-12-07 NOTE — ASSESSMENT & PLAN NOTE
Glucose 200 on admit  Last A1c 6.1 3/2022    Not on home insulin/oral meds, diet controlled    -POCT glucose AC, HS  -LDSSI

## 2022-12-07 NOTE — Clinical Note
-Admit pt today -Keep pt on schedule for tx next week just in case -RTC next week for MD and labs:CBC, CMP and Mg visit with Dr. Irving -Pt will follow up for MD and tx at [facility]

## 2022-12-07 NOTE — SUBJECTIVE & OBJECTIVE
Oncology Treatment Plan:   OP SCLC TOPOTECAN Q3W    Medications:  Continuous Infusions:  Scheduled Meds:   [START ON 12/8/2022] allopurinoL  300 mg Oral Daily    enoxaparin  40 mg Subcutaneous Daily    [START ON 12/8/2022] losartan  100 mg Oral Daily     PRN Meds:dextrose 10%, dextrose 10%, glucagon (human recombinant), glucose, glucose, HYDROmorphone, naloxone, oxyCODONE, sodium chloride 0.9%     Review of patient's allergies indicates:  No Known Allergies     Past Medical History:   Diagnosis Date    Diabetes mellitus, type 2     Hypertension      Past Surgical History:   Procedure Laterality Date    ENDOBRONCHIAL ULTRASOUND N/A 3/22/2022    Procedure: ENDOBRONCHIAL ULTRASOUND (EBUS);  Surgeon: Kristin Samuels MD;  Location: [facility] OR [address] [place] [address];  Service: Endoscopy;  Laterality: N/A;    KNEE SURGERY       Family History       Problem Relation (Age of Onset)    Diabetes Mellitus Mother    Lung cancer Father    Pacemaker/defibrilator Father          Tobacco Use    Smoking status: Never    Smokeless tobacco: Never    Tobacco comments:     stop smoking 26 years ago   Substance and Sexual Activity    Alcohol use: No    Drug use: No    Sexual activity: Yes       Review of Systems   Constitutional:  Negative for chills, fatigue and fever.   HENT:  Negative for rhinorrhea, sore throat and trouble swallowing.    Eyes:  Negative for pain and visual disturbance.   Respiratory:  Positive for cough. Negative for shortness of breath.    Cardiovascular:  Negative for chest pain, palpitations and leg swelling.   Gastrointestinal:  Positive for abdominal pain and constipation. Negative for diarrhea, nausea and vomiting.   Genitourinary:  Positive for flank pain. Negative for difficulty urinating, dysuria and hematuria.   Musculoskeletal:  Positive for back pain.   Skin:  Negative for color change and pallor.   Neurological:  Positive for headaches. Negative for dizziness, tremors, seizures, syncope, weakness, light-headedness and  numbness.   Psychiatric/Behavioral:  Negative for agitation and confusion.    Objective:     Vital Signs (Most Recent):  Temp: 97.9 °F (36.6 °C) (12/07/22 1702)  Pulse: 84 (12/07/22 1702)  Resp: 18 (12/07/22 1702)  BP: 137/75 (12/07/22 1702)  SpO2: (!) 94 % (12/07/22 1702)   Vital Signs (24h Range):  Temp:  [97.7 °F (36.5 °C)-97.9 °F (36.6 °C)] 97.9 °F (36.6 °C)  Pulse:  [75-84] 84  Resp:  [18] 18  SpO2:  [91 %-96 %] 94 %  BP: (137-171)/(75-91) 137/75     Weight: 119.3 kg (263 lb 0.1 oz)  Body mass index is 41.19 kg/m².  Body surface area is 2.37 meters squared.    No intake or output data in the 24 hours ending 12/07/22 1704    Physical Exam  Constitutional:       General: He is in acute distress.      Appearance: He is obese.   HENT:      Head: Normocephalic and atraumatic.      Right Ear: External ear normal.      Left Ear: External ear normal.      Mouth/Throat:      Mouth: Mucous membranes are moist.   Eyes:      General:         Right eye: No discharge.         Left eye: No discharge.      Extraocular Movements: Extraocular movements intact.   Cardiovascular:      Rate and Rhythm: Normal rate and regular rhythm.      Heart sounds: Normal heart sounds. No murmur heard.  Pulmonary:      Effort: No respiratory distress.      Breath sounds: No wheezing.   Abdominal:      General: There is no distension.      Palpations: Abdomen is soft.      Tenderness: There is abdominal tenderness.   Musculoskeletal:         General: Tenderness present.      Right lower leg: No edema.      Left lower leg: No edema.   Skin:     Findings: No bruising.   Neurological:      General: No focal deficit present.      Mental Status: He is oriented to person, place, and time.      Cranial Nerves: No cranial nerve deficit.      Comments: Weakness noted on right leg, reduced strength   Psychiatric:         Mood and Affect: Mood normal.         Behavior: Behavior normal.       Significant Labs:   CBC:   Recent Labs   Lab 12/07/22  0930   WBC  9.41   HGB 13.5*   HCT 42.3       and CMP:   Recent Labs   Lab 12/07/22  0930      K 4.6      CO2 29   *   BUN 17   CREATININE 1.2   CALCIUM 9.7   PROT 7.0   ALBUMIN 3.6   BILITOT 0.8   ALKPHOS 223*   AST 58*   ALT 82*   ANIONGAP 7*       Diagnostic Results:  I have reviewed all pertinent imaging results/findings within the past 24 hours.

## 2022-12-07 NOTE — ASSESSMENT & PLAN NOTE
Plan Name: OP ATEZOLIZUMAB CARBOPLATIN ETOPOSIDE Q3W  Treatment Goal: Palliative  Status: Inactive  Start Date: 4/5/2022  End Date: 9/27/2022  Provider: Antolin Sosa MD

## 2022-12-07 NOTE — H&P
[facility] - Telemetry Stepdown  Hematology/Oncology  H&P    Patient Name: Juan Gillespie  MRN: [id number]  Admission Date: 12/7/2022  Code Status: Full Code   Attending Provider: Nai Byers MD  Primary Care Physician: Keri Batista NP  Principal Problem:Cancer related pain    Subjective:     HPI: Mr. 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 Patient was sent to inpatient heme/onc service from clinic after being found in severe pain. Patient states his pain is burning ans sharp, originates in right flank and travels down to right thigh/calv/ankle. He states the pain switches from left to right, but currently pain is on right. Also has occasional suprapubic abdominal pain associated with leg pain. He reports using oxycodone 30mg without relief at home (not on med rec). Only thing that appears to help is massaging and warm baths before sleep. Patients family states they have noticed large (sometimes golf ball sized) knots on back, and more frequently on right hamstring. Massaging knots provides relief. Patient also reports bilateral lower extremity weakness, but feels it could be due to the pain. He normally walks multiple miles a day, but now can only walk a couple houses down the street. Patient also reports mild headache which has been present since admission. He denies CP, SOB, changes in vision, hematochezia, melena, nausea/vomiting, other weakness/tingling/numbness.     Labs on admission remarkable for worsened transaminitis, otherwise unremarkable. Pan scan imaging pending    Fhx:   Mother: DM  Father un-diagnosed Cancer    Social:  Tobacco: Quit 27 years ago  Alcohol: none  Drugs: none    Oncological History:  CT Chest w/ con 3/20:  Mediastinal lymphadenopathy 7.5 cm.  Right middle lobe consolidative opacity 4.6 cm.  5.3  cm liver lesion.  CT A/P 3/23/22: Re-demonstration of hypoattenuating liver lesion, 4.5cm. Two other lesions 2.7 and 2.5 cm.   MRI Brain 3/23/22: Enchacning lesion in the pituitary gland 13mm. 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     Patient underwent diagnostic bronchoscopy with EBUS of RML mass and lymph nodes on 03/22:  Path w/ small cell lung ca.      -Started Carbo/Etop/Atez 4/5/22  -Maintenance atezolizumab 7/5/22  -Consolidative thoracic radiation 8/4/22-8/18/22       Oncology Treatment Plan:   OP SCLC TOPOTECAN Q3W    Medications:  Continuous Infusions:  Scheduled Meds:   [START ON 12/8/2022] allopurinoL  300 mg Oral Daily    enoxaparin  40 mg Subcutaneous Daily    [START ON 12/8/2022] losartan  100 mg Oral Daily     PRN Meds:dextrose 10%, dextrose 10%, glucagon (human recombinant), glucose, glucose, HYDROmorphone, naloxone, oxyCODONE, sodium chloride 0.9%     Review of patient's allergies indicates:  No Known Allergies     Past Medical History:   Diagnosis Date    Diabetes mellitus, type 2     Hypertension      Past Surgical History:   Procedure Laterality Date    ENDOBRONCHIAL ULTRASOUND N/A 3/22/2022    Procedure: ENDOBRONCHIAL ULTRASOUND (EBUS);  Surgeon: Kristin Samuels MD;  Location: [facility] OR [address] [place] [address];  Service: Endoscopy;  Laterality: N/A;    KNEE SURGERY       Family History       Problem Relation (Age of Onset)    Diabetes Mellitus Mother    Lung cancer Father    Pacemaker/defibrilator Father          Tobacco Use    Smoking status: Never    Smokeless tobacco: Never    Tobacco comments:     stop smoking 26 years ago   Substance and Sexual Activity    Alcohol use: No    Drug use: No    Sexual activity: Yes       Review of Systems   Constitutional:  Negative for chills, fatigue and fever.   HENT:  Negative for rhinorrhea, sore throat and trouble swallowing.    Eyes:  Negative for pain and visual disturbance.   Respiratory:  Positive for cough. Negative for shortness of breath.     Cardiovascular:  Negative for chest pain, palpitations and leg swelling.   Gastrointestinal:  Positive for abdominal pain and constipation. Negative for diarrhea, nausea and vomiting.   Genitourinary:  Positive for flank pain. Negative for difficulty urinating, dysuria and hematuria.   Musculoskeletal:  Positive for back pain.   Skin:  Negative for color change and pallor.   Neurological:  Positive for headaches. Negative for dizziness, tremors, seizures, syncope, weakness, light-headedness and numbness.   Psychiatric/Behavioral:  Negative for agitation and confusion.    Objective:     Vital Signs (Most Recent):  Temp: 97.9 °F (36.6 °C) (12/07/22 1702)  Pulse: 84 (12/07/22 1702)  Resp: 18 (12/07/22 1702)  BP: 137/75 (12/07/22 1702)  SpO2: (!) 94 % (12/07/22 1702)   Vital Signs (24h Range):  Temp:  [97.7 °F (36.5 °C)-97.9 °F (36.6 °C)] 97.9 °F (36.6 °C)  Pulse:  [75-84] 84  Resp:  [18] 18  SpO2:  [91 %-96 %] 94 %  BP: (137-171)/(75-91) 137/75     Weight: 119.3 kg (263 lb 0.1 oz)  Body mass index is 41.19 kg/m².  Body surface area is 2.37 meters squared.    No intake or output data in the 24 hours ending 12/07/22 1704    Physical Exam  Constitutional:       General: He is in acute distress.      Appearance: He is obese.   HENT:      Head: Normocephalic and atraumatic.      Right Ear: External ear normal.      Left Ear: External ear normal.      Mouth/Throat:      Mouth: Mucous membranes are moist.   Eyes:      General:         Right eye: No discharge.         Left eye: No discharge.      Extraocular Movements: Extraocular movements intact.   Cardiovascular:      Rate and Rhythm: Normal rate and regular rhythm.      Heart sounds: Normal heart sounds. No murmur heard.  Pulmonary:      Effort: No respiratory distress.      Breath sounds: No wheezing.   Abdominal:      General: There is no distension.      Palpations: Abdomen is soft.      Tenderness: There is abdominal tenderness.   Musculoskeletal:         General:  Tenderness present.      Right lower leg: No edema.      Left lower leg: No edema.   Skin:     Findings: No bruising.   Neurological:      General: No focal deficit present.      Mental Status: He is oriented to person, place, and time.      Cranial Nerves: No cranial nerve deficit.      Comments: Weakness noted on right leg, reduced strength   Psychiatric:         Mood and Affect: Mood normal.         Behavior: Behavior normal.       Significant Labs:   CBC:   Recent Labs   Lab 12/07/22  0930   WBC 9.41   HGB 13.5*   HCT 42.3       and CMP:   Recent Labs   Lab 12/07/22  0930      K 4.6      CO2 29   *   BUN 17   CREATININE 1.2   CALCIUM 9.7   PROT 7.0   ALBUMIN 3.6   BILITOT 0.8   ALKPHOS 223*   AST 58*   ALT 82*   ANIONGAP 7*       Diagnostic Results:  I have reviewed all pertinent imaging results/findings within the past 24 hours.    Assessment/Plan:     * Cancer related pain  Patient with back/leg pain onset following chemo 10/24/22. Not well controlled. States he takes oxy 30mg as needed without relief. Missed most recent scheduled chemotherapy due to pain. Also with new onset lower extremirty weakness. Strength subjectively weak in R leg, patient states difficult to move 2/2 pain.    - Oxy 10mg Q6 PRN  - dilaudid 0.5mg Q4 PRN as needed for breakthrough  - will consider palliative consult  - CT ab/pelv, MRI brain, MRI spine pending for complete evaluation of cancer progression    Elevated alanine aminotransferase (ALT) level  See liver metastasis    Acute drug-induced gout of multiple sites  Continue allopurinal    Secondary malignant neoplasm of liver  Likely cause of transaminitis  Pan scan pending for further evaluation    Small cell lung cancer  Plan Name: OP ATEZOLIZUMAB CARBOPLATIN ETOPOSIDE Q3W  Treatment Goal: Palliative  Status: Inactive  Start Date: 4/5/2022  End Date: 9/27/2022  Provider: Antolin Sosa MD    Primary hypertension  BP well controlled   Kidney function at  baseline    -continue home lisinopril    Type 2 diabetes mellitus, without long-term current use of insulin  Glucose 200 on admit  Last A1c 6.1 3/2022    Not on home insulin/oral meds, diet controlled    -POCT glucose STU MONTOYA  -LDSSI          Alexis Kumar MD  Hematology/Oncology  [facility] - Telemetry Stepdown

## 2022-12-07 NOTE — PROGRESS NOTES
[facility]  Hematology/Medical Oncology Clinic       PATIENT: Juan Gillespie  MRN: [id number]  DATE: 12/7/2022    Reason for referral: Extensive stage small cell lung ca    Initial History: Juan Gillespie is a 57 year old man with extensive stage SCLC who presents to clinic for evaluation prior to treatment with C4 of Carboplatin, etoposide and atezo. His oncologist is Dr. Racheal Jennings.    Oncology history per Dr. Jennings's clinic notes : Patient is a 57-year-old male with history of T2DM, HTN, tobacco use who presented to the ED on 3/19/22 for cough, sob, and pleuritic chest pain.      CT Chest w/ con 3/20:  Mediastinal lymphadenopathy 7.5 cm.  Right middle lobe consolidative opacity 4.6 cm.  5.3 cm liver lesion.    CT A/P 3/23/22: Re-demonstration of hypoattenuating liver lesion, 4.5cm. Two other lesions 2.7 and 2.5 cm.     MRI Brain 3/23/22: Enchacning lesion in the pituitary gland 13mm. 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        Patient underwent diagnostic bronchoscopy with EBUS of RML mass and lymph nodes on 03/22:  Path w/ small cell lung ca.     Oncological History:  -Started Carbo/Etop/Atez 4/5/22  -Maintenance atezolizumab 7/5/22  -Consolidative thoracic radiation 8/4/22-8/18/22    Interval History:     Pt presents for MD chahal prior to C2 topetecan that was delayed due to pt being out of town.  Pt's pain meds were sent to [place] as he was running low but states that over the past 2 weeks they are no longer helping and he is having worsening lower back pain and LE weakness.  Pt was in significant distress during exam and agreeable to be admitted for pain control and further eval.  If spine bone mets found pt agreeable to additional XRT if needed.      His wife accompanies him at this visit.  Past Medical History:   Past Medical History:   Diagnosis Date    Diabetes mellitus, type 2     Hypertension        Past Surgical HIstory:   Past Surgical History:    Procedure Laterality Date    ENDOBRONCHIAL ULTRASOUND N/A 3/22/2022    Procedure: ENDOBRONCHIAL ULTRASOUND (EBUS);  Surgeon: Kristin Samuels MD;  Location: [facility] OR [address] [place] [address];  Service: Endoscopy;  Laterality: N/A;    KNEE SURGERY         Family History:   Family History   Problem Relation Age of Onset    Diabetes Mellitus Mother     Pacemaker/defibrilator Father     Lung cancer Father        Social History:  reports that he has never smoked. He has never used smokeless tobacco. He reports that he does not drink alcohol and does not use drugs.    Allergies:  Review of patient's allergies indicates:  No Known Allergies    Medications:  Current Outpatient Medications   Medication Sig Dispense Refill    albuterol (ACCUNEB) 1.25 mg/3 mL Nebu Use 1 vial (1.25 mg total) by nebulization 3 (three) times daily as needed (shortness of breath). Rescue 90 mL 2    allopurinoL (ZYLOPRIM) 300 MG tablet Take 1 tablet (300 mg total) by mouth once daily. 60 tablet 3    colchicine (COLCRYS) 0.6 mg tablet Take 1 tablet (0.6 mg total) by mouth once daily. This is for gout. Take every day for treatment and prevention. (Patient not taking: Reported on 10/11/2022) 30 tablet 11    duke's soln (benadryl 30 mL, mylanta 30 mL, LIDOcaine 30 mL, nystatin 30 mL) 120mL Take 10 mLs by mouth 4 (four) times daily. (Patient not taking: Reported on 10/11/2022) 480 mL 2    fluticasone propionate (FLONASE) 50 mcg/actuation nasal spray 1 spray (50 mcg total) by Each Nostril route once daily. (Patient not taking: Reported on 10/11/2022) 15 g 2    hydrOXYzine HCL (ATARAX) 25 MG tablet Take 1 tablet (25 mg total) by mouth 3 (three) times daily as needed for Itching. (Patient not taking: Reported on 10/11/2022) 21 tablet 0    indomethacin (INDOCIN) 50 MG capsule TAKE 1 CAPSULE BY MOUTH THREE TIMES DAILY WITH MEALS FOR 7 DAYS 21 capsule 0    losartan (COZAAR) 100 MG tablet Take 100 mg by mouth once daily.      montelukast (SINGULAIR) 10 mg tablet Take 10 mg by  mouth every evening.      NIFEdipine (PROCARDIA-XL) 60 MG (OSM) 24 hr tablet Take 1 tablet (60 mg total) by mouth once daily. (Patient not taking: Reported on 10/11/2022) 30 tablet 3    ondansetron (ZOFRAN) 8 MG tablet Take 1 tablet (8 mg total) by mouth every 8 (eight) hours as needed for Nausea. (Patient not taking: Reported on 10/11/2022) 30 tablet 3    pantoprazole (PROTONIX) 40 MG tablet Take 1 tablet (40 mg total) by mouth once daily. (Patient not taking: Reported on 10/11/2022) 30 tablet 3    promethazine-codeine 6.25-10 mg/5 ml (PHENERGAN WITH CODEINE) 6.25-10 mg/5 mL syrup Take 5 mLs by mouth 3 (three) times daily as needed for Cough. (Patient not taking: Reported on 10/11/2022) 500 mL 0    sildenafiL (VIAGRA) 50 MG tablet Take 1 tablet (50 mg total) by mouth daily as needed for Erectile Dysfunction. 30 tablet 0    TRUE METRIX GLUCOSE TEST STRIP Strp       TRUEPLUS LANCETS 33 gauge Misc        No current facility-administered medications for this visit.       Review of Systems   Constitutional:  Negative for chills, fatigue and fever.   HENT:  Negative for congestion and trouble swallowing.    Respiratory:  Negative for cough, chest tightness and shortness of breath.    Cardiovascular:  Negative for chest pain.   Gastrointestinal:  Negative for abdominal pain and blood in stool.   Endocrine: Negative for cold intolerance and heat intolerance.   Genitourinary:  Negative for hematuria.   Musculoskeletal:  Positive for arthralgias, back pain and myalgias.   Skin:  Negative for rash.   Neurological:  Negative for weakness.   Hematological:  Negative for adenopathy. Does not bruise/bleed easily.   Psychiatric/Behavioral:  Positive for dysphoric mood. The patient is nervous/anxious.    ECOG Performance Status:   ECOG SCORE             Objective:      Vitals:   Vitals:    12/07/22 0957   BP: (!) 171/91   BP Location: Left arm   Patient Position: Sitting   BP Method: Large (Automatic)   Pulse: 75   Resp: 18   Temp:  "97.9 °F (36.6 °C)   TempSrc: Oral   SpO2: 96%   Weight: 119.3 kg (263 lb 0.1 oz)   Height: 5' 7" (1.702 m)   telemedicine    Physical Exam  Constitutional:       General: He is in acute distress.      Appearance: Normal appearance. He is not ill-appearing.   HENT:      Head: Normocephalic and atraumatic.      Nose: Nose normal.      Mouth/Throat:      Mouth: Mucous membranes are moist.      Pharynx: Oropharynx is clear. No oropharyngeal exudate or posterior oropharyngeal erythema.   Eyes:      General: No scleral icterus.     Extraocular Movements: Extraocular movements intact.      Conjunctiva/sclera: Conjunctivae normal.      Pupils: Pupils are equal, round, and reactive to light.   Pulmonary:      Effort: Pulmonary effort is normal. No respiratory distress.   Musculoskeletal:         General: No swelling. Normal range of motion.      Cervical back: Normal range of motion.      Right lower leg: No edema.      Left lower leg: No edema.   Skin:     General: Skin is warm and dry.      Coloration: Skin is not jaundiced.   Neurological:      General: No focal deficit present.      Mental Status: He is alert.   Psychiatric:      Comments: Anxious and tearful     Laboratory Data:  Recent Results (from the past 168 hour(s))   CBC Auto Differential    Collection Time: 12/07/22  9:30 AM   Result Value Ref Range    WBC 9.41 3.90 - 12.70 K/uL    RBC 4.72 4.60 - 6.20 M/uL    Hemoglobin 13.5 (L) 14.0 - 18.0 g/dL    Hematocrit 42.3 40.0 - 54.0 %    MCV 90 82 - 98 fL    MCH 28.6 27.0 - 31.0 pg    MCHC 31.9 (L) 32.0 - 36.0 g/dL    RDW 14.1 11.5 - 14.5 %    Platelets 188 150 - 450 K/uL    MPV 10.1 9.2 - 12.9 fL    Immature Granulocytes 2.0 (H) 0.0 - 0.5 %    Gran # (ANC) 7.0 1.8 - 7.7 K/uL    Immature Grans (Abs) 0.19 (H) 0.00 - 0.04 K/uL    Lymph # 0.8 (L) 1.0 - 4.8 K/uL    Mono # 0.4 0.3 - 1.0 K/uL    Eos # 0.9 (H) 0.0 - 0.5 K/uL    Baso # 0.09 0.00 - 0.20 K/uL    nRBC 0 0 /100 WBC    Gran % 74.7 (H) 38.0 - 73.0 %    Lymph % 8.7 " (L) 18.0 - 48.0 %    Mono % 4.5 4.0 - 15.0 %    Eosinophil % 9.1 (H) 0.0 - 8.0 %    Basophil % 1.0 0.0 - 1.9 %    Differential Method Automated    Comprehensive Metabolic Panel    Collection Time: 12/07/22  9:30 AM   Result Value Ref Range    Sodium 136 136 - 145 mmol/L    Potassium 4.6 3.5 - 5.1 mmol/L    Chloride 100 95 - 110 mmol/L    CO2 29 23 - 29 mmol/L    Glucose 196 (H) 70 - 110 mg/dL    BUN 17 6 - 20 mg/dL    Creatinine 1.2 0.5 - 1.4 mg/dL    Calcium 9.7 8.7 - 10.5 mg/dL    Total Protein 7.0 6.0 - 8.4 g/dL    Albumin 3.6 3.5 - 5.2 g/dL    Total Bilirubin 0.8 0.1 - 1.0 mg/dL    Alkaline Phosphatase 223 (H) 55 - 135 U/L    AST 58 (H) 10 - 40 U/L    ALT 82 (H) 10 - 44 U/L    Anion Gap 7 (L) 8 - 16 mmol/L    eGFR >60.0 >60 mL/min/1.73 m^2       Imaging:   MRI Brain W WO Contrast    Result Date: 10/11/2022  EXAMINATION: MRI BRAIN W WO CONTRAST CLINICAL HISTORY: Small cell lung cancer, brain re-staging; Malignant neoplasm of unspecified part of unspecified bronchus or lung TECHNIQUE: Sagittal and axial T1, axial T2, axial FLAIR, axial gradient, axial diffusion imaging of the whole brain pre-contrast with postcontrast axial T1 and axial spoiled gradient imaging reformatted in the coronal and sagittal planes.. Ten ml of Gadavist injected intravenously. COMPARISON: 07/12/2022 FINDINGS: Interval 1.2 cm enhancing lesion within the left anterior inferior frontal lobe which prominently the ring-enhancing measuring 1.2 x 0.9 x 0.9 cm in AP by TV by CC dimensions respectively in light of history concerning for parenchymal metastases the with question trace susceptibility associated with this lesion. Previously identified heterogeneous enhancing sellar lesion is again seen allowing for routine brain technique with lesion measuring approximately 1.3 cm craniocaudal this may represent pituitary adenoma though indeterminate.  Previous intraluminal filling defect within the right jugular foramen is no longer seen.  There is  however diffusion signal abnormality with ill-defined T1 hypointensity within the right occipital condyle concerning for possible osseous metastases the clinical correlation and further evaluation with nuclear medicine imaging advised. There is continued slight prominent leptomeningeal enhancement along the left cerebellum which raises concern for possible underlying vascular malformation such as a dural AV fistula with collateral vascular engorgement.  There is no restricted diffusion to suggest acute infarction.  Ventricles stable without hydrocephalus.  There is mild edema signal surrounding the left frontal enhancing lesion with continued few scattered punctate size regions of T2 FLAIR signal hyperintensity elsewhere supratentorial white matter which are nonspecific and may represent mild chronic ischemic change. This report was flagged in Epic as abnormal.     Interval development of a small ring-enhancing lesion left anterior inferior frontal lobe concerning for parenchymal metastases in light of history.  Clinical correlation and follow-up advised There is continue though relatively stable heterogeneous enhancement and enlargement of the material within the sella which may represent pituitary adenoma though indeterminate. Previous right internal jugular vein thrombus no longer seen. Abnormal signal along the right occipital condyle concerning for possible osseous metastases clinical correlation and further evaluation with nuclear medicine imaging advised Continued prominent vascularity left cerebellar folia raising concern for possible underlying vascular malformation unchanged.  Further evaluation with noncontrast MRA head may be of further diagnostic value. Electronically signed by: Jonel Lawrence DO Date:    10/11/2022 Time:    10:05    CT Chest Abdomen Pelvis With Contrast (xpd)    Result Date: 10/18/2022  EXAMINATION: CT CHEST ABDOMEN PELVIS WITH CONTRAST (XPD) CLINICAL HISTORY: metastatic small cell lung  cancer on maintenance immunotherapy, eval response; Malignant neoplasm of unspecified part of unspecified bronchus or lung TECHNIQUE: Low dose axial images, sagittal and coronal reformations were obtained from the thoracic inlet to the pubic symphysis following the IV administration of 100 mL of Omnipaque 350 COMPARISON: 07/26/2022 FINDINGS: Right IJ venous shunt tip superior aspect right atrium, absence of clot in included upper right IJ. Fatty infiltration of liver, additional diminished attenuation space-occupying lesions of liver, largest central right hepatic lobe 3.6 cm image 103 series 3.  Stable.  Spleen, adrenal glands, pancreas, gallbladder and biliary tree normal. Urinary bladder normal.  Prostate gland very small 4 cm versus postop prostatectomy.  No free fluid or retroperitoneal adenopathy.  GI track normal. Tiny nonobstructive right lower and left upper renal pole stones. New lymph node left retro manubrial 3.4 cm image 28 series 3 appearing in the interim.  Paratracheal conglomerate adenopathy 2.1 x 2.5 cm, was 1.6 x 3 cm.  Subcarinal adenopathy stable.  No new hilar or mediastinal adenopathy. Degenerative disc spondylosis cervicothoracic junction., focal osteoblastic opacities involve humeral heads both clavicle superimposed on erosive DJD more prominent on right.  Additional focal osteoblastic opacities sternum, ribs, dorsal lumbar sacral spine pelvis and both femoral head and trochanteric regions.  Moderate anterior spondylosis dorsal spine and degenerative disc spondylosis facet joint arthropathy lumbosacral junction with DJD SI joints.  Fracture defects left lower anterior chest wall stable. Scattered calcified plaque great vessels aorta iliac femoral arteries. Dominant medial segment right middle lobe mass 1.1 x 2.7 cm image 69 series 3, was 1.6 x 3.4 cm.  Additional patchy nodular lesions right lower lobe posterior basilar segments, largest 1.4 cm image 66 series 3 suspicious for metastatic  disease and/or superimposed inflammatory and infectious process.  Additional patchy infiltrates medial posterior segment right upper lobe and right lower lobe particularly medial basilar segment image 91 series 3.  No new pleural reaction.  Tracheobronchial tree normal. .     1. New presumed metastatic node anterior superior left mediastinum, paratracheal adenopathy otherwise stable. 2. Decreased size in right middle lobe mass with new evidence of metastatic disease right lower lobe versus superimposed inflammatory infectious process discussed above. 3. New developing metastatic lesions liver. 4. Bony universal metastatic disease stable. 5. Recist summary: 6. Compare with previous CT chest abdomen pelvis 07/26/2022 7. Lesion 1: Right middle lobe mass 1.1 x 2.7 cm was 1.6 x 13.4 cm. 8. Lesion 2: Right paratracheal conned Willett a adenopathy 2.1 x 2.5 cm, was 1.6 x 3 cm. 9. Lesion 3: Dome 1.9 cm stable.  (New progressive liver metastatic disease noted elsewhere) 10. Lesion 4: Caudal aspect right hepatic lobe 1 cm, stable 11.  This report was flagged in Epic as abnormal. Electronically signed by: Dewayne Hutchinson MD Date:    10/18/2022 Time:    09:28       Assessment and Plan          Extensive stage small cell lung cancer  -Initially diagnosed with extensive stage small cell lung cancer in 03/2022 who was treated with carbo/etop/atezolizumab from 04/05/2022 - 06/14/22 with partial response. He subsequently completed consolidation thoracic RT 8/4/22 - 8/18/22, and he was on maintenance atezolizumab.   -10/18/22 showing progression of disease in lungs and liver.  Asymptomatic.  -Dr. Sosa discussed with him plan for topotecan and pt was consented  -Pt tolerated cycle 1 well but delayed C2 due to being out of town  Plan 12/07/22  -Pt being admitted to main for pain control today and eval for new mets  -Keep pt on schedule for tx next week at [facility] for C2 of topotecan  -RTC next week for MD and labs:CBC, CMP and Mg visit with  Dr. Irving  -Pt will follow up for MD and tx at     Time spent with pt: 40 minutes      Problem List Items Addressed This Visit    None    Soledad Irving MD  Hematology Oncology

## 2022-12-07 NOTE — Clinical Note
-Pt being admitted to main for pain control today -Keep pt on schedule for tx next week just in case -RTC next week for MD and labs:CBC, CMP and Mg visit with Dr. Irving -Pt will follow up for MD and tx at [facility]

## 2022-12-07 NOTE — ASSESSMENT & PLAN NOTE
Patient with back/leg pain onset following chemo 10/24/22. Not well controlled. States he takes oxy 30mg as needed without relief. Missed most recent scheduled chemotherapy due to pain. Also with new onset lower extremirty weakness. Strength subjectively weak in R leg, patient states difficult to move 2/2 pain.    - Oxy 10mg Q6 PRN  - dilaudid 0.5mg Q4 PRN as needed for breakthrough  - will consider palliative consult  - CT ab/pelv, MRI brain, MRI spine pending for complete evaluation of cancer progression

## 2022-12-08 LAB
ALBUMIN SERPL BCP-MCNC: 3.5 G/DL (ref 3.5–5.2)
ALP SERPL-CCNC: 224 U/L (ref 55–135)
ALT SERPL W/O P-5'-P-CCNC: 78 U/L (ref 10–44)
ANION GAP SERPL CALC-SCNC: 10 MMOL/L (ref 8–16)
AST SERPL-CCNC: 52 U/L (ref 10–40)
BASOPHILS # BLD AUTO: 0.11 K/UL (ref 0–0.2)
BASOPHILS NFR BLD: 1 % (ref 0–1.9)
BILIRUB SERPL-MCNC: 0.9 MG/DL (ref 0.1–1)
BUN SERPL-MCNC: 16 MG/DL (ref 6–20)
CALCIUM SERPL-MCNC: 9.7 MG/DL (ref 8.7–10.5)
CHLORIDE SERPL-SCNC: 102 MMOL/L (ref 95–110)
CO2 SERPL-SCNC: 25 MMOL/L (ref 23–29)
CREAT SERPL-MCNC: 1.1 MG/DL (ref 0.5–1.4)
DIFFERENTIAL METHOD: ABNORMAL
EOSINOPHIL # BLD AUTO: 0.6 K/UL (ref 0–0.5)
EOSINOPHIL NFR BLD: 5.7 % (ref 0–8)
ERYTHROCYTE [DISTWIDTH] IN BLOOD BY AUTOMATED COUNT: 14 % (ref 11.5–14.5)
EST. GFR  (NO RACE VARIABLE): >60 ML/MIN/1.73 M^2
GLUCOSE SERPL-MCNC: 170 MG/DL (ref 70–110)
HCT VFR BLD AUTO: 42.5 % (ref 40–54)
HGB BLD-MCNC: 14 G/DL (ref 14–18)
IMM GRANULOCYTES # BLD AUTO: 0.19 K/UL (ref 0–0.04)
IMM GRANULOCYTES NFR BLD AUTO: 1.8 % (ref 0–0.5)
LYMPHOCYTES # BLD AUTO: 0.6 K/UL (ref 1–4.8)
LYMPHOCYTES NFR BLD: 5.8 % (ref 18–48)
MAGNESIUM SERPL-MCNC: 1.9 MG/DL (ref 1.6–2.6)
MCH RBC QN AUTO: 28.8 PG (ref 27–31)
MCHC RBC AUTO-ENTMCNC: 32.9 G/DL (ref 32–36)
MCV RBC AUTO: 87 FL (ref 82–98)
MONOCYTES # BLD AUTO: 0.5 K/UL (ref 0.3–1)
MONOCYTES NFR BLD: 4.5 % (ref 4–15)
NEUTROPHILS # BLD AUTO: 8.7 K/UL (ref 1.8–7.7)
NEUTROPHILS NFR BLD: 81.2 % (ref 38–73)
NRBC BLD-RTO: 0 /100 WBC
PHOSPHATE SERPL-MCNC: 3.2 MG/DL (ref 2.7–4.5)
PLATELET # BLD AUTO: 184 K/UL (ref 150–450)
PMV BLD AUTO: 9.7 FL (ref 9.2–12.9)
POCT GLUCOSE: 110 MG/DL (ref 70–110)
POCT GLUCOSE: 133 MG/DL (ref 70–110)
POCT GLUCOSE: 158 MG/DL (ref 70–110)
POTASSIUM SERPL-SCNC: 4.5 MMOL/L (ref 3.5–5.1)
PROT SERPL-MCNC: 7 G/DL (ref 6–8.4)
RBC # BLD AUTO: 4.86 M/UL (ref 4.6–6.2)
SODIUM SERPL-SCNC: 137 MMOL/L (ref 136–145)
WBC # BLD AUTO: 10.66 K/UL (ref 3.9–12.7)

## 2022-12-08 PROCEDURE — 25000003 PHARM REV CODE 250

## 2022-12-08 PROCEDURE — 63600175 PHARM REV CODE 636 W HCPCS

## 2022-12-08 PROCEDURE — 20600001 HC STEP DOWN PRIVATE ROOM

## 2022-12-08 PROCEDURE — 25500020 PHARM REV CODE 255: Performed by: HOSPITALIST

## 2022-12-08 PROCEDURE — 85025 COMPLETE CBC W/AUTO DIFF WBC: CPT | Performed by: INTERNAL MEDICINE

## 2022-12-08 PROCEDURE — 36415 COLL VENOUS BLD VENIPUNCTURE: CPT | Performed by: INTERNAL MEDICINE

## 2022-12-08 PROCEDURE — 84100 ASSAY OF PHOSPHORUS: CPT

## 2022-12-08 PROCEDURE — 25000003 PHARM REV CODE 250: Performed by: INTERNAL MEDICINE

## 2022-12-08 PROCEDURE — 83735 ASSAY OF MAGNESIUM: CPT

## 2022-12-08 PROCEDURE — 80053 COMPREHEN METABOLIC PANEL: CPT | Performed by: INTERNAL MEDICINE

## 2022-12-08 PROCEDURE — 99233 SBSQ HOSP IP/OBS HIGH 50: CPT | Mod: ,,, | Performed by: HOSPITALIST

## 2022-12-08 PROCEDURE — 99233 PR SUBSEQUENT HOSPITAL CARE,LEVL III: ICD-10-PCS | Mod: ,,, | Performed by: HOSPITALIST

## 2022-12-08 RX ORDER — ENOXAPARIN SODIUM 100 MG/ML
40 INJECTION SUBCUTANEOUS EVERY 12 HOURS
Status: DISCONTINUED | OUTPATIENT
Start: 2022-12-08 | End: 2022-12-10 | Stop reason: HOSPADM

## 2022-12-08 RX ORDER — POLYETHYLENE GLYCOL 3350 17 G/17G
17 POWDER, FOR SOLUTION ORAL DAILY
Status: DISCONTINUED | OUTPATIENT
Start: 2022-12-08 | End: 2022-12-10 | Stop reason: HOSPADM

## 2022-12-08 RX ORDER — OXYCODONE HYDROCHLORIDE 10 MG/1
10 TABLET ORAL EVERY 6 HOURS
Status: DISCONTINUED | OUTPATIENT
Start: 2022-12-08 | End: 2022-12-08

## 2022-12-08 RX ORDER — AMOXICILLIN 250 MG
1 CAPSULE ORAL DAILY PRN
Status: DISCONTINUED | OUTPATIENT
Start: 2022-12-08 | End: 2022-12-10 | Stop reason: HOSPADM

## 2022-12-08 RX ORDER — HYDROMORPHONE HYDROCHLORIDE 1 MG/ML
1 INJECTION, SOLUTION INTRAMUSCULAR; INTRAVENOUS; SUBCUTANEOUS ONCE
Status: COMPLETED | OUTPATIENT
Start: 2022-12-08 | End: 2022-12-08

## 2022-12-08 RX ORDER — OXYCODONE HYDROCHLORIDE 10 MG/1
10 TABLET ORAL EVERY 4 HOURS PRN
Status: DISCONTINUED | OUTPATIENT
Start: 2022-12-08 | End: 2022-12-09

## 2022-12-08 RX ADMIN — IOHEXOL 100 ML: 350 INJECTION, SOLUTION INTRAVENOUS at 10:12

## 2022-12-08 RX ADMIN — HYDROMORPHONE HYDROCHLORIDE 0.5 MG: 1 INJECTION, SOLUTION INTRAMUSCULAR; INTRAVENOUS; SUBCUTANEOUS at 01:12

## 2022-12-08 RX ADMIN — HYDROMORPHONE HYDROCHLORIDE 1 MG: 1 INJECTION, SOLUTION INTRAMUSCULAR; INTRAVENOUS; SUBCUTANEOUS at 08:12

## 2022-12-08 RX ADMIN — DOCUSATE SODIUM 50 MG: 50 CAPSULE, LIQUID FILLED ORAL at 03:12

## 2022-12-08 RX ADMIN — HYDROMORPHONE HYDROCHLORIDE 0.5 MG: 1 INJECTION, SOLUTION INTRAMUSCULAR; INTRAVENOUS; SUBCUTANEOUS at 03:12

## 2022-12-08 RX ADMIN — HYDROMORPHONE HYDROCHLORIDE 0.5 MG: 1 INJECTION, SOLUTION INTRAMUSCULAR; INTRAVENOUS; SUBCUTANEOUS at 06:12

## 2022-12-08 RX ADMIN — POLYETHYLENE GLYCOL 3350 17 G: 17 POWDER, FOR SOLUTION ORAL at 08:12

## 2022-12-08 RX ADMIN — ACETAMINOPHEN 650 MG: 325 TABLET ORAL at 11:12

## 2022-12-08 RX ADMIN — OXYCODONE HYDROCHLORIDE 10 MG: 10 TABLET ORAL at 10:12

## 2022-12-08 RX ADMIN — LOSARTAN POTASSIUM 100 MG: 50 TABLET, FILM COATED ORAL at 08:12

## 2022-12-08 RX ADMIN — HYDROMORPHONE HYDROCHLORIDE 0.5 MG: 1 INJECTION, SOLUTION INTRAMUSCULAR; INTRAVENOUS; SUBCUTANEOUS at 09:12

## 2022-12-08 RX ADMIN — ALLOPURINOL 300 MG: 300 TABLET ORAL at 08:12

## 2022-12-08 RX ADMIN — OXYCODONE HYDROCHLORIDE 10 MG: 10 TABLET ORAL at 03:12

## 2022-12-08 NOTE — SUBJECTIVE & OBJECTIVE
Interval History: NAEO, VSS, patient still constipated, attempted bowel movement but it was too painful. Will initiated stool softener and monitor for relief.    Pan scan imaging with extensive osseous extensive metastatic disease throughout spine and visualized portion of pelvis.  Soft tissue component at T12 with anterior epidural encroachment, contributing to moderate/severe spinal canal stenosis.  No pathologic fracture identified. Also a new 4-5 mm enhancing focus in the right dorsal putamen/posterior limb internal capsule    Oncology Treatment Plan:   OP SCLC TOPOTECAN Q3W    Medications:  Continuous Infusions:  Scheduled Meds:   allopurinoL  300 mg Oral Daily    docusate sodium  50 mg Oral Daily    enoxaparin  40 mg Subcutaneous Q12H    losartan  100 mg Oral Daily    polyethylene glycol  17 g Oral Daily     PRN Meds:acetaminophen, dextrose 10%, dextrose 10%, glucagon (human recombinant), glucose, glucose, HYDROmorphone, insulin aspart U-100, naloxone, oxyCODONE, senna-docusate 8.6-50 mg, sodium chloride 0.9%     Review of Systems   Constitutional:  Negative for chills, fatigue and fever.   HENT:  Negative for rhinorrhea, sore throat and trouble swallowing.    Eyes:  Negative for pain and visual disturbance.   Respiratory:  Positive for cough. Negative for shortness of breath.    Cardiovascular:  Negative for chest pain, palpitations and leg swelling.   Gastrointestinal:  Positive for abdominal pain and constipation. Negative for diarrhea, nausea and vomiting.   Genitourinary:  Positive for flank pain. Negative for difficulty urinating, dysuria and hematuria.   Musculoskeletal:  Positive for back pain.   Skin:  Negative for color change and pallor.   Neurological:  Positive for headaches. Negative for dizziness, tremors, seizures, syncope, weakness, light-headedness and numbness.   Psychiatric/Behavioral:  Negative for agitation and confusion.    Objective:     Vital Signs (Most Recent):  Temp: 98 °F (36.7 °C)  (12/08/22 1210)  Pulse: 79 (12/08/22 1210)  Resp: 20 (12/08/22 1329)  BP: (!) 154/80 (12/08/22 1210)  SpO2: 95 % (12/08/22 1210)   Vital Signs (24h Range):  Temp:  [96.7 °F (35.9 °C)-98.1 °F (36.7 °C)] 98 °F (36.7 °C)  Pulse:  [79-96] 79  Resp:  [18-20] 20  SpO2:  [92 %-97 %] 95 %  BP: (137-177)/(75-88) 154/80     Weight: 119.3 kg (263 lb 0.1 oz)  Body mass index is 41.19 kg/m².  Body surface area is 2.37 meters squared.    No intake or output data in the 24 hours ending 12/08/22 1424    Physical Exam  Constitutional:       General: He is in acute distress.      Appearance: He is obese.   HENT:      Head: Normocephalic and atraumatic.      Right Ear: External ear normal.      Left Ear: External ear normal.      Mouth/Throat:      Mouth: Mucous membranes are moist.   Eyes:      General:         Right eye: No discharge.         Left eye: No discharge.      Extraocular Movements: Extraocular movements intact.   Cardiovascular:      Rate and Rhythm: Normal rate and regular rhythm.      Heart sounds: Normal heart sounds. No murmur heard.  Pulmonary:      Effort: No respiratory distress.      Breath sounds: No wheezing.   Abdominal:      General: There is no distension.      Palpations: Abdomen is soft.      Tenderness: There is abdominal tenderness.   Musculoskeletal:         General: Tenderness present.      Right lower leg: No edema.      Left lower leg: No edema.   Skin:     Findings: No bruising.   Neurological:      General: No focal deficit present.      Mental Status: He is oriented to person, place, and time.      Cranial Nerves: No cranial nerve deficit.      Comments: Weakness noted on right leg, reduced strength   Psychiatric:         Mood and Affect: Mood normal.         Behavior: Behavior normal.       Significant Labs:   CBC:   Recent Labs   Lab 12/07/22 0930 12/08/22 0524   WBC 9.41 10.66   HGB 13.5* 14.0   HCT 42.3 42.5    184    and CMP:   Recent Labs   Lab 12/07/22 0930 12/08/22  0524     137   K 4.6 4.5    102   CO2 29 25   * 170*   BUN 17 16   CREATININE 1.2 1.1   CALCIUM 9.7 9.7   PROT 7.0 7.0   ALBUMIN 3.6 3.5   BILITOT 0.8 0.9   ALKPHOS 223* 224*   AST 58* 52*   ALT 82* 78*   ANIONGAP 7* 10       Diagnostic Results:  I have reviewed all pertinent imaging results/findings within the past 24 hours.

## 2022-12-08 NOTE — PLAN OF CARE
Problem: Adult Inpatient Plan of Care  Goal: Plan of Care Review  Outcome: Ongoing, Progressing     Problem: Adult Inpatient Plan of Care  Goal: Patient-Specific Goal (Individualized)  Outcome: Ongoing, Progressing     Problem: Adult Inpatient Plan of Care  Goal: Optimal Comfort and Wellbeing  Outcome: Ongoing, Progressing       Pt AAOx4.  Appears in pain.  New orders received from MD. ANDINO.  Wife remains at bedside.  Safety measures in place.  Bed in low position call light in place. WCTM.

## 2022-12-08 NOTE — HOSPITAL COURSE
Patient admitted for pain management and concern for worsening metastasis. MRI brain concerning for new metastatic leasion. MRI spine with diffuse osseous metastasis, CT chest/abd/pelv with questionable new lesion near the hepatic dome, Increasing right lower lobe confluent consolidation which may relate to airways, infection or progression of metastatic disease,  Lytic lesion in the right iliac bone has slightly increased from prior. Radiation Oncology consulted, undergwent single fraction to T12, Right ilium, and Right hip. Pain well controlled with Mscontin 30mg Bid with Oxy 5mg Q4 for breakthrough

## 2022-12-08 NOTE — PROGRESS NOTES
[facility] Tele[facility]  Hematology/Oncology  Progress Note    Patient Name: Juan Gillespie  Admission Date: 12/7/2022  Hospital Length of Stay: 1 days  Code Status: Full Code     Subjective:     HPI:  Mr. 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 Patient was sent to inpatient heme/onc service from clinic after being found in severe pain. Patient states his pain is burning ans sharp, originates in right flank and travels down to right thigh/calv/ankle. He states the pain switches from left to right, but currently pain is on right. Also has occasional suprapubic abdominal pain associated with leg pain. He reports using oxycodone 30mg without relief at home (not on med rec). Only thing that appears to help is massaging and warm baths before sleep. Patients family states they have noticed large (sometimes golf ball sized) knots on back, and more frequently on right hamstring. Massaging knots provides relief. Patient also reports bilateral lower extremity weakness, but feels it could be due to the pain. He normally walks multiple miles a day, but now can only walk a couple houses down the street. Patient also reports mild headache which has been present since admission. He denies CP, SOB, changes in vision, hematochezia, melena, nausea/vomiting, other weakness/tingling/numbness.     Labs on admission remarkable for worsened transaminitis, otherwise unremarkable. Pan scan imaging pending    Fhx:   Mother: DM  Father un-diagnosed Cancer    Social:  Tobacco: Quit 27 years ago  Alcohol: none  Drugs: none    Oncological History:  CT Chest w/ con 3/20:  Mediastinal lymphadenopathy 7.5 cm.  Right middle lobe consolidative opacity 4.6 cm.  5.3 cm liver lesion.  CT A/P 3/23/22: Re-demonstration of hypoattenuating liver lesion, 4.5cm. Two  other lesions 2.7 and 2.5 cm.   MRI Brain 3/23/22: Enchacning lesion in the pituitary gland 13mm. 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     Patient underwent diagnostic bronchoscopy with EBUS of RML mass and lymph nodes on 03/22:  Path w/ small cell lung ca.      -Started Carbo/Etop/Atez 4/5/22  -Maintenance atezolizumab 7/5/22  -Consolidative thoracic radiation 8/4/22-8/18/22      Interval History: NAEO, VSS, patient still constipated, attempted bowel movement but it was too painful. Will initiated stool softener and monitor for relief.    Pan scan imaging with extensive osseous extensive metastatic disease throughout spine and visualized portion of pelvis.  Soft tissue component at T12 with anterior epidural encroachment, contributing to moderate/severe spinal canal stenosis.  No pathologic fracture identified. Also a new 4-5 mm enhancing focus in the right dorsal putamen/posterior limb internal capsule    Oncology Treatment Plan:   OP SCLC TOPOTECAN Q3W    Medications:  Continuous Infusions:  Scheduled Meds:   allopurinoL  300 mg Oral Daily    docusate sodium  50 mg Oral Daily    enoxaparin  40 mg Subcutaneous Q12H    losartan  100 mg Oral Daily    polyethylene glycol  17 g Oral Daily     PRN Meds:acetaminophen, dextrose 10%, dextrose 10%, glucagon (human recombinant), glucose, glucose, HYDROmorphone, insulin aspart U-100, naloxone, oxyCODONE, senna-docusate 8.6-50 mg, sodium chloride 0.9%     Review of Systems   Constitutional:  Negative for chills, fatigue and fever.   HENT:  Negative for rhinorrhea, sore throat and trouble swallowing.    Eyes:  Negative for pain and visual disturbance.   Respiratory:  Positive for cough. Negative for shortness of breath.    Cardiovascular:  Negative for chest pain, palpitations and leg swelling.   Gastrointestinal:  Positive for abdominal pain and constipation. Negative for diarrhea, nausea and vomiting.   Genitourinary:  Positive for flank pain. Negative for  difficulty urinating, dysuria and hematuria.   Musculoskeletal:  Positive for back pain.   Skin:  Negative for color change and pallor.   Neurological:  Positive for headaches. Negative for dizziness, tremors, seizures, syncope, weakness, light-headedness and numbness.   Psychiatric/Behavioral:  Negative for agitation and confusion.    Objective:     Vital Signs (Most Recent):  Temp: 98 °F (36.7 °C) (12/08/22 1210)  Pulse: 79 (12/08/22 1210)  Resp: 20 (12/08/22 1329)  BP: (!) 154/80 (12/08/22 1210)  SpO2: 95 % (12/08/22 1210)   Vital Signs (24h Range):  Temp:  [96.7 °F (35.9 °C)-98.1 °F (36.7 °C)] 98 °F (36.7 °C)  Pulse:  [79-96] 79  Resp:  [18-20] 20  SpO2:  [92 %-97 %] 95 %  BP: (137-177)/(75-88) 154/80     Weight: 119.3 kg (263 lb 0.1 oz)  Body mass index is 41.19 kg/m².  Body surface area is 2.37 meters squared.    No intake or output data in the 24 hours ending 12/08/22 1424    Physical Exam  Constitutional:       General: He is in acute distress.      Appearance: He is obese.   HENT:      Head: Normocephalic and atraumatic.      Right Ear: External ear normal.      Left Ear: External ear normal.      Mouth/Throat:      Mouth: Mucous membranes are moist.   Eyes:      General:         Right eye: No discharge.         Left eye: No discharge.      Extraocular Movements: Extraocular movements intact.   Cardiovascular:      Rate and Rhythm: Normal rate and regular rhythm.      Heart sounds: Normal heart sounds. No murmur heard.  Pulmonary:      Effort: No respiratory distress.      Breath sounds: No wheezing.   Abdominal:      General: There is no distension.      Palpations: Abdomen is soft.      Tenderness: There is abdominal tenderness.   Musculoskeletal:         General: Tenderness present.      Right lower leg: No edema.      Left lower leg: No edema.   Skin:     Findings: No bruising.   Neurological:      General: No focal deficit present.      Mental Status: He is oriented to person, place, and time.       Cranial Nerves: No cranial nerve deficit.      Comments: Weakness noted on right leg, reduced strength   Psychiatric:         Mood and Affect: Mood normal.         Behavior: Behavior normal.       Significant Labs:   CBC:   Recent Labs   Lab 12/07/22  0930 12/08/22  0524   WBC 9.41 10.66   HGB 13.5* 14.0   HCT 42.3 42.5    184    and CMP:   Recent Labs   Lab 12/07/22  0930 12/08/22  0524    137   K 4.6 4.5    102   CO2 29 25   * 170*   BUN 17 16   CREATININE 1.2 1.1   CALCIUM 9.7 9.7   PROT 7.0 7.0   ALBUMIN 3.6 3.5   BILITOT 0.8 0.9   ALKPHOS 223* 224*   AST 58* 52*   ALT 82* 78*   ANIONGAP 7* 10       Diagnostic Results:  I have reviewed all pertinent imaging results/findings within the past 24 hours.    Assessment/Plan:     * Cancer related pain  Patient with back/leg pain onset following chemo 10/24/22. Not well controlled. States he takes oxy 30mg as needed without relief. Missed most recent scheduled chemotherapy due to pain. Also with new onset lower extremirty weakness. Strength subjectively weak in R leg, patient states difficult to move 2/2 pain.    - Oxy 10mg Q4 scheduled  - dilaudid 0.5mg Q4 PRN as needed for breakthrough  - will consider palliative consult  - CT ab/pelv pending  - MRI brain concerning for metastasis MRI spine with evidence of osseous metastasis  - will consult radiation oncology for consideration of targeted palliative radiotherapy    Secondary malignant neoplasm of brain  See cancer related pain    Elevated alanine aminotransferase (ALT) level  See liver metastasis    Acute drug-induced gout of multiple sites  Continue allopurinal    Secondary malignant neoplasm of liver  Likely cause of transaminitis  Pan scan pending for further evaluation    Small cell lung cancer  Plan Name: OP ATEZOLIZUMAB CARBOPLATIN ETOPOSIDE Q3W  Treatment Goal: Palliative  Status: Inactive  Start Date: 4/5/2022  End Date: 9/27/2022  Provider: Antolin Sosa MD    Primary hypertension  BP  well controlled   Kidney function at baseline    -continue home lisinopril    Type 2 diabetes mellitus, without long-term current use of insulin  Glucose 200 on admit  Last A1c 6.1 3/2022    Not on home insulin/oral meds, diet controlled    -POCT glucose LINDSAY, HS  -LDSSI             Alexis Kumar MD  Hematology/Oncology  [facility] - Tele[facility]

## 2022-12-08 NOTE — ASSESSMENT & PLAN NOTE
Patient with back/leg pain onset following chemo 10/24/22. Not well controlled. States he takes oxy 30mg as needed without relief. Missed most recent scheduled chemotherapy due to pain. Also with new onset lower extremirty weakness. Strength subjectively weak in R leg, patient states difficult to move 2/2 pain.    - Oxy 10mg Q4 scheduled  - dilaudid 0.5mg Q4 PRN as needed for breakthrough  - will consider palliative consult  - CT ab/pelv pending  - MRI brain concerning for metastasis MRI spine with evidence of osseous metastasis  - will consult radiation oncology for consideration of targeted palliative radiotherapy

## 2022-12-09 DIAGNOSIS — C79.31 SECONDARY MALIGNANT NEOPLASM OF BRAIN: ICD-10-CM

## 2022-12-09 DIAGNOSIS — C34.90 SMALL CELL LUNG CANCER: Primary | ICD-10-CM

## 2022-12-09 DIAGNOSIS — C78.7 SECONDARY MALIGNANT NEOPLASM OF LIVER: ICD-10-CM

## 2022-12-09 LAB
ALBUMIN SERPL BCP-MCNC: 3.6 G/DL (ref 3.5–5.2)
ALP SERPL-CCNC: 240 U/L (ref 55–135)
ALT SERPL W/O P-5'-P-CCNC: 99 U/L (ref 10–44)
ANION GAP SERPL CALC-SCNC: 8 MMOL/L (ref 8–16)
AST SERPL-CCNC: 71 U/L (ref 10–40)
BASOPHILS # BLD AUTO: 0.12 K/UL (ref 0–0.2)
BASOPHILS NFR BLD: 1.2 % (ref 0–1.9)
BILIRUB SERPL-MCNC: 0.8 MG/DL (ref 0.1–1)
BUN SERPL-MCNC: 14 MG/DL (ref 6–20)
CALCIUM SERPL-MCNC: 10.1 MG/DL (ref 8.7–10.5)
CHLORIDE SERPL-SCNC: 101 MMOL/L (ref 95–110)
CO2 SERPL-SCNC: 25 MMOL/L (ref 23–29)
CREAT SERPL-MCNC: 1 MG/DL (ref 0.5–1.4)
DIFFERENTIAL METHOD: ABNORMAL
EOSINOPHIL # BLD AUTO: 1.1 K/UL (ref 0–0.5)
EOSINOPHIL NFR BLD: 11.1 % (ref 0–8)
ERYTHROCYTE [DISTWIDTH] IN BLOOD BY AUTOMATED COUNT: 14.4 % (ref 11.5–14.5)
EST. GFR  (NO RACE VARIABLE): >60 ML/MIN/1.73 M^2
GLUCOSE SERPL-MCNC: 138 MG/DL (ref 70–110)
HCT VFR BLD AUTO: 47.8 % (ref 40–54)
HGB BLD-MCNC: 15.7 G/DL (ref 14–18)
IMM GRANULOCYTES # BLD AUTO: 0.19 K/UL (ref 0–0.04)
IMM GRANULOCYTES NFR BLD AUTO: 2 % (ref 0–0.5)
LYMPHOCYTES # BLD AUTO: 0.9 K/UL (ref 1–4.8)
LYMPHOCYTES NFR BLD: 9.7 % (ref 18–48)
MAGNESIUM SERPL-MCNC: 1.9 MG/DL (ref 1.6–2.6)
MCH RBC QN AUTO: 28.8 PG (ref 27–31)
MCHC RBC AUTO-ENTMCNC: 32.8 G/DL (ref 32–36)
MCV RBC AUTO: 88 FL (ref 82–98)
MONOCYTES # BLD AUTO: 0.6 K/UL (ref 0.3–1)
MONOCYTES NFR BLD: 6.5 % (ref 4–15)
NEUTROPHILS # BLD AUTO: 6.8 K/UL (ref 1.8–7.7)
NEUTROPHILS NFR BLD: 69.5 % (ref 38–73)
NRBC BLD-RTO: 0 /100 WBC
PHOSPHATE SERPL-MCNC: 3.1 MG/DL (ref 2.7–4.5)
PLATELET # BLD AUTO: 215 K/UL (ref 150–450)
PMV BLD AUTO: 9.4 FL (ref 9.2–12.9)
POCT GLUCOSE: 148 MG/DL (ref 70–110)
POCT GLUCOSE: 173 MG/DL (ref 70–110)
POCT GLUCOSE: 185 MG/DL (ref 70–110)
POCT GLUCOSE: 191 MG/DL (ref 70–110)
POTASSIUM SERPL-SCNC: 4.3 MMOL/L (ref 3.5–5.1)
PROT SERPL-MCNC: 7.2 G/DL (ref 6–8.4)
RBC # BLD AUTO: 5.46 M/UL (ref 4.6–6.2)
SODIUM SERPL-SCNC: 134 MMOL/L (ref 136–145)
WBC # BLD AUTO: 9.72 K/UL (ref 3.9–12.7)

## 2022-12-09 PROCEDURE — 36415 COLL VENOUS BLD VENIPUNCTURE: CPT | Performed by: INTERNAL MEDICINE

## 2022-12-09 PROCEDURE — 77263 THER RADIOLOGY TX PLNG CPLX: CPT | Mod: ,,, | Performed by: RADIOLOGY

## 2022-12-09 PROCEDURE — 84100 ASSAY OF PHOSPHORUS: CPT

## 2022-12-09 PROCEDURE — 85025 COMPLETE CBC W/AUTO DIFF WBC: CPT | Performed by: INTERNAL MEDICINE

## 2022-12-09 PROCEDURE — 77334 RADIATION TREATMENT AID(S): CPT | Mod: TC | Performed by: RADIOLOGY

## 2022-12-09 PROCEDURE — 25000003 PHARM REV CODE 250

## 2022-12-09 PROCEDURE — 99233 SBSQ HOSP IP/OBS HIGH 50: CPT | Mod: ,,, | Performed by: HOSPITALIST

## 2022-12-09 PROCEDURE — 83735 ASSAY OF MAGNESIUM: CPT

## 2022-12-09 PROCEDURE — 80053 COMPREHEN METABOLIC PANEL: CPT | Performed by: INTERNAL MEDICINE

## 2022-12-09 PROCEDURE — 63600175 PHARM REV CODE 636 W HCPCS

## 2022-12-09 PROCEDURE — 77290 THER RAD SIMULAJ FIELD CPLX: CPT | Mod: TC | Performed by: RADIOLOGY

## 2022-12-09 PROCEDURE — 77387 PR GUIDANCE FOR RADIATION TREATMENT DELIVERY: ICD-10-PCS | Mod: 26,,, | Performed by: RADIOLOGY

## 2022-12-09 PROCEDURE — 77334 PR  RADN TREATMENT AID(S) COMPLX: ICD-10-PCS | Mod: 26,,, | Performed by: RADIOLOGY

## 2022-12-09 PROCEDURE — 77014 HC CT GUIDANCE RADIATION THERAPY FLDS PLACEMENT: CPT | Mod: TC | Performed by: RADIOLOGY

## 2022-12-09 PROCEDURE — 99233 PR SUBSEQUENT HOSPITAL CARE,LEVL III: ICD-10-PCS | Mod: ,,, | Performed by: HOSPITALIST

## 2022-12-09 PROCEDURE — 77295 PR 3D RADIOTHERAPY PLAN: ICD-10-PCS | Mod: 26,,, | Performed by: RADIOLOGY

## 2022-12-09 PROCEDURE — 77412 RADIATION TX DELIVERY LVL 3: CPT | Performed by: RADIOLOGY

## 2022-12-09 PROCEDURE — 77334 RADIATION TREATMENT AID(S): CPT | Mod: 26,,, | Performed by: RADIOLOGY

## 2022-12-09 PROCEDURE — 77300 RADIATION THERAPY DOSE PLAN: CPT | Mod: 26,,, | Performed by: RADIOLOGY

## 2022-12-09 PROCEDURE — 77387 GUIDANCE FOR RADJ TX DLVR: CPT | Mod: TC | Performed by: RADIOLOGY

## 2022-12-09 PROCEDURE — 77387 GUIDANCE FOR RADJ TX DLVR: CPT | Mod: 26,,, | Performed by: RADIOLOGY

## 2022-12-09 PROCEDURE — 77263 PR  RADIATION THERAPY PLAN COMPLEX: ICD-10-PCS | Mod: ,,, | Performed by: RADIOLOGY

## 2022-12-09 PROCEDURE — 25000003 PHARM REV CODE 250: Performed by: INTERNAL MEDICINE

## 2022-12-09 PROCEDURE — 77295 3-D RADIOTHERAPY PLAN: CPT | Mod: TC | Performed by: RADIOLOGY

## 2022-12-09 PROCEDURE — 77417 THER RADIOLOGY PORT IMAGE(S): CPT | Performed by: RADIOLOGY

## 2022-12-09 PROCEDURE — 20600001 HC STEP DOWN PRIVATE ROOM

## 2022-12-09 PROCEDURE — 77300 PR RADIATION THERAPY,DOSIMETRY PLAN: ICD-10-PCS | Mod: 26,,, | Performed by: RADIOLOGY

## 2022-12-09 PROCEDURE — 77295 3-D RADIOTHERAPY PLAN: CPT | Mod: 26,,, | Performed by: RADIOLOGY

## 2022-12-09 PROCEDURE — 77300 RADIATION THERAPY DOSE PLAN: CPT | Mod: TC | Performed by: RADIOLOGY

## 2022-12-09 RX ORDER — ONDANSETRON 2 MG/ML
4 INJECTION INTRAMUSCULAR; INTRAVENOUS ONCE
Status: COMPLETED | OUTPATIENT
Start: 2022-12-09 | End: 2022-12-09

## 2022-12-09 RX ORDER — DIPHENHYDRAMINE HCL 25 MG
25 CAPSULE ORAL ONCE
Status: COMPLETED | OUTPATIENT
Start: 2022-12-09 | End: 2022-12-09

## 2022-12-09 RX ORDER — DIPHENHYDRAMINE HCL 25 MG
25 CAPSULE ORAL EVERY 6 HOURS PRN
Status: DISCONTINUED | OUTPATIENT
Start: 2022-12-09 | End: 2022-12-10 | Stop reason: HOSPADM

## 2022-12-09 RX ORDER — ONDANSETRON 4 MG/1
4 TABLET, FILM COATED ORAL EVERY 6 HOURS PRN
Status: DISCONTINUED | OUTPATIENT
Start: 2022-12-09 | End: 2022-12-09

## 2022-12-09 RX ORDER — FLUTICASONE PROPIONATE 50 MCG
2 SPRAY, SUSPENSION (ML) NASAL DAILY
Status: DISCONTINUED | OUTPATIENT
Start: 2022-12-09 | End: 2022-12-10 | Stop reason: HOSPADM

## 2022-12-09 RX ORDER — DEXAMETHASONE 4 MG/1
4 TABLET ORAL EVERY 12 HOURS
Status: DISCONTINUED | OUTPATIENT
Start: 2022-12-09 | End: 2022-12-10 | Stop reason: HOSPADM

## 2022-12-09 RX ORDER — OXYCODONE HYDROCHLORIDE 5 MG/1
5 TABLET ORAL EVERY 4 HOURS PRN
Status: DISCONTINUED | OUTPATIENT
Start: 2022-12-09 | End: 2022-12-10 | Stop reason: HOSPADM

## 2022-12-09 RX ORDER — MORPHINE SULFATE 30 MG/1
30 TABLET, FILM COATED, EXTENDED RELEASE ORAL EVERY 12 HOURS
Status: DISCONTINUED | OUTPATIENT
Start: 2022-12-09 | End: 2022-12-10 | Stop reason: HOSPADM

## 2022-12-09 RX ORDER — CETIRIZINE HYDROCHLORIDE 5 MG/1
5 TABLET ORAL ONCE
Status: COMPLETED | OUTPATIENT
Start: 2022-12-09 | End: 2022-12-09

## 2022-12-09 RX ORDER — ONDANSETRON 4 MG/1
8 TABLET, FILM COATED ORAL EVERY 6 HOURS PRN
Status: DISCONTINUED | OUTPATIENT
Start: 2022-12-09 | End: 2022-12-10 | Stop reason: HOSPADM

## 2022-12-09 RX ADMIN — DEXAMETHASONE 4 MG: 4 TABLET ORAL at 08:12

## 2022-12-09 RX ADMIN — HYDROMORPHONE HYDROCHLORIDE 0.5 MG: 1 INJECTION, SOLUTION INTRAMUSCULAR; INTRAVENOUS; SUBCUTANEOUS at 06:12

## 2022-12-09 RX ADMIN — DIPHENHYDRAMINE HYDROCHLORIDE 25 MG: 25 CAPSULE ORAL at 12:12

## 2022-12-09 RX ADMIN — LOSARTAN POTASSIUM 100 MG: 50 TABLET, FILM COATED ORAL at 08:12

## 2022-12-09 RX ADMIN — OXYCODONE 5 MG: 5 TABLET ORAL at 05:12

## 2022-12-09 RX ADMIN — ENOXAPARIN SODIUM 40 MG: 40 INJECTION SUBCUTANEOUS at 08:12

## 2022-12-09 RX ADMIN — ALLOPURINOL 300 MG: 300 TABLET ORAL at 08:12

## 2022-12-09 RX ADMIN — HYDROMORPHONE HYDROCHLORIDE 0.5 MG: 1 INJECTION, SOLUTION INTRAMUSCULAR; INTRAVENOUS; SUBCUTANEOUS at 01:12

## 2022-12-09 RX ADMIN — DOCUSATE SODIUM 50 MG: 50 CAPSULE, LIQUID FILLED ORAL at 08:12

## 2022-12-09 RX ADMIN — OXYCODONE HYDROCHLORIDE 10 MG: 10 TABLET ORAL at 09:12

## 2022-12-09 RX ADMIN — OXYCODONE HYDROCHLORIDE 10 MG: 10 TABLET ORAL at 05:12

## 2022-12-09 RX ADMIN — ONDANSETRON 4 MG: 2 INJECTION INTRAMUSCULAR; INTRAVENOUS at 08:12

## 2022-12-09 RX ADMIN — OXYCODONE 5 MG: 5 TABLET ORAL at 10:12

## 2022-12-09 RX ADMIN — POLYETHYLENE GLYCOL 3350 17 G: 17 POWDER, FOR SOLUTION ORAL at 08:12

## 2022-12-09 RX ADMIN — ONDANSETRON HYDROCHLORIDE 8 MG: 4 TABLET, FILM COATED ORAL at 05:12

## 2022-12-09 RX ADMIN — MORPHINE SULFATE 30 MG: 30 TABLET, FILM COATED, EXTENDED RELEASE ORAL at 08:12

## 2022-12-09 RX ADMIN — DEXAMETHASONE 4 MG: 4 TABLET ORAL at 09:12

## 2022-12-09 RX ADMIN — MORPHINE SULFATE 30 MG: 30 TABLET, FILM COATED, EXTENDED RELEASE ORAL at 11:12

## 2022-12-09 RX ADMIN — CETIRIZINE HYDROCHLORIDE 5 MG: 5 TABLET, FILM COATED ORAL at 12:12

## 2022-12-09 NOTE — SUBJECTIVE & OBJECTIVE
Interval History: NAEO, VSS, pain becoming better managed, will transition patient to long acting Mscontin with breakthrough oxycodone. Radiaiton oncology consulted, will take patient for single fraction to T12, Right ilium, and Right hip. He reports itchiness to right foot following contrast. Will treat with benadryl.    Oncology Treatment Plan:   OP SCLC TOPOTECAN Q3W    Medications:  Continuous Infusions:  Scheduled Meds:   allopurinoL  300 mg Oral Daily    dexAMETHasone  4 mg Oral Q12H    docusate sodium  50 mg Oral Daily    enoxaparin  40 mg Subcutaneous Q12H    losartan  100 mg Oral Daily    morphine  30 mg Oral Q12H    polyethylene glycol  17 g Oral Daily     PRN Meds:acetaminophen, dextrose 10%, dextrose 10%, glucagon (human recombinant), glucose, glucose, insulin aspart U-100, naloxone, oxyCODONE, senna-docusate 8.6-50 mg, sodium chloride 0.9%     Review of Systems   Constitutional:  Negative for chills, fatigue and fever.   HENT:  Negative for rhinorrhea, sore throat and trouble swallowing.    Eyes:  Negative for pain and visual disturbance.   Respiratory:  Positive for cough. Negative for shortness of breath.    Cardiovascular:  Negative for chest pain, palpitations and leg swelling.   Gastrointestinal:  Positive for abdominal pain (resolving) and constipation. Negative for diarrhea, nausea and vomiting.   Genitourinary:  Positive for flank pain (resolving). Negative for difficulty urinating, dysuria and hematuria.   Musculoskeletal:  Positive for back pain (resolving).   Skin:  Negative for color change and pallor.   Neurological:  Positive for headaches. Negative for dizziness, tremors, seizures, syncope, weakness, light-headedness and numbness.   Psychiatric/Behavioral:  Negative for agitation and confusion.    Objective:     Vital Signs (Most Recent):  Temp: 98.1 °F (36.7 °C) (12/09/22 1051)  Pulse: 98 (12/09/22 1051)  Resp: 20 (12/09/22 1128)  BP: 120/68 (12/09/22 1051)  SpO2: (!) 94 % (12/09/22  1051)   Vital Signs (24h Range):  Temp:  [97.8 °F (36.6 °C)-98.3 °F (36.8 °C)] 98.1 °F (36.7 °C)  Pulse:  [] 98  Resp:  [17-20] 20  SpO2:  [93 %-98 %] 94 %  BP: (120-160)/(65-91) 120/68     Weight: 119.3 kg (263 lb)  Body mass index is 41.19 kg/m².  Body surface area is 2.37 meters squared.      Intake/Output Summary (Last 24 hours) at 12/9/2022 1358  Last data filed at 12/9/2022 0403  Gross per 24 hour   Intake 480 ml   Output 900 ml   Net -420 ml       Physical Exam  Vitals reviewed.   Constitutional:       General: He is not in acute distress.     Appearance: He is obese.   HENT:      Head: Normocephalic and atraumatic.      Right Ear: External ear normal.      Left Ear: External ear normal.      Mouth/Throat:      Mouth: Mucous membranes are moist.   Eyes:      General:         Right eye: No discharge.         Left eye: No discharge.      Extraocular Movements: Extraocular movements intact.   Cardiovascular:      Rate and Rhythm: Normal rate and regular rhythm.      Heart sounds: Normal heart sounds. No murmur heard.  Pulmonary:      Effort: No respiratory distress.      Breath sounds: No wheezing.   Abdominal:      General: There is no distension.      Palpations: Abdomen is soft.      Tenderness: There is no abdominal tenderness.   Musculoskeletal:         General: Tenderness present.      Right lower leg: No edema.      Left lower leg: No edema.   Skin:     Findings: No bruising.   Neurological:      General: No focal deficit present.      Mental Status: He is oriented to person, place, and time.      Cranial Nerves: No cranial nerve deficit.      Comments: Weakness noted on right leg, reduced strength   Psychiatric:         Mood and Affect: Mood normal.         Behavior: Behavior normal.       Significant Labs:   CBC:   Recent Labs   Lab 12/08/22 0524 12/09/22 0427   WBC 10.66 9.72   HGB 14.0 15.7   HCT 42.5 47.8    215    and CMP:   Recent Labs   Lab 12/08/22 0524 12/09/22 0427     134*   K 4.5 4.3    101   CO2 25 25   * 138*   BUN 16 14   CREATININE 1.1 1.0   CALCIUM 9.7 10.1   PROT 7.0 7.2   ALBUMIN 3.5 3.6   BILITOT 0.9 0.8   ALKPHOS 224* 240*   AST 52* 71*   ALT 78* 99*   ANIONGAP 10 8       Diagnostic Results:  I have reviewed all pertinent imaging results/findings within the past 24 hours.

## 2022-12-09 NOTE — ASSESSMENT & PLAN NOTE
Plan Name: OP ATEZOLIZUMAB CARBOPLATIN ETOPOSIDE Q3W  Treatment Goal: Palliative  Status: Inactive  Start Date: 4/5/2022  End Date: 9/27/2022  Provider: Antolin Sosa MD    MRI brain with concern for new metastasis, MRI spine with diffuse osseous metastasis

## 2022-12-09 NOTE — ASSESSMENT & PLAN NOTE
Patient with back/leg pain onset following chemo 10/24/22. Not well controlled. States he takes oxy 30mg as needed without relief. Missed most recent scheduled chemotherapy due to pain. Also with new onset lower extremirty weakness. Strength subjectively weak in R leg, patient states difficult to move 2/2 pain.    - MS contin 30BID  - Oxy 5mg Q4 PRN for breakthrough  - will consider palliative consult  - CT ab/pelv pending  - MRI brain concerning for metastasis MRI spine with evidence of osseous metastasis  - radiation oncology on board for targeted palliative radiotherapy

## 2022-12-09 NOTE — PLAN OF CARE
Problem: Adult Inpatient Plan of Care  Goal: Plan of Care Review  Outcome: Ongoing, Progressing     Problem: Adult Inpatient Plan of Care  Goal: Patient-Specific Goal (Individualized)  Outcome: Ongoing, Progressing     Problem: Adult Inpatient Plan of Care  Goal: Absence of Hospital-Acquired Illness or Injury  Outcome: Ongoing, Progressing     Problem: Adult Inpatient Plan of Care  Goal: Optimal Comfort and Wellbeing  Outcome: Ongoing, Progressing     Pt resting quietly.  VSS.  Wife at bedside.  Plan of care reviewed and discussed.  Safety measures in place.  Personal items and call light within in .  Patient able to ambulate and shower.

## 2022-12-09 NOTE — PROGRESS NOTES
[facility]  Hematology/Oncology  Progress Note    Patient Name: Juan Gillespie  Admission Date: 12/7/2022  Hospital Length of Stay: 2 days  Code Status: Full Code     Subjective:     HPI:  Mr. 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 Patient was sent to inpatient heme/onc service from clinic after being found in severe pain. Patient states his pain is burning ans sharp, originates in right flank and travels down to right thigh/calv/ankle. He states the pain switches from left to right, but currently pain is on right. Also has occasional suprapubic abdominal pain associated with leg pain. He reports using oxycodone 30mg without relief at home (not on med rec). Only thing that appears to help is massaging and warm baths before sleep. Patients family states they have noticed large (sometimes golf ball sized) knots on back, and more frequently on right hamstring. Massaging knots provides relief. Patient also reports bilateral lower extremity weakness, but feels it could be due to the pain. He normally walks multiple miles a day, but now can only walk a couple houses down the street. Patient also reports mild headache which has been present since admission. He denies CP, SOB, changes in vision, hematochezia, melena, nausea/vomiting, other weakness/tingling/numbness.     Labs on admission remarkable for worsened transaminitis, otherwise unremarkable. Pan scan imaging pending    Fhx:   Mother: DM  Father un-diagnosed Cancer    Social:  Tobacco: Quit 27 years ago  Alcohol: none  Drugs: none    Oncological History:  CT Chest w/ con 3/20:  Mediastinal lymphadenopathy 7.5 cm.  Right middle lobe consolidative opacity 4.6 cm.  5.3 cm liver lesion.  CT A/P 3/23/22: Re-demonstration of hypoattenuating liver lesion, 4.5cm. Two  other lesions 2.7 and 2.5 cm.   MRI Brain 3/23/22: Enchacning lesion in the pituitary gland 13mm. 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     Patient underwent diagnostic bronchoscopy with EBUS of RML mass and lymph nodes on 03/22:  Path w/ small cell lung ca.      -Started Carbo/Etop/Atez 4/5/22  -Maintenance atezolizumab 7/5/22  -Consolidative thoracic radiation 8/4/22-8/18/22      Interval History: NAEO, VSS, pain becoming better managed, will transition patient to long acting Mscontin with breakthrough oxycodone. Radiaiton oncology consulted, will take patient for single fraction to T12, Right ilium, and Right hip. He reports itchiness to right foot following contrast. Will treat with benadryl.    Oncology Treatment Plan:   OP SCLC TOPOTECAN Q3W    Medications:  Continuous Infusions:  Scheduled Meds:   allopurinoL  300 mg Oral Daily    dexAMETHasone  4 mg Oral Q12H    docusate sodium  50 mg Oral Daily    enoxaparin  40 mg Subcutaneous Q12H    losartan  100 mg Oral Daily    morphine  30 mg Oral Q12H    polyethylene glycol  17 g Oral Daily     PRN Meds:acetaminophen, dextrose 10%, dextrose 10%, glucagon (human recombinant), glucose, glucose, insulin aspart U-100, naloxone, oxyCODONE, senna-docusate 8.6-50 mg, sodium chloride 0.9%     Review of Systems   Constitutional:  Negative for chills, fatigue and fever.   HENT:  Negative for rhinorrhea, sore throat and trouble swallowing.    Eyes:  Negative for pain and visual disturbance.   Respiratory:  Positive for cough. Negative for shortness of breath.    Cardiovascular:  Negative for chest pain, palpitations and leg swelling.   Gastrointestinal:  Positive for abdominal pain (resolving) and constipation. Negative for diarrhea, nausea and vomiting.   Genitourinary:  Positive for flank pain (resolving). Negative for difficulty urinating, dysuria and hematuria.   Musculoskeletal:  Positive for back pain (resolving).   Skin:  Negative for color change and pallor.    Neurological:  Positive for headaches. Negative for dizziness, tremors, seizures, syncope, weakness, light-headedness and numbness.   Psychiatric/Behavioral:  Negative for agitation and confusion.    Objective:     Vital Signs (Most Recent):  Temp: 98.1 °F (36.7 °C) (12/09/22 1051)  Pulse: 98 (12/09/22 1051)  Resp: 20 (12/09/22 1128)  BP: 120/68 (12/09/22 1051)  SpO2: (!) 94 % (12/09/22 1051)   Vital Signs (24h Range):  Temp:  [97.8 °F (36.6 °C)-98.3 °F (36.8 °C)] 98.1 °F (36.7 °C)  Pulse:  [] 98  Resp:  [17-20] 20  SpO2:  [93 %-98 %] 94 %  BP: (120-160)/(65-91) 120/68     Weight: 119.3 kg (263 lb)  Body mass index is 41.19 kg/m².  Body surface area is 2.37 meters squared.      Intake/Output Summary (Last 24 hours) at 12/9/2022 1358  Last data filed at 12/9/2022 0403  Gross per 24 hour   Intake 480 ml   Output 900 ml   Net -420 ml       Physical Exam  Vitals reviewed.   Constitutional:       General: He is not in acute distress.     Appearance: He is obese.   HENT:      Head: Normocephalic and atraumatic.      Right Ear: External ear normal.      Left Ear: External ear normal.      Mouth/Throat:      Mouth: Mucous membranes are moist.   Eyes:      General:         Right eye: No discharge.         Left eye: No discharge.      Extraocular Movements: Extraocular movements intact.   Cardiovascular:      Rate and Rhythm: Normal rate and regular rhythm.      Heart sounds: Normal heart sounds. No murmur heard.  Pulmonary:      Effort: No respiratory distress.      Breath sounds: No wheezing.   Abdominal:      General: There is no distension.      Palpations: Abdomen is soft.      Tenderness: There is no abdominal tenderness.   Musculoskeletal:         General: Tenderness present.      Right lower leg: No edema.      Left lower leg: No edema.   Skin:     Findings: No bruising.   Neurological:      General: No focal deficit present.      Mental Status: He is oriented to person, place, and time.      Cranial Nerves:  No cranial nerve deficit.      Comments: Weakness noted on right leg, reduced strength   Psychiatric:         Mood and Affect: Mood normal.         Behavior: Behavior normal.       Significant Labs:   CBC:   Recent Labs   Lab 12/08/22  0524 12/09/22 0427   WBC 10.66 9.72   HGB 14.0 15.7   HCT 42.5 47.8    215    and CMP:   Recent Labs   Lab 12/08/22  0524 12/09/22 0427    134*   K 4.5 4.3    101   CO2 25 25   * 138*   BUN 16 14   CREATININE 1.1 1.0   CALCIUM 9.7 10.1   PROT 7.0 7.2   ALBUMIN 3.5 3.6   BILITOT 0.9 0.8   ALKPHOS 224* 240*   AST 52* 71*   ALT 78* 99*   ANIONGAP 10 8       Diagnostic Results:  I have reviewed all pertinent imaging results/findings within the past 24 hours.    Assessment/Plan:     * Cancer related pain  Patient with back/leg pain onset following chemo 10/24/22. Not well controlled. States he takes oxy 30mg as needed without relief. Missed most recent scheduled chemotherapy due to pain. Also with new onset lower extremirty weakness. Strength subjectively weak in R leg, patient states difficult to move 2/2 pain.    - MS contin 30BID  - Oxy 5mg Q4 PRN for breakthrough  - will consider palliative consult  - CT ab/pelv pending  - MRI brain concerning for metastasis MRI spine with evidence of osseous metastasis  - radiation oncology on board for targeted palliative radiotherapy    Secondary malignant neoplasm of brain  See cancer related pain    Elevated alanine aminotransferase (ALT) level  See liver metastasis    Acute drug-induced gout of multiple sites  Continue allopurinal    Secondary malignant neoplasm of liver  Likely cause of transaminitis    CT abd with multiple hepatic hypodensities which are largely stable, however questionable new subcentimeter hypodensity near the dome.    Small cell lung cancer  Plan Name: OP ATEZOLIZUMAB CARBOPLATIN ETOPOSIDE Q3W  Treatment Goal: Palliative  Status: Inactive  Start Date: 4/5/2022  End Date: 9/27/2022  Provider: Antolin  KALE Sosa MD    MRI brain with concern for new metastasis, MRI spine with diffuse osseous metastasis    Primary hypertension  BP well controlled   Kidney function at baseline    -continue home lisinopril    Type 2 diabetes mellitus, without long-term current use of insulin  Glucose 200 on admit  Last A1c 6.1 3/2022    Not on home insulin/oral meds, diet controlled    -POCT glucose AC, HS  -LDSSI            Alexis Kumar MD  Hematology/Oncology  [facility] Tele[facility]

## 2022-12-09 NOTE — CONSULTS
[facility] - Telemetry Stepdown  Radiation Oncology  Consult Note    Patient Name: Juan Gillespie  MRN: [id number]  Admission Date: 12/7/2022  Hospital Length of Stay: 2 days  Code Status: Full Code   Attending Provider: Nai Byers MD  Consulting Provider: Stephen Jacome MD  Primary Care Physician: Keri Batista NP  Principal Problem:Cancer related pain    Inpatient consult to Radiation Oncology  Consult performed by: Stephen Jacome MD  Consult ordered by: Alexis Kumar MD        Assessment/Plan:     Small cell lung cancer  Mr. Gillespie is a 56 y/o male who is well known to me with Stage IV ES-SCLC s/p chemo-immunotherapy, thoracic consolidation radiation 30 Gy in 10 fx, and most recently SRS to single brain metastasis as well as initiation of 2nd line systemic therapy. He was admitted on 12/7/22 for poorly controlled lower back and Right hip pain. MRI Total Spine 12/7/22 demonstrated progressive osseous metastatic disease with concerning posterior extension of disease at T12 into the canal with moderate stenosis. MRI Brain demonstrated a subtle focus of enhancement in Right basal ganglia/internal capsule though note was made that this could be appearance of subacute infarct. CT C/A/P 12/8/22 demonstrated progressive disease including increase in size of lytic disease in the Right iliac bone. Radiation Oncology was consulted for consideration of palliative radiation.     I met with the patient and his wife at bedside. Pain is better controlled w/ current regimen. Pain has primarily been in the lower back but also Right hip and pelvis. He denies numbness, tingling, or weakness in the lower extremities. He does not feeling that standing or walking makes the pain worse, but it does worsen if he sits for more than 10-15 minutes.     I discussed the role of radiation in palliation of painful bone metastases. I recommend treating the T11-L1, Right iliac bone, and Right proximal femur/acetabulum to 8 Gy x1 fraction.  Potential side effects including pain flare, loose stools/diarrhea, and N/V were reviewed. He was in agreement with proceeding with palliative RT.     In regards to the brain lesion, there was too much motion artifact at this time to consider focal treatment with radiosurgery. The radiologist also said that lesion could represent an infarct, so I recommend a follow-up MRI Brain in 1 month to re-assess.     Plan:  1) CT Simulation this morning for radiation treatment planning. Anticipate delivering single fraction to T12, Right ilium, and Right hip today in the late afternoon.   2) If there is no contraindication, please start dexamethasone 4 mg bid x3 days both to prevent pain flare from radiation and also as adjunctive pain reliever for bone metastases.         Thank you for your consult. I will follow-up with patient. Please contact us if you have any additional questions.    Stephen Jacome MD  Radiation Oncology  [facility] - Telemetry Stepdown

## 2022-12-09 NOTE — ASSESSMENT & PLAN NOTE
Likely cause of transaminitis    CT abd with multiple hepatic hypodensities which are largely stable, however questionable new subcentimeter hypodensity near the dome.

## 2022-12-09 NOTE — ASSESSMENT & PLAN NOTE
Mr. Gillespie is a 58 y/o male who is well known to me with Stage IV ES-SCLC s/p chemo-immunotherapy, thoracic consolidation radiation 30 Gy in 10 fx, and most recently SRS to single brain metastasis as well as initiation of 2nd line systemic therapy. He was admitted on 12/7/22 for poorly controlled lower back and Right hip pain. MRI Total Spine 12/7/22 demonstrated progressive osseous metastatic disease with concerning posterior extension of disease at T12 into the canal with moderate stenosis. MRI Brain demonstrated a subtle focus of enhancement in Right basal ganglia/internal capsule though note was made that this could be appearance of subacute infarct. CT C/A/P 12/8/22 demonstrated progressive disease including increase in size of lytic disease in the Right iliac bone. Radiation Oncology was consulted for consideration of palliative radiation.     I met with the patient and his wife at bedside. Pain is better controlled w/ current regimen. Pain has primarily been in the lower back but also Right hip and pelvis. He denies numbness, tingling, or weakness in the lower extremities. He does not feeling that standing or walking makes the pain worse, but it does worsen if he sits for more than 10-15 minutes.     I discussed the role of radiation in palliation of painful bone metastases. I recommend treating the T11-L1, Right iliac bone, and Right proximal femur/acetabulum to 8 Gy x1 fraction. Potential side effects including pain flare, loose stools/diarrhea, and N/V were reviewed. He was in agreement with proceeding with palliative RT.     In regards to the brain lesion, there was too much motion artifact at this time to consider focal treatment with radiosurgery. The radiologist also said that lesion could represent an infarct, so I recommend a follow-up MRI Brain in 1 month to re-assess.     Plan:  1) CT Simulation this morning for radiation treatment planning. Anticipate delivering single fraction to T12, Right ilium,  and Right hip today in the late afternoon.   2) If there is no contraindication, please start dexamethasone 4 mg bid x3 days both to prevent pain flare from radiation and also as adjunctive pain reliever for bone metastases.

## 2022-12-09 NOTE — NURSING
Ok per Dr. Irving to schedule pt at [facility] on 12/12 prior to his next cycle of treatment. CBC CMP prior.\        Future Appointments   Date Time Provider Department Center   12/12/2022 10:20 AM LAB,  HOSPITAL [facility] LAB [facility]   12/12/2022 11:30 AM Soledad Irving MD [facility] HEM ONC [facility]   12/12/2022  1:00 PM CHAIR 05 WB WB CHEMO [facility]   12/13/2022  1:00 PM CHAIR 05 WB [facility] CHEMO [facility]   12/14/2022  1:00 PM CHAIR 05 WB WB CHEMO [facility]   12/15/2022  1:00 PM CHAIR 07 WB WB CHEMO [facility]   12/16/2022  1:00 PM CHAIR 05 [facility] CHEMO [facility]   1/24/2023 11:30 AM [facility]   1/24/2023  1:30 PM Stephen Jacome MD [address]     ISAIAS WoodallN, RN  Oncology Discharge Navigator

## 2022-12-09 NOTE — NURSING
Patient c/o pain and nausea.  Gave 5mg oxy po and 8mg zofran po. Pt threw up right after.  Notified MD.

## 2022-12-09 NOTE — PROGRESS NOTES
Admit Assessment    Patient Identification  Juan Gillespie   :  1965  Admit Date:  2022  Attending Provider:  Nai Byers MD              Referral:   Pt was admitted to  with a diagnosis of Cancer related pain, and was admitted this hospital stay due to Malignant neoplasm of unspecified part of unspecified bronchus or lung [C34.90]  Small cell lung cancer [C34.90]  Secondary malignant neoplasm of brain [C79.31].       is involved was referred to the Social Work Department via routine referral .  Patient presents as a 57 y.o. year old  male.    Persons interviewed : Patient. Patient was in pain upon entering the room. Unable to concentrate and did not feel like talking he requested that I speak to his wife who was bedside.     Living Situation:      Resides at [address] [facility] [address] [facility] [address], phone: [phone] (home).  Lives with his wife        Current or Past Agencies and Description of Services/Supplies    DME: Wife reports having a wheelchair and a shower chair at home but the patient does not use either       Home Health: Patient was not active on home health services prior to hospital. Does not feel he will need the service upon discharge.       IV Infusion; N/A      Nutrition: Oral     Outpatient Pharmacy:     [facility] - [place] - [address]  [address]  [facility] [address]  Phone: [phone] Fax: [phone]    [id number] - [place], [place] - [address] AT [facility] [address]  [address]  [facility] [address]  Phone: [phone] Fax: [phone]    [facility] [address] [facility]  [address] [place] [address]  Phone: [phone] Fax: [phone]      Patient Preference of agencies include : Patient does not express a preference    Patient/Caregiver informed of right to choose providers or agencies.  Patient provides permission to release any  necessary information to [facility] and to Non-[facility] agencies as needed to facilitate patient care, treatment planning, and patient discharge planning.  Written and verbal resources provided.      Coping: Patient does not answer. Wife says normally he is doing well I just caught him at a bad time.           Adjustment to Diagnosis and Treatment: Appropriate    Emotional/Behavioral/Cognitive Issues: None observed            History/Current Symptoms of Anxiety/Depression: No:   History/Current Substance Use:   Social History     Tobacco Use    Smoking status: Never    Smokeless tobacco: Never    Tobacco comments:     stop smoking 26 years ago   Substance and Sexual Activity    Alcohol use: No    Drug use: No    Sexual activity: Yes       Indications of Abuse/Neglect: No:   Abuse Screen (yes response referral indicated)  Feels Unsafe at Home or Work/School: no  Feels Threatened by Someone: no  Does anyone try to keep you from having contact with others or doing things outside your home?: no  Physical Signs of Abuse Present: no    Financial:  Payer/Plan Subscr  Sex Relation Sub. Ins. ID Effective Group Num   1. [place]* DARRELL LINO 1965 Male Self [id number]* 16                                    [address]                            Other identified concerns/needs: None at this time    Plan: Return home     Interventions/Referrals: None at this time  Patient/caregiver engaged in treatment planning process.     providing psychosocial and supportive counseling, resources, education, assistance and discharge planning as appropriate.  Patient/caregiver state understanding of  available resources,  following, remains available.

## 2022-12-09 NOTE — NURSING
Patient resting in bed with spouse at the bedside. Paitient states pain has been fairly managed with PO and IV pain meds given q4h per MD orders. Vitals WNL, will continue to monitor.

## 2022-12-10 VITALS
HEIGHT: 67 IN | WEIGHT: 263 LBS | RESPIRATION RATE: 18 BRPM | TEMPERATURE: 98 F | HEART RATE: 89 BPM | DIASTOLIC BLOOD PRESSURE: 69 MMHG | SYSTOLIC BLOOD PRESSURE: 126 MMHG | BODY MASS INDEX: 41.28 KG/M2 | OXYGEN SATURATION: 91 %

## 2022-12-10 LAB
ALBUMIN SERPL BCP-MCNC: 3.7 G/DL (ref 3.5–5.2)
ALP SERPL-CCNC: 247 U/L (ref 55–135)
ALT SERPL W/O P-5'-P-CCNC: 106 U/L (ref 10–44)
ANION GAP SERPL CALC-SCNC: 11 MMOL/L (ref 8–16)
AST SERPL-CCNC: 62 U/L (ref 10–40)
BASOPHILS # BLD AUTO: 0.03 K/UL (ref 0–0.2)
BASOPHILS NFR BLD: 0.2 % (ref 0–1.9)
BILIRUB SERPL-MCNC: 0.8 MG/DL (ref 0.1–1)
BUN SERPL-MCNC: 20 MG/DL (ref 6–20)
CALCIUM SERPL-MCNC: 10.2 MG/DL (ref 8.7–10.5)
CHLORIDE SERPL-SCNC: 102 MMOL/L (ref 95–110)
CO2 SERPL-SCNC: 22 MMOL/L (ref 23–29)
CREAT SERPL-MCNC: 1.2 MG/DL (ref 0.5–1.4)
DIFFERENTIAL METHOD: ABNORMAL
EOSINOPHIL # BLD AUTO: 0 K/UL (ref 0–0.5)
EOSINOPHIL NFR BLD: 0.1 % (ref 0–8)
ERYTHROCYTE [DISTWIDTH] IN BLOOD BY AUTOMATED COUNT: 14.6 % (ref 11.5–14.5)
EST. GFR  (NO RACE VARIABLE): >60 ML/MIN/1.73 M^2
GLUCOSE SERPL-MCNC: 194 MG/DL (ref 70–110)
HCT VFR BLD AUTO: 45.5 % (ref 40–54)
HGB BLD-MCNC: 15.3 G/DL (ref 14–18)
IMM GRANULOCYTES # BLD AUTO: 0.13 K/UL (ref 0–0.04)
IMM GRANULOCYTES NFR BLD AUTO: 0.8 % (ref 0–0.5)
LYMPHOCYTES # BLD AUTO: 0.5 K/UL (ref 1–4.8)
LYMPHOCYTES NFR BLD: 3.3 % (ref 18–48)
MAGNESIUM SERPL-MCNC: 2.1 MG/DL (ref 1.6–2.6)
MCH RBC QN AUTO: 29.3 PG (ref 27–31)
MCHC RBC AUTO-ENTMCNC: 33.6 G/DL (ref 32–36)
MCV RBC AUTO: 87 FL (ref 82–98)
MONOCYTES # BLD AUTO: 0.2 K/UL (ref 0.3–1)
MONOCYTES NFR BLD: 1.5 % (ref 4–15)
NEUTROPHILS # BLD AUTO: 14.7 K/UL (ref 1.8–7.7)
NEUTROPHILS NFR BLD: 94.1 % (ref 38–73)
NRBC BLD-RTO: 0 /100 WBC
PHOSPHATE SERPL-MCNC: 3.4 MG/DL (ref 2.7–4.5)
PLATELET # BLD AUTO: 249 K/UL (ref 150–450)
PMV BLD AUTO: 9.4 FL (ref 9.2–12.9)
POCT GLUCOSE: 216 MG/DL (ref 70–110)
POTASSIUM SERPL-SCNC: 4.5 MMOL/L (ref 3.5–5.1)
PROT SERPL-MCNC: 7.7 G/DL (ref 6–8.4)
RBC # BLD AUTO: 5.22 M/UL (ref 4.6–6.2)
SODIUM SERPL-SCNC: 135 MMOL/L (ref 136–145)
WBC # BLD AUTO: 15.59 K/UL (ref 3.9–12.7)

## 2022-12-10 PROCEDURE — 36415 COLL VENOUS BLD VENIPUNCTURE: CPT | Performed by: INTERNAL MEDICINE

## 2022-12-10 PROCEDURE — 83735 ASSAY OF MAGNESIUM: CPT

## 2022-12-10 PROCEDURE — 99239 HOSP IP/OBS DSCHRG MGMT >30: CPT | Mod: ,,, | Performed by: INTERNAL MEDICINE

## 2022-12-10 PROCEDURE — 84100 ASSAY OF PHOSPHORUS: CPT

## 2022-12-10 PROCEDURE — 25000003 PHARM REV CODE 250

## 2022-12-10 PROCEDURE — 85025 COMPLETE CBC W/AUTO DIFF WBC: CPT | Performed by: INTERNAL MEDICINE

## 2022-12-10 PROCEDURE — 99239 PR HOSPITAL DISCHARGE DAY,>30 MIN: ICD-10-PCS | Mod: ,,, | Performed by: INTERNAL MEDICINE

## 2022-12-10 PROCEDURE — 25000003 PHARM REV CODE 250: Performed by: INTERNAL MEDICINE

## 2022-12-10 PROCEDURE — 80053 COMPREHEN METABOLIC PANEL: CPT | Performed by: INTERNAL MEDICINE

## 2022-12-10 PROCEDURE — 25000242 PHARM REV CODE 250 ALT 637 W/ HCPCS

## 2022-12-10 PROCEDURE — 63600175 PHARM REV CODE 636 W HCPCS

## 2022-12-10 RX ORDER — MORPHINE SULFATE 30 MG/1
30 TABLET, FILM COATED, EXTENDED RELEASE ORAL EVERY 12 HOURS
Qty: 60 TABLET | Refills: 0 | Status: SHIPPED | OUTPATIENT
Start: 2022-12-10 | End: 2023-07-13 | Stop reason: SDUPTHER

## 2022-12-10 RX ORDER — DEXAMETHASONE 4 MG/1
4 TABLET ORAL EVERY 12 HOURS
Qty: 3 TABLET | Refills: 0 | Status: SHIPPED | OUTPATIENT
Start: 2022-12-10 | End: 2022-12-12

## 2022-12-10 RX ORDER — ONDANSETRON HYDROCHLORIDE 8 MG/1
8 TABLET, FILM COATED ORAL EVERY 8 HOURS PRN
Qty: 30 TABLET | Refills: 3 | Status: SHIPPED | OUTPATIENT
Start: 2022-12-10 | End: 2023-01-11 | Stop reason: SDUPTHER

## 2022-12-10 RX ORDER — AMOXICILLIN 250 MG
1 CAPSULE ORAL DAILY PRN
Qty: 30 TABLET | Refills: 2 | Status: SHIPPED | OUTPATIENT
Start: 2022-12-10 | End: 2023-01-23 | Stop reason: SDUPTHER

## 2022-12-10 RX ORDER — OXYCODONE HYDROCHLORIDE 5 MG/1
5 TABLET ORAL EVERY 4 HOURS PRN
Qty: 42 TABLET | Refills: 0 | Status: SHIPPED | OUTPATIENT
Start: 2022-12-10 | End: 2022-12-17

## 2022-12-10 RX ADMIN — ENOXAPARIN SODIUM 40 MG: 40 INJECTION SUBCUTANEOUS at 08:12

## 2022-12-10 RX ADMIN — LOSARTAN POTASSIUM 100 MG: 50 TABLET, FILM COATED ORAL at 08:12

## 2022-12-10 RX ADMIN — FLUTICASONE PROPIONATE 100 MCG: 50 SPRAY, METERED NASAL at 04:12

## 2022-12-10 RX ADMIN — MORPHINE SULFATE 30 MG: 30 TABLET, FILM COATED, EXTENDED RELEASE ORAL at 08:12

## 2022-12-10 RX ADMIN — FLUTICASONE PROPIONATE 100 MCG: 50 SPRAY, METERED NASAL at 08:12

## 2022-12-10 RX ADMIN — POLYETHYLENE GLYCOL 3350 17 G: 17 POWDER, FOR SOLUTION ORAL at 08:12

## 2022-12-10 RX ADMIN — OXYCODONE 5 MG: 5 TABLET ORAL at 04:12

## 2022-12-10 RX ADMIN — DIPHENHYDRAMINE HYDROCHLORIDE 25 MG: 25 CAPSULE ORAL at 04:12

## 2022-12-10 RX ADMIN — DOCUSATE SODIUM 50 MG: 50 CAPSULE, LIQUID FILLED ORAL at 08:12

## 2022-12-10 RX ADMIN — DEXAMETHASONE 4 MG: 4 TABLET ORAL at 08:12

## 2022-12-10 RX ADMIN — ALLOPURINOL 300 MG: 300 TABLET ORAL at 08:12

## 2022-12-10 RX ADMIN — INSULIN ASPART 2 UNITS: 100 INJECTION, SOLUTION INTRAVENOUS; SUBCUTANEOUS at 09:12

## 2022-12-10 NOTE — SUBJECTIVE & OBJECTIVE
Interval History: NAEO, VSS. Episode of nausea/vomiting following radiation therapy. Relieved with zofran. Patient pain well controlled. Patient does have elevated white cell count this morning, strong indication in response to steroids and radiation therapy. Medically stable for discharge.    Oncology Treatment Plan:   OP SCLC TOPOTECAN Q3W    Medications:  Continuous Infusions:  Scheduled Meds:   allopurinoL  300 mg Oral Daily    dexAMETHasone  4 mg Oral Q12H    docusate sodium  50 mg Oral Daily    enoxaparin  40 mg Subcutaneous Q12H    fluticasone propionate  2 spray Each Nostril Daily    losartan  100 mg Oral Daily    morphine  30 mg Oral Q12H    polyethylene glycol  17 g Oral Daily     PRN Meds:acetaminophen, dextrose 10%, dextrose 10%, diphenhydrAMINE, glucagon (human recombinant), glucose, glucose, insulin aspart U-100, naloxone, ondansetron, oxyCODONE, senna-docusate 8.6-50 mg, sodium chloride 0.9%     Review of Systems   Constitutional:  Negative for chills, fatigue and fever.   HENT:  Negative for rhinorrhea, sore throat and trouble swallowing.    Eyes:  Negative for pain and visual disturbance.   Respiratory:  Positive for cough. Negative for shortness of breath.    Cardiovascular:  Negative for chest pain, palpitations and leg swelling.   Gastrointestinal:  Positive for constipation. Negative for abdominal pain, diarrhea, nausea and vomiting.   Genitourinary:  Negative for difficulty urinating, dysuria, flank pain and hematuria.   Musculoskeletal:  Negative for back pain.   Skin:  Negative for color change and pallor.   Neurological:  Negative for dizziness, tremors, seizures, syncope, weakness, light-headedness, numbness and headaches.   Psychiatric/Behavioral:  Negative for agitation and confusion.    Objective:     Vital Signs (Most Recent):  Temp: 97.5 °F (36.4 °C) (12/10/22 0853)  Pulse: 100 (12/10/22 0853)  Resp: 20 (12/10/22 0854)  BP: (!) 141/76 (12/10/22 0853)  SpO2: (!) 94 % (12/10/22 0853)    Vital Signs (24h Range):  Temp:  [97.5 °F (36.4 °C)-98.7 °F (37.1 °C)] 97.5 °F (36.4 °C)  Pulse:  [] 100  Resp:  [12-20] 20  SpO2:  [92 %-99 %] 94 %  BP: (120-184)/(68-97) 141/76     Weight: 119.3 kg (263 lb)  Body mass index is 41.19 kg/m².  Body surface area is 2.37 meters squared.    No intake or output data in the 24 hours ending 12/10/22 0956    Physical Exam  Vitals reviewed.   Constitutional:       General: He is not in acute distress.     Appearance: He is obese.   HENT:      Head: Normocephalic and atraumatic.      Right Ear: External ear normal.      Left Ear: External ear normal.      Mouth/Throat:      Mouth: Mucous membranes are moist.   Eyes:      General:         Right eye: No discharge.         Left eye: No discharge.      Extraocular Movements: Extraocular movements intact.   Cardiovascular:      Rate and Rhythm: Normal rate and regular rhythm.      Heart sounds: Normal heart sounds. No murmur heard.  Pulmonary:      Effort: No respiratory distress.      Breath sounds: No wheezing.   Abdominal:      General: There is no distension.      Palpations: Abdomen is soft.      Tenderness: There is no abdominal tenderness.   Musculoskeletal:         General: No tenderness.      Right lower leg: No edema.      Left lower leg: No edema.   Skin:     Findings: No bruising.   Neurological:      General: No focal deficit present.      Mental Status: He is oriented to person, place, and time.      Cranial Nerves: No cranial nerve deficit.      Comments: Weakness noted on right leg, reduced strength   Psychiatric:         Mood and Affect: Mood normal.         Behavior: Behavior normal.       Significant Labs:   CBC:   Recent Labs   Lab 12/09/22  0427 12/10/22  0425   WBC 9.72 15.59*   HGB 15.7 15.3   HCT 47.8 45.5    249    and CMP:   Recent Labs   Lab 12/09/22  0427 12/10/22  0425   * 135*   K 4.3 4.5    102   CO2 25 22*   * 194*   BUN 14 20   CREATININE 1.0 1.2   CALCIUM 10.1 10.2    PROT 7.2 7.7   ALBUMIN 3.6 3.7   BILITOT 0.8 0.8   ALKPHOS 240* 247*   AST 71* 62*   ALT 99* 106*   ANIONGAP 8 11       Diagnostic Results:  I have reviewed all pertinent imaging results/findings within the past 24 hours.

## 2022-12-10 NOTE — DISCHARGE INSTRUCTIONS
Follow up with Dr. Irving (12/14). DO NOT TAKE ALECTINIB (chemotheapy pill) until discussed with Dr. Irving.

## 2022-12-10 NOTE — DISCHARGE SUMMARY
[facility] - Telemetry Stepdown  Hematology/Oncology  Discharge Summary      Patient Name: Juan Gillespie  MRN: [id number]  Admission Date: 12/7/2022  Hospital Length of Stay: 3 days  Discharge Date and Time:  12/10/2022 10:49 AM  Attending Physician: Nai Byers MD   Discharging Provider: Alexis Kumar MD  Primary Care Provider: Keri Batista NP    HPI: Mr. 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 Patient was sent to inpatient heme/onc service from clinic after being found in severe pain. Patient states his pain is burning ans sharp, originates in right flank and travels down to right thigh/calv/ankle. He states the pain switches from left to right, but currently pain is on right. Also has occasional suprapubic abdominal pain associated with leg pain. He reports using oxycodone 30mg without relief at home (not on med rec). Only thing that appears to help is massaging and warm baths before sleep. Patients family states they have noticed large (sometimes golf ball sized) knots on back, and more frequently on right hamstring. Massaging knots provides relief. Patient also reports bilateral lower extremity weakness, but feels it could be due to the pain. He normally walks multiple miles a day, but now can only walk a couple houses down the street. Patient also reports mild headache which has been present since admission. He denies CP, SOB, changes in vision, hematochezia, melena, nausea/vomiting, other weakness/tingling/numbness.     Labs on admission remarkable for worsened transaminitis, otherwise unremarkable. Pan scan imaging pending    Fhx:   Mother: DM  Father un-diagnosed Cancer    Social:  Tobacco: Quit 27 years ago  Alcohol: none  Drugs: none    Oncological History:  CT Chest w/ con 3/20:  Mediastinal lymphadenopathy 7.5 cm.   Right middle lobe consolidative opacity 4.6 cm.  5.3 cm liver lesion.  CT A/P 3/23/22: Re-demonstration of hypoattenuating liver lesion, 4.5cm. Two other lesions 2.7 and 2.5 cm.   MRI Brain 3/23/22: Enchacning lesion in the pituitary gland 13mm. 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     Patient underwent diagnostic bronchoscopy with EBUS of RML mass and lymph nodes on 03/22:  Path w/ small cell lung ca.      -Started Carbo/Etop/Atez 4/5/22  -Maintenance atezolizumab 7/5/22  -Consolidative thoracic radiation 8/4/22-8/18/22      Hospital Course: Patient admitted for pain management and concern for worsening metastasis. MRI brain concerning for new metastatic leasion. MRI spine with diffuse osseous metastasis, CT chest/abd/pelv with questionable new lesion near the hepatic dome, Increasing right lower lobe confluent consolidation which may relate to airways, infection or progression of metastatic disease,  Lytic lesion in the right iliac bone has slightly increased from prior. Radiation Oncology consulted, undergwent single fraction to T12, Right ilium, and Right hip. Pain well controlled with Mscontin 30mg Bid with Oxy 5mg Q4 for breakthrough      Goals of Care Treatment Preferences:  Code Status: Full Code      Consults:   Consults (From admission, onward)        Status Ordering Provider     Inpatient consult to Radiation Oncology  Once        Provider:  (Not yet assigned)    Completed BUCK MALONEY        nterval History: NAEO, VSS. Episode of nausea/vomiting following radiation therapy. Relieved with zofran. Patient pain well controlled. Patient does have elevated white cell count this morning, strong indication in response to steroids and radiation therapy. Medically stable for discharge.    Oncology Treatment Plan:   OP SCLC TOPOTECAN Q3W    Review of Systems   Constitutional:  Negative for chills, fatigue and fever.   HENT:  Negative for rhinorrhea, sore throat and trouble swallowing.    Eyes:  Negative for  pain and visual disturbance.   Respiratory:  Positive for cough. Negative for shortness of breath.    Cardiovascular:  Negative for chest pain, palpitations and leg swelling.   Gastrintestinal:  Positive for constipation. Negative for abdominal pain, diarrhea, nausea and vomiting.   Genitourinary:  Negative for difficulty urinating, dysuria, flank pain and hematuria.   Musculoskeletal:  Negative for back pain.   Skin:  Negative for color change and pallor.   Neurological:  Negative for dizziness, tremors, seizures, syncope, weakness, light-headedness, numbness and headaches.   Psychiatric/Behavioral:  Negative for agitation and confusion.    Objective:     Vital Signs (Most Recent):  Temp: 97.5 °F (36.4 °C) (12/10/22 0853)  Pulse: 100 (12/10/22 0853)  Resp: 20 (12/10/22 0854)  BP: (!) 141/76 (12/10/22 0853)  SpO2: (!) 94 % (12/10/22 0853)   Vital Signs (24h Range):  Temp:  [97.5 °F (36.4 °C)-98.7 °F (37.1 °C)] 97.5 °F (36.4 °C)  Pulse:  [] 100  Resp:  [12-20] 20  SpO2:  [92 %-99 %] 94 %  BP: (120-184)/(68-97) 141/76     Weight: 119.3 kg (263 lb)  Body mass index is 41.19 kg/m².  Body surface area is 2.37 meters squared.    No intake or output data in the 24 hours ending 12/10/22 0956    Physical Exam  Vitals reviewed.   Constitutional:       General: He is not in acute distress.     Appearance: He is obese.   HENT:      Head: Normocephalic and atraumatic.      Right Ear: External ear normal.      Left Ear: External ear normal.      Mouth/Throat:      Mouth: Mucous membranes are moist.   Eyes:      General:         Right eye: No discharge.         Left eye: No discharge.      Extraocular Movements: Extraocular movements intact.   Cardiovascular:      Rate and Rhythm: Normal rate and regular rhythm.      Heart sounds: Normal heart sounds. No murmur heard.  Pulmonary:      Effort: No respiratory distress.      Breath sounds: No wheezing.   Abdominal:      General: There is no distension.      Palpations: Abdomen  is soft.      Tenderness: There is no abdominal tenderness.   Musculoskeletal:         General: No tenderness.      Right lower leg: No edema.      Left lower leg: No edema.   Skin:     Findings: No bruising.   Neurological:      General: No focal deficit present.      Mental Status: He is oriented to person, place, and time.      Cranial Nerves: No cranial nerve deficit.      Comments: Weakness noted on right leg, reduced strength   Psychiatric:         Mood and Affect: Mood normal.         Behavior: Behavior normal.     Significant Diagnostic Studies: Labs:   CMP   Recent Labs   Lab 12/09/22  0427 12/10/22  0425   * 135*   K 4.3 4.5    102   CO2 25 22*   * 194*   BUN 14 20   CREATININE 1.0 1.2   CALCIUM 10.1 10.2   PROT 7.2 7.7   ALBUMIN 3.6 3.7   BILITOT 0.8 0.8   ALKPHOS 240* 247*   AST 71* 62*   ALT 99* 106*   ANIONGAP 8 11    and CBC   Recent Labs   Lab 12/09/22  0427 12/10/22  0425   WBC 9.72 15.59*   HGB 15.7 15.3   HCT 47.8 45.5    249       Pending Diagnostic Studies:     None        Final Active Diagnoses:    Diagnosis Date Noted POA    PRINCIPAL PROBLEM:  Cancer related pain [G89.3] 12/07/2022 Unknown    Secondary malignant neoplasm of brain [C79.31] 10/11/2022 Yes    Elevated alanine aminotransferase (ALT) level [R74.01] 09/02/2022 Yes    Acute drug-induced gout of multiple sites [M10.29] 07/26/2022 Yes    Small cell lung cancer [C34.90] 03/30/2022 Yes     Chronic    Secondary malignant neoplasm of liver [C78.7] 03/30/2022 Yes    Type 2 diabetes mellitus, without long-term current use of insulin [E11.9] 03/20/2022 Yes    Primary hypertension [I10] 03/20/2022 Yes      Problems Resolved During this Admission:      Discharged Condition: stable    Disposition: Home or Self Care    Follow Up: with primary oncologist Monday 12/12/22    Patient Instructions:   No discharge procedures on file.  Medications:  Reconciled Home Medications:      Medication List      START taking  these medications    dexAMETHasone 4 MG Tab  Commonly known as: DECADRON  Take 1 tablet (4 mg total) by mouth every 12 (twelve) hours. for 3 doses     morphine 30 MG 12 hr tablet  Commonly known as: MS CONTIN  Take 1 tablet (30 mg total) by mouth every 12 (twelve) hours.     oxyCODONE 5 MG immediate release tablet  Commonly known as: ROXICODONE  Take 1 tablet (5 mg total) by mouth every 4 (four) hours as needed for Pain.     senna-docusate 8.6-50 mg 8.6-50 mg per tablet  Commonly known as: PERICOLACE  Take 1 tablet by mouth daily as needed for Constipation.        CONTINUE taking these medications    albuterol 1.25 mg/3 mL Nebu  Commonly known as: ACCUNEB  Use 1 vial (1.25 mg total) by nebulization 3 (three) times daily as needed (shortness of breath). Rescue     allopurinoL 300 MG tablet  Commonly known as: ZYLOPRIM  Take 1 tablet (300 mg total) by mouth once daily.     losartan 100 MG tablet  Commonly known as: COZAAR  Take 100 mg by mouth once daily.     montelukast 10 mg tablet  Commonly known as: SINGULAIR  Take 10 mg by mouth every evening.     ondansetron 8 MG tablet  Commonly known as: ZOFRAN  Take 1 tablet (8 mg total) by mouth every 8 (eight) hours as needed for Nausea.     sildenafiL 50 MG tablet  Commonly known as: VIAGRA  Take 1 tablet (50 mg total) by mouth daily as needed for Erectile Dysfunction.     TRUE METRIX GLUCOSE TEST STRIP Strp  Generic drug: blood sugar diagnostic     TRUEPLUS LANCETS 33 gauge Misc  Generic drug: lancets        STOP taking these medications    colchicine 0.6 mg tablet  Commonly known as: COLCRYS     DUKE'S SOLUTION (BENADRYL 30 ML, MYLANTA 30 ML, LIDOCAINE 30 ML, NYSTATIN 30 ML)     fluticasone propionate 50 mcg/actuation nasal spray  Commonly known as: FLONASE     hydrOXYzine HCL 25 MG tablet  Commonly known as: ATARAX     indomethacin 50 MG capsule  Commonly known as: INDOCIN     NIFEdipine 60 MG (OSM) 24 hr tablet  Commonly known as: PROCARDIA-XL     pantoprazole 40 MG  tablet  Commonly known as: PROTONIX     promethazine-codeine 6.25-10 mg/5 ml 6.25-10 mg/5 mL syrup  Commonly known as: PHENERGAN with CODEINE            Alexis Kumar MD  Hematology/Oncology  [facility] - Tele[facility] Step[facility]

## 2022-12-10 NOTE — NURSING
PIV removed.  Pt able to walk off unit with wife and personal belongings.  Discharge paperwork give to patient.  Bedside delivery of meds.  Patient verbalizes understanding ofdischarge instructions.

## 2022-12-12 ENCOUNTER — INFUSION (OUTPATIENT)
Dept: INFUSION THERAPY | Facility: HOSPITAL | Age: 57
End: 2022-12-12
Attending: STUDENT IN AN ORGANIZED HEALTH CARE EDUCATION/TRAINING PROGRAM
Payer: MEDICAID

## 2022-12-12 ENCOUNTER — OFFICE VISIT (OUTPATIENT)
Dept: HEMATOLOGY/ONCOLOGY | Facility: CLINIC | Age: 57
End: 2022-12-12
Payer: MEDICAID

## 2022-12-12 VITALS
DIASTOLIC BLOOD PRESSURE: 77 MMHG | RESPIRATION RATE: 18 BRPM | HEART RATE: 101 BPM | SYSTOLIC BLOOD PRESSURE: 155 MMHG | TEMPERATURE: 98 F | OXYGEN SATURATION: 96 %

## 2022-12-12 VITALS
WEIGHT: 251.75 LBS | BODY MASS INDEX: 39.51 KG/M2 | DIASTOLIC BLOOD PRESSURE: 99 MMHG | HEIGHT: 67 IN | HEART RATE: 119 BPM | OXYGEN SATURATION: 96 % | SYSTOLIC BLOOD PRESSURE: 186 MMHG

## 2022-12-12 DIAGNOSIS — G89.3 CANCER RELATED PAIN: ICD-10-CM

## 2022-12-12 DIAGNOSIS — I10 HYPERTENSION, UNSPECIFIED TYPE: Primary | ICD-10-CM

## 2022-12-12 DIAGNOSIS — C34.90 SMALL CELL LUNG CANCER: ICD-10-CM

## 2022-12-12 DIAGNOSIS — Z76.0 MEDICATION REFILL: ICD-10-CM

## 2022-12-12 DIAGNOSIS — C34.90 SMALL CELL LUNG CANCER: Primary | ICD-10-CM

## 2022-12-12 DIAGNOSIS — C78.7 SECONDARY MALIGNANT NEOPLASM OF LIVER: ICD-10-CM

## 2022-12-12 DIAGNOSIS — I10 HYPERTENSION, UNSPECIFIED TYPE: ICD-10-CM

## 2022-12-12 DIAGNOSIS — Z09 FOLLOW-UP EXAM: ICD-10-CM

## 2022-12-12 DIAGNOSIS — E11.69 TYPE 2 DIABETES MELLITUS WITH OTHER SPECIFIED COMPLICATION, WITHOUT LONG-TERM CURRENT USE OF INSULIN: ICD-10-CM

## 2022-12-12 PROCEDURE — 3077F SYST BP >= 140 MM HG: CPT | Mod: CPTII,,, | Performed by: STUDENT IN AN ORGANIZED HEALTH CARE EDUCATION/TRAINING PROGRAM

## 2022-12-12 PROCEDURE — 3044F PR MOST RECENT HEMOGLOBIN A1C LEVEL <7.0%: ICD-10-PCS | Mod: CPTII,,, | Performed by: STUDENT IN AN ORGANIZED HEALTH CARE EDUCATION/TRAINING PROGRAM

## 2022-12-12 PROCEDURE — 3044F HG A1C LEVEL LT 7.0%: CPT | Mod: CPTII,,, | Performed by: STUDENT IN AN ORGANIZED HEALTH CARE EDUCATION/TRAINING PROGRAM

## 2022-12-12 PROCEDURE — 3008F PR BODY MASS INDEX (BMI) DOCUMENTED: ICD-10-PCS | Mod: CPTII,,, | Performed by: STUDENT IN AN ORGANIZED HEALTH CARE EDUCATION/TRAINING PROGRAM

## 2022-12-12 PROCEDURE — 99214 OFFICE O/P EST MOD 30 MIN: CPT | Mod: PBBFAC,25 | Performed by: STUDENT IN AN ORGANIZED HEALTH CARE EDUCATION/TRAINING PROGRAM

## 2022-12-12 PROCEDURE — 1159F PR MEDICATION LIST DOCUMENTED IN MEDICAL RECORD: ICD-10-PCS | Mod: CPTII,,, | Performed by: STUDENT IN AN ORGANIZED HEALTH CARE EDUCATION/TRAINING PROGRAM

## 2022-12-12 PROCEDURE — 96413 CHEMO IV INFUSION 1 HR: CPT

## 2022-12-12 PROCEDURE — 99999 PR PBB SHADOW E&M-EST. PATIENT-LVL IV: CPT | Mod: PBBFAC,,, | Performed by: STUDENT IN AN ORGANIZED HEALTH CARE EDUCATION/TRAINING PROGRAM

## 2022-12-12 PROCEDURE — 96374 THER/PROPH/DIAG INJ IV PUSH: CPT

## 2022-12-12 PROCEDURE — A4216 STERILE WATER/SALINE, 10 ML: HCPCS | Performed by: STUDENT IN AN ORGANIZED HEALTH CARE EDUCATION/TRAINING PROGRAM

## 2022-12-12 PROCEDURE — 1159F MED LIST DOCD IN RCRD: CPT | Mod: CPTII,,, | Performed by: STUDENT IN AN ORGANIZED HEALTH CARE EDUCATION/TRAINING PROGRAM

## 2022-12-12 PROCEDURE — 25000003 PHARM REV CODE 250: Performed by: STUDENT IN AN ORGANIZED HEALTH CARE EDUCATION/TRAINING PROGRAM

## 2022-12-12 PROCEDURE — 4010F PR ACE/ARB THEARPY RXD/TAKEN: ICD-10-PCS | Mod: CPTII,,, | Performed by: STUDENT IN AN ORGANIZED HEALTH CARE EDUCATION/TRAINING PROGRAM

## 2022-12-12 PROCEDURE — 99215 OFFICE O/P EST HI 40 MIN: CPT | Mod: S$PBB,,, | Performed by: STUDENT IN AN ORGANIZED HEALTH CARE EDUCATION/TRAINING PROGRAM

## 2022-12-12 PROCEDURE — 63600175 PHARM REV CODE 636 W HCPCS: Performed by: STUDENT IN AN ORGANIZED HEALTH CARE EDUCATION/TRAINING PROGRAM

## 2022-12-12 PROCEDURE — 99999 PR PBB SHADOW E&M-EST. PATIENT-LVL IV: ICD-10-PCS | Mod: PBBFAC,,, | Performed by: STUDENT IN AN ORGANIZED HEALTH CARE EDUCATION/TRAINING PROGRAM

## 2022-12-12 PROCEDURE — 4010F ACE/ARB THERAPY RXD/TAKEN: CPT | Mod: CPTII,,, | Performed by: STUDENT IN AN ORGANIZED HEALTH CARE EDUCATION/TRAINING PROGRAM

## 2022-12-12 PROCEDURE — 99215 PR OFFICE/OUTPT VISIT, EST, LEVL V, 40-54 MIN: ICD-10-PCS | Mod: S$PBB,,, | Performed by: STUDENT IN AN ORGANIZED HEALTH CARE EDUCATION/TRAINING PROGRAM

## 2022-12-12 PROCEDURE — 3008F BODY MASS INDEX DOCD: CPT | Mod: CPTII,,, | Performed by: STUDENT IN AN ORGANIZED HEALTH CARE EDUCATION/TRAINING PROGRAM

## 2022-12-12 PROCEDURE — 96367 TX/PROPH/DG ADDL SEQ IV INF: CPT

## 2022-12-12 PROCEDURE — 96375 TX/PRO/DX INJ NEW DRUG ADDON: CPT

## 2022-12-12 PROCEDURE — 1111F DSCHRG MED/CURRENT MED MERGE: CPT | Mod: CPTII,,, | Performed by: STUDENT IN AN ORGANIZED HEALTH CARE EDUCATION/TRAINING PROGRAM

## 2022-12-12 PROCEDURE — 3080F DIAST BP >= 90 MM HG: CPT | Mod: CPTII,,, | Performed by: STUDENT IN AN ORGANIZED HEALTH CARE EDUCATION/TRAINING PROGRAM

## 2022-12-12 PROCEDURE — 3080F PR MOST RECENT DIASTOLIC BLOOD PRESSURE >= 90 MM HG: ICD-10-PCS | Mod: CPTII,,, | Performed by: STUDENT IN AN ORGANIZED HEALTH CARE EDUCATION/TRAINING PROGRAM

## 2022-12-12 PROCEDURE — 96365 THER/PROPH/DIAG IV INF INIT: CPT

## 2022-12-12 PROCEDURE — 1111F PR DISCHARGE MEDS RECONCILED W/ CURRENT OUTPATIENT MED LIST: ICD-10-PCS | Mod: CPTII,,, | Performed by: STUDENT IN AN ORGANIZED HEALTH CARE EDUCATION/TRAINING PROGRAM

## 2022-12-12 PROCEDURE — 3077F PR MOST RECENT SYSTOLIC BLOOD PRESSURE >= 140 MM HG: ICD-10-PCS | Mod: CPTII,,, | Performed by: STUDENT IN AN ORGANIZED HEALTH CARE EDUCATION/TRAINING PROGRAM

## 2022-12-12 RX ORDER — HEPARIN 100 UNIT/ML
500 SYRINGE INTRAVENOUS
Status: CANCELLED | OUTPATIENT
Start: 2022-12-13

## 2022-12-12 RX ORDER — HYDROMORPHONE HYDROCHLORIDE 2 MG/ML
1 INJECTION, SOLUTION INTRAMUSCULAR; INTRAVENOUS; SUBCUTANEOUS ONCE
Status: CANCELLED
Start: 2022-12-12 | End: 2022-12-12

## 2022-12-12 RX ORDER — SODIUM CHLORIDE 0.9 % (FLUSH) 0.9 %
10 SYRINGE (ML) INJECTION
Status: CANCELLED | OUTPATIENT
Start: 2022-12-14

## 2022-12-12 RX ORDER — HEPARIN 100 UNIT/ML
500 SYRINGE INTRAVENOUS
Status: CANCELLED | OUTPATIENT
Start: 2022-12-12

## 2022-12-12 RX ORDER — HYDROMORPHONE HYDROCHLORIDE 1 MG/ML
1 INJECTION, SOLUTION INTRAMUSCULAR; INTRAVENOUS; SUBCUTANEOUS ONCE
Status: CANCELLED | OUTPATIENT
Start: 2022-12-12

## 2022-12-12 RX ORDER — LOSARTAN POTASSIUM 25 MG/1
100 TABLET ORAL ONCE
Status: CANCELLED | OUTPATIENT
Start: 2022-12-12

## 2022-12-12 RX ORDER — HEPARIN 100 UNIT/ML
500 SYRINGE INTRAVENOUS
Status: CANCELLED | OUTPATIENT
Start: 2022-12-14

## 2022-12-12 RX ORDER — SODIUM CHLORIDE 0.9 % (FLUSH) 0.9 %
10 SYRINGE (ML) INJECTION
Status: CANCELLED | OUTPATIENT
Start: 2022-12-12

## 2022-12-12 RX ORDER — HEPARIN 100 UNIT/ML
500 SYRINGE INTRAVENOUS
Status: DISCONTINUED | OUTPATIENT
Start: 2022-12-12 | End: 2022-12-12 | Stop reason: HOSPADM

## 2022-12-12 RX ORDER — LOSARTAN POTASSIUM 100 MG/1
100 TABLET ORAL DAILY
Qty: 30 TABLET | Refills: 2 | Status: SHIPPED | OUTPATIENT
Start: 2022-12-12 | End: 2023-04-10 | Stop reason: SDUPTHER

## 2022-12-12 RX ORDER — SODIUM CHLORIDE 0.9 % (FLUSH) 0.9 %
10 SYRINGE (ML) INJECTION
Status: CANCELLED | OUTPATIENT
Start: 2022-12-15

## 2022-12-12 RX ORDER — LOSARTAN POTASSIUM 25 MG/1
100 TABLET ORAL ONCE
Status: DISCONTINUED | OUTPATIENT
Start: 2022-12-12 | End: 2022-12-12

## 2022-12-12 RX ORDER — SODIUM CHLORIDE 0.9 % (FLUSH) 0.9 %
10 SYRINGE (ML) INJECTION
Status: CANCELLED | OUTPATIENT
Start: 2022-12-13

## 2022-12-12 RX ORDER — HEPARIN 100 UNIT/ML
500 SYRINGE INTRAVENOUS
Status: CANCELLED | OUTPATIENT
Start: 2022-12-15

## 2022-12-12 RX ORDER — HYDROMORPHONE HYDROCHLORIDE 1 MG/ML
1 INJECTION, SOLUTION INTRAMUSCULAR; INTRAVENOUS; SUBCUTANEOUS ONCE
Status: COMPLETED | OUTPATIENT
Start: 2022-12-12 | End: 2022-12-12

## 2022-12-12 RX ORDER — SODIUM CHLORIDE 0.9 % (FLUSH) 0.9 %
10 SYRINGE (ML) INJECTION
Status: DISCONTINUED | OUTPATIENT
Start: 2022-12-12 | End: 2022-12-12 | Stop reason: HOSPADM

## 2022-12-12 RX ADMIN — HEPARIN 500 UNITS: 100 SYRINGE at 02:12

## 2022-12-12 RX ADMIN — HYDROMORPHONE HYDROCHLORIDE 1 MG: 1 INJECTION, SOLUTION INTRAMUSCULAR; INTRAVENOUS; SUBCUTANEOUS at 12:12

## 2022-12-12 RX ADMIN — DEXAMETHASONE SODIUM PHOSPHATE 12 MG: 4 INJECTION, SOLUTION INTRAMUSCULAR; INTRAVENOUS at 12:12

## 2022-12-12 RX ADMIN — Medication 10 ML: at 02:12

## 2022-12-12 RX ADMIN — TOPOTECAN HYDROCHLORIDE 3.48 MG: 4 INJECTION, POWDER, LYOPHILIZED, FOR SOLUTION INTRAVENOUS at 01:12

## 2022-12-12 NOTE — PLAN OF CARE
Pt arrived to unit for C2D1 of Topotecan. Labs and follow-up in clinic done prior to. Pt okay to proceed with tx today per . Pt was just recent d/c from the hospital due to his back pain worsening. At home pain medication wasn't helping. Pt given 1mg of Dilaudid IVP with moderate relief. VSS. Pt received pre-med of Dexamethasone. Topotecan infused over 30 minutes. Pt tolerated well. Will return tomorrow for D2 of tx. Discharged from unit in wheelchair.

## 2022-12-12 NOTE — PROGRESS NOTES
[facility]  Hematology/Medical Oncology Clinic       PATIENT: Juan Gillespie  MRN: [id number]  DATE: 12/12/2022    Reason for referral: Extensive stage small cell lung ca    Initial History: Juan Gillespie is a 57 year old man with extensive stage SCLC who presents to clinic for evaluation prior to treatment with C4 of Carboplatin, etoposide and atezo. His oncologist is Dr. Racheal Jennings.    Oncology history per Dr. Jennings's clinic notes : Patient is a 57-year-old male with history of T2DM, HTN, tobacco use who presented to the ED on 3/19/22 for cough, sob, and pleuritic chest pain.      CT Chest w/ con 3/20:  Mediastinal lymphadenopathy 7.5 cm.  Right middle lobe consolidative opacity 4.6 cm.  5.3 cm liver lesion.    CT A/P 3/23/22: Re-demonstration of hypoattenuating liver lesion, 4.5cm. Two other lesions 2.7 and 2.5 cm.     MRI Brain 3/23/22: Enchacning lesion in the pituitary gland 13mm. 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        Patient underwent diagnostic bronchoscopy with EBUS of RML mass and lymph nodes on 03/22:  Path w/ small cell lung ca.     Oncological History:  -Started Carbo/Etop/Atez 4/5/22  -Maintenance atezolizumab 7/5/22  -Consolidative thoracic radiation 8/4/22-8/18/22    Interval History:     Pt presents for MD chahal prior to C2 topetecan after inpt visit for pain control and palliative XRT.  Pt reports improvement in pain but still has significant back pain.  Pt agreeable to resume chemo today and stool softeners increased due to constipation.  BP meds and diabetic supplies refilled at pharmacy.      His wife accompanies him at this visit.  Past Medical History:   Past Medical History:   Diagnosis Date    Diabetes mellitus, type 2     Hypertension        Past Surgical HIstory:   Past Surgical History:   Procedure Laterality Date    ENDOBRONCHIAL ULTRASOUND N/A 3/22/2022    Procedure: ENDOBRONCHIAL ULTRASOUND (EBUS);  Surgeon: Kristin Samuels MD;   Location: [facility] OR [address] [place] [address];  Service: Endoscopy;  Laterality: N/A;    KNEE SURGERY         Family History:   Family History   Problem Relation Age of Onset    Diabetes Mellitus Mother     Pacemaker/defibrilator Father     Lung cancer Father        Social History:  reports that he has never smoked. He has never used smokeless tobacco. He reports that he does not drink alcohol and does not use drugs.    Allergies:  Review of patient's allergies indicates:  No Known Allergies    Medications:  Current Outpatient Medications   Medication Sig Dispense Refill    albuterol (ACCUNEB) 1.25 mg/3 mL Nebu Use 1 vial (1.25 mg total) by nebulization 3 (three) times daily as needed (shortness of breath). Rescue 90 mL 2    allopurinoL (ZYLOPRIM) 300 MG tablet Take 1 tablet (300 mg total) by mouth once daily. 60 tablet 3    dexAMETHasone (DECADRON) 4 MG Tab Take 1 tablet (4 mg total) by mouth every 12 (twelve) hours. for 3 doses 3 tablet 0    montelukast (SINGULAIR) 10 mg tablet Take 10 mg by mouth every evening.      morphine (MS CONTIN) 30 MG 12 hr tablet Take 1 tablet (30 mg total) by mouth every 12 (twelve) hours. 60 tablet 0    ondansetron (ZOFRAN) 8 MG tablet Take 1 tablet (8 mg total) by mouth every 8 (eight) hours as needed for Nausea. 30 tablet 3    oxyCODONE (ROXICODONE) 5 MG immediate release tablet Take 1 tablet (5 mg total) by mouth every 4 (four) hours as needed for Pain. 42 tablet 0    senna-docusate 8.6-50 mg (PERICOLACE) 8.6-50 mg per tablet Take 1 tablet by mouth daily as needed for Constipation. 30 tablet 2    sildenafiL (VIAGRA) 50 MG tablet Take 1 tablet (50 mg total) by mouth daily as needed for Erectile Dysfunction. 30 tablet 0    TRUE METRIX GLUCOSE TEST STRIP Strp       TRUEPLUS LANCETS 33 gauge Misc       losartan (COZAAR) 100 MG tablet Take 1 tablet (100 mg total) by mouth once daily. 30 tablet 2     No current facility-administered medications for this visit.     Facility-Administered Medications Ordered in  "Other Visits   Medication Dose Route Frequency Provider Last Rate Last Admin    dexAMETHasone (DECADRON) 12 mg in sodium chloride 0.9% 50 mL IVPB  12 mg Intravenous 1 time in Clinic/HOD Soledad Irving  mL/hr at 12/12/22 1241 12 mg at 12/12/22 1241    heparin, porcine (PF) 100 unit/mL injection flush 500 Units  500 Units Intravenous PRN Soledad Irving MD        sodium chloride 0.9% flush 10 mL  10 mL Intravenous PRN Soledad Irving MD        topotecan (HYCAMTIN) 3.48 mg in sodium chloride 0.9% 100 mL chemo infusion  1.5 mg/m2 Intravenous 1 time in Clinic/HOD Soledad Irving MD           Review of Systems   Constitutional:  Negative for chills, fatigue and fever.   HENT:  Negative for congestion and trouble swallowing.    Respiratory:  Negative for cough, chest tightness and shortness of breath.    Cardiovascular:  Negative for chest pain.   Gastrointestinal:  Negative for abdominal pain and blood in stool.   Endocrine: Negative for cold intolerance and heat intolerance.   Genitourinary:  Negative for hematuria.   Musculoskeletal:  Positive for arthralgias, back pain and myalgias.   Skin:  Negative for rash.   Neurological:  Negative for weakness.   Hematological:  Negative for adenopathy. Does not bruise/bleed easily.   Psychiatric/Behavioral:  Positive for dysphoric mood. The patient is nervous/anxious.    ECOG Performance Status:   ECOG SCORE    2 - Capable of all selfcare but unable to carry out any work activities, active > 50% of hours         Objective:      Vitals:   Vitals:    12/12/22 1101 12/12/22 1129   BP: (!) 186/119 (!) 186/99   BP Location: Left arm Left arm   Patient Position: Sitting Sitting   BP Method: Large (Automatic) Large (Automatic)   Pulse: (!) 119    SpO2: 96%    Weight: 114.2 kg (251 lb 12.3 oz)    Height: 5' 7" (1.702 m)    telemedicine    Physical Exam  Constitutional:       General: He is in acute distress.      Appearance: Normal appearance. He is not ill-appearing.   HENT:      Head: " Normocephalic and atraumatic.      Nose: Nose normal.      Mouth/Throat:      Mouth: Mucous membranes are moist.      Pharynx: Oropharynx is clear. No oropharyngeal exudate or posterior oropharyngeal erythema.   Eyes:      General: No scleral icterus.     Extraocular Movements: Extraocular movements intact.      Conjunctiva/sclera: Conjunctivae normal.      Pupils: Pupils are equal, round, and reactive to light.   Pulmonary:      Effort: Pulmonary effort is normal. No respiratory distress.   Musculoskeletal:         General: No swelling. Normal range of motion.      Cervical back: Normal range of motion.      Right lower leg: No edema.      Left lower leg: No edema.   Skin:     General: Skin is warm and dry.      Coloration: Skin is not jaundiced.   Neurological:      General: No focal deficit present.      Mental Status: He is alert.   Psychiatric:      Comments: Anxious and tearful     Laboratory Data:  Recent Results (from the past 168 hour(s))   CBC Auto Differential    Collection Time: 12/07/22  9:30 AM   Result Value Ref Range    WBC 9.41 3.90 - 12.70 K/uL    RBC 4.72 4.60 - 6.20 M/uL    Hemoglobin 13.5 (L) 14.0 - 18.0 g/dL    Hematocrit 42.3 40.0 - 54.0 %    MCV 90 82 - 98 fL    MCH 28.6 27.0 - 31.0 pg    MCHC 31.9 (L) 32.0 - 36.0 g/dL    RDW 14.1 11.5 - 14.5 %    Platelets 188 150 - 450 K/uL    MPV 10.1 9.2 - 12.9 fL    Immature Granulocytes 2.0 (H) 0.0 - 0.5 %    Gran # (ANC) 7.0 1.8 - 7.7 K/uL    Immature Grans (Abs) 0.19 (H) 0.00 - 0.04 K/uL    Lymph # 0.8 (L) 1.0 - 4.8 K/uL    Mono # 0.4 0.3 - 1.0 K/uL    Eos # 0.9 (H) 0.0 - 0.5 K/uL    Baso # 0.09 0.00 - 0.20 K/uL    nRBC 0 0 /100 WBC    Gran % 74.7 (H) 38.0 - 73.0 %    Lymph % 8.7 (L) 18.0 - 48.0 %    Mono % 4.5 4.0 - 15.0 %    Eosinophil % 9.1 (H) 0.0 - 8.0 %    Basophil % 1.0 0.0 - 1.9 %    Differential Method Automated    Comprehensive Metabolic Panel    Collection Time: 12/07/22  9:30 AM   Result Value Ref Range    Sodium 136 136 - 145 mmol/L     Potassium 4.6 3.5 - 5.1 mmol/L    Chloride 100 95 - 110 mmol/L    CO2 29 23 - 29 mmol/L    Glucose 196 (H) 70 - 110 mg/dL    BUN 17 6 - 20 mg/dL    Creatinine 1.2 0.5 - 1.4 mg/dL    Calcium 9.7 8.7 - 10.5 mg/dL    Total Protein 7.0 6.0 - 8.4 g/dL    Albumin 3.6 3.5 - 5.2 g/dL    Total Bilirubin 0.8 0.1 - 1.0 mg/dL    Alkaline Phosphatase 223 (H) 55 - 135 U/L    AST 58 (H) 10 - 40 U/L    ALT 82 (H) 10 - 44 U/L    Anion Gap 7 (L) 8 - 16 mmol/L    eGFR >60.0 >60 mL/min/1.73 m^2   POCT glucose    Collection Time: 12/07/22  5:17 PM   Result Value Ref Range    POCT Glucose 108 70 - 110 mg/dL   Hemoglobin A1c if not done in past 3 months    Collection Time: 12/07/22  7:22 PM   Result Value Ref Range    Hemoglobin A1C 6.9 (H) 4.0 - 5.6 %    Estimated Avg Glucose 151 (H) 68 - 131 mg/dL   CBC auto differential    Collection Time: 12/08/22  5:24 AM   Result Value Ref Range    WBC 10.66 3.90 - 12.70 K/uL    RBC 4.86 4.60 - 6.20 M/uL    Hemoglobin 14.0 14.0 - 18.0 g/dL    Hematocrit 42.5 40.0 - 54.0 %    MCV 87 82 - 98 fL    MCH 28.8 27.0 - 31.0 pg    MCHC 32.9 32.0 - 36.0 g/dL    RDW 14.0 11.5 - 14.5 %    Platelets 184 150 - 450 K/uL    MPV 9.7 9.2 - 12.9 fL    Immature Granulocytes 1.8 (H) 0.0 - 0.5 %    Gran # (ANC) 8.7 (H) 1.8 - 7.7 K/uL    Immature Grans (Abs) 0.19 (H) 0.00 - 0.04 K/uL    Lymph # 0.6 (L) 1.0 - 4.8 K/uL    Mono # 0.5 0.3 - 1.0 K/uL    Eos # 0.6 (H) 0.0 - 0.5 K/uL    Baso # 0.11 0.00 - 0.20 K/uL    nRBC 0 0 /100 WBC    Gran % 81.2 (H) 38.0 - 73.0 %    Lymph % 5.8 (L) 18.0 - 48.0 %    Mono % 4.5 4.0 - 15.0 %    Eosinophil % 5.7 0.0 - 8.0 %    Basophil % 1.0 0.0 - 1.9 %    Differential Method Automated    Comprehensive metabolic panel    Collection Time: 12/08/22  5:24 AM   Result Value Ref Range    Sodium 137 136 - 145 mmol/L    Potassium 4.5 3.5 - 5.1 mmol/L    Chloride 102 95 - 110 mmol/L    CO2 25 23 - 29 mmol/L    Glucose 170 (H) 70 - 110 mg/dL    BUN 16 6 - 20 mg/dL    Creatinine 1.1 0.5 - 1.4 mg/dL     Calcium 9.7 8.7 - 10.5 mg/dL    Total Protein 7.0 6.0 - 8.4 g/dL    Albumin 3.5 3.5 - 5.2 g/dL    Total Bilirubin 0.9 0.1 - 1.0 mg/dL    Alkaline Phosphatase 224 (H) 55 - 135 U/L    AST 52 (H) 10 - 40 U/L    ALT 78 (H) 10 - 44 U/L    Anion Gap 10 8 - 16 mmol/L    eGFR >60.0 >60 mL/min/1.73 m^2   Phosphorus    Collection Time: 12/08/22  5:24 AM   Result Value Ref Range    Phosphorus 3.2 2.7 - 4.5 mg/dL   Magnesium    Collection Time: 12/08/22  5:24 AM   Result Value Ref Range    Magnesium 1.9 1.6 - 2.6 mg/dL   POCT glucose    Collection Time: 12/08/22  8:00 AM   Result Value Ref Range    POCT Glucose 158 (H) 70 - 110 mg/dL   POCT glucose    Collection Time: 12/08/22 12:09 PM   Result Value Ref Range    POCT Glucose 133 (H) 70 - 110 mg/dL   POCT glucose    Collection Time: 12/08/22  4:04 PM   Result Value Ref Range    POCT Glucose 110 70 - 110 mg/dL   CBC auto differential    Collection Time: 12/09/22  4:27 AM   Result Value Ref Range    WBC 9.72 3.90 - 12.70 K/uL    RBC 5.46 4.60 - 6.20 M/uL    Hemoglobin 15.7 14.0 - 18.0 g/dL    Hematocrit 47.8 40.0 - 54.0 %    MCV 88 82 - 98 fL    MCH 28.8 27.0 - 31.0 pg    MCHC 32.8 32.0 - 36.0 g/dL    RDW 14.4 11.5 - 14.5 %    Platelets 215 150 - 450 K/uL    MPV 9.4 9.2 - 12.9 fL    Immature Granulocytes 2.0 (H) 0.0 - 0.5 %    Gran # (ANC) 6.8 1.8 - 7.7 K/uL    Immature Grans (Abs) 0.19 (H) 0.00 - 0.04 K/uL    Lymph # 0.9 (L) 1.0 - 4.8 K/uL    Mono # 0.6 0.3 - 1.0 K/uL    Eos # 1.1 (H) 0.0 - 0.5 K/uL    Baso # 0.12 0.00 - 0.20 K/uL    nRBC 0 0 /100 WBC    Gran % 69.5 38.0 - 73.0 %    Lymph % 9.7 (L) 18.0 - 48.0 %    Mono % 6.5 4.0 - 15.0 %    Eosinophil % 11.1 (H) 0.0 - 8.0 %    Basophil % 1.2 0.0 - 1.9 %    Differential Method Automated    Comprehensive metabolic panel    Collection Time: 12/09/22  4:27 AM   Result Value Ref Range    Sodium 134 (L) 136 - 145 mmol/L    Potassium 4.3 3.5 - 5.1 mmol/L    Chloride 101 95 - 110 mmol/L    CO2 25 23 - 29 mmol/L    Glucose 138 (H) 70 -  110 mg/dL    BUN 14 6 - 20 mg/dL    Creatinine 1.0 0.5 - 1.4 mg/dL    Calcium 10.1 8.7 - 10.5 mg/dL    Total Protein 7.2 6.0 - 8.4 g/dL    Albumin 3.6 3.5 - 5.2 g/dL    Total Bilirubin 0.8 0.1 - 1.0 mg/dL    Alkaline Phosphatase 240 (H) 55 - 135 U/L    AST 71 (H) 10 - 40 U/L    ALT 99 (H) 10 - 44 U/L    Anion Gap 8 8 - 16 mmol/L    eGFR >60.0 >60 mL/min/1.73 m^2   Phosphorus    Collection Time: 12/09/22  4:27 AM   Result Value Ref Range    Phosphorus 3.1 2.7 - 4.5 mg/dL   Magnesium    Collection Time: 12/09/22  4:27 AM   Result Value Ref Range    Magnesium 1.9 1.6 - 2.6 mg/dL   POCT glucose    Collection Time: 12/09/22  6:14 AM   Result Value Ref Range    POCT Glucose 148 (H) 70 - 110 mg/dL   POCT glucose    Collection Time: 12/09/22  8:30 AM   Result Value Ref Range    POCT Glucose 173 (H) 70 - 110 mg/dL   POCT glucose    Collection Time: 12/09/22 10:49 AM   Result Value Ref Range    POCT Glucose 191 (H) 70 - 110 mg/dL   POCT glucose    Collection Time: 12/09/22  3:27 PM   Result Value Ref Range    POCT Glucose 185 (H) 70 - 110 mg/dL   CBC auto differential    Collection Time: 12/10/22  4:25 AM   Result Value Ref Range    WBC 15.59 (H) 3.90 - 12.70 K/uL    RBC 5.22 4.60 - 6.20 M/uL    Hemoglobin 15.3 14.0 - 18.0 g/dL    Hematocrit 45.5 40.0 - 54.0 %    MCV 87 82 - 98 fL    MCH 29.3 27.0 - 31.0 pg    MCHC 33.6 32.0 - 36.0 g/dL    RDW 14.6 (H) 11.5 - 14.5 %    Platelets 249 150 - 450 K/uL    MPV 9.4 9.2 - 12.9 fL    Immature Granulocytes 0.8 (H) 0.0 - 0.5 %    Gran # (ANC) 14.7 (H) 1.8 - 7.7 K/uL    Immature Grans (Abs) 0.13 (H) 0.00 - 0.04 K/uL    Lymph # 0.5 (L) 1.0 - 4.8 K/uL    Mono # 0.2 (L) 0.3 - 1.0 K/uL    Eos # 0.0 0.0 - 0.5 K/uL    Baso # 0.03 0.00 - 0.20 K/uL    nRBC 0 0 /100 WBC    Gran % 94.1 (H) 38.0 - 73.0 %    Lymph % 3.3 (L) 18.0 - 48.0 %    Mono % 1.5 (L) 4.0 - 15.0 %    Eosinophil % 0.1 0.0 - 8.0 %    Basophil % 0.2 0.0 - 1.9 %    Differential Method Automated    Comprehensive metabolic panel     Collection Time: 12/10/22  4:25 AM   Result Value Ref Range    Sodium 135 (L) 136 - 145 mmol/L    Potassium 4.5 3.5 - 5.1 mmol/L    Chloride 102 95 - 110 mmol/L    CO2 22 (L) 23 - 29 mmol/L    Glucose 194 (H) 70 - 110 mg/dL    BUN 20 6 - 20 mg/dL    Creatinine 1.2 0.5 - 1.4 mg/dL    Calcium 10.2 8.7 - 10.5 mg/dL    Total Protein 7.7 6.0 - 8.4 g/dL    Albumin 3.7 3.5 - 5.2 g/dL    Total Bilirubin 0.8 0.1 - 1.0 mg/dL    Alkaline Phosphatase 247 (H) 55 - 135 U/L    AST 62 (H) 10 - 40 U/L     (H) 10 - 44 U/L    Anion Gap 11 8 - 16 mmol/L    eGFR >60.0 >60 mL/min/1.73 m^2   Phosphorus    Collection Time: 12/10/22  4:25 AM   Result Value Ref Range    Phosphorus 3.4 2.7 - 4.5 mg/dL   Magnesium    Collection Time: 12/10/22  4:25 AM   Result Value Ref Range    Magnesium 2.1 1.6 - 2.6 mg/dL   POCT glucose    Collection Time: 12/10/22  9:01 AM   Result Value Ref Range    POCT Glucose 216 (H) 70 - 110 mg/dL   Magnesium    Collection Time: 12/12/22 10:08 AM   Result Value Ref Range    Magnesium 2.1 1.6 - 2.6 mg/dL   CBC Auto Differential    Collection Time: 12/12/22 10:08 AM   Result Value Ref Range    WBC 14.24 (H) 3.90 - 12.70 K/uL    RBC 5.38 4.60 - 6.20 M/uL    Hemoglobin 15.5 14.0 - 18.0 g/dL    Hematocrit 48.0 40.0 - 54.0 %    MCV 89 82 - 98 fL    MCH 28.8 27.0 - 31.0 pg    MCHC 32.3 32.0 - 36.0 g/dL    RDW 14.8 (H) 11.5 - 14.5 %    Platelets SEE COMMENT 150 - 450 K/uL    MPV SEE COMMENT 9.2 - 12.9 fL    Immature Granulocytes 1.1 (H) 0.0 - 0.5 %    Gran # (ANC) 12.2 (H) 1.8 - 7.7 K/uL    Immature Grans (Abs) 0.15 (H) 0.00 - 0.04 K/uL    Lymph # 0.8 (L) 1.0 - 4.8 K/uL    Mono # 0.8 0.3 - 1.0 K/uL    Eos # 0.2 0.0 - 0.5 K/uL    Baso # 0.11 0.00 - 0.20 K/uL    nRBC 0 0 /100 WBC    Gran % 85.9 (H) 38.0 - 73.0 %    Lymph % 5.5 (L) 18.0 - 48.0 %    Mono % 5.6 4.0 - 15.0 %    Eosinophil % 1.1 0.0 - 8.0 %    Basophil % 0.8 0.0 - 1.9 %    Platelet Estimate Clumped (A)     Differential Method Automated    Comprehensive  Metabolic Panel    Collection Time: 12/12/22 10:08 AM   Result Value Ref Range    Sodium 140 136 - 145 mmol/L    Potassium 4.1 3.5 - 5.1 mmol/L    Chloride 98 95 - 110 mmol/L    CO2 29 23 - 29 mmol/L    Glucose 179 (H) 70 - 110 mg/dL    BUN 27 (H) 6 - 20 mg/dL    Creatinine 1.3 0.5 - 1.4 mg/dL    Calcium 10.2 8.7 - 10.5 mg/dL    Total Protein 8.2 6.0 - 8.4 g/dL    Albumin 4.1 3.5 - 5.2 g/dL    Total Bilirubin 1.0 0.1 - 1.0 mg/dL    Alkaline Phosphatase 233 (H) 55 - 135 U/L    AST 56 (H) 10 - 40 U/L    ALT 98 (H) 10 - 44 U/L    Anion Gap 13 8 - 16 mmol/L    eGFR >60 >60 mL/min/1.73 m^2       Imaging:   MRI Brain W WO Contrast    Result Date: 10/11/2022  EXAMINATION: MRI BRAIN W WO CONTRAST CLINICAL HISTORY: Small cell lung cancer, brain re-staging; Malignant neoplasm of unspecified part of unspecified bronchus or lung TECHNIQUE: Sagittal and axial T1, axial T2, axial FLAIR, axial gradient, axial diffusion imaging of the whole brain pre-contrast with postcontrast axial T1 and axial spoiled gradient imaging reformatted in the coronal and sagittal planes.. Ten ml of Gadavist injected intravenously. COMPARISON: 07/12/2022 FINDINGS: Interval 1.2 cm enhancing lesion within the left anterior inferior frontal lobe which prominently the ring-enhancing measuring 1.2 x 0.9 x 0.9 cm in AP by TV by CC dimensions respectively in light of history concerning for parenchymal metastases the with question trace susceptibility associated with this lesion. Previously identified heterogeneous enhancing sellar lesion is again seen allowing for routine brain technique with lesion measuring approximately 1.3 cm craniocaudal this may represent pituitary adenoma though indeterminate.  Previous intraluminal filling defect within the right jugular foramen is no longer seen.  There is however diffusion signal abnormality with ill-defined T1 hypointensity within the right occipital condyle concerning for possible osseous metastases the clinical  correlation and further evaluation with nuclear medicine imaging advised. There is continued slight prominent leptomeningeal enhancement along the left cerebellum which raises concern for possible underlying vascular malformation such as a dural AV fistula with collateral vascular engorgement.  There is no restricted diffusion to suggest acute infarction.  Ventricles stable without hydrocephalus.  There is mild edema signal surrounding the left frontal enhancing lesion with continued few scattered punctate size regions of T2 FLAIR signal hyperintensity elsewhere supratentorial white matter which are nonspecific and may represent mild chronic ischemic change. This report was flagged in Epic as abnormal.     Interval development of a small ring-enhancing lesion left anterior inferior frontal lobe concerning for parenchymal metastases in light of history.  Clinical correlation and follow-up advised There is continue though relatively stable heterogeneous enhancement and enlargement of the material within the sella which may represent pituitary adenoma though indeterminate. Previous right internal jugular vein thrombus no longer seen. Abnormal signal along the right occipital condyle concerning for possible osseous metastases clinical correlation and further evaluation with nuclear medicine imaging advised Continued prominent vascularity left cerebellar folia raising concern for possible underlying vascular malformation unchanged.  Further evaluation with noncontrast MRA head may be of further diagnostic value. Electronically signed by: Jonel Lawrence DO Date:    10/11/2022 Time:    10:05    CT Chest Abdomen Pelvis With Contrast (xpd)    Result Date: 10/18/2022  EXAMINATION: CT CHEST ABDOMEN PELVIS WITH CONTRAST (XPD) CLINICAL HISTORY: metastatic small cell lung cancer on maintenance immunotherapy, eval response; Malignant neoplasm of unspecified part of unspecified bronchus or lung TECHNIQUE: Low dose axial images,  sagittal and coronal reformations were obtained from the thoracic inlet to the pubic symphysis following the IV administration of 100 mL of Omnipaque 350 COMPARISON: 07/26/2022 FINDINGS: Right IJ venous shunt tip superior aspect right atrium, absence of clot in included upper right IJ. Fatty infiltration of liver, additional diminished attenuation space-occupying lesions of liver, largest central right hepatic lobe 3.6 cm image 103 series 3.  Stable.  Spleen, adrenal glands, pancreas, gallbladder and biliary tree normal. Urinary bladder normal.  Prostate gland very small 4 cm versus postop prostatectomy.  No free fluid or retroperitoneal adenopathy.  GI track normal. Tiny nonobstructive right lower and left upper renal pole stones. New lymph node left retro manubrial 3.4 cm image 28 series 3 appearing in the interim.  Paratracheal conglomerate adenopathy 2.1 x 2.5 cm, was 1.6 x 3 cm.  Subcarinal adenopathy stable.  No new hilar or mediastinal adenopathy. Degenerative disc spondylosis cervicothoracic junction., focal osteoblastic opacities involve humeral heads both clavicle superimposed on erosive DJD more prominent on right.  Additional focal osteoblastic opacities sternum, ribs, dorsal lumbar sacral spine pelvis and both femoral head and trochanteric regions.  Moderate anterior spondylosis dorsal spine and degenerative disc spondylosis facet joint arthropathy lumbosacral junction with DJD SI joints.  Fracture defects left lower anterior chest wall stable. Scattered calcified plaque great vessels aorta iliac femoral arteries. Dominant medial segment right middle lobe mass 1.1 x 2.7 cm image 69 series 3, was 1.6 x 3.4 cm.  Additional patchy nodular lesions right lower lobe posterior basilar segments, largest 1.4 cm image 66 series 3 suspicious for metastatic disease and/or superimposed inflammatory and infectious process.  Additional patchy infiltrates medial posterior segment right upper lobe and right lower lobe  particularly medial basilar segment image 91 series 3.  No new pleural reaction.  Tracheobronchial tree normal. .     1. New presumed metastatic node anterior superior left mediastinum, paratracheal adenopathy otherwise stable. 2. Decreased size in right middle lobe mass with new evidence of metastatic disease right lower lobe versus superimposed inflammatory infectious process discussed above. 3. New developing metastatic lesions liver. 4. Bony universal metastatic disease stable. 5. Recist summary: 6. Compare with previous CT chest abdomen pelvis 07/26/2022 7. Lesion 1: Right middle lobe mass 1.1 x 2.7 cm was 1.6 x 13.4 cm. 8. Lesion 2: Right paratracheal conned Willett a adenopathy 2.1 x 2.5 cm, was 1.6 x 3 cm. 9. Lesion 3: Dome 1.9 cm stable.  (New progressive liver metastatic disease noted elsewhere) 10. Lesion 4: Caudal aspect right hepatic lobe 1 cm, stable 11.  This report was flagged in Epic as abnormal. Electronically signed by: Dewayne Hutchinson MD Date:    10/18/2022 Time:    09:28       Assessment and Plan          Extensive stage small cell lung cancer  -Initially diagnosed with extensive stage small cell lung cancer in 03/2022 who was treated with carbo/etop/atezolizumab from 04/05/2022 - 06/14/22 with partial response. He subsequently completed consolidation thoracic RT 8/4/22 - 8/18/22, and he was on maintenance atezolizumab.   -10/18/22 showing progression of disease in lungs and liver.  Asymptomatic.  -Dr. Sosa discussed with him plan for topotecan and pt was consented  -Pt tolerated cycle 1 well but delayed C2 due to being out of town  -S/p admission for pain control and palliative XRT on 12/07/22  Plan 12/12/22  -Keep pt on schedule for C2 of topotecan today  -RTC in for MD and tx  -Follow up with palliative care      Time spent with pt: 40 minutes      Problem List Items Addressed This Visit          Cardiac/Vascular    HTN (hypertension)       Oncology    Small cell lung cancer - Primary  (Chronic)    Overview     Initially diagnosed with extensive stage small cell lung cancer in 03/2022 who was treated with carbo/etop/atezolizumab from 04/05/2022 - 06/14/22 with partial response. He subsequently completed consolidation thoracic RT 8/4/22 - 8/18/22, and he was on maintenance atezolizumab.          Relevant Medications    losartan (COZAAR) 100 MG tablet    Other Relevant Orders    Ambulatory referral/consult to CLINIC Palliative Care    Cancer related pain       Endocrine    Type 2 diabetes mellitus, without long-term current use of insulin     Other Visit Diagnoses       Follow-up exam        Medication refill              Soledad Irving MD  Hematology Oncology

## 2022-12-13 ENCOUNTER — INFUSION (OUTPATIENT)
Dept: INFUSION THERAPY | Facility: HOSPITAL | Age: 57
End: 2022-12-13
Attending: STUDENT IN AN ORGANIZED HEALTH CARE EDUCATION/TRAINING PROGRAM
Payer: MEDICAID

## 2022-12-13 VITALS — SYSTOLIC BLOOD PRESSURE: 143 MMHG | HEART RATE: 97 BPM | DIASTOLIC BLOOD PRESSURE: 80 MMHG | OXYGEN SATURATION: 96 %

## 2022-12-13 DIAGNOSIS — C34.90 SMALL CELL LUNG CANCER: ICD-10-CM

## 2022-12-13 DIAGNOSIS — I10 HYPERTENSION, UNSPECIFIED TYPE: Primary | ICD-10-CM

## 2022-12-13 DIAGNOSIS — C78.7 SECONDARY MALIGNANT NEOPLASM OF LIVER: ICD-10-CM

## 2022-12-13 PROCEDURE — 96413 CHEMO IV INFUSION 1 HR: CPT

## 2022-12-13 PROCEDURE — 63600175 PHARM REV CODE 636 W HCPCS: Performed by: STUDENT IN AN ORGANIZED HEALTH CARE EDUCATION/TRAINING PROGRAM

## 2022-12-13 PROCEDURE — 25000003 PHARM REV CODE 250: Performed by: STUDENT IN AN ORGANIZED HEALTH CARE EDUCATION/TRAINING PROGRAM

## 2022-12-13 PROCEDURE — A4216 STERILE WATER/SALINE, 10 ML: HCPCS | Performed by: STUDENT IN AN ORGANIZED HEALTH CARE EDUCATION/TRAINING PROGRAM

## 2022-12-13 PROCEDURE — 96374 THER/PROPH/DIAG INJ IV PUSH: CPT

## 2022-12-13 PROCEDURE — 96365 THER/PROPH/DIAG IV INF INIT: CPT

## 2022-12-13 PROCEDURE — 77336 RADIATION PHYSICS CONSULT: CPT | Performed by: RADIOLOGY

## 2022-12-13 PROCEDURE — 96375 TX/PRO/DX INJ NEW DRUG ADDON: CPT

## 2022-12-13 PROCEDURE — 96367 TX/PROPH/DG ADDL SEQ IV INF: CPT

## 2022-12-13 RX ORDER — HEPARIN 100 UNIT/ML
500 SYRINGE INTRAVENOUS
Status: DISCONTINUED | OUTPATIENT
Start: 2022-12-13 | End: 2022-12-14 | Stop reason: HOSPADM

## 2022-12-13 RX ORDER — HYDROMORPHONE HYDROCHLORIDE 1 MG/ML
1 INJECTION, SOLUTION INTRAMUSCULAR; INTRAVENOUS; SUBCUTANEOUS
Status: COMPLETED | OUTPATIENT
Start: 2022-12-13 | End: 2022-12-13

## 2022-12-13 RX ORDER — SODIUM CHLORIDE 0.9 % (FLUSH) 0.9 %
10 SYRINGE (ML) INJECTION
Status: DISCONTINUED | OUTPATIENT
Start: 2022-12-13 | End: 2022-12-14 | Stop reason: HOSPADM

## 2022-12-13 RX ADMIN — Medication 10 ML: at 02:12

## 2022-12-13 RX ADMIN — HYDROMORPHONE HYDROCHLORIDE 1 MG: 1 INJECTION, SOLUTION INTRAMUSCULAR; INTRAVENOUS; SUBCUTANEOUS at 01:12

## 2022-12-13 RX ADMIN — DEXAMETHASONE SODIUM PHOSPHATE 12 MG: 4 INJECTION, SOLUTION INTRAMUSCULAR; INTRAVENOUS at 01:12

## 2022-12-13 RX ADMIN — TOPOTECAN HYDROCHLORIDE 3.48 MG: 4 INJECTION, POWDER, LYOPHILIZED, FOR SOLUTION INTRAVENOUS at 01:12

## 2022-12-13 RX ADMIN — HEPARIN 500 UNITS: 100 SYRINGE at 02:12

## 2022-12-13 RX ADMIN — SODIUM CHLORIDE: 0.9 INJECTION, SOLUTION INTRAVENOUS at 01:12

## 2022-12-14 ENCOUNTER — INFUSION (OUTPATIENT)
Dept: INFUSION THERAPY | Facility: HOSPITAL | Age: 57
End: 2022-12-14
Attending: STUDENT IN AN ORGANIZED HEALTH CARE EDUCATION/TRAINING PROGRAM
Payer: MEDICAID

## 2022-12-14 VITALS
SYSTOLIC BLOOD PRESSURE: 153 MMHG | DIASTOLIC BLOOD PRESSURE: 80 MMHG | TEMPERATURE: 98 F | OXYGEN SATURATION: 95 % | RESPIRATION RATE: 18 BRPM | HEART RATE: 103 BPM

## 2022-12-14 DIAGNOSIS — C78.7 SECONDARY MALIGNANT NEOPLASM OF LIVER: ICD-10-CM

## 2022-12-14 DIAGNOSIS — I10 HYPERTENSION, UNSPECIFIED TYPE: Primary | ICD-10-CM

## 2022-12-14 DIAGNOSIS — C34.90 SMALL CELL LUNG CANCER: ICD-10-CM

## 2022-12-14 PROCEDURE — 63600175 PHARM REV CODE 636 W HCPCS: Performed by: STUDENT IN AN ORGANIZED HEALTH CARE EDUCATION/TRAINING PROGRAM

## 2022-12-14 PROCEDURE — A4216 STERILE WATER/SALINE, 10 ML: HCPCS | Performed by: STUDENT IN AN ORGANIZED HEALTH CARE EDUCATION/TRAINING PROGRAM

## 2022-12-14 PROCEDURE — 25000003 PHARM REV CODE 250: Performed by: STUDENT IN AN ORGANIZED HEALTH CARE EDUCATION/TRAINING PROGRAM

## 2022-12-14 PROCEDURE — 96413 CHEMO IV INFUSION 1 HR: CPT

## 2022-12-14 PROCEDURE — 96367 TX/PROPH/DG ADDL SEQ IV INF: CPT

## 2022-12-14 PROCEDURE — 96365 THER/PROPH/DIAG IV INF INIT: CPT

## 2022-12-14 RX ORDER — SODIUM CHLORIDE 0.9 % (FLUSH) 0.9 %
10 SYRINGE (ML) INJECTION
Status: DISCONTINUED | OUTPATIENT
Start: 2022-12-14 | End: 2022-12-14 | Stop reason: HOSPADM

## 2022-12-14 RX ORDER — HEPARIN 100 UNIT/ML
500 SYRINGE INTRAVENOUS
Status: DISCONTINUED | OUTPATIENT
Start: 2022-12-14 | End: 2022-12-14 | Stop reason: HOSPADM

## 2022-12-14 RX ADMIN — HEPARIN 500 UNITS: 100 SYRINGE at 12:12

## 2022-12-14 RX ADMIN — Medication 10 ML: at 12:12

## 2022-12-14 RX ADMIN — DEXAMETHASONE SODIUM PHOSPHATE 12 MG: 10 INJECTION, SOLUTION INTRAMUSCULAR; INTRAVENOUS at 10:12

## 2022-12-14 RX ADMIN — TOPOTECAN HYDROCHLORIDE 3.48 MG: 4 INJECTION, POWDER, LYOPHILIZED, FOR SOLUTION INTRAVENOUS at 11:12

## 2022-12-14 NOTE — PLAN OF CARE
Patient arrived to unit for C2D2 of Topotecan chemotherapy infusion. Patient using a wheelchair for long distances. Pt accompanied by his wife. Patient reported being in 7/10 pain. MD notified. Dilaudid 1mg ordered. Patient accessed and IV fluids infusing to gravity. Dilaudid 1mg IVP given. Patient reported mild relief and stated pain is tolerable. Decadron given prior to initiating treatment. Topotecan infused over 30 mins. Infusion tolerated well. VSS. Patient's next appointment 12/14/2022. Discharged off unit using a wheelchair escorted by his wife in no acute signs of distress.

## 2022-12-14 NOTE — PLAN OF CARE
Problem: Anemia (Chemotherapy Effects)  Goal: Anemia Symptom Improvement  Outcome: Ongoing, Progressing     Problem: Urinary Bleeding Risk or Actual (Chemotherapy Effects)  Goal: Absence of Hematuria  Outcome: Ongoing, Progressing     Problem: Nausea and Vomiting (Chemotherapy Effects)  Goal: Fluid and Electrolyte Balance  Outcome: Ongoing, Progressing     Problem: Neurotoxicity (Chemotherapy Effects)  Goal: Neurotoxicity Symptom Control  Outcome: Ongoing, Progressing     Problem: Neutropenia (Chemotherapy Effects)  Goal: Absence of Infection  Outcome: Ongoing, Progressing     Problem: Thrombocytopenia Bleeding Risk (Chemotherapy Effects)  Goal: Absence of Bleeding  Outcome: Ongoing, Progressing     Problem: Oral Mucositis (Chemotherapy Effects)  Goal: Improved Oral Mucous Membrane Integrity  Outcome: Ongoing, Progressing

## 2022-12-14 NOTE — PLAN OF CARE
Pt completed C2D3 of Topotecan. VSS. Pt's pain in control today. Is ambulating with more ease. Pt received pre-med of Dexamethasone. Topotecan infused over 30 minutes. Tolerated well. Will return tomorrow for C2D4. Discharged from unit in [facility].

## 2022-12-15 ENCOUNTER — INFUSION (OUTPATIENT)
Dept: INFUSION THERAPY | Facility: HOSPITAL | Age: 57
End: 2022-12-15
Attending: STUDENT IN AN ORGANIZED HEALTH CARE EDUCATION/TRAINING PROGRAM
Payer: MEDICAID

## 2022-12-15 VITALS
OXYGEN SATURATION: 95 % | TEMPERATURE: 98 F | RESPIRATION RATE: 17 BRPM | SYSTOLIC BLOOD PRESSURE: 146 MMHG | DIASTOLIC BLOOD PRESSURE: 70 MMHG | HEART RATE: 97 BPM

## 2022-12-15 DIAGNOSIS — C34.90 SMALL CELL LUNG CANCER: ICD-10-CM

## 2022-12-15 DIAGNOSIS — I10 HYPERTENSION, UNSPECIFIED TYPE: Primary | ICD-10-CM

## 2022-12-15 DIAGNOSIS — C34.90 SMALL CELL LUNG CANCER: Primary | Chronic | ICD-10-CM

## 2022-12-15 DIAGNOSIS — C78.7 SECONDARY MALIGNANT NEOPLASM OF LIVER: ICD-10-CM

## 2022-12-15 PROCEDURE — 96365 THER/PROPH/DIAG IV INF INIT: CPT

## 2022-12-15 PROCEDURE — 25000003 PHARM REV CODE 250: Performed by: STUDENT IN AN ORGANIZED HEALTH CARE EDUCATION/TRAINING PROGRAM

## 2022-12-15 PROCEDURE — 96367 TX/PROPH/DG ADDL SEQ IV INF: CPT

## 2022-12-15 PROCEDURE — 63600175 PHARM REV CODE 636 W HCPCS: Performed by: STUDENT IN AN ORGANIZED HEALTH CARE EDUCATION/TRAINING PROGRAM

## 2022-12-15 PROCEDURE — 96413 CHEMO IV INFUSION 1 HR: CPT

## 2022-12-15 PROCEDURE — A4216 STERILE WATER/SALINE, 10 ML: HCPCS | Performed by: STUDENT IN AN ORGANIZED HEALTH CARE EDUCATION/TRAINING PROGRAM

## 2022-12-15 RX ORDER — HEPARIN 100 UNIT/ML
500 SYRINGE INTRAVENOUS
Status: DISCONTINUED | OUTPATIENT
Start: 2022-12-15 | End: 2022-12-15 | Stop reason: HOSPADM

## 2022-12-15 RX ORDER — SODIUM CHLORIDE 0.9 % (FLUSH) 0.9 %
10 SYRINGE (ML) INJECTION
Status: DISCONTINUED | OUTPATIENT
Start: 2022-12-15 | End: 2022-12-15 | Stop reason: HOSPADM

## 2022-12-15 RX ADMIN — HEPARIN 500 UNITS: 100 SYRINGE at 02:12

## 2022-12-15 RX ADMIN — DEXAMETHASONE SODIUM PHOSPHATE 12 MG: 4 INJECTION, SOLUTION INTRAMUSCULAR; INTRAVENOUS at 01:12

## 2022-12-15 RX ADMIN — TOPOTECAN HYDROCHLORIDE 3.48 MG: 4 INJECTION, POWDER, LYOPHILIZED, FOR SOLUTION INTRAVENOUS at 01:12

## 2022-12-15 RX ADMIN — Medication 10 ML: at 02:12

## 2022-12-16 ENCOUNTER — INFUSION (OUTPATIENT)
Dept: INFUSION THERAPY | Facility: HOSPITAL | Age: 57
End: 2022-12-16
Attending: STUDENT IN AN ORGANIZED HEALTH CARE EDUCATION/TRAINING PROGRAM
Payer: MEDICAID

## 2022-12-16 DIAGNOSIS — C78.7 SECONDARY MALIGNANT NEOPLASM OF LIVER: ICD-10-CM

## 2022-12-16 DIAGNOSIS — I10 HYPERTENSION, UNSPECIFIED TYPE: Primary | ICD-10-CM

## 2022-12-16 DIAGNOSIS — C34.90 SMALL CELL LUNG CANCER: ICD-10-CM

## 2022-12-16 PROCEDURE — 63600175 PHARM REV CODE 636 W HCPCS: Mod: TB | Performed by: STUDENT IN AN ORGANIZED HEALTH CARE EDUCATION/TRAINING PROGRAM

## 2022-12-16 PROCEDURE — 96372 THER/PROPH/DIAG INJ SC/IM: CPT

## 2022-12-16 RX ADMIN — PEGFILGRASTIM 6 MG: 6 INJECTION SUBCUTANEOUS at 01:12

## 2022-12-16 NOTE — PLAN OF CARE
Pt received C2D5 of Fulphila. No new or worsening complaints to report today. Tolerated injection well. Pt and spouse have next upcoming appointments through [facility]. Discharged from unit in [facility].

## 2022-12-20 DIAGNOSIS — C34.90 SMALL CELL LUNG CANCER: ICD-10-CM

## 2022-12-20 DIAGNOSIS — Z51.5 PALLIATIVE CARE BY SPECIALIST: ICD-10-CM

## 2022-12-20 RX ORDER — OXYCODONE HYDROCHLORIDE 5 MG/1
5 TABLET ORAL EVERY 6 HOURS PRN
Qty: 60 TABLET | Refills: 0 | Status: SHIPPED | OUTPATIENT
Start: 2022-12-20 | End: 2023-01-04

## 2022-12-30 ENCOUNTER — LAB VISIT (OUTPATIENT)
Dept: LAB | Facility: HOSPITAL | Age: 57
End: 2022-12-30
Attending: STUDENT IN AN ORGANIZED HEALTH CARE EDUCATION/TRAINING PROGRAM
Payer: MEDICAID

## 2022-12-30 DIAGNOSIS — C34.90 SMALL CELL LUNG CANCER: Chronic | ICD-10-CM

## 2022-12-30 LAB
ALBUMIN SERPL BCP-MCNC: 3.6 G/DL (ref 3.5–5.2)
ALP SERPL-CCNC: 360 U/L (ref 55–135)
ALT SERPL W/O P-5'-P-CCNC: 52 U/L (ref 10–44)
ANION GAP SERPL CALC-SCNC: 11 MMOL/L (ref 8–16)
ANISOCYTOSIS BLD QL SMEAR: SLIGHT
AST SERPL-CCNC: 27 U/L (ref 10–40)
BASOPHILS # BLD AUTO: ABNORMAL K/UL (ref 0–0.2)
BASOPHILS NFR BLD: 0 % (ref 0–1.9)
BILIRUB SERPL-MCNC: 0.6 MG/DL (ref 0.1–1)
BUN SERPL-MCNC: 16 MG/DL (ref 6–20)
CALCIUM SERPL-MCNC: 9.3 MG/DL (ref 8.7–10.5)
CHLORIDE SERPL-SCNC: 105 MMOL/L (ref 95–110)
CO2 SERPL-SCNC: 24 MMOL/L (ref 23–29)
CREAT SERPL-MCNC: 1.3 MG/DL (ref 0.5–1.4)
DIFFERENTIAL METHOD: ABNORMAL
EOSINOPHIL # BLD AUTO: ABNORMAL K/UL (ref 0–0.5)
EOSINOPHIL NFR BLD: 4 % (ref 0–8)
ERYTHROCYTE [DISTWIDTH] IN BLOOD BY AUTOMATED COUNT: 14.9 % (ref 11.5–14.5)
EST. GFR  (NO RACE VARIABLE): >60 ML/MIN/1.73 M^2
GLUCOSE SERPL-MCNC: 271 MG/DL (ref 70–110)
HCT VFR BLD AUTO: 39.4 % (ref 40–54)
HGB BLD-MCNC: 12.7 G/DL (ref 14–18)
IMM GRANULOCYTES # BLD AUTO: ABNORMAL K/UL (ref 0–0.04)
IMM GRANULOCYTES NFR BLD AUTO: ABNORMAL % (ref 0–0.5)
LYMPHOCYTES # BLD AUTO: ABNORMAL K/UL (ref 1–4.8)
LYMPHOCYTES NFR BLD: 10 % (ref 18–48)
MAGNESIUM SERPL-MCNC: 2 MG/DL (ref 1.6–2.6)
MCH RBC QN AUTO: 28.5 PG (ref 27–31)
MCHC RBC AUTO-ENTMCNC: 32.2 G/DL (ref 32–36)
MCV RBC AUTO: 88 FL (ref 82–98)
MONOCYTES # BLD AUTO: ABNORMAL K/UL (ref 0.3–1)
MONOCYTES NFR BLD: 7 % (ref 4–15)
MYELOCYTES NFR BLD MANUAL: 8 %
NEUTROPHILS NFR BLD: 71 % (ref 38–73)
NRBC BLD-RTO: 0 /100 WBC
PLATELET # BLD AUTO: 359 K/UL (ref 150–450)
PLATELET BLD QL SMEAR: ABNORMAL
PMV BLD AUTO: 9.5 FL (ref 9.2–12.9)
POLYCHROMASIA BLD QL SMEAR: ABNORMAL
POTASSIUM SERPL-SCNC: 4.7 MMOL/L (ref 3.5–5.1)
PROT SERPL-MCNC: 7.3 G/DL (ref 6–8.4)
RBC # BLD AUTO: 4.46 M/UL (ref 4.6–6.2)
SODIUM SERPL-SCNC: 140 MMOL/L (ref 136–145)
WBC # BLD AUTO: 9.85 K/UL (ref 3.9–12.7)

## 2022-12-30 PROCEDURE — 80053 COMPREHEN METABOLIC PANEL: CPT | Performed by: STUDENT IN AN ORGANIZED HEALTH CARE EDUCATION/TRAINING PROGRAM

## 2022-12-30 PROCEDURE — 85027 COMPLETE CBC AUTOMATED: CPT | Performed by: STUDENT IN AN ORGANIZED HEALTH CARE EDUCATION/TRAINING PROGRAM

## 2022-12-30 PROCEDURE — 85007 BL SMEAR W/DIFF WBC COUNT: CPT | Performed by: STUDENT IN AN ORGANIZED HEALTH CARE EDUCATION/TRAINING PROGRAM

## 2022-12-30 PROCEDURE — 36415 COLL VENOUS BLD VENIPUNCTURE: CPT | Performed by: STUDENT IN AN ORGANIZED HEALTH CARE EDUCATION/TRAINING PROGRAM

## 2022-12-30 PROCEDURE — 83735 ASSAY OF MAGNESIUM: CPT | Performed by: STUDENT IN AN ORGANIZED HEALTH CARE EDUCATION/TRAINING PROGRAM

## 2023-01-03 ENCOUNTER — INFUSION (OUTPATIENT)
Dept: INFUSION THERAPY | Facility: HOSPITAL | Age: 58
End: 2023-01-03
Attending: STUDENT IN AN ORGANIZED HEALTH CARE EDUCATION/TRAINING PROGRAM
Payer: MEDICAID

## 2023-01-03 ENCOUNTER — OFFICE VISIT (OUTPATIENT)
Dept: HEMATOLOGY/ONCOLOGY | Facility: CLINIC | Age: 58
End: 2023-01-03
Payer: MEDICAID

## 2023-01-03 VITALS
BODY MASS INDEX: 38.96 KG/M2 | HEART RATE: 124 BPM | OXYGEN SATURATION: 98 % | WEIGHT: 248.25 LBS | DIASTOLIC BLOOD PRESSURE: 78 MMHG | SYSTOLIC BLOOD PRESSURE: 129 MMHG | HEIGHT: 67 IN

## 2023-01-03 VITALS
TEMPERATURE: 98 F | SYSTOLIC BLOOD PRESSURE: 125 MMHG | DIASTOLIC BLOOD PRESSURE: 66 MMHG | HEART RATE: 117 BPM | RESPIRATION RATE: 18 BRPM | OXYGEN SATURATION: 96 %

## 2023-01-03 DIAGNOSIS — Z76.0 MEDICATION REFILL: ICD-10-CM

## 2023-01-03 DIAGNOSIS — C34.90 SMALL CELL LUNG CANCER: ICD-10-CM

## 2023-01-03 DIAGNOSIS — Z09 FOLLOW-UP EXAM: ICD-10-CM

## 2023-01-03 DIAGNOSIS — I10 HYPERTENSION, UNSPECIFIED TYPE: Primary | ICD-10-CM

## 2023-01-03 DIAGNOSIS — C34.90 SMALL CELL LUNG CANCER: Primary | ICD-10-CM

## 2023-01-03 DIAGNOSIS — I10 HYPERTENSION, UNSPECIFIED TYPE: ICD-10-CM

## 2023-01-03 DIAGNOSIS — Z51.11 ENCOUNTER FOR ANTINEOPLASTIC CHEMOTHERAPY: ICD-10-CM

## 2023-01-03 DIAGNOSIS — E11.69 TYPE 2 DIABETES MELLITUS WITH OTHER SPECIFIED COMPLICATION, WITHOUT LONG-TERM CURRENT USE OF INSULIN: ICD-10-CM

## 2023-01-03 DIAGNOSIS — Z51.5 PALLIATIVE CARE BY SPECIALIST: ICD-10-CM

## 2023-01-03 DIAGNOSIS — J10.1 INFLUENZA A: ICD-10-CM

## 2023-01-03 DIAGNOSIS — C78.7 SECONDARY MALIGNANT NEOPLASM OF LIVER: ICD-10-CM

## 2023-01-03 PROCEDURE — 3008F PR BODY MASS INDEX (BMI) DOCUMENTED: ICD-10-PCS | Mod: CPTII,,, | Performed by: STUDENT IN AN ORGANIZED HEALTH CARE EDUCATION/TRAINING PROGRAM

## 2023-01-03 PROCEDURE — 25000003 PHARM REV CODE 250: Performed by: STUDENT IN AN ORGANIZED HEALTH CARE EDUCATION/TRAINING PROGRAM

## 2023-01-03 PROCEDURE — 96413 CHEMO IV INFUSION 1 HR: CPT

## 2023-01-03 PROCEDURE — A4216 STERILE WATER/SALINE, 10 ML: HCPCS | Performed by: STUDENT IN AN ORGANIZED HEALTH CARE EDUCATION/TRAINING PROGRAM

## 2023-01-03 PROCEDURE — 99999 PR PBB SHADOW E&M-EST. PATIENT-LVL II: ICD-10-PCS | Mod: PBBFAC,,, | Performed by: STUDENT IN AN ORGANIZED HEALTH CARE EDUCATION/TRAINING PROGRAM

## 2023-01-03 PROCEDURE — 63600175 PHARM REV CODE 636 W HCPCS: Performed by: STUDENT IN AN ORGANIZED HEALTH CARE EDUCATION/TRAINING PROGRAM

## 2023-01-03 PROCEDURE — 3074F PR MOST RECENT SYSTOLIC BLOOD PRESSURE < 130 MM HG: ICD-10-PCS | Mod: CPTII,,, | Performed by: STUDENT IN AN ORGANIZED HEALTH CARE EDUCATION/TRAINING PROGRAM

## 2023-01-03 PROCEDURE — 3078F DIAST BP <80 MM HG: CPT | Mod: CPTII,,, | Performed by: STUDENT IN AN ORGANIZED HEALTH CARE EDUCATION/TRAINING PROGRAM

## 2023-01-03 PROCEDURE — 99215 PR OFFICE/OUTPT VISIT, EST, LEVL V, 40-54 MIN: ICD-10-PCS | Mod: S$PBB,,, | Performed by: STUDENT IN AN ORGANIZED HEALTH CARE EDUCATION/TRAINING PROGRAM

## 2023-01-03 PROCEDURE — 3074F SYST BP LT 130 MM HG: CPT | Mod: CPTII,,, | Performed by: STUDENT IN AN ORGANIZED HEALTH CARE EDUCATION/TRAINING PROGRAM

## 2023-01-03 PROCEDURE — 99212 OFFICE O/P EST SF 10 MIN: CPT | Mod: PBBFAC,25 | Performed by: STUDENT IN AN ORGANIZED HEALTH CARE EDUCATION/TRAINING PROGRAM

## 2023-01-03 PROCEDURE — 99999 PR PBB SHADOW E&M-EST. PATIENT-LVL II: CPT | Mod: PBBFAC,,, | Performed by: STUDENT IN AN ORGANIZED HEALTH CARE EDUCATION/TRAINING PROGRAM

## 2023-01-03 PROCEDURE — 1111F DSCHRG MED/CURRENT MED MERGE: CPT | Mod: CPTII,,, | Performed by: STUDENT IN AN ORGANIZED HEALTH CARE EDUCATION/TRAINING PROGRAM

## 2023-01-03 PROCEDURE — 96365 THER/PROPH/DIAG IV INF INIT: CPT | Mod: 59

## 2023-01-03 PROCEDURE — 99215 OFFICE O/P EST HI 40 MIN: CPT | Mod: S$PBB,,, | Performed by: STUDENT IN AN ORGANIZED HEALTH CARE EDUCATION/TRAINING PROGRAM

## 2023-01-03 PROCEDURE — 3008F BODY MASS INDEX DOCD: CPT | Mod: CPTII,,, | Performed by: STUDENT IN AN ORGANIZED HEALTH CARE EDUCATION/TRAINING PROGRAM

## 2023-01-03 PROCEDURE — 1111F PR DISCHARGE MEDS RECONCILED W/ CURRENT OUTPATIENT MED LIST: ICD-10-PCS | Mod: CPTII,,, | Performed by: STUDENT IN AN ORGANIZED HEALTH CARE EDUCATION/TRAINING PROGRAM

## 2023-01-03 PROCEDURE — 3078F PR MOST RECENT DIASTOLIC BLOOD PRESSURE < 80 MM HG: ICD-10-PCS | Mod: CPTII,,, | Performed by: STUDENT IN AN ORGANIZED HEALTH CARE EDUCATION/TRAINING PROGRAM

## 2023-01-03 RX ORDER — SODIUM CHLORIDE 0.9 % (FLUSH) 0.9 %
10 SYRINGE (ML) INJECTION
Status: DISCONTINUED | OUTPATIENT
Start: 2023-01-03 | End: 2023-01-03 | Stop reason: HOSPADM

## 2023-01-03 RX ORDER — PROMETHAZINE HYDROCHLORIDE AND CODEINE PHOSPHATE 6.25; 1 MG/5ML; MG/5ML
5 SOLUTION ORAL EVERY 6 HOURS PRN
Qty: 473 ML | Refills: 0 | Status: SHIPPED | OUTPATIENT
Start: 2023-01-03 | End: 2023-01-23 | Stop reason: SDUPTHER

## 2023-01-03 RX ORDER — SODIUM CHLORIDE 0.9 % (FLUSH) 0.9 %
10 SYRINGE (ML) INJECTION
Status: CANCELLED | OUTPATIENT
Start: 2023-01-05

## 2023-01-03 RX ORDER — HEPARIN 100 UNIT/ML
500 SYRINGE INTRAVENOUS
Status: CANCELLED | OUTPATIENT
Start: 2023-01-06

## 2023-01-03 RX ORDER — HEPARIN 100 UNIT/ML
500 SYRINGE INTRAVENOUS
Status: CANCELLED | OUTPATIENT
Start: 2023-01-03

## 2023-01-03 RX ORDER — HEPARIN 100 UNIT/ML
500 SYRINGE INTRAVENOUS
Status: DISCONTINUED | OUTPATIENT
Start: 2023-01-03 | End: 2023-01-03 | Stop reason: HOSPADM

## 2023-01-03 RX ORDER — SODIUM CHLORIDE 0.9 % (FLUSH) 0.9 %
10 SYRINGE (ML) INJECTION
Status: CANCELLED | OUTPATIENT
Start: 2023-01-03

## 2023-01-03 RX ORDER — SODIUM CHLORIDE 0.9 % (FLUSH) 0.9 %
10 SYRINGE (ML) INJECTION
Status: CANCELLED | OUTPATIENT
Start: 2023-01-06

## 2023-01-03 RX ORDER — SODIUM CHLORIDE 0.9 % (FLUSH) 0.9 %
10 SYRINGE (ML) INJECTION
Status: CANCELLED | OUTPATIENT
Start: 2023-01-04

## 2023-01-03 RX ORDER — HEPARIN 100 UNIT/ML
500 SYRINGE INTRAVENOUS
Status: CANCELLED | OUTPATIENT
Start: 2023-01-04

## 2023-01-03 RX ORDER — HEPARIN 100 UNIT/ML
500 SYRINGE INTRAVENOUS
Status: CANCELLED | OUTPATIENT
Start: 2023-01-05

## 2023-01-03 RX ADMIN — Medication 10 ML: at 10:01

## 2023-01-03 RX ADMIN — TOPOTECAN HYDROCHLORIDE 3.47 MG: 4 INJECTION, POWDER, LYOPHILIZED, FOR SOLUTION INTRAVENOUS at 09:01

## 2023-01-03 RX ADMIN — DEXAMETHASONE SODIUM PHOSPHATE 12 MG: 4 INJECTION, SOLUTION INTRAMUSCULAR; INTRAVENOUS at 08:01

## 2023-01-03 RX ADMIN — HEPARIN 500 UNITS: 100 SYRINGE at 10:01

## 2023-01-03 NOTE — PROGRESS NOTES
[facility]  Hematology/Medical Oncology Clinic       PATIENT: Juan Gillespie  MRN: [id number]  DATE: 1/3/2023    Reason for referral: Extensive stage small cell lung ca    Initial History: Juan Gillespie is a 57 year old man with extensive stage SCLC who presents to clinic for evaluation prior to treatment with C4 of Carboplatin, etoposide and atezo. His oncologist is Dr. Racheal Jennings.    Oncology history per Dr. Jennings's clinic notes : Patient is a 57-year-old male with history of T2DM, HTN, tobacco use who presented to the ED on 3/19/22 for cough, sob, and pleuritic chest pain.      CT Chest w/ con 3/20:  Mediastinal lymphadenopathy 7.5 cm.  Right middle lobe consolidative opacity 4.6 cm.  5.3 cm liver lesion.    CT A/P 3/23/22: Re-demonstration of hypoattenuating liver lesion, 4.5cm. Two other lesions 2.7 and 2.5 cm.     MRI Brain 3/23/22: Enchacning lesion in the pituitary gland 13mm. 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        Patient underwent diagnostic bronchoscopy with EBUS of RML mass and lymph nodes on 03/22:  Path w/ small cell lung ca.     Oncological History:  -Started Carbo/Etop/Atez 4/5/22  -Maintenance atezolizumab 7/5/22  -Consolidative thoracic radiation 8/4/22-8/18/22    Interval History:     Pt presents for MD chahal prior to C3 topetecan.  Pt feeling much better and is no longer having to take pain meds after last tx.  Cough medicine refilled.  No other issue or complaints voiced.    His wife accompanies him at this visit.  Past Medical History:   Past Medical History:   Diagnosis Date    Diabetes mellitus, type 2     Hypertension        Past Surgical HIstory:   Past Surgical History:   Procedure Laterality Date    ENDOBRONCHIAL ULTRASOUND N/A 3/22/2022    Procedure: ENDOBRONCHIAL ULTRASOUND (EBUS);  Surgeon: Kristin Samuels MD;  Location: [facility] OR [address] [place] [address];  Service: Endoscopy;  Laterality: N/A;    KNEE SURGERY         Family History:   Family History    Problem Relation Age of Onset    Diabetes Mellitus Mother     Pacemaker/defibrilator Father     Lung cancer Father        Social History:  reports that he has never smoked. He has never used smokeless tobacco. He reports that he does not drink alcohol and does not use drugs.    Allergies:  Review of patient's allergies indicates:  No Known Allergies    Medications:  Current Outpatient Medications   Medication Sig Dispense Refill    albuterol (ACCUNEB) 1.25 mg/3 mL Nebu Use 1 vial (1.25 mg total) by nebulization 3 (three) times daily as needed (shortness of breath). Rescue 90 mL 2    allopurinoL (ZYLOPRIM) 300 MG tablet Take 1 tablet (300 mg total) by mouth once daily. 60 tablet 3    losartan (COZAAR) 100 MG tablet Take 1 tablet (100 mg total) by mouth once daily. 30 tablet 2    montelukast (SINGULAIR) 10 mg tablet Take 10 mg by mouth every evening.      morphine (MS CONTIN) 30 MG 12 hr tablet Take 1 tablet (30 mg total) by mouth every 12 (twelve) hours. 60 tablet 0    ondansetron (ZOFRAN) 8 MG tablet Take 1 tablet (8 mg total) by mouth every 8 (eight) hours as needed for Nausea. 30 tablet 3    oxyCODONE (ROXICODONE) 5 MG immediate release tablet Take 1 tablet (5 mg total) by mouth every 6 (six) hours as needed for Pain. 60 tablet 0    senna-docusate 8.6-50 mg (PERICOLACE) 8.6-50 mg per tablet Take 1 tablet by mouth daily as needed for Constipation. 30 tablet 2    sildenafiL (VIAGRA) 50 MG tablet Take 1 tablet (50 mg total) by mouth daily as needed for Erectile Dysfunction. 30 tablet 0    TRUE METRIX GLUCOSE TEST STRIP Strp       TRUEPLUS LANCETS 33 gauge Misc        No current facility-administered medications for this visit.       Review of Systems   Constitutional:  Negative for chills, fatigue and fever.   HENT:  Negative for congestion and trouble swallowing.    Respiratory:  Positive for cough. Negative for chest tightness and shortness of breath.    Cardiovascular:  Negative for chest pain.    Gastrointestinal:  Negative for abdominal pain and blood in stool.   Endocrine: Negative for cold intolerance and heat intolerance.   Genitourinary:  Negative for hematuria.   Musculoskeletal:  Negative for arthralgias, back pain and myalgias.   Skin:  Negative for rash.   Neurological:  Negative for weakness.   Hematological:  Negative for adenopathy. Does not bruise/bleed easily.   Psychiatric/Behavioral:  Negative for dysphoric mood. The patient is not nervous/anxious.    ECOG Performance Status:   ECOG SCORE             Objective:      Vitals:   There were no vitals filed for this visit.  telemedicine    Physical Exam  Constitutional:       General: He is not in acute distress.     Appearance: Normal appearance. He is not ill-appearing.   HENT:      Head: Normocephalic and atraumatic.      Nose: Nose normal.      Mouth/Throat:      Mouth: Mucous membranes are moist.      Pharynx: Oropharynx is clear. No oropharyngeal exudate or posterior oropharyngeal erythema.   Eyes:      General: No scleral icterus.     Extraocular Movements: Extraocular movements intact.      Conjunctiva/sclera: Conjunctivae normal.      Pupils: Pupils are equal, round, and reactive to light.   Pulmonary:      Effort: Pulmonary effort is normal. No respiratory distress.   Abdominal:      General: Abdomen is flat.      Palpations: Abdomen is soft.   Musculoskeletal:         General: No swelling. Normal range of motion.      Cervical back: Normal range of motion.      Right lower leg: No edema.      Left lower leg: No edema.   Skin:     General: Skin is warm and dry.      Coloration: Skin is not jaundiced.   Neurological:      General: No focal deficit present.      Mental Status: He is alert.   Psychiatric:         Mood and Affect: Mood normal.         Behavior: Behavior normal.         Thought Content: Thought content normal.         Judgment: Judgment normal.     Laboratory Data:  Recent Results (from the past 168 hour(s))   CBC auto  differential    Collection Time: 12/30/22 10:54 AM   Result Value Ref Range    WBC 9.85 3.90 - 12.70 K/uL    RBC 4.46 (L) 4.60 - 6.20 M/uL    Hemoglobin 12.7 (L) 14.0 - 18.0 g/dL    Hematocrit 39.4 (L) 40.0 - 54.0 %    MCV 88 82 - 98 fL    MCH 28.5 27.0 - 31.0 pg    MCHC 32.2 32.0 - 36.0 g/dL    RDW 14.9 (H) 11.5 - 14.5 %    Platelets 359 150 - 450 K/uL    MPV 9.5 9.2 - 12.9 fL    Immature Granulocytes CANCELED 0.0 - 0.5 %    Immature Grans (Abs) CANCELED 0.00 - 0.04 K/uL    Lymph # CANCELED 1.0 - 4.8 K/uL    Mono # CANCELED 0.3 - 1.0 K/uL    Eos # CANCELED 0.0 - 0.5 K/uL    Baso # CANCELED 0.00 - 0.20 K/uL    nRBC 0 0 /100 WBC    Gran % 71.0 38.0 - 73.0 %    Lymph % 10.0 (L) 18.0 - 48.0 %    Mono % 7.0 4.0 - 15.0 %    Eosinophil % 4.0 0.0 - 8.0 %    Basophil % 0.0 0.0 - 1.9 %    Myelocytes 8.0 %    Platelet Estimate Appears normal     Aniso Slight     Poly Occasional     Differential Method Manual    Comprehensive Metabolic Panel    Collection Time: 12/30/22 10:54 AM   Result Value Ref Range    Sodium 140 136 - 145 mmol/L    Potassium 4.7 3.5 - 5.1 mmol/L    Chloride 105 95 - 110 mmol/L    CO2 24 23 - 29 mmol/L    Glucose 271 (H) 70 - 110 mg/dL    BUN 16 6 - 20 mg/dL    Creatinine 1.3 0.5 - 1.4 mg/dL    Calcium 9.3 8.7 - 10.5 mg/dL    Total Protein 7.3 6.0 - 8.4 g/dL    Albumin 3.6 3.5 - 5.2 g/dL    Total Bilirubin 0.6 0.1 - 1.0 mg/dL    Alkaline Phosphatase 360 (H) 55 - 135 U/L    AST 27 10 - 40 U/L    ALT 52 (H) 10 - 44 U/L    Anion Gap 11 8 - 16 mmol/L    eGFR >60 >60 mL/min/1.73 m^2   Magnesium    Collection Time: 12/30/22 10:54 AM   Result Value Ref Range    Magnesium 2.0 1.6 - 2.6 mg/dL       Imaging:   MRI Brain W WO Contrast    Result Date: 10/11/2022  EXAMINATION: MRI BRAIN W WO CONTRAST CLINICAL HISTORY: Small cell lung cancer, brain re-staging; Malignant neoplasm of unspecified part of unspecified bronchus or lung TECHNIQUE: Sagittal and axial T1, axial T2, axial FLAIR, axial gradient, axial diffusion  imaging of the whole brain pre-contrast with postcontrast axial T1 and axial spoiled gradient imaging reformatted in the coronal and sagittal planes.. Ten ml of Gadavist injected intravenously. COMPARISON: 07/12/2022 FINDINGS: Interval 1.2 cm enhancing lesion within the left anterior inferior frontal lobe which prominently the ring-enhancing measuring 1.2 x 0.9 x 0.9 cm in AP by TV by CC dimensions respectively in light of history concerning for parenchymal metastases the with question trace susceptibility associated with this lesion. Previously identified heterogeneous enhancing sellar lesion is again seen allowing for routine brain technique with lesion measuring approximately 1.3 cm craniocaudal this may represent pituitary adenoma though indeterminate.  Previous intraluminal filling defect within the right jugular foramen is no longer seen.  There is however diffusion signal abnormality with ill-defined T1 hypointensity within the right occipital condyle concerning for possible osseous metastases the clinical correlation and further evaluation with nuclear medicine imaging advised. There is continued slight prominent leptomeningeal enhancement along the left cerebellum which raises concern for possible underlying vascular malformation such as a dural AV fistula with collateral vascular engorgement.  There is no restricted diffusion to suggest acute infarction.  Ventricles stable without hydrocephalus.  There is mild edema signal surrounding the left frontal enhancing lesion with continued few scattered punctate size regions of T2 FLAIR signal hyperintensity elsewhere supratentorial white matter which are nonspecific and may represent mild chronic ischemic change. This report was flagged in Epic as abnormal.     Interval development of a small ring-enhancing lesion left anterior inferior frontal lobe concerning for parenchymal metastases in light of history.  Clinical correlation and follow-up advised There is  continue though relatively stable heterogeneous enhancement and enlargement of the material within the sella which may represent pituitary adenoma though indeterminate. Previous right internal jugular vein thrombus no longer seen. Abnormal signal along the right occipital condyle concerning for possible osseous metastases clinical correlation and further evaluation with nuclear medicine imaging advised Continued prominent vascularity left cerebellar folia raising concern for possible underlying vascular malformation unchanged.  Further evaluation with noncontrast MRA head may be of further diagnostic value. Electronically signed by: Jonel Lawrence DO Date:    10/11/2022 Time:    10:05    CT Chest Abdomen Pelvis With Contrast (xpd)    Result Date: 10/18/2022  EXAMINATION: CT CHEST ABDOMEN PELVIS WITH CONTRAST (XPD) CLINICAL HISTORY: metastatic small cell lung cancer on maintenance immunotherapy, eval response; Malignant neoplasm of unspecified part of unspecified bronchus or lung TECHNIQUE: Low dose axial images, sagittal and coronal reformations were obtained from the thoracic inlet to the pubic symphysis following the IV administration of 100 mL of Omnipaque 350 COMPARISON: 07/26/2022 FINDINGS: Right IJ venous shunt tip superior aspect right atrium, absence of clot in included upper right IJ. Fatty infiltration of liver, additional diminished attenuation space-occupying lesions of liver, largest central right hepatic lobe 3.6 cm image 103 series 3.  Stable.  Spleen, adrenal glands, pancreas, gallbladder and biliary tree normal. Urinary bladder normal.  Prostate gland very small 4 cm versus postop prostatectomy.  No free fluid or retroperitoneal adenopathy.  GI track normal. Tiny nonobstructive right lower and left upper renal pole stones. New lymph node left retro manubrial 3.4 cm image 28 series 3 appearing in the interim.  Paratracheal conglomerate adenopathy 2.1 x 2.5 cm, was 1.6 x 3 cm.  Subcarinal adenopathy  stable.  No new hilar or mediastinal adenopathy. Degenerative disc spondylosis cervicothoracic junction., focal osteoblastic opacities involve humeral heads both clavicle superimposed on erosive DJD more prominent on right.  Additional focal osteoblastic opacities sternum, ribs, dorsal lumbar sacral spine pelvis and both femoral head and trochanteric regions.  Moderate anterior spondylosis dorsal spine and degenerative disc spondylosis facet joint arthropathy lumbosacral junction with DJD SI joints.  Fracture defects left lower anterior chest wall stable. Scattered calcified plaque great vessels aorta iliac femoral arteries. Dominant medial segment right middle lobe mass 1.1 x 2.7 cm image 69 series 3, was 1.6 x 3.4 cm.  Additional patchy nodular lesions right lower lobe posterior basilar segments, largest 1.4 cm image 66 series 3 suspicious for metastatic disease and/or superimposed inflammatory and infectious process.  Additional patchy infiltrates medial posterior segment right upper lobe and right lower lobe particularly medial basilar segment image 91 series 3.  No new pleural reaction.  Tracheobronchial tree normal. .     1. New presumed metastatic node anterior superior left mediastinum, paratracheal adenopathy otherwise stable. 2. Decreased size in right middle lobe mass with new evidence of metastatic disease right lower lobe versus superimposed inflammatory infectious process discussed above. 3. New developing metastatic lesions liver. 4. Bony universal metastatic disease stable. 5. Recist summary: 6. Compare with previous CT chest abdomen pelvis 07/26/2022 7. Lesion 1: Right middle lobe mass 1.1 x 2.7 cm was 1.6 x 13.4 cm. 8. Lesion 2: Right paratracheal conned Willett a adenopathy 2.1 x 2.5 cm, was 1.6 x 3 cm. 9. Lesion 3: Dome 1.9 cm stable.  (New progressive liver metastatic disease noted elsewhere) 10. Lesion 4: Caudal aspect right hepatic lobe 1 cm, stable 11.  This report was flagged in Epic as  abnormal. Electronically signed by: Dewayne Hutchinson MD Date:    10/18/2022 Time:    09:28       Assessment and Plan          Extensive stage small cell lung cancer  -Initially diagnosed with extensive stage small cell lung cancer in 03/2022 who was treated with carbo/etop/atezolizumab from 04/05/2022 - 06/14/22 with partial response. He subsequently completed consolidation thoracic RT 8/4/22 - 8/18/22, and he was on maintenance atezolizumab.   -10/18/22 showing progression of disease in lungs and liver.  Asymptomatic.  -Dr. Sosa discussed with him plan for topotecan and pt was consented  -Pt tolerated cycle 1 well but delayed C2 due to being out of town  -S/p admission for pain control and palliative XRT on 12/07/22  Plan 01/03/23  -Keep pt on schedule for C3 of topotecan today  -RTC in for MD and tx in 3 weeks  -Follow up with palliative care      Time spent with pt: 40 minutes      Problem List Items Addressed This Visit    None      Soledad Irving MD  Hematology Oncology

## 2023-01-03 NOTE — PLAN OF CARE
Pt completed C3D1 of Topotecan. Follow-up in clinic done with  prior to. Labs and plan of care reviewed. Pt reporting his back pain has been under control. Does endorse some lightheadedness today. Room air O2 was 92-93% in clinic. Pt put on 2L O2 by NC. Oxygen went back up to 95%. VSS. Pt received pre-med of Dexamethasone. Topotecan infused over 30 minutes. Pt tolerated well. Will return tomorrow for D2 of tx. Discharged from unit in [facility].

## 2023-01-04 ENCOUNTER — INFUSION (OUTPATIENT)
Dept: INFUSION THERAPY | Facility: HOSPITAL | Age: 58
End: 2023-01-04
Attending: STUDENT IN AN ORGANIZED HEALTH CARE EDUCATION/TRAINING PROGRAM
Payer: MEDICAID

## 2023-01-04 VITALS
TEMPERATURE: 98 F | OXYGEN SATURATION: 96 % | HEART RATE: 107 BPM | DIASTOLIC BLOOD PRESSURE: 76 MMHG | RESPIRATION RATE: 18 BRPM | SYSTOLIC BLOOD PRESSURE: 132 MMHG

## 2023-01-04 DIAGNOSIS — C78.7 SECONDARY MALIGNANT NEOPLASM OF LIVER: ICD-10-CM

## 2023-01-04 DIAGNOSIS — I10 HYPERTENSION, UNSPECIFIED TYPE: Primary | ICD-10-CM

## 2023-01-04 DIAGNOSIS — C34.90 SMALL CELL LUNG CANCER: ICD-10-CM

## 2023-01-04 PROCEDURE — A4216 STERILE WATER/SALINE, 10 ML: HCPCS | Performed by: STUDENT IN AN ORGANIZED HEALTH CARE EDUCATION/TRAINING PROGRAM

## 2023-01-04 PROCEDURE — 96367 TX/PROPH/DG ADDL SEQ IV INF: CPT

## 2023-01-04 PROCEDURE — 25000003 PHARM REV CODE 250: Performed by: STUDENT IN AN ORGANIZED HEALTH CARE EDUCATION/TRAINING PROGRAM

## 2023-01-04 PROCEDURE — 96413 CHEMO IV INFUSION 1 HR: CPT

## 2023-01-04 PROCEDURE — 63600175 PHARM REV CODE 636 W HCPCS: Performed by: STUDENT IN AN ORGANIZED HEALTH CARE EDUCATION/TRAINING PROGRAM

## 2023-01-04 RX ORDER — SODIUM CHLORIDE 0.9 % (FLUSH) 0.9 %
10 SYRINGE (ML) INJECTION
Status: DISCONTINUED | OUTPATIENT
Start: 2023-01-04 | End: 2023-01-04 | Stop reason: HOSPADM

## 2023-01-04 RX ORDER — HEPARIN 100 UNIT/ML
500 SYRINGE INTRAVENOUS
Status: DISCONTINUED | OUTPATIENT
Start: 2023-01-04 | End: 2023-01-04 | Stop reason: HOSPADM

## 2023-01-04 RX ADMIN — Medication 10 ML: at 01:01

## 2023-01-04 RX ADMIN — TOPOTECAN HYDROCHLORIDE 3.47 MG: 4 INJECTION, POWDER, LYOPHILIZED, FOR SOLUTION INTRAVENOUS at 12:01

## 2023-01-04 RX ADMIN — HEPARIN 500 UNITS: 100 SYRINGE at 01:01

## 2023-01-04 RX ADMIN — DEXAMETHASONE SODIUM PHOSPHATE 12 MG: 4 INJECTION, SOLUTION INTRAMUSCULAR; INTRAVENOUS at 12:01

## 2023-01-04 NOTE — PLAN OF CARE
Patient arrived to unit for C3D2 Topotecan infusion. Patient reports lightheadness and mild SOB after walking to the unit. Patient connected 2L of O2 via NC, sats 96% -97%. Patient reported relief. Dexamethasone pre-medication infused over 20 mins. Topotecan infused over 30 mins patient. Patient denied any new or worsening symptoms post infusion. Infusion tolerated well. O2 sats on room air 95%-96%. Patient educated to space out activities to decrease fatigue. MD Maria Fernanda notified. No new orders at this time.  Patient discharged ambulating independently in no signs of acute distress.

## 2023-01-05 ENCOUNTER — INFUSION (OUTPATIENT)
Dept: INFUSION THERAPY | Facility: HOSPITAL | Age: 58
End: 2023-01-05
Attending: STUDENT IN AN ORGANIZED HEALTH CARE EDUCATION/TRAINING PROGRAM
Payer: MEDICAID

## 2023-01-05 VITALS
TEMPERATURE: 98 F | DIASTOLIC BLOOD PRESSURE: 76 MMHG | SYSTOLIC BLOOD PRESSURE: 147 MMHG | RESPIRATION RATE: 18 BRPM | HEART RATE: 104 BPM | OXYGEN SATURATION: 97 %

## 2023-01-05 DIAGNOSIS — C34.90 SMALL CELL LUNG CANCER: ICD-10-CM

## 2023-01-05 DIAGNOSIS — C78.7 SECONDARY MALIGNANT NEOPLASM OF LIVER: ICD-10-CM

## 2023-01-05 DIAGNOSIS — I10 HYPERTENSION, UNSPECIFIED TYPE: Primary | ICD-10-CM

## 2023-01-05 PROCEDURE — A4216 STERILE WATER/SALINE, 10 ML: HCPCS | Performed by: STUDENT IN AN ORGANIZED HEALTH CARE EDUCATION/TRAINING PROGRAM

## 2023-01-05 PROCEDURE — 96413 CHEMO IV INFUSION 1 HR: CPT

## 2023-01-05 PROCEDURE — 63600175 PHARM REV CODE 636 W HCPCS: Performed by: STUDENT IN AN ORGANIZED HEALTH CARE EDUCATION/TRAINING PROGRAM

## 2023-01-05 PROCEDURE — 25000003 PHARM REV CODE 250: Performed by: STUDENT IN AN ORGANIZED HEALTH CARE EDUCATION/TRAINING PROGRAM

## 2023-01-05 PROCEDURE — 96365 THER/PROPH/DIAG IV INF INIT: CPT | Mod: 59

## 2023-01-05 RX ORDER — SODIUM CHLORIDE 0.9 % (FLUSH) 0.9 %
10 SYRINGE (ML) INJECTION
Status: DISCONTINUED | OUTPATIENT
Start: 2023-01-05 | End: 2023-01-05 | Stop reason: HOSPADM

## 2023-01-05 RX ORDER — HEPARIN 100 UNIT/ML
500 SYRINGE INTRAVENOUS
Status: DISCONTINUED | OUTPATIENT
Start: 2023-01-05 | End: 2023-01-05 | Stop reason: HOSPADM

## 2023-01-05 RX ADMIN — DEXAMETHASONE SODIUM PHOSPHATE 12 MG: 4 INJECTION, SOLUTION INTRAMUSCULAR; INTRAVENOUS at 11:01

## 2023-01-05 RX ADMIN — HEPARIN 500 UNITS: 100 SYRINGE at 01:01

## 2023-01-05 RX ADMIN — TOPOTECAN HYDROCHLORIDE 3.47 MG: 4 INJECTION, POWDER, LYOPHILIZED, FOR SOLUTION INTRAVENOUS at 12:01

## 2023-01-05 RX ADMIN — Medication 10 ML: at 01:01

## 2023-01-05 NOTE — PLAN OF CARE
Pt completed C3D3 of Topotecan. Continues with fatigue. Doesn't endorse lightheadedness today. Room air O2 97%. VSS. Pt received pre-med of Dexamethasone. Topotecan infused over 30 minutes. Pt tolerated well. Will return tomorrow for D4 of tx. Discharged from unit in [facility].

## 2023-01-06 ENCOUNTER — INFUSION (OUTPATIENT)
Dept: INFUSION THERAPY | Facility: HOSPITAL | Age: 58
End: 2023-01-06
Attending: STUDENT IN AN ORGANIZED HEALTH CARE EDUCATION/TRAINING PROGRAM
Payer: MEDICAID

## 2023-01-06 VITALS
DIASTOLIC BLOOD PRESSURE: 66 MMHG | SYSTOLIC BLOOD PRESSURE: 135 MMHG | RESPIRATION RATE: 18 BRPM | HEART RATE: 112 BPM | OXYGEN SATURATION: 94 % | TEMPERATURE: 99 F

## 2023-01-06 DIAGNOSIS — C34.90 SMALL CELL LUNG CANCER: ICD-10-CM

## 2023-01-06 DIAGNOSIS — I10 HYPERTENSION, UNSPECIFIED TYPE: Primary | ICD-10-CM

## 2023-01-06 DIAGNOSIS — C78.7 SECONDARY MALIGNANT NEOPLASM OF LIVER: ICD-10-CM

## 2023-01-06 PROCEDURE — 96413 CHEMO IV INFUSION 1 HR: CPT

## 2023-01-06 PROCEDURE — 63600175 PHARM REV CODE 636 W HCPCS: Performed by: STUDENT IN AN ORGANIZED HEALTH CARE EDUCATION/TRAINING PROGRAM

## 2023-01-06 PROCEDURE — A4216 STERILE WATER/SALINE, 10 ML: HCPCS | Performed by: STUDENT IN AN ORGANIZED HEALTH CARE EDUCATION/TRAINING PROGRAM

## 2023-01-06 PROCEDURE — 96367 TX/PROPH/DG ADDL SEQ IV INF: CPT

## 2023-01-06 PROCEDURE — 25000003 PHARM REV CODE 250: Performed by: STUDENT IN AN ORGANIZED HEALTH CARE EDUCATION/TRAINING PROGRAM

## 2023-01-06 RX ORDER — HEPARIN 100 UNIT/ML
500 SYRINGE INTRAVENOUS
Status: DISCONTINUED | OUTPATIENT
Start: 2023-01-06 | End: 2023-01-25 | Stop reason: HOSPADM

## 2023-01-06 RX ORDER — SODIUM CHLORIDE 0.9 % (FLUSH) 0.9 %
10 SYRINGE (ML) INJECTION
Status: DISCONTINUED | OUTPATIENT
Start: 2023-01-06 | End: 2023-01-25 | Stop reason: HOSPADM

## 2023-01-06 RX ADMIN — TOPOTECAN HYDROCHLORIDE 3.47 MG: 4 INJECTION, POWDER, LYOPHILIZED, FOR SOLUTION INTRAVENOUS at 12:01

## 2023-01-06 RX ADMIN — Medication 10 ML: at 02:01

## 2023-01-06 RX ADMIN — DEXAMETHASONE SODIUM PHOSPHATE 12 MG: 4 INJECTION, SOLUTION INTRAMUSCULAR; INTRAVENOUS at 11:01

## 2023-01-06 RX ADMIN — HEPARIN 500 UNITS: 100 SYRINGE at 02:01

## 2023-01-07 ENCOUNTER — INFUSION (OUTPATIENT)
Dept: INFUSION THERAPY | Facility: HOSPITAL | Age: 58
End: 2023-01-07
Attending: STUDENT IN AN ORGANIZED HEALTH CARE EDUCATION/TRAINING PROGRAM
Payer: MEDICAID

## 2023-01-07 VITALS
OXYGEN SATURATION: 97 % | RESPIRATION RATE: 19 BRPM | SYSTOLIC BLOOD PRESSURE: 153 MMHG | TEMPERATURE: 98 F | HEART RATE: 107 BPM | DIASTOLIC BLOOD PRESSURE: 94 MMHG

## 2023-01-07 DIAGNOSIS — I10 HYPERTENSION, UNSPECIFIED TYPE: Primary | ICD-10-CM

## 2023-01-07 DIAGNOSIS — C34.90 SMALL CELL LUNG CANCER: ICD-10-CM

## 2023-01-07 DIAGNOSIS — C78.7 SECONDARY MALIGNANT NEOPLASM OF LIVER: ICD-10-CM

## 2023-01-07 PROCEDURE — 96372 THER/PROPH/DIAG INJ SC/IM: CPT

## 2023-01-07 PROCEDURE — 63600175 PHARM REV CODE 636 W HCPCS: Mod: TB | Performed by: STUDENT IN AN ORGANIZED HEALTH CARE EDUCATION/TRAINING PROGRAM

## 2023-01-07 RX ADMIN — PEGFILGRASTIM 6 MG: 6 INJECTION SUBCUTANEOUS at 10:01

## 2023-01-10 ENCOUNTER — PATIENT MESSAGE (OUTPATIENT)
Dept: HEMATOLOGY/ONCOLOGY | Facility: CLINIC | Age: 58
End: 2023-01-10
Payer: MEDICAID

## 2023-01-11 RX ORDER — ONDANSETRON HYDROCHLORIDE 8 MG/1
8 TABLET, FILM COATED ORAL EVERY 8 HOURS PRN
Qty: 45 TABLET | Refills: 3 | Status: SHIPPED | OUTPATIENT
Start: 2023-01-11 | End: 2023-02-10

## 2023-01-11 NOTE — TELEPHONE ENCOUNTER
Spoke to wife regarding hydration and checking BP.  Per wife pt is feeling better.  Zofran refilled.      Soledad Irving MD

## 2023-01-12 ENCOUNTER — PATIENT MESSAGE (OUTPATIENT)
Dept: HEMATOLOGY/ONCOLOGY | Facility: CLINIC | Age: 58
End: 2023-01-12
Payer: MEDICAID

## 2023-01-12 RX ORDER — CYANOCOBALAMIN 1000 UG/ML
1000 INJECTION, SOLUTION INTRAMUSCULAR; SUBCUTANEOUS
Status: CANCELLED | OUTPATIENT
Start: 2023-01-13

## 2023-01-13 ENCOUNTER — PATIENT MESSAGE (OUTPATIENT)
Dept: HEMATOLOGY/ONCOLOGY | Facility: CLINIC | Age: 58
End: 2023-01-13
Payer: MEDICAID

## 2023-01-23 ENCOUNTER — OFFICE VISIT (OUTPATIENT)
Dept: HEMATOLOGY/ONCOLOGY | Facility: CLINIC | Age: 58
End: 2023-01-23
Payer: MEDICAID

## 2023-01-23 ENCOUNTER — INFUSION (OUTPATIENT)
Dept: INFUSION THERAPY | Facility: HOSPITAL | Age: 58
End: 2023-01-23
Attending: STUDENT IN AN ORGANIZED HEALTH CARE EDUCATION/TRAINING PROGRAM
Payer: MEDICAID

## 2023-01-23 VITALS
SYSTOLIC BLOOD PRESSURE: 140 MMHG | WEIGHT: 253.06 LBS | DIASTOLIC BLOOD PRESSURE: 85 MMHG | HEART RATE: 102 BPM | SYSTOLIC BLOOD PRESSURE: 131 MMHG | BODY MASS INDEX: 39.72 KG/M2 | HEART RATE: 99 BPM | RESPIRATION RATE: 16 BRPM | OXYGEN SATURATION: 95 % | HEIGHT: 67 IN | TEMPERATURE: 99 F | OXYGEN SATURATION: 96 % | DIASTOLIC BLOOD PRESSURE: 90 MMHG

## 2023-01-23 DIAGNOSIS — R05.3 CHRONIC COUGH: ICD-10-CM

## 2023-01-23 DIAGNOSIS — D49.9 IMMUNODEFICIENCY SECONDARY TO NEOPLASM: ICD-10-CM

## 2023-01-23 DIAGNOSIS — I10 PRIMARY HYPERTENSION: ICD-10-CM

## 2023-01-23 DIAGNOSIS — D84.81 IMMUNODEFICIENCY SECONDARY TO NEOPLASM: ICD-10-CM

## 2023-01-23 DIAGNOSIS — Z51.5 PALLIATIVE CARE BY SPECIALIST: ICD-10-CM

## 2023-01-23 DIAGNOSIS — Z76.0 MEDICATION REFILL: ICD-10-CM

## 2023-01-23 DIAGNOSIS — Z79.899 IMMUNODEFICIENCY SECONDARY TO CHEMOTHERAPY: ICD-10-CM

## 2023-01-23 DIAGNOSIS — C78.7 SECONDARY MALIGNANT NEOPLASM OF LIVER: ICD-10-CM

## 2023-01-23 DIAGNOSIS — C34.90 SMALL CELL LUNG CANCER: ICD-10-CM

## 2023-01-23 DIAGNOSIS — I10 HYPERTENSION, UNSPECIFIED TYPE: ICD-10-CM

## 2023-01-23 DIAGNOSIS — J10.1 INFLUENZA A: ICD-10-CM

## 2023-01-23 DIAGNOSIS — R07.82 INTERCOSTAL PAIN: ICD-10-CM

## 2023-01-23 DIAGNOSIS — E11.69 TYPE 2 DIABETES MELLITUS WITH OTHER SPECIFIED COMPLICATION, WITHOUT LONG-TERM CURRENT USE OF INSULIN: ICD-10-CM

## 2023-01-23 DIAGNOSIS — C34.90 SMALL CELL LUNG CANCER: Primary | ICD-10-CM

## 2023-01-23 DIAGNOSIS — C79.31 SECONDARY MALIGNANT NEOPLASM OF BRAIN: ICD-10-CM

## 2023-01-23 DIAGNOSIS — R74.01 ELEVATED ALANINE AMINOTRANSFERASE (ALT) LEVEL: ICD-10-CM

## 2023-01-23 DIAGNOSIS — D84.821 IMMUNODEFICIENCY SECONDARY TO CHEMOTHERAPY: ICD-10-CM

## 2023-01-23 DIAGNOSIS — T45.1X5A IMMUNODEFICIENCY SECONDARY TO CHEMOTHERAPY: ICD-10-CM

## 2023-01-23 DIAGNOSIS — I10 HYPERTENSION, UNSPECIFIED TYPE: Primary | ICD-10-CM

## 2023-01-23 DIAGNOSIS — Z09 FOLLOW-UP EXAM: ICD-10-CM

## 2023-01-23 PROCEDURE — 3008F PR BODY MASS INDEX (BMI) DOCUMENTED: ICD-10-PCS | Mod: CPTII,,, | Performed by: STUDENT IN AN ORGANIZED HEALTH CARE EDUCATION/TRAINING PROGRAM

## 2023-01-23 PROCEDURE — 3008F BODY MASS INDEX DOCD: CPT | Mod: CPTII,,, | Performed by: STUDENT IN AN ORGANIZED HEALTH CARE EDUCATION/TRAINING PROGRAM

## 2023-01-23 PROCEDURE — 96413 CHEMO IV INFUSION 1 HR: CPT

## 2023-01-23 PROCEDURE — 3079F DIAST BP 80-89 MM HG: CPT | Mod: CPTII,,, | Performed by: STUDENT IN AN ORGANIZED HEALTH CARE EDUCATION/TRAINING PROGRAM

## 2023-01-23 PROCEDURE — 99213 OFFICE O/P EST LOW 20 MIN: CPT | Mod: PBBFAC | Performed by: STUDENT IN AN ORGANIZED HEALTH CARE EDUCATION/TRAINING PROGRAM

## 2023-01-23 PROCEDURE — 3077F SYST BP >= 140 MM HG: CPT | Mod: CPTII,,, | Performed by: STUDENT IN AN ORGANIZED HEALTH CARE EDUCATION/TRAINING PROGRAM

## 2023-01-23 PROCEDURE — 4010F PR ACE/ARB THEARPY RXD/TAKEN: ICD-10-PCS | Mod: CPTII,,, | Performed by: STUDENT IN AN ORGANIZED HEALTH CARE EDUCATION/TRAINING PROGRAM

## 2023-01-23 PROCEDURE — 96367 TX/PROPH/DG ADDL SEQ IV INF: CPT

## 2023-01-23 PROCEDURE — 3079F PR MOST RECENT DIASTOLIC BLOOD PRESSURE 80-89 MM HG: ICD-10-PCS | Mod: CPTII,,, | Performed by: STUDENT IN AN ORGANIZED HEALTH CARE EDUCATION/TRAINING PROGRAM

## 2023-01-23 PROCEDURE — 99215 PR OFFICE/OUTPT VISIT, EST, LEVL V, 40-54 MIN: ICD-10-PCS | Mod: S$PBB,,, | Performed by: STUDENT IN AN ORGANIZED HEALTH CARE EDUCATION/TRAINING PROGRAM

## 2023-01-23 PROCEDURE — 96372 THER/PROPH/DIAG INJ SC/IM: CPT

## 2023-01-23 PROCEDURE — 99999 PR PBB SHADOW E&M-EST. PATIENT-LVL III: ICD-10-PCS | Mod: PBBFAC,,, | Performed by: STUDENT IN AN ORGANIZED HEALTH CARE EDUCATION/TRAINING PROGRAM

## 2023-01-23 PROCEDURE — 99999 PR PBB SHADOW E&M-EST. PATIENT-LVL III: CPT | Mod: PBBFAC,,, | Performed by: STUDENT IN AN ORGANIZED HEALTH CARE EDUCATION/TRAINING PROGRAM

## 2023-01-23 PROCEDURE — 99215 OFFICE O/P EST HI 40 MIN: CPT | Mod: S$PBB,,, | Performed by: STUDENT IN AN ORGANIZED HEALTH CARE EDUCATION/TRAINING PROGRAM

## 2023-01-23 PROCEDURE — 4010F ACE/ARB THERAPY RXD/TAKEN: CPT | Mod: CPTII,,, | Performed by: STUDENT IN AN ORGANIZED HEALTH CARE EDUCATION/TRAINING PROGRAM

## 2023-01-23 PROCEDURE — 25000003 PHARM REV CODE 250: Performed by: STUDENT IN AN ORGANIZED HEALTH CARE EDUCATION/TRAINING PROGRAM

## 2023-01-23 PROCEDURE — 63600175 PHARM REV CODE 636 W HCPCS: Performed by: STUDENT IN AN ORGANIZED HEALTH CARE EDUCATION/TRAINING PROGRAM

## 2023-01-23 PROCEDURE — 3077F PR MOST RECENT SYSTOLIC BLOOD PRESSURE >= 140 MM HG: ICD-10-PCS | Mod: CPTII,,, | Performed by: STUDENT IN AN ORGANIZED HEALTH CARE EDUCATION/TRAINING PROGRAM

## 2023-01-23 PROCEDURE — A4216 STERILE WATER/SALINE, 10 ML: HCPCS | Performed by: STUDENT IN AN ORGANIZED HEALTH CARE EDUCATION/TRAINING PROGRAM

## 2023-01-23 RX ORDER — CYANOCOBALAMIN 1000 UG/ML
1000 INJECTION, SOLUTION INTRAMUSCULAR; SUBCUTANEOUS
Status: COMPLETED | OUTPATIENT
Start: 2023-01-23 | End: 2023-01-23

## 2023-01-23 RX ORDER — PROMETHAZINE HYDROCHLORIDE AND CODEINE PHOSPHATE 6.25; 1 MG/5ML; MG/5ML
5 SOLUTION ORAL EVERY 6 HOURS PRN
Qty: 473 ML | Refills: 0 | Status: SHIPPED | OUTPATIENT
Start: 2023-01-23 | End: 2023-02-16

## 2023-01-23 RX ORDER — AMOXICILLIN 250 MG
1 CAPSULE ORAL DAILY PRN
Qty: 30 TABLET | Refills: 2 | Status: SHIPPED | OUTPATIENT
Start: 2023-01-23

## 2023-01-23 RX ORDER — CYANOCOBALAMIN 1000 UG/ML
1000 INJECTION, SOLUTION INTRAMUSCULAR; SUBCUTANEOUS
Status: CANCELLED | OUTPATIENT
Start: 2023-01-23

## 2023-01-23 RX ORDER — HEPARIN 100 UNIT/ML
500 SYRINGE INTRAVENOUS
Status: CANCELLED | OUTPATIENT
Start: 2023-01-23

## 2023-01-23 RX ORDER — HEPARIN 100 UNIT/ML
500 SYRINGE INTRAVENOUS
Status: DISCONTINUED | OUTPATIENT
Start: 2023-01-23 | End: 2023-03-07 | Stop reason: HOSPADM

## 2023-01-23 RX ORDER — SODIUM CHLORIDE 0.9 % (FLUSH) 0.9 %
10 SYRINGE (ML) INJECTION
Status: CANCELLED | OUTPATIENT
Start: 2023-01-26

## 2023-01-23 RX ORDER — ALBUTEROL SULFATE 1.25 MG/3ML
1.25 SOLUTION RESPIRATORY (INHALATION) 3 TIMES DAILY PRN
Qty: 90 ML | Refills: 2 | Status: SHIPPED | OUTPATIENT
Start: 2023-01-23 | End: 2023-03-08 | Stop reason: SDUPTHER

## 2023-01-23 RX ORDER — SODIUM CHLORIDE 0.9 % (FLUSH) 0.9 %
10 SYRINGE (ML) INJECTION
Status: CANCELLED | OUTPATIENT
Start: 2023-01-24

## 2023-01-23 RX ORDER — HEPARIN 100 UNIT/ML
500 SYRINGE INTRAVENOUS
Status: CANCELLED | OUTPATIENT
Start: 2023-01-24

## 2023-01-23 RX ORDER — SODIUM CHLORIDE 0.9 % (FLUSH) 0.9 %
10 SYRINGE (ML) INJECTION
Status: CANCELLED | OUTPATIENT
Start: 2023-01-25

## 2023-01-23 RX ORDER — SODIUM CHLORIDE 0.9 % (FLUSH) 0.9 %
10 SYRINGE (ML) INJECTION
Status: CANCELLED | OUTPATIENT
Start: 2023-01-23

## 2023-01-23 RX ORDER — SODIUM CHLORIDE 0.9 % (FLUSH) 0.9 %
10 SYRINGE (ML) INJECTION
Status: DISCONTINUED | OUTPATIENT
Start: 2023-01-23 | End: 2023-03-07 | Stop reason: HOSPADM

## 2023-01-23 RX ORDER — HEPARIN 100 UNIT/ML
500 SYRINGE INTRAVENOUS
Status: CANCELLED | OUTPATIENT
Start: 2023-01-25

## 2023-01-23 RX ORDER — HEPARIN 100 UNIT/ML
500 SYRINGE INTRAVENOUS
Status: CANCELLED | OUTPATIENT
Start: 2023-01-26

## 2023-01-23 RX ORDER — BENZONATATE 100 MG/1
200 CAPSULE ORAL 3 TIMES DAILY PRN
Qty: 60 CAPSULE | Refills: 3 | Status: SHIPPED | OUTPATIENT
Start: 2023-01-23 | End: 2023-02-03

## 2023-01-23 RX ADMIN — DEXAMETHASONE SODIUM PHOSPHATE 12 MG: 4 INJECTION, SOLUTION INTRAMUSCULAR; INTRAVENOUS at 09:01

## 2023-01-23 RX ADMIN — TOPOTECAN HYDROCHLORIDE 3.5 MG: 4 INJECTION, POWDER, LYOPHILIZED, FOR SOLUTION INTRAVENOUS at 10:01

## 2023-01-23 RX ADMIN — CYANOCOBALAMIN 1000 MCG: 1000 INJECTION, SOLUTION INTRAMUSCULAR at 12:01

## 2023-01-23 RX ADMIN — HEPARIN 500 UNITS: 100 SYRINGE at 12:01

## 2023-01-23 RX ADMIN — Medication 10 ML: at 12:01

## 2023-01-23 NOTE — PROGRESS NOTES
[facility]  [facility]  Hematology/Medical Oncology Clinic       PATIENT: Juan Gillespie  MRN: [id number]  DATE: 1/23/2023    Reason for referral: Extensive stage small cell lung ca    Initial History: Juan Gillespie is a 57 year old man with extensive stage SCLC who presents to clinic for evaluation prior to treatment  of topotecan.          Oncological History:  -Started Carbo/Etop/Atez 4/5/22  -Maintenance atezolizumab 7/5/22  -Consolidative thoracic radiation 8/4/22-8/18/22  -topotecan started 10/24/23-present (missed Nov tx)    Interval History:     Pt presents for MD chahal prior to C4 topetecan.  Pt feeling much better and is no longer having to take pain meds after last tx.  Cough medicine refilled for chronic cough.  No other issue or complaints voiced.  BG noted to be elevated but pt eating an increased amount of candy lately but maintains good hydration and regular bowel movements.      His wife accompanies him at this visit.  Past Medical History:   Past Medical History:   Diagnosis Date    Diabetes mellitus, type 2     Hypertension        Past Surgical HIstory:   Past Surgical History:   Procedure Laterality Date    ENDOBRONCHIAL ULTRASOUND N/A 3/22/2022    Procedure: ENDOBRONCHIAL ULTRASOUND (EBUS);  Surgeon: Kristin Samuels MD;  Location: [facility] OR [address] [place] [address];  Service: Endoscopy;  Laterality: N/A;    KNEE SURGERY         Family History:   Family History   Problem Relation Age of Onset    Diabetes Mellitus Mother     Pacemaker/defibrilator Father     Lung cancer Father        Social History:  reports that he has never smoked. He has never used smokeless tobacco. He reports that he does not drink alcohol and does not use drugs.    Allergies:  Review of patient's allergies indicates:  No Known Allergies    Medications:  Current Outpatient Medications   Medication Sig Dispense Refill    albuterol (ACCUNEB) 1.25 mg/3 mL Nebu Use 1 vial (1.25 mg total) by nebulization 3 (three)  times daily as needed (shortness of breath). Rescue 90 mL 2    allopurinoL (ZYLOPRIM) 300 MG tablet Take 1 tablet (300 mg total) by mouth once daily. 60 tablet 3    benzonatate (TESSALON) 200 MG capsule Take 1 capsule (200 mg total) by mouth 3 (three) times daily as needed for Cough. 30 capsule 3    losartan (COZAAR) 100 MG tablet Take 1 tablet (100 mg total) by mouth once daily. 30 tablet 2    montelukast (SINGULAIR) 10 mg tablet Take 10 mg by mouth every evening.      morphine (MS CONTIN) 30 MG 12 hr tablet Take 1 tablet (30 mg total) by mouth every 12 (twelve) hours. 60 tablet 0    ondansetron (ZOFRAN) 8 MG tablet Take 1 tablet (8 mg total) by mouth every 8 (eight) hours as needed for Nausea. 45 tablet 3    promethazine-codeine 6.25-10 mg/5 ml (PHENERGAN WITH CODEINE) 6.25-10 mg/5 mL syrup Take 5 mLs by mouth every 6 (six) hours as needed for Cough. 473 mL 0    senna-docusate 8.6-50 mg (PERICOLACE) 8.6-50 mg per tablet Take 1 tablet by mouth daily as needed for Constipation. 30 tablet 2    sildenafiL (VIAGRA) 50 MG tablet Take 1 tablet (50 mg total) by mouth daily as needed for Erectile Dysfunction. 30 tablet 0    TRUE METRIX GLUCOSE TEST STRIP Strp       TRUEPLUS LANCETS 33 gauge Misc        No current facility-administered medications for this visit.     Facility-Administered Medications Ordered in Other Visits   Medication Dose Route Frequency Provider Last Rate Last Admin    heparin, porcine (PF) 100 unit/mL injection flush 500 Units  500 Units Intravenous PRN Soledad Irving MD   500 Units at 01/06/23 1400    sodium chloride 0.9% flush 10 mL  10 mL Intravenous PRN Soledad Irving MD   10 mL at 01/06/23 1400       Review of Systems   Constitutional:  Negative for chills, fatigue and fever.   HENT:  Negative for congestion and trouble swallowing.    Respiratory:  Positive for cough. Negative for chest tightness and shortness of breath.    Cardiovascular:  Negative for chest pain.   Gastrointestinal:  Negative for  "abdominal pain and blood in stool.   Endocrine: Negative for cold intolerance and heat intolerance.   Genitourinary:  Negative for hematuria.   Musculoskeletal:  Negative for arthralgias, back pain and myalgias.   Skin:  Negative for rash.   Neurological:  Negative for weakness.   Hematological:  Negative for adenopathy. Does not bruise/bleed easily.   Psychiatric/Behavioral:  Negative for dysphoric mood. The patient is not nervous/anxious.    ECOG Performance Status:   ECOG SCORE             Objective:      Vitals:   Vitals:    01/23/23 0900   BP: (!) 140/85   BP Location: Left arm   Patient Position: Sitting   BP Method: Large (Automatic)   Pulse: 102   SpO2: 95%   Weight: 114.8 kg (253 lb 1.4 oz)   Height: 5' 7" (1.702 m)     telemedicine    Physical Exam  Constitutional:       General: He is not in acute distress.     Appearance: Normal appearance. He is not ill-appearing.   HENT:      Head: Normocephalic and atraumatic.      Nose: Nose normal.      Mouth/Throat:      Mouth: Mucous membranes are moist.      Pharynx: Oropharynx is clear. No oropharyngeal exudate or posterior oropharyngeal erythema.   Eyes:      General: No scleral icterus.     Extraocular Movements: Extraocular movements intact.      Conjunctiva/sclera: Conjunctivae normal.      Pupils: Pupils are equal, round, and reactive to light.   Pulmonary:      Effort: Pulmonary effort is normal. No respiratory distress.   Abdominal:      General: Abdomen is flat.      Palpations: Abdomen is soft.   Musculoskeletal:         General: No swelling. Normal range of motion.      Cervical back: Normal range of motion.      Right lower leg: No edema.      Left lower leg: No edema.   Skin:     General: Skin is warm and dry.      Coloration: Skin is not jaundiced.   Neurological:      General: No focal deficit present.      Mental Status: He is alert.   Psychiatric:         Mood and Affect: Mood normal.         Behavior: Behavior normal.         Thought Content: " Thought content normal.         Judgment: Judgment normal.     Laboratory Data:  Recent Results (from the past 168 hour(s))   CBC auto differential    Collection Time: 01/23/23  8:05 AM   Result Value Ref Range    WBC 15.64 (H) 3.90 - 12.70 K/uL    RBC 4.11 (L) 4.60 - 6.20 M/uL    Hemoglobin 12.0 (L) 14.0 - 18.0 g/dL    Hematocrit 37.5 (L) 40.0 - 54.0 %    MCV 91 82 - 98 fL    MCH 29.2 27.0 - 31.0 pg    MCHC 32.0 32.0 - 36.0 g/dL    RDW 17.3 (H) 11.5 - 14.5 %    Platelets 283 150 - 450 K/uL    MPV 9.6 9.2 - 12.9 fL    Immature Granulocytes CANCELED 0.0 - 0.5 %    Immature Grans (Abs) CANCELED 0.00 - 0.04 K/uL    Lymph # CANCELED 1.0 - 4.8 K/uL    Mono # CANCELED 0.3 - 1.0 K/uL    Eos # CANCELED 0.0 - 0.5 K/uL    Baso # CANCELED 0.00 - 0.20 K/uL    nRBC 1 (A) 0 /100 WBC    Gran % 67.0 38.0 - 73.0 %    Lymph % 17.0 (L) 18.0 - 48.0 %    Mono % 4.0 4.0 - 15.0 %    Eosinophil % 1.0 0.0 - 8.0 %    Basophil % 0.0 0.0 - 1.9 %    Bands 4.0 %    Metamyelocytes 6.0 %    Myelocytes 1.0 %    Platelet Estimate Appears normal     Aniso Slight     Poly Occasional     Tear Drop Cells Occasional     Differential Method Manual    Comprehensive Metabolic Panel    Collection Time: 01/23/23  8:05 AM   Result Value Ref Range    Sodium 134 (L) 136 - 145 mmol/L    Potassium 4.8 3.5 - 5.1 mmol/L    Chloride 100 95 - 110 mmol/L    CO2 24 23 - 29 mmol/L    Glucose 317 (H) 70 - 110 mg/dL    BUN 16 6 - 20 mg/dL    Creatinine 1.3 0.5 - 1.4 mg/dL    Calcium 9.2 8.7 - 10.5 mg/dL    Total Protein 6.8 6.0 - 8.4 g/dL    Albumin 3.5 3.5 - 5.2 g/dL    Total Bilirubin 0.5 0.1 - 1.0 mg/dL    Alkaline Phosphatase 452 (H) 55 - 135 U/L    AST 34 10 - 40 U/L    ALT 73 (H) 10 - 44 U/L    Anion Gap 10 8 - 16 mmol/L    eGFR >60 >60 mL/min/1.73 m^2   Magnesium    Collection Time: 01/23/23  8:05 AM   Result Value Ref Range    Magnesium 1.9 1.6 - 2.6 mg/dL       Imaging:   MRI Brain W WO Contrast    Result Date: 10/11/2022  EXAMINATION: MRI BRAIN W WO CONTRAST  CLINICAL HISTORY: Small cell lung cancer, brain re-staging; Malignant neoplasm of unspecified part of unspecified bronchus or lung TECHNIQUE: Sagittal and axial T1, axial T2, axial FLAIR, axial gradient, axial diffusion imaging of the whole brain pre-contrast with postcontrast axial T1 and axial spoiled gradient imaging reformatted in the coronal and sagittal planes.. Ten ml of Gadavist injected intravenously. COMPARISON: 07/12/2022 FINDINGS: Interval 1.2 cm enhancing lesion within the left anterior inferior frontal lobe which prominently the ring-enhancing measuring 1.2 x 0.9 x 0.9 cm in AP by TV by CC dimensions respectively in light of history concerning for parenchymal metastases the with question trace susceptibility associated with this lesion. Previously identified heterogeneous enhancing sellar lesion is again seen allowing for routine brain technique with lesion measuring approximately 1.3 cm craniocaudal this may represent pituitary adenoma though indeterminate.  Previous intraluminal filling defect within the right jugular foramen is no longer seen.  There is however diffusion signal abnormality with ill-defined T1 hypointensity within the right occipital condyle concerning for possible osseous metastases the clinical correlation and further evaluation with nuclear medicine imaging advised. There is continued slight prominent leptomeningeal enhancement along the left cerebellum which raises concern for possible underlying vascular malformation such as a dural AV fistula with collateral vascular engorgement.  There is no restricted diffusion to suggest acute infarction.  Ventricles stable without hydrocephalus.  There is mild edema signal surrounding the left frontal enhancing lesion with continued few scattered punctate size regions of T2 FLAIR signal hyperintensity elsewhere supratentorial white matter which are nonspecific and may represent mild chronic ischemic change. This report was flagged in Epic  as abnormal.     Interval development of a small ring-enhancing lesion left anterior inferior frontal lobe concerning for parenchymal metastases in light of history.  Clinical correlation and follow-up advised There is continue though relatively stable heterogeneous enhancement and enlargement of the material within the sella which may represent pituitary adenoma though indeterminate. Previous right internal jugular vein thrombus no longer seen. Abnormal signal along the right occipital condyle concerning for possible osseous metastases clinical correlation and further evaluation with nuclear medicine imaging advised Continued prominent vascularity left cerebellar folia raising concern for possible underlying vascular malformation unchanged.  Further evaluation with noncontrast MRA head may be of further diagnostic value. Electronically signed by: Jonel Lawrence DO Date:    10/11/2022 Time:    10:05    CT Chest Abdomen Pelvis With Contrast (xpd)    Result Date: 10/18/2022  EXAMINATION: CT CHEST ABDOMEN PELVIS WITH CONTRAST (XPD) CLINICAL HISTORY: metastatic small cell lung cancer on maintenance immunotherapy, eval response; Malignant neoplasm of unspecified part of unspecified bronchus or lung TECHNIQUE: Low dose axial images, sagittal and coronal reformations were obtained from the thoracic inlet to the pubic symphysis following the IV administration of 100 mL of Omnipaque 350 COMPARISON: 07/26/2022 FINDINGS: Right IJ venous shunt tip superior aspect right atrium, absence of clot in included upper right IJ. Fatty infiltration of liver, additional diminished attenuation space-occupying lesions of liver, largest central right hepatic lobe 3.6 cm image 103 series 3.  Stable.  Spleen, adrenal glands, pancreas, gallbladder and biliary tree normal. Urinary bladder normal.  Prostate gland very small 4 cm versus postop prostatectomy.  No free fluid or retroperitoneal adenopathy.  GI track normal. Tiny nonobstructive right  lower and left upper renal pole stones. New lymph node left retro manubrial 3.4 cm image 28 series 3 appearing in the interim.  Paratracheal conglomerate adenopathy 2.1 x 2.5 cm, was 1.6 x 3 cm.  Subcarinal adenopathy stable.  No new hilar or mediastinal adenopathy. Degenerative disc spondylosis cervicothoracic junction., focal osteoblastic opacities involve humeral heads both clavicle superimposed on erosive DJD more prominent on right.  Additional focal osteoblastic opacities sternum, ribs, dorsal lumbar sacral spine pelvis and both femoral head and trochanteric regions.  Moderate anterior spondylosis dorsal spine and degenerative disc spondylosis facet joint arthropathy lumbosacral junction with DJD SI joints.  Fracture defects left lower anterior chest wall stable. Scattered calcified plaque great vessels aorta iliac femoral arteries. Dominant medial segment right middle lobe mass 1.1 x 2.7 cm image 69 series 3, was 1.6 x 3.4 cm.  Additional patchy nodular lesions right lower lobe posterior basilar segments, largest 1.4 cm image 66 series 3 suspicious for metastatic disease and/or superimposed inflammatory and infectious process.  Additional patchy infiltrates medial posterior segment right upper lobe and right lower lobe particularly medial basilar segment image 91 series 3.  No new pleural reaction.  Tracheobronchial tree normal. .     1. New presumed metastatic node anterior superior left mediastinum, paratracheal adenopathy otherwise stable. 2. Decreased size in right middle lobe mass with new evidence of metastatic disease right lower lobe versus superimposed inflammatory infectious process discussed above. 3. New developing metastatic lesions liver. 4. Bony universal metastatic disease stable. 5. Recist summary: 6. Compare with previous CT chest abdomen pelvis 07/26/2022 7. Lesion 1: Right middle lobe mass 1.1 x 2.7 cm was 1.6 x 13.4 cm. 8. Lesion 2: Right paratracheal conned Willett a adenopathy 2.1 x 2.5  cm, was 1.6 x 3 cm. 9. Lesion 3: Dome 1.9 cm stable.  (New progressive liver metastatic disease noted elsewhere) 10. Lesion 4: Caudal aspect right hepatic lobe 1 cm, stable 11.  This report was flagged in Epic as abnormal. Electronically signed by: Dewayne Hutchinson MD Date:    10/18/2022 Time:    09:28       Assessment and Plan          Extensive stage small cell lung cancer  -Initially diagnosed with extensive stage small cell lung cancer in 03/2022 who was treated with carbo/etop/atezolizumab from 04/05/2022 - 06/14/22 with partial response. He subsequently completed consolidation thoracic RT 8/4/22 - 8/18/22, and he was on maintenance atezolizumab.   -10/18/22 showing progression of disease in lungs and liver.  Asymptomatic.  -Dr. Sosa discussed with him plan for topotecan and pt was consented  -Pt tolerated cycle 1 well but delayed C2 due to being out of town  -Scans in December 2022 largely stable with exception of  Right middle lobe lung mass.  3.4 cm, still demonstrated widespread disease in bones  -Admitted for pain control and palliative XRT on 12/07/22  -Pt had been off of tx in Nov 2022 due to going on vacation, as symptoms improved drastically after restarting tx  Plan 01/23/23  -Keep pt on schedule for C4 of topotecan today  -RTC in for MD and tx in 3 weeks  -Plan for repeat scans in March  -Follow up with palliative care      Cancer related pain/palliative care by specialist  -Pain well controlled and is not using pain meds since restarting tx and palliative XRT      Time spent with pt: 40 minutes      Problem List Items Addressed This Visit          Oncology    Small cell lung cancer - Primary (Chronic)    Overview     Initially diagnosed with extensive stage small cell lung cancer in 03/2022 who was treated with carbo/etop/atezolizumab from 04/05/2022 - 06/14/22 with partial response. He subsequently completed consolidation thoracic RT 8/4/22 - 8/18/22, and he was on maintenance atezolizumab.           Relevant Medications    albuterol (ACCUNEB) 1.25 mg/3 mL Nebu    senna-docusate 8.6-50 mg (PERICOLACE) 8.6-50 mg per tablet    benzonatate (TESSALON) 200 MG capsule    Other Relevant Orders    COMPREHENSIVE METABOLIC PANEL    CBC W/ AUTO DIFFERENTIAL    Magnesium     Other Visit Diagnoses       Influenza A        Relevant Medications    promethazine-codeine 6.25-10 mg/5 ml (PHENERGAN WITH CODEINE) 6.25-10 mg/5 mL syrup            Soledad Irving MD  Hematology Oncology

## 2023-01-24 ENCOUNTER — OFFICE VISIT (OUTPATIENT)
Dept: RADIATION ONCOLOGY | Facility: CLINIC | Age: 58
End: 2023-01-24
Payer: MEDICAID

## 2023-01-24 ENCOUNTER — PATIENT MESSAGE (OUTPATIENT)
Dept: RADIATION ONCOLOGY | Facility: CLINIC | Age: 58
End: 2023-01-24

## 2023-01-24 ENCOUNTER — HOSPITAL ENCOUNTER (OUTPATIENT)
Dept: RADIOLOGY | Facility: HOSPITAL | Age: 58
Discharge: HOME | End: 2023-01-24
Attending: RADIOLOGY
Payer: MEDICAID

## 2023-01-24 ENCOUNTER — INFUSION (OUTPATIENT)
Dept: INFUSION THERAPY | Facility: HOSPITAL | Age: 58
End: 2023-01-24
Attending: STUDENT IN AN ORGANIZED HEALTH CARE EDUCATION/TRAINING PROGRAM
Payer: MEDICAID

## 2023-01-24 VITALS
HEIGHT: 67 IN | DIASTOLIC BLOOD PRESSURE: 79 MMHG | SYSTOLIC BLOOD PRESSURE: 156 MMHG | HEART RATE: 106 BPM | OXYGEN SATURATION: 96 % | RESPIRATION RATE: 19 BRPM | WEIGHT: 248.44 LBS | BODY MASS INDEX: 38.99 KG/M2 | TEMPERATURE: 98 F

## 2023-01-24 VITALS
SYSTOLIC BLOOD PRESSURE: 156 MMHG | RESPIRATION RATE: 20 BRPM | TEMPERATURE: 98 F | DIASTOLIC BLOOD PRESSURE: 79 MMHG | HEART RATE: 106 BPM | OXYGEN SATURATION: 96 %

## 2023-01-24 DIAGNOSIS — C79.31 SECONDARY MALIGNANT NEOPLASM OF BRAIN: ICD-10-CM

## 2023-01-24 DIAGNOSIS — C34.90 SMALL CELL LUNG CANCER: ICD-10-CM

## 2023-01-24 DIAGNOSIS — C79.31 SECONDARY MALIGNANT NEOPLASM OF BRAIN: Primary | ICD-10-CM

## 2023-01-24 DIAGNOSIS — C78.7 SECONDARY MALIGNANT NEOPLASM OF LIVER: ICD-10-CM

## 2023-01-24 DIAGNOSIS — I10 HYPERTENSION, UNSPECIFIED TYPE: Primary | ICD-10-CM

## 2023-01-24 DIAGNOSIS — C34.90 SMALL CELL LUNG CANCER: Chronic | ICD-10-CM

## 2023-01-24 PROCEDURE — 99999 PR PBB SHADOW E&M-EST. PATIENT-LVL IV: ICD-10-PCS | Mod: PBBFAC,,, | Performed by: RADIOLOGY

## 2023-01-24 PROCEDURE — 99024 PR POST-OP FOLLOW-UP VISIT: ICD-10-PCS | Mod: ,,, | Performed by: RADIOLOGY

## 2023-01-24 PROCEDURE — 96413 CHEMO IV INFUSION 1 HR: CPT

## 2023-01-24 PROCEDURE — 99024 POSTOP FOLLOW-UP VISIT: CPT | Mod: ,,, | Performed by: RADIOLOGY

## 2023-01-24 PROCEDURE — 4010F PR ACE/ARB THEARPY RXD/TAKEN: ICD-10-PCS | Mod: CPTII,,, | Performed by: RADIOLOGY

## 2023-01-24 PROCEDURE — 96367 TX/PROPH/DG ADDL SEQ IV INF: CPT

## 2023-01-24 PROCEDURE — 3078F DIAST BP <80 MM HG: CPT | Mod: CPTII,,, | Performed by: RADIOLOGY

## 2023-01-24 PROCEDURE — 3077F PR MOST RECENT SYSTOLIC BLOOD PRESSURE >= 140 MM HG: ICD-10-PCS | Mod: CPTII,,, | Performed by: RADIOLOGY

## 2023-01-24 PROCEDURE — 99214 OFFICE O/P EST MOD 30 MIN: CPT | Mod: PBBFAC,25 | Performed by: RADIOLOGY

## 2023-01-24 PROCEDURE — 63600175 PHARM REV CODE 636 W HCPCS: Performed by: STUDENT IN AN ORGANIZED HEALTH CARE EDUCATION/TRAINING PROGRAM

## 2023-01-24 PROCEDURE — 3008F PR BODY MASS INDEX (BMI) DOCUMENTED: ICD-10-PCS | Mod: CPTII,,, | Performed by: RADIOLOGY

## 2023-01-24 PROCEDURE — 99999 PR PBB SHADOW E&M-EST. PATIENT-LVL IV: CPT | Mod: PBBFAC,,, | Performed by: RADIOLOGY

## 2023-01-24 PROCEDURE — 70553 MRI BRAIN STEM W/O & W/DYE: CPT | Mod: 26,,, | Performed by: RADIOLOGY

## 2023-01-24 PROCEDURE — 3078F PR MOST RECENT DIASTOLIC BLOOD PRESSURE < 80 MM HG: ICD-10-PCS | Mod: CPTII,,, | Performed by: RADIOLOGY

## 2023-01-24 PROCEDURE — 96375 TX/PRO/DX INJ NEW DRUG ADDON: CPT

## 2023-01-24 PROCEDURE — 3008F BODY MASS INDEX DOCD: CPT | Mod: CPTII,,, | Performed by: RADIOLOGY

## 2023-01-24 PROCEDURE — 4010F ACE/ARB THERAPY RXD/TAKEN: CPT | Mod: CPTII,,, | Performed by: RADIOLOGY

## 2023-01-24 PROCEDURE — 25000003 PHARM REV CODE 250: Performed by: STUDENT IN AN ORGANIZED HEALTH CARE EDUCATION/TRAINING PROGRAM

## 2023-01-24 PROCEDURE — A9585 GADOBUTROL INJECTION: HCPCS | Performed by: RADIOLOGY

## 2023-01-24 PROCEDURE — 25500020 PHARM REV CODE 255: Performed by: RADIOLOGY

## 2023-01-24 PROCEDURE — 70553 MRI BRAIN W WO CONTRAST: ICD-10-PCS | Mod: 26,,, | Performed by: RADIOLOGY

## 2023-01-24 PROCEDURE — 3077F SYST BP >= 140 MM HG: CPT | Mod: CPTII,,, | Performed by: RADIOLOGY

## 2023-01-24 PROCEDURE — 70553 MRI BRAIN STEM W/O & W/DYE: CPT | Mod: TC

## 2023-01-24 RX ORDER — ONDANSETRON 2 MG/ML
8 INJECTION INTRAMUSCULAR; INTRAVENOUS
Status: COMPLETED | OUTPATIENT
Start: 2023-01-24 | End: 2023-01-24

## 2023-01-24 RX ORDER — HEPARIN 100 UNIT/ML
500 SYRINGE INTRAVENOUS
Status: DISCONTINUED | OUTPATIENT
Start: 2023-01-24 | End: 2023-01-24 | Stop reason: HOSPADM

## 2023-01-24 RX ORDER — GADOBUTROL 604.72 MG/ML
10 INJECTION INTRAVENOUS
Status: COMPLETED | OUTPATIENT
Start: 2023-01-24 | End: 2023-01-24

## 2023-01-24 RX ORDER — SODIUM CHLORIDE 0.9 % (FLUSH) 0.9 %
10 SYRINGE (ML) INJECTION
Status: DISCONTINUED | OUTPATIENT
Start: 2023-01-24 | End: 2023-01-24 | Stop reason: HOSPADM

## 2023-01-24 RX ADMIN — HEPARIN 500 UNITS: 100 SYRINGE at 10:01

## 2023-01-24 RX ADMIN — ONDANSETRON 8 MG: 2 INJECTION INTRAMUSCULAR; INTRAVENOUS at 08:01

## 2023-01-24 RX ADMIN — GADOBUTROL 10 ML: 604.72 INJECTION INTRAVENOUS at 01:01

## 2023-01-24 RX ADMIN — TOPOTECAN HYDROCHLORIDE 3.5 MG: 4 INJECTION, POWDER, LYOPHILIZED, FOR SOLUTION INTRAVENOUS at 09:01

## 2023-01-24 RX ADMIN — DEXAMETHASONE SODIUM PHOSPHATE 12 MG: 4 INJECTION, SOLUTION INTRAMUSCULAR; INTRAVENOUS at 08:01

## 2023-01-24 NOTE — PROGRESS NOTES
1/24/2023    Radiation Oncology Follow-Up Visit    Prior Radiation History:    Course 1:  Site  Technique  Energy  Dose/Fx (Gy)  #Fx  Total Dose (Gy)  Start Date  End Date  Elapsed Days    Mediastinum  3D  18X  3  10 / 10  30  8/4/2022 8/18/2022  14      Course 2:   Site  Technique  Energy  Dose/Fx (Gy)  #Fx  Total Dose (Gy)  Start Date  End Date  Elapsed Days    Lt Frontal PTV  SRS  6X  20  1 / 1  20  10/25/2022  10/25/2022  0      Course 3:   Site  Technique  Energy  Dose/Fx (Gy)  #Fx  Total Dose (Gy)  Start Date  End Date  Elapsed Days    T11-L1  AP/PA  18X  8  1 / 1  8  12/9/2022 12/9/2022  0    Rt Ilium  BUI/LPO  18X  8  1 / 1  8  12/9/2022 12/9/2022  0    Rt Hip  AP/PA  18X  8  1 / 1  8  12/9/2022 12/9/2022  0      Assessment   This is a 58 y.o. 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 Staging MRI Brain demonstrated an enhancing sellar mass c/w pituitary adenoma. He received chemo-immunotherapy. Re-staging extracranial imaging CT C/A/P 7/26/22 demonstrated partial response with decrease in size of RML lung primary and mediastinal adenopathy. He has completed chemotherapy and is now on maintenance immunotherapy. He completed thoracic consolidation RT 30 Gy in 10 fx on 8/18/22. MRI Brain 10/2022 demonstrated a single Left frontal metastasis that was treated with SRS 20 Gy x1 on 10/25/22.    He was admitted on 12/7/22 for poorly controlled lower back and Right hip pain. MRI Total Spine 12/7/22 demonstrated progressive osseous metastatic disease with concerning posterior extension of disease at T12 into the canal with moderate stenosis. MRI Brain demonstrated a subtle focus of enhancement in Right basal ganglia/internal capsule though note was made that this could be appearance of subacute infarct. CT C/A/P 12/8/22 demonstrated progressive disease including increase in size of lytic disease in the  Right iliac bone. He received palliative RT 8 Gy x1 to T11-L1, Rt ilium, and Rt hip on 12/9/22.       Pain has significantly improved following radiation and starting new chemotherapy. MRI Brain today 1/24/23 with persistent enhancing lesion in Right basal ganglia, though it also enhances on pre-contrast T1. Since it appears stable relative to MRI Brain 12/7/22, will plan to continue monitoring with repeat MRI in 2 months.         Plan   1) I will see him back in 2 months with MRI Brain prior for re-staging.   2) F/U with Dr. Irving as scheduled for continued systemic therapy.          CHIEF COMPLAINT: F/U for intracranial surveillance.     HPI:     Past Medical History:   Diagnosis Date    Diabetes mellitus, type 2     Hypertension        Past Surgical History:   Procedure Laterality Date    ENDOBRONCHIAL ULTRASOUND N/A 3/22/2022    Procedure: ENDOBRONCHIAL ULTRASOUND (EBUS);  Surgeon: Kristin Samuels MD;  Location: [facility] OR [address] [place] [address];  Service: Endoscopy;  Laterality: N/A;    KNEE SURGERY         Social History     Tobacco Use    Smoking status: Former     Types: Cigarettes    Smokeless tobacco: Never    Tobacco comments:     stop smoking 26 years ago   Substance Use Topics    Alcohol use: No    Drug use: No       Cancer-related family history includes Lung cancer in his father.    Current Outpatient Medications on File Prior to Visit   Medication Sig Dispense Refill    albuterol (ACCUNEB) 1.25 mg/3 mL Nebu Use 1 vial (1.25 mg total) by nebulization 3 (three) times daily as needed (shortness of breath). Rescue 90 mL 2    allopurinoL (ZYLOPRIM) 300 MG tablet Take 1 tablet (300 mg total) by mouth once daily. 60 tablet 3    benzonatate (TESSALON) 100 MG capsule Take 2 capsules (200 mg total) by mouth 3 (three) times daily as needed for Cough. 60 capsule 3    losartan (COZAAR) 100 MG tablet Take 1 tablet (100 mg total) by mouth once daily. 30 tablet 2    montelukast (SINGULAIR) 10 mg tablet Take 10 mg by mouth every  "evening.      morphine (MS CONTIN) 30 MG 12 hr tablet Take 1 tablet (30 mg total) by mouth every 12 (twelve) hours. 60 tablet 0    ondansetron (ZOFRAN) 8 MG tablet Take 1 tablet (8 mg total) by mouth every 8 (eight) hours as needed for Nausea. 45 tablet 3    promethazine-codeine 6.25-10 mg/5 ml (PHENERGAN WITH CODEINE) 6.25-10 mg/5 mL syrup Take 5 mLs by mouth every 6 (six) hours as needed for Cough. 473 mL 0    senna-docusate 8.6-50 mg (PERICOLACE) 8.6-50 mg per tablet Take 1 tablet by mouth daily as needed for Constipation. 30 tablet 2    sildenafiL (VIAGRA) 50 MG tablet Take 1 tablet (50 mg total) by mouth daily as needed for Erectile Dysfunction. 30 tablet 0    TRUE METRIX GLUCOSE TEST STRIP Strp       TRUEPLUS LANCETS 33 gauge Misc       [DISCONTINUED] albuterol-ipratropium (DUO-NEB) 2.5 mg-0.5 mg/3 mL nebulizer solution Take 3 mLs by nebulization every 4 (four) hours. Rescue 90 mL 3    [DISCONTINUED] azelastine (ASTELIN) 137 mcg (0.1 %) nasal spray 1 spray by Nasal route 2 (two) times daily.       Current Facility-Administered Medications on File Prior to Visit   Medication Dose Route Frequency Provider Last Rate Last Admin    [COMPLETED] gadobutroL (GADAVIST) injection 10 mL  10 mL Intravenous ONCE PRN Stephen Jacome MD   10 mL at 01/24/23 1334    heparin, porcine (PF) 100 unit/mL injection flush 500 Units  500 Units Intravenous PRN Soledad Irving MD   500 Units at 01/06/23 1400    heparin, porcine (PF) 100 unit/mL injection flush 500 Units  500 Units Intravenous PRN Soledad Irving MD   500 Units at 01/23/23 1218    sodium chloride 0.9% flush 10 mL  10 mL Intravenous PRN Soledad Irving MD   10 mL at 01/06/23 1400    sodium chloride 0.9% flush 10 mL  10 mL Intravenous PRN Soledad Irving MD   10 mL at 01/23/23 1218       Review of patient's allergies indicates:  No Known Allergies      Vital Signs: BP (!) 156/79   Pulse 106   Temp 98 °F (36.7 °C)   Resp 19   Ht 5' 7" (1.702 m)   Wt 112.7 kg (248 lb 7.3 oz)  "  SpO2 96%   BMI 38.91 kg/m²     ECOG Performance Status: 1    Physical Exam  Vitals reviewed.   Constitutional:       Appearance: Normal appearance.   HENT:      Head: Normocephalic and atraumatic.   Eyes:      General: No scleral icterus.     Extraocular Movements: Extraocular movements intact.   Pulmonary:      Effort: Pulmonary effort is normal. No respiratory distress.   Abdominal:      General: There is no distension.   Musculoskeletal:      Cervical back: Neck supple.   Lymphadenopathy:      Cervical: No cervical adenopathy.   Skin:     General: Skin is warm and dry.   Neurological:      General: No focal deficit present.      Mental Status: He is alert and oriented to person, place, and time.      Cranial Nerves: No cranial nerve deficit.   Psychiatric:         Mood and Affect: Mood normal.         Behavior: Behavior normal.         Judgment: Judgment normal.        Labs:    Imaging: I have personally reviewed the patient's available images and reports and summarized pertinent findings above in HPI.     Pathology: No new path

## 2023-01-25 ENCOUNTER — INFUSION (OUTPATIENT)
Dept: INFUSION THERAPY | Facility: HOSPITAL | Age: 58
End: 2023-01-25
Attending: STUDENT IN AN ORGANIZED HEALTH CARE EDUCATION/TRAINING PROGRAM
Payer: MEDICAID

## 2023-01-25 VITALS
HEART RATE: 109 BPM | RESPIRATION RATE: 17 BRPM | DIASTOLIC BLOOD PRESSURE: 80 MMHG | TEMPERATURE: 99 F | OXYGEN SATURATION: 95 % | SYSTOLIC BLOOD PRESSURE: 132 MMHG

## 2023-01-25 DIAGNOSIS — I10 HYPERTENSION, UNSPECIFIED TYPE: Primary | ICD-10-CM

## 2023-01-25 DIAGNOSIS — C34.90 SMALL CELL LUNG CANCER: ICD-10-CM

## 2023-01-25 DIAGNOSIS — C78.7 SECONDARY MALIGNANT NEOPLASM OF LIVER: ICD-10-CM

## 2023-01-25 PROCEDURE — 96367 TX/PROPH/DG ADDL SEQ IV INF: CPT

## 2023-01-25 PROCEDURE — 25000003 PHARM REV CODE 250: Performed by: STUDENT IN AN ORGANIZED HEALTH CARE EDUCATION/TRAINING PROGRAM

## 2023-01-25 PROCEDURE — A4216 STERILE WATER/SALINE, 10 ML: HCPCS | Performed by: STUDENT IN AN ORGANIZED HEALTH CARE EDUCATION/TRAINING PROGRAM

## 2023-01-25 PROCEDURE — 96413 CHEMO IV INFUSION 1 HR: CPT

## 2023-01-25 PROCEDURE — 63600175 PHARM REV CODE 636 W HCPCS: Performed by: STUDENT IN AN ORGANIZED HEALTH CARE EDUCATION/TRAINING PROGRAM

## 2023-01-25 RX ORDER — SODIUM CHLORIDE 0.9 % (FLUSH) 0.9 %
10 SYRINGE (ML) INJECTION
Status: DISCONTINUED | OUTPATIENT
Start: 2023-01-25 | End: 2023-01-25 | Stop reason: HOSPADM

## 2023-01-25 RX ORDER — HEPARIN 100 UNIT/ML
500 SYRINGE INTRAVENOUS
Status: DISCONTINUED | OUTPATIENT
Start: 2023-01-25 | End: 2023-01-25 | Stop reason: HOSPADM

## 2023-01-25 RX ADMIN — Medication 10 ML: at 03:01

## 2023-01-25 RX ADMIN — HEPARIN 500 UNITS: 100 SYRINGE at 03:01

## 2023-01-25 RX ADMIN — DEXAMETHASONE SODIUM PHOSPHATE 12 MG: 4 INJECTION, SOLUTION INTRAMUSCULAR; INTRAVENOUS at 12:01

## 2023-01-25 RX ADMIN — TOPOTECAN HYDROCHLORIDE 3.5 MG: 4 INJECTION, POWDER, LYOPHILIZED, FOR SOLUTION INTRAVENOUS at 01:01

## 2023-01-25 NOTE — PLAN OF CARE
Patient arrived to unit for C4D2 of Topotecan. Patient reported feeling nauseated upon arrival. 8mg of Zofran administered. Patient reported relief 10 minutes after administration. Pre- medication of Decadron infused over 20 minutes. Topotecan infused over 30 mins. Patient discharged off unit in no signs of acute distress ambulating independently. Patient declined need for a wheelchair.

## 2023-01-26 ENCOUNTER — INFUSION (OUTPATIENT)
Dept: INFUSION THERAPY | Facility: HOSPITAL | Age: 58
End: 2023-01-26
Attending: STUDENT IN AN ORGANIZED HEALTH CARE EDUCATION/TRAINING PROGRAM
Payer: MEDICAID

## 2023-01-26 VITALS
DIASTOLIC BLOOD PRESSURE: 68 MMHG | SYSTOLIC BLOOD PRESSURE: 141 MMHG | HEART RATE: 98 BPM | RESPIRATION RATE: 20 BRPM | TEMPERATURE: 98 F | OXYGEN SATURATION: 96 %

## 2023-01-26 DIAGNOSIS — I10 HYPERTENSION, UNSPECIFIED TYPE: Primary | ICD-10-CM

## 2023-01-26 DIAGNOSIS — C34.90 SMALL CELL LUNG CANCER: ICD-10-CM

## 2023-01-26 DIAGNOSIS — C78.7 SECONDARY MALIGNANT NEOPLASM OF LIVER: ICD-10-CM

## 2023-01-26 PROCEDURE — A4216 STERILE WATER/SALINE, 10 ML: HCPCS | Performed by: STUDENT IN AN ORGANIZED HEALTH CARE EDUCATION/TRAINING PROGRAM

## 2023-01-26 PROCEDURE — 25000003 PHARM REV CODE 250: Performed by: STUDENT IN AN ORGANIZED HEALTH CARE EDUCATION/TRAINING PROGRAM

## 2023-01-26 PROCEDURE — 96413 CHEMO IV INFUSION 1 HR: CPT

## 2023-01-26 PROCEDURE — 96367 TX/PROPH/DG ADDL SEQ IV INF: CPT

## 2023-01-26 PROCEDURE — 63600175 PHARM REV CODE 636 W HCPCS: Performed by: STUDENT IN AN ORGANIZED HEALTH CARE EDUCATION/TRAINING PROGRAM

## 2023-01-26 RX ORDER — SODIUM CHLORIDE 0.9 % (FLUSH) 0.9 %
10 SYRINGE (ML) INJECTION
Status: DISCONTINUED | OUTPATIENT
Start: 2023-01-26 | End: 2023-01-26 | Stop reason: HOSPADM

## 2023-01-26 RX ORDER — HEPARIN 100 UNIT/ML
500 SYRINGE INTRAVENOUS
Status: DISCONTINUED | OUTPATIENT
Start: 2023-01-26 | End: 2023-01-26 | Stop reason: HOSPADM

## 2023-01-26 RX ADMIN — TOPOTECAN HYDROCHLORIDE 3.5 MG: 4 INJECTION, POWDER, LYOPHILIZED, FOR SOLUTION INTRAVENOUS at 12:01

## 2023-01-26 RX ADMIN — DEXAMETHASONE SODIUM PHOSPHATE 12 MG: 4 INJECTION, SOLUTION INTRAMUSCULAR; INTRAVENOUS at 12:01

## 2023-01-26 RX ADMIN — Medication 10 ML: at 02:01

## 2023-01-26 RX ADMIN — HEPARIN 500 UNITS: 100 SYRINGE at 02:01

## 2023-01-26 NOTE — PLAN OF CARE
Premeds of Decadron IVPB administered prior to Topetecan. No new or worsening symptoms reported since 1/24 tx.Pt tolerated today's tx well. 1L NS infused during visit today.Will RTC 1/26 for D4 Topetecan. Pt ambulated off unit accompanied by spouse.

## 2023-01-27 ENCOUNTER — INFUSION (OUTPATIENT)
Dept: INFUSION THERAPY | Facility: HOSPITAL | Age: 58
End: 2023-01-27
Attending: STUDENT IN AN ORGANIZED HEALTH CARE EDUCATION/TRAINING PROGRAM
Payer: MEDICAID

## 2023-01-27 VITALS
OXYGEN SATURATION: 95 % | DIASTOLIC BLOOD PRESSURE: 84 MMHG | RESPIRATION RATE: 18 BRPM | SYSTOLIC BLOOD PRESSURE: 176 MMHG | HEART RATE: 102 BPM | TEMPERATURE: 98 F

## 2023-01-27 DIAGNOSIS — I10 HYPERTENSION, UNSPECIFIED TYPE: Primary | ICD-10-CM

## 2023-01-27 DIAGNOSIS — C34.90 SMALL CELL LUNG CANCER: ICD-10-CM

## 2023-01-27 DIAGNOSIS — C78.7 SECONDARY MALIGNANT NEOPLASM OF LIVER: ICD-10-CM

## 2023-01-27 PROCEDURE — 63600175 PHARM REV CODE 636 W HCPCS: Mod: TB | Performed by: STUDENT IN AN ORGANIZED HEALTH CARE EDUCATION/TRAINING PROGRAM

## 2023-01-27 PROCEDURE — 96372 THER/PROPH/DIAG INJ SC/IM: CPT

## 2023-01-27 RX ADMIN — PEGFILGRASTIM 6 MG: 6 INJECTION SUBCUTANEOUS at 11:01

## 2023-01-27 NOTE — PLAN OF CARE
Patient arrived to unit for C4D5 of Topotecan.VSS.Pre- medication of Decadron infused over 20 minutes. Topotecan infused over 30 mins. Patient discharged off unit in no signs of acute distress ambulating independently. Patient declined need for a wheelchair.

## 2023-01-30 NOTE — PLAN OF CARE
Patient arrived to unit for Fulphila injection. Patient tolerated injection well. Discharged off unit accompanied by his daughter in no acute signs of distress.

## 2023-02-02 ENCOUNTER — PATIENT MESSAGE (OUTPATIENT)
Dept: RADIATION ONCOLOGY | Facility: CLINIC | Age: 58
End: 2023-02-02
Payer: MEDICAID

## 2023-02-03 ENCOUNTER — PATIENT MESSAGE (OUTPATIENT)
Dept: HEMATOLOGY/ONCOLOGY | Facility: CLINIC | Age: 58
End: 2023-02-03
Payer: MEDICAID

## 2023-02-06 ENCOUNTER — INFUSION (OUTPATIENT)
Dept: INFUSION THERAPY | Facility: HOSPITAL | Age: 58
End: 2023-02-06
Attending: STUDENT IN AN ORGANIZED HEALTH CARE EDUCATION/TRAINING PROGRAM
Payer: MEDICAID

## 2023-02-06 DIAGNOSIS — C79.31 SECONDARY MALIGNANT NEOPLASM OF BRAIN: Primary | ICD-10-CM

## 2023-02-06 PROCEDURE — 96372 THER/PROPH/DIAG INJ SC/IM: CPT

## 2023-02-06 PROCEDURE — 63600175 PHARM REV CODE 636 W HCPCS: Performed by: STUDENT IN AN ORGANIZED HEALTH CARE EDUCATION/TRAINING PROGRAM

## 2023-02-06 RX ORDER — CYANOCOBALAMIN 1000 UG/ML
1000 INJECTION, SOLUTION INTRAMUSCULAR; SUBCUTANEOUS
Status: COMPLETED | OUTPATIENT
Start: 2023-02-06 | End: 2023-02-06

## 2023-02-06 RX ORDER — CYANOCOBALAMIN 1000 UG/ML
1000 INJECTION, SOLUTION INTRAMUSCULAR; SUBCUTANEOUS
Status: CANCELLED | OUTPATIENT
Start: 2023-02-06

## 2023-02-06 RX ADMIN — CYANOCOBALAMIN 1000 MCG: 1000 INJECTION, SOLUTION INTRAMUSCULAR at 11:02

## 2023-02-10 ENCOUNTER — TELEPHONE (OUTPATIENT)
Dept: HEMATOLOGY/ONCOLOGY | Facility: CLINIC | Age: 58
End: 2023-02-10
Payer: MEDICAID

## 2023-02-10 ENCOUNTER — HOSPITAL ENCOUNTER (EMERGENCY)
Facility: HOSPITAL | Age: 58
Discharge: HOME | End: 2023-02-10
Attending: EMERGENCY MEDICINE
Payer: MEDICAID

## 2023-02-10 VITALS
OXYGEN SATURATION: 98 % | BODY MASS INDEX: 39.24 KG/M2 | RESPIRATION RATE: 20 BRPM | SYSTOLIC BLOOD PRESSURE: 127 MMHG | WEIGHT: 250 LBS | TEMPERATURE: 98 F | HEIGHT: 67 IN | DIASTOLIC BLOOD PRESSURE: 57 MMHG | HEART RATE: 87 BPM

## 2023-02-10 DIAGNOSIS — E86.0 DEHYDRATION: Primary | ICD-10-CM

## 2023-02-10 DIAGNOSIS — R73.9 HYPERGLYCEMIA: ICD-10-CM

## 2023-02-10 LAB
ALBUMIN SERPL BCP-MCNC: 3.7 G/DL (ref 3.5–5.2)
ALLENS TEST: ABNORMAL
ALP SERPL-CCNC: 516 U/L (ref 55–135)
ALT SERPL W/O P-5'-P-CCNC: 74 U/L (ref 10–44)
ANION GAP SERPL CALC-SCNC: 11 MMOL/L (ref 8–16)
AST SERPL-CCNC: 34 U/L (ref 10–40)
B-OH-BUTYR BLD STRIP-SCNC: 0.2 MMOL/L (ref 0–0.5)
BACTERIA #/AREA URNS HPF: NORMAL /HPF
BASOPHILS # BLD AUTO: 0.1 K/UL (ref 0–0.2)
BASOPHILS NFR BLD: 1 % (ref 0–1.9)
BILIRUB SERPL-MCNC: 0.7 MG/DL (ref 0.1–1)
BILIRUB UR QL STRIP: NEGATIVE
BUN SERPL-MCNC: 19 MG/DL (ref 6–20)
CALCIUM SERPL-MCNC: 9.4 MG/DL (ref 8.7–10.5)
CHLORIDE SERPL-SCNC: 98 MMOL/L (ref 95–110)
CLARITY UR: CLEAR
CO2 SERPL-SCNC: 24 MMOL/L (ref 23–29)
COLOR UR: YELLOW
CREAT SERPL-MCNC: 1.6 MG/DL (ref 0.5–1.4)
DELSYS: ABNORMAL
DIFFERENTIAL METHOD: ABNORMAL
EOSINOPHIL # BLD AUTO: 0.2 K/UL (ref 0–0.5)
EOSINOPHIL NFR BLD: 1.8 % (ref 0–8)
ERYTHROCYTE [DISTWIDTH] IN BLOOD BY AUTOMATED COUNT: 18.8 % (ref 11.5–14.5)
EST. GFR  (NO RACE VARIABLE): 49 ML/MIN/1.73 M^2
GLUCOSE SERPL-MCNC: 376 MG/DL (ref 70–110)
GLUCOSE SERPL-MCNC: 573 MG/DL (ref 70–110)
GLUCOSE UR QL STRIP: ABNORMAL
HCO3 UR-SCNC: 29 MMOL/L (ref 24–28)
HCT VFR BLD AUTO: 36.3 % (ref 40–54)
HGB BLD-MCNC: 11.9 G/DL (ref 14–18)
HGB UR QL STRIP: NEGATIVE
IMM GRANULOCYTES # BLD AUTO: 0.39 K/UL (ref 0–0.04)
IMM GRANULOCYTES NFR BLD AUTO: 4 % (ref 0–0.5)
KETONES UR QL STRIP: NEGATIVE
LEUKOCYTE ESTERASE UR QL STRIP: NEGATIVE
LYMPHOCYTES # BLD AUTO: 0.8 K/UL (ref 1–4.8)
LYMPHOCYTES NFR BLD: 7.8 % (ref 18–48)
MAGNESIUM SERPL-MCNC: 2.1 MG/DL (ref 1.6–2.6)
MCH RBC QN AUTO: 29.4 PG (ref 27–31)
MCHC RBC AUTO-ENTMCNC: 32.8 G/DL (ref 32–36)
MCV RBC AUTO: 90 FL (ref 82–98)
MICROSCOPIC COMMENT: NORMAL
MONOCYTES # BLD AUTO: 0.6 K/UL (ref 0.3–1)
MONOCYTES NFR BLD: 5.6 % (ref 4–15)
NEUTROPHILS # BLD AUTO: 7.8 K/UL (ref 1.8–7.7)
NEUTROPHILS NFR BLD: 79.8 % (ref 38–73)
NITRITE UR QL STRIP: NEGATIVE
NRBC BLD-RTO: 0 /100 WBC
PCO2 BLDA: 50.2 MMHG (ref 35–45)
PH SMN: 7.37 [PH] (ref 7.35–7.45)
PH UR STRIP: 5 [PH] (ref 5–8)
PLATELET # BLD AUTO: 308 K/UL (ref 150–450)
PMV BLD AUTO: 10.2 FL (ref 9.2–12.9)
PO2 BLDA: 27 MMHG (ref 40–60)
POC BE: 3 MMOL/L
POC SATURATED O2: 47 % (ref 95–100)
POC TCO2: 31 MMOL/L (ref 24–29)
POCT GLUCOSE: 209 MG/DL (ref 70–110)
POCT GLUCOSE: 376 MG/DL (ref 70–110)
POCT GLUCOSE: >500 MG/DL (ref 70–110)
POTASSIUM SERPL-SCNC: 4.4 MMOL/L (ref 3.5–5.1)
PROT SERPL-MCNC: 7.1 G/DL (ref 6–8.4)
PROT UR QL STRIP: NEGATIVE
RBC # BLD AUTO: 4.05 M/UL (ref 4.6–6.2)
RBC #/AREA URNS HPF: 2 /HPF (ref 0–4)
SAMPLE: ABNORMAL
SITE: ABNORMAL
SODIUM SERPL-SCNC: 133 MMOL/L (ref 136–145)
SP GR UR STRIP: >1.03 (ref 1–1.03)
URN SPEC COLLECT METH UR: ABNORMAL
UROBILINOGEN UR STRIP-ACNC: NEGATIVE EU/DL
WBC # BLD AUTO: 9.81 K/UL (ref 3.9–12.7)
YEAST URNS QL MICRO: NORMAL

## 2023-02-10 PROCEDURE — 93010 ELECTROCARDIOGRAM REPORT: CPT | Mod: ,,, | Performed by: INTERNAL MEDICINE

## 2023-02-10 PROCEDURE — 99900035 HC TECH TIME PER 15 MIN (STAT)

## 2023-02-10 PROCEDURE — 81000 URINALYSIS NONAUTO W/SCOPE: CPT | Performed by: PHYSICIAN ASSISTANT

## 2023-02-10 PROCEDURE — 82010 KETONE BODYS QUAN: CPT | Performed by: PHYSICIAN ASSISTANT

## 2023-02-10 PROCEDURE — 63600175 PHARM REV CODE 636 W HCPCS: Performed by: EMERGENCY MEDICINE

## 2023-02-10 PROCEDURE — 93010 EKG 12-LEAD: ICD-10-PCS | Mod: ,,, | Performed by: INTERNAL MEDICINE

## 2023-02-10 PROCEDURE — 96361 HYDRATE IV INFUSION ADD-ON: CPT

## 2023-02-10 PROCEDURE — 82962 GLUCOSE BLOOD TEST: CPT

## 2023-02-10 PROCEDURE — 93005 ELECTROCARDIOGRAM TRACING: CPT

## 2023-02-10 PROCEDURE — 25000003 PHARM REV CODE 250: Performed by: EMERGENCY MEDICINE

## 2023-02-10 PROCEDURE — 85025 COMPLETE CBC W/AUTO DIFF WBC: CPT | Mod: 91 | Performed by: PHYSICIAN ASSISTANT

## 2023-02-10 PROCEDURE — 82803 BLOOD GASES ANY COMBINATION: CPT

## 2023-02-10 PROCEDURE — 99285 EMERGENCY DEPT VISIT HI MDM: CPT | Mod: 25

## 2023-02-10 PROCEDURE — 80053 COMPREHEN METABOLIC PANEL: CPT | Mod: 91 | Performed by: PHYSICIAN ASSISTANT

## 2023-02-10 PROCEDURE — 83735 ASSAY OF MAGNESIUM: CPT | Mod: 91 | Performed by: PHYSICIAN ASSISTANT

## 2023-02-10 PROCEDURE — 96374 THER/PROPH/DIAG INJ IV PUSH: CPT

## 2023-02-10 RX ADMIN — SODIUM CHLORIDE 2000 ML: 9 INJECTION, SOLUTION INTRAVENOUS at 05:02

## 2023-02-10 RX ADMIN — INSULIN HUMAN 6 UNITS: 100 INJECTION, SOLUTION PARENTERAL at 05:02

## 2023-02-10 NOTE — ED PROVIDER NOTES
Encounter Date: 2/10/2023       History     Chief Complaint   Patient presents with    Blood Sugar Problem     Pt reports blood sugar 567 at home. Chemo pt for lung ca. Cbg >500 in triage      58 y.o. male  Past Medical History:  No date: Diabetes mellitus, type 2  No date: Hypertension     Got chemo 2 weeks ago for lung ca. Notes he gets steroids with chemo. Notes that earlier today his glucose was>500 at home. Denies f/c, n/v, diarrhea/dsyuria or other c/o.          Echo 3/20/22    · The left ventricle is normal in size with normal systolic function. The estimated ejection fraction is 55%.  · Normal right ventricular size with normal right ventricular systolic function.  · Normal left ventricular diastolic function.  · Normal central venous pressure (3 mmHg).      Review of patient's allergies indicates:  No Known Allergies  Past Medical History:   Diagnosis Date    Cancer     Small Cell -Stage 4 Lung Cancer    Diabetes mellitus, type 2     Hypertension      Past Surgical History:   Procedure Laterality Date    ENDOBRONCHIAL ULTRASOUND N/A 3/22/2022    Procedure: ENDOBRONCHIAL ULTRASOUND (EBUS);  Surgeon: Kristin Samuels MD;  Location: [facility] OR [address] [place] [address];  Service: Endoscopy;  Laterality: N/A;    KNEE SURGERY       Family History   Problem Relation Age of Onset    Diabetes Mellitus Mother     Pacemaker/defibrilator Father     Lung cancer Father      Social History     Tobacco Use    Smoking status: Former     Types: Cigarettes    Smokeless tobacco: Never    Tobacco comments:     stop smoking 26 years ago   Substance Use Topics    Alcohol use: No    Drug use: No     Review of Systems   Constitutional:  Negative for fever.   HENT:  Negative for sore throat.    Respiratory:  Negative for shortness of breath.    Cardiovascular:  Negative for chest pain.   Gastrointestinal:  Negative for nausea.   Genitourinary:  Negative for dysuria.   Musculoskeletal:  Negative for back pain.   Skin:  Negative for rash.   Neurological:   Negative for weakness.   Hematological:  Does not bruise/bleed easily.   All other systems reviewed and are negative.    Physical Exam     Initial Vitals [02/10/23 1509]   BP Pulse Resp Temp SpO2   (!) 145/81 (!) 114 18 98.4 °F (36.9 °C) 95 %      MAP       --         Physical Exam    Nursing note and vitals reviewed.  Constitutional: He appears well-developed and well-nourished.   HENT:   Head: Normocephalic and atraumatic.   Eyes: EOM are normal. Pupils are equal, round, and reactive to light.   Cardiovascular:  Regular rhythm.           tachy   Pulmonary/Chest: Effort normal.   Abdominal: He exhibits no distension.   Musculoskeletal:         General: No tenderness or edema.     Neurological: He is alert and oriented to person, place, and time. No cranial nerve deficit.   Skin: Skin is warm and dry.   Psychiatric: He has a normal mood and affect.       ED Course   Procedures  MEDICAL DECISION MAKING    After review of the patient's physical exam, ED testing, and history/symptoms, relevant labs, imaging, available outside records  a wide differential was considered including but not limited to: infectious, traumatic, vascular, toxicological , malignant, ischemic, embolic, psychological, genetic, iatrogenic, idiopathic, substance dependence/intoxication/withdrawal, electrolyte or blood dyscrasia, and other etiologies.        labs/imaging/interventions include:       Medications   sodium chloride 0.9% bolus 2,000 mL 2,000 mL (2,000 mLs Intravenous New Bag 2/10/23 1729)   insulin regular injection 6 Units 0.06 mL (6 Units Intravenous Given 2/10/23 1732)     Labs Reviewed   CBC W/ AUTO DIFFERENTIAL - Abnormal; Notable for the following components:       Result Value    RBC 4.05 (*)     Hemoglobin 11.9 (*)     Hematocrit 36.3 (*)     RDW 18.8 (*)     Immature Granulocytes 4.0 (*)     Gran # (ANC) 7.8 (*)     Immature Grans (Abs) 0.39 (*)     Lymph # 0.8 (*)     Gran % 79.8 (*)     Lymph % 7.8 (*)     All other  components within normal limits   COMPREHENSIVE METABOLIC PANEL - Abnormal; Notable for the following components:    Sodium 133 (*)     Glucose 573 (*)     Creatinine 1.6 (*)     Alkaline Phosphatase 516 (*)     ALT 74 (*)     eGFR 49 (*)     All other components within normal limits    Narrative:     GLU critical result(s) called and verbal readback obtained from CHADD Jacome RN by MARCOS 02/10/2023 16:12   URINALYSIS, REFLEX TO URINE CULTURE - Abnormal; Notable for the following components:    Specific Gravity, UA >1.030 (*)     Glucose, UA 4+ (*)     All other components within normal limits    Narrative:     Specimen Source->Urine   POCT GLUCOSE - Abnormal; Notable for the following components:    POCT Glucose >500 (*)     All other components within normal limits   POCT GLUCOSE - Abnormal; Notable for the following components:    POCT Glucose 376 (*)     All other components within normal limits   ISTAT PROCEDURE - Abnormal; Notable for the following components:    POC PCO2 50.2 (*)     POC PO2 27 (*)     POC HCO3 29.0 (*)     POC SATURATED O2 47 (*)     POC TCO2 31 (*)     All other components within normal limits   POCT GLUCOSE - Abnormal; Notable for the following components:    POCT Glucose 209 (*)     All other components within normal limits   BETA - HYDROXYBUTYRATE, SERUM   MAGNESIUM   URINALYSIS MICROSCOPIC    Narrative:     Specimen Source->Urine   POCT GLUCOSE MONITORING CONTINUOUS   POCT GLUCOSE MONITORING CONTINUOUS   POCT GLUCOSE MONITORING CONTINUOUS      X-Ray Chest AP Portable   Final Result      No acute abnormality identified on this single view.      Additional findings better evaluated on prior CT dated 12/08/2022.         Electronically signed by: Willis Shabazz MD   Date:    02/10/2023   Time:    16:03        Pt is not in dka. Anion gap 11 and beta hydroxy is not elevated.      After interventions pt feeling better. Repeat glucose 209. Will discharge.    I have independently evaluated and  interpreted all available labs and imaging.  The suspected diagnosis, treatment and plan were discussed with the patient. All questions or concerns have been addressed.    Note was created using voice recognition software. Note may have occasional typographical or grammatical errors , garbled syntax, and other bizarre constructions that may not have been identified and edited despite good ana initial review prior to signing.       EKG Readings: (Independently Interpreted)   Hr 111, sinus tach, nl axis/intervals, no cece/twi, non acute, no stemi                                         Clinical Impression:   Final diagnoses:  [R73.9] Hyperglycemia  [E86.0] Dehydration (Primary)        ED Disposition Condition    Discharge Stable          ED Prescriptions    None       Follow-up Information       Follow up With Specialties Details Why Contact Info    Keri Batista NP [facility]   [address] [place] [address]  [place] [address]  [phone]               Rl Esqueda MD  02/10/23 3576

## 2023-02-10 NOTE — ED TRIAGE NOTES
Pt arrived to the ED via personal transport due to hyperglycemia. Pt reports being at chemo for his Small Cell Stage 4 Lung Cancer when Dr reported sugar > 500. Pt was informed to get to the ED for insulin administration. Pt alert and calm, oriented x4. Pt has symptoms of dehydration, urinary frequency, dry mouth, and fatigue. 96% on RA. No acute distress noted.

## 2023-02-10 NOTE — TELEPHONE ENCOUNTER
Spoke with patient spouse Isabela Allenet because patient was not availaible. Told her per dr Irving to inform mr Juan to recheck his blood glucose and if it's still elevated above 400 to go to ED. Patient spouse verbalized understanding.

## 2023-02-11 NOTE — DISCHARGE INSTRUCTIONS
Thank you for coming to our Emergency Department today. It is important to remember that some problems or medical conditions are difficult to diagnose and may not be found or addressed during your Emergency Department visit.     Be sure to follow up with your primary care doctor and review all labs/imaging/tests that were performed during your ER visit with them. Some labs/imaging/tests may be outside of the normal range, and require non-emergent follow-up and/or further investigation/treatment/procedures/testing to help diagnose/exclude/prevent complications or other potentially serious medical conditions that were not discussed or addressed during your ER visit.    If you do not have a primary care doctor, you may contact the one listed on your discharge paperwork or you may also call the [facility] Appointment Desk at [phone] to schedule an appointment and establish care with one. It is important to your health that you have a primary care doctor.    Please take all medications as directed. All medications may potentially have side-effects and it is impossible to predict which medications may give you side-effects or what side-effects (if any) they will give you.. If you feel that you are having a negative effect or side-effect of any medication you should immediately stop taking them and seek medical attention. If you feel that you are having a life-threatening reaction call 911.    Return to the ER with any questions/concerns, new/concerning symptoms, worsening or failure to improve.     Do not drive, swim, climb to height, take a bath, operate heavy machinery, drink alcohol or take potentially sedating medications, sign any legal documents or make any important decisions for 24 hours if you have received any pain medications, sedatives or mood altering drugs during your ER visit or within 24 hours of taking them if they have been prescribed to you.     You can find additional resources for Dentists,  hearing aids, durable medical equipment, low cost pharmacies and other resources at [email or web address].org    BELOW THIS LINE ONLY APPLIES IF YOU HAVE A COVID TEST PENDING OR IF YOU HAVE BEEN DIAGNOSED WITH COVID:  Please access [facility] to review the results of your test. Until the results of your COVID test return, you should isolate yourself so as not to potentially spread illness to others.   If your COVID test returns positive, you should isolate yourself so as not to spread illness to others. After five full days, if you are feeling better and you have not had fever for 24 hours, you can return to your typical daily activities, but you must wear a mask around others for an additional 5 days.   If your COVID test returns negative and you are either unvaccinated or more than six months out from your two-dose vaccine and are not yet boosted, you should still quarantine for 5 full days followed by strict mask use for an additional 5 full days.   If your COVID test returns negative and you have received your 2-dose initial vaccine as well as a booster, you should continue strict mask use for 10 full days after the exposure.  For all those exposed, best practice includes a test at day 5 after the exposure. This can be a home test or a test through one of the many testing centers throughout our community.   Masking is always advised to limit the spread of COVID. Cdc.gov is an excellent site to obtain the latest up to date recommendations regarding COVID and COVID testing.     CDC Testing and Quarantine Guidelines for patients with exposure to a known-positive COVID-19 person:  A close exposure is defined as anyone who has had an exposure (masked or unmasked) to a known COVID -19 positive person within 6 feet of someone for a cumulative total of 15 minutes or more over a 24-hour period.   Vaccinated and/or if you recently had a positive covid test within 90 days do NOT need to quarantine after contact with someone  who had COVID-19 unless you develop symptoms.   Fully vaccinated people who have not had a positive test within 90 days, should get tested 3-5 days after their exposure, even if they don't have symptoms and wear a mask indoors in public for 14 days following exposure or until their test result is negative.      Unvaccinated and/or NOT had a positive test within 90 days and meet close exposure  You are required by CDC guidelines to quarantine for at least 5 days from time of exposure followed by 5 days of strict masking. It is recommended, but not required to test after 5 days, unless you develop symptoms, in which case you should test at that time.  If you get tested after 5 days and your test is positive, your 5 day period of isolation starts the day of the positive test.    If your exposure does not meet the above definition, you can return to your normal daily activities to include social distancing, wearing a mask and frequent handwashing.      Here is a link to guidance from the CDC:  https://www.cdc.gov/media/releases/2021/s1227-isolation-quarantine-guidance.html      [facility] Testing Sites:  https://ldh.la.gov/page/3934      [facility] website with testing locations and guidance:  [email or web address]

## 2023-02-13 ENCOUNTER — INFUSION (OUTPATIENT)
Dept: INFUSION THERAPY | Facility: HOSPITAL | Age: 58
End: 2023-02-13
Attending: STUDENT IN AN ORGANIZED HEALTH CARE EDUCATION/TRAINING PROGRAM
Payer: MEDICAID

## 2023-02-13 ENCOUNTER — OFFICE VISIT (OUTPATIENT)
Dept: HEMATOLOGY/ONCOLOGY | Facility: CLINIC | Age: 58
End: 2023-02-13
Payer: MEDICAID

## 2023-02-13 VITALS
OXYGEN SATURATION: 96 % | HEIGHT: 67 IN | SYSTOLIC BLOOD PRESSURE: 142 MMHG | TEMPERATURE: 98 F | RESPIRATION RATE: 17 BRPM | TEMPERATURE: 98 F | SYSTOLIC BLOOD PRESSURE: 142 MMHG | WEIGHT: 248.88 LBS | DIASTOLIC BLOOD PRESSURE: 73 MMHG | HEART RATE: 95 BPM | BODY MASS INDEX: 39.06 KG/M2 | OXYGEN SATURATION: 97 % | DIASTOLIC BLOOD PRESSURE: 84 MMHG | HEART RATE: 95 BPM

## 2023-02-13 DIAGNOSIS — C79.51 BONE METASTASIS: ICD-10-CM

## 2023-02-13 DIAGNOSIS — C78.7 SECONDARY MALIGNANT NEOPLASM OF LIVER: ICD-10-CM

## 2023-02-13 DIAGNOSIS — C34.90 MALIGNANT NEOPLASM OF UNSPECIFIED PART OF UNSPECIFIED BRONCHUS OR LUNG: ICD-10-CM

## 2023-02-13 DIAGNOSIS — C34.90 SMALL CELL LUNG CANCER: ICD-10-CM

## 2023-02-13 DIAGNOSIS — C34.90 MALIGNANT NEOPLASM OF UNSPECIFIED PART OF UNSPECIFIED BRONCHUS OR LUNG: Primary | ICD-10-CM

## 2023-02-13 DIAGNOSIS — Z51.5 PALLIATIVE CARE BY SPECIALIST: ICD-10-CM

## 2023-02-13 DIAGNOSIS — I10 HYPERTENSION, UNSPECIFIED TYPE: Primary | ICD-10-CM

## 2023-02-13 DIAGNOSIS — I10 HYPERTENSION, UNSPECIFIED TYPE: ICD-10-CM

## 2023-02-13 DIAGNOSIS — Z09 FOLLOW-UP EXAM: ICD-10-CM

## 2023-02-13 DIAGNOSIS — E11.69 TYPE 2 DIABETES MELLITUS WITH OTHER SPECIFIED COMPLICATION, WITHOUT LONG-TERM CURRENT USE OF INSULIN: ICD-10-CM

## 2023-02-13 LAB
ALBUMIN SERPL BCP-MCNC: 3.4 G/DL (ref 3.5–5.2)
ALP SERPL-CCNC: 407 U/L (ref 55–135)
ALT SERPL W/O P-5'-P-CCNC: 56 U/L (ref 10–44)
ANION GAP SERPL CALC-SCNC: 11 MMOL/L (ref 8–16)
AST SERPL-CCNC: 30 U/L (ref 10–40)
BILIRUB SERPL-MCNC: 0.6 MG/DL (ref 0.1–1)
BUN SERPL-MCNC: 17 MG/DL (ref 6–20)
CALCIUM SERPL-MCNC: 8.7 MG/DL (ref 8.7–10.5)
CHLORIDE SERPL-SCNC: 96 MMOL/L (ref 95–110)
CO2 SERPL-SCNC: 24 MMOL/L (ref 23–29)
CREAT SERPL-MCNC: 1.3 MG/DL (ref 0.5–1.4)
EST. GFR  (NO RACE VARIABLE): >60 ML/MIN/1.73 M^2
GLUCOSE SERPL-MCNC: 366 MG/DL (ref 70–110)
GLUCOSE SERPL-MCNC: 469 MG/DL (ref 70–110)
POTASSIUM SERPL-SCNC: 4.6 MMOL/L (ref 3.5–5.1)
PROT SERPL-MCNC: 6.6 G/DL (ref 6–8.4)
SODIUM SERPL-SCNC: 131 MMOL/L (ref 136–145)

## 2023-02-13 PROCEDURE — 4010F PR ACE/ARB THEARPY RXD/TAKEN: ICD-10-PCS | Mod: CPTII,,, | Performed by: STUDENT IN AN ORGANIZED HEALTH CARE EDUCATION/TRAINING PROGRAM

## 2023-02-13 PROCEDURE — 3077F PR MOST RECENT SYSTOLIC BLOOD PRESSURE >= 140 MM HG: ICD-10-PCS | Mod: CPTII,,, | Performed by: STUDENT IN AN ORGANIZED HEALTH CARE EDUCATION/TRAINING PROGRAM

## 2023-02-13 PROCEDURE — 63600175 PHARM REV CODE 636 W HCPCS: Performed by: STUDENT IN AN ORGANIZED HEALTH CARE EDUCATION/TRAINING PROGRAM

## 2023-02-13 PROCEDURE — 99215 OFFICE O/P EST HI 40 MIN: CPT | Mod: S$PBB,,, | Performed by: STUDENT IN AN ORGANIZED HEALTH CARE EDUCATION/TRAINING PROGRAM

## 2023-02-13 PROCEDURE — 82947 ASSAY GLUCOSE BLOOD QUANT: CPT | Performed by: STUDENT IN AN ORGANIZED HEALTH CARE EDUCATION/TRAINING PROGRAM

## 2023-02-13 PROCEDURE — 4010F ACE/ARB THERAPY RXD/TAKEN: CPT | Mod: CPTII,,, | Performed by: STUDENT IN AN ORGANIZED HEALTH CARE EDUCATION/TRAINING PROGRAM

## 2023-02-13 PROCEDURE — 1159F MED LIST DOCD IN RCRD: CPT | Mod: CPTII,,, | Performed by: STUDENT IN AN ORGANIZED HEALTH CARE EDUCATION/TRAINING PROGRAM

## 2023-02-13 PROCEDURE — 25000003 PHARM REV CODE 250: Performed by: STUDENT IN AN ORGANIZED HEALTH CARE EDUCATION/TRAINING PROGRAM

## 2023-02-13 PROCEDURE — 99215 PR OFFICE/OUTPT VISIT, EST, LEVL V, 40-54 MIN: ICD-10-PCS | Mod: S$PBB,,, | Performed by: STUDENT IN AN ORGANIZED HEALTH CARE EDUCATION/TRAINING PROGRAM

## 2023-02-13 PROCEDURE — 99999 PR PBB SHADOW E&M-EST. PATIENT-LVL IV: ICD-10-PCS | Mod: PBBFAC,,, | Performed by: STUDENT IN AN ORGANIZED HEALTH CARE EDUCATION/TRAINING PROGRAM

## 2023-02-13 PROCEDURE — 99999 PR PBB SHADOW E&M-EST. PATIENT-LVL IV: CPT | Mod: PBBFAC,,, | Performed by: STUDENT IN AN ORGANIZED HEALTH CARE EDUCATION/TRAINING PROGRAM

## 2023-02-13 PROCEDURE — 99214 OFFICE O/P EST MOD 30 MIN: CPT | Mod: PBBFAC,25 | Performed by: STUDENT IN AN ORGANIZED HEALTH CARE EDUCATION/TRAINING PROGRAM

## 2023-02-13 PROCEDURE — 96372 THER/PROPH/DIAG INJ SC/IM: CPT | Mod: 59

## 2023-02-13 PROCEDURE — 96367 TX/PROPH/DG ADDL SEQ IV INF: CPT

## 2023-02-13 PROCEDURE — 3008F BODY MASS INDEX DOCD: CPT | Mod: CPTII,,, | Performed by: STUDENT IN AN ORGANIZED HEALTH CARE EDUCATION/TRAINING PROGRAM

## 2023-02-13 PROCEDURE — 3077F SYST BP >= 140 MM HG: CPT | Mod: CPTII,,, | Performed by: STUDENT IN AN ORGANIZED HEALTH CARE EDUCATION/TRAINING PROGRAM

## 2023-02-13 PROCEDURE — A4216 STERILE WATER/SALINE, 10 ML: HCPCS | Performed by: STUDENT IN AN ORGANIZED HEALTH CARE EDUCATION/TRAINING PROGRAM

## 2023-02-13 PROCEDURE — 3008F PR BODY MASS INDEX (BMI) DOCUMENTED: ICD-10-PCS | Mod: CPTII,,, | Performed by: STUDENT IN AN ORGANIZED HEALTH CARE EDUCATION/TRAINING PROGRAM

## 2023-02-13 PROCEDURE — 96413 CHEMO IV INFUSION 1 HR: CPT

## 2023-02-13 PROCEDURE — 3079F DIAST BP 80-89 MM HG: CPT | Mod: CPTII,,, | Performed by: STUDENT IN AN ORGANIZED HEALTH CARE EDUCATION/TRAINING PROGRAM

## 2023-02-13 PROCEDURE — 1159F PR MEDICATION LIST DOCUMENTED IN MEDICAL RECORD: ICD-10-PCS | Mod: CPTII,,, | Performed by: STUDENT IN AN ORGANIZED HEALTH CARE EDUCATION/TRAINING PROGRAM

## 2023-02-13 PROCEDURE — 80053 COMPREHEN METABOLIC PANEL: CPT | Performed by: STUDENT IN AN ORGANIZED HEALTH CARE EDUCATION/TRAINING PROGRAM

## 2023-02-13 PROCEDURE — 3079F PR MOST RECENT DIASTOLIC BLOOD PRESSURE 80-89 MM HG: ICD-10-PCS | Mod: CPTII,,, | Performed by: STUDENT IN AN ORGANIZED HEALTH CARE EDUCATION/TRAINING PROGRAM

## 2023-02-13 RX ORDER — HEPARIN 100 UNIT/ML
500 SYRINGE INTRAVENOUS
Status: DISCONTINUED | OUTPATIENT
Start: 2023-02-13 | End: 2023-02-13 | Stop reason: HOSPADM

## 2023-02-13 RX ORDER — SODIUM CHLORIDE 0.9 % (FLUSH) 0.9 %
10 SYRINGE (ML) INJECTION
Status: CANCELLED | OUTPATIENT
Start: 2023-02-13

## 2023-02-13 RX ORDER — HEPARIN 100 UNIT/ML
500 SYRINGE INTRAVENOUS
Status: CANCELLED | OUTPATIENT
Start: 2023-02-13

## 2023-02-13 RX ORDER — SODIUM CHLORIDE 0.9 % (FLUSH) 0.9 %
10 SYRINGE (ML) INJECTION
Status: DISCONTINUED | OUTPATIENT
Start: 2023-02-13 | End: 2023-02-13 | Stop reason: HOSPADM

## 2023-02-13 RX ADMIN — TOPOTECAN HYDROCHLORIDE 3.47 MG: 4 INJECTION, POWDER, LYOPHILIZED, FOR SOLUTION INTRAVENOUS at 09:02

## 2023-02-13 RX ADMIN — HEPARIN 500 UNITS: 100 SYRINGE at 01:02

## 2023-02-13 RX ADMIN — INSULIN HUMAN 10 UNITS: 100 INJECTION, SOLUTION PARENTERAL at 10:02

## 2023-02-13 RX ADMIN — DEXAMETHASONE SODIUM PHOSPHATE 6 MG: 4 INJECTION, SOLUTION INTRAMUSCULAR; INTRAVENOUS at 09:02

## 2023-02-13 RX ADMIN — Medication 10 ML: at 01:02

## 2023-02-13 NOTE — PROGRESS NOTES
[facility]  [facility]  Hematology/Medical Oncology Clinic       PATIENT: Juan Gillespie  MRN: [id number]  DATE: 2/13/2023    Reason for referral: Extensive stage small cell lung ca    Initial History: Juan Gillespie is a 57 year old man with extensive stage SCLC who presents to clinic for evaluation prior to treatment  of topotecan.          Oncological History:  -Started Carbo/Etop/Atez 4/5/22  -Maintenance atezolizumab 7/5/22  -Consolidative thoracic radiation 8/4/22-8/18/22  -topotecan started 10/24/23-present (missed Nov tx)    Interval History:     Pt presents for MD chahal prior to C4 topetecan.  Pt feeling much better and is no longer having to take pain meds after last tx.  Cough medicine refilled for chronic cough.  No other issue or complaints voiced.  BG noted to be elevated but pt eating an increased amount of carbohydrates.  Pt cautioned on diet as BG was controlled by diet only.  Pt may need insulin regularly if BG remains elevated.  Insulin given on tx today.      His wife accompanies him at this visit.  Past Medical History:   Past Medical History:   Diagnosis Date    Cancer     Small Cell -Stage 4 Lung Cancer    Diabetes mellitus, type 2     Hypertension        Past Surgical HIstory:   Past Surgical History:   Procedure Laterality Date    ENDOBRONCHIAL ULTRASOUND N/A 3/22/2022    Procedure: ENDOBRONCHIAL ULTRASOUND (EBUS);  Surgeon: Kristin Samuels MD;  Location: [facility] OR [address] [place] [address];  Service: Endoscopy;  Laterality: N/A;    KNEE SURGERY         Family History:   Family History   Problem Relation Age of Onset    Diabetes Mellitus Mother     Pacemaker/defibrilator Father     Lung cancer Father        Social History:  reports that he has quit smoking. His smoking use included cigarettes. He has never used smokeless tobacco. He reports that he does not drink alcohol and does not use drugs.    Allergies:  Review of patient's allergies indicates:  No Known  Allergies    Medications:  Current Outpatient Medications   Medication Sig Dispense Refill    albuterol (ACCUNEB) 1.25 mg/3 mL Nebu Use 1 vial (1.25 mg total) by nebulization 3 (three) times daily as needed (shortness of breath). Rescue 90 mL 2    allopurinoL (ZYLOPRIM) 300 MG tablet Take 1 tablet (300 mg total) by mouth once daily. 60 tablet 3    losartan (COZAAR) 100 MG tablet Take 1 tablet (100 mg total) by mouth once daily. 30 tablet 2    montelukast (SINGULAIR) 10 mg tablet Take 10 mg by mouth every evening.      morphine (MS CONTIN) 30 MG 12 hr tablet Take 1 tablet (30 mg total) by mouth every 12 (twelve) hours. 60 tablet 0    promethazine-codeine 6.25-10 mg/5 ml (PHENERGAN WITH CODEINE) 6.25-10 mg/5 mL syrup Take 5 mLs by mouth every 6 (six) hours as needed for Cough. 473 mL 0    senna-docusate 8.6-50 mg (PERICOLACE) 8.6-50 mg per tablet Take 1 tablet by mouth daily as needed for Constipation. 30 tablet 2    sildenafiL (VIAGRA) 50 MG tablet Take 1 tablet (50 mg total) by mouth daily as needed for Erectile Dysfunction. 30 tablet 0    TRUE METRIX GLUCOSE TEST STRIP Strp       TRUEPLUS LANCETS 33 gauge Misc        No current facility-administered medications for this visit.     Facility-Administered Medications Ordered in Other Visits   Medication Dose Route Frequency Provider Last Rate Last Admin    heparin, porcine (PF) 100 unit/mL injection flush 500 Units  500 Units Intravenous PRN Soledad Irving MD   500 Units at 01/23/23 1218    sodium chloride 0.9% flush 10 mL  10 mL Intravenous PRN Soledad Irving MD   10 mL at 01/23/23 1218       Review of Systems   Constitutional:  Negative for chills, fatigue and fever.   HENT:  Negative for congestion and trouble swallowing.    Respiratory:  Negative for cough, chest tightness and shortness of breath.    Cardiovascular:  Negative for chest pain.   Gastrointestinal:  Negative for abdominal pain and blood in stool.   Endocrine: Negative for cold intolerance and heat  intolerance.   Genitourinary:  Negative for hematuria.   Musculoskeletal:  Negative for arthralgias, back pain and myalgias.   Skin:  Negative for rash.   Neurological:  Negative for weakness.   Hematological:  Negative for adenopathy. Does not bruise/bleed easily.   Psychiatric/Behavioral:  Negative for dysphoric mood. The patient is not nervous/anxious.    ECOG Performance Status:   ECOG SCORE             Objective:      Vitals:   There were no vitals filed for this visit.    telemedicine    Physical Exam  Constitutional:       General: He is not in acute distress.     Appearance: Normal appearance. He is not ill-appearing.   HENT:      Head: Normocephalic and atraumatic.      Nose: Nose normal.      Mouth/Throat:      Mouth: Mucous membranes are moist.      Pharynx: Oropharynx is clear. No oropharyngeal exudate or posterior oropharyngeal erythema.   Eyes:      General: No scleral icterus.     Extraocular Movements: Extraocular movements intact.      Conjunctiva/sclera: Conjunctivae normal.      Pupils: Pupils are equal, round, and reactive to light.   Pulmonary:      Effort: Pulmonary effort is normal. No respiratory distress.   Abdominal:      General: Abdomen is flat.      Palpations: Abdomen is soft.   Musculoskeletal:         General: No swelling. Normal range of motion.      Cervical back: Normal range of motion.      Right lower leg: No edema.      Left lower leg: No edema.   Skin:     General: Skin is warm and dry.      Coloration: Skin is not jaundiced.   Neurological:      General: No focal deficit present.      Mental Status: He is alert.   Psychiatric:         Mood and Affect: Mood normal.         Behavior: Behavior normal.         Thought Content: Thought content normal.         Judgment: Judgment normal.     Laboratory Data:  Recent Results (from the past 168 hour(s))   COMPREHENSIVE METABOLIC PANEL    Collection Time: 02/10/23 10:15 AM   Result Value Ref Range    Sodium 132 (L) 136 - 145 mmol/L     Potassium 5.0 3.5 - 5.1 mmol/L    Chloride 95 95 - 110 mmol/L    CO2 26 23 - 29 mmol/L    Glucose 551 (HH) 70 - 110 mg/dL    BUN 20 6 - 20 mg/dL    Creatinine 1.5 (H) 0.5 - 1.4 mg/dL    Calcium 9.2 8.7 - 10.5 mg/dL    Total Protein 7.4 6.0 - 8.4 g/dL    Albumin 3.9 3.5 - 5.2 g/dL    Total Bilirubin 0.7 0.1 - 1.0 mg/dL    Alkaline Phosphatase 555 (H) 55 - 135 U/L    AST 36 10 - 40 U/L    ALT 77 (H) 10 - 44 U/L    Anion Gap 11 8 - 16 mmol/L    eGFR 53 (A) >60 mL/min/1.73 m^2   CBC W/ AUTO DIFFERENTIAL    Collection Time: 02/10/23 10:15 AM   Result Value Ref Range    WBC 10.19 3.90 - 12.70 K/uL    RBC 4.04 (L) 4.60 - 6.20 M/uL    Hemoglobin 12.3 (L) 14.0 - 18.0 g/dL    Hematocrit 37.5 (L) 40.0 - 54.0 %    MCV 93 82 - 98 fL    MCH 30.4 27.0 - 31.0 pg    MCHC 32.8 32.0 - 36.0 g/dL    RDW 18.7 (H) 11.5 - 14.5 %    Platelets 294 150 - 450 K/uL    MPV 9.8 9.2 - 12.9 fL    Immature Granulocytes 4.0 (H) 0.0 - 0.5 %    Gran # (ANC) 8.1 (H) 1.8 - 7.7 K/uL    Immature Grans (Abs) 0.41 (H) 0.00 - 0.04 K/uL    Lymph # 0.9 (L) 1.0 - 4.8 K/uL    Mono # 0.5 0.3 - 1.0 K/uL    Eos # 0.2 0.0 - 0.5 K/uL    Baso # 0.09 0.00 - 0.20 K/uL    nRBC 0 0 /100 WBC    Gran % 79.9 (H) 38.0 - 73.0 %    Lymph % 8.6 (L) 18.0 - 48.0 %    Mono % 4.9 4.0 - 15.0 %    Eosinophil % 1.7 0.0 - 8.0 %    Basophil % 0.9 0.0 - 1.9 %    Differential Method Automated    Magnesium    Collection Time: 02/10/23 10:15 AM   Result Value Ref Range    Magnesium 2.1 1.6 - 2.6 mg/dL   POCT glucose    Collection Time: 02/10/23  3:09 PM   Result Value Ref Range    POCT Glucose >500 (H) 70 - 110 mg/dL   CBC auto differential    Collection Time: 02/10/23  3:23 PM   Result Value Ref Range    WBC 9.81 3.90 - 12.70 K/uL    RBC 4.05 (L) 4.60 - 6.20 M/uL    Hemoglobin 11.9 (L) 14.0 - 18.0 g/dL    Hematocrit 36.3 (L) 40.0 - 54.0 %    MCV 90 82 - 98 fL    MCH 29.4 27.0 - 31.0 pg    MCHC 32.8 32.0 - 36.0 g/dL    RDW 18.8 (H) 11.5 - 14.5 %    Platelets 308 150 - 450 K/uL    MPV 10.2 9.2  - 12.9 fL    Immature Granulocytes 4.0 (H) 0.0 - 0.5 %    Gran # (ANC) 7.8 (H) 1.8 - 7.7 K/uL    Immature Grans (Abs) 0.39 (H) 0.00 - 0.04 K/uL    Lymph # 0.8 (L) 1.0 - 4.8 K/uL    Mono # 0.6 0.3 - 1.0 K/uL    Eos # 0.2 0.0 - 0.5 K/uL    Baso # 0.10 0.00 - 0.20 K/uL    nRBC 0 0 /100 WBC    Gran % 79.8 (H) 38.0 - 73.0 %    Lymph % 7.8 (L) 18.0 - 48.0 %    Mono % 5.6 4.0 - 15.0 %    Eosinophil % 1.8 0.0 - 8.0 %    Basophil % 1.0 0.0 - 1.9 %    Differential Method Automated    Comprehensive metabolic panel    Collection Time: 02/10/23  3:23 PM   Result Value Ref Range    Sodium 133 (L) 136 - 145 mmol/L    Potassium 4.4 3.5 - 5.1 mmol/L    Chloride 98 95 - 110 mmol/L    CO2 24 23 - 29 mmol/L    Glucose 573 (HH) 70 - 110 mg/dL    BUN 19 6 - 20 mg/dL    Creatinine 1.6 (H) 0.5 - 1.4 mg/dL    Calcium 9.4 8.7 - 10.5 mg/dL    Total Protein 7.1 6.0 - 8.4 g/dL    Albumin 3.7 3.5 - 5.2 g/dL    Total Bilirubin 0.7 0.1 - 1.0 mg/dL    Alkaline Phosphatase 516 (H) 55 - 135 U/L    AST 34 10 - 40 U/L    ALT 74 (H) 10 - 44 U/L    Anion Gap 11 8 - 16 mmol/L    eGFR 49 (A) >60 mL/min/1.73 m^2   Beta - Hydroxybutyrate, Serum    Collection Time: 02/10/23  3:23 PM   Result Value Ref Range    Beta-Hydroxybutyrate 0.2 0.0 - 0.5 mmol/L   Magnesium    Collection Time: 02/10/23  3:23 PM   Result Value Ref Range    Magnesium 2.1 1.6 - 2.6 mg/dL   Urinalysis, Reflex to Urine Culture Urine, Clean Catch    Collection Time: 02/10/23  4:53 PM    Specimen: Urine   Result Value Ref Range    Specimen UA Urine, Clean Catch     Color, UA Yellow Yellow, Straw, Leela    Appearance, UA Clear Clear    pH, UA 5.0 5.0 - 8.0    Specific Gravity, UA >1.030 (A) 1.005 - 1.030    Protein, UA Negative Negative    Glucose, UA 4+ (A) Negative    Ketones, UA Negative Negative    Bilirubin (UA) Negative Negative    Occult Blood UA Negative Negative    Nitrite, UA Negative Negative    Urobilinogen, UA Negative <2.0 EU/dL    Leukocytes, UA Negative Negative   Urinalysis  Microscopic    Collection Time: 02/10/23  4:53 PM   Result Value Ref Range    RBC, UA 2 0 - 4 /hpf    Bacteria Rare None-Occ /hpf    Yeast, UA None None    Microscopic Comment SEE COMMENT    POCT glucose    Collection Time: 02/10/23  5:27 PM   Result Value Ref Range    POCT Glucose 376 (H) 70 - 110 mg/dL   ISTAT PROCEDURE    Collection Time: 02/10/23  5:30 PM   Result Value Ref Range    POC PH 7.370 7.35 - 7.45    POC PCO2 50.2 (H) 35 - 45 mmHg    POC PO2 27 (L) 40 - 60 mmHg    POC HCO3 29.0 (H) 24 - 28 mmol/L    POC BE 3 -2 to 2 mmol/L    POC SATURATED O2 47 (L) 95 - 100 %    POC TCO2 31 (H) 24 - 29 mmol/L    Sample VENOUS     Site Other     Allens Test N/A     DelSys Room Air    POCT Glucose, Hand-Held Device    Collection Time: 02/10/23  5:31 PM   Result Value Ref Range    POC Glucose 376 (A) 70 - 110 MG/DL   POCT glucose    Collection Time: 02/10/23  6:43 PM   Result Value Ref Range    POCT Glucose 209 (H) 70 - 110 mg/dL       Imaging:   MRI Brain W WO Contrast    Result Date: 10/11/2022  EXAMINATION: MRI BRAIN W WO CONTRAST CLINICAL HISTORY: Small cell lung cancer, brain re-staging; Malignant neoplasm of unspecified part of unspecified bronchus or lung TECHNIQUE: Sagittal and axial T1, axial T2, axial FLAIR, axial gradient, axial diffusion imaging of the whole brain pre-contrast with postcontrast axial T1 and axial spoiled gradient imaging reformatted in the coronal and sagittal planes.. Ten ml of Gadavist injected intravenously. COMPARISON: 07/12/2022 FINDINGS: Interval 1.2 cm enhancing lesion within the left anterior inferior frontal lobe which prominently the ring-enhancing measuring 1.2 x 0.9 x 0.9 cm in AP by TV by CC dimensions respectively in light of history concerning for parenchymal metastases the with question trace susceptibility associated with this lesion. Previously identified heterogeneous enhancing sellar lesion is again seen allowing for routine brain technique with lesion measuring  approximately 1.3 cm craniocaudal this may represent pituitary adenoma though indeterminate.  Previous intraluminal filling defect within the right jugular foramen is no longer seen.  There is however diffusion signal abnormality with ill-defined T1 hypointensity within the right occipital condyle concerning for possible osseous metastases the clinical correlation and further evaluation with nuclear medicine imaging advised. There is continued slight prominent leptomeningeal enhancement along the left cerebellum which raises concern for possible underlying vascular malformation such as a dural AV fistula with collateral vascular engorgement.  There is no restricted diffusion to suggest acute infarction.  Ventricles stable without hydrocephalus.  There is mild edema signal surrounding the left frontal enhancing lesion with continued few scattered punctate size regions of T2 FLAIR signal hyperintensity elsewhere supratentorial white matter which are nonspecific and may represent mild chronic ischemic change. This report was flagged in Epic as abnormal.     Interval development of a small ring-enhancing lesion left anterior inferior frontal lobe concerning for parenchymal metastases in light of history.  Clinical correlation and follow-up advised There is continue though relatively stable heterogeneous enhancement and enlargement of the material within the sella which may represent pituitary adenoma though indeterminate. Previous right internal jugular vein thrombus no longer seen. Abnormal signal along the right occipital condyle concerning for possible osseous metastases clinical correlation and further evaluation with nuclear medicine imaging advised Continued prominent vascularity left cerebellar folia raising concern for possible underlying vascular malformation unchanged.  Further evaluation with noncontrast MRA head may be of further diagnostic value. Electronically signed by: Jonel Lawrence DO  Date:    10/11/2022 Time:    10:05    CT Chest Abdomen Pelvis With Contrast (xpd)    Result Date: 10/18/2022  EXAMINATION: CT CHEST ABDOMEN PELVIS WITH CONTRAST (XPD) CLINICAL HISTORY: metastatic small cell lung cancer on maintenance immunotherapy, eval response; Malignant neoplasm of unspecified part of unspecified bronchus or lung TECHNIQUE: Low dose axial images, sagittal and coronal reformations were obtained from the thoracic inlet to the pubic symphysis following the IV administration of 100 mL of Omnipaque 350 COMPARISON: 07/26/2022 FINDINGS: Right IJ venous shunt tip superior aspect right atrium, absence of clot in included upper right IJ. Fatty infiltration of liver, additional diminished attenuation space-occupying lesions of liver, largest central right hepatic lobe 3.6 cm image 103 series 3.  Stable.  Spleen, adrenal glands, pancreas, gallbladder and biliary tree normal. Urinary bladder normal.  Prostate gland very small 4 cm versus postop prostatectomy.  No free fluid or retroperitoneal adenopathy.  GI track normal. Tiny nonobstructive right lower and left upper renal pole stones. New lymph node left retro manubrial 3.4 cm image 28 series 3 appearing in the interim.  Paratracheal conglomerate adenopathy 2.1 x 2.5 cm, was 1.6 x 3 cm.  Subcarinal adenopathy stable.  No new hilar or mediastinal adenopathy. Degenerative disc spondylosis cervicothoracic junction., focal osteoblastic opacities involve humeral heads both clavicle superimposed on erosive DJD more prominent on right.  Additional focal osteoblastic opacities sternum, ribs, dorsal lumbar sacral spine pelvis and both femoral head and trochanteric regions.  Moderate anterior spondylosis dorsal spine and degenerative disc spondylosis facet joint arthropathy lumbosacral junction with DJD SI joints.  Fracture defects left lower anterior chest wall stable. Scattered calcified plaque great vessels aorta iliac femoral arteries. Dominant medial segment  right middle lobe mass 1.1 x 2.7 cm image 69 series 3, was 1.6 x 3.4 cm.  Additional patchy nodular lesions right lower lobe posterior basilar segments, largest 1.4 cm image 66 series 3 suspicious for metastatic disease and/or superimposed inflammatory and infectious process.  Additional patchy infiltrates medial posterior segment right upper lobe and right lower lobe particularly medial basilar segment image 91 series 3.  No new pleural reaction.  Tracheobronchial tree normal. .     1. New presumed metastatic node anterior superior left mediastinum, paratracheal adenopathy otherwise stable. 2. Decreased size in right middle lobe mass with new evidence of metastatic disease right lower lobe versus superimposed inflammatory infectious process discussed above. 3. New developing metastatic lesions liver. 4. Bony universal metastatic disease stable. 5. Recist summary: 6. Compare with previous CT chest abdomen pelvis 07/26/2022 7. Lesion 1: Right middle lobe mass 1.1 x 2.7 cm was 1.6 x 13.4 cm. 8. Lesion 2: Right paratracheal conned Willett a adenopathy 2.1 x 2.5 cm, was 1.6 x 3 cm. 9. Lesion 3: Dome 1.9 cm stable.  (New progressive liver metastatic disease noted elsewhere) 10. Lesion 4: Caudal aspect right hepatic lobe 1 cm, stable 11.  This report was flagged in Epic as abnormal. Electronically signed by: Dewayne Hutchinson MD Date:    10/18/2022 Time:    09:28       Assessment and Plan          Extensive stage small cell lung cancer  -Initially diagnosed with extensive stage small cell lung cancer in 03/2022 who was treated with carbo/etop/atezolizumab from 04/05/2022 - 06/14/22 with partial response. He subsequently completed consolidation thoracic RT 8/4/22 - 8/18/22, and he was on maintenance atezolizumab.   -10/18/22 showing progression of disease in lungs and liver.  Asymptomatic.  -Dr. Sosa discussed with him plan for topotecan and pt was consented  -Pt tolerated cycle 1 well but delayed C2 due to being out of  town  -Scans in December 2022 largely stable with exception of  Right middle lobe lung mass.  3.4 cm, still demonstrated widespread disease in bones  -Admitted for pain control and palliative XRT on 12/07/22  -Pt had been off of tx in Nov 2022 due to going on vacation, as symptoms improved drastically after restarting tx  Plan 02/13/23  -Keep pt on schedule for C5 of topotecan today  -RTC in for MD and tx in 3 weeks  -Plan for repeat scans in March (ordered)  -Follow up with palliative care      Cancer related pain/palliative care by specialist  -Pain well controlled and is not using pain meds since restarting tx and palliative XRT      DM2  -Previously diet controlled   -Worsened since pt increased sugar and carb intake  -Insulin given today on tx  -Pt cautioned on diet, may need to start on insulin soon if not controlled  -Referral made for new PCP per pt request        Time spent with pt: 40 minutes      Problem List Items Addressed This Visit    None        Soledad Irving MD  Hematology Oncology

## 2023-02-13 NOTE — Clinical Note
-Chemo today -RTC in 3 weeks for MD, labs (CMP, CBC, Mg) -Please get pt IVF 1 L NS weekly in between tx visits -Please get pt diabetic education and PCP ASAP -CT to be ordered for second week of March

## 2023-02-13 NOTE — PLAN OF CARE
Patient arrived to unit for Topotecan chemotherapy infusion accompanied by his wife. Patient see in clinic by Dr. Irving prior to arrival. Labs and treatment plan reviewed by Dr. Irving with patient prior to infusion visit. Patient agreeable to treatment plan today. Labs ok to proceed with treatment plan today. CMP stat drawn. Blood glucose resulted in 469. MD Maria Fernanda notified. 10 units of insulin ordered. Patient denied any new or worsening symptoms. Dexamethasone IVPB administered over 20 mins. Topotecan administered over 30 mins. Patient tolerated treatment well. New blood glucose lab drawn at 1145. Glucose resulted in 366. MD Maria Fernanda aware. No new orders at this time. Patient discharged off unit in no signs of acute distress. Patient advised to monitor glucose at home and if patient's symptoms worsen to go to the ER. Patient verbalized understanding.

## 2023-02-14 ENCOUNTER — INFUSION (OUTPATIENT)
Dept: INFUSION THERAPY | Facility: HOSPITAL | Age: 58
End: 2023-02-14
Attending: STUDENT IN AN ORGANIZED HEALTH CARE EDUCATION/TRAINING PROGRAM
Payer: MEDICAID

## 2023-02-14 ENCOUNTER — PATIENT MESSAGE (OUTPATIENT)
Dept: HEMATOLOGY/ONCOLOGY | Facility: CLINIC | Age: 58
End: 2023-02-14
Payer: MEDICAID

## 2023-02-14 VITALS
SYSTOLIC BLOOD PRESSURE: 136 MMHG | HEART RATE: 100 BPM | RESPIRATION RATE: 17 BRPM | OXYGEN SATURATION: 98 % | DIASTOLIC BLOOD PRESSURE: 82 MMHG | TEMPERATURE: 98 F

## 2023-02-14 DIAGNOSIS — I10 HYPERTENSION, UNSPECIFIED TYPE: Primary | ICD-10-CM

## 2023-02-14 DIAGNOSIS — C34.90 SMALL CELL LUNG CANCER: ICD-10-CM

## 2023-02-14 DIAGNOSIS — C78.7 SECONDARY MALIGNANT NEOPLASM OF LIVER: ICD-10-CM

## 2023-02-14 LAB — GLUCOSE SERPL-MCNC: 445 MG/DL (ref 70–110)

## 2023-02-14 PROCEDURE — 96413 CHEMO IV INFUSION 1 HR: CPT

## 2023-02-14 PROCEDURE — 36593 DECLOT VASCULAR DEVICE: CPT

## 2023-02-14 PROCEDURE — A4216 STERILE WATER/SALINE, 10 ML: HCPCS | Performed by: STUDENT IN AN ORGANIZED HEALTH CARE EDUCATION/TRAINING PROGRAM

## 2023-02-14 PROCEDURE — 25000003 PHARM REV CODE 250: Performed by: STUDENT IN AN ORGANIZED HEALTH CARE EDUCATION/TRAINING PROGRAM

## 2023-02-14 PROCEDURE — 63600175 PHARM REV CODE 636 W HCPCS: Performed by: STUDENT IN AN ORGANIZED HEALTH CARE EDUCATION/TRAINING PROGRAM

## 2023-02-14 PROCEDURE — 96372 THER/PROPH/DIAG INJ SC/IM: CPT | Mod: 59

## 2023-02-14 PROCEDURE — 96375 TX/PRO/DX INJ NEW DRUG ADDON: CPT

## 2023-02-14 PROCEDURE — 82947 ASSAY GLUCOSE BLOOD QUANT: CPT | Performed by: STUDENT IN AN ORGANIZED HEALTH CARE EDUCATION/TRAINING PROGRAM

## 2023-02-14 RX ORDER — DEXAMETHASONE SODIUM PHOSPHATE 4 MG/ML
6 INJECTION, SOLUTION INTRA-ARTICULAR; INTRALESIONAL; INTRAMUSCULAR; INTRAVENOUS; SOFT TISSUE
Status: CANCELLED
Start: 2023-02-15

## 2023-02-14 RX ORDER — DEXAMETHASONE SODIUM PHOSPHATE 4 MG/ML
6 INJECTION, SOLUTION INTRA-ARTICULAR; INTRALESIONAL; INTRAMUSCULAR; INTRAVENOUS; SOFT TISSUE
Status: COMPLETED | OUTPATIENT
Start: 2023-02-14 | End: 2023-02-14

## 2023-02-14 RX ORDER — HEPARIN 100 UNIT/ML
500 SYRINGE INTRAVENOUS
Status: CANCELLED | OUTPATIENT
Start: 2023-02-16

## 2023-02-14 RX ORDER — SODIUM CHLORIDE 0.9 % (FLUSH) 0.9 %
10 SYRINGE (ML) INJECTION
Status: CANCELLED | OUTPATIENT
Start: 2023-02-14

## 2023-02-14 RX ORDER — HEPARIN 100 UNIT/ML
500 SYRINGE INTRAVENOUS
Status: DISCONTINUED | OUTPATIENT
Start: 2023-02-14 | End: 2023-02-14 | Stop reason: HOSPADM

## 2023-02-14 RX ORDER — SODIUM CHLORIDE 0.9 % (FLUSH) 0.9 %
10 SYRINGE (ML) INJECTION
Status: DISCONTINUED | OUTPATIENT
Start: 2023-02-14 | End: 2023-02-14 | Stop reason: HOSPADM

## 2023-02-14 RX ORDER — HEPARIN 100 UNIT/ML
500 SYRINGE INTRAVENOUS
Status: CANCELLED | OUTPATIENT
Start: 2023-02-14

## 2023-02-14 RX ORDER — HEPARIN 100 UNIT/ML
500 SYRINGE INTRAVENOUS
Status: CANCELLED | OUTPATIENT
Start: 2023-02-15

## 2023-02-14 RX ORDER — DEXAMETHASONE SODIUM PHOSPHATE 4 MG/ML
6 INJECTION, SOLUTION INTRA-ARTICULAR; INTRALESIONAL; INTRAMUSCULAR; INTRAVENOUS; SOFT TISSUE
Status: CANCELLED
Start: 2023-02-16

## 2023-02-14 RX ORDER — SODIUM CHLORIDE 0.9 % (FLUSH) 0.9 %
10 SYRINGE (ML) INJECTION
Status: CANCELLED | OUTPATIENT
Start: 2023-02-15

## 2023-02-14 RX ORDER — DEXAMETHASONE SODIUM PHOSPHATE 4 MG/ML
6 INJECTION, SOLUTION INTRA-ARTICULAR; INTRALESIONAL; INTRAMUSCULAR; INTRAVENOUS; SOFT TISSUE
Status: CANCELLED
Start: 2023-02-14

## 2023-02-14 RX ORDER — SODIUM CHLORIDE 0.9 % (FLUSH) 0.9 %
10 SYRINGE (ML) INJECTION
Status: CANCELLED | OUTPATIENT
Start: 2023-02-16

## 2023-02-14 RX ADMIN — ALTEPLASE 2 MG: 2.2 INJECTION, POWDER, LYOPHILIZED, FOR SOLUTION INTRAVENOUS at 08:02

## 2023-02-14 RX ADMIN — HEPARIN 500 UNITS: 100 SYRINGE at 11:02

## 2023-02-14 RX ADMIN — TOPOTECAN HYDROCHLORIDE 3.47 MG: 4 INJECTION, POWDER, LYOPHILIZED, FOR SOLUTION INTRAVENOUS at 10:02

## 2023-02-14 RX ADMIN — INSULIN HUMAN 10 UNITS: 100 INJECTION, SOLUTION PARENTERAL at 10:02

## 2023-02-14 RX ADMIN — DEXAMETHASONE SODIUM PHOSPHATE 6 MG: 4 INJECTION, SOLUTION INTRA-ARTICULAR; INTRALESIONAL; INTRAMUSCULAR; INTRAVENOUS; SOFT TISSUE at 10:02

## 2023-02-14 RX ADMIN — Medication 10 ML: at 11:02

## 2023-02-15 ENCOUNTER — INFUSION (OUTPATIENT)
Dept: INFUSION THERAPY | Facility: HOSPITAL | Age: 58
End: 2023-02-15
Attending: STUDENT IN AN ORGANIZED HEALTH CARE EDUCATION/TRAINING PROGRAM
Payer: MEDICAID

## 2023-02-15 VITALS
TEMPERATURE: 98 F | RESPIRATION RATE: 17 BRPM | DIASTOLIC BLOOD PRESSURE: 73 MMHG | OXYGEN SATURATION: 96 % | HEART RATE: 101 BPM | SYSTOLIC BLOOD PRESSURE: 140 MMHG

## 2023-02-15 DIAGNOSIS — C34.90 SMALL CELL LUNG CANCER: ICD-10-CM

## 2023-02-15 DIAGNOSIS — I10 HYPERTENSION, UNSPECIFIED TYPE: Primary | ICD-10-CM

## 2023-02-15 DIAGNOSIS — C78.7 SECONDARY MALIGNANT NEOPLASM OF LIVER: ICD-10-CM

## 2023-02-15 PROCEDURE — A4216 STERILE WATER/SALINE, 10 ML: HCPCS | Performed by: STUDENT IN AN ORGANIZED HEALTH CARE EDUCATION/TRAINING PROGRAM

## 2023-02-15 PROCEDURE — 63600175 PHARM REV CODE 636 W HCPCS: Performed by: STUDENT IN AN ORGANIZED HEALTH CARE EDUCATION/TRAINING PROGRAM

## 2023-02-15 PROCEDURE — 96413 CHEMO IV INFUSION 1 HR: CPT

## 2023-02-15 PROCEDURE — 25000003 PHARM REV CODE 250: Performed by: STUDENT IN AN ORGANIZED HEALTH CARE EDUCATION/TRAINING PROGRAM

## 2023-02-15 PROCEDURE — 96376 TX/PRO/DX INJ SAME DRUG ADON: CPT

## 2023-02-15 RX ORDER — SODIUM CHLORIDE 0.9 % (FLUSH) 0.9 %
10 SYRINGE (ML) INJECTION
Status: DISCONTINUED | OUTPATIENT
Start: 2023-02-15 | End: 2023-02-15 | Stop reason: HOSPADM

## 2023-02-15 RX ORDER — DEXAMETHASONE SODIUM PHOSPHATE 4 MG/ML
6 INJECTION, SOLUTION INTRA-ARTICULAR; INTRALESIONAL; INTRAMUSCULAR; INTRAVENOUS; SOFT TISSUE
Status: COMPLETED | OUTPATIENT
Start: 2023-02-15 | End: 2023-02-15

## 2023-02-15 RX ORDER — HEPARIN 100 UNIT/ML
500 SYRINGE INTRAVENOUS
Status: DISCONTINUED | OUTPATIENT
Start: 2023-02-15 | End: 2023-02-15 | Stop reason: HOSPADM

## 2023-02-15 RX ADMIN — HEPARIN 500 UNITS: 100 SYRINGE at 10:02

## 2023-02-15 RX ADMIN — Medication 10 ML: at 10:02

## 2023-02-15 RX ADMIN — TOPOTECAN HYDROCHLORIDE 3.47 MG: 4 INJECTION, POWDER, LYOPHILIZED, FOR SOLUTION INTRAVENOUS at 09:02

## 2023-02-15 RX ADMIN — DEXAMETHASONE SODIUM PHOSPHATE 6 MG: 4 INJECTION, SOLUTION INTRA-ARTICULAR; INTRALESIONAL; INTRAMUSCULAR; INTRAVENOUS; SOFT TISSUE at 08:02

## 2023-02-15 NOTE — PLAN OF CARE
Patient arrived to unit for D3C5 Topotecan chemotherapy infusion. Patient reports seeing his PCP yesterday and working on getting his blood glucose under control. Patient started on  Forxiga daily and insulin pen using a sliding scale. Patient to follow up with PCP 2/22/23. 6 mg Dexamethasone IVP administered prior to Topotecan infusion. Topotecan infused over 30 mins. Patient denies any new or worsening symptoms during visit. Discharged home in no signs of acute distress.

## 2023-02-16 ENCOUNTER — INFUSION (OUTPATIENT)
Dept: INFUSION THERAPY | Facility: HOSPITAL | Age: 58
End: 2023-02-16
Attending: STUDENT IN AN ORGANIZED HEALTH CARE EDUCATION/TRAINING PROGRAM
Payer: MEDICAID

## 2023-02-16 VITALS
HEART RATE: 85 BPM | DIASTOLIC BLOOD PRESSURE: 70 MMHG | RESPIRATION RATE: 17 BRPM | TEMPERATURE: 98 F | SYSTOLIC BLOOD PRESSURE: 145 MMHG | OXYGEN SATURATION: 98 %

## 2023-02-16 DIAGNOSIS — I10 HYPERTENSION, UNSPECIFIED TYPE: Primary | ICD-10-CM

## 2023-02-16 DIAGNOSIS — C34.90 SMALL CELL LUNG CANCER: ICD-10-CM

## 2023-02-16 DIAGNOSIS — C78.7 SECONDARY MALIGNANT NEOPLASM OF LIVER: ICD-10-CM

## 2023-02-16 PROCEDURE — 96413 CHEMO IV INFUSION 1 HR: CPT

## 2023-02-16 PROCEDURE — A4216 STERILE WATER/SALINE, 10 ML: HCPCS | Performed by: STUDENT IN AN ORGANIZED HEALTH CARE EDUCATION/TRAINING PROGRAM

## 2023-02-16 PROCEDURE — 63600175 PHARM REV CODE 636 W HCPCS: Performed by: STUDENT IN AN ORGANIZED HEALTH CARE EDUCATION/TRAINING PROGRAM

## 2023-02-16 PROCEDURE — 25000003 PHARM REV CODE 250: Performed by: STUDENT IN AN ORGANIZED HEALTH CARE EDUCATION/TRAINING PROGRAM

## 2023-02-16 PROCEDURE — 96375 TX/PRO/DX INJ NEW DRUG ADDON: CPT

## 2023-02-16 RX ORDER — HEPARIN 100 UNIT/ML
500 SYRINGE INTRAVENOUS
Status: DISCONTINUED | OUTPATIENT
Start: 2023-02-16 | End: 2023-02-23 | Stop reason: HOSPADM

## 2023-02-16 RX ORDER — DEXAMETHASONE SODIUM PHOSPHATE 4 MG/ML
6 INJECTION, SOLUTION INTRA-ARTICULAR; INTRALESIONAL; INTRAMUSCULAR; INTRAVENOUS; SOFT TISSUE
Status: COMPLETED | OUTPATIENT
Start: 2023-02-16 | End: 2023-02-16

## 2023-02-16 RX ORDER — SODIUM CHLORIDE 0.9 % (FLUSH) 0.9 %
10 SYRINGE (ML) INJECTION
Status: DISCONTINUED | OUTPATIENT
Start: 2023-02-16 | End: 2023-02-23 | Stop reason: HOSPADM

## 2023-02-16 RX ADMIN — TOPOTECAN HYDROCHLORIDE 3.47 MG: 4 INJECTION, POWDER, LYOPHILIZED, FOR SOLUTION INTRAVENOUS at 10:02

## 2023-02-16 RX ADMIN — HEPARIN 500 UNITS: 100 SYRINGE at 12:02

## 2023-02-16 RX ADMIN — Medication 10 ML: at 12:02

## 2023-02-16 RX ADMIN — DEXAMETHASONE SODIUM PHOSPHATE 6 MG: 4 INJECTION, SOLUTION INTRA-ARTICULAR; INTRALESIONAL; INTRAMUSCULAR; INTRAVENOUS; SOFT TISSUE at 09:02

## 2023-02-17 ENCOUNTER — INFUSION (OUTPATIENT)
Dept: INFUSION THERAPY | Facility: HOSPITAL | Age: 58
End: 2023-02-17
Attending: STUDENT IN AN ORGANIZED HEALTH CARE EDUCATION/TRAINING PROGRAM
Payer: MEDICAID

## 2023-02-17 VITALS — TEMPERATURE: 98 F

## 2023-02-17 DIAGNOSIS — C78.7 SECONDARY MALIGNANT NEOPLASM OF LIVER: ICD-10-CM

## 2023-02-17 DIAGNOSIS — C34.90 SMALL CELL LUNG CANCER: ICD-10-CM

## 2023-02-17 DIAGNOSIS — I10 HYPERTENSION, UNSPECIFIED TYPE: Primary | ICD-10-CM

## 2023-02-17 PROCEDURE — 96372 THER/PROPH/DIAG INJ SC/IM: CPT

## 2023-02-17 PROCEDURE — 63600175 PHARM REV CODE 636 W HCPCS: Mod: TB,JG | Performed by: STUDENT IN AN ORGANIZED HEALTH CARE EDUCATION/TRAINING PROGRAM

## 2023-02-17 RX ADMIN — PEGFILGRASTIM 6 MG: 6 INJECTION SUBCUTANEOUS at 10:02

## 2023-02-22 ENCOUNTER — INFUSION (OUTPATIENT)
Dept: INFUSION THERAPY | Facility: HOSPITAL | Age: 58
End: 2023-02-22
Attending: STUDENT IN AN ORGANIZED HEALTH CARE EDUCATION/TRAINING PROGRAM
Payer: MEDICAID

## 2023-02-22 VITALS
RESPIRATION RATE: 17 BRPM | TEMPERATURE: 98 F | SYSTOLIC BLOOD PRESSURE: 117 MMHG | OXYGEN SATURATION: 95 % | HEART RATE: 92 BPM | DIASTOLIC BLOOD PRESSURE: 59 MMHG

## 2023-02-22 DIAGNOSIS — E11.9 TYPE 2 DIABETES MELLITUS, WITHOUT LONG-TERM CURRENT USE OF INSULIN: ICD-10-CM

## 2023-02-22 DIAGNOSIS — C79.31 SECONDARY MALIGNANT NEOPLASM OF BRAIN: Primary | ICD-10-CM

## 2023-02-22 PROCEDURE — 25000003 PHARM REV CODE 250: Performed by: STUDENT IN AN ORGANIZED HEALTH CARE EDUCATION/TRAINING PROGRAM

## 2023-02-22 PROCEDURE — 96360 HYDRATION IV INFUSION INIT: CPT

## 2023-02-22 PROCEDURE — 63600175 PHARM REV CODE 636 W HCPCS: Performed by: STUDENT IN AN ORGANIZED HEALTH CARE EDUCATION/TRAINING PROGRAM

## 2023-02-22 PROCEDURE — A4216 STERILE WATER/SALINE, 10 ML: HCPCS | Performed by: STUDENT IN AN ORGANIZED HEALTH CARE EDUCATION/TRAINING PROGRAM

## 2023-02-22 PROCEDURE — 96372 THER/PROPH/DIAG INJ SC/IM: CPT | Mod: 59

## 2023-02-22 RX ORDER — CYANOCOBALAMIN 1000 UG/ML
1000 INJECTION, SOLUTION INTRAMUSCULAR; SUBCUTANEOUS
Status: COMPLETED | OUTPATIENT
Start: 2023-02-22 | End: 2023-02-22

## 2023-02-22 RX ORDER — HEPARIN 100 UNIT/ML
500 SYRINGE INTRAVENOUS
Status: DISCONTINUED | OUTPATIENT
Start: 2023-02-22 | End: 2023-02-22 | Stop reason: HOSPADM

## 2023-02-22 RX ORDER — SODIUM CHLORIDE 0.9 % (FLUSH) 0.9 %
10 SYRINGE (ML) INJECTION
Status: DISCONTINUED | OUTPATIENT
Start: 2023-02-22 | End: 2023-02-22 | Stop reason: HOSPADM

## 2023-02-22 RX ORDER — CYANOCOBALAMIN 1000 UG/ML
1000 INJECTION, SOLUTION INTRAMUSCULAR; SUBCUTANEOUS
Status: CANCELLED | OUTPATIENT
Start: 2023-02-22

## 2023-02-22 RX ADMIN — Medication 10 ML: at 12:02

## 2023-02-22 RX ADMIN — SODIUM CHLORIDE 1000 ML: 9 INJECTION, SOLUTION INTRAVENOUS at 11:02

## 2023-02-22 RX ADMIN — CYANOCOBALAMIN 1000 MCG: 1000 INJECTION, SOLUTION INTRAMUSCULAR at 11:02

## 2023-02-22 RX ADMIN — HEPARIN 500 UNITS: 100 SYRINGE at 12:02

## 2023-02-22 NOTE — PLAN OF CARE
Pt arrived to unit for IVFs and his B12 injection. VSS. Pt continues with staying on top of his fluid intake. Feeling well overall today. Blood sugar still remains under control. IVFs and B12 shot tolerated well. Pt has next appointments through [facility]. Discharged from unit in NAD.

## 2023-02-24 NOTE — PLAN OF CARE
Patient arrived to unit for D4 Topotecan chemotherapy infusion. 6 mg Dexamethasone IVP administered prior to Topotecan infusion. Topotecan infused over 30 mins. Patient denies any new or worsening symptoms during visit. Discharged home in no signs of acute distress

## 2023-02-28 ENCOUNTER — CLINICAL SUPPORT (OUTPATIENT)
Dept: DIABETES | Facility: CLINIC | Age: 58
End: 2023-02-28
Payer: MEDICAID

## 2023-02-28 DIAGNOSIS — E11.69 TYPE 2 DIABETES MELLITUS WITH OTHER SPECIFIED COMPLICATION, WITHOUT LONG-TERM CURRENT USE OF INSULIN: ICD-10-CM

## 2023-02-28 PROCEDURE — G0108 DIAB MANAGE TRN  PER INDIV: HCPCS | Mod: PBBFAC

## 2023-02-28 PROCEDURE — 99999 PR PBB SHADOW E&M-EST. PATIENT-LVL II: CPT | Mod: PBBFAC,,,

## 2023-02-28 PROCEDURE — 99999 PR PBB SHADOW E&M-EST. PATIENT-LVL II: ICD-10-PCS | Mod: PBBFAC,,,

## 2023-02-28 PROCEDURE — 99212 OFFICE O/P EST SF 10 MIN: CPT | Mod: PBBFAC

## 2023-03-01 ENCOUNTER — INFUSION (OUTPATIENT)
Dept: INFUSION THERAPY | Facility: HOSPITAL | Age: 58
End: 2023-03-01
Attending: STUDENT IN AN ORGANIZED HEALTH CARE EDUCATION/TRAINING PROGRAM
Payer: MEDICAID

## 2023-03-01 VITALS
RESPIRATION RATE: 18 BRPM | OXYGEN SATURATION: 96 % | DIASTOLIC BLOOD PRESSURE: 71 MMHG | HEART RATE: 101 BPM | TEMPERATURE: 98 F | SYSTOLIC BLOOD PRESSURE: 143 MMHG

## 2023-03-01 DIAGNOSIS — E11.9 TYPE 2 DIABETES MELLITUS, WITHOUT LONG-TERM CURRENT USE OF INSULIN: Primary | ICD-10-CM

## 2023-03-01 PROCEDURE — 96360 HYDRATION IV INFUSION INIT: CPT

## 2023-03-01 PROCEDURE — A4216 STERILE WATER/SALINE, 10 ML: HCPCS | Performed by: STUDENT IN AN ORGANIZED HEALTH CARE EDUCATION/TRAINING PROGRAM

## 2023-03-01 PROCEDURE — 25000003 PHARM REV CODE 250: Performed by: STUDENT IN AN ORGANIZED HEALTH CARE EDUCATION/TRAINING PROGRAM

## 2023-03-01 PROCEDURE — 63600175 PHARM REV CODE 636 W HCPCS: Performed by: STUDENT IN AN ORGANIZED HEALTH CARE EDUCATION/TRAINING PROGRAM

## 2023-03-01 RX ORDER — SODIUM CHLORIDE 0.9 % (FLUSH) 0.9 %
10 SYRINGE (ML) INJECTION
Status: DISCONTINUED | OUTPATIENT
Start: 2023-03-01 | End: 2023-03-01 | Stop reason: HOSPADM

## 2023-03-01 RX ORDER — HEPARIN 100 UNIT/ML
500 SYRINGE INTRAVENOUS
Status: DISCONTINUED | OUTPATIENT
Start: 2023-03-01 | End: 2023-03-01 | Stop reason: HOSPADM

## 2023-03-01 RX ADMIN — SODIUM CHLORIDE 1000 ML: 9 INJECTION, SOLUTION INTRAVENOUS at 09:03

## 2023-03-01 RX ADMIN — HEPARIN 500 UNITS: 100 SYRINGE at 09:03

## 2023-03-01 RX ADMIN — Medication 10 ML: at 09:03

## 2023-03-01 NOTE — PLAN OF CARE
Patient arrived to unit for IVFs. Port accessed, flushed w/o difficulty, blood return noted, and IVFs infusing to gravity. IVFs tolerated well. Patient continuing to monitor glucose at home and reports doing well. Port de-accessed, flushed w/o difficulty, blood return noted and heparin locked. Needle intact. Discharged off unit in no signs of acute distress.

## 2023-03-03 ENCOUNTER — LAB VISIT (OUTPATIENT)
Dept: LAB | Facility: HOSPITAL | Age: 58
End: 2023-03-03
Attending: STUDENT IN AN ORGANIZED HEALTH CARE EDUCATION/TRAINING PROGRAM
Payer: MEDICAID

## 2023-03-03 DIAGNOSIS — C34.90 MALIGNANT NEOPLASM OF UNSPECIFIED PART OF UNSPECIFIED BRONCHUS OR LUNG: ICD-10-CM

## 2023-03-03 LAB
ALBUMIN SERPL BCP-MCNC: 3.9 G/DL (ref 3.5–5.2)
ALP SERPL-CCNC: 343 U/L (ref 55–135)
ALT SERPL W/O P-5'-P-CCNC: 49 U/L (ref 10–44)
ANION GAP SERPL CALC-SCNC: 11 MMOL/L (ref 8–16)
AST SERPL-CCNC: 31 U/L (ref 10–40)
BASOPHILS # BLD AUTO: 0.06 K/UL (ref 0–0.2)
BASOPHILS NFR BLD: 0.7 % (ref 0–1.9)
BILIRUB SERPL-MCNC: 0.6 MG/DL (ref 0.1–1)
BUN SERPL-MCNC: 13 MG/DL (ref 6–20)
CALCIUM SERPL-MCNC: 10.1 MG/DL (ref 8.7–10.5)
CHLORIDE SERPL-SCNC: 100 MMOL/L (ref 95–110)
CO2 SERPL-SCNC: 26 MMOL/L (ref 23–29)
CREAT SERPL-MCNC: 1.2 MG/DL (ref 0.5–1.4)
DIFFERENTIAL METHOD: ABNORMAL
EOSINOPHIL # BLD AUTO: 0.2 K/UL (ref 0–0.5)
EOSINOPHIL NFR BLD: 2.5 % (ref 0–8)
ERYTHROCYTE [DISTWIDTH] IN BLOOD BY AUTOMATED COUNT: 18.1 % (ref 11.5–14.5)
EST. GFR  (NO RACE VARIABLE): >60 ML/MIN/1.73 M^2
GLUCOSE SERPL-MCNC: 188 MG/DL (ref 70–110)
HCT VFR BLD AUTO: 37.9 % (ref 40–54)
HGB BLD-MCNC: 12.2 G/DL (ref 14–18)
IMM GRANULOCYTES # BLD AUTO: 0.22 K/UL (ref 0–0.04)
IMM GRANULOCYTES NFR BLD AUTO: 2.7 % (ref 0–0.5)
LYMPHOCYTES # BLD AUTO: 0.8 K/UL (ref 1–4.8)
LYMPHOCYTES NFR BLD: 9.6 % (ref 18–48)
MAGNESIUM SERPL-MCNC: 2.1 MG/DL (ref 1.6–2.6)
MCH RBC QN AUTO: 30.7 PG (ref 27–31)
MCHC RBC AUTO-ENTMCNC: 32.2 G/DL (ref 32–36)
MCV RBC AUTO: 95 FL (ref 82–98)
MONOCYTES # BLD AUTO: 0.4 K/UL (ref 0.3–1)
MONOCYTES NFR BLD: 4.8 % (ref 4–15)
NEUTROPHILS # BLD AUTO: 6.6 K/UL (ref 1.8–7.7)
NEUTROPHILS NFR BLD: 79.7 % (ref 38–73)
NRBC BLD-RTO: 0 /100 WBC
PLATELET # BLD AUTO: 311 K/UL (ref 150–450)
PMV BLD AUTO: 9 FL (ref 9.2–12.9)
POTASSIUM SERPL-SCNC: 4.5 MMOL/L (ref 3.5–5.1)
PROT SERPL-MCNC: 7.3 G/DL (ref 6–8.4)
RBC # BLD AUTO: 3.98 M/UL (ref 4.6–6.2)
SODIUM SERPL-SCNC: 137 MMOL/L (ref 136–145)
WBC # BLD AUTO: 8.3 K/UL (ref 3.9–12.7)

## 2023-03-03 PROCEDURE — 83735 ASSAY OF MAGNESIUM: CPT | Performed by: STUDENT IN AN ORGANIZED HEALTH CARE EDUCATION/TRAINING PROGRAM

## 2023-03-03 PROCEDURE — 36415 COLL VENOUS BLD VENIPUNCTURE: CPT | Performed by: STUDENT IN AN ORGANIZED HEALTH CARE EDUCATION/TRAINING PROGRAM

## 2023-03-03 PROCEDURE — 80053 COMPREHEN METABOLIC PANEL: CPT | Performed by: STUDENT IN AN ORGANIZED HEALTH CARE EDUCATION/TRAINING PROGRAM

## 2023-03-03 PROCEDURE — 85025 COMPLETE CBC W/AUTO DIFF WBC: CPT | Performed by: STUDENT IN AN ORGANIZED HEALTH CARE EDUCATION/TRAINING PROGRAM

## 2023-03-06 ENCOUNTER — OFFICE VISIT (OUTPATIENT)
Dept: HEMATOLOGY/ONCOLOGY | Facility: CLINIC | Age: 58
End: 2023-03-06
Payer: MEDICAID

## 2023-03-06 ENCOUNTER — INFUSION (OUTPATIENT)
Dept: INFUSION THERAPY | Facility: HOSPITAL | Age: 58
End: 2023-03-06
Attending: STUDENT IN AN ORGANIZED HEALTH CARE EDUCATION/TRAINING PROGRAM
Payer: MEDICAID

## 2023-03-06 VITALS
OXYGEN SATURATION: 97 % | DIASTOLIC BLOOD PRESSURE: 80 MMHG | SYSTOLIC BLOOD PRESSURE: 143 MMHG | HEART RATE: 85 BPM | RESPIRATION RATE: 17 BRPM | WEIGHT: 248.88 LBS | OXYGEN SATURATION: 97 % | HEIGHT: 67 IN | HEART RATE: 90 BPM | SYSTOLIC BLOOD PRESSURE: 122 MMHG | DIASTOLIC BLOOD PRESSURE: 60 MMHG | TEMPERATURE: 98 F | BODY MASS INDEX: 39.06 KG/M2

## 2023-03-06 DIAGNOSIS — C34.90 MALIGNANT NEOPLASM OF UNSPECIFIED PART OF UNSPECIFIED BRONCHUS OR LUNG: Primary | ICD-10-CM

## 2023-03-06 DIAGNOSIS — Z51.5 PALLIATIVE CARE BY SPECIALIST: ICD-10-CM

## 2023-03-06 DIAGNOSIS — Z09 FOLLOW-UP EXAM: ICD-10-CM

## 2023-03-06 DIAGNOSIS — C34.90 SMALL CELL LUNG CANCER: ICD-10-CM

## 2023-03-06 DIAGNOSIS — C79.31 SECONDARY MALIGNANT NEOPLASM OF BRAIN: ICD-10-CM

## 2023-03-06 DIAGNOSIS — C78.7 SECONDARY MALIGNANT NEOPLASM OF LIVER: ICD-10-CM

## 2023-03-06 DIAGNOSIS — E11.69 TYPE 2 DIABETES MELLITUS WITH OTHER SPECIFIED COMPLICATION, WITHOUT LONG-TERM CURRENT USE OF INSULIN: ICD-10-CM

## 2023-03-06 DIAGNOSIS — I10 HYPERTENSION, UNSPECIFIED TYPE: ICD-10-CM

## 2023-03-06 DIAGNOSIS — I10 HYPERTENSION, UNSPECIFIED TYPE: Primary | ICD-10-CM

## 2023-03-06 PROCEDURE — 63600175 PHARM REV CODE 636 W HCPCS: Performed by: STUDENT IN AN ORGANIZED HEALTH CARE EDUCATION/TRAINING PROGRAM

## 2023-03-06 PROCEDURE — 99213 OFFICE O/P EST LOW 20 MIN: CPT | Mod: PBBFAC,25 | Performed by: STUDENT IN AN ORGANIZED HEALTH CARE EDUCATION/TRAINING PROGRAM

## 2023-03-06 PROCEDURE — 96375 TX/PRO/DX INJ NEW DRUG ADDON: CPT

## 2023-03-06 PROCEDURE — 3008F PR BODY MASS INDEX (BMI) DOCUMENTED: ICD-10-PCS | Mod: CPTII,,, | Performed by: STUDENT IN AN ORGANIZED HEALTH CARE EDUCATION/TRAINING PROGRAM

## 2023-03-06 PROCEDURE — 96372 THER/PROPH/DIAG INJ SC/IM: CPT | Mod: 59

## 2023-03-06 PROCEDURE — 99215 OFFICE O/P EST HI 40 MIN: CPT | Mod: S$PBB,,, | Performed by: STUDENT IN AN ORGANIZED HEALTH CARE EDUCATION/TRAINING PROGRAM

## 2023-03-06 PROCEDURE — 96413 CHEMO IV INFUSION 1 HR: CPT

## 2023-03-06 PROCEDURE — 3077F PR MOST RECENT SYSTOLIC BLOOD PRESSURE >= 140 MM HG: ICD-10-PCS | Mod: CPTII,,, | Performed by: STUDENT IN AN ORGANIZED HEALTH CARE EDUCATION/TRAINING PROGRAM

## 2023-03-06 PROCEDURE — 1159F MED LIST DOCD IN RCRD: CPT | Mod: CPTII,,, | Performed by: STUDENT IN AN ORGANIZED HEALTH CARE EDUCATION/TRAINING PROGRAM

## 2023-03-06 PROCEDURE — 99999 PR PBB SHADOW E&M-EST. PATIENT-LVL III: ICD-10-PCS | Mod: PBBFAC,,, | Performed by: STUDENT IN AN ORGANIZED HEALTH CARE EDUCATION/TRAINING PROGRAM

## 2023-03-06 PROCEDURE — 99999 PR PBB SHADOW E&M-EST. PATIENT-LVL III: CPT | Mod: PBBFAC,,, | Performed by: STUDENT IN AN ORGANIZED HEALTH CARE EDUCATION/TRAINING PROGRAM

## 2023-03-06 PROCEDURE — 4010F ACE/ARB THERAPY RXD/TAKEN: CPT | Mod: CPTII,,, | Performed by: STUDENT IN AN ORGANIZED HEALTH CARE EDUCATION/TRAINING PROGRAM

## 2023-03-06 PROCEDURE — 25000003 PHARM REV CODE 250: Performed by: STUDENT IN AN ORGANIZED HEALTH CARE EDUCATION/TRAINING PROGRAM

## 2023-03-06 PROCEDURE — 3077F SYST BP >= 140 MM HG: CPT | Mod: CPTII,,, | Performed by: STUDENT IN AN ORGANIZED HEALTH CARE EDUCATION/TRAINING PROGRAM

## 2023-03-06 PROCEDURE — 1159F PR MEDICATION LIST DOCUMENTED IN MEDICAL RECORD: ICD-10-PCS | Mod: CPTII,,, | Performed by: STUDENT IN AN ORGANIZED HEALTH CARE EDUCATION/TRAINING PROGRAM

## 2023-03-06 PROCEDURE — A4216 STERILE WATER/SALINE, 10 ML: HCPCS | Performed by: STUDENT IN AN ORGANIZED HEALTH CARE EDUCATION/TRAINING PROGRAM

## 2023-03-06 PROCEDURE — 3079F DIAST BP 80-89 MM HG: CPT | Mod: CPTII,,, | Performed by: STUDENT IN AN ORGANIZED HEALTH CARE EDUCATION/TRAINING PROGRAM

## 2023-03-06 PROCEDURE — 3079F PR MOST RECENT DIASTOLIC BLOOD PRESSURE 80-89 MM HG: ICD-10-PCS | Mod: CPTII,,, | Performed by: STUDENT IN AN ORGANIZED HEALTH CARE EDUCATION/TRAINING PROGRAM

## 2023-03-06 PROCEDURE — 3008F BODY MASS INDEX DOCD: CPT | Mod: CPTII,,, | Performed by: STUDENT IN AN ORGANIZED HEALTH CARE EDUCATION/TRAINING PROGRAM

## 2023-03-06 PROCEDURE — 99215 PR OFFICE/OUTPT VISIT, EST, LEVL V, 40-54 MIN: ICD-10-PCS | Mod: S$PBB,,, | Performed by: STUDENT IN AN ORGANIZED HEALTH CARE EDUCATION/TRAINING PROGRAM

## 2023-03-06 PROCEDURE — 4010F PR ACE/ARB THEARPY RXD/TAKEN: ICD-10-PCS | Mod: CPTII,,, | Performed by: STUDENT IN AN ORGANIZED HEALTH CARE EDUCATION/TRAINING PROGRAM

## 2023-03-06 RX ORDER — DEXAMETHASONE SODIUM PHOSPHATE 4 MG/ML
6 INJECTION, SOLUTION INTRA-ARTICULAR; INTRALESIONAL; INTRAMUSCULAR; INTRAVENOUS; SOFT TISSUE
Status: CANCELLED
Start: 2023-03-06

## 2023-03-06 RX ORDER — DEXAMETHASONE SODIUM PHOSPHATE 4 MG/ML
6 INJECTION, SOLUTION INTRA-ARTICULAR; INTRALESIONAL; INTRAMUSCULAR; INTRAVENOUS; SOFT TISSUE
Status: CANCELLED
Start: 2023-03-07

## 2023-03-06 RX ORDER — CETIRIZINE HYDROCHLORIDE 10 MG/1
1 TABLET ORAL
COMMUNITY

## 2023-03-06 RX ORDER — CYANOCOBALAMIN 1000 UG/ML
1000 INJECTION, SOLUTION INTRAMUSCULAR; SUBCUTANEOUS
Status: CANCELLED | OUTPATIENT
Start: 2023-03-06

## 2023-03-06 RX ORDER — SODIUM CHLORIDE 0.9 % (FLUSH) 0.9 %
10 SYRINGE (ML) INJECTION
Status: CANCELLED | OUTPATIENT
Start: 2023-03-07

## 2023-03-06 RX ORDER — SODIUM CHLORIDE 0.9 % (FLUSH) 0.9 %
10 SYRINGE (ML) INJECTION
Status: DISCONTINUED | OUTPATIENT
Start: 2023-03-06 | End: 2023-03-06 | Stop reason: HOSPADM

## 2023-03-06 RX ORDER — HEPARIN 100 UNIT/ML
500 SYRINGE INTRAVENOUS
Status: CANCELLED | OUTPATIENT
Start: 2023-03-09

## 2023-03-06 RX ORDER — DEXAMETHASONE SODIUM PHOSPHATE 4 MG/ML
6 INJECTION, SOLUTION INTRA-ARTICULAR; INTRALESIONAL; INTRAMUSCULAR; INTRAVENOUS; SOFT TISSUE
Status: CANCELLED
Start: 2023-03-09

## 2023-03-06 RX ORDER — HEPARIN 100 UNIT/ML
500 SYRINGE INTRAVENOUS
Status: DISCONTINUED | OUTPATIENT
Start: 2023-03-06 | End: 2023-03-06 | Stop reason: HOSPADM

## 2023-03-06 RX ORDER — SODIUM CHLORIDE 0.9 % (FLUSH) 0.9 %
10 SYRINGE (ML) INJECTION
Status: CANCELLED | OUTPATIENT
Start: 2023-03-08

## 2023-03-06 RX ORDER — SODIUM CHLORIDE 0.9 % (FLUSH) 0.9 %
10 SYRINGE (ML) INJECTION
Status: CANCELLED | OUTPATIENT
Start: 2023-03-06

## 2023-03-06 RX ORDER — INSULIN ASPART 100 [IU]/ML
1 INJECTION, SOLUTION INTRAVENOUS; SUBCUTANEOUS
COMMUNITY
Start: 2023-02-14

## 2023-03-06 RX ORDER — DAPAGLIFLOZIN 10 MG/1
1 TABLET, FILM COATED ORAL EVERY MORNING
COMMUNITY
Start: 2023-02-14

## 2023-03-06 RX ORDER — HEPARIN 100 UNIT/ML
500 SYRINGE INTRAVENOUS
Status: CANCELLED | OUTPATIENT
Start: 2023-03-06

## 2023-03-06 RX ORDER — SEMAGLUTIDE 1.34 MG/ML
0.25 INJECTION, SOLUTION SUBCUTANEOUS WEEKLY
Status: ON HOLD | COMMUNITY
Start: 2023-02-22 | End: 2023-07-20 | Stop reason: HOSPADM

## 2023-03-06 RX ORDER — BENZONATATE 100 MG/1
1 CAPSULE ORAL
COMMUNITY
End: 2023-06-19 | Stop reason: SDUPTHER

## 2023-03-06 RX ORDER — HEPARIN 100 UNIT/ML
500 SYRINGE INTRAVENOUS
Status: CANCELLED | OUTPATIENT
Start: 2023-03-07

## 2023-03-06 RX ORDER — DEXAMETHASONE SODIUM PHOSPHATE 4 MG/ML
6 INJECTION, SOLUTION INTRA-ARTICULAR; INTRALESIONAL; INTRAMUSCULAR; INTRAVENOUS; SOFT TISSUE
Status: COMPLETED | OUTPATIENT
Start: 2023-03-06 | End: 2023-03-06

## 2023-03-06 RX ORDER — HEPARIN 100 UNIT/ML
500 SYRINGE INTRAVENOUS
Status: CANCELLED | OUTPATIENT
Start: 2023-03-08

## 2023-03-06 RX ORDER — SODIUM CHLORIDE 0.9 % (FLUSH) 0.9 %
10 SYRINGE (ML) INJECTION
Status: CANCELLED | OUTPATIENT
Start: 2023-03-09

## 2023-03-06 RX ORDER — DEXAMETHASONE SODIUM PHOSPHATE 4 MG/ML
6 INJECTION, SOLUTION INTRA-ARTICULAR; INTRALESIONAL; INTRAMUSCULAR; INTRAVENOUS; SOFT TISSUE
Status: CANCELLED
Start: 2023-03-08

## 2023-03-06 RX ORDER — CYANOCOBALAMIN 1000 UG/ML
1000 INJECTION, SOLUTION INTRAMUSCULAR; SUBCUTANEOUS
Status: COMPLETED | OUTPATIENT
Start: 2023-03-06 | End: 2023-03-06

## 2023-03-06 RX ADMIN — TOPOTECAN HYDROCHLORIDE 3.47 MG: 4 INJECTION, POWDER, LYOPHILIZED, FOR SOLUTION INTRAVENOUS at 09:03

## 2023-03-06 RX ADMIN — Medication 10 ML: at 08:03

## 2023-03-06 RX ADMIN — HEPARIN 500 UNITS: 100 SYRINGE at 08:03

## 2023-03-06 RX ADMIN — CYANOCOBALAMIN 1000 MCG: 1000 INJECTION, SOLUTION INTRAMUSCULAR at 10:03

## 2023-03-06 RX ADMIN — DEXAMETHASONE SODIUM PHOSPHATE 6 MG: 4 INJECTION, SOLUTION INTRA-ARTICULAR; INTRALESIONAL; INTRAMUSCULAR; INTRAVENOUS; SOFT TISSUE at 08:03

## 2023-03-06 NOTE — PLAN OF CARE
Patient arrived to unit for D1C6 of Topotecan infusion and B12 injection. Patient seen in clinic prior to arrival. Treatment plan and labs reviewed with patient during clinic visit with Dr. Irving. Patient agreeable to treatment plan today. Labs reviewed along with treatment plan and ok to proceed with treatment plan today. Pre- medications of Dexamethasone IVP administered prior to Topotecan infusion. Topotecan infused over 30 mins. Patient denied any new or worsening symptoms following treatment. B12 administered on left deltoid via IM route. Patient tolerated injection well. Patient discharged off unit in no signs of acute distress ambulating independently.

## 2023-03-06 NOTE — PROGRESS NOTES
[facility]  [facility]  Hematology/Medical Oncology Clinic       PATIENT: Juan Gillespie  MRN: [id number]  DATE: 3/6/2023    Reason for referral: Extensive stage small cell lung ca    Initial History: Juan Gillespie is a 57 year old man with extensive stage SCLC who presents to clinic for evaluation prior to treatment  of topotecan.          Oncological History:  -Started Carbo/Etop/Atez 4/5/22  -Maintenance atezolizumab 7/5/22  -Consolidative thoracic radiation 8/4/22-8/18/22  -topotecan started 10/24/23-3/10/22(missed Nov tx)    Interval History:     Pt presents for MD chahal prior to C6 topetecan.  Pt now following with PCP and labs and BG now significantly improved.  No issues at time of exam.  CT scans to be done before next visit.    His wife accompanies him at this visit.  Past Medical History:   Past Medical History:   Diagnosis Date    Cancer     Small Cell -Stage 4 Lung Cancer    Diabetes mellitus, type 2     Hypertension        Past Surgical HIstory:   Past Surgical History:   Procedure Laterality Date    ENDOBRONCHIAL ULTRASOUND N/A 3/22/2022    Procedure: ENDOBRONCHIAL ULTRASOUND (EBUS);  Surgeon: Kristin Samuels MD;  Location: [facility] OR [address] [place] [address];  Service: Endoscopy;  Laterality: N/A;    KNEE SURGERY         Family History:   Family History   Problem Relation Age of Onset    Diabetes Mellitus Mother     Pacemaker/defibrilator Father     Lung cancer Father        Social History:  reports that he has quit smoking. His smoking use included cigarettes. He has never used smokeless tobacco. He reports that he does not drink alcohol and does not use drugs.    Allergies:  Review of patient's allergies indicates:  No Known Allergies    Medications:  Current Outpatient Medications   Medication Sig Dispense Refill    albuterol (ACCUNEB) 1.25 mg/3 mL Nebu Use 1 vial (1.25 mg total) by nebulization 3 (three) times daily as needed (shortness of breath). Rescue 90 mL 2    allopurinoL  (ZYLOPRIM) 300 MG tablet Take 1 tablet (300 mg total) by mouth once daily. 60 tablet 3    benzonatate (TESSALON) 100 MG capsule Take 1 capsule by mouth as needed.      cetirizine (ZYRTEC) 10 MG tablet Take 1 tablet by mouth as needed.      dapagliflozin (FARXIGA) 10 mg tablet Take 1 tablet by mouth once daily.      insulin aspart U-100 (NOVOLOG) 100 unit/mL (3 mL) InPn pen Inject 1 Units into the skin as needed. Inject into skin if over 200.      losartan (COZAAR) 100 MG tablet Take 1 tablet (100 mg total) by mouth once daily. 30 tablet 2    montelukast (SINGULAIR) 10 mg tablet Take 10 mg by mouth every evening.      morphine (MS CONTIN) 30 MG 12 hr tablet Take 1 tablet (30 mg total) by mouth every 12 (twelve) hours. 60 tablet 0    semaglutide (OZEMPIC) 0.25 mg or 0.5 mg(2 mg/1.5 mL) pen injector Inject 0.25 mg into the skin once a week.      senna-docusate 8.6-50 mg (PERICOLACE) 8.6-50 mg per tablet Take 1 tablet by mouth daily as needed for Constipation. 30 tablet 2    TRUE METRIX GLUCOSE TEST STRIP Strp       TRUEPLUS LANCETS 33 gauge Misc       sildenafiL (VIAGRA) 50 MG tablet Take 1 tablet (50 mg total) by mouth daily as needed for Erectile Dysfunction. 30 tablet 0     No current facility-administered medications for this visit.     Facility-Administered Medications Ordered in Other Visits   Medication Dose Route Frequency Provider Last Rate Last Admin    heparin, porcine (PF) 100 unit/mL injection flush 500 Units  500 Units Intravenous PRN Soledad Irving MD   500 Units at 01/23/23 1218    heparin, porcine (PF) 100 unit/mL injection flush 500 Units  500 Units Intravenous PRN Soledad Irving MD   500 Units at 03/06/23 0855    sodium chloride 0.9% flush 10 mL  10 mL Intravenous PRN Soledad Irving MD   10 mL at 01/23/23 1218    sodium chloride 0.9% flush 10 mL  10 mL Intravenous PRN Soledad Irving MD   10 mL at 03/06/23 0855       Review of Systems   Constitutional:  Negative for chills, fatigue and fever.   HENT:   "Negative for congestion and trouble swallowing.    Respiratory:  Negative for cough, chest tightness and shortness of breath.    Cardiovascular:  Negative for chest pain.   Gastrointestinal:  Negative for abdominal pain and blood in stool.   Endocrine: Negative for cold intolerance and heat intolerance.   Genitourinary:  Negative for hematuria.   Musculoskeletal:  Negative for arthralgias, back pain and myalgias.   Skin:  Negative for rash.   Neurological:  Negative for weakness.   Hematological:  Negative for adenopathy. Does not bruise/bleed easily.   Psychiatric/Behavioral:  Negative for dysphoric mood. The patient is not nervous/anxious.    ECOG Performance Status:   ECOG SCORE             Objective:      Vitals:   Vitals:    03/06/23 0805   BP: (!) 143/80   BP Location: Left arm   Patient Position: Sitting   BP Method: Large (Automatic)   Pulse: 90   SpO2: 97%   Weight: 112.9 kg (248 lb 14.4 oz)   Height: 5' 7" (1.702 m)       telemedicine    Physical Exam  Constitutional:       General: He is not in acute distress.     Appearance: Normal appearance. He is not ill-appearing.   HENT:      Head: Normocephalic and atraumatic.      Nose: Nose normal.      Mouth/Throat:      Mouth: Mucous membranes are moist.      Pharynx: Oropharynx is clear. No oropharyngeal exudate or posterior oropharyngeal erythema.   Eyes:      General: No scleral icterus.     Extraocular Movements: Extraocular movements intact.      Conjunctiva/sclera: Conjunctivae normal.      Pupils: Pupils are equal, round, and reactive to light.   Pulmonary:      Effort: Pulmonary effort is normal. No respiratory distress.   Abdominal:      General: Abdomen is flat.      Palpations: Abdomen is soft.   Musculoskeletal:         General: No swelling. Normal range of motion.      Cervical back: Normal range of motion.      Right lower leg: No edema.      Left lower leg: No edema.   Skin:     General: Skin is warm and dry.      Coloration: Skin is not " jaundiced.   Neurological:      General: No focal deficit present.      Mental Status: He is alert.   Psychiatric:         Mood and Affect: Mood normal.         Behavior: Behavior normal.         Thought Content: Thought content normal.         Judgment: Judgment normal.     Laboratory Data:  Recent Results (from the past 168 hour(s))   COMPREHENSIVE METABOLIC PANEL    Collection Time: 03/03/23  9:32 AM   Result Value Ref Range    Sodium 137 136 - 145 mmol/L    Potassium 4.5 3.5 - 5.1 mmol/L    Chloride 100 95 - 110 mmol/L    CO2 26 23 - 29 mmol/L    Glucose 188 (H) 70 - 110 mg/dL    BUN 13 6 - 20 mg/dL    Creatinine 1.2 0.5 - 1.4 mg/dL    Calcium 10.1 8.7 - 10.5 mg/dL    Total Protein 7.3 6.0 - 8.4 g/dL    Albumin 3.9 3.5 - 5.2 g/dL    Total Bilirubin 0.6 0.1 - 1.0 mg/dL    Alkaline Phosphatase 343 (H) 55 - 135 U/L    AST 31 10 - 40 U/L    ALT 49 (H) 10 - 44 U/L    Anion Gap 11 8 - 16 mmol/L    eGFR >60 >60 mL/min/1.73 m^2   CBC W/ AUTO DIFFERENTIAL    Collection Time: 03/03/23  9:32 AM   Result Value Ref Range    WBC 8.30 3.90 - 12.70 K/uL    RBC 3.98 (L) 4.60 - 6.20 M/uL    Hemoglobin 12.2 (L) 14.0 - 18.0 g/dL    Hematocrit 37.9 (L) 40.0 - 54.0 %    MCV 95 82 - 98 fL    MCH 30.7 27.0 - 31.0 pg    MCHC 32.2 32.0 - 36.0 g/dL    RDW 18.1 (H) 11.5 - 14.5 %    Platelets 311 150 - 450 K/uL    MPV 9.0 (L) 9.2 - 12.9 fL    Immature Granulocytes 2.7 (H) 0.0 - 0.5 %    Gran # (ANC) 6.6 1.8 - 7.7 K/uL    Immature Grans (Abs) 0.22 (H) 0.00 - 0.04 K/uL    Lymph # 0.8 (L) 1.0 - 4.8 K/uL    Mono # 0.4 0.3 - 1.0 K/uL    Eos # 0.2 0.0 - 0.5 K/uL    Baso # 0.06 0.00 - 0.20 K/uL    nRBC 0 0 /100 WBC    Gran % 79.7 (H) 38.0 - 73.0 %    Lymph % 9.6 (L) 18.0 - 48.0 %    Mono % 4.8 4.0 - 15.0 %    Eosinophil % 2.5 0.0 - 8.0 %    Basophil % 0.7 0.0 - 1.9 %    Differential Method Automated    Magnesium    Collection Time: 03/03/23  9:32 AM   Result Value Ref Range    Magnesium 2.1 1.6 - 2.6 mg/dL       Imaging:   MRI Brain W WO  Contrast    Result Date: 10/11/2022  EXAMINATION: MRI BRAIN W WO CONTRAST CLINICAL HISTORY: Small cell lung cancer, brain re-staging; Malignant neoplasm of unspecified part of unspecified bronchus or lung TECHNIQUE: Sagittal and axial T1, axial T2, axial FLAIR, axial gradient, axial diffusion imaging of the whole brain pre-contrast with postcontrast axial T1 and axial spoiled gradient imaging reformatted in the coronal and sagittal planes.. Ten ml of Gadavist injected intravenously. COMPARISON: 07/12/2022 FINDINGS: Interval 1.2 cm enhancing lesion within the left anterior inferior frontal lobe which prominently the ring-enhancing measuring 1.2 x 0.9 x 0.9 cm in AP by TV by CC dimensions respectively in light of history concerning for parenchymal metastases the with question trace susceptibility associated with this lesion. Previously identified heterogeneous enhancing sellar lesion is again seen allowing for routine brain technique with lesion measuring approximately 1.3 cm craniocaudal this may represent pituitary adenoma though indeterminate.  Previous intraluminal filling defect within the right jugular foramen is no longer seen.  There is however diffusion signal abnormality with ill-defined T1 hypointensity within the right occipital condyle concerning for possible osseous metastases the clinical correlation and further evaluation with nuclear medicine imaging advised. There is continued slight prominent leptomeningeal enhancement along the left cerebellum which raises concern for possible underlying vascular malformation such as a dural AV fistula with collateral vascular engorgement.  There is no restricted diffusion to suggest acute infarction.  Ventricles stable without hydrocephalus.  There is mild edema signal surrounding the left frontal enhancing lesion with continued few scattered punctate size regions of T2 FLAIR signal hyperintensity elsewhere supratentorial white matter which are nonspecific and may  represent mild chronic ischemic change. This report was flagged in Epic as abnormal.     Interval development of a small ring-enhancing lesion left anterior inferior frontal lobe concerning for parenchymal metastases in light of history.  Clinical correlation and follow-up advised There is continue though relatively stable heterogeneous enhancement and enlargement of the material within the sella which may represent pituitary adenoma though indeterminate. Previous right internal jugular vein thrombus no longer seen. Abnormal signal along the right occipital condyle concerning for possible osseous metastases clinical correlation and further evaluation with nuclear medicine imaging advised Continued prominent vascularity left cerebellar folia raising concern for possible underlying vascular malformation unchanged.  Further evaluation with noncontrast MRA head may be of further diagnostic value. Electronically signed by: Jonel Lawrence DO Date:    10/11/2022 Time:    10:05    CT Chest Abdomen Pelvis With Contrast (xpd)    Result Date: 10/18/2022  EXAMINATION: CT CHEST ABDOMEN PELVIS WITH CONTRAST (XPD) CLINICAL HISTORY: metastatic small cell lung cancer on maintenance immunotherapy, eval response; Malignant neoplasm of unspecified part of unspecified bronchus or lung TECHNIQUE: Low dose axial images, sagittal and coronal reformations were obtained from the thoracic inlet to the pubic symphysis following the IV administration of 100 mL of Omnipaque 350 COMPARISON: 07/26/2022 FINDINGS: Right IJ venous shunt tip superior aspect right atrium, absence of clot in included upper right IJ. Fatty infiltration of liver, additional diminished attenuation space-occupying lesions of liver, largest central right hepatic lobe 3.6 cm image 103 series 3.  Stable.  Spleen, adrenal glands, pancreas, gallbladder and biliary tree normal. Urinary bladder normal.  Prostate gland very small 4 cm versus postop prostatectomy.  No free fluid or  retroperitoneal adenopathy.  GI track normal. Tiny nonobstructive right lower and left upper renal pole stones. New lymph node left retro manubrial 3.4 cm image 28 series 3 appearing in the interim.  Paratracheal conglomerate adenopathy 2.1 x 2.5 cm, was 1.6 x 3 cm.  Subcarinal adenopathy stable.  No new hilar or mediastinal adenopathy. Degenerative disc spondylosis cervicothoracic junction., focal osteoblastic opacities involve humeral heads both clavicle superimposed on erosive DJD more prominent on right.  Additional focal osteoblastic opacities sternum, ribs, dorsal lumbar sacral spine pelvis and both femoral head and trochanteric regions.  Moderate anterior spondylosis dorsal spine and degenerative disc spondylosis facet joint arthropathy lumbosacral junction with DJD SI joints.  Fracture defects left lower anterior chest wall stable. Scattered calcified plaque great vessels aorta iliac femoral arteries. Dominant medial segment right middle lobe mass 1.1 x 2.7 cm image 69 series 3, was 1.6 x 3.4 cm.  Additional patchy nodular lesions right lower lobe posterior basilar segments, largest 1.4 cm image 66 series 3 suspicious for metastatic disease and/or superimposed inflammatory and infectious process.  Additional patchy infiltrates medial posterior segment right upper lobe and right lower lobe particularly medial basilar segment image 91 series 3.  No new pleural reaction.  Tracheobronchial tree normal. .     1. New presumed metastatic node anterior superior left mediastinum, paratracheal adenopathy otherwise stable. 2. Decreased size in right middle lobe mass with new evidence of metastatic disease right lower lobe versus superimposed inflammatory infectious process discussed above. 3. New developing metastatic lesions liver. 4. Bony universal metastatic disease stable. 5. Recist summary: 6. Compare with previous CT chest abdomen pelvis 07/26/2022 7. Lesion 1: Right middle lobe mass 1.1 x 2.7 cm was 1.6 x 13.4  cm. 8. Lesion 2: Right paratracheal conned Willett a adenopathy 2.1 x 2.5 cm, was 1.6 x 3 cm. 9. Lesion 3: Dome 1.9 cm stable.  (New progressive liver metastatic disease noted elsewhere) 10. Lesion 4: Caudal aspect right hepatic lobe 1 cm, stable 11.  This report was flagged in Epic as abnormal. Electronically signed by: Dewayne Hutchinsno MD Date:    10/18/2022 Time:    09:28       Assessment and Plan          Extensive stage small cell lung cancer  -Initially diagnosed with extensive stage small cell lung cancer in 03/2022 who was treated with carbo/etop/atezolizumab from 04/05/2022 - 06/14/22 with partial response. He subsequently completed consolidation thoracic RT 8/4/22 - 8/18/22, and he was on maintenance atezolizumab.   -10/18/22 showing progression of disease in lungs and liver.  Asymptomatic.  -Dr. Sosa discussed with him plan for topotecan and pt was consented  -Pt tolerated cycle 1 well but delayed C2 due to being out of town  -Scans in December 2022 largely stable with exception of  Right middle lobe lung mass.  3.4 cm, still demonstrated widespread disease in bones  -Admitted for pain control and palliative XRT on 12/07/22  -Pt had been off of tx in Nov 2022 due to going on vacation, as symptoms improved drastically after restarting tx  Plan 03/06/23  -Keep pt on schedule for C6 of topotecan today, tx signed  -RTC in for MD and CT scan review in 1 week  -Follow up with palliative care      Cancer related pain/palliative care by specialist  -Pain well controlled and is not using pain meds since restarting tx and palliative XRT      DM2  -Controlled, following with PCP        Time spent with pt: 40 minutes      Problem List Items Addressed This Visit    None        Soledad Irving MD  Hematology Oncology

## 2023-03-07 ENCOUNTER — INFUSION (OUTPATIENT)
Dept: INFUSION THERAPY | Facility: HOSPITAL | Age: 58
End: 2023-03-07
Attending: STUDENT IN AN ORGANIZED HEALTH CARE EDUCATION/TRAINING PROGRAM
Payer: MEDICAID

## 2023-03-07 VITALS
OXYGEN SATURATION: 97 % | RESPIRATION RATE: 17 BRPM | HEART RATE: 88 BPM | DIASTOLIC BLOOD PRESSURE: 66 MMHG | TEMPERATURE: 98 F | SYSTOLIC BLOOD PRESSURE: 129 MMHG

## 2023-03-07 DIAGNOSIS — C78.7 SECONDARY MALIGNANT NEOPLASM OF LIVER: ICD-10-CM

## 2023-03-07 DIAGNOSIS — I10 HYPERTENSION, UNSPECIFIED TYPE: Primary | ICD-10-CM

## 2023-03-07 DIAGNOSIS — E11.9 TYPE 2 DIABETES MELLITUS, WITHOUT LONG-TERM CURRENT USE OF INSULIN: ICD-10-CM

## 2023-03-07 DIAGNOSIS — C34.90 SMALL CELL LUNG CANCER: ICD-10-CM

## 2023-03-07 PROCEDURE — 63600175 PHARM REV CODE 636 W HCPCS: Performed by: STUDENT IN AN ORGANIZED HEALTH CARE EDUCATION/TRAINING PROGRAM

## 2023-03-07 PROCEDURE — A4216 STERILE WATER/SALINE, 10 ML: HCPCS | Performed by: STUDENT IN AN ORGANIZED HEALTH CARE EDUCATION/TRAINING PROGRAM

## 2023-03-07 PROCEDURE — 25000003 PHARM REV CODE 250: Performed by: STUDENT IN AN ORGANIZED HEALTH CARE EDUCATION/TRAINING PROGRAM

## 2023-03-07 PROCEDURE — 96413 CHEMO IV INFUSION 1 HR: CPT

## 2023-03-07 PROCEDURE — 96375 TX/PRO/DX INJ NEW DRUG ADDON: CPT

## 2023-03-07 RX ORDER — SODIUM CHLORIDE 0.9 % (FLUSH) 0.9 %
10 SYRINGE (ML) INJECTION
Status: DISCONTINUED | OUTPATIENT
Start: 2023-03-07 | End: 2023-03-07 | Stop reason: HOSPADM

## 2023-03-07 RX ORDER — HEPARIN 100 UNIT/ML
500 SYRINGE INTRAVENOUS
Status: DISCONTINUED | OUTPATIENT
Start: 2023-03-07 | End: 2023-03-07 | Stop reason: HOSPADM

## 2023-03-07 RX ORDER — DEXAMETHASONE SODIUM PHOSPHATE 4 MG/ML
6 INJECTION, SOLUTION INTRA-ARTICULAR; INTRALESIONAL; INTRAMUSCULAR; INTRAVENOUS; SOFT TISSUE
Status: COMPLETED | OUTPATIENT
Start: 2023-03-07 | End: 2023-03-07

## 2023-03-07 RX ADMIN — HEPARIN 500 UNITS: 100 SYRINGE at 11:03

## 2023-03-07 RX ADMIN — Medication 10 ML: at 11:03

## 2023-03-07 RX ADMIN — DEXAMETHASONE SODIUM PHOSPHATE 6 MG: 4 INJECTION, SOLUTION INTRA-ARTICULAR; INTRALESIONAL; INTRAMUSCULAR; INTRAVENOUS; SOFT TISSUE at 09:03

## 2023-03-07 RX ADMIN — TOPOTECAN HYDROCHLORIDE 3.47 MG: 4 INJECTION, POWDER, LYOPHILIZED, FOR SOLUTION INTRAVENOUS at 09:03

## 2023-03-07 RX ADMIN — SODIUM CHLORIDE 1000 ML: 9 INJECTION, SOLUTION INTRAVENOUS at 09:03

## 2023-03-07 NOTE — PLAN OF CARE
Patient arrived to unit for C6D2 Topotecan and IVFs infusion. Pt continues to monitor blood glucose via Dexcon. Plan of care reviewed, patient agreeable to plan. Decadron IVP administered prior to infusion.Pt had reports of pressure to back of neck prior to Topotecan start. B/P checked, denied visual change, glucose checked, WNL. Patient tolerated Topotecan infusion well. 1L NS infused during visit. Pressure reported resolved at discharge. VSS. Discharge instructions reviewed, patient instructed to return 3/8. Patient ambulated off unit accompanied by spouse. Patient in NAD at time of discharge.

## 2023-03-08 ENCOUNTER — INFUSION (OUTPATIENT)
Dept: INFUSION THERAPY | Facility: HOSPITAL | Age: 58
End: 2023-03-08
Attending: STUDENT IN AN ORGANIZED HEALTH CARE EDUCATION/TRAINING PROGRAM
Payer: MEDICAID

## 2023-03-08 VITALS
HEART RATE: 79 BPM | SYSTOLIC BLOOD PRESSURE: 135 MMHG | OXYGEN SATURATION: 96 % | DIASTOLIC BLOOD PRESSURE: 64 MMHG | RESPIRATION RATE: 17 BRPM | TEMPERATURE: 98 F

## 2023-03-08 DIAGNOSIS — C78.7 SECONDARY MALIGNANT NEOPLASM OF LIVER: ICD-10-CM

## 2023-03-08 DIAGNOSIS — C34.90 SMALL CELL LUNG CANCER: ICD-10-CM

## 2023-03-08 DIAGNOSIS — I10 HYPERTENSION, UNSPECIFIED TYPE: Primary | ICD-10-CM

## 2023-03-08 PROCEDURE — 25000003 PHARM REV CODE 250: Performed by: STUDENT IN AN ORGANIZED HEALTH CARE EDUCATION/TRAINING PROGRAM

## 2023-03-08 PROCEDURE — 96375 TX/PRO/DX INJ NEW DRUG ADDON: CPT

## 2023-03-08 PROCEDURE — 96365 THER/PROPH/DIAG IV INF INIT: CPT

## 2023-03-08 PROCEDURE — 96413 CHEMO IV INFUSION 1 HR: CPT

## 2023-03-08 PROCEDURE — 63600175 PHARM REV CODE 636 W HCPCS: Performed by: STUDENT IN AN ORGANIZED HEALTH CARE EDUCATION/TRAINING PROGRAM

## 2023-03-08 RX ORDER — DEXAMETHASONE SODIUM PHOSPHATE 4 MG/ML
6 INJECTION, SOLUTION INTRA-ARTICULAR; INTRALESIONAL; INTRAMUSCULAR; INTRAVENOUS; SOFT TISSUE
Status: COMPLETED | OUTPATIENT
Start: 2023-03-08 | End: 2023-03-08

## 2023-03-08 RX ORDER — HEPARIN 100 UNIT/ML
500 SYRINGE INTRAVENOUS
Status: DISCONTINUED | OUTPATIENT
Start: 2023-03-08 | End: 2023-03-08 | Stop reason: HOSPADM

## 2023-03-08 RX ORDER — SODIUM CHLORIDE 0.9 % (FLUSH) 0.9 %
10 SYRINGE (ML) INJECTION
Status: DISCONTINUED | OUTPATIENT
Start: 2023-03-08 | End: 2023-03-08 | Stop reason: HOSPADM

## 2023-03-08 RX ADMIN — HEPARIN 500 UNITS: 100 SYRINGE at 11:03

## 2023-03-08 RX ADMIN — DEXAMETHASONE SODIUM PHOSPHATE 6 MG: 4 INJECTION, SOLUTION INTRA-ARTICULAR; INTRALESIONAL; INTRAMUSCULAR; INTRAVENOUS; SOFT TISSUE at 09:03

## 2023-03-08 RX ADMIN — TOPOTECAN HYDROCHLORIDE 3.47 MG: 4 INJECTION, POWDER, LYOPHILIZED, FOR SOLUTION INTRAVENOUS at 09:03

## 2023-03-08 NOTE — PLAN OF CARE
Patient arrived to unit for scheduled Topotecan infusion. VSS. Port accessed, flushed, infusing with a liter of Normal Saline. Chemo was hung by a chemo nurse, after chemo and fluids complete, port flushed with heparin and de accessed. Tolerated well. Ambulated off unit accompanied with wife, in NAD.

## 2023-03-09 ENCOUNTER — INFUSION (OUTPATIENT)
Dept: INFUSION THERAPY | Facility: HOSPITAL | Age: 58
End: 2023-03-09
Attending: STUDENT IN AN ORGANIZED HEALTH CARE EDUCATION/TRAINING PROGRAM
Payer: MEDICAID

## 2023-03-09 VITALS
HEART RATE: 80 BPM | TEMPERATURE: 98 F | OXYGEN SATURATION: 96 % | RESPIRATION RATE: 17 BRPM | SYSTOLIC BLOOD PRESSURE: 138 MMHG | DIASTOLIC BLOOD PRESSURE: 67 MMHG

## 2023-03-09 DIAGNOSIS — C34.90 SMALL CELL LUNG CANCER: ICD-10-CM

## 2023-03-09 DIAGNOSIS — I10 HYPERTENSION, UNSPECIFIED TYPE: Primary | ICD-10-CM

## 2023-03-09 DIAGNOSIS — C78.7 SECONDARY MALIGNANT NEOPLASM OF LIVER: ICD-10-CM

## 2023-03-09 PROCEDURE — 25000003 PHARM REV CODE 250: Performed by: STUDENT IN AN ORGANIZED HEALTH CARE EDUCATION/TRAINING PROGRAM

## 2023-03-09 PROCEDURE — 63600175 PHARM REV CODE 636 W HCPCS: Performed by: STUDENT IN AN ORGANIZED HEALTH CARE EDUCATION/TRAINING PROGRAM

## 2023-03-09 PROCEDURE — 36593 DECLOT VASCULAR DEVICE: CPT

## 2023-03-09 PROCEDURE — 96413 CHEMO IV INFUSION 1 HR: CPT

## 2023-03-09 PROCEDURE — A4216 STERILE WATER/SALINE, 10 ML: HCPCS | Performed by: STUDENT IN AN ORGANIZED HEALTH CARE EDUCATION/TRAINING PROGRAM

## 2023-03-09 PROCEDURE — 96375 TX/PRO/DX INJ NEW DRUG ADDON: CPT

## 2023-03-09 RX ORDER — SODIUM CHLORIDE 0.9 % (FLUSH) 0.9 %
10 SYRINGE (ML) INJECTION
Status: DISCONTINUED | OUTPATIENT
Start: 2023-03-09 | End: 2023-03-09 | Stop reason: HOSPADM

## 2023-03-09 RX ORDER — DEXAMETHASONE SODIUM PHOSPHATE 4 MG/ML
6 INJECTION, SOLUTION INTRA-ARTICULAR; INTRALESIONAL; INTRAMUSCULAR; INTRAVENOUS; SOFT TISSUE
Status: COMPLETED | OUTPATIENT
Start: 2023-03-09 | End: 2023-03-09

## 2023-03-09 RX ORDER — HEPARIN 100 UNIT/ML
500 SYRINGE INTRAVENOUS
Status: DISCONTINUED | OUTPATIENT
Start: 2023-03-09 | End: 2023-03-09 | Stop reason: HOSPADM

## 2023-03-09 RX ADMIN — TOPOTECAN HYDROCHLORIDE 3.47 MG: 4 INJECTION, POWDER, LYOPHILIZED, FOR SOLUTION INTRAVENOUS at 10:03

## 2023-03-09 RX ADMIN — DEXAMETHASONE SODIUM PHOSPHATE 6 MG: 4 INJECTION, SOLUTION INTRA-ARTICULAR; INTRALESIONAL; INTRAMUSCULAR; INTRAVENOUS; SOFT TISSUE at 10:03

## 2023-03-09 RX ADMIN — HEPARIN 500 UNITS: 100 SYRINGE at 11:03

## 2023-03-09 RX ADMIN — ALTEPLASE 2 MG: 2.2 INJECTION, POWDER, LYOPHILIZED, FOR SOLUTION INTRAVENOUS at 09:03

## 2023-03-09 RX ADMIN — Medication 10 ML: at 11:03

## 2023-03-09 NOTE — PLAN OF CARE
Pt tolerated D4 Topotecan and 1L NS. Port with + blood return noted. Pt to RTC 3/10 for growth factor. Pt ambulated off unit accompanied by spouse.

## 2023-03-10 ENCOUNTER — HOSPITAL ENCOUNTER (OUTPATIENT)
Dept: RADIOLOGY | Facility: HOSPITAL | Age: 58
Discharge: HOME | End: 2023-03-10
Attending: STUDENT IN AN ORGANIZED HEALTH CARE EDUCATION/TRAINING PROGRAM
Payer: MEDICAID

## 2023-03-10 ENCOUNTER — INFUSION (OUTPATIENT)
Dept: INFUSION THERAPY | Facility: HOSPITAL | Age: 58
End: 2023-03-10
Attending: STUDENT IN AN ORGANIZED HEALTH CARE EDUCATION/TRAINING PROGRAM
Payer: MEDICAID

## 2023-03-10 VITALS
TEMPERATURE: 98 F | DIASTOLIC BLOOD PRESSURE: 85 MMHG | RESPIRATION RATE: 17 BRPM | HEART RATE: 78 BPM | OXYGEN SATURATION: 96 % | SYSTOLIC BLOOD PRESSURE: 162 MMHG

## 2023-03-10 DIAGNOSIS — C78.7 SECONDARY MALIGNANT NEOPLASM OF LIVER: ICD-10-CM

## 2023-03-10 DIAGNOSIS — C34.90 MALIGNANT NEOPLASM OF UNSPECIFIED PART OF UNSPECIFIED BRONCHUS OR LUNG: ICD-10-CM

## 2023-03-10 DIAGNOSIS — C34.90 SMALL CELL LUNG CANCER: ICD-10-CM

## 2023-03-10 DIAGNOSIS — I10 HYPERTENSION, UNSPECIFIED TYPE: Primary | ICD-10-CM

## 2023-03-10 PROCEDURE — 74177 CT ABD & PELVIS W/CONTRAST: CPT | Mod: 26,,, | Performed by: RADIOLOGY

## 2023-03-10 PROCEDURE — 71260 CT THORAX DX C+: CPT | Mod: 26,,, | Performed by: RADIOLOGY

## 2023-03-10 PROCEDURE — 63600175 PHARM REV CODE 636 W HCPCS: Mod: JZ,TB,JG | Performed by: STUDENT IN AN ORGANIZED HEALTH CARE EDUCATION/TRAINING PROGRAM

## 2023-03-10 PROCEDURE — 71260 CT THORAX DX C+: CPT | Mod: TC

## 2023-03-10 PROCEDURE — 74177 CT ABD & PELVIS W/CONTRAST: CPT | Mod: TC

## 2023-03-10 PROCEDURE — 25500020 PHARM REV CODE 255: Performed by: STUDENT IN AN ORGANIZED HEALTH CARE EDUCATION/TRAINING PROGRAM

## 2023-03-10 PROCEDURE — 96372 THER/PROPH/DIAG INJ SC/IM: CPT | Mod: 59

## 2023-03-10 PROCEDURE — 74177 CT CHEST ABDOMEN PELVIS WITH CONTRAST (XPD): ICD-10-PCS | Mod: 26,,, | Performed by: RADIOLOGY

## 2023-03-10 PROCEDURE — 71260 CT CHEST ABDOMEN PELVIS WITH CONTRAST (XPD): ICD-10-PCS | Mod: 26,,, | Performed by: RADIOLOGY

## 2023-03-10 RX ADMIN — IOHEXOL 100 ML: 350 INJECTION, SOLUTION INTRAVENOUS at 09:03

## 2023-03-10 RX ADMIN — PEGFILGRASTIM 6 MG: 6 INJECTION SUBCUTANEOUS at 09:03

## 2023-03-10 NOTE — PLAN OF CARE
Pt received his D5 Fulphila. CT done earlier this morning. VSS. Injection tolerated well. Discharged from unit accompanied by spouse in NAD.

## 2023-03-13 ENCOUNTER — OFFICE VISIT (OUTPATIENT)
Dept: HEMATOLOGY/ONCOLOGY | Facility: CLINIC | Age: 58
End: 2023-03-13
Payer: MEDICAID

## 2023-03-13 VITALS
HEART RATE: 93 BPM | BODY MASS INDEX: 39.1 KG/M2 | OXYGEN SATURATION: 97 % | HEIGHT: 67 IN | SYSTOLIC BLOOD PRESSURE: 149 MMHG | DIASTOLIC BLOOD PRESSURE: 75 MMHG | WEIGHT: 249.13 LBS

## 2023-03-13 DIAGNOSIS — C34.90 LUNG CANCER METASTATIC TO BONE: ICD-10-CM

## 2023-03-13 DIAGNOSIS — C78.7 SECONDARY MALIGNANT NEOPLASM OF LIVER: ICD-10-CM

## 2023-03-13 DIAGNOSIS — I10 HYPERTENSION, UNSPECIFIED TYPE: ICD-10-CM

## 2023-03-13 DIAGNOSIS — E11.69 TYPE 2 DIABETES MELLITUS WITH OTHER SPECIFIED COMPLICATION, WITHOUT LONG-TERM CURRENT USE OF INSULIN: ICD-10-CM

## 2023-03-13 DIAGNOSIS — C79.51 LUNG CANCER METASTATIC TO BONE: ICD-10-CM

## 2023-03-13 DIAGNOSIS — Z09 FOLLOW-UP EXAM: ICD-10-CM

## 2023-03-13 DIAGNOSIS — Z51.5 PALLIATIVE CARE BY SPECIALIST: ICD-10-CM

## 2023-03-13 DIAGNOSIS — C79.51 BONE METASTASIS: ICD-10-CM

## 2023-03-13 DIAGNOSIS — C34.90 SMALL CELL LUNG CANCER: Primary | ICD-10-CM

## 2023-03-13 DIAGNOSIS — C79.31 SECONDARY MALIGNANT NEOPLASM OF BRAIN: ICD-10-CM

## 2023-03-13 PROCEDURE — 99999 PR PBB SHADOW E&M-EST. PATIENT-LVL IV: ICD-10-PCS | Mod: PBBFAC,,, | Performed by: STUDENT IN AN ORGANIZED HEALTH CARE EDUCATION/TRAINING PROGRAM

## 2023-03-13 PROCEDURE — 3008F BODY MASS INDEX DOCD: CPT | Mod: CPTII,,, | Performed by: STUDENT IN AN ORGANIZED HEALTH CARE EDUCATION/TRAINING PROGRAM

## 2023-03-13 PROCEDURE — 3078F PR MOST RECENT DIASTOLIC BLOOD PRESSURE < 80 MM HG: ICD-10-PCS | Mod: CPTII,,, | Performed by: STUDENT IN AN ORGANIZED HEALTH CARE EDUCATION/TRAINING PROGRAM

## 2023-03-13 PROCEDURE — 1159F PR MEDICATION LIST DOCUMENTED IN MEDICAL RECORD: ICD-10-PCS | Mod: CPTII,,, | Performed by: STUDENT IN AN ORGANIZED HEALTH CARE EDUCATION/TRAINING PROGRAM

## 2023-03-13 PROCEDURE — 3077F SYST BP >= 140 MM HG: CPT | Mod: CPTII,,, | Performed by: STUDENT IN AN ORGANIZED HEALTH CARE EDUCATION/TRAINING PROGRAM

## 2023-03-13 PROCEDURE — 99214 OFFICE O/P EST MOD 30 MIN: CPT | Mod: S$PBB,,, | Performed by: STUDENT IN AN ORGANIZED HEALTH CARE EDUCATION/TRAINING PROGRAM

## 2023-03-13 PROCEDURE — 99999 PR PBB SHADOW E&M-EST. PATIENT-LVL IV: CPT | Mod: PBBFAC,,, | Performed by: STUDENT IN AN ORGANIZED HEALTH CARE EDUCATION/TRAINING PROGRAM

## 2023-03-13 PROCEDURE — 3078F DIAST BP <80 MM HG: CPT | Mod: CPTII,,, | Performed by: STUDENT IN AN ORGANIZED HEALTH CARE EDUCATION/TRAINING PROGRAM

## 2023-03-13 PROCEDURE — 99214 OFFICE O/P EST MOD 30 MIN: CPT | Mod: PBBFAC | Performed by: STUDENT IN AN ORGANIZED HEALTH CARE EDUCATION/TRAINING PROGRAM

## 2023-03-13 PROCEDURE — 4010F ACE/ARB THERAPY RXD/TAKEN: CPT | Mod: CPTII,,, | Performed by: STUDENT IN AN ORGANIZED HEALTH CARE EDUCATION/TRAINING PROGRAM

## 2023-03-13 PROCEDURE — 1159F MED LIST DOCD IN RCRD: CPT | Mod: CPTII,,, | Performed by: STUDENT IN AN ORGANIZED HEALTH CARE EDUCATION/TRAINING PROGRAM

## 2023-03-13 PROCEDURE — 99214 PR OFFICE/OUTPT VISIT, EST, LEVL IV, 30-39 MIN: ICD-10-PCS | Mod: S$PBB,,, | Performed by: STUDENT IN AN ORGANIZED HEALTH CARE EDUCATION/TRAINING PROGRAM

## 2023-03-13 PROCEDURE — 3008F PR BODY MASS INDEX (BMI) DOCUMENTED: ICD-10-PCS | Mod: CPTII,,, | Performed by: STUDENT IN AN ORGANIZED HEALTH CARE EDUCATION/TRAINING PROGRAM

## 2023-03-13 PROCEDURE — 3077F PR MOST RECENT SYSTOLIC BLOOD PRESSURE >= 140 MM HG: ICD-10-PCS | Mod: CPTII,,, | Performed by: STUDENT IN AN ORGANIZED HEALTH CARE EDUCATION/TRAINING PROGRAM

## 2023-03-13 PROCEDURE — 4010F PR ACE/ARB THEARPY RXD/TAKEN: ICD-10-PCS | Mod: CPTII,,, | Performed by: STUDENT IN AN ORGANIZED HEALTH CARE EDUCATION/TRAINING PROGRAM

## 2023-03-13 RX ORDER — HEPARIN 100 UNIT/ML
500 SYRINGE INTRAVENOUS
Status: CANCELLED | OUTPATIENT
Start: 2023-03-20

## 2023-03-13 RX ORDER — SODIUM CHLORIDE 0.9 % (FLUSH) 0.9 %
10 SYRINGE (ML) INJECTION
Status: CANCELLED | OUTPATIENT
Start: 2023-03-20

## 2023-03-13 NOTE — Clinical Note
-Continue b12 shots for now -Please give pt dental clearance form for zometa -RTC in 3 weeks to initiate lurbinectadin and MD visit (if approved)

## 2023-03-13 NOTE — PROGRESS NOTES
[facility]  [facility]  Hematology/Medical Oncology Clinic       PATIENT: Juan Gillespie  MRN: [id number]  DATE: 3/13/2023    Reason for referral: Extensive stage small cell lung ca    Initial History: Juan Gillespie is a 58 year old man with extensive stage SCLC who presents to clinic for evaluation prior to treatment  of topotecan.          Oncological History:  -Started Carbo/Etop/Atez 4/5/22  -Maintenance atezolizumab 7/5/22  -Consolidative thoracic radiation 8/4/22-8/18/22  -topotecan started 10/24/23-3/10/22(missed Nov tx)-mixed resposne    Interval History:     Pt presents for MD visit for CT review that demonstrates mixed response.  Pt and wife agreeable to lurbinectadin and consents signed after side effects reviewed.  Pt also getting dental clearance for zometa.  No issues or complaints at time of exam.    His wife accompanies him at this visit.  Past Medical History:   Past Medical History:   Diagnosis Date    Cancer     Small Cell -Stage 4 Lung Cancer    Diabetes mellitus, type 2     Hypertension        Past Surgical HIstory:   Past Surgical History:   Procedure Laterality Date    ENDOBRONCHIAL ULTRASOUND N/A 3/22/2022    Procedure: ENDOBRONCHIAL ULTRASOUND (EBUS);  Surgeon: Kristin Samuels MD;  Location: [facility] OR [address] [place] [address];  Service: Endoscopy;  Laterality: N/A;    KNEE SURGERY         Family History:   Family History   Problem Relation Age of Onset    Diabetes Mellitus Mother     Pacemaker/defibrilator Father     Lung cancer Father        Social History:  reports that he has quit smoking. His smoking use included cigarettes. He has never used smokeless tobacco. He reports that he does not drink alcohol and does not use drugs.    Allergies:  Review of patient's allergies indicates:  No Known Allergies    Medications:  Current Outpatient Medications   Medication Sig Dispense Refill    albuterol (ACCUNEB) 1.25 mg/3 mL Nebu Use 1 vial (1.25 mg total) by nebulization 3 (three)  times daily as needed (shortness of breath). Rescue 90 mL 2    allopurinoL (ZYLOPRIM) 300 MG tablet Take 1 tablet (300 mg total) by mouth once daily. 60 tablet 3    benzonatate (TESSALON) 100 MG capsule Take 1 capsule by mouth as needed.      cetirizine (ZYRTEC) 10 MG tablet Take 1 tablet by mouth as needed.      dapagliflozin (FARXIGA) 10 mg tablet Take 1 tablet by mouth once daily.      insulin aspart U-100 (NOVOLOG) 100 unit/mL (3 mL) InPn pen Inject 1 Units into the skin as needed. Inject into skin if over 200.      losartan (COZAAR) 100 MG tablet Take 1 tablet (100 mg total) by mouth once daily. 30 tablet 2    montelukast (SINGULAIR) 10 mg tablet Take 10 mg by mouth every evening.      morphine (MS CONTIN) 30 MG 12 hr tablet Take 1 tablet (30 mg total) by mouth every 12 (twelve) hours. 60 tablet 0    semaglutide (OZEMPIC) 0.25 mg or 0.5 mg(2 mg/1.5 mL) pen injector Inject 0.25 mg into the skin once a week.      senna-docusate 8.6-50 mg (PERICOLACE) 8.6-50 mg per tablet Take 1 tablet by mouth daily as needed for Constipation. 30 tablet 2    sildenafiL (VIAGRA) 50 MG tablet Take 1 tablet (50 mg total) by mouth daily as needed for Erectile Dysfunction. 30 tablet 0    TRUE METRIX GLUCOSE TEST STRIP Strp       TRUEPLUS LANCETS 33 gauge Misc        No current facility-administered medications for this visit.       Review of Systems   Constitutional:  Negative for chills, fatigue and fever.   HENT:  Negative for congestion and trouble swallowing.    Respiratory:  Negative for cough, chest tightness and shortness of breath.    Cardiovascular:  Negative for chest pain.   Gastrointestinal:  Negative for abdominal pain and blood in stool.   Endocrine: Negative for cold intolerance and heat intolerance.   Genitourinary:  Negative for hematuria.   Musculoskeletal:  Negative for arthralgias, back pain and myalgias.   Skin:  Negative for rash.   Neurological:  Negative for weakness.   Hematological:  Negative for adenopathy.  Does not bruise/bleed easily.   Psychiatric/Behavioral:  Negative for dysphoric mood. The patient is not nervous/anxious.    ECOG Performance Status:   ECOG SCORE             Objective:      Vitals:   There were no vitals filed for this visit.      telemedicine    Physical Exam  Constitutional:       General: He is not in acute distress.     Appearance: Normal appearance. He is not ill-appearing.   HENT:      Head: Normocephalic and atraumatic.      Nose: Nose normal.      Mouth/Throat:      Mouth: Mucous membranes are moist.      Pharynx: Oropharynx is clear. No oropharyngeal exudate or posterior oropharyngeal erythema.   Eyes:      General: No scleral icterus.     Extraocular Movements: Extraocular movements intact.      Conjunctiva/sclera: Conjunctivae normal.      Pupils: Pupils are equal, round, and reactive to light.   Pulmonary:      Effort: Pulmonary effort is normal. No respiratory distress.   Abdominal:      General: Abdomen is flat.      Palpations: Abdomen is soft.   Musculoskeletal:         General: No swelling. Normal range of motion.      Cervical back: Normal range of motion.      Right lower leg: No edema.      Left lower leg: No edema.   Skin:     General: Skin is warm and dry.      Coloration: Skin is not jaundiced.   Neurological:      General: No focal deficit present.      Mental Status: He is alert.   Psychiatric:         Mood and Affect: Mood normal.         Behavior: Behavior normal.         Thought Content: Thought content normal.         Judgment: Judgment normal.     Laboratory Data:  No results found for this or any previous visit (from the past 168 hour(s)).      Imaging:   MRI Brain W WO Contrast    Result Date: 10/11/2022  EXAMINATION: MRI BRAIN W WO CONTRAST CLINICAL HISTORY: Small cell lung cancer, brain re-staging; Malignant neoplasm of unspecified part of unspecified bronchus or lung TECHNIQUE: Sagittal and axial T1, axial T2, axial FLAIR, axial gradient, axial diffusion imaging  of the whole brain pre-contrast with postcontrast axial T1 and axial spoiled gradient imaging reformatted in the coronal and sagittal planes.. Ten ml of Gadavist injected intravenously. COMPARISON: 07/12/2022 FINDINGS: Interval 1.2 cm enhancing lesion within the left anterior inferior frontal lobe which prominently the ring-enhancing measuring 1.2 x 0.9 x 0.9 cm in AP by TV by CC dimensions respectively in light of history concerning for parenchymal metastases the with question trace susceptibility associated with this lesion. Previously identified heterogeneous enhancing sellar lesion is again seen allowing for routine brain technique with lesion measuring approximately 1.3 cm craniocaudal this may represent pituitary adenoma though indeterminate.  Previous intraluminal filling defect within the right jugular foramen is no longer seen.  There is however diffusion signal abnormality with ill-defined T1 hypointensity within the right occipital condyle concerning for possible osseous metastases the clinical correlation and further evaluation with nuclear medicine imaging advised. There is continued slight prominent leptomeningeal enhancement along the left cerebellum which raises concern for possible underlying vascular malformation such as a dural AV fistula with collateral vascular engorgement.  There is no restricted diffusion to suggest acute infarction.  Ventricles stable without hydrocephalus.  There is mild edema signal surrounding the left frontal enhancing lesion with continued few scattered punctate size regions of T2 FLAIR signal hyperintensity elsewhere supratentorial white matter which are nonspecific and may represent mild chronic ischemic change. This report was flagged in Epic as abnormal.     Interval development of a small ring-enhancing lesion left anterior inferior frontal lobe concerning for parenchymal metastases in light of history.  Clinical correlation and follow-up advised There is continue  though relatively stable heterogeneous enhancement and enlargement of the material within the sella which may represent pituitary adenoma though indeterminate. Previous right internal jugular vein thrombus no longer seen. Abnormal signal along the right occipital condyle concerning for possible osseous metastases clinical correlation and further evaluation with nuclear medicine imaging advised Continued prominent vascularity left cerebellar folia raising concern for possible underlying vascular malformation unchanged.  Further evaluation with noncontrast MRA head may be of further diagnostic value. Electronically signed by: Jonel Lawrence DO Date:    10/11/2022 Time:    10:05    CT Chest Abdomen Pelvis With Contrast (xpd)    Result Date: 10/18/2022  EXAMINATION: CT CHEST ABDOMEN PELVIS WITH CONTRAST (XPD) CLINICAL HISTORY: metastatic small cell lung cancer on maintenance immunotherapy, eval response; Malignant neoplasm of unspecified part of unspecified bronchus or lung TECHNIQUE: Low dose axial images, sagittal and coronal reformations were obtained from the thoracic inlet to the pubic symphysis following the IV administration of 100 mL of Omnipaque 350 COMPARISON: 07/26/2022 FINDINGS: Right IJ venous shunt tip superior aspect right atrium, absence of clot in included upper right IJ. Fatty infiltration of liver, additional diminished attenuation space-occupying lesions of liver, largest central right hepatic lobe 3.6 cm image 103 series 3.  Stable.  Spleen, adrenal glands, pancreas, gallbladder and biliary tree normal. Urinary bladder normal.  Prostate gland very small 4 cm versus postop prostatectomy.  No free fluid or retroperitoneal adenopathy.  GI track normal. Tiny nonobstructive right lower and left upper renal pole stones. New lymph node left retro manubrial 3.4 cm image 28 series 3 appearing in the interim.  Paratracheal conglomerate adenopathy 2.1 x 2.5 cm, was 1.6 x 3 cm.  Subcarinal adenopathy stable.  No  new hilar or mediastinal adenopathy. Degenerative disc spondylosis cervicothoracic junction., focal osteoblastic opacities involve humeral heads both clavicle superimposed on erosive DJD more prominent on right.  Additional focal osteoblastic opacities sternum, ribs, dorsal lumbar sacral spine pelvis and both femoral head and trochanteric regions.  Moderate anterior spondylosis dorsal spine and degenerative disc spondylosis facet joint arthropathy lumbosacral junction with DJD SI joints.  Fracture defects left lower anterior chest wall stable. Scattered calcified plaque great vessels aorta iliac femoral arteries. Dominant medial segment right middle lobe mass 1.1 x 2.7 cm image 69 series 3, was 1.6 x 3.4 cm.  Additional patchy nodular lesions right lower lobe posterior basilar segments, largest 1.4 cm image 66 series 3 suspicious for metastatic disease and/or superimposed inflammatory and infectious process.  Additional patchy infiltrates medial posterior segment right upper lobe and right lower lobe particularly medial basilar segment image 91 series 3.  No new pleural reaction.  Tracheobronchial tree normal. .     1. New presumed metastatic node anterior superior left mediastinum, paratracheal adenopathy otherwise stable. 2. Decreased size in right middle lobe mass with new evidence of metastatic disease right lower lobe versus superimposed inflammatory infectious process discussed above. 3. New developing metastatic lesions liver. 4. Bony universal metastatic disease stable. 5. Recist summary: 6. Compare with previous CT chest abdomen pelvis 07/26/2022 7. Lesion 1: Right middle lobe mass 1.1 x 2.7 cm was 1.6 x 13.4 cm. 8. Lesion 2: Right paratracheal conned Willett a adenopathy 2.1 x 2.5 cm, was 1.6 x 3 cm. 9. Lesion 3: Dome 1.9 cm stable.  (New progressive liver metastatic disease noted elsewhere) 10. Lesion 4: Caudal aspect right hepatic lobe 1 cm, stable 11.  This report was flagged in Epic as abnormal.  Electronically signed by: Dewayne Hutchinson MD Date:    10/18/2022 Time:    09:28       Assessment and Plan          ECOG 0      Extensive stage small cell lung cancer  -Initially diagnosed with extensive stage small cell lung cancer in 03/2022 who was treated with carbo/etop/atezolizumab from 04/05/2022 - 06/14/22 with partial response. He subsequently completed consolidation thoracic RT 8/4/22 - 8/18/22, and he was on maintenance atezolizumab.   -10/18/22 showing progression of disease in lungs and liver.  Asymptomatic.  -Dr. Sosa discussed with him plan for topotecan and pt was consented  -Pt tolerated cycle 1 well but delayed C2 due to being out of town  -Scans in December 2022 largely stable with exception of  Right middle lobe lung mass.  3.4 cm, still demonstrated widespread disease in bones  -Admitted for pain control and palliative XRT on 12/07/22  -Pt had been off of tx in Nov 2022 due to going on vacation, as symptoms improved drastically after restarting tx  Plan 03/13/23  -As Ct demonstrates mixed response post 6 cycle of topotecan, consents signed for lurbinectadin as performance status remains good  -RTC in 3 weeks to start next tx  -Will precert for zometa, dental clearance note to be obtained, pt already on calcium supplements  -Follow up with palliative care      Cancer related pain/palliative care by specialist  -Pain well controlled and is not using pain meds since restarting tx and palliative XRT      DM2  -Controlled, following with PCP        Time spent with pt: 30 minutes      Problem List Items Addressed This Visit    None        Soledad Irving MD  Hematology Oncology

## 2023-03-15 ENCOUNTER — INFUSION (OUTPATIENT)
Dept: INFUSION THERAPY | Facility: HOSPITAL | Age: 58
End: 2023-03-15
Attending: STUDENT IN AN ORGANIZED HEALTH CARE EDUCATION/TRAINING PROGRAM
Payer: MEDICAID

## 2023-03-15 VITALS
TEMPERATURE: 98 F | HEART RATE: 101 BPM | OXYGEN SATURATION: 96 % | RESPIRATION RATE: 17 BRPM | SYSTOLIC BLOOD PRESSURE: 156 MMHG | DIASTOLIC BLOOD PRESSURE: 78 MMHG

## 2023-03-15 DIAGNOSIS — E11.9 TYPE 2 DIABETES MELLITUS, WITHOUT LONG-TERM CURRENT USE OF INSULIN: Primary | ICD-10-CM

## 2023-03-15 PROCEDURE — 96360 HYDRATION IV INFUSION INIT: CPT

## 2023-03-15 PROCEDURE — 63600175 PHARM REV CODE 636 W HCPCS: Performed by: STUDENT IN AN ORGANIZED HEALTH CARE EDUCATION/TRAINING PROGRAM

## 2023-03-15 PROCEDURE — 25000003 PHARM REV CODE 250: Performed by: STUDENT IN AN ORGANIZED HEALTH CARE EDUCATION/TRAINING PROGRAM

## 2023-03-15 PROCEDURE — A4216 STERILE WATER/SALINE, 10 ML: HCPCS | Performed by: STUDENT IN AN ORGANIZED HEALTH CARE EDUCATION/TRAINING PROGRAM

## 2023-03-15 RX ORDER — HEPARIN 100 UNIT/ML
500 SYRINGE INTRAVENOUS
Status: DISCONTINUED | OUTPATIENT
Start: 2023-03-15 | End: 2023-03-15 | Stop reason: HOSPADM

## 2023-03-15 RX ORDER — SODIUM CHLORIDE 0.9 % (FLUSH) 0.9 %
10 SYRINGE (ML) INJECTION
Status: DISCONTINUED | OUTPATIENT
Start: 2023-03-15 | End: 2023-03-15 | Stop reason: HOSPADM

## 2023-03-15 RX ADMIN — SODIUM CHLORIDE 1000 ML: 9 INJECTION, SOLUTION INTRAVENOUS at 11:03

## 2023-03-15 RX ADMIN — Medication 10 ML: at 01:03

## 2023-03-15 RX ADMIN — HEPARIN 500 UNITS: 100 SYRINGE at 01:03

## 2023-03-15 NOTE — PLAN OF CARE
Patient arrived to unit for IVFs infusion. No new or worsening symptoms to report today.Plan of care reviewed, patient agreeable to plan. Patient tolerated infusion well.VSS. Discharge instructions reviewed, patient instructed to return 3/21. Patient ambulated off unit accompanied by spouse. Patient in NAD at time of discharge.

## 2023-03-21 ENCOUNTER — TELEPHONE (OUTPATIENT)
Dept: HEMATOLOGY/ONCOLOGY | Facility: CLINIC | Age: 58
End: 2023-03-21
Payer: MEDICAID

## 2023-03-21 ENCOUNTER — INFUSION (OUTPATIENT)
Dept: INFUSION THERAPY | Facility: HOSPITAL | Age: 58
End: 2023-03-21
Attending: STUDENT IN AN ORGANIZED HEALTH CARE EDUCATION/TRAINING PROGRAM
Payer: MEDICAID

## 2023-03-21 VITALS — WEIGHT: 248.81 LBS | BODY MASS INDEX: 39.05 KG/M2 | HEIGHT: 67 IN

## 2023-03-21 VITALS
SYSTOLIC BLOOD PRESSURE: 134 MMHG | HEART RATE: 99 BPM | DIASTOLIC BLOOD PRESSURE: 70 MMHG | RESPIRATION RATE: 17 BRPM | TEMPERATURE: 98 F | OXYGEN SATURATION: 96 %

## 2023-03-21 DIAGNOSIS — C79.31 SECONDARY MALIGNANT NEOPLASM OF BRAIN: ICD-10-CM

## 2023-03-21 DIAGNOSIS — E11.9 TYPE 2 DIABETES MELLITUS, WITHOUT LONG-TERM CURRENT USE OF INSULIN: Primary | ICD-10-CM

## 2023-03-21 PROCEDURE — 96372 THER/PROPH/DIAG INJ SC/IM: CPT | Mod: 59

## 2023-03-21 PROCEDURE — 63600175 PHARM REV CODE 636 W HCPCS: Performed by: STUDENT IN AN ORGANIZED HEALTH CARE EDUCATION/TRAINING PROGRAM

## 2023-03-21 PROCEDURE — 25000003 PHARM REV CODE 250: Performed by: STUDENT IN AN ORGANIZED HEALTH CARE EDUCATION/TRAINING PROGRAM

## 2023-03-21 PROCEDURE — 96360 HYDRATION IV INFUSION INIT: CPT

## 2023-03-21 RX ORDER — CYANOCOBALAMIN 1000 UG/ML
1000 INJECTION, SOLUTION INTRAMUSCULAR; SUBCUTANEOUS
Status: CANCELLED | OUTPATIENT
Start: 2023-03-21

## 2023-03-21 RX ORDER — CYANOCOBALAMIN 1000 UG/ML
1000 INJECTION, SOLUTION INTRAMUSCULAR; SUBCUTANEOUS
Status: COMPLETED | OUTPATIENT
Start: 2023-03-21 | End: 2023-03-21

## 2023-03-21 RX ORDER — HEPARIN 100 UNIT/ML
500 SYRINGE INTRAVENOUS
Status: DISCONTINUED | OUTPATIENT
Start: 2023-03-21 | End: 2023-03-21 | Stop reason: HOSPADM

## 2023-03-21 RX ORDER — SODIUM CHLORIDE 0.9 % (FLUSH) 0.9 %
10 SYRINGE (ML) INJECTION
Status: DISCONTINUED | OUTPATIENT
Start: 2023-03-21 | End: 2023-03-21 | Stop reason: HOSPADM

## 2023-03-21 RX ADMIN — SODIUM CHLORIDE 1000 ML: 9 INJECTION, SOLUTION INTRAVENOUS at 11:03

## 2023-03-21 RX ADMIN — HEPARIN 500 UNITS: 100 SYRINGE at 01:03

## 2023-03-21 RX ADMIN — CYANOCOBALAMIN 1000 MCG: 1000 INJECTION, SOLUTION INTRAMUSCULAR at 11:03

## 2023-03-21 NOTE — TELEPHONE ENCOUNTER
----- Message from Cailin Julian RN sent at 3/21/2023  8:39 AM CDT -----  We can not clear I faxed tx summary reports  His mouth was not treated  ----- Message -----  From: Sheri Gillespie MA  Sent: 3/21/2023   8:23 AM CDT  To: [facility] Staff    Good Afternoon,     The attached patient needs a dental clearance to start zometa. The dentsist has in turn sent us a clearance asking about his history of radiation prior to and extraction that can be done. Can you please assist? It is scanned in his media and I can also fax if needed.     Thanks,   DEBBIE Wade

## 2023-03-21 NOTE — PROGRESS NOTES
Diabetes Care Specialist Progress Note  Author: Celestina Noble RN  Date: 2/28/2023    Program Intake  Reason for Diabetes Program Visit:: Initial Diabetes Assessment  Current diabetes risk level:: low  In the last 12 months, have you:: used emergency room services  Was the ER or hospital admission related to diabetes?: Yes  Permission to speak with others about care:: yes    Lab Results   Component Value Date    HGBA1C 6.9 (H) 12/07/2022       Clinical  Clinical Assessment  Current Diabetes Treatment: Oral Medication, Injectable (Farxiga 10mg daily and Ozempic 0.25mg weekly)  Have you ever experienced hypoglycemia (low blood sugar)?: no  Have you ever experienced hyperglycemia (high blood sugar)?: yes  In the last month, how often have you experienced high blood sugar?: other (see comments) (When he was hospitalized in Feb.)  Are you able to tell when your blood sugar is high?: No (comment)  Have you ever been hospitalized because your blood sugar was high?: yes (see comments)    Medication Information  How do you obtain your medications?: Patient drives  How many days a week do you miss your medications?: Never  Do you use a pill box or medication chart to help you manage your medications?: Pill box  Do you sometimes have difficulty refilling your medications?: No  Medication adherence impacting ability to self-manage diabetes?: No    Labs  Do you have regular lab work to monitor your medications?: Yes  Type of Regular Lab Work: A1c  Where do you get your labs drawn?: [facility]  Lab Compliance Barriers: No    Nutritional Status  Diet: Regular  Meal Plan 24 Hour Recall: Breakfast, Lunch, Dinner, Snack (Pt says he uses liquid IV for hydration at times.)  Meal Plan 24 Hour Recall - Breakfast: cottage cheese, banana, flax seed and lemon water  Meal Plan 24 Hour Recall - Lunch: small cauliflower pizza, small salad, sugar-free lemonade, or sugar-free gatorade  Meal Plan 24 Hour Recall - Dinner: red beans and spagetti  squash  Meal Plan 24 Hour Recall - Snack: gaucomole w/chips, or crackers, or banana  Change in appetite?: No  Recent Changes in Weight: No Recent Weight Change  Current nutritional status an area of need that is impacting patient's ability to self-manage diabetes?: No    Additional Social History  Support  Does anyone support you with your diabetes care?: yes  Who supports you?: self  Who takes you to your medical appointments?: self  Does the current support meet the patient's needs?: Yes  Is Support an area impacting ability to self-manage diabetes?: No    Access to Mass Media & Technology  Does the patient have access to any of the following devices or technologies?: Smart phone  Media or technology needs impacting ability to self-manage diabetes?: No    Cognitive/Behavioral Health  Alert and Oriented: Yes  Difficulty Thinking: No  Requires Prompting: No  Requires assistance for routine expression?: No  Cognitive or behavioral barriers impacting ability to self-manage diabetes?: No    Culture/Mormonism  Culture or Mosque beliefs that may impact ability to access healthcare: No    Communication  Language preference: English  Hearing Problems: No  Vision Problems: No  Communication needs impacting ability to self-manage diabetes?: No    Health Literacy  Preferred Learning Method: Face to Face, Reading Materials  How often do you need to have someone help you read instructions, pamphlets, or written material from your doctor or pharmacy?: Never  Health literacy needs impacting ability to self-manage diabetes?: No      Diabetes Self-Management Skills Assessment  Diabetes Disease Process/Treatment Options  Patient/caregiver able to state what happens when someone has diabetes.: somewhat  Patient/caregiver knows what type of diabetes they have.: yes  Diabetes Type : Type II  Patient/caregiver able to identify at least three signs and symptoms of diabetes.: no  Patient able to identify at least three risk factors for  diabetes.: no  Diabetes Disease Process/Treatment Options: Skills Assessment Completed: Yes  Assessment indicates:: Instruction Needed, Knowledge deficit  Area of need?: Yes    Nutrition/Healthy Eating  Challenges to healthy eating:: portion control  Method of carbohydrate measurement:: no method  Patient can identify foods that impact blood sugar.: no (see comments)  Nutrition/Healthy Eating Skills Assessment Completed:: Yes  Assessment indicates:: Instruction Needed, Knowledge deficit  Area of need?: Yes    Physical Activity/Exercise  Patient's daily activity level:: lightly active (Pt does pushup and just starting walking last week)  Patient formally exercises outside of work.: yes  Exercise Type: walking  Intensity: Low  Patient can identify forms of physical activity.: yes  Stated forms of physical activity:: any movement performed by muscles that uses energy  Patient can identify reasons why exercise/physical activity is important in diabetes management.: no  Physical Activity/Exercise Skills Assessment Completed: : Yes  Assessment indicates:: Instruction Needed, Knowledge deficit  Area of need?: Yes    Medications  Patient is able to describe current diabetes management routine.: yes  Diabetes management routine:: injectable medications, oral medications  Patient is able to identify current diabetes medications, dosages, and appropriate timing of medications.: yes  Patient understands the purpose of the medications taken for diabetes.: no  Patient reports problems or concerns with current medication regimen.: no  Medication Skills Assessment Completed:: Yes  Assessment indicates:: Instruction Needed, Knowledge deficit  Area of need?: Yes    Home Blood Glucose Monitoring  Patient states that blood sugar is checked at home daily.: yes  Monitoring Method:: personal continuous glucose monitor  Personal CGM type:: Dexcom G6  Patient is able to use personal CGM appropriately.: yes  CGM Report reviewed?: no  Home  Blood Glucose Monitoring Skills Assessment Completed: : Yes  Assessment indicates:: Instruction Needed  Area of need?: No    Acute Complications  Patient is able to identify types of acute complications: No  Acute Complications Skills Assessment Completed: : Yes  Assessment indicates:: Instruction Needed, Knowledge deficit  Area of need?: Yes    Chronic Complications  Chronic Complications Skills Assessment Completed: : No  Deferred due to:: Time    Psychosocial/Coping  Psychosocial/Coping Skills Assessment Completed: : No  Deffered due to:: Time    Assessment Summary and Plan    Based on today's diabetes care assessment, the following areas of need were identified:      Social 2/28/2023   Support No   Access to Mass Media/Tech No   Cognitive/Behavioral Health No   Culture/Rastafari No   Communication No   Health Literacy No        Clinical 2/28/2023   Medication Adherence No   Lab Compliance No   Nutritional Status No        Diabetes Self-Management Skills 2/28/2023   Diabetes Disease Process/Treatment Options Yes-Provided DM management guide and discussed risk factors of diabetes. Also, instructed patient on what is diabetes and the progression of the disease. Discussed significance of current A1c.   Nutrition/Healthy Eating Yes- see care planning   Physical Activity/Exercise Yes- Discussed benefits of exercise as it relates to insulin resistance and weight loss   Medication Yes- Discussed MOA and side-effects of Farxiag and Ozempic.   Home Blood Glucose Monitoring No   Acute Complications Yes-Reviewed blood glucose goals, prevention, detection, signs and symptoms, and treatment of hypoglycemia.          Today's interventions were provided through individual discussion, instruction, and written materials were provided.      Patient verbalized understanding of instruction and written materials.  Pt was able to return back demonstration of instructions today. Patient understood key points, needs reinforcement and  further instruction.     Diabetes Self-Management Care Plan:    Today's Diabetes Self-Management Care Plan was developed with Juan's input. Juan has agreed to work toward the following goal(s) to improve his/her overall diabetes control.      Care Plan: Diabetes Management   Updates made since 2/19/2023 12:00 AM        Problem: Healthy Eating         Goal: Eat 2-3 meals daily with 30-45g/2-3 servings of Carbohydrate per meal. Limit snacking in between meal to 1 serving (15 grams).    Start Date: 2/28/2023   Priority: High   Barriers: No Barriers Identified   Note:    2/28/23 - - We concentrated on portion sizes at today's session.  Encouraged increased consumption of non-starchy veggies to diet.  Overall meal planning and making better choices for meals. Discussed carb vs non-carb foods, appropriate amount of carbs to have at meals/snacks and acceptable serving sizes of individual carb items.  - Instructed on appropriate label reading and serving sizes of specific carb containing foods., measuring portion control (hand method) and using the plate method of meal planning. Discussed having lean protein at all meals and to also add non starchy vegetables at lunch and dinner.  Written education information provided to patient for use at home.          Task: Provided visual examples using dry measuring cups, food models, and other familiar objects such as computer mouse, deck or cards, tennis ball etc. to help with visualization of portions. Completed 2/28/2023       Task: Review the importance of balancing carbohydrates with each meal using portion control techniques to count servings of carbohydrate and label reading to identify serving size and amount of total carbs per serving. Completed 2/28/2023        Task: Provided Sample plate method and reviewed the use of the plate to estimate amounts of carbohydrate per meal. Completed 2/28/2023          Follow Up Plan     Follow up for F/U PRN.    Today's care plan and  follow up schedule was discussed with patient.  Juan verbalized understanding of the care plan, goals, and agrees to follow up plan.        The patient was encouraged to communicate with his/her health care provider/physician and care team regarding his/her condition(s) and treatment.  I provided the patient with my contact information today and encouraged to contact me via phone or [facility] Patient Portal as needed.     Length of Visit   Total Time: 60 Minutes

## 2023-03-21 NOTE — PLAN OF CARE
Patient arrived to unit for scheduled IVF's and B12 injection. VSS. Port-a-cath accessed, blood return present. B12 administered to L deltoid via IM route,  ml's infused over 1 hour, tolerated both well. Port heparinized and de accessed. Ambulated off unit for discharge in NAD accompanied with wife.

## 2023-03-27 ENCOUNTER — INFUSION (OUTPATIENT)
Dept: INFUSION THERAPY | Facility: HOSPITAL | Age: 58
End: 2023-03-27
Attending: STUDENT IN AN ORGANIZED HEALTH CARE EDUCATION/TRAINING PROGRAM
Payer: MEDICAID

## 2023-03-27 VITALS
DIASTOLIC BLOOD PRESSURE: 79 MMHG | TEMPERATURE: 98 F | SYSTOLIC BLOOD PRESSURE: 153 MMHG | HEART RATE: 80 BPM | OXYGEN SATURATION: 95 % | RESPIRATION RATE: 17 BRPM

## 2023-03-27 DIAGNOSIS — E11.9 TYPE 2 DIABETES MELLITUS, WITHOUT LONG-TERM CURRENT USE OF INSULIN: Primary | ICD-10-CM

## 2023-03-27 PROCEDURE — 25000003 PHARM REV CODE 250: Performed by: STUDENT IN AN ORGANIZED HEALTH CARE EDUCATION/TRAINING PROGRAM

## 2023-03-27 PROCEDURE — A4216 STERILE WATER/SALINE, 10 ML: HCPCS | Performed by: STUDENT IN AN ORGANIZED HEALTH CARE EDUCATION/TRAINING PROGRAM

## 2023-03-27 PROCEDURE — 96360 HYDRATION IV INFUSION INIT: CPT

## 2023-03-27 PROCEDURE — 63600175 PHARM REV CODE 636 W HCPCS: Performed by: STUDENT IN AN ORGANIZED HEALTH CARE EDUCATION/TRAINING PROGRAM

## 2023-03-27 RX ORDER — SODIUM CHLORIDE 0.9 % (FLUSH) 0.9 %
10 SYRINGE (ML) INJECTION
Status: DISCONTINUED | OUTPATIENT
Start: 2023-03-27 | End: 2023-03-27 | Stop reason: HOSPADM

## 2023-03-27 RX ORDER — HEPARIN 100 UNIT/ML
500 SYRINGE INTRAVENOUS
Status: DISCONTINUED | OUTPATIENT
Start: 2023-03-27 | End: 2023-03-27 | Stop reason: HOSPADM

## 2023-03-27 RX ADMIN — SODIUM CHLORIDE 1000 ML: 9 INJECTION, SOLUTION INTRAVENOUS at 12:03

## 2023-03-27 RX ADMIN — Medication 10 ML: at 01:03

## 2023-03-27 RX ADMIN — HEPARIN 500 UNITS: 100 SYRINGE at 01:03

## 2023-03-27 NOTE — PLAN OF CARE
Pt arrived to unit for IVFs. Reports his blood pressure and blood sugar has been under control. VSS. Denies any new or worsening complaints. IVFs tolerated well. Pt has his next appointments through [facility]. Discharged from unit accompanied by daughter in NAD.

## 2023-03-28 ENCOUNTER — HOSPITAL ENCOUNTER (OUTPATIENT)
Dept: RADIOLOGY | Facility: HOSPITAL | Age: 58
Discharge: HOME | End: 2023-03-28
Attending: RADIOLOGY
Payer: MEDICAID

## 2023-03-28 DIAGNOSIS — C79.31 SECONDARY MALIGNANT NEOPLASM OF BRAIN: ICD-10-CM

## 2023-03-28 PROCEDURE — A9585 GADOBUTROL INJECTION: HCPCS | Performed by: RADIOLOGY

## 2023-03-28 PROCEDURE — 70553 MRI BRAIN STEM W/O & W/DYE: CPT | Mod: TC

## 2023-03-28 PROCEDURE — 70553 MRI BRAIN STEM W/O & W/DYE: CPT | Mod: 26,,, | Performed by: RADIOLOGY

## 2023-03-28 PROCEDURE — 70553 MRI BRAIN W WO CONTRAST: ICD-10-PCS | Mod: 26,,, | Performed by: RADIOLOGY

## 2023-03-28 PROCEDURE — 25500020 PHARM REV CODE 255: Performed by: RADIOLOGY

## 2023-03-28 RX ORDER — GADOBUTROL 604.72 MG/ML
10 INJECTION INTRAVENOUS
Status: COMPLETED | OUTPATIENT
Start: 2023-03-28 | End: 2023-03-28

## 2023-03-28 RX ADMIN — GADOBUTROL 10 ML: 604.72 INJECTION INTRAVENOUS at 10:03

## 2023-03-29 NOTE — PROGRESS NOTES
3/30/2023    Radiation Oncology Follow-Up Visit    Prior Radiation History:    Course 1:  Site  Technique  Energy  Dose/Fx (Gy)  #Fx  Total Dose (Gy)  Start Date  End Date  Elapsed Days    Mediastinum  3D  18X  3  10 / 10  30  8/4/2022  8/18/2022  14      Course 2:   Site  Technique  Energy  Dose/Fx (Gy)  #Fx  Total Dose (Gy)  Start Date  End Date  Elapsed Days    Lt Frontal PTV  SRS  6X  20  1 / 1  20  10/25/2022  10/25/2022  0      Course 3:   Site  Technique  Energy  Dose/Fx (Gy)  #Fx  Total Dose (Gy)  Start Date  End Date  Elapsed Days    T11-L1  AP/PA  18X  8  1 / 1  8  12/9/2022 12/9/2022  0    Rt Ilium  BUI/LPO  18X  8  1 / 1  8  12/9/2022 12/9/2022  0    Rt Hip  AP/PA  18X  8  1 / 1  8  12/9/2022 12/9/2022  0      Assessment   This is a 58 y.o. 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 Staging MRI Brain demonstrated an enhancing sellar mass c/w pituitary adenoma. He received chemo-immunotherapy. Re-staging extracranial imaging CT C/A/P 7/26/22 demonstrated partial response with decrease in size of RML lung primary and mediastinal adenopathy. He has completed chemotherapy and is now on maintenance immunotherapy. He completed thoracic consolidation RT 30 Gy in 10 fx on 8/18/22. MRI Brain 10/2022 demonstrated a single Left frontal metastasis that was treated with SRS 20 Gy x1 on 10/25/22. He received palliative RT 8 Gy x1 to T11-L1, Rt ilium, and Rt hip on 12/9/22.     MRI Brain 3/28/23 demonstrated a new 0.6 cm Left temporal metastasis, slight increase in the enhancing focus in the Right internal capsule to 0.7 cm, and interval regrowth of the Left frontal metastasis that was treated in 10/2022. This had resolved on last imaging and now appears as a 1.3 cm rim-enhancing cystic lesion.     The Left frontal lesion was treated 5 months ago, so I think this may be more related to necrosis over  recurrence. Will plan to observe it for now.     Since the volume remains limited, I recommend treating the Right internal capsule and Left temporal metastases with SRS 22 Gy x1. Potential side effects of radiosurgery including radiation necrosis were reviewed. At the end of our discussion, he was in agreement with proceeding with the recommended treatment.         Plan   1) Schedule CT Simulation for radiosurgery treatment planning.   2) F/U with Dr. Irving as scheduled for continued systemic therapy.          CHIEF COMPLAINT: F/U for intracranial surveillance.     HPI: No neurologic issues since last visit, specifically no focal numbness or weakness, gait imbalance, or speech difficulty. He also denies focal pain at this time. Last CT C/A/P had mixed response to therapy, so he is being switched to Lurbinectedin.     Past Medical History:   Diagnosis Date    Cancer     Small Cell -Stage 4 Lung Cancer    Diabetes mellitus, type 2     Hypertension        Past Surgical History:   Procedure Laterality Date    ENDOBRONCHIAL ULTRASOUND N/A 3/22/2022    Procedure: ENDOBRONCHIAL ULTRASOUND (EBUS);  Surgeon: Kristin Samuels MD;  Location: [facility] OR [address] [place] [address];  Service: Endoscopy;  Laterality: N/A;    KNEE SURGERY         Social History     Tobacco Use    Smoking status: Former     Types: Cigarettes    Smokeless tobacco: Never    Tobacco comments:     stop smoking 26 years ago   Substance Use Topics    Alcohol use: No    Drug use: No       Cancer-related family history includes Lung cancer in his father.    Current Outpatient Medications on File Prior to Visit   Medication Sig Dispense Refill    albuterol (ACCUNEB) 1.25 mg/3 mL Nebu Use 1 vial (1.25 mg total) by nebulization 3 (three) times daily as needed (shortness of breath). Rescue 90 mL 2    allopurinoL (ZYLOPRIM) 300 MG tablet Take 1 tablet (300 mg total) by mouth once daily. 60 tablet 3    benzonatate (TESSALON) 100 MG capsule Take 1 capsule by mouth as needed.       "cetirizine (ZYRTEC) 10 MG tablet Take 1 tablet by mouth as needed.      dapagliflozin (FARXIGA) 10 mg tablet Take 1 tablet by mouth once daily.      insulin aspart U-100 (NOVOLOG) 100 unit/mL (3 mL) InPn pen Inject 1 Units into the skin as needed. Inject into skin if over 200.      losartan (COZAAR) 100 MG tablet Take 1 tablet (100 mg total) by mouth once daily. 30 tablet 2    montelukast (SINGULAIR) 10 mg tablet Take 10 mg by mouth every evening.      morphine (MS CONTIN) 30 MG 12 hr tablet Take 1 tablet (30 mg total) by mouth every 12 (twelve) hours. 60 tablet 0    semaglutide (OZEMPIC) 0.25 mg or 0.5 mg(2 mg/1.5 mL) pen injector Inject 0.25 mg into the skin once a week.      senna-docusate 8.6-50 mg (PERICOLACE) 8.6-50 mg per tablet Take 1 tablet by mouth daily as needed for Constipation. 30 tablet 2    sildenafiL (VIAGRA) 50 MG tablet Take 1 tablet (50 mg total) by mouth daily as needed for Erectile Dysfunction. 30 tablet 0    TRUE METRIX GLUCOSE TEST STRIP Strp       TRUEPLUS LANCETS 33 gauge Misc       [DISCONTINUED] albuterol-ipratropium (DUO-NEB) 2.5 mg-0.5 mg/3 mL nebulizer solution Take 3 mLs by nebulization every 4 (four) hours. Rescue 90 mL 3    [DISCONTINUED] azelastine (ASTELIN) 137 mcg (0.1 %) nasal spray 1 spray by Nasal route 2 (two) times daily.       No current facility-administered medications on file prior to visit.       Review of patient's allergies indicates:  No Known Allergies      Vital Signs: BP (!) 153/81 (BP Location: Left arm, Patient Position: Sitting)   Pulse 87   Temp 97.4 °F (36.3 °C)   Resp 18   Ht 5' 7" (1.702 m)   Wt 115.8 kg (255 lb 4.8 oz)   SpO2 95%   BMI 39.99 kg/m²     ECOG Performance Status: 1    Physical Exam  Vitals reviewed.   Constitutional:       Appearance: Normal appearance.   HENT:      Head: Normocephalic and atraumatic.   Eyes:      General: No scleral icterus.     Extraocular Movements: Extraocular movements intact.   Pulmonary:      Effort: Pulmonary " effort is normal. No respiratory distress.   Abdominal:      General: There is no distension.   Musculoskeletal:      Cervical back: Neck supple.   Lymphadenopathy:      Cervical: No cervical adenopathy.   Skin:     General: Skin is warm and dry.   Neurological:      General: No focal deficit present.      Mental Status: He is alert and oriented to person, place, and time.      Cranial Nerves: No cranial nerve deficit.   Psychiatric:         Mood and Affect: Mood normal.         Behavior: Behavior normal.         Judgment: Judgment normal.        Labs:    Imaging: I have personally reviewed the patient's available images and reports and summarized pertinent findings above in HPI.     Pathology: No new path

## 2023-03-30 ENCOUNTER — OFFICE VISIT (OUTPATIENT)
Dept: RADIATION ONCOLOGY | Facility: CLINIC | Age: 58
End: 2023-03-30
Payer: MEDICAID

## 2023-03-30 VITALS
TEMPERATURE: 97 F | WEIGHT: 255.31 LBS | BODY MASS INDEX: 40.07 KG/M2 | DIASTOLIC BLOOD PRESSURE: 81 MMHG | RESPIRATION RATE: 18 BRPM | HEART RATE: 87 BPM | HEIGHT: 67 IN | SYSTOLIC BLOOD PRESSURE: 153 MMHG | OXYGEN SATURATION: 95 %

## 2023-03-30 DIAGNOSIS — C79.31 SECONDARY MALIGNANT NEOPLASM OF BRAIN: Primary | ICD-10-CM

## 2023-03-30 PROCEDURE — 99999 PR PBB SHADOW E&M-EST. PATIENT-LVL IV: CPT | Mod: PBBFAC,,, | Performed by: RADIOLOGY

## 2023-03-30 PROCEDURE — 4010F PR ACE/ARB THEARPY RXD/TAKEN: ICD-10-PCS | Mod: CPTII,,, | Performed by: RADIOLOGY

## 2023-03-30 PROCEDURE — 99214 OFFICE O/P EST MOD 30 MIN: CPT | Mod: PBBFAC | Performed by: RADIOLOGY

## 2023-03-30 PROCEDURE — 1159F PR MEDICATION LIST DOCUMENTED IN MEDICAL RECORD: ICD-10-PCS | Mod: CPTII,,, | Performed by: RADIOLOGY

## 2023-03-30 PROCEDURE — 3079F DIAST BP 80-89 MM HG: CPT | Mod: CPTII,,, | Performed by: RADIOLOGY

## 2023-03-30 PROCEDURE — 4010F ACE/ARB THERAPY RXD/TAKEN: CPT | Mod: CPTII,,, | Performed by: RADIOLOGY

## 2023-03-30 PROCEDURE — 3077F SYST BP >= 140 MM HG: CPT | Mod: CPTII,,, | Performed by: RADIOLOGY

## 2023-03-30 PROCEDURE — 99999 PR PBB SHADOW E&M-EST. PATIENT-LVL IV: ICD-10-PCS | Mod: PBBFAC,,, | Performed by: RADIOLOGY

## 2023-03-30 PROCEDURE — 3008F BODY MASS INDEX DOCD: CPT | Mod: CPTII,,, | Performed by: RADIOLOGY

## 2023-03-30 PROCEDURE — 99214 PR OFFICE/OUTPT VISIT, EST, LEVL IV, 30-39 MIN: ICD-10-PCS | Mod: S$PBB,,, | Performed by: RADIOLOGY

## 2023-03-30 PROCEDURE — 3079F PR MOST RECENT DIASTOLIC BLOOD PRESSURE 80-89 MM HG: ICD-10-PCS | Mod: CPTII,,, | Performed by: RADIOLOGY

## 2023-03-30 PROCEDURE — 3077F PR MOST RECENT SYSTOLIC BLOOD PRESSURE >= 140 MM HG: ICD-10-PCS | Mod: CPTII,,, | Performed by: RADIOLOGY

## 2023-03-30 PROCEDURE — 3008F PR BODY MASS INDEX (BMI) DOCUMENTED: ICD-10-PCS | Mod: CPTII,,, | Performed by: RADIOLOGY

## 2023-03-30 PROCEDURE — 99214 OFFICE O/P EST MOD 30 MIN: CPT | Mod: S$PBB,,, | Performed by: RADIOLOGY

## 2023-03-30 PROCEDURE — 1159F MED LIST DOCD IN RCRD: CPT | Mod: CPTII,,, | Performed by: RADIOLOGY

## 2023-04-03 ENCOUNTER — HOSPITAL ENCOUNTER (OUTPATIENT)
Dept: RADIATION THERAPY | Facility: HOSPITAL | Age: 58
Discharge: HOME | End: 2023-04-03
Attending: STUDENT IN AN ORGANIZED HEALTH CARE EDUCATION/TRAINING PROGRAM
Payer: MEDICAID

## 2023-04-03 ENCOUNTER — HOSPITAL ENCOUNTER (OUTPATIENT)
Dept: RADIATION THERAPY | Facility: HOSPITAL | Age: 58
Discharge: HOME | End: 2023-04-03
Attending: RADIOLOGY
Payer: MEDICAID

## 2023-04-03 PROCEDURE — 77301 RADIOTHERAPY DOSE PLAN IMRT: CPT | Mod: TC | Performed by: RADIOLOGY

## 2023-04-03 PROCEDURE — 77301 PR  INTEN MOD RADIOTHER PLAN W/DOSE VOL HIST: ICD-10-PCS | Mod: 26,,, | Performed by: RADIOLOGY

## 2023-04-03 PROCEDURE — 77014 HC CT GUIDANCE RADIATION THERAPY FLDS PLACEMENT: CPT | Mod: TC | Performed by: RADIOLOGY

## 2023-04-03 PROCEDURE — 77301 RADIOTHERAPY DOSE PLAN IMRT: CPT | Mod: 26,,, | Performed by: RADIOLOGY

## 2023-04-04 ENCOUNTER — LAB VISIT (OUTPATIENT)
Dept: LAB | Facility: HOSPITAL | Age: 58
End: 2023-04-04
Attending: STUDENT IN AN ORGANIZED HEALTH CARE EDUCATION/TRAINING PROGRAM
Payer: MEDICAID

## 2023-04-04 DIAGNOSIS — C34.90 SMALL CELL LUNG CANCER: ICD-10-CM

## 2023-04-04 DIAGNOSIS — C34.90 MALIGNANT NEOPLASM OF UNSPECIFIED PART OF UNSPECIFIED BRONCHUS OR LUNG: ICD-10-CM

## 2023-04-04 LAB
ALBUMIN SERPL BCP-MCNC: 4.1 G/DL (ref 3.5–5.2)
ALP SERPL-CCNC: 258 U/L (ref 55–135)
ALT SERPL W/O P-5'-P-CCNC: 56 U/L (ref 10–44)
ANION GAP SERPL CALC-SCNC: 9 MMOL/L (ref 8–16)
AST SERPL-CCNC: 40 U/L (ref 10–40)
BASOPHILS # BLD AUTO: 0.11 K/UL (ref 0–0.2)
BASOPHILS NFR BLD: 1.2 % (ref 0–1.9)
BILIRUB SERPL-MCNC: 0.9 MG/DL (ref 0.1–1)
BUN SERPL-MCNC: 24 MG/DL (ref 6–20)
CALCIUM SERPL-MCNC: 10.5 MG/DL (ref 8.7–10.5)
CHLORIDE SERPL-SCNC: 104 MMOL/L (ref 95–110)
CO2 SERPL-SCNC: 25 MMOL/L (ref 23–29)
CREAT SERPL-MCNC: 1.2 MG/DL (ref 0.5–1.4)
DIFFERENTIAL METHOD: ABNORMAL
EOSINOPHIL # BLD AUTO: 0.4 K/UL (ref 0–0.5)
EOSINOPHIL NFR BLD: 4.3 % (ref 0–8)
ERYTHROCYTE [DISTWIDTH] IN BLOOD BY AUTOMATED COUNT: 15.5 % (ref 11.5–14.5)
EST. GFR  (NO RACE VARIABLE): >60 ML/MIN/1.73 M^2
GLUCOSE SERPL-MCNC: 153 MG/DL (ref 70–110)
HCT VFR BLD AUTO: 45.6 % (ref 40–54)
HGB BLD-MCNC: 14.1 G/DL (ref 14–18)
IMM GRANULOCYTES # BLD AUTO: 0.07 K/UL (ref 0–0.04)
IMM GRANULOCYTES NFR BLD AUTO: 0.7 % (ref 0–0.5)
LYMPHOCYTES # BLD AUTO: 0.9 K/UL (ref 1–4.8)
LYMPHOCYTES NFR BLD: 9.2 % (ref 18–48)
MAGNESIUM SERPL-MCNC: 2.1 MG/DL (ref 1.6–2.6)
MCH RBC QN AUTO: 29.4 PG (ref 27–31)
MCHC RBC AUTO-ENTMCNC: 30.9 G/DL (ref 32–36)
MCV RBC AUTO: 95 FL (ref 82–98)
MONOCYTES # BLD AUTO: 0.7 K/UL (ref 0.3–1)
MONOCYTES NFR BLD: 7 % (ref 4–15)
NEUTROPHILS # BLD AUTO: 7.3 K/UL (ref 1.8–7.7)
NEUTROPHILS NFR BLD: 77.6 % (ref 38–73)
NRBC BLD-RTO: 0 /100 WBC
PLATELET # BLD AUTO: 348 K/UL (ref 150–450)
PMV BLD AUTO: 9.4 FL (ref 9.2–12.9)
POTASSIUM SERPL-SCNC: 4.9 MMOL/L (ref 3.5–5.1)
PROT SERPL-MCNC: 7.7 G/DL (ref 6–8.4)
RBC # BLD AUTO: 4.8 M/UL (ref 4.6–6.2)
SODIUM SERPL-SCNC: 138 MMOL/L (ref 136–145)
WBC # BLD AUTO: 9.46 K/UL (ref 3.9–12.7)

## 2023-04-04 PROCEDURE — 85025 COMPLETE CBC W/AUTO DIFF WBC: CPT | Performed by: STUDENT IN AN ORGANIZED HEALTH CARE EDUCATION/TRAINING PROGRAM

## 2023-04-04 PROCEDURE — 80053 COMPREHEN METABOLIC PANEL: CPT | Performed by: STUDENT IN AN ORGANIZED HEALTH CARE EDUCATION/TRAINING PROGRAM

## 2023-04-04 PROCEDURE — 77300 PR RADIATION THERAPY,DOSIMETRY PLAN: ICD-10-PCS | Mod: 26,,, | Performed by: RADIOLOGY

## 2023-04-04 PROCEDURE — 77300 RADIATION THERAPY DOSE PLAN: CPT | Mod: TC | Performed by: RADIOLOGY

## 2023-04-04 PROCEDURE — 36415 COLL VENOUS BLD VENIPUNCTURE: CPT | Performed by: STUDENT IN AN ORGANIZED HEALTH CARE EDUCATION/TRAINING PROGRAM

## 2023-04-04 PROCEDURE — 77300 RADIATION THERAPY DOSE PLAN: CPT | Mod: 26,,, | Performed by: RADIOLOGY

## 2023-04-04 PROCEDURE — 83735 ASSAY OF MAGNESIUM: CPT | Performed by: STUDENT IN AN ORGANIZED HEALTH CARE EDUCATION/TRAINING PROGRAM

## 2023-04-05 ENCOUNTER — INFUSION (OUTPATIENT)
Dept: INFUSION THERAPY | Facility: HOSPITAL | Age: 58
End: 2023-04-05
Attending: STUDENT IN AN ORGANIZED HEALTH CARE EDUCATION/TRAINING PROGRAM
Payer: MEDICAID

## 2023-04-05 ENCOUNTER — OFFICE VISIT (OUTPATIENT)
Dept: HEMATOLOGY/ONCOLOGY | Facility: CLINIC | Age: 58
End: 2023-04-05
Payer: MEDICAID

## 2023-04-05 VITALS
TEMPERATURE: 98 F | DIASTOLIC BLOOD PRESSURE: 76 MMHG | HEART RATE: 103 BPM | RESPIRATION RATE: 17 BRPM | OXYGEN SATURATION: 96 % | SYSTOLIC BLOOD PRESSURE: 127 MMHG | HEIGHT: 67 IN | HEART RATE: 97 BPM | DIASTOLIC BLOOD PRESSURE: 88 MMHG | WEIGHT: 242.5 LBS | OXYGEN SATURATION: 98 % | BODY MASS INDEX: 38.06 KG/M2 | SYSTOLIC BLOOD PRESSURE: 135 MMHG

## 2023-04-05 DIAGNOSIS — E11.69 TYPE 2 DIABETES MELLITUS WITH OTHER SPECIFIED COMPLICATION, WITHOUT LONG-TERM CURRENT USE OF INSULIN: ICD-10-CM

## 2023-04-05 DIAGNOSIS — I10 PRIMARY HYPERTENSION: ICD-10-CM

## 2023-04-05 DIAGNOSIS — C79.51 LUNG CANCER METASTATIC TO BONE: ICD-10-CM

## 2023-04-05 DIAGNOSIS — D84.821 IMMUNODEFICIENCY SECONDARY TO CHEMOTHERAPY: ICD-10-CM

## 2023-04-05 DIAGNOSIS — C78.7 SECONDARY MALIGNANT NEOPLASM OF LIVER: ICD-10-CM

## 2023-04-05 DIAGNOSIS — C79.31 SECONDARY MALIGNANT NEOPLASM OF BRAIN: ICD-10-CM

## 2023-04-05 DIAGNOSIS — Z09 FOLLOW-UP EXAM: ICD-10-CM

## 2023-04-05 DIAGNOSIS — C34.90 LUNG CANCER METASTATIC TO BONE: ICD-10-CM

## 2023-04-05 DIAGNOSIS — C77.1 SECONDARY AND UNSPECIFIED MALIGNANT NEOPLASM OF INTRATHORACIC LYMPH NODES: ICD-10-CM

## 2023-04-05 DIAGNOSIS — C34.90 SMALL CELL LUNG CANCER: Primary | ICD-10-CM

## 2023-04-05 DIAGNOSIS — Z79.899 IMMUNODEFICIENCY SECONDARY TO CHEMOTHERAPY: ICD-10-CM

## 2023-04-05 DIAGNOSIS — E11.9 TYPE 2 DIABETES MELLITUS, WITHOUT LONG-TERM CURRENT USE OF INSULIN: ICD-10-CM

## 2023-04-05 DIAGNOSIS — Z51.5 PALLIATIVE CARE BY SPECIALIST: ICD-10-CM

## 2023-04-05 DIAGNOSIS — T45.1X5A IMMUNODEFICIENCY SECONDARY TO CHEMOTHERAPY: ICD-10-CM

## 2023-04-05 DIAGNOSIS — I10 HYPERTENSION, UNSPECIFIED TYPE: ICD-10-CM

## 2023-04-05 PROCEDURE — 63600175 PHARM REV CODE 636 W HCPCS: Performed by: STUDENT IN AN ORGANIZED HEALTH CARE EDUCATION/TRAINING PROGRAM

## 2023-04-05 PROCEDURE — 3075F SYST BP GE 130 - 139MM HG: CPT | Mod: CPTII,,, | Performed by: STUDENT IN AN ORGANIZED HEALTH CARE EDUCATION/TRAINING PROGRAM

## 2023-04-05 PROCEDURE — 4010F PR ACE/ARB THEARPY RXD/TAKEN: ICD-10-PCS | Mod: CPTII,,, | Performed by: STUDENT IN AN ORGANIZED HEALTH CARE EDUCATION/TRAINING PROGRAM

## 2023-04-05 PROCEDURE — 3079F PR MOST RECENT DIASTOLIC BLOOD PRESSURE 80-89 MM HG: ICD-10-PCS | Mod: CPTII,,, | Performed by: STUDENT IN AN ORGANIZED HEALTH CARE EDUCATION/TRAINING PROGRAM

## 2023-04-05 PROCEDURE — 77338 DESIGN MLC DEVICE FOR IMRT: CPT | Mod: 26,,, | Performed by: RADIOLOGY

## 2023-04-05 PROCEDURE — 3008F PR BODY MASS INDEX (BMI) DOCUMENTED: ICD-10-PCS | Mod: CPTII,,, | Performed by: STUDENT IN AN ORGANIZED HEALTH CARE EDUCATION/TRAINING PROGRAM

## 2023-04-05 PROCEDURE — 77470 SPECIAL RADIATION TREATMENT: CPT | Mod: 26,59,, | Performed by: RADIOLOGY

## 2023-04-05 PROCEDURE — 99999 PR PBB SHADOW E&M-EST. PATIENT-LVL II: CPT | Mod: PBBFAC,,, | Performed by: STUDENT IN AN ORGANIZED HEALTH CARE EDUCATION/TRAINING PROGRAM

## 2023-04-05 PROCEDURE — 25000003 PHARM REV CODE 250: Performed by: STUDENT IN AN ORGANIZED HEALTH CARE EDUCATION/TRAINING PROGRAM

## 2023-04-05 PROCEDURE — 3075F PR MOST RECENT SYSTOLIC BLOOD PRESS GE 130-139MM HG: ICD-10-PCS | Mod: CPTII,,, | Performed by: STUDENT IN AN ORGANIZED HEALTH CARE EDUCATION/TRAINING PROGRAM

## 2023-04-05 PROCEDURE — 77338 DESIGN MLC DEVICE FOR IMRT: CPT | Mod: TC | Performed by: RADIOLOGY

## 2023-04-05 PROCEDURE — 3008F BODY MASS INDEX DOCD: CPT | Mod: CPTII,,, | Performed by: STUDENT IN AN ORGANIZED HEALTH CARE EDUCATION/TRAINING PROGRAM

## 2023-04-05 PROCEDURE — 77470 PR  SPECIAL RADIATION TREATMENT: ICD-10-PCS | Mod: 26,59,, | Performed by: RADIOLOGY

## 2023-04-05 PROCEDURE — 99215 OFFICE O/P EST HI 40 MIN: CPT | Mod: S$PBB,,, | Performed by: STUDENT IN AN ORGANIZED HEALTH CARE EDUCATION/TRAINING PROGRAM

## 2023-04-05 PROCEDURE — 96372 THER/PROPH/DIAG INJ SC/IM: CPT | Mod: 59

## 2023-04-05 PROCEDURE — 77370 RADIATION PHYSICS CONSULT: CPT | Performed by: RADIOLOGY

## 2023-04-05 PROCEDURE — 96417 CHEMO IV INFUS EACH ADDL SEQ: CPT

## 2023-04-05 PROCEDURE — 99999 PR PBB SHADOW E&M-EST. PATIENT-LVL II: ICD-10-PCS | Mod: PBBFAC,,, | Performed by: STUDENT IN AN ORGANIZED HEALTH CARE EDUCATION/TRAINING PROGRAM

## 2023-04-05 PROCEDURE — 4010F ACE/ARB THERAPY RXD/TAKEN: CPT | Mod: CPTII,,, | Performed by: STUDENT IN AN ORGANIZED HEALTH CARE EDUCATION/TRAINING PROGRAM

## 2023-04-05 PROCEDURE — A4216 STERILE WATER/SALINE, 10 ML: HCPCS | Performed by: STUDENT IN AN ORGANIZED HEALTH CARE EDUCATION/TRAINING PROGRAM

## 2023-04-05 PROCEDURE — 77338 PR  MLC IMRT DESIGN & CONSTRUCTION PER IMRT PLAN: ICD-10-PCS | Mod: 26,,, | Performed by: RADIOLOGY

## 2023-04-05 PROCEDURE — 96367 TX/PROPH/DG ADDL SEQ IV INF: CPT

## 2023-04-05 PROCEDURE — 77470 SPECIAL RADIATION TREATMENT: CPT | Mod: 59,TC | Performed by: RADIOLOGY

## 2023-04-05 PROCEDURE — 3079F DIAST BP 80-89 MM HG: CPT | Mod: CPTII,,, | Performed by: STUDENT IN AN ORGANIZED HEALTH CARE EDUCATION/TRAINING PROGRAM

## 2023-04-05 PROCEDURE — 96365 THER/PROPH/DIAG IV INF INIT: CPT

## 2023-04-05 PROCEDURE — 99215 PR OFFICE/OUTPT VISIT, EST, LEVL V, 40-54 MIN: ICD-10-PCS | Mod: S$PBB,,, | Performed by: STUDENT IN AN ORGANIZED HEALTH CARE EDUCATION/TRAINING PROGRAM

## 2023-04-05 PROCEDURE — 99212 OFFICE O/P EST SF 10 MIN: CPT | Mod: PBBFAC,25 | Performed by: STUDENT IN AN ORGANIZED HEALTH CARE EDUCATION/TRAINING PROGRAM

## 2023-04-05 PROCEDURE — 96413 CHEMO IV INFUSION 1 HR: CPT

## 2023-04-05 RX ORDER — CYANOCOBALAMIN 1000 UG/ML
1000 INJECTION, SOLUTION INTRAMUSCULAR; SUBCUTANEOUS
Status: COMPLETED | OUTPATIENT
Start: 2023-04-05 | End: 2023-04-05

## 2023-04-05 RX ORDER — SODIUM CHLORIDE 0.9 % (FLUSH) 0.9 %
10 SYRINGE (ML) INJECTION
Status: CANCELLED | OUTPATIENT
Start: 2023-04-05

## 2023-04-05 RX ORDER — SODIUM CHLORIDE 0.9 % (FLUSH) 0.9 %
10 SYRINGE (ML) INJECTION
Status: DISCONTINUED | OUTPATIENT
Start: 2023-04-05 | End: 2023-04-05 | Stop reason: HOSPADM

## 2023-04-05 RX ORDER — HEPARIN 100 UNIT/ML
500 SYRINGE INTRAVENOUS
Status: DISCONTINUED | OUTPATIENT
Start: 2023-04-05 | End: 2023-04-05 | Stop reason: HOSPADM

## 2023-04-05 RX ORDER — HEPARIN 100 UNIT/ML
500 SYRINGE INTRAVENOUS
Status: CANCELLED | OUTPATIENT
Start: 2023-04-05

## 2023-04-05 RX ORDER — CYANOCOBALAMIN 1000 UG/ML
1000 INJECTION, SOLUTION INTRAMUSCULAR; SUBCUTANEOUS
Status: CANCELLED | OUTPATIENT
Start: 2023-04-05

## 2023-04-05 RX ADMIN — DEXAMETHASONE SODIUM PHOSPHATE 0.25 MG: 10 INJECTION, SOLUTION INTRAMUSCULAR; INTRAVENOUS at 09:04

## 2023-04-05 RX ADMIN — Medication 10 ML: at 11:04

## 2023-04-05 RX ADMIN — LURBINECTEDIN 7.5 MG: 0.5 INJECTION, POWDER, LYOPHILIZED, FOR SOLUTION INTRAVENOUS at 09:04

## 2023-04-05 RX ADMIN — ALTEPLASE 2 MG: 2.2 INJECTION, POWDER, LYOPHILIZED, FOR SOLUTION INTRAVENOUS at 08:04

## 2023-04-05 RX ADMIN — HEPARIN 500 UNITS: 100 SYRINGE at 11:04

## 2023-04-05 RX ADMIN — CYANOCOBALAMIN 1000 MCG: 1000 INJECTION, SOLUTION INTRAMUSCULAR at 08:04

## 2023-04-05 RX ADMIN — SODIUM CHLORIDE 1000 ML: 9 INJECTION, SOLUTION INTRAVENOUS at 09:04

## 2023-04-05 NOTE — PLAN OF CARE
Pt arrived to unit for C1 of Zepzelca along with his monthly B12 injection and IVFs. Pt had labs done and saw  earlier this morning. Okay to proceed with his first cycle today. VSS. Reporting not being able to eat as much due to being full quickly and having stomach pain. Still continues being active. Pt received his injection and 1L NS. Pre-med of Aloxi/Dex given. Zepzelca infused over 1 hour. Pt tolerated well. Will return next Wednesday for additional IVFs. Discharged from unit in NAD.

## 2023-04-05 NOTE — Clinical Note
-first lurbinectadin today -RTC in 3 weeks for MD visit and C2 lurbinectadin With labs (CMP, CBC, Mg) day before -Please continue fluids and B12 shots as he has been getting.

## 2023-04-05 NOTE — PROGRESS NOTES
[facility]  [facility]  Hematology/Medical Oncology Clinic       PATIENT: Juan Gillespie  MRN: [id number]  DATE: 4/5/2023    Reason for referral: Extensive stage small cell lung ca    Initial History: Juan Gillespie is a 58 year old man with extensive stage SCLC who presents to clinic for evaluation prior to treatment  of topotecan.          Oncological History:  -Started Carbo/Etop/Atez 4/5/22  -Maintenance atezolizumab 7/5/22  -Consolidative thoracic radiation 8/4/22-8/18/22  -topotecan started 10/24/23-3/10/22(missed Nov tx)-mixed resposne    Interval History:     Pt presents for MD visit and first lurbinectadin chemo.  Pt tolerating tx well and denied any complaints.  MRI brain demonstrated a few new mets and has XRT planned by rad/onc.  Will continue fluids and B12 shots as well.    His wife accompanies him at this visit.  Past Medical History:   Past Medical History:   Diagnosis Date    Cancer     Small Cell -Stage 4 Lung Cancer    Diabetes mellitus, type 2     Hypertension        Past Surgical HIstory:   Past Surgical History:   Procedure Laterality Date    ENDOBRONCHIAL ULTRASOUND N/A 3/22/2022    Procedure: ENDOBRONCHIAL ULTRASOUND (EBUS);  Surgeon: Kristin Samuels MD;  Location: [facility] OR [address] [place] [address];  Service: Endoscopy;  Laterality: N/A;    KNEE SURGERY         Family History:   Family History   Problem Relation Age of Onset    Diabetes Mellitus Mother     Pacemaker/defibrilator Father     Lung cancer Father        Social History:  reports that he has quit smoking. His smoking use included cigarettes. He has never used smokeless tobacco. He reports that he does not drink alcohol and does not use drugs.    Allergies:  Review of patient's allergies indicates:  No Known Allergies    Medications:  Current Outpatient Medications   Medication Sig Dispense Refill    albuterol (ACCUNEB) 1.25 mg/3 mL Nebu Use 1 vial (1.25 mg total) by nebulization 3 (three) times daily as needed  (shortness of breath). Rescue 90 mL 2    allopurinoL (ZYLOPRIM) 300 MG tablet Take 1 tablet (300 mg total) by mouth once daily. 60 tablet 3    benzonatate (TESSALON) 100 MG capsule Take 1 capsule by mouth as needed.      cetirizine (ZYRTEC) 10 MG tablet Take 1 tablet by mouth as needed.      dapagliflozin (FARXIGA) 10 mg tablet Take 1 tablet by mouth once daily.      insulin aspart U-100 (NOVOLOG) 100 unit/mL (3 mL) InPn pen Inject 1 Units into the skin as needed. Inject into skin if over 200.      losartan (COZAAR) 100 MG tablet Take 1 tablet (100 mg total) by mouth once daily. 30 tablet 2    montelukast (SINGULAIR) 10 mg tablet Take 10 mg by mouth every evening.      morphine (MS CONTIN) 30 MG 12 hr tablet Take 1 tablet (30 mg total) by mouth every 12 (twelve) hours. 60 tablet 0    semaglutide (OZEMPIC) 0.25 mg or 0.5 mg(2 mg/1.5 mL) pen injector Inject 0.25 mg into the skin once a week.      senna-docusate 8.6-50 mg (PERICOLACE) 8.6-50 mg per tablet Take 1 tablet by mouth daily as needed for Constipation. 30 tablet 2    TRUE METRIX GLUCOSE TEST STRIP Strp       TRUEPLUS LANCETS 33 gauge Misc        No current facility-administered medications for this visit.       Review of Systems   Constitutional:  Negative for chills, fatigue and fever.   HENT:  Negative for congestion, sinus pressure and trouble swallowing.    Respiratory:  Negative for cough, chest tightness and shortness of breath.    Cardiovascular:  Negative for chest pain.   Gastrointestinal:  Negative for abdominal pain and blood in stool.   Endocrine: Negative for cold intolerance and heat intolerance.   Genitourinary:  Negative for hematuria.   Musculoskeletal:  Negative for arthralgias, back pain and myalgias.   Skin:  Negative for rash.   Neurological:  Negative for weakness.   Hematological:  Negative for adenopathy. Does not bruise/bleed easily.   Psychiatric/Behavioral:  Negative for dysphoric mood. The patient is not nervous/anxious.    ECOG  Performance Status:   ECOG SCORE             Objective:      Vitals:   There were no vitals filed for this visit.      telemedicine    Physical Exam  Constitutional:       General: He is not in acute distress.     Appearance: Normal appearance. He is not ill-appearing.   HENT:      Head: Normocephalic and atraumatic.      Nose: Nose normal.      Mouth/Throat:      Mouth: Mucous membranes are moist.      Pharynx: Oropharynx is clear. No oropharyngeal exudate or posterior oropharyngeal erythema.   Eyes:      General: No scleral icterus.     Extraocular Movements: Extraocular movements intact.      Conjunctiva/sclera: Conjunctivae normal.      Pupils: Pupils are equal, round, and reactive to light.   Pulmonary:      Effort: Pulmonary effort is normal. No respiratory distress.   Abdominal:      General: Abdomen is flat.      Palpations: Abdomen is soft.   Musculoskeletal:         General: No swelling. Normal range of motion.      Cervical back: Normal range of motion.      Right lower leg: No edema.      Left lower leg: No edema.   Skin:     General: Skin is warm and dry.      Coloration: Skin is not jaundiced.   Neurological:      General: No focal deficit present.      Mental Status: He is alert.   Psychiatric:         Mood and Affect: Mood normal.         Behavior: Behavior normal.         Thought Content: Thought content normal.         Judgment: Judgment normal.     Laboratory Data:  Recent Results (from the past 168 hour(s))   Magnesium    Collection Time: 04/04/23 10:35 AM   Result Value Ref Range    Magnesium 2.1 1.6 - 2.6 mg/dL   COMPREHENSIVE METABOLIC PANEL    Collection Time: 04/04/23 10:35 AM   Result Value Ref Range    Sodium 138 136 - 145 mmol/L    Potassium 4.9 3.5 - 5.1 mmol/L    Chloride 104 95 - 110 mmol/L    CO2 25 23 - 29 mmol/L    Glucose 153 (H) 70 - 110 mg/dL    BUN 24 (H) 6 - 20 mg/dL    Creatinine 1.2 0.5 - 1.4 mg/dL    Calcium 10.5 8.7 - 10.5 mg/dL    Total Protein 7.7 6.0 - 8.4 g/dL     Albumin 4.1 3.5 - 5.2 g/dL    Total Bilirubin 0.9 0.1 - 1.0 mg/dL    Alkaline Phosphatase 258 (H) 55 - 135 U/L    AST 40 10 - 40 U/L    ALT 56 (H) 10 - 44 U/L    Anion Gap 9 8 - 16 mmol/L    eGFR >60 >60 mL/min/1.73 m^2   CBC W/ AUTO DIFFERENTIAL    Collection Time: 04/04/23 10:35 AM   Result Value Ref Range    WBC 9.46 3.90 - 12.70 K/uL    RBC 4.80 4.60 - 6.20 M/uL    Hemoglobin 14.1 14.0 - 18.0 g/dL    Hematocrit 45.6 40.0 - 54.0 %    MCV 95 82 - 98 fL    MCH 29.4 27.0 - 31.0 pg    MCHC 30.9 (L) 32.0 - 36.0 g/dL    RDW 15.5 (H) 11.5 - 14.5 %    Platelets 348 150 - 450 K/uL    MPV 9.4 9.2 - 12.9 fL    Immature Granulocytes 0.7 (H) 0.0 - 0.5 %    Gran # (ANC) 7.3 1.8 - 7.7 K/uL    Immature Grans (Abs) 0.07 (H) 0.00 - 0.04 K/uL    Lymph # 0.9 (L) 1.0 - 4.8 K/uL    Mono # 0.7 0.3 - 1.0 K/uL    Eos # 0.4 0.0 - 0.5 K/uL    Baso # 0.11 0.00 - 0.20 K/uL    nRBC 0 0 /100 WBC    Gran % 77.6 (H) 38.0 - 73.0 %    Lymph % 9.2 (L) 18.0 - 48.0 %    Mono % 7.0 4.0 - 15.0 %    Eosinophil % 4.3 0.0 - 8.0 %    Basophil % 1.2 0.0 - 1.9 %    Differential Method Automated          Imaging:   MRI Brain W WO Contrast    Result Date: 10/11/2022  EXAMINATION: MRI BRAIN W WO CONTRAST CLINICAL HISTORY: Small cell lung cancer, brain re-staging; Malignant neoplasm of unspecified part of unspecified bronchus or lung TECHNIQUE: Sagittal and axial T1, axial T2, axial FLAIR, axial gradient, axial diffusion imaging of the whole brain pre-contrast with postcontrast axial T1 and axial spoiled gradient imaging reformatted in the coronal and sagittal planes.. Ten ml of Gadavist injected intravenously. COMPARISON: 07/12/2022 FINDINGS: Interval 1.2 cm enhancing lesion within the left anterior inferior frontal lobe which prominently the ring-enhancing measuring 1.2 x 0.9 x 0.9 cm in AP by TV by CC dimensions respectively in light of history concerning for parenchymal metastases the with question trace susceptibility associated with this lesion. Previously  identified heterogeneous enhancing sellar lesion is again seen allowing for routine brain technique with lesion measuring approximately 1.3 cm craniocaudal this may represent pituitary adenoma though indeterminate.  Previous intraluminal filling defect within the right jugular foramen is no longer seen.  There is however diffusion signal abnormality with ill-defined T1 hypointensity within the right occipital condyle concerning for possible osseous metastases the clinical correlation and further evaluation with nuclear medicine imaging advised. There is continued slight prominent leptomeningeal enhancement along the left cerebellum which raises concern for possible underlying vascular malformation such as a dural AV fistula with collateral vascular engorgement.  There is no restricted diffusion to suggest acute infarction.  Ventricles stable without hydrocephalus.  There is mild edema signal surrounding the left frontal enhancing lesion with continued few scattered punctate size regions of T2 FLAIR signal hyperintensity elsewhere supratentorial white matter which are nonspecific and may represent mild chronic ischemic change. This report was flagged in Epic as abnormal.     Interval development of a small ring-enhancing lesion left anterior inferior frontal lobe concerning for parenchymal metastases in light of history.  Clinical correlation and follow-up advised There is continue though relatively stable heterogeneous enhancement and enlargement of the material within the sella which may represent pituitary adenoma though indeterminate. Previous right internal jugular vein thrombus no longer seen. Abnormal signal along the right occipital condyle concerning for possible osseous metastases clinical correlation and further evaluation with nuclear medicine imaging advised Continued prominent vascularity left cerebellar folia raising concern for possible underlying vascular malformation unchanged.  Further evaluation  with noncontrast MRA head may be of further diagnostic value. Electronically signed by: Jonel Lawrence DO Date:    10/11/2022 Time:    10:05    CT Chest Abdomen Pelvis With Contrast (xpd)    Result Date: 10/18/2022  EXAMINATION: CT CHEST ABDOMEN PELVIS WITH CONTRAST (XPD) CLINICAL HISTORY: metastatic small cell lung cancer on maintenance immunotherapy, eval response; Malignant neoplasm of unspecified part of unspecified bronchus or lung TECHNIQUE: Low dose axial images, sagittal and coronal reformations were obtained from the thoracic inlet to the pubic symphysis following the IV administration of 100 mL of Omnipaque 350 COMPARISON: 07/26/2022 FINDINGS: Right IJ venous shunt tip superior aspect right atrium, absence of clot in included upper right IJ. Fatty infiltration of liver, additional diminished attenuation space-occupying lesions of liver, largest central right hepatic lobe 3.6 cm image 103 series 3.  Stable.  Spleen, adrenal glands, pancreas, gallbladder and biliary tree normal. Urinary bladder normal.  Prostate gland very small 4 cm versus postop prostatectomy.  No free fluid or retroperitoneal adenopathy.  GI track normal. Tiny nonobstructive right lower and left upper renal pole stones. New lymph node left retro manubrial 3.4 cm image 28 series 3 appearing in the interim.  Paratracheal conglomerate adenopathy 2.1 x 2.5 cm, was 1.6 x 3 cm.  Subcarinal adenopathy stable.  No new hilar or mediastinal adenopathy. Degenerative disc spondylosis cervicothoracic junction., focal osteoblastic opacities involve humeral heads both clavicle superimposed on erosive DJD more prominent on right.  Additional focal osteoblastic opacities sternum, ribs, dorsal lumbar sacral spine pelvis and both femoral head and trochanteric regions.  Moderate anterior spondylosis dorsal spine and degenerative disc spondylosis facet joint arthropathy lumbosacral junction with DJD SI joints.  Fracture defects left lower anterior chest wall  stable. Scattered calcified plaque great vessels aorta iliac femoral arteries. Dominant medial segment right middle lobe mass 1.1 x 2.7 cm image 69 series 3, was 1.6 x 3.4 cm.  Additional patchy nodular lesions right lower lobe posterior basilar segments, largest 1.4 cm image 66 series 3 suspicious for metastatic disease and/or superimposed inflammatory and infectious process.  Additional patchy infiltrates medial posterior segment right upper lobe and right lower lobe particularly medial basilar segment image 91 series 3.  No new pleural reaction.  Tracheobronchial tree normal. .     1. New presumed metastatic node anterior superior left mediastinum, paratracheal adenopathy otherwise stable. 2. Decreased size in right middle lobe mass with new evidence of metastatic disease right lower lobe versus superimposed inflammatory infectious process discussed above. 3. New developing metastatic lesions liver. 4. Bony universal metastatic disease stable. 5. Recist summary: 6. Compare with previous CT chest abdomen pelvis 07/26/2022 7. Lesion 1: Right middle lobe mass 1.1 x 2.7 cm was 1.6 x 13.4 cm. 8. Lesion 2: Right paratracheal conned Willett a adenopathy 2.1 x 2.5 cm, was 1.6 x 3 cm. 9. Lesion 3: Dome 1.9 cm stable.  (New progressive liver metastatic disease noted elsewhere) 10. Lesion 4: Caudal aspect right hepatic lobe 1 cm, stable 11.  This report was flagged in Epic as abnormal. Electronically signed by: Dewayne Hutchinson MD Date:    10/18/2022 Time:    09:28       Assessment and Plan          ECOG 0      Extensive stage small cell lung cancer  -Initially diagnosed with extensive stage small cell lung cancer in 03/2022 who was treated with carbo/etop/atezolizumab from 04/05/2022 - 06/14/22 with partial response. He subsequently completed consolidation thoracic RT 8/4/22 - 8/18/22, and he was on maintenance atezolizumab.   -10/18/22 showing progression of disease in lungs and liver.  Asymptomatic.  -Dr. Sosa discussed  with him plan for topotecan and pt was consented  -Pt tolerated cycle 1 well but delayed C2 due to being out of town  -Scans in December 2022 largely stable with exception of  Right middle lobe lung mass.  3.4 cm, still demonstrated widespread disease in bones  -Admitted for pain control and palliative XRT on 12/07/22  -Pt had been off of tx in Nov 2022 due to going on vacation, as symptoms improved drastically after restarting tx  Plan 04/05/23  -As Ct demonstrates mixed response post 6 cycle of topotecan, consents signed for lurbinectadin as performance status remains good, first tx today  -MRI brain demonstrated a few new mets.  Has XRT planned this month  -RTC in 3 weeks to start next tx cycle, give C1 lurbinectadin today  -Will precert for zometa, dental clearance note to be obtained, pt already on calcium supplements (has not had dental clearance yet)  -Follow up with palliative care      Cancer related pain/palliative care by specialist  -Pain well controlled and is not using pain meds since restarting tx and palliative XRT      DM2  -Controlled, following with PCP        Time spent with pt: 30 minutes      Problem List Items Addressed This Visit          Cardiac/Vascular    HTN (hypertension)       Oncology    Small cell lung cancer - Primary (Chronic)    Overview     Initially diagnosed with extensive stage small cell lung cancer in 03/2022 who was treated with carbo/etop/atezolizumab from 04/05/2022 - 06/14/22 with partial response. He subsequently completed consolidation thoracic RT 8/4/22 - 8/18/22, and he was on maintenance atezolizumab.          Secondary malignant neoplasm of liver    Secondary malignant neoplasm of brain    Lung cancer metastatic to bone       Endocrine    Type 2 diabetes mellitus, without long-term current use of insulin     Other Visit Diagnoses       Follow-up exam        Palliative care by specialist                    Soledad Irving MD  Hematology Oncology

## 2023-04-10 DIAGNOSIS — I10 HYPERTENSION, UNSPECIFIED TYPE: Primary | ICD-10-CM

## 2023-04-10 DIAGNOSIS — C34.90 SMALL CELL LUNG CANCER: ICD-10-CM

## 2023-04-10 RX ORDER — LOSARTAN POTASSIUM 100 MG/1
100 TABLET ORAL DAILY
Qty: 30 TABLET | Refills: 0 | Status: SHIPPED | OUTPATIENT
Start: 2023-04-10 | End: 2023-04-17 | Stop reason: SDUPTHER

## 2023-04-12 ENCOUNTER — INFUSION (OUTPATIENT)
Dept: INFUSION THERAPY | Facility: HOSPITAL | Age: 58
End: 2023-04-12
Attending: STUDENT IN AN ORGANIZED HEALTH CARE EDUCATION/TRAINING PROGRAM
Payer: MEDICAID

## 2023-04-12 VITALS
TEMPERATURE: 98 F | DIASTOLIC BLOOD PRESSURE: 84 MMHG | OXYGEN SATURATION: 96 % | HEART RATE: 105 BPM | RESPIRATION RATE: 18 BRPM | SYSTOLIC BLOOD PRESSURE: 137 MMHG

## 2023-04-12 DIAGNOSIS — E11.9 TYPE 2 DIABETES MELLITUS, WITHOUT LONG-TERM CURRENT USE OF INSULIN: Primary | ICD-10-CM

## 2023-04-12 PROCEDURE — 25000003 PHARM REV CODE 250: Performed by: STUDENT IN AN ORGANIZED HEALTH CARE EDUCATION/TRAINING PROGRAM

## 2023-04-12 PROCEDURE — A4216 STERILE WATER/SALINE, 10 ML: HCPCS | Performed by: STUDENT IN AN ORGANIZED HEALTH CARE EDUCATION/TRAINING PROGRAM

## 2023-04-12 PROCEDURE — 63600175 PHARM REV CODE 636 W HCPCS: Performed by: STUDENT IN AN ORGANIZED HEALTH CARE EDUCATION/TRAINING PROGRAM

## 2023-04-12 PROCEDURE — 96360 HYDRATION IV INFUSION INIT: CPT

## 2023-04-12 RX ORDER — HEPARIN 100 UNIT/ML
500 SYRINGE INTRAVENOUS
Status: DISCONTINUED | OUTPATIENT
Start: 2023-04-12 | End: 2023-04-12 | Stop reason: HOSPADM

## 2023-04-12 RX ORDER — SODIUM CHLORIDE 0.9 % (FLUSH) 0.9 %
10 SYRINGE (ML) INJECTION
Status: DISCONTINUED | OUTPATIENT
Start: 2023-04-12 | End: 2023-04-12 | Stop reason: HOSPADM

## 2023-04-12 RX ADMIN — HEPARIN 500 UNITS: 100 SYRINGE at 01:04

## 2023-04-12 RX ADMIN — Medication 10 ML: at 01:04

## 2023-04-12 RX ADMIN — SODIUM CHLORIDE 1000 ML: 9 INJECTION, SOLUTION INTRAVENOUS at 12:04

## 2023-04-12 NOTE — PLAN OF CARE
Patient arrived to unit for 1L of IVFs .Patient reports upset stomach following dose increase in his Ozempic medication. Prescribing provider is aware and dose will be reduced. IVFs tolerated well. Discharged from unit in no signs of acute distress.

## 2023-04-13 PROCEDURE — 77014 PR  CT GUIDANCE PLACEMENT RAD THERAPY FIELDS: CPT | Mod: 26,,, | Performed by: RADIOLOGY

## 2023-04-13 PROCEDURE — 77014 HC CT GUIDANCE RADIATION THERAPY FLDS PLACEMENT: CPT | Mod: TC,59 | Performed by: RADIOLOGY

## 2023-04-13 PROCEDURE — 77372 SRS LINEAR BASED: CPT | Performed by: RADIOLOGY

## 2023-04-13 PROCEDURE — 77014 PR  CT GUIDANCE PLACEMENT RAD THERAPY FIELDS: ICD-10-PCS | Mod: 26,,, | Performed by: RADIOLOGY

## 2023-04-17 DIAGNOSIS — I10 HYPERTENSION, UNSPECIFIED TYPE: ICD-10-CM

## 2023-04-17 DIAGNOSIS — C34.90 SMALL CELL LUNG CANCER: ICD-10-CM

## 2023-04-17 RX ORDER — LOSARTAN POTASSIUM 100 MG/1
100 TABLET ORAL DAILY
Qty: 30 TABLET | Refills: 0 | Status: SHIPPED | OUTPATIENT
Start: 2023-04-17 | End: 2023-05-15 | Stop reason: SDUPTHER

## 2023-04-19 ENCOUNTER — INFUSION (OUTPATIENT)
Dept: INFUSION THERAPY | Facility: HOSPITAL | Age: 58
End: 2023-04-19
Attending: STUDENT IN AN ORGANIZED HEALTH CARE EDUCATION/TRAINING PROGRAM
Payer: MEDICAID

## 2023-04-19 VITALS
OXYGEN SATURATION: 97 % | TEMPERATURE: 98 F | SYSTOLIC BLOOD PRESSURE: 124 MMHG | HEART RATE: 110 BPM | DIASTOLIC BLOOD PRESSURE: 70 MMHG

## 2023-04-19 DIAGNOSIS — E11.9 TYPE 2 DIABETES MELLITUS, WITHOUT LONG-TERM CURRENT USE OF INSULIN: ICD-10-CM

## 2023-04-19 DIAGNOSIS — C79.31 SECONDARY MALIGNANT NEOPLASM OF BRAIN: Primary | ICD-10-CM

## 2023-04-19 PROCEDURE — 25000003 PHARM REV CODE 250: Performed by: STUDENT IN AN ORGANIZED HEALTH CARE EDUCATION/TRAINING PROGRAM

## 2023-04-19 PROCEDURE — 96372 THER/PROPH/DIAG INJ SC/IM: CPT | Mod: 59

## 2023-04-19 PROCEDURE — 96361 HYDRATE IV INFUSION ADD-ON: CPT

## 2023-04-19 PROCEDURE — 96360 HYDRATION IV INFUSION INIT: CPT

## 2023-04-19 PROCEDURE — 63600175 PHARM REV CODE 636 W HCPCS: Performed by: STUDENT IN AN ORGANIZED HEALTH CARE EDUCATION/TRAINING PROGRAM

## 2023-04-19 RX ORDER — HEPARIN 100 UNIT/ML
500 SYRINGE INTRAVENOUS
Status: DISCONTINUED | OUTPATIENT
Start: 2023-04-19 | End: 2023-04-19 | Stop reason: HOSPADM

## 2023-04-19 RX ORDER — SODIUM CHLORIDE 0.9 % (FLUSH) 0.9 %
10 SYRINGE (ML) INJECTION
Status: DISCONTINUED | OUTPATIENT
Start: 2023-04-19 | End: 2023-04-19 | Stop reason: HOSPADM

## 2023-04-19 RX ORDER — CYANOCOBALAMIN 1000 UG/ML
1000 INJECTION, SOLUTION INTRAMUSCULAR; SUBCUTANEOUS
Status: COMPLETED | OUTPATIENT
Start: 2023-04-19 | End: 2023-04-19

## 2023-04-19 RX ORDER — CYANOCOBALAMIN 1000 UG/ML
1000 INJECTION, SOLUTION INTRAMUSCULAR; SUBCUTANEOUS
Status: CANCELLED | OUTPATIENT
Start: 2023-04-19

## 2023-04-19 RX ADMIN — CYANOCOBALAMIN 1000 MCG: 1000 INJECTION, SOLUTION INTRAMUSCULAR at 12:04

## 2023-04-19 RX ADMIN — HEPARIN 500 UNITS: 100 SYRINGE at 01:04

## 2023-04-19 RX ADMIN — SODIUM CHLORIDE 1000 ML: 9 INJECTION, SOLUTION INTRAVENOUS at 12:04

## 2023-04-25 ENCOUNTER — LAB VISIT (OUTPATIENT)
Dept: LAB | Facility: HOSPITAL | Age: 58
End: 2023-04-25
Attending: STUDENT IN AN ORGANIZED HEALTH CARE EDUCATION/TRAINING PROGRAM
Payer: MEDICAID

## 2023-04-25 DIAGNOSIS — C34.90 SMALL CELL LUNG CANCER: ICD-10-CM

## 2023-04-25 LAB
ALBUMIN SERPL BCP-MCNC: 3.9 G/DL (ref 3.5–5.2)
ALP SERPL-CCNC: 329 U/L (ref 55–135)
ALT SERPL W/O P-5'-P-CCNC: 78 U/L (ref 10–44)
ANION GAP SERPL CALC-SCNC: 10 MMOL/L (ref 8–16)
AST SERPL-CCNC: 52 U/L (ref 10–40)
BASOPHILS # BLD AUTO: 0.04 K/UL (ref 0–0.2)
BASOPHILS NFR BLD: 0.9 % (ref 0–1.9)
BILIRUB SERPL-MCNC: 0.7 MG/DL (ref 0.1–1)
BUN SERPL-MCNC: 14 MG/DL (ref 6–20)
CALCIUM SERPL-MCNC: 10 MG/DL (ref 8.7–10.5)
CHLORIDE SERPL-SCNC: 104 MMOL/L (ref 95–110)
CO2 SERPL-SCNC: 26 MMOL/L (ref 23–29)
CREAT SERPL-MCNC: 1 MG/DL (ref 0.5–1.4)
DIFFERENTIAL METHOD: ABNORMAL
EOSINOPHIL # BLD AUTO: 0.3 K/UL (ref 0–0.5)
EOSINOPHIL NFR BLD: 5.8 % (ref 0–8)
ERYTHROCYTE [DISTWIDTH] IN BLOOD BY AUTOMATED COUNT: 14.4 % (ref 11.5–14.5)
EST. GFR  (NO RACE VARIABLE): >60 ML/MIN/1.73 M^2
GLUCOSE SERPL-MCNC: 107 MG/DL (ref 70–110)
HCT VFR BLD AUTO: 42.4 % (ref 40–54)
HGB BLD-MCNC: 13.6 G/DL (ref 14–18)
IMM GRANULOCYTES # BLD AUTO: 0.02 K/UL (ref 0–0.04)
IMM GRANULOCYTES NFR BLD AUTO: 0.4 % (ref 0–0.5)
LYMPHOCYTES # BLD AUTO: 0.7 K/UL (ref 1–4.8)
LYMPHOCYTES NFR BLD: 13.9 % (ref 18–48)
MAGNESIUM SERPL-MCNC: 2 MG/DL (ref 1.6–2.6)
MCH RBC QN AUTO: 29.4 PG (ref 27–31)
MCHC RBC AUTO-ENTMCNC: 32.1 G/DL (ref 32–36)
MCV RBC AUTO: 92 FL (ref 82–98)
MONOCYTES # BLD AUTO: 0.5 K/UL (ref 0.3–1)
MONOCYTES NFR BLD: 11.1 % (ref 4–15)
NEUTROPHILS # BLD AUTO: 3.2 K/UL (ref 1.8–7.7)
NEUTROPHILS NFR BLD: 67.9 % (ref 38–73)
NRBC BLD-RTO: 0 /100 WBC
PLATELET # BLD AUTO: 310 K/UL (ref 150–450)
PMV BLD AUTO: 9.1 FL (ref 9.2–12.9)
POTASSIUM SERPL-SCNC: 4.5 MMOL/L (ref 3.5–5.1)
PROT SERPL-MCNC: 7.2 G/DL (ref 6–8.4)
RBC # BLD AUTO: 4.63 M/UL (ref 4.6–6.2)
SODIUM SERPL-SCNC: 140 MMOL/L (ref 136–145)
WBC # BLD AUTO: 4.67 K/UL (ref 3.9–12.7)

## 2023-04-25 PROCEDURE — 85025 COMPLETE CBC W/AUTO DIFF WBC: CPT | Performed by: STUDENT IN AN ORGANIZED HEALTH CARE EDUCATION/TRAINING PROGRAM

## 2023-04-25 PROCEDURE — 36415 COLL VENOUS BLD VENIPUNCTURE: CPT | Performed by: STUDENT IN AN ORGANIZED HEALTH CARE EDUCATION/TRAINING PROGRAM

## 2023-04-25 PROCEDURE — 83735 ASSAY OF MAGNESIUM: CPT | Performed by: STUDENT IN AN ORGANIZED HEALTH CARE EDUCATION/TRAINING PROGRAM

## 2023-04-25 PROCEDURE — 80053 COMPREHEN METABOLIC PANEL: CPT | Performed by: STUDENT IN AN ORGANIZED HEALTH CARE EDUCATION/TRAINING PROGRAM

## 2023-04-26 ENCOUNTER — OFFICE VISIT (OUTPATIENT)
Dept: HEMATOLOGY/ONCOLOGY | Facility: CLINIC | Age: 58
End: 2023-04-26
Payer: MEDICAID

## 2023-04-26 ENCOUNTER — INFUSION (OUTPATIENT)
Dept: INFUSION THERAPY | Facility: HOSPITAL | Age: 58
End: 2023-04-26
Attending: STUDENT IN AN ORGANIZED HEALTH CARE EDUCATION/TRAINING PROGRAM
Payer: MEDICAID

## 2023-04-26 VITALS
HEART RATE: 81 BPM | OXYGEN SATURATION: 95 % | SYSTOLIC BLOOD PRESSURE: 164 MMHG | TEMPERATURE: 98 F | RESPIRATION RATE: 18 BRPM | DIASTOLIC BLOOD PRESSURE: 89 MMHG

## 2023-04-26 VITALS
SYSTOLIC BLOOD PRESSURE: 149 MMHG | OXYGEN SATURATION: 98 % | WEIGHT: 239.88 LBS | BODY MASS INDEX: 37.65 KG/M2 | HEART RATE: 93 BPM | DIASTOLIC BLOOD PRESSURE: 93 MMHG | HEIGHT: 67 IN

## 2023-04-26 DIAGNOSIS — C34.90 LUNG CANCER METASTATIC TO BONE: ICD-10-CM

## 2023-04-26 DIAGNOSIS — C79.31 SECONDARY MALIGNANT NEOPLASM OF BRAIN: ICD-10-CM

## 2023-04-26 DIAGNOSIS — E11.69 TYPE 2 DIABETES MELLITUS WITH OTHER SPECIFIED COMPLICATION, WITHOUT LONG-TERM CURRENT USE OF INSULIN: ICD-10-CM

## 2023-04-26 DIAGNOSIS — C79.51 LUNG CANCER METASTATIC TO BONE: ICD-10-CM

## 2023-04-26 DIAGNOSIS — C34.90 SMALL CELL LUNG CANCER: Primary | ICD-10-CM

## 2023-04-26 DIAGNOSIS — C78.7 SECONDARY MALIGNANT NEOPLASM OF LIVER: ICD-10-CM

## 2023-04-26 DIAGNOSIS — D84.821 IMMUNODEFICIENCY SECONDARY TO CHEMOTHERAPY: ICD-10-CM

## 2023-04-26 DIAGNOSIS — I10 HYPERTENSION, UNSPECIFIED TYPE: ICD-10-CM

## 2023-04-26 DIAGNOSIS — Z79.899 IMMUNODEFICIENCY SECONDARY TO CHEMOTHERAPY: ICD-10-CM

## 2023-04-26 DIAGNOSIS — Z51.5 PALLIATIVE CARE BY SPECIALIST: ICD-10-CM

## 2023-04-26 DIAGNOSIS — Z09 FOLLOW-UP EXAM: ICD-10-CM

## 2023-04-26 DIAGNOSIS — M10.9 GOUT, UNSPECIFIED CAUSE, UNSPECIFIED CHRONICITY, UNSPECIFIED SITE: ICD-10-CM

## 2023-04-26 DIAGNOSIS — T45.1X5A IMMUNODEFICIENCY SECONDARY TO CHEMOTHERAPY: ICD-10-CM

## 2023-04-26 DIAGNOSIS — Z76.0 MEDICATION REFILL: ICD-10-CM

## 2023-04-26 PROCEDURE — 99212 OFFICE O/P EST SF 10 MIN: CPT | Mod: PBBFAC,25 | Performed by: STUDENT IN AN ORGANIZED HEALTH CARE EDUCATION/TRAINING PROGRAM

## 2023-04-26 PROCEDURE — 99215 PR OFFICE/OUTPT VISIT, EST, LEVL V, 40-54 MIN: ICD-10-PCS | Mod: S$PBB,,, | Performed by: STUDENT IN AN ORGANIZED HEALTH CARE EDUCATION/TRAINING PROGRAM

## 2023-04-26 PROCEDURE — 99999 PR PBB SHADOW E&M-EST. PATIENT-LVL II: ICD-10-PCS | Mod: PBBFAC,,, | Performed by: STUDENT IN AN ORGANIZED HEALTH CARE EDUCATION/TRAINING PROGRAM

## 2023-04-26 PROCEDURE — 3080F PR MOST RECENT DIASTOLIC BLOOD PRESSURE >= 90 MM HG: ICD-10-PCS | Mod: CPTII,,, | Performed by: STUDENT IN AN ORGANIZED HEALTH CARE EDUCATION/TRAINING PROGRAM

## 2023-04-26 PROCEDURE — 3008F BODY MASS INDEX DOCD: CPT | Mod: CPTII,,, | Performed by: STUDENT IN AN ORGANIZED HEALTH CARE EDUCATION/TRAINING PROGRAM

## 2023-04-26 PROCEDURE — 3077F SYST BP >= 140 MM HG: CPT | Mod: CPTII,,, | Performed by: STUDENT IN AN ORGANIZED HEALTH CARE EDUCATION/TRAINING PROGRAM

## 2023-04-26 PROCEDURE — 4010F ACE/ARB THERAPY RXD/TAKEN: CPT | Mod: CPTII,,, | Performed by: STUDENT IN AN ORGANIZED HEALTH CARE EDUCATION/TRAINING PROGRAM

## 2023-04-26 PROCEDURE — 99999 PR PBB SHADOW E&M-EST. PATIENT-LVL II: CPT | Mod: PBBFAC,,, | Performed by: STUDENT IN AN ORGANIZED HEALTH CARE EDUCATION/TRAINING PROGRAM

## 2023-04-26 PROCEDURE — 3080F DIAST BP >= 90 MM HG: CPT | Mod: CPTII,,, | Performed by: STUDENT IN AN ORGANIZED HEALTH CARE EDUCATION/TRAINING PROGRAM

## 2023-04-26 PROCEDURE — 3077F PR MOST RECENT SYSTOLIC BLOOD PRESSURE >= 140 MM HG: ICD-10-PCS | Mod: CPTII,,, | Performed by: STUDENT IN AN ORGANIZED HEALTH CARE EDUCATION/TRAINING PROGRAM

## 2023-04-26 PROCEDURE — 99215 OFFICE O/P EST HI 40 MIN: CPT | Mod: S$PBB,,, | Performed by: STUDENT IN AN ORGANIZED HEALTH CARE EDUCATION/TRAINING PROGRAM

## 2023-04-26 PROCEDURE — 63600175 PHARM REV CODE 636 W HCPCS: Performed by: STUDENT IN AN ORGANIZED HEALTH CARE EDUCATION/TRAINING PROGRAM

## 2023-04-26 PROCEDURE — 25000003 PHARM REV CODE 250: Performed by: STUDENT IN AN ORGANIZED HEALTH CARE EDUCATION/TRAINING PROGRAM

## 2023-04-26 PROCEDURE — A4216 STERILE WATER/SALINE, 10 ML: HCPCS | Performed by: STUDENT IN AN ORGANIZED HEALTH CARE EDUCATION/TRAINING PROGRAM

## 2023-04-26 PROCEDURE — 4010F PR ACE/ARB THEARPY RXD/TAKEN: ICD-10-PCS | Mod: CPTII,,, | Performed by: STUDENT IN AN ORGANIZED HEALTH CARE EDUCATION/TRAINING PROGRAM

## 2023-04-26 PROCEDURE — 3008F PR BODY MASS INDEX (BMI) DOCUMENTED: ICD-10-PCS | Mod: CPTII,,, | Performed by: STUDENT IN AN ORGANIZED HEALTH CARE EDUCATION/TRAINING PROGRAM

## 2023-04-26 PROCEDURE — 96413 CHEMO IV INFUSION 1 HR: CPT

## 2023-04-26 PROCEDURE — 96367 TX/PROPH/DG ADDL SEQ IV INF: CPT

## 2023-04-26 RX ORDER — HEPARIN 100 UNIT/ML
500 SYRINGE INTRAVENOUS
Status: DISCONTINUED | OUTPATIENT
Start: 2023-04-26 | End: 2023-04-26 | Stop reason: HOSPADM

## 2023-04-26 RX ORDER — ALLOPURINOL 300 MG/1
300 TABLET ORAL DAILY
Qty: 60 TABLET | Refills: 3 | Status: SHIPPED | OUTPATIENT
Start: 2023-04-26

## 2023-04-26 RX ORDER — SODIUM CHLORIDE 0.9 % (FLUSH) 0.9 %
10 SYRINGE (ML) INJECTION
Status: CANCELLED | OUTPATIENT
Start: 2023-04-26

## 2023-04-26 RX ORDER — SODIUM CHLORIDE 0.9 % (FLUSH) 0.9 %
10 SYRINGE (ML) INJECTION
Status: DISCONTINUED | OUTPATIENT
Start: 2023-04-26 | End: 2023-04-26 | Stop reason: HOSPADM

## 2023-04-26 RX ORDER — HEPARIN 100 UNIT/ML
500 SYRINGE INTRAVENOUS
Status: CANCELLED | OUTPATIENT
Start: 2023-04-26

## 2023-04-26 RX ADMIN — HEPARIN 500 UNITS: 100 SYRINGE at 11:04

## 2023-04-26 RX ADMIN — DEXAMETHASONE SODIUM PHOSPHATE 0.25 MG: 10 INJECTION, SOLUTION INTRAMUSCULAR; INTRAVENOUS at 09:04

## 2023-04-26 RX ADMIN — Medication 10 ML: at 11:04

## 2023-04-26 RX ADMIN — LURBINECTEDIN 7.5 MG: 0.5 INJECTION, POWDER, LYOPHILIZED, FOR SOLUTION INTRAVENOUS at 10:04

## 2023-04-26 NOTE — PROGRESS NOTES
[facility]  Hematology/Medical Oncology Clinic       PATIENT: Juan Gillespie  MRN: [id number]  DATE: 4/26/2023    Reason for referral: Extensive stage small cell lung ca    Initial History: Juan Gillespie is a 58 year old man with extensive stage SCLC who presents to clinic for evaluation prior to treatment  of topotecan.          Oncological History:  -Started Carbo/Etop/Atez 4/5/22  -Maintenance atezolizumab 7/5/22  -Consolidative thoracic radiation 8/4/22-8/18/22  -topotecan started 10/24/23-3/10/22(missed Nov tx)-mixed resposne  -Lurbinectadin started 4/05/23 to presents    Interval History:     Pt presents for MD visit and second lurbinectadin chemo.  Pt tolerating tx well and denied any complaints except for mild fatigue.  Requests refills of allopurinol.          His wife accompanies him at this visit.  Past Medical History:   Past Medical History:   Diagnosis Date    Cancer     Small Cell -Stage 4 Lung Cancer    Diabetes mellitus, type 2     Hypertension        Past Surgical HIstory:   Past Surgical History:   Procedure Laterality Date    ENDOBRONCHIAL ULTRASOUND N/A 3/22/2022    Procedure: ENDOBRONCHIAL ULTRASOUND (EBUS);  Surgeon: Kristin Samuels MD;  Location: [facility] OR [address] [place] [address];  Service: Endoscopy;  Laterality: N/A;    KNEE SURGERY         Family History:   Family History   Problem Relation Age of Onset    Diabetes Mellitus Mother     Pacemaker/defibrilator Father     Lung cancer Father        Social History:  reports that he has quit smoking. His smoking use included cigarettes. He has never used smokeless tobacco. He reports that he does not drink alcohol and does not use drugs.    Allergies:  Review of patient's allergies indicates:  No Known Allergies    Medications:  Current Outpatient Medications   Medication Sig Dispense Refill    albuterol (ACCUNEB) 1.25 mg/3 mL Nebu Use 1 vial (1.25 mg total) by nebulization 3 (three) times daily as needed (shortness of breath). Rescue 90 mL 2     allopurinoL (ZYLOPRIM) 300 MG tablet Take 1 tablet (300 mg total) by mouth once daily. 60 tablet 3    benzonatate (TESSALON) 100 MG capsule Take 1 capsule by mouth as needed.      cetirizine (ZYRTEC) 10 MG tablet Take 1 tablet by mouth as needed.      dapagliflozin (FARXIGA) 10 mg tablet Take 1 tablet by mouth once daily.      insulin aspart U-100 (NOVOLOG) 100 unit/mL (3 mL) InPn pen Inject 1 Units into the skin as needed. Inject into skin if over 200.      losartan (COZAAR) 100 MG tablet Take 1 tablet (100 mg total) by mouth once daily. 30 tablet 0    montelukast (SINGULAIR) 10 mg tablet Take 10 mg by mouth every evening.      morphine (MS CONTIN) 30 MG 12 hr tablet Take 1 tablet (30 mg total) by mouth every 12 (twelve) hours. 60 tablet 0    semaglutide (OZEMPIC) 0.25 mg or 0.5 mg(2 mg/1.5 mL) pen injector Inject 0.25 mg into the skin once a week.      senna-docusate 8.6-50 mg (PERICOLACE) 8.6-50 mg per tablet Take 1 tablet by mouth daily as needed for Constipation. 30 tablet 2    TRUE METRIX GLUCOSE TEST STRIP Strp       TRUEPLUS LANCETS 33 gauge Misc        No current facility-administered medications for this visit.       Review of Systems   Constitutional:  Negative for chills, fatigue and fever.   HENT:  Negative for congestion, sinus pressure and trouble swallowing.    Respiratory:  Negative for cough, chest tightness and shortness of breath.    Cardiovascular:  Negative for chest pain.   Gastrointestinal:  Negative for abdominal pain and blood in stool.   Endocrine: Negative for cold intolerance and heat intolerance.   Genitourinary:  Negative for hematuria.   Musculoskeletal:  Negative for arthralgias, back pain and myalgias.   Skin:  Negative for rash.   Neurological:  Negative for weakness.   Hematological:  Negative for adenopathy. Does not bruise/bleed easily.   Psychiatric/Behavioral:  Negative for dysphoric mood. The patient is not nervous/anxious.    ECOG Performance Status:   ECOG SCORE              Objective:      Vitals:   There were no vitals filed for this visit.      telemedicine    Physical Exam  Constitutional:       General: He is not in acute distress.     Appearance: Normal appearance. He is not ill-appearing.   HENT:      Head: Normocephalic and atraumatic.      Nose: Nose normal.      Mouth/Throat:      Mouth: Mucous membranes are moist.      Pharynx: Oropharynx is clear. No oropharyngeal exudate or posterior oropharyngeal erythema.   Eyes:      General: No scleral icterus.     Extraocular Movements: Extraocular movements intact.      Conjunctiva/sclera: Conjunctivae normal.      Pupils: Pupils are equal, round, and reactive to light.   Pulmonary:      Effort: Pulmonary effort is normal. No respiratory distress.   Abdominal:      General: Abdomen is flat.      Palpations: Abdomen is soft.   Musculoskeletal:         General: No swelling. Normal range of motion.      Cervical back: Normal range of motion.      Right lower leg: No edema.      Left lower leg: No edema.   Skin:     General: Skin is warm and dry.      Coloration: Skin is not jaundiced.   Neurological:      General: No focal deficit present.      Mental Status: He is alert.   Psychiatric:         Mood and Affect: Mood normal.         Behavior: Behavior normal.         Thought Content: Thought content normal.         Judgment: Judgment normal.     Laboratory Data:  Recent Results (from the past 168 hour(s))   COMPREHENSIVE METABOLIC PANEL    Collection Time: 04/25/23  9:41 AM   Result Value Ref Range    Sodium 140 136 - 145 mmol/L    Potassium 4.5 3.5 - 5.1 mmol/L    Chloride 104 95 - 110 mmol/L    CO2 26 23 - 29 mmol/L    Glucose 107 70 - 110 mg/dL    BUN 14 6 - 20 mg/dL    Creatinine 1.0 0.5 - 1.4 mg/dL    Calcium 10.0 8.7 - 10.5 mg/dL    Total Protein 7.2 6.0 - 8.4 g/dL    Albumin 3.9 3.5 - 5.2 g/dL    Total Bilirubin 0.7 0.1 - 1.0 mg/dL    Alkaline Phosphatase 329 (H) 55 - 135 U/L    AST 52 (H) 10 - 40 U/L    ALT 78 (H) 10 - 44  U/L    Anion Gap 10 8 - 16 mmol/L    eGFR >60 >60 mL/min/1.73 m^2   CBC W/ AUTO DIFFERENTIAL    Collection Time: 04/25/23  9:41 AM   Result Value Ref Range    WBC 4.67 3.90 - 12.70 K/uL    RBC 4.63 4.60 - 6.20 M/uL    Hemoglobin 13.6 (L) 14.0 - 18.0 g/dL    Hematocrit 42.4 40.0 - 54.0 %    MCV 92 82 - 98 fL    MCH 29.4 27.0 - 31.0 pg    MCHC 32.1 32.0 - 36.0 g/dL    RDW 14.4 11.5 - 14.5 %    Platelets 310 150 - 450 K/uL    MPV 9.1 (L) 9.2 - 12.9 fL    Immature Granulocytes 0.4 0.0 - 0.5 %    Gran # (ANC) 3.2 1.8 - 7.7 K/uL    Immature Grans (Abs) 0.02 0.00 - 0.04 K/uL    Lymph # 0.7 (L) 1.0 - 4.8 K/uL    Mono # 0.5 0.3 - 1.0 K/uL    Eos # 0.3 0.0 - 0.5 K/uL    Baso # 0.04 0.00 - 0.20 K/uL    nRBC 0 0 /100 WBC    Gran % 67.9 38.0 - 73.0 %    Lymph % 13.9 (L) 18.0 - 48.0 %    Mono % 11.1 4.0 - 15.0 %    Eosinophil % 5.8 0.0 - 8.0 %    Basophil % 0.9 0.0 - 1.9 %    Differential Method Automated    Magnesium    Collection Time: 04/25/23  9:41 AM   Result Value Ref Range    Magnesium 2.0 1.6 - 2.6 mg/dL         Imaging:   MRI Brain W WO Contrast    Result Date: 10/11/2022  EXAMINATION: MRI BRAIN W WO CONTRAST CLINICAL HISTORY: Small cell lung cancer, brain re-staging; Malignant neoplasm of unspecified part of unspecified bronchus or lung TECHNIQUE: Sagittal and axial T1, axial T2, axial FLAIR, axial gradient, axial diffusion imaging of the whole brain pre-contrast with postcontrast axial T1 and axial spoiled gradient imaging reformatted in the coronal and sagittal planes.. Ten ml of Gadavist injected intravenously. COMPARISON: 07/12/2022 FINDINGS: Interval 1.2 cm enhancing lesion within the left anterior inferior frontal lobe which prominently the ring-enhancing measuring 1.2 x 0.9 x 0.9 cm in AP by TV by CC dimensions respectively in light of history concerning for parenchymal metastases the with question trace susceptibility associated with this lesion. Previously identified heterogeneous enhancing sellar lesion is  again seen allowing for routine brain technique with lesion measuring approximately 1.3 cm craniocaudal this may represent pituitary adenoma though indeterminate.  Previous intraluminal filling defect within the right jugular foramen is no longer seen.  There is however diffusion signal abnormality with ill-defined T1 hypointensity within the right occipital condyle concerning for possible osseous metastases the clinical correlation and further evaluation with nuclear medicine imaging advised. There is continued slight prominent leptomeningeal enhancement along the left cerebellum which raises concern for possible underlying vascular malformation such as a dural AV fistula with collateral vascular engorgement.  There is no restricted diffusion to suggest acute infarction.  Ventricles stable without hydrocephalus.  There is mild edema signal surrounding the left frontal enhancing lesion with continued few scattered punctate size regions of T2 FLAIR signal hyperintensity elsewhere supratentorial white matter which are nonspecific and may represent mild chronic ischemic change. This report was flagged in Epic as abnormal.     Interval development of a small ring-enhancing lesion left anterior inferior frontal lobe concerning for parenchymal metastases in light of history.  Clinical correlation and follow-up advised There is continue though relatively stable heterogeneous enhancement and enlargement of the material within the sella which may represent pituitary adenoma though indeterminate. Previous right internal jugular vein thrombus no longer seen. Abnormal signal along the right occipital condyle concerning for possible osseous metastases clinical correlation and further evaluation with nuclear medicine imaging advised Continued prominent vascularity left cerebellar folia raising concern for possible underlying vascular malformation unchanged.  Further evaluation with noncontrast MRA head may be of further  diagnostic value. Electronically signed by: Jonel Lawrence DO Date:    10/11/2022 Time:    10:05    CT Chest Abdomen Pelvis With Contrast (xpd)    Result Date: 10/18/2022  EXAMINATION: CT CHEST ABDOMEN PELVIS WITH CONTRAST (XPD) CLINICAL HISTORY: metastatic small cell lung cancer on maintenance immunotherapy, eval response; Malignant neoplasm of unspecified part of unspecified bronchus or lung TECHNIQUE: Low dose axial images, sagittal and coronal reformations were obtained from the thoracic inlet to the pubic symphysis following the IV administration of 100 mL of Omnipaque 350 COMPARISON: 07/26/2022 FINDINGS: Right IJ venous shunt tip superior aspect right atrium, absence of clot in included upper right IJ. Fatty infiltration of liver, additional diminished attenuation space-occupying lesions of liver, largest central right hepatic lobe 3.6 cm image 103 series 3.  Stable.  Spleen, adrenal glands, pancreas, gallbladder and biliary tree normal. Urinary bladder normal.  Prostate gland very small 4 cm versus postop prostatectomy.  No free fluid or retroperitoneal adenopathy.  GI track normal. Tiny nonobstructive right lower and left upper renal pole stones. New lymph node left retro manubrial 3.4 cm image 28 series 3 appearing in the interim.  Paratracheal conglomerate adenopathy 2.1 x 2.5 cm, was 1.6 x 3 cm.  Subcarinal adenopathy stable.  No new hilar or mediastinal adenopathy. Degenerative disc spondylosis cervicothoracic junction., focal osteoblastic opacities involve humeral heads both clavicle superimposed on erosive DJD more prominent on right.  Additional focal osteoblastic opacities sternum, ribs, dorsal lumbar sacral spine pelvis and both femoral head and trochanteric regions.  Moderate anterior spondylosis dorsal spine and degenerative disc spondylosis facet joint arthropathy lumbosacral junction with DJD SI joints.  Fracture defects left lower anterior chest wall stable. Scattered calcified plaque great  vessels aorta iliac femoral arteries. Dominant medial segment right middle lobe mass 1.1 x 2.7 cm image 69 series 3, was 1.6 x 3.4 cm.  Additional patchy nodular lesions right lower lobe posterior basilar segments, largest 1.4 cm image 66 series 3 suspicious for metastatic disease and/or superimposed inflammatory and infectious process.  Additional patchy infiltrates medial posterior segment right upper lobe and right lower lobe particularly medial basilar segment image 91 series 3.  No new pleural reaction.  Tracheobronchial tree normal. .     1. New presumed metastatic node anterior superior left mediastinum, paratracheal adenopathy otherwise stable. 2. Decreased size in right middle lobe mass with new evidence of metastatic disease right lower lobe versus superimposed inflammatory infectious process discussed above. 3. New developing metastatic lesions liver. 4. Bony universal metastatic disease stable. 5. Recist summary: 6. Compare with previous CT chest abdomen pelvis 07/26/2022 7. Lesion 1: Right middle lobe mass 1.1 x 2.7 cm was 1.6 x 13.4 cm. 8. Lesion 2: Right paratracheal conned Willett a adenopathy 2.1 x 2.5 cm, was 1.6 x 3 cm. 9. Lesion 3: Dome 1.9 cm stable.  (New progressive liver metastatic disease noted elsewhere) 10. Lesion 4: Caudal aspect right hepatic lobe 1 cm, stable 11.  This report was flagged in Epic as abnormal. Electronically signed by: Dewayne Hutchinson MD Date:    10/18/2022 Time:    09:28       Assessment and Plan          ECOG 0      Extensive stage small cell lung cancer with brain mets  -Initially diagnosed with extensive stage small cell lung cancer in 03/2022 who was treated with carbo/etop/atezolizumab from 04/05/2022 - 06/14/22 with partial response. He subsequently completed consolidation thoracic RT 8/4/22 - 8/18/22, and he was on maintenance atezolizumab.   -10/18/22 showing progression of disease in lungs and liver.  Asymptomatic.  -Dr. Sosa discussed with him plan for  topotecan and pt was consented  -Pt tolerated cycle 1 well but delayed C2 due to being out of town  -Scans in December 2022 largely stable with exception of  Right middle lobe lung mass.  3.4 cm, still demonstrated widespread disease in bones  -Admitted for pain control and palliative XRT on 12/07/22  -Pt had been off of tx in Nov 2022 due to going on vacation, as symptoms improved drastically after restarting tx  Plan 04/26/23  -RTC in 3 weeks to start next tx cycle, give C2 lurbinectadin today  -Will precert for zometa, dental clearance note to be obtained, pt already on calcium supplements (has not had dental clearance yet)  -Follow up with palliative care  -Allopurinol refilled      Cancer related pain/palliative care by specialist  -Pain well controlled and is not using pain meds since restarting tx and palliative XRT      DM2  -Controlled, following with PCP        Time spent with pt: 30 minutes      Problem List Items Addressed This Visit    None            Soledad Irving MD  Hematology Oncology

## 2023-04-26 NOTE — Clinical Note
-chemo today -RTC in 3 weeks for MD visit labs (CBC, CMP, MMA, mg) and next chemo -Continue B12 and fluids as scheduled

## 2023-04-26 NOTE — PLAN OF CARE
Pt arrived to unit for C2 of Zepzelca and IVFs. Pt had labs done and saw  earlier this morning. Okay to proceed with tx today. VSS. Endorses generalized fatigue and not being able to drink as much water. Pre-med of Aloxi/Dex given. Zepzelca infused over 1 hour. Pt tolerated well. Will return next Monday for additional IVFs and his monthly B12 injection. Discharged from unit in NAD.

## 2023-04-30 ENCOUNTER — PATIENT MESSAGE (OUTPATIENT)
Dept: HEMATOLOGY/ONCOLOGY | Facility: CLINIC | Age: 58
End: 2023-04-30
Payer: MEDICAID

## 2023-05-01 ENCOUNTER — INFUSION (OUTPATIENT)
Dept: INFUSION THERAPY | Facility: HOSPITAL | Age: 58
End: 2023-05-01
Attending: STUDENT IN AN ORGANIZED HEALTH CARE EDUCATION/TRAINING PROGRAM
Payer: MEDICAID

## 2023-05-01 ENCOUNTER — PATIENT MESSAGE (OUTPATIENT)
Dept: HEMATOLOGY/ONCOLOGY | Facility: CLINIC | Age: 58
End: 2023-05-01
Payer: MEDICAID

## 2023-05-01 VITALS
HEART RATE: 96 BPM | SYSTOLIC BLOOD PRESSURE: 120 MMHG | DIASTOLIC BLOOD PRESSURE: 75 MMHG | TEMPERATURE: 98 F | OXYGEN SATURATION: 96 % | RESPIRATION RATE: 18 BRPM

## 2023-05-01 DIAGNOSIS — C34.90 SMALL CELL LUNG CANCER: Primary | ICD-10-CM

## 2023-05-01 DIAGNOSIS — C34.90 LUNG CANCER METASTATIC TO BONE: ICD-10-CM

## 2023-05-01 DIAGNOSIS — R05.9 COUGH: ICD-10-CM

## 2023-05-01 DIAGNOSIS — C79.31 SECONDARY MALIGNANT NEOPLASM OF BRAIN: ICD-10-CM

## 2023-05-01 DIAGNOSIS — E11.9 TYPE 2 DIABETES MELLITUS, WITHOUT LONG-TERM CURRENT USE OF INSULIN: Primary | ICD-10-CM

## 2023-05-01 DIAGNOSIS — C79.51 LUNG CANCER METASTATIC TO BONE: ICD-10-CM

## 2023-05-01 PROCEDURE — 25000003 PHARM REV CODE 250: Performed by: STUDENT IN AN ORGANIZED HEALTH CARE EDUCATION/TRAINING PROGRAM

## 2023-05-01 PROCEDURE — 96360 HYDRATION IV INFUSION INIT: CPT

## 2023-05-01 PROCEDURE — A4216 STERILE WATER/SALINE, 10 ML: HCPCS | Performed by: STUDENT IN AN ORGANIZED HEALTH CARE EDUCATION/TRAINING PROGRAM

## 2023-05-01 PROCEDURE — 96372 THER/PROPH/DIAG INJ SC/IM: CPT

## 2023-05-01 PROCEDURE — 63600175 PHARM REV CODE 636 W HCPCS: Performed by: STUDENT IN AN ORGANIZED HEALTH CARE EDUCATION/TRAINING PROGRAM

## 2023-05-01 RX ORDER — HEPARIN 100 UNIT/ML
500 SYRINGE INTRAVENOUS
Status: CANCELLED | OUTPATIENT
Start: 2023-05-02

## 2023-05-01 RX ORDER — HEPARIN 100 UNIT/ML
500 SYRINGE INTRAVENOUS
Status: DISCONTINUED | OUTPATIENT
Start: 2023-05-01 | End: 2023-05-01 | Stop reason: HOSPADM

## 2023-05-01 RX ORDER — SODIUM CHLORIDE 0.9 % (FLUSH) 0.9 %
10 SYRINGE (ML) INJECTION
Status: DISCONTINUED | OUTPATIENT
Start: 2023-05-01 | End: 2023-05-01 | Stop reason: HOSPADM

## 2023-05-01 RX ORDER — SODIUM CHLORIDE 0.9 % (FLUSH) 0.9 %
10 SYRINGE (ML) INJECTION
Status: CANCELLED | OUTPATIENT
Start: 2023-05-02

## 2023-05-01 RX ORDER — PROMETHAZINE HYDROCHLORIDE AND CODEINE PHOSPHATE 6.25; 1 MG/5ML; MG/5ML
SOLUTION ORAL
Qty: 240 ML | Refills: 0 | Status: SHIPPED | OUTPATIENT
Start: 2023-05-01 | End: 2023-06-08

## 2023-05-01 RX ORDER — CYANOCOBALAMIN 1000 UG/ML
1000 INJECTION, SOLUTION INTRAMUSCULAR; SUBCUTANEOUS
Status: CANCELLED | OUTPATIENT
Start: 2023-05-01

## 2023-05-01 RX ORDER — CYANOCOBALAMIN 1000 UG/ML
1000 INJECTION, SOLUTION INTRAMUSCULAR; SUBCUTANEOUS
Status: COMPLETED | OUTPATIENT
Start: 2023-05-01 | End: 2023-05-01

## 2023-05-01 RX ADMIN — Medication 10 ML: at 01:05

## 2023-05-01 RX ADMIN — CYANOCOBALAMIN 1000 MCG: 1000 INJECTION, SOLUTION INTRAMUSCULAR at 01:05

## 2023-05-01 RX ADMIN — HEPARIN 500 UNITS: 100 SYRINGE at 01:05

## 2023-05-01 RX ADMIN — SODIUM CHLORIDE 1000 ML: 9 INJECTION, SOLUTION INTRAVENOUS at 12:05

## 2023-05-01 NOTE — PLAN OF CARE
Pt arrived to unit for IVFs and his B12 injection. Still continues with fatigue and weight loss due to being on his Ozempic. VSS. Plan of care reviewed. Pt tolerated his IVFs and B12 injection well. Has his next appointments through [facility]. Discharged from unit in NAD.

## 2023-05-02 DIAGNOSIS — C34.90 SMALL CELL LUNG CANCER: Primary | ICD-10-CM

## 2023-05-02 RX ORDER — BENZONATATE 200 MG/1
CAPSULE ORAL
Qty: 60 CAPSULE | Refills: 0 | Status: SHIPPED | OUTPATIENT
Start: 2023-05-02 | End: 2023-06-08

## 2023-05-04 ENCOUNTER — TELEPHONE (OUTPATIENT)
Dept: RADIATION ONCOLOGY | Facility: CLINIC | Age: 58
End: 2023-05-04
Payer: MEDICAID

## 2023-05-04 DIAGNOSIS — C79.31 SECONDARY MALIGNANT NEOPLASM OF BRAIN: Primary | ICD-10-CM

## 2023-05-04 NOTE — TELEPHONE ENCOUNTER
Followup call after completing radiation to the brain Mrs Gillespie states he is tired after chemo.  F/u appt and scan made and confirmed

## 2023-05-04 NOTE — TELEPHONE ENCOUNTER
----- Message from Cailin Julian RN sent at 4/14/2023 10:35 AM CDT -----  Srs brain 4/13 cs needs f/u

## 2023-05-08 ENCOUNTER — INFUSION (OUTPATIENT)
Dept: INFUSION THERAPY | Facility: HOSPITAL | Age: 58
End: 2023-05-08
Attending: STUDENT IN AN ORGANIZED HEALTH CARE EDUCATION/TRAINING PROGRAM
Payer: MEDICAID

## 2023-05-08 VITALS
RESPIRATION RATE: 18 BRPM | OXYGEN SATURATION: 96 % | HEART RATE: 108 BPM | SYSTOLIC BLOOD PRESSURE: 131 MMHG | DIASTOLIC BLOOD PRESSURE: 79 MMHG | TEMPERATURE: 98 F

## 2023-05-08 DIAGNOSIS — E11.9 TYPE 2 DIABETES MELLITUS, WITHOUT LONG-TERM CURRENT USE OF INSULIN: Primary | ICD-10-CM

## 2023-05-08 PROCEDURE — 25000003 PHARM REV CODE 250: Performed by: STUDENT IN AN ORGANIZED HEALTH CARE EDUCATION/TRAINING PROGRAM

## 2023-05-08 PROCEDURE — 96360 HYDRATION IV INFUSION INIT: CPT

## 2023-05-08 PROCEDURE — A4216 STERILE WATER/SALINE, 10 ML: HCPCS | Performed by: STUDENT IN AN ORGANIZED HEALTH CARE EDUCATION/TRAINING PROGRAM

## 2023-05-08 PROCEDURE — 63600175 PHARM REV CODE 636 W HCPCS: Performed by: STUDENT IN AN ORGANIZED HEALTH CARE EDUCATION/TRAINING PROGRAM

## 2023-05-08 RX ORDER — HEPARIN 100 UNIT/ML
500 SYRINGE INTRAVENOUS
Status: CANCELLED | OUTPATIENT
Start: 2023-05-14

## 2023-05-08 RX ORDER — SODIUM CHLORIDE 0.9 % (FLUSH) 0.9 %
10 SYRINGE (ML) INJECTION
Status: DISCONTINUED | OUTPATIENT
Start: 2023-05-08 | End: 2023-05-08 | Stop reason: HOSPADM

## 2023-05-08 RX ORDER — HEPARIN 100 UNIT/ML
500 SYRINGE INTRAVENOUS
Status: DISCONTINUED | OUTPATIENT
Start: 2023-05-08 | End: 2023-05-08 | Stop reason: HOSPADM

## 2023-05-08 RX ORDER — SODIUM CHLORIDE 0.9 % (FLUSH) 0.9 %
10 SYRINGE (ML) INJECTION
Status: CANCELLED | OUTPATIENT
Start: 2023-05-14

## 2023-05-08 RX ADMIN — Medication 10 ML: at 01:05

## 2023-05-08 RX ADMIN — SODIUM CHLORIDE 1000 ML: 9 INJECTION, SOLUTION INTRAVENOUS at 12:05

## 2023-05-08 RX ADMIN — HEPARIN 500 UNITS: 100 SYRINGE at 01:05

## 2023-05-08 NOTE — PLAN OF CARE
Pt arrived to unit for his weekly IVFs. Still continues with fatigue and lack of appetite. Recently saw his PCP for a follow-up. Was swabbed for COVID, flu, and any potential bacterial infections due to his cough that came back positive. Is still waiting to hear back from the specialist they are going to be referred to. VSS. IVFs infused. Reached out to clinic to see if pt's upcoming follow-up can be rescheduled since it is for chemo clearance and pt is due for his Zepzelca next Wednesday. Pt and wife updated on plan of care. Discharged from unit in NAD.

## 2023-05-09 ENCOUNTER — TELEPHONE (OUTPATIENT)
Dept: PULMONOLOGY | Facility: CLINIC | Age: 58
End: 2023-05-09
Payer: MEDICAID

## 2023-05-09 NOTE — TELEPHONE ENCOUNTER
----- Message from Fouzia Mahajan sent at 5/9/2023  8:05 AM CDT -----  Regarding: Appt Access  Contact: [phone]  NP states he has a referral from Dr Bedolla/Baron in [place] to be seen in Pulmonary.  Please call.

## 2023-05-09 NOTE — TELEPHONE ENCOUNTER
I spoke to Mrs Gillespie who tells me her  has lung cancer and is receiving Chemotherapy. She said Dr Bedolla, his PCP swabbed his nose last week in the office and results came back for a bacterial infection. She does not know which infection he has and Dr Bedolla is not a [facility] MD. He told Mr Gillespie he should see a Pulmonary doctor. Mrs Gillespie said her  has never seen a pulmonologist before. I told Mrs Gillespie we will need more information and that I will call tomorrow after I speak with one of our physicians Taylor Duenas LPN

## 2023-05-10 ENCOUNTER — PATIENT MESSAGE (OUTPATIENT)
Dept: HEMATOLOGY/ONCOLOGY | Facility: CLINIC | Age: 58
End: 2023-05-10
Payer: MEDICAID

## 2023-05-11 DIAGNOSIS — A31.0 MYCOBACTERIUM AVIUM INFECTION: Primary | ICD-10-CM

## 2023-05-15 DIAGNOSIS — I10 HYPERTENSION, UNSPECIFIED TYPE: ICD-10-CM

## 2023-05-15 DIAGNOSIS — C34.90 SMALL CELL LUNG CANCER: ICD-10-CM

## 2023-05-15 RX ORDER — LOSARTAN POTASSIUM 100 MG/1
100 TABLET ORAL DAILY
Qty: 30 TABLET | Refills: 0 | Status: SHIPPED | OUTPATIENT
Start: 2023-05-15 | End: 2023-06-07 | Stop reason: SDUPTHER

## 2023-05-16 ENCOUNTER — TELEPHONE (OUTPATIENT)
Dept: INFECTIOUS DISEASES | Facility: CLINIC | Age: 58
End: 2023-05-16
Payer: MEDICAID

## 2023-05-16 NOTE — TELEPHONE ENCOUNTER
----- Message from Sima Young MD sent at 5/16/2023 12:44 PM CDT -----  This gely is on my clinic schedule for tomorrow for MAC, but I don't see any AFB cultures in the chart or available in the [facility] link. Were cultures faxed to clinic? Can you help me get them before appt? thanks

## 2023-05-16 NOTE — TELEPHONE ENCOUNTER
5/16/23    Staff message sent to Dr Irving requesting any related records for MAC be scanned into pt chart prior to appt scheduled tomorrow.       ----- Message from Sima Young MD sent at 5/16/2023 12:44 PM CDT -----  This gely is on my clinic schedule for tomorrow for MAC, but I don't see any AFB cultures in the chart or available in the [facility] link. Were cultures faxed to clinic? Can you help me get them before appt? thanks

## 2023-05-17 ENCOUNTER — INFUSION (OUTPATIENT)
Dept: INFUSION THERAPY | Facility: HOSPITAL | Age: 58
End: 2023-05-17
Attending: STUDENT IN AN ORGANIZED HEALTH CARE EDUCATION/TRAINING PROGRAM
Payer: MEDICAID

## 2023-05-17 ENCOUNTER — OFFICE VISIT (OUTPATIENT)
Dept: HEMATOLOGY/ONCOLOGY | Facility: CLINIC | Age: 58
End: 2023-05-17
Payer: MEDICAID

## 2023-05-17 ENCOUNTER — OFFICE VISIT (OUTPATIENT)
Dept: INFECTIOUS DISEASES | Facility: CLINIC | Age: 58
End: 2023-05-17
Payer: MEDICAID

## 2023-05-17 VITALS
WEIGHT: 233.44 LBS | BODY MASS INDEX: 36.64 KG/M2 | OXYGEN SATURATION: 96 % | SYSTOLIC BLOOD PRESSURE: 137 MMHG | DIASTOLIC BLOOD PRESSURE: 86 MMHG | HEIGHT: 67 IN | HEART RATE: 105 BPM

## 2023-05-17 VITALS
DIASTOLIC BLOOD PRESSURE: 78 MMHG | TEMPERATURE: 98 F | RESPIRATION RATE: 18 BRPM | HEART RATE: 92 BPM | OXYGEN SATURATION: 95 % | SYSTOLIC BLOOD PRESSURE: 139 MMHG

## 2023-05-17 VITALS
DIASTOLIC BLOOD PRESSURE: 83 MMHG | WEIGHT: 227.75 LBS | SYSTOLIC BLOOD PRESSURE: 134 MMHG | HEART RATE: 97 BPM | BODY MASS INDEX: 35.75 KG/M2 | HEIGHT: 67 IN

## 2023-05-17 DIAGNOSIS — C34.90 SMALL CELL LUNG CANCER: ICD-10-CM

## 2023-05-17 DIAGNOSIS — Z09 FOLLOW-UP EXAM: ICD-10-CM

## 2023-05-17 DIAGNOSIS — C79.31 SECONDARY MALIGNANT NEOPLASM OF BRAIN: Primary | ICD-10-CM

## 2023-05-17 DIAGNOSIS — C34.90 LUNG CANCER METASTATIC TO BONE: ICD-10-CM

## 2023-05-17 DIAGNOSIS — I10 PRIMARY HYPERTENSION: ICD-10-CM

## 2023-05-17 DIAGNOSIS — T45.1X5A IMMUNODEFICIENCY SECONDARY TO CHEMOTHERAPY: ICD-10-CM

## 2023-05-17 DIAGNOSIS — I10 HYPERTENSION, UNSPECIFIED TYPE: ICD-10-CM

## 2023-05-17 DIAGNOSIS — Z51.5 PALLIATIVE CARE BY SPECIALIST: ICD-10-CM

## 2023-05-17 DIAGNOSIS — D49.9 IMMUNODEFICIENCY SECONDARY TO NEOPLASM: ICD-10-CM

## 2023-05-17 DIAGNOSIS — C34.90 MALIGNANT NEOPLASM OF UNSPECIFIED PART OF UNSPECIFIED BRONCHUS OR LUNG: Primary | ICD-10-CM

## 2023-05-17 DIAGNOSIS — Z79.899 IMMUNODEFICIENCY SECONDARY TO CHEMOTHERAPY: ICD-10-CM

## 2023-05-17 DIAGNOSIS — C78.7 SECONDARY MALIGNANT NEOPLASM OF LIVER: ICD-10-CM

## 2023-05-17 DIAGNOSIS — A31.0 MYCOBACTERIUM AVIUM INFECTION: ICD-10-CM

## 2023-05-17 DIAGNOSIS — C79.51 LUNG CANCER METASTATIC TO BONE: ICD-10-CM

## 2023-05-17 DIAGNOSIS — E11.9 TYPE 2 DIABETES MELLITUS, WITHOUT LONG-TERM CURRENT USE OF INSULIN: ICD-10-CM

## 2023-05-17 DIAGNOSIS — D84.821 IMMUNODEFICIENCY SECONDARY TO CHEMOTHERAPY: ICD-10-CM

## 2023-05-17 DIAGNOSIS — D84.81 IMMUNODEFICIENCY SECONDARY TO NEOPLASM: ICD-10-CM

## 2023-05-17 DIAGNOSIS — E11.69 TYPE 2 DIABETES MELLITUS WITH OTHER SPECIFIED COMPLICATION, WITHOUT LONG-TERM CURRENT USE OF INSULIN: ICD-10-CM

## 2023-05-17 PROCEDURE — 3008F BODY MASS INDEX DOCD: CPT | Mod: CPTII,,, | Performed by: INTERNAL MEDICINE

## 2023-05-17 PROCEDURE — 4010F PR ACE/ARB THEARPY RXD/TAKEN: ICD-10-PCS | Mod: CPTII,,, | Performed by: STUDENT IN AN ORGANIZED HEALTH CARE EDUCATION/TRAINING PROGRAM

## 2023-05-17 PROCEDURE — 3079F DIAST BP 80-89 MM HG: CPT | Mod: CPTII,,, | Performed by: INTERNAL MEDICINE

## 2023-05-17 PROCEDURE — 96413 CHEMO IV INFUSION 1 HR: CPT

## 2023-05-17 PROCEDURE — 4010F PR ACE/ARB THEARPY RXD/TAKEN: ICD-10-PCS | Mod: CPTII,,, | Performed by: INTERNAL MEDICINE

## 2023-05-17 PROCEDURE — 96367 TX/PROPH/DG ADDL SEQ IV INF: CPT

## 2023-05-17 PROCEDURE — 3075F SYST BP GE 130 - 139MM HG: CPT | Mod: CPTII,,, | Performed by: INTERNAL MEDICINE

## 2023-05-17 PROCEDURE — 99215 OFFICE O/P EST HI 40 MIN: CPT | Mod: S$PBB,,, | Performed by: STUDENT IN AN ORGANIZED HEALTH CARE EDUCATION/TRAINING PROGRAM

## 2023-05-17 PROCEDURE — 3075F PR MOST RECENT SYSTOLIC BLOOD PRESS GE 130-139MM HG: ICD-10-PCS | Mod: CPTII,,, | Performed by: INTERNAL MEDICINE

## 2023-05-17 PROCEDURE — 3079F DIAST BP 80-89 MM HG: CPT | Mod: CPTII,,, | Performed by: STUDENT IN AN ORGANIZED HEALTH CARE EDUCATION/TRAINING PROGRAM

## 2023-05-17 PROCEDURE — 99214 PR OFFICE/OUTPT VISIT, EST, LEVL IV, 30-39 MIN: ICD-10-PCS | Mod: S$PBB,,, | Performed by: INTERNAL MEDICINE

## 2023-05-17 PROCEDURE — 3079F PR MOST RECENT DIASTOLIC BLOOD PRESSURE 80-89 MM HG: ICD-10-PCS | Mod: CPTII,,, | Performed by: INTERNAL MEDICINE

## 2023-05-17 PROCEDURE — 63600175 PHARM REV CODE 636 W HCPCS: Performed by: STUDENT IN AN ORGANIZED HEALTH CARE EDUCATION/TRAINING PROGRAM

## 2023-05-17 PROCEDURE — 99999 PR PBB SHADOW E&M-EST. PATIENT-LVL III: CPT | Mod: PBBFAC,,, | Performed by: STUDENT IN AN ORGANIZED HEALTH CARE EDUCATION/TRAINING PROGRAM

## 2023-05-17 PROCEDURE — 4010F ACE/ARB THERAPY RXD/TAKEN: CPT | Mod: CPTII,,, | Performed by: STUDENT IN AN ORGANIZED HEALTH CARE EDUCATION/TRAINING PROGRAM

## 2023-05-17 PROCEDURE — 3075F PR MOST RECENT SYSTOLIC BLOOD PRESS GE 130-139MM HG: ICD-10-PCS | Mod: CPTII,,, | Performed by: STUDENT IN AN ORGANIZED HEALTH CARE EDUCATION/TRAINING PROGRAM

## 2023-05-17 PROCEDURE — 3079F PR MOST RECENT DIASTOLIC BLOOD PRESSURE 80-89 MM HG: ICD-10-PCS | Mod: CPTII,,, | Performed by: STUDENT IN AN ORGANIZED HEALTH CARE EDUCATION/TRAINING PROGRAM

## 2023-05-17 PROCEDURE — 99999 PR PBB SHADOW E&M-EST. PATIENT-LVL IV: ICD-10-PCS | Mod: PBBFAC,,,

## 2023-05-17 PROCEDURE — 25000003 PHARM REV CODE 250: Performed by: STUDENT IN AN ORGANIZED HEALTH CARE EDUCATION/TRAINING PROGRAM

## 2023-05-17 PROCEDURE — 99214 OFFICE O/P EST MOD 30 MIN: CPT | Mod: PBBFAC,25,27

## 2023-05-17 PROCEDURE — 3008F BODY MASS INDEX DOCD: CPT | Mod: CPTII,,, | Performed by: STUDENT IN AN ORGANIZED HEALTH CARE EDUCATION/TRAINING PROGRAM

## 2023-05-17 PROCEDURE — 96372 THER/PROPH/DIAG INJ SC/IM: CPT | Mod: 59

## 2023-05-17 PROCEDURE — 99214 OFFICE O/P EST MOD 30 MIN: CPT | Mod: S$PBB,,, | Performed by: INTERNAL MEDICINE

## 2023-05-17 PROCEDURE — 99999 PR PBB SHADOW E&M-EST. PATIENT-LVL III: ICD-10-PCS | Mod: PBBFAC,,, | Performed by: STUDENT IN AN ORGANIZED HEALTH CARE EDUCATION/TRAINING PROGRAM

## 2023-05-17 PROCEDURE — 3008F PR BODY MASS INDEX (BMI) DOCUMENTED: ICD-10-PCS | Mod: CPTII,,, | Performed by: STUDENT IN AN ORGANIZED HEALTH CARE EDUCATION/TRAINING PROGRAM

## 2023-05-17 PROCEDURE — 99999 PR PBB SHADOW E&M-EST. PATIENT-LVL IV: CPT | Mod: PBBFAC,,,

## 2023-05-17 PROCEDURE — 3008F PR BODY MASS INDEX (BMI) DOCUMENTED: ICD-10-PCS | Mod: CPTII,,, | Performed by: INTERNAL MEDICINE

## 2023-05-17 PROCEDURE — 99213 OFFICE O/P EST LOW 20 MIN: CPT | Mod: PBBFAC,25 | Performed by: STUDENT IN AN ORGANIZED HEALTH CARE EDUCATION/TRAINING PROGRAM

## 2023-05-17 PROCEDURE — A4216 STERILE WATER/SALINE, 10 ML: HCPCS | Performed by: STUDENT IN AN ORGANIZED HEALTH CARE EDUCATION/TRAINING PROGRAM

## 2023-05-17 PROCEDURE — 99215 PR OFFICE/OUTPT VISIT, EST, LEVL V, 40-54 MIN: ICD-10-PCS | Mod: S$PBB,,, | Performed by: STUDENT IN AN ORGANIZED HEALTH CARE EDUCATION/TRAINING PROGRAM

## 2023-05-17 PROCEDURE — 3075F SYST BP GE 130 - 139MM HG: CPT | Mod: CPTII,,, | Performed by: STUDENT IN AN ORGANIZED HEALTH CARE EDUCATION/TRAINING PROGRAM

## 2023-05-17 PROCEDURE — 4010F ACE/ARB THERAPY RXD/TAKEN: CPT | Mod: CPTII,,, | Performed by: INTERNAL MEDICINE

## 2023-05-17 RX ORDER — HEPARIN 100 UNIT/ML
500 SYRINGE INTRAVENOUS
Status: DISCONTINUED | OUTPATIENT
Start: 2023-05-17 | End: 2023-05-17 | Stop reason: HOSPADM

## 2023-05-17 RX ORDER — SODIUM CHLORIDE 0.9 % (FLUSH) 0.9 %
10 SYRINGE (ML) INJECTION
Status: CANCELLED | OUTPATIENT
Start: 2023-05-17

## 2023-05-17 RX ORDER — HEPARIN 100 UNIT/ML
500 SYRINGE INTRAVENOUS
Status: CANCELLED | OUTPATIENT
Start: 2023-05-26

## 2023-05-17 RX ORDER — HEPARIN 100 UNIT/ML
500 SYRINGE INTRAVENOUS
Status: CANCELLED | OUTPATIENT
Start: 2023-05-17

## 2023-05-17 RX ORDER — SODIUM CHLORIDE FOR INHALATION 3 %
4 VIAL, NEBULIZER (ML) INHALATION 2 TIMES DAILY
Qty: 240 ML | Refills: 11 | Status: SHIPPED | OUTPATIENT
Start: 2023-05-17 | End: 2023-06-16

## 2023-05-17 RX ORDER — CYANOCOBALAMIN 1000 UG/ML
1000 INJECTION, SOLUTION INTRAMUSCULAR; SUBCUTANEOUS
Status: COMPLETED | OUTPATIENT
Start: 2023-05-17 | End: 2023-05-17

## 2023-05-17 RX ORDER — SODIUM CHLORIDE 0.9 % (FLUSH) 0.9 %
10 SYRINGE (ML) INJECTION
Status: DISCONTINUED | OUTPATIENT
Start: 2023-05-17 | End: 2023-05-17 | Stop reason: HOSPADM

## 2023-05-17 RX ORDER — SODIUM CHLORIDE 0.9 % (FLUSH) 0.9 %
10 SYRINGE (ML) INJECTION
Status: CANCELLED | OUTPATIENT
Start: 2023-05-26

## 2023-05-17 RX ORDER — CYANOCOBALAMIN 1000 UG/ML
1000 INJECTION, SOLUTION INTRAMUSCULAR; SUBCUTANEOUS
Status: CANCELLED | OUTPATIENT
Start: 2023-05-17

## 2023-05-17 RX ADMIN — LURBINECTEDIN 7.5 MG: 0.5 INJECTION, POWDER, LYOPHILIZED, FOR SOLUTION INTRAVENOUS at 11:05

## 2023-05-17 RX ADMIN — SODIUM CHLORIDE 1000 ML: 9 INJECTION, SOLUTION INTRAVENOUS at 10:05

## 2023-05-17 RX ADMIN — DEXAMETHASONE SODIUM PHOSPHATE 0.25 MG: 10 INJECTION, SOLUTION INTRAMUSCULAR; INTRAVENOUS at 10:05

## 2023-05-17 RX ADMIN — CYANOCOBALAMIN 1000 MCG: 1000 INJECTION, SOLUTION INTRAMUSCULAR at 10:05

## 2023-05-17 RX ADMIN — HEPARIN 500 UNITS: 100 SYRINGE at 12:05

## 2023-05-17 RX ADMIN — Medication 10 ML: at 12:05

## 2023-05-17 NOTE — PLAN OF CARE
Pt arrived to unit for C3 of Zepzelca, his q2w B12 injection, and IVFs. Labs along with follow-up in clinic done with  prior to. Pt was also seen by infectious disease for a current MAC infection. Pt voices no new or worsening complaints. Still continues with fatigue. VSS. Pt received his injection and 1L NS. Pre-med of Aloxi/Dex given. Zepzelca infused over 1 hour. Pt tolerated well. Will return next Monday for additional IVFs. Discharged from unit in NAD.

## 2023-05-17 NOTE — PROGRESS NOTES
[facility]  Hematology/Medical Oncology Clinic       PATIENT: Juan Gillespie  MRN: [id number]  DATE: 5/17/2023    Reason for referral: Extensive stage small cell lung ca    Initial History: Juan Gillespie is a 58 year old man with extensive stage SCLC who presents to clinic for evaluation prior to treatment  of topotecan.          Oncological History:  -Started Carbo/Etop/Atez 4/5/22  -Maintenance atezolizumab 7/5/22  -Consolidative thoracic radiation 8/4/22-8/18/22  -topotecan started 10/24/23-3/10/22(missed Nov tx)-mixed resposne  -Lurbinectadin started 4/05/23 to presents    Interval History:     Pt presents for MD visit and thirdlurbinectadin chemo.  Pt tolerating tx well and denied any complaints except for mild fatigue.  Since last visit pt had nasal swab positive for MAC and was referred to ID. ID determined that is was most likely a nasal colonization only and gave him meds with his nebulizer.  Scans ordered prior to next visit and tx signed for today.        His wife accompanies him at this visit.  Past Medical History:   Past Medical History:   Diagnosis Date    Cancer     Small Cell -Stage 4 Lung Cancer    Diabetes mellitus, type 2     Hypertension        Past Surgical HIstory:   Past Surgical History:   Procedure Laterality Date    ENDOBRONCHIAL ULTRASOUND N/A 3/22/2022    Procedure: ENDOBRONCHIAL ULTRASOUND (EBUS);  Surgeon: Kristin Samuels MD;  Location: [address];  Service: Endoscopy;  Laterality: N/A;    KNEE SURGERY         Family History:   Family History   Problem Relation Age of Onset    Diabetes Mellitus Mother     Pacemaker/defibrilator Father     Lung cancer Father        Social History:  reports that he has quit smoking. His smoking use included cigarettes. He has quit using smokeless tobacco. He reports that he does not drink alcohol and does not use drugs.    Allergies:  Review of patient's allergies indicates:  No Known Allergies    Medications:  Current Outpatient  Medications   Medication Sig Dispense Refill    albuterol (ACCUNEB) 1.25 mg/3 mL Nebu Use 1 vial (1.25 mg total) by nebulization 3 (three) times daily as needed (shortness of breath). Rescue 90 mL 2    allopurinoL (ZYLOPRIM) 300 MG tablet Take 1 tablet (300 mg total) by mouth once daily. 60 tablet 3    benzonatate (TESSALON) 100 MG capsule Take 1 capsule by mouth as needed.      benzonatate (TESSALON) 200 MG capsule Take one capsule three times daily as needed for cough not to exceed 3 capsules daily 60 capsule 0    cetirizine (ZYRTEC) 10 MG tablet Take 1 tablet by mouth as needed.      dapagliflozin (FARXIGA) 10 mg tablet Take 1 tablet by mouth once daily.      insulin aspart U-100 (NOVOLOG) 100 unit/mL (3 mL) InPn pen Inject 1 Units into the skin as needed. Inject into skin if over 200.      losartan (COZAAR) 100 MG tablet Take 1 tablet (100 mg total) by mouth once daily. 30 tablet 0    montelukast (SINGULAIR) 10 mg tablet Take 10 mg by mouth every evening.      morphine (MS CONTIN) 30 MG 12 hr tablet Take 1 tablet (30 mg total) by mouth every 12 (twelve) hours. 60 tablet 0    promethazine-codeine 6.25-10 mg/5 ml (PHENERGAN WITH CODEINE) 6.25-10 mg/5 mL syrup Take 5 ml every 6 hours as needed for cough 240 mL 0    semaglutide (OZEMPIC) 0.25 mg or 0.5 mg(2 mg/1.5 mL) pen injector Inject 0.25 mg into the skin once a week.      senna-docusate 8.6-50 mg (PERICOLACE) 8.6-50 mg per tablet Take 1 tablet by mouth daily as needed for Constipation. 30 tablet 2    sodium chloride 3% 3 % nebulizer solution Take 4 mLs by nebulization 2 (two) times a day. 240 mL 11    TRUE METRIX GLUCOSE TEST STRIP Strp       TRUEPLUS LANCETS 33 gauge Misc        No current facility-administered medications for this visit.       Review of Systems   Constitutional:  Negative for chills, fatigue and fever.   HENT:  Negative for congestion, sinus pressure and trouble swallowing.    Respiratory:  Negative for cough, chest tightness and shortness of  "breath.    Cardiovascular:  Negative for chest pain.   Gastrointestinal:  Negative for abdominal pain and blood in stool.   Endocrine: Negative for cold intolerance and heat intolerance.   Genitourinary:  Negative for hematuria.   Musculoskeletal:  Negative for arthralgias, back pain and myalgias.   Skin:  Negative for rash.   Neurological:  Negative for weakness.   Hematological:  Negative for adenopathy. Does not bruise/bleed easily.   Psychiatric/Behavioral:  Negative for dysphoric mood. The patient is not nervous/anxious.    ECOG Performance Status:   ECOG SCORE             Objective:      Vitals:   Vitals:    05/17/23 0954   BP: 134/83   BP Location: Left arm   Patient Position: Sitting   BP Method: Large (Automatic)   Pulse: 97   Weight: 103.3 kg (227 lb 11.8 oz)   Height: 5' 7" (1.702 m)         telemedicine    Physical Exam  Constitutional:       General: He is not in acute distress.     Appearance: Normal appearance. He is not ill-appearing.   HENT:      Head: Normocephalic and atraumatic.      Nose: Nose normal.      Mouth/Throat:      Mouth: Mucous membranes are moist.      Pharynx: Oropharynx is clear. No oropharyngeal exudate or posterior oropharyngeal erythema.   Eyes:      General: No scleral icterus.     Extraocular Movements: Extraocular movements intact.      Conjunctiva/sclera: Conjunctivae normal.      Pupils: Pupils are equal, round, and reactive to light.   Pulmonary:      Effort: Pulmonary effort is normal. No respiratory distress.   Abdominal:      General: Abdomen is flat.      Palpations: Abdomen is soft.   Musculoskeletal:         General: No swelling. Normal range of motion.      Cervical back: Normal range of motion.      Right lower leg: No edema.      Left lower leg: No edema.   Skin:     General: Skin is warm and dry.      Coloration: Skin is not jaundiced.   Neurological:      General: No focal deficit present.      Mental Status: He is alert.   Psychiatric:         Mood and Affect: " Mood normal.         Behavior: Behavior normal.         Thought Content: Thought content normal.         Judgment: Judgment normal.     Laboratory Data:  Recent Results (from the past 168 hour(s))   CBC W/ AUTO DIFFERENTIAL    Collection Time: 05/17/23  9:07 AM   Result Value Ref Range    WBC 4.07 3.90 - 12.70 K/uL    RBC 4.74 4.60 - 6.20 M/uL    Hemoglobin 13.5 (L) 14.0 - 18.0 g/dL    Hematocrit 42.0 40.0 - 54.0 %    MCV 89 82 - 98 fL    MCH 28.5 27.0 - 31.0 pg    MCHC 32.1 32.0 - 36.0 g/dL    RDW 14.6 (H) 11.5 - 14.5 %    Platelets 310 150 - 450 K/uL    MPV 9.3 9.2 - 12.9 fL    Immature Granulocytes 1.7 (H) 0.0 - 0.5 %    Gran # (ANC) 2.6 1.8 - 7.7 K/uL    Immature Grans (Abs) 0.07 (H) 0.00 - 0.04 K/uL    Lymph # 0.5 (L) 1.0 - 4.8 K/uL    Mono # 0.7 0.3 - 1.0 K/uL    Eos # 0.1 0.0 - 0.5 K/uL    Baso # 0.03 0.00 - 0.20 K/uL    nRBC 0 0 /100 WBC    Gran % 64.4 38.0 - 73.0 %    Lymph % 13.3 (L) 18.0 - 48.0 %    Mono % 16.5 (H) 4.0 - 15.0 %    Eosinophil % 3.4 0.0 - 8.0 %    Basophil % 0.7 0.0 - 1.9 %    Differential Method Automated    COMPREHENSIVE METABOLIC PANEL    Collection Time: 05/17/23  9:27 AM   Result Value Ref Range    Sodium 141 136 - 145 mmol/L    Potassium 4.4 3.5 - 5.1 mmol/L    Chloride 107 95 - 110 mmol/L    CO2 25 23 - 29 mmol/L    Glucose 142 (H) 70 - 110 mg/dL    BUN 16 6 - 20 mg/dL    Creatinine 1.0 0.5 - 1.4 mg/dL    Calcium 9.8 8.7 - 10.5 mg/dL    Total Protein 7.2 6.0 - 8.4 g/dL    Albumin 3.8 3.5 - 5.2 g/dL    Total Bilirubin 0.7 0.1 - 1.0 mg/dL    Alkaline Phosphatase 407 (H) 55 - 135 U/L    AST 52 (H) 10 - 40 U/L    ALT 71 (H) 10 - 44 U/L    Anion Gap 9 8 - 16 mmol/L    eGFR >60 >60 mL/min/1.73 m^2   Magnesium    Collection Time: 05/17/23  9:27 AM   Result Value Ref Range    Magnesium 1.8 1.6 - 2.6 mg/dL         Imaging:   MRI Brain W WO Contrast    Result Date: 10/11/2022  EXAMINATION: MRI BRAIN W WO CONTRAST CLINICAL HISTORY: Small cell lung cancer, brain re-staging; Malignant neoplasm of  unspecified part of unspecified bronchus or lung TECHNIQUE: Sagittal and axial T1, axial T2, axial FLAIR, axial gradient, axial diffusion imaging of the whole brain pre-contrast with postcontrast axial T1 and axial spoiled gradient imaging reformatted in the coronal and sagittal planes.. Ten ml of Gadavist injected intravenously. COMPARISON: 07/12/2022 FINDINGS: Interval 1.2 cm enhancing lesion within the left anterior inferior frontal lobe which prominently the ring-enhancing measuring 1.2 x 0.9 x 0.9 cm in AP by TV by CC dimensions respectively in light of history concerning for parenchymal metastases the with question trace susceptibility associated with this lesion. Previously identified heterogeneous enhancing sellar lesion is again seen allowing for routine brain technique with lesion measuring approximately 1.3 cm craniocaudal this may represent pituitary adenoma though indeterminate.  Previous intraluminal filling defect within the right jugular foramen is no longer seen.  There is however diffusion signal abnormality with ill-defined T1 hypointensity within the right occipital condyle concerning for possible osseous metastases the clinical correlation and further evaluation with nuclear medicine imaging advised. There is continued slight prominent leptomeningeal enhancement along the left cerebellum which raises concern for possible underlying vascular malformation such as a dural AV fistula with collateral vascular engorgement.  There is no restricted diffusion to suggest acute infarction.  Ventricles stable without hydrocephalus.  There is mild edema signal surrounding the left frontal enhancing lesion with continued few scattered punctate size regions of T2 FLAIR signal hyperintensity elsewhere supratentorial white matter which are nonspecific and may represent mild chronic ischemic change. This report was flagged in Epic as abnormal.     Interval development of a small ring-enhancing lesion left  anterior inferior frontal lobe concerning for parenchymal metastases in light of history.  Clinical correlation and follow-up advised There is continue though relatively stable heterogeneous enhancement and enlargement of the material within the sella which may represent pituitary adenoma though indeterminate. Previous right internal jugular vein thrombus no longer seen. Abnormal signal along the right occipital condyle concerning for possible osseous metastases clinical correlation and further evaluation with nuclear medicine imaging advised Continued prominent vascularity left cerebellar folia raising concern for possible underlying vascular malformation unchanged.  Further evaluation with noncontrast MRA head may be of further diagnostic value. Electronically signed by: Jonel Lawrence DO Date:    10/11/2022 Time:    10:05    CT Chest Abdomen Pelvis With Contrast (xpd)    Result Date: 10/18/2022  EXAMINATION: CT CHEST ABDOMEN PELVIS WITH CONTRAST (XPD) CLINICAL HISTORY: metastatic small cell lung cancer on maintenance immunotherapy, eval response; Malignant neoplasm of unspecified part of unspecified bronchus or lung TECHNIQUE: Low dose axial images, sagittal and coronal reformations were obtained from the thoracic inlet to the pubic symphysis following the IV administration of 100 mL of Omnipaque 350 COMPARISON: 07/26/2022 FINDINGS: Right IJ venous shunt tip superior aspect right atrium, absence of clot in included upper right IJ. Fatty infiltration of liver, additional diminished attenuation space-occupying lesions of liver, largest central right hepatic lobe 3.6 cm image 103 series 3.  Stable.  Spleen, adrenal glands, pancreas, gallbladder and biliary tree normal. Urinary bladder normal.  Prostate gland very small 4 cm versus postop prostatectomy.  No free fluid or retroperitoneal adenopathy.  GI track normal. Tiny nonobstructive right lower and left upper renal pole stones. New lymph node left retro manubrial  3.4 cm image 28 series 3 appearing in the interim.  Paratracheal conglomerate adenopathy 2.1 x 2.5 cm, was 1.6 x 3 cm.  Subcarinal adenopathy stable.  No new hilar or mediastinal adenopathy. Degenerative disc spondylosis cervicothoracic junction., focal osteoblastic opacities involve humeral heads both clavicle superimposed on erosive DJD more prominent on right.  Additional focal osteoblastic opacities sternum, ribs, dorsal lumbar sacral spine pelvis and both femoral head and trochanteric regions.  Moderate anterior spondylosis dorsal spine and degenerative disc spondylosis facet joint arthropathy lumbosacral junction with DJD SI joints.  Fracture defects left lower anterior chest wall stable. Scattered calcified plaque great vessels aorta iliac femoral arteries. Dominant medial segment right middle lobe mass 1.1 x 2.7 cm image 69 series 3, was 1.6 x 3.4 cm.  Additional patchy nodular lesions right lower lobe posterior basilar segments, largest 1.4 cm image 66 series 3 suspicious for metastatic disease and/or superimposed inflammatory and infectious process.  Additional patchy infiltrates medial posterior segment right upper lobe and right lower lobe particularly medial basilar segment image 91 series 3.  No new pleural reaction.  Tracheobronchial tree normal. .     1. New presumed metastatic node anterior superior left mediastinum, paratracheal adenopathy otherwise stable. 2. Decreased size in right middle lobe mass with new evidence of metastatic disease right lower lobe versus superimposed inflammatory infectious process discussed above. 3. New developing metastatic lesions liver. 4. Bony universal metastatic disease stable. 5. Recist summary: 6. Compare with previous CT chest abdomen pelvis 07/26/2022 7. Lesion 1: Right middle lobe mass 1.1 x 2.7 cm was 1.6 x 13.4 cm. 8. Lesion 2: Right paratracheal conned Willett a adenopathy 2.1 x 2.5 cm, was 1.6 x 3 cm. 9. Lesion 3: Dome 1.9 cm stable.  (New progressive liver  metastatic disease noted elsewhere) 10. Lesion 4: Caudal aspect right hepatic lobe 1 cm, stable 11.  This report was flagged in Epic as abnormal. Electronically signed by: Dewayne Hutchinson MD Date:    10/18/2022 Time:    09:28       Assessment and Plan          ECOG 0      Extensive stage small cell lung cancer with brain mets  -Initially diagnosed with extensive stage small cell lung cancer in 03/2022 who was treated with carbo/etop/atezolizumab from 04/05/2022 - 06/14/22 with partial response. He subsequently completed consolidation thoracic RT 8/4/22 - 8/18/22, and he was on maintenance atezolizumab.   -10/18/22 showing progression of disease in lungs and liver.  Asymptomatic.  -Dr. Sosa discussed with him plan for topotecan and pt was consented  -Pt tolerated cycle 1 well but delayed C2 due to being out of town  -Scans in December 2022 largely stable with exception of  Right middle lobe lung mass.  3.4 cm, still demonstrated widespread disease in bones  -Admitted for pain control and palliative XRT on 12/07/22  -Pt had been off of tx in Nov 2022 due to going on vacation, as symptoms improved drastically after restarting tx  Plan 05/17/23  -RTC in 3 weeks to start next tx cycle, give C3 lurbinectadin today  -Will precert for zometa, dental clearance note to be obtained, pt already on calcium supplements (has not had dental clearance yet)  -Scans ordered prior to next visit  -Follow up with palliative care        Cancer related pain/palliative care by specialist  -Pain well controlled and is not using pain meds since restarting tx and palliative XRT      DM2  -Controlled, following with PCP        Time spent with pt: 40 minutes      Problem List Items Addressed This Visit    None  Visit Diagnoses       Malignant neoplasm of unspecified part of unspecified bronchus or lung    -  Primary    Relevant Orders    CT Chest Abdomen Pelvis With Contrast (xpd)                  Soledad Irving MD  Hematology  Oncology

## 2023-05-17 NOTE — Clinical Note
-chemo today -RTC in 3 weeks for MD visit labs (CMP, CBC, Mg) and next chemo -CT scan ordered prior to next visit

## 2023-05-22 ENCOUNTER — INFUSION (OUTPATIENT)
Dept: INFUSION THERAPY | Facility: HOSPITAL | Age: 58
End: 2023-05-22
Attending: STUDENT IN AN ORGANIZED HEALTH CARE EDUCATION/TRAINING PROGRAM
Payer: MEDICAID

## 2023-05-22 VITALS
HEART RATE: 96 BPM | RESPIRATION RATE: 18 BRPM | OXYGEN SATURATION: 97 % | DIASTOLIC BLOOD PRESSURE: 68 MMHG | SYSTOLIC BLOOD PRESSURE: 123 MMHG | TEMPERATURE: 98 F

## 2023-05-22 DIAGNOSIS — E11.9 TYPE 2 DIABETES MELLITUS, WITHOUT LONG-TERM CURRENT USE OF INSULIN: Primary | ICD-10-CM

## 2023-05-22 PROCEDURE — 63600175 PHARM REV CODE 636 W HCPCS: Performed by: STUDENT IN AN ORGANIZED HEALTH CARE EDUCATION/TRAINING PROGRAM

## 2023-05-22 PROCEDURE — 96360 HYDRATION IV INFUSION INIT: CPT

## 2023-05-22 PROCEDURE — A4216 STERILE WATER/SALINE, 10 ML: HCPCS | Performed by: STUDENT IN AN ORGANIZED HEALTH CARE EDUCATION/TRAINING PROGRAM

## 2023-05-22 PROCEDURE — 25000003 PHARM REV CODE 250: Performed by: STUDENT IN AN ORGANIZED HEALTH CARE EDUCATION/TRAINING PROGRAM

## 2023-05-22 RX ORDER — SODIUM CHLORIDE 0.9 % (FLUSH) 0.9 %
10 SYRINGE (ML) INJECTION
Status: CANCELLED | OUTPATIENT
Start: 2023-06-07

## 2023-05-22 RX ORDER — HEPARIN 100 UNIT/ML
500 SYRINGE INTRAVENOUS
Status: DISCONTINUED | OUTPATIENT
Start: 2023-05-22 | End: 2023-05-22 | Stop reason: HOSPADM

## 2023-05-22 RX ORDER — SODIUM CHLORIDE 0.9 % (FLUSH) 0.9 %
10 SYRINGE (ML) INJECTION
Status: DISCONTINUED | OUTPATIENT
Start: 2023-05-22 | End: 2023-05-22 | Stop reason: HOSPADM

## 2023-05-22 RX ORDER — HEPARIN 100 UNIT/ML
500 SYRINGE INTRAVENOUS
Status: CANCELLED | OUTPATIENT
Start: 2023-06-07

## 2023-05-22 RX ADMIN — Medication 10 ML: at 01:05

## 2023-05-22 RX ADMIN — SODIUM CHLORIDE 1000 ML: 9 INJECTION, SOLUTION INTRAVENOUS at 12:05

## 2023-05-22 RX ADMIN — HEPARIN 500 UNITS: 100 SYRINGE at 01:05

## 2023-05-22 NOTE — PLAN OF CARE
Pt arrived to unit for his weekly IVFs. Still continues with fatigue and lack of appetite. Also reports smells have been bothering him and making him nauseous since getting his chemo tx last week. VSS. Plan of care reviewed. IVFs infused. Pt will return next week for his weekly IVFs and his B12 injection. Discharged from unit accompanied by spouse in NAD.

## 2023-05-29 ENCOUNTER — INFUSION (OUTPATIENT)
Dept: INFUSION THERAPY | Facility: HOSPITAL | Age: 58
End: 2023-05-29
Attending: STUDENT IN AN ORGANIZED HEALTH CARE EDUCATION/TRAINING PROGRAM
Payer: MEDICAID

## 2023-05-29 VITALS
RESPIRATION RATE: 18 BRPM | DIASTOLIC BLOOD PRESSURE: 76 MMHG | HEART RATE: 89 BPM | SYSTOLIC BLOOD PRESSURE: 135 MMHG | OXYGEN SATURATION: 98 % | TEMPERATURE: 98 F

## 2023-05-29 DIAGNOSIS — C79.31 SECONDARY MALIGNANT NEOPLASM OF BRAIN: Primary | ICD-10-CM

## 2023-05-29 DIAGNOSIS — E11.9 TYPE 2 DIABETES MELLITUS, WITHOUT LONG-TERM CURRENT USE OF INSULIN: ICD-10-CM

## 2023-05-29 PROCEDURE — A4216 STERILE WATER/SALINE, 10 ML: HCPCS | Performed by: STUDENT IN AN ORGANIZED HEALTH CARE EDUCATION/TRAINING PROGRAM

## 2023-05-29 PROCEDURE — 96372 THER/PROPH/DIAG INJ SC/IM: CPT | Mod: 59

## 2023-05-29 PROCEDURE — 25000003 PHARM REV CODE 250: Performed by: STUDENT IN AN ORGANIZED HEALTH CARE EDUCATION/TRAINING PROGRAM

## 2023-05-29 PROCEDURE — 96360 HYDRATION IV INFUSION INIT: CPT

## 2023-05-29 PROCEDURE — 63600175 PHARM REV CODE 636 W HCPCS: Performed by: STUDENT IN AN ORGANIZED HEALTH CARE EDUCATION/TRAINING PROGRAM

## 2023-05-29 RX ORDER — HEPARIN 100 UNIT/ML
500 SYRINGE INTRAVENOUS
Status: DISCONTINUED | OUTPATIENT
Start: 2023-05-29 | End: 2023-05-29 | Stop reason: HOSPADM

## 2023-05-29 RX ORDER — SODIUM CHLORIDE 0.9 % (FLUSH) 0.9 %
10 SYRINGE (ML) INJECTION
Status: CANCELLED | OUTPATIENT
Start: 2023-06-19

## 2023-05-29 RX ORDER — CYANOCOBALAMIN 1000 UG/ML
1000 INJECTION, SOLUTION INTRAMUSCULAR; SUBCUTANEOUS
Status: COMPLETED | OUTPATIENT
Start: 2023-05-29 | End: 2023-05-29

## 2023-05-29 RX ORDER — HEPARIN 100 UNIT/ML
500 SYRINGE INTRAVENOUS
Status: CANCELLED | OUTPATIENT
Start: 2023-06-19

## 2023-05-29 RX ORDER — CYANOCOBALAMIN 1000 UG/ML
1000 INJECTION, SOLUTION INTRAMUSCULAR; SUBCUTANEOUS
Status: CANCELLED | OUTPATIENT
Start: 2023-05-29

## 2023-05-29 RX ORDER — SODIUM CHLORIDE 0.9 % (FLUSH) 0.9 %
10 SYRINGE (ML) INJECTION
Status: DISCONTINUED | OUTPATIENT
Start: 2023-05-29 | End: 2023-05-29 | Stop reason: HOSPADM

## 2023-05-29 RX ADMIN — CYANOCOBALAMIN 1000 MCG: 1000 INJECTION, SOLUTION INTRAMUSCULAR at 12:05

## 2023-05-29 RX ADMIN — SODIUM CHLORIDE 1000 ML: 9 INJECTION, SOLUTION INTRAVENOUS at 12:05

## 2023-05-29 RX ADMIN — Medication 10 ML: at 01:05

## 2023-05-29 RX ADMIN — HEPARIN 500 UNITS: 100 SYRINGE at 01:05

## 2023-05-29 NOTE — PLAN OF CARE
Pt arrived to unit for his weekly IVFs and B12 injection. Still continues with fatigue. VSS. Plan of care reviewed. IVFs infused. Pt and spouse have their next appointments through [facility]. Discharged from unit accompanied by spouse in NAD.

## 2023-05-31 ENCOUNTER — PATIENT MESSAGE (OUTPATIENT)
Dept: HEMATOLOGY/ONCOLOGY | Facility: CLINIC | Age: 58
End: 2023-05-31
Payer: MEDICAID

## 2023-05-31 ENCOUNTER — HOSPITAL ENCOUNTER (EMERGENCY)
Facility: HOSPITAL | Age: 58
Discharge: HOME | End: 2023-05-31
Attending: EMERGENCY MEDICINE
Payer: MEDICAID

## 2023-05-31 VITALS
OXYGEN SATURATION: 100 % | HEIGHT: 67 IN | RESPIRATION RATE: 18 BRPM | TEMPERATURE: 98 F | SYSTOLIC BLOOD PRESSURE: 158 MMHG | BODY MASS INDEX: 35.79 KG/M2 | HEART RATE: 92 BPM | WEIGHT: 228 LBS | DIASTOLIC BLOOD PRESSURE: 87 MMHG

## 2023-05-31 DIAGNOSIS — R07.9 CHEST PAIN: ICD-10-CM

## 2023-05-31 DIAGNOSIS — C34.81 MALIGNANT NEOPLASM OF OVERLAPPING SITES OF RIGHT LUNG: ICD-10-CM

## 2023-05-31 DIAGNOSIS — I26.99 ACUTE PULMONARY EMBOLISM WITHOUT ACUTE COR PULMONALE, UNSPECIFIED PULMONARY EMBOLISM TYPE: Primary | ICD-10-CM

## 2023-05-31 DIAGNOSIS — T82.9XXA CENTRAL LINE COMPLICATION: ICD-10-CM

## 2023-05-31 LAB
ALBUMIN SERPL BCP-MCNC: 3.6 G/DL (ref 3.5–5.2)
ALP SERPL-CCNC: 451 U/L (ref 55–135)
ALT SERPL W/O P-5'-P-CCNC: 109 U/L (ref 10–44)
ANION GAP SERPL CALC-SCNC: 7 MMOL/L (ref 8–16)
AST SERPL-CCNC: 77 U/L (ref 10–40)
BASOPHILS # BLD AUTO: 0.01 K/UL (ref 0–0.2)
BASOPHILS NFR BLD: 0.4 % (ref 0–1.9)
BILIRUB SERPL-MCNC: 0.5 MG/DL (ref 0.1–1)
BNP SERPL-MCNC: 60 PG/ML (ref 0–99)
BUN SERPL-MCNC: 16 MG/DL (ref 6–20)
CALCIUM SERPL-MCNC: 9.9 MG/DL (ref 8.7–10.5)
CHLORIDE SERPL-SCNC: 105 MMOL/L (ref 95–110)
CO2 SERPL-SCNC: 27 MMOL/L (ref 23–29)
CREAT SERPL-MCNC: 1 MG/DL (ref 0.5–1.4)
DIFFERENTIAL METHOD: ABNORMAL
EOSINOPHIL # BLD AUTO: 0.1 K/UL (ref 0–0.5)
EOSINOPHIL NFR BLD: 3.3 % (ref 0–8)
ERYTHROCYTE [DISTWIDTH] IN BLOOD BY AUTOMATED COUNT: 15.1 % (ref 11.5–14.5)
EST. GFR  (NO RACE VARIABLE): >60 ML/MIN/1.73 M^2
GLUCOSE SERPL-MCNC: 106 MG/DL (ref 70–110)
HCT VFR BLD AUTO: 39.5 % (ref 40–54)
HGB BLD-MCNC: 13.3 G/DL (ref 14–18)
IMM GRANULOCYTES # BLD AUTO: 0.02 K/UL (ref 0–0.04)
IMM GRANULOCYTES NFR BLD AUTO: 0.8 % (ref 0–0.5)
LYMPHOCYTES # BLD AUTO: 0.4 K/UL (ref 1–4.8)
LYMPHOCYTES NFR BLD: 16.7 % (ref 18–48)
MAGNESIUM SERPL-MCNC: 2 MG/DL (ref 1.6–2.6)
MCH RBC QN AUTO: 29.2 PG (ref 27–31)
MCHC RBC AUTO-ENTMCNC: 33.7 G/DL (ref 32–36)
MCV RBC AUTO: 87 FL (ref 82–98)
MONOCYTES # BLD AUTO: 0.3 K/UL (ref 0.3–1)
MONOCYTES NFR BLD: 13.8 % (ref 4–15)
NEUTROPHILS # BLD AUTO: 1.6 K/UL (ref 1.8–7.7)
NEUTROPHILS NFR BLD: 65 % (ref 38–73)
NRBC BLD-RTO: 0 /100 WBC
PLATELET # BLD AUTO: 221 K/UL (ref 150–450)
PMV BLD AUTO: 10 FL (ref 9.2–12.9)
POTASSIUM SERPL-SCNC: 4.6 MMOL/L (ref 3.5–5.1)
PROT SERPL-MCNC: 7.1 G/DL (ref 6–8.4)
RBC # BLD AUTO: 4.55 M/UL (ref 4.6–6.2)
SODIUM SERPL-SCNC: 139 MMOL/L (ref 136–145)
TROPONIN I SERPL DL<=0.01 NG/ML-MCNC: <0.006 NG/ML (ref 0–0.03)
WBC # BLD AUTO: 2.4 K/UL (ref 3.9–12.7)

## 2023-05-31 PROCEDURE — 99285 EMERGENCY DEPT VISIT HI MDM: CPT | Mod: 25

## 2023-05-31 PROCEDURE — 63600175 PHARM REV CODE 636 W HCPCS: Performed by: EMERGENCY MEDICINE

## 2023-05-31 PROCEDURE — 25500020 PHARM REV CODE 255: Performed by: EMERGENCY MEDICINE

## 2023-05-31 PROCEDURE — 80053 COMPREHEN METABOLIC PANEL: CPT | Performed by: EMERGENCY MEDICINE

## 2023-05-31 PROCEDURE — 85025 COMPLETE CBC W/AUTO DIFF WBC: CPT | Performed by: EMERGENCY MEDICINE

## 2023-05-31 PROCEDURE — 93010 EKG 12-LEAD: ICD-10-PCS | Mod: ,,, | Performed by: INTERNAL MEDICINE

## 2023-05-31 PROCEDURE — 83735 ASSAY OF MAGNESIUM: CPT | Performed by: EMERGENCY MEDICINE

## 2023-05-31 PROCEDURE — 83880 ASSAY OF NATRIURETIC PEPTIDE: CPT | Performed by: EMERGENCY MEDICINE

## 2023-05-31 PROCEDURE — 93005 ELECTROCARDIOGRAM TRACING: CPT

## 2023-05-31 PROCEDURE — 84484 ASSAY OF TROPONIN QUANT: CPT | Performed by: EMERGENCY MEDICINE

## 2023-05-31 PROCEDURE — 96374 THER/PROPH/DIAG INJ IV PUSH: CPT | Mod: 59

## 2023-05-31 PROCEDURE — 96372 THER/PROPH/DIAG INJ SC/IM: CPT | Performed by: EMERGENCY MEDICINE

## 2023-05-31 PROCEDURE — 93010 ELECTROCARDIOGRAM REPORT: CPT | Mod: ,,, | Performed by: INTERNAL MEDICINE

## 2023-05-31 RX ORDER — HYDROMORPHONE HYDROCHLORIDE 1 MG/ML
0.5 INJECTION, SOLUTION INTRAMUSCULAR; INTRAVENOUS; SUBCUTANEOUS
Status: COMPLETED | OUTPATIENT
Start: 2023-05-31 | End: 2023-05-31

## 2023-05-31 RX ORDER — APIXABAN 5 MG (74)
KIT ORAL
Qty: 74 TABLET | Refills: 0 | Status: SHIPPED | OUTPATIENT
Start: 2023-05-31 | End: 2023-06-19

## 2023-05-31 RX ORDER — ENOXAPARIN SODIUM 100 MG/ML
1 INJECTION SUBCUTANEOUS
Status: COMPLETED | OUTPATIENT
Start: 2023-05-31 | End: 2023-05-31

## 2023-05-31 RX ADMIN — IOHEXOL 75 ML: 350 INJECTION, SOLUTION INTRAVENOUS at 02:05

## 2023-05-31 RX ADMIN — ENOXAPARIN SODIUM 110 MG: 60 INJECTION SUBCUTANEOUS at 04:05

## 2023-05-31 RX ADMIN — HYDROMORPHONE HYDROCHLORIDE 0.5 MG: 1 INJECTION, SOLUTION INTRAMUSCULAR; INTRAVENOUS; SUBCUTANEOUS at 01:05

## 2023-05-31 NOTE — ED NOTES
Patient c/o right neck pain radiating to right port. States that started this am. Patient hx of lung cx, port for chemo tx. Last chemo was approximately 2 weeks ago.

## 2023-05-31 NOTE — DISCHARGE INSTRUCTIONS
Please return for any blood in stool, black stool, shortness of breath or worsening pain. Please follow up closely with your cancer doctor to discuss ED visit and to continue blood thinners. I have written you for 1 month but you will need to be on them longer. Please return to the ED for check if you fall or injure yourself, especially if you hit your head as you are at increased risk of bleeding. Return for severe headache, confused, numbness, weakness you are at increased risk of bleeding if you have metastatic disease in your brain.     Please follow up closely with primary care doctor, heme/onc and palliative medicine.    Thank you for coming to our Emergency Department today. As we discussed, it is important to remember that some problems are difficult to diagnose and may not be found during your Emergency Department visit. Be sure to follow up with your primary care doctor and review all labs/imaging/tests that were performed during this visit with them. Some labs/tests may be outside of the normal range and require non-emergent follow-up and further investigation to help diagnose/exclude/prevent complications or other medical conditions.    If you do not have a primary care doctor, you may contact the one listed on your discharge paperwork or you may also call the [facility] Appointment Desk at [phone] to schedule an appointment and establish care with one. It is important to your health that you have a primary care doctor.    Please take all medications as directed. All medications may potentially have side-effects and it is impossible to predict which medications may give you side-effects or what side-effects (if any) they will give you.. If you feel that you are having a negative effect or side-effect of any medication you should immediately stop taking them and seek medical attention. If you feel that you are having a life-threatening reaction call 911.    Return to the ER with any  questions/concerns, new/concerning symptoms, worsening or failure to improve.     Do not drive, swim, climb to height, take a bath or make any important decisions for 24 hours if you have received any pain medications, sedatives or mood altering drugs during your ER visit.'

## 2023-05-31 NOTE — ED PROVIDER NOTES
Encounter Date: 5/31/2023    SCRIBE #1 NOTE: I, Anabell Rosario, am scribing for, and in the presence of,  Teresa Eisenberg MD. I have scribed the following portions of the note - Other sections scribed: HPI, ROS, PE.     History     Chief Complaint   Patient presents with    Neck Pain     Pt reports pain to his right neck that radiates down his port in right chest since this morning. Pt has port for chemo tx, port last used on Monday for IV fluids, last chemo tx 2 weeks ago. Pt reports recently diagnosed with a bacterial infection to nasal passage but is not on abx for it.     Vascular Access Problem     Juan Gillespie is a 58 y.o. male, with a PMHx of DM type 2, Small cell- Stage 4 lung Cancer, and HTN, PSHX of deviated septum correction and Hemorrhoid repair surgery, who presents to the ED with neck pain associated with the port in the right chest. Patient reports around 10:30 am this morning he started having right neck pain that radiated to his port in the right chest. Patient reports endorsing a cream that helped the pain subside, but the pain returned once more. Patient notes his port was placed in over a year ago. The port was last used on Monday for fluid replacement (dehydration) and was last used for a chemo session 2 and a half weeks ago, Dr. Irving is oncologist. Patient reports right shoulder pain yesterday, but denies that pain at this time. Patient also reports actively treating a bacterial infection in the nose that may have been triggered by using tap water in a neti pot. Patient states he saw a disease specialist that gave him saline and a nebulizer to treat the infection. Patient endorses losartan, albuterol inhaler, allopurinol, ozempic, insulin, oxycodone (at night, to sleep), and morphine (as needed). Patient denies alcohol, tobacco, or illicit drug use. No other exacerbating or alleviating factors. Patient denies fever, trauma, or other associated symptoms. This is the extent of the patient's  complaints today in the Emergency Department. NKDA.     The history is provided by the patient. No  was used.   Review of patient's allergies indicates:  No Known Allergies  Past Medical History:   Diagnosis Date    Cancer     Small Cell -Stage 4 Lung Cancer    Diabetes mellitus, type 2     Hypertension      Past Surgical History:   Procedure Laterality Date    ENDOBRONCHIAL ULTRASOUND N/A 3/22/2022    Procedure: ENDOBRONCHIAL ULTRASOUND (EBUS);  Surgeon: Kristin Samuels MD;  Location: [facility] OR [address] [place] [address];  Service: Endoscopy;  Laterality: N/A;    KNEE SURGERY       Family History   Problem Relation Age of Onset    Diabetes Mellitus Mother     Pacemaker/defibrilator Father     Lung cancer Father      Social History     Tobacco Use    Smoking status: Former     Types: Cigarettes    Smokeless tobacco: Former    Tobacco comments:     stop smoking 26 years ago   Substance Use Topics    Alcohol use: No    Drug use: No     Review of Systems   Constitutional:  Negative for chills, diaphoresis and fever.   HENT:  Negative for sore throat.    Eyes:  Negative for photophobia and visual disturbance.   Respiratory:  Negative for cough and shortness of breath.    Cardiovascular:  Negative for chest pain and leg swelling.   Gastrointestinal:  Negative for abdominal pain, blood in stool, constipation, diarrhea, nausea and vomiting.   Genitourinary:  Negative for dysuria, frequency, hematuria and urgency.   Musculoskeletal:  Positive for neck pain (right neck, radiates to port in the right chest). Negative for neck stiffness.   Skin:  Negative for rash and wound.   Neurological:  Negative for weakness, light-headedness, numbness and headaches.   Hematological:  Does not bruise/bleed easily.   Psychiatric/Behavioral:  Negative for confusion and suicidal ideas.    All other systems reviewed and are negative.    Physical Exam     Initial Vitals [05/31/23 1125]   BP Pulse Resp Temp SpO2   (!) 159/84 88 18 98 °F (36.7 °C)  97 %      MAP       --         Physical Exam    Nursing note and vitals reviewed.  Constitutional: He appears well-developed and well-nourished. He is not diaphoretic. No distress.   HENT:   Head: Normocephalic and atraumatic.   Right Ear: External ear normal.   Left Ear: External ear normal.   Mouth/Throat: Oropharynx is clear and moist. No oropharyngeal exudate.   Eyes: Conjunctivae and EOM are normal. Pupils are equal, round, and reactive to light. Right eye exhibits no discharge. Left eye exhibits no discharge.   Neck: Neck supple. No JVD present.   No midline neck tenderness.   Normal range of motion.  Cardiovascular:  Normal rate, regular rhythm, normal heart sounds and intact distal pulses.     Exam reveals no gallop and no friction rub.       No murmur heard.  Pulmonary/Chest: Breath sounds normal. No respiratory distress. He has no wheezes. He has no rhonchi. He has no rales. He exhibits no tenderness.   Port to right chest wall with no tenderness or swelling over the port. No erythema or skin changes.    Abdominal: Abdomen is soft. Bowel sounds are normal. He exhibits no distension. There is no abdominal tenderness. There is no rebound and no guarding.   Musculoskeletal:         General: No tenderness or edema. Normal range of motion.      Cervical back: Normal range of motion and neck supple.      Comments: Tenderness to paraspinal in the upper thoracic scapula area.  No midline back tenderness.     Lymphadenopathy:     He has no cervical adenopathy.   Neurological: He is alert and oriented to person, place, and time. He has normal strength. No cranial nerve deficit or sensory deficit. GCS score is 15. GCS eye subscore is 4. GCS verbal subscore is 5. GCS motor subscore is 6.   Skin: Skin is warm and dry. Capillary refill takes less than 2 seconds.   No tenderness to port.    Psychiatric: He has a normal mood and affect. Thought content normal.       ED Course   Procedures  Labs Reviewed   COMPREHENSIVE  METABOLIC PANEL - Abnormal; Notable for the following components:       Result Value    Alkaline Phosphatase 451 (*)     AST 77 (*)      (*)     Anion Gap 7 (*)     All other components within normal limits   CBC W/ AUTO DIFFERENTIAL - Abnormal; Notable for the following components:    WBC 2.40 (*)     RBC 4.55 (*)     Hemoglobin 13.3 (*)     Hematocrit 39.5 (*)     RDW 15.1 (*)     Immature Granulocytes 0.8 (*)     Gran # (ANC) 1.6 (*)     Lymph # 0.4 (*)     Lymph % 16.7 (*)     All other components within normal limits   MAGNESIUM   B-TYPE NATRIURETIC PEPTIDE   TROPONIN I     EKG Readings: (Independently Interpreted)   Sinus rhythm with first-degree AV block at 75.  Low voltage.  No ST elevation or significant depression.  T-wave inversions in V1 and V2 and T-wave flattening aVL.  Normal axis.  QTC is 417.     Imaging Results               CTA Chest Non-Coronary (PE Studies) (Final result)  Result time 05/31/23 15:27:02      Final result by Sagar Gore MD (05/31/23 15:27:02)                   Impression:      Filling defect involving a subsegmental branch of the right lower lobe pulmonary artery, concerning for pulmonary thromboembolism.  Direct invasion by tumor is less likely.  No CT evidence of right heart strain.    Progression of metastatic disease:    *Enlarging prevascular lymph node.  *Enlarging ill-defined mass in the mediastinum and right hilum.  *Enlarging 3.1 cm right middle lobe mass.  *Widespread osseous metastases.  *Multiple liver metastases, difficult to compared to the prior exam.  This report was flagged in Epic as abnormal.      Electronically signed by: Sagar Gore  Date:    05/31/2023  Time:    15:27               Narrative:    EXAMINATION:  CTA CHEST NON CORONARY (PE STUDIES)    CLINICAL HISTORY:  Pulmonary embolism (PE) suspected, high prob;chest pain, back pain, left neck pain, h/o cancer;    TECHNIQUE:  Low dose axial images, sagittal and coronal reformations were obtained  from the thoracic inlet to the lung bases following the IV administration of 75 mL of Omnipaque 350.  Contrast timing was optimized to evaluate the pulmonary arteries.  MIP images were performed.    COMPARISON:  Multiple CTs, most recent 03/10/2023    FINDINGS:  LINES/TUBES:  Right chest wall Port-A-Cath terminates at the cavoatrial junction.    SOFT TISSUES: No axillary or subpectoral lymphadenopathy.  The thyroid gland and supraclavicular regions are difficult to evaluate due to streak artifact and image noise.    HEART AND MEDIASTINUM: There is a 2.4 cm prevascular lymph node (2:99), previously 2.0 cm.  There is confluent soft tissue in the paratracheal, subcarinal, and right hilar stations, which is difficult to measure but has increased significantly in size.  There is at least abutment of the esophagus, which is inseparable from the soft tissue mass.  Heart is normal in size.  No CT evidence of right heart strain.  No pericardial fluid.  Mild atherosclerosis.  The main pulmonary artery is normal in size.  There is a filling defect involving a subsegmental branch of the right lower lobe pulmonary artery (2:248).    PLEURA: No pleural effusion or pneumothorax.    LUNGS AND AIRWAYS: Central airways are patent.  The right hilar mass encases adjacent pulmonary vessels and airways with associated bronchiectasis.  There is a 3.1 x 2.6 cm paramediastinal mass in the right middle lobe (2:223), previously 2.7 x 2.2 cm.  New ground-glass opacities in the superior left lower lobe.    UPPER ABDOMEN: There are multiple liver lesions, although difficult to compare to the prior exam.  No other abnormality in the visualized upper abdomen.    BONES: Innumerable lytic and sclerotic lesions throughout the chest, similar in size and distribution to prior.  There are subacute left-sided rib fractures.  No new fracture.                                       X-Ray Chest PA And Lateral (Final result)  Result time 05/31/23 12:54:18       Final result by Jett Serrano MD (05/31/23 12:54:18)                   Impression:      No interval change      Electronically signed by: Jett Serrano MD  Date:    05/31/2023  Time:    12:54               Narrative:    EXAMINATION:  XR CHEST PA AND LATERAL    CLINICAL HISTORY:  Unspecified complication of cardiac and vascular prosthetic device, implant and graft, initial encounter    TECHNIQUE:  PA and lateral views of the chest were performed.    COMPARISON:  Chest radiograph dated February 10, 2023    FINDINGS:  Right-sided chest port is unchanged in position.  The trachea and cardiomediastinal silhouette remains stable.  Coarse consolidative changes in the right middle lobe are stable.  There is persistent widening of the superior mediastinum, correlating to adenopathy seen on recent CT.  No evidence for pneumothorax.  Osseous structures demonstrate no evidence for acute fractures or dislocations.                                       Medications   HYDROmorphone injection 0.5 mg (0.5 mg Intravenous Given 5/31/23 1309)   iohexoL (OMNIPAQUE 350) injection 75 mL (75 mLs Intravenous Given 5/31/23 1454)   enoxaparin injection 110 mg (110 mg Subcutaneous Given 5/31/23 1602)     Medical Decision Making:   History:   Old Medical Records: I decided to obtain old medical records.  Clinical Tests:   Lab Tests: Ordered and Reviewed  Radiological Study: Ordered and Reviewed     MDM  59 yo male with PMhx of metastatic lung cancer on chemotherapy presents with right chest pain with radiation to right back. Has not been taking MS contin at home. Port placed 1 year ago and was used on Monday without difficulty. Denies fever, swelling. DDx includes but is not limited to port malfunction, malignancy, PE, PNA, PNthx, anemia, ACS.     Labs with elevated LFTs-patient with known metastatic liver lesions.No RUQ tenderness to palpation.     CBC with leukopenia in the setting of chemotherapy, mild anemia.     Trop WNL    CXR with port  in correct position     CTA PE with filling defect in the subsegmental branch of the RLL, no right heart strain.     Progressive metastatic disease, including increased disease to the right lung ad liver, bones.     Discussed these results with Dr. Sandoval, given patient hemodynamically stable and not optimized pain control at home (as not taking his narcotics, reports he generally does not need them, he does still have them at home) recommends lovenox here (1mg/kg) and discharge on eliquis starter pack. Follow up with Dr. Irving.     I also discussed with patient worsening of his disease, which maybe causing symptoms. We discussed taking pain medicines when needed and we discussed the role that palliative care can play in his care now and upcoming and the difference between palliative care and hospice. He is interested in following up with palliative care.     He asked me if there is a chance his cancer will go away. I discussed with him that I am not an expert and don't want to take away his hope but that at this level of metastatic cancer there is usually a near zero chance of going into remission and the focus should be on the quality of his days. Recommend he continue this conversation with heme/onc and palliative care.     We talked about the risks and side effects of blood thinners. Need to come in for falling or hitting head, monitoring for GI bleeding, and return for headache as patient with metastatic brain lesions on his previous MRI.     Patient and wife updated and in agreement with plan. All questions answered. Patient standing up at bedside, asking to remove stickers as he is ready to go.            Scribe Attestation:   Scribe #1: I performed the above scribed service and the documentation accurately describes the services I performed. I attest to the accuracy of the note.               I, Teresa Eisenberg, personally performed the services described in this documentation. All medical record entries made by  the scribe were at my direction and in my presence. I have reviewed the chart and agree that the record reflects my personal performance and is accurate and complete.     Clinical Impression:   Final diagnoses:  [T82.9XXA] Central line complication - pain near port  [R07.9] Chest pain  [I26.99] Acute pulmonary embolism without acute cor pulmonale, unspecified pulmonary embolism type (Primary)  [C34.81] Malignant neoplasm of overlapping sites of right lung        ED Disposition Condition    Discharge Stable          ED Prescriptions       Medication Sig Dispense Start Date End Date Auth. Provider    apixaban (ELIQUIS DVT-PE TREAT 30D START) 5 mg (74 tabs) DsPk For the first 7 days take two 5 mg tablets twice daily.  After 7 days take one 5 mg tablet twice daily. 74 tablet 5/31/2023 -- Teresa Eisenberg MD          Follow-up Information       Follow up With Specialties Details Why Contact Info Additional Information    Soledad Irving MD Hematology and Oncology Schedule an appointment as soon as possible for a visit in 2 days to discuss recent ED visit [address]  [address]  [facility] [id number] [phone]       [facility] Emergency Dept Emergency Medicine  As needed, If symptoms worsen [address]  [facility] [address] [phone]     [facility] - Palliative Medicine Palliative Medicine Schedule an appointment as soon as possible for a visit in 2 days to discuss recent ED visit [address]  [facility] [address] [phone] Please park in garage or Medical Office Bldg. surface lot and use Medical Ofc Bldg elevator. Check in at [facility] [address].             Teresa Eisenberg MD  06/01/23 0006

## 2023-06-02 ENCOUNTER — HOSPITAL ENCOUNTER (OUTPATIENT)
Dept: RADIOLOGY | Facility: HOSPITAL | Age: 58
Discharge: HOME | End: 2023-06-02
Attending: STUDENT IN AN ORGANIZED HEALTH CARE EDUCATION/TRAINING PROGRAM
Payer: MEDICAID

## 2023-06-02 DIAGNOSIS — C34.90 MALIGNANT NEOPLASM OF UNSPECIFIED PART OF UNSPECIFIED BRONCHUS OR LUNG: ICD-10-CM

## 2023-06-02 PROCEDURE — 74177 CT ABD & PELVIS W/CONTRAST: CPT | Mod: TC

## 2023-06-02 PROCEDURE — 71260 CT THORAX DX C+: CPT | Mod: TC

## 2023-06-02 PROCEDURE — 71260 CT CHEST ABDOMEN PELVIS WITH CONTRAST (XPD): ICD-10-PCS | Mod: 26,,, | Performed by: RADIOLOGY

## 2023-06-02 PROCEDURE — 25500020 PHARM REV CODE 255: Performed by: STUDENT IN AN ORGANIZED HEALTH CARE EDUCATION/TRAINING PROGRAM

## 2023-06-02 PROCEDURE — 71260 CT THORAX DX C+: CPT | Mod: 26,,, | Performed by: RADIOLOGY

## 2023-06-02 PROCEDURE — 74177 CT ABD & PELVIS W/CONTRAST: CPT | Mod: 26,,, | Performed by: RADIOLOGY

## 2023-06-02 PROCEDURE — 74177 CT CHEST ABDOMEN PELVIS WITH CONTRAST (XPD): ICD-10-PCS | Mod: 26,,, | Performed by: RADIOLOGY

## 2023-06-02 RX ADMIN — IOHEXOL 100 ML: 350 INJECTION, SOLUTION INTRAVENOUS at 09:06

## 2023-06-02 RX ADMIN — IOHEXOL 15 ML: 300 INJECTION, SOLUTION INTRAVENOUS at 09:06

## 2023-06-07 ENCOUNTER — TELEPHONE (OUTPATIENT)
Dept: RADIATION ONCOLOGY | Facility: CLINIC | Age: 58
End: 2023-06-07
Payer: MEDICAID

## 2023-06-07 ENCOUNTER — LAB VISIT (OUTPATIENT)
Dept: LAB | Facility: HOSPITAL | Age: 58
End: 2023-06-07
Attending: STUDENT IN AN ORGANIZED HEALTH CARE EDUCATION/TRAINING PROGRAM
Payer: MEDICAID

## 2023-06-07 ENCOUNTER — TUMOR BOARD CONFERENCE (OUTPATIENT)
Dept: HEMATOLOGY/ONCOLOGY | Facility: CLINIC | Age: 58
End: 2023-06-07
Payer: MEDICAID

## 2023-06-07 ENCOUNTER — OFFICE VISIT (OUTPATIENT)
Dept: HEMATOLOGY/ONCOLOGY | Facility: CLINIC | Age: 58
End: 2023-06-07
Payer: MEDICAID

## 2023-06-07 ENCOUNTER — INFUSION (OUTPATIENT)
Dept: INFUSION THERAPY | Facility: HOSPITAL | Age: 58
End: 2023-06-07
Attending: STUDENT IN AN ORGANIZED HEALTH CARE EDUCATION/TRAINING PROGRAM
Payer: MEDICAID

## 2023-06-07 VITALS
SYSTOLIC BLOOD PRESSURE: 119 MMHG | WEIGHT: 217.63 LBS | BODY MASS INDEX: 34.16 KG/M2 | HEIGHT: 67 IN | OXYGEN SATURATION: 97 % | HEART RATE: 96 BPM | HEART RATE: 106 BPM | TEMPERATURE: 99 F | SYSTOLIC BLOOD PRESSURE: 137 MMHG | DIASTOLIC BLOOD PRESSURE: 69 MMHG | RESPIRATION RATE: 18 BRPM | DIASTOLIC BLOOD PRESSURE: 85 MMHG | OXYGEN SATURATION: 97 %

## 2023-06-07 DIAGNOSIS — C34.90 LUNG CANCER METASTATIC TO BONE: ICD-10-CM

## 2023-06-07 DIAGNOSIS — Z51.5 PALLIATIVE CARE BY SPECIALIST: ICD-10-CM

## 2023-06-07 DIAGNOSIS — C79.31 SECONDARY MALIGNANT NEOPLASM OF BRAIN: Primary | ICD-10-CM

## 2023-06-07 DIAGNOSIS — D84.821 IMMUNODEFICIENCY SECONDARY TO CHEMOTHERAPY: ICD-10-CM

## 2023-06-07 DIAGNOSIS — I10 PRIMARY HYPERTENSION: ICD-10-CM

## 2023-06-07 DIAGNOSIS — Z76.0 MEDICATION REFILL: ICD-10-CM

## 2023-06-07 DIAGNOSIS — C34.90 SMALL CELL LUNG CANCER: Primary | ICD-10-CM

## 2023-06-07 DIAGNOSIS — C79.51 LUNG CANCER METASTATIC TO BONE: ICD-10-CM

## 2023-06-07 DIAGNOSIS — E11.69 TYPE 2 DIABETES MELLITUS WITH OTHER SPECIFIED COMPLICATION, WITHOUT LONG-TERM CURRENT USE OF INSULIN: ICD-10-CM

## 2023-06-07 DIAGNOSIS — D49.9 IMMUNODEFICIENCY SECONDARY TO NEOPLASM: ICD-10-CM

## 2023-06-07 DIAGNOSIS — T45.1X5A IMMUNODEFICIENCY SECONDARY TO CHEMOTHERAPY: ICD-10-CM

## 2023-06-07 DIAGNOSIS — E11.9 TYPE 2 DIABETES MELLITUS, WITHOUT LONG-TERM CURRENT USE OF INSULIN: ICD-10-CM

## 2023-06-07 DIAGNOSIS — G89.3 CANCER RELATED PAIN: ICD-10-CM

## 2023-06-07 DIAGNOSIS — I10 HYPERTENSION, UNSPECIFIED TYPE: ICD-10-CM

## 2023-06-07 DIAGNOSIS — C34.90 MALIGNANT NEOPLASM OF UNSPECIFIED PART OF UNSPECIFIED BRONCHUS OR LUNG: ICD-10-CM

## 2023-06-07 DIAGNOSIS — Z79.01 ANTICOAGULATED: ICD-10-CM

## 2023-06-07 DIAGNOSIS — Z09 FOLLOW-UP EXAM: ICD-10-CM

## 2023-06-07 DIAGNOSIS — I26.99 ACUTE PULMONARY EMBOLISM WITHOUT ACUTE COR PULMONALE, UNSPECIFIED PULMONARY EMBOLISM TYPE: ICD-10-CM

## 2023-06-07 DIAGNOSIS — C79.31 SECONDARY MALIGNANT NEOPLASM OF BRAIN: ICD-10-CM

## 2023-06-07 DIAGNOSIS — C78.7 SECONDARY MALIGNANT NEOPLASM OF LIVER: ICD-10-CM

## 2023-06-07 DIAGNOSIS — D84.81 IMMUNODEFICIENCY SECONDARY TO NEOPLASM: ICD-10-CM

## 2023-06-07 DIAGNOSIS — Z79.899 IMMUNODEFICIENCY SECONDARY TO CHEMOTHERAPY: ICD-10-CM

## 2023-06-07 LAB
ALBUMIN SERPL BCP-MCNC: 4 G/DL (ref 3.5–5.2)
ALP SERPL-CCNC: 461 U/L (ref 55–135)
ALT SERPL W/O P-5'-P-CCNC: 100 U/L (ref 10–44)
ANION GAP SERPL CALC-SCNC: 11 MMOL/L (ref 8–16)
AST SERPL-CCNC: 71 U/L (ref 10–40)
BASOPHILS # BLD AUTO: 0.07 K/UL (ref 0–0.2)
BASOPHILS NFR BLD: 1.5 % (ref 0–1.9)
BILIRUB SERPL-MCNC: 1.1 MG/DL (ref 0.1–1)
BUN SERPL-MCNC: 18 MG/DL (ref 6–20)
CALCIUM SERPL-MCNC: 10.7 MG/DL (ref 8.7–10.5)
CHLORIDE SERPL-SCNC: 102 MMOL/L (ref 95–110)
CO2 SERPL-SCNC: 27 MMOL/L (ref 23–29)
CREAT SERPL-MCNC: 1.2 MG/DL (ref 0.5–1.4)
DIFFERENTIAL METHOD: ABNORMAL
EOSINOPHIL # BLD AUTO: 0.1 K/UL (ref 0–0.5)
EOSINOPHIL NFR BLD: 1.5 % (ref 0–8)
ERYTHROCYTE [DISTWIDTH] IN BLOOD BY AUTOMATED COUNT: 15.6 % (ref 11.5–14.5)
EST. GFR  (NO RACE VARIABLE): >60 ML/MIN/1.73 M^2
GLUCOSE SERPL-MCNC: 148 MG/DL (ref 70–110)
HCT VFR BLD AUTO: 45.8 % (ref 40–54)
HGB BLD-MCNC: 14.8 G/DL (ref 14–18)
IMM GRANULOCYTES # BLD AUTO: 0.1 K/UL (ref 0–0.04)
IMM GRANULOCYTES NFR BLD AUTO: 2.2 % (ref 0–0.5)
LYMPHOCYTES # BLD AUTO: 0.6 K/UL (ref 1–4.8)
LYMPHOCYTES NFR BLD: 12 % (ref 18–48)
MAGNESIUM SERPL-MCNC: 2 MG/DL (ref 1.6–2.6)
MCH RBC QN AUTO: 28.8 PG (ref 27–31)
MCHC RBC AUTO-ENTMCNC: 32.3 G/DL (ref 32–36)
MCV RBC AUTO: 89 FL (ref 82–98)
MONOCYTES # BLD AUTO: 1.1 K/UL (ref 0.3–1)
MONOCYTES NFR BLD: 22.8 % (ref 4–15)
NEUTROPHILS # BLD AUTO: 2.8 K/UL (ref 1.8–7.7)
NEUTROPHILS NFR BLD: 60 % (ref 38–73)
NRBC BLD-RTO: 0 /100 WBC
PLATELET # BLD AUTO: 309 K/UL (ref 150–450)
PMV BLD AUTO: 9.3 FL (ref 9.2–12.9)
POTASSIUM SERPL-SCNC: 5.3 MMOL/L (ref 3.5–5.1)
PROT SERPL-MCNC: 7.6 G/DL (ref 6–8.4)
RBC # BLD AUTO: 5.13 M/UL (ref 4.6–6.2)
SODIUM SERPL-SCNC: 140 MMOL/L (ref 136–145)
WBC # BLD AUTO: 4.6 K/UL (ref 3.9–12.7)

## 2023-06-07 PROCEDURE — 99213 OFFICE O/P EST LOW 20 MIN: CPT | Mod: PBBFAC,25 | Performed by: STUDENT IN AN ORGANIZED HEALTH CARE EDUCATION/TRAINING PROGRAM

## 2023-06-07 PROCEDURE — 36415 COLL VENOUS BLD VENIPUNCTURE: CPT | Performed by: STUDENT IN AN ORGANIZED HEALTH CARE EDUCATION/TRAINING PROGRAM

## 2023-06-07 PROCEDURE — 3008F BODY MASS INDEX DOCD: CPT | Mod: CPTII,,, | Performed by: STUDENT IN AN ORGANIZED HEALTH CARE EDUCATION/TRAINING PROGRAM

## 2023-06-07 PROCEDURE — 96372 THER/PROPH/DIAG INJ SC/IM: CPT | Mod: 59

## 2023-06-07 PROCEDURE — 96361 HYDRATE IV INFUSION ADD-ON: CPT

## 2023-06-07 PROCEDURE — 99999 PR PBB SHADOW E&M-EST. PATIENT-LVL III: ICD-10-PCS | Mod: PBBFAC,,, | Performed by: STUDENT IN AN ORGANIZED HEALTH CARE EDUCATION/TRAINING PROGRAM

## 2023-06-07 PROCEDURE — 4010F PR ACE/ARB THEARPY RXD/TAKEN: ICD-10-PCS | Mod: CPTII,,, | Performed by: STUDENT IN AN ORGANIZED HEALTH CARE EDUCATION/TRAINING PROGRAM

## 2023-06-07 PROCEDURE — 85025 COMPLETE CBC W/AUTO DIFF WBC: CPT | Performed by: STUDENT IN AN ORGANIZED HEALTH CARE EDUCATION/TRAINING PROGRAM

## 2023-06-07 PROCEDURE — 3079F PR MOST RECENT DIASTOLIC BLOOD PRESSURE 80-89 MM HG: ICD-10-PCS | Mod: CPTII,,, | Performed by: STUDENT IN AN ORGANIZED HEALTH CARE EDUCATION/TRAINING PROGRAM

## 2023-06-07 PROCEDURE — 99215 OFFICE O/P EST HI 40 MIN: CPT | Mod: S$PBB,,, | Performed by: STUDENT IN AN ORGANIZED HEALTH CARE EDUCATION/TRAINING PROGRAM

## 2023-06-07 PROCEDURE — 3079F DIAST BP 80-89 MM HG: CPT | Mod: CPTII,,, | Performed by: STUDENT IN AN ORGANIZED HEALTH CARE EDUCATION/TRAINING PROGRAM

## 2023-06-07 PROCEDURE — 63600175 PHARM REV CODE 636 W HCPCS: Performed by: STUDENT IN AN ORGANIZED HEALTH CARE EDUCATION/TRAINING PROGRAM

## 2023-06-07 PROCEDURE — 80053 COMPREHEN METABOLIC PANEL: CPT | Performed by: STUDENT IN AN ORGANIZED HEALTH CARE EDUCATION/TRAINING PROGRAM

## 2023-06-07 PROCEDURE — A4216 STERILE WATER/SALINE, 10 ML: HCPCS | Performed by: STUDENT IN AN ORGANIZED HEALTH CARE EDUCATION/TRAINING PROGRAM

## 2023-06-07 PROCEDURE — 3075F PR MOST RECENT SYSTOLIC BLOOD PRESS GE 130-139MM HG: ICD-10-PCS | Mod: CPTII,,, | Performed by: STUDENT IN AN ORGANIZED HEALTH CARE EDUCATION/TRAINING PROGRAM

## 2023-06-07 PROCEDURE — 99999 PR PBB SHADOW E&M-EST. PATIENT-LVL III: CPT | Mod: PBBFAC,,, | Performed by: STUDENT IN AN ORGANIZED HEALTH CARE EDUCATION/TRAINING PROGRAM

## 2023-06-07 PROCEDURE — 96374 THER/PROPH/DIAG INJ IV PUSH: CPT

## 2023-06-07 PROCEDURE — 25000003 PHARM REV CODE 250: Performed by: STUDENT IN AN ORGANIZED HEALTH CARE EDUCATION/TRAINING PROGRAM

## 2023-06-07 PROCEDURE — 3075F SYST BP GE 130 - 139MM HG: CPT | Mod: CPTII,,, | Performed by: STUDENT IN AN ORGANIZED HEALTH CARE EDUCATION/TRAINING PROGRAM

## 2023-06-07 PROCEDURE — 4010F ACE/ARB THERAPY RXD/TAKEN: CPT | Mod: CPTII,,, | Performed by: STUDENT IN AN ORGANIZED HEALTH CARE EDUCATION/TRAINING PROGRAM

## 2023-06-07 PROCEDURE — 99215 PR OFFICE/OUTPT VISIT, EST, LEVL V, 40-54 MIN: ICD-10-PCS | Mod: S$PBB,,, | Performed by: STUDENT IN AN ORGANIZED HEALTH CARE EDUCATION/TRAINING PROGRAM

## 2023-06-07 PROCEDURE — 3008F PR BODY MASS INDEX (BMI) DOCUMENTED: ICD-10-PCS | Mod: CPTII,,, | Performed by: STUDENT IN AN ORGANIZED HEALTH CARE EDUCATION/TRAINING PROGRAM

## 2023-06-07 PROCEDURE — 83735 ASSAY OF MAGNESIUM: CPT | Performed by: STUDENT IN AN ORGANIZED HEALTH CARE EDUCATION/TRAINING PROGRAM

## 2023-06-07 RX ORDER — ZOLEDRONIC ACID 0.04 MG/ML
4 INJECTION, SOLUTION INTRAVENOUS
OUTPATIENT
Start: 2023-07-05

## 2023-06-07 RX ORDER — HEPARIN 100 UNIT/ML
500 SYRINGE INTRAVENOUS
Status: DISCONTINUED | OUTPATIENT
Start: 2023-06-07 | End: 2023-06-07 | Stop reason: HOSPADM

## 2023-06-07 RX ORDER — ZOLEDRONIC ACID 0.04 MG/ML
4 INJECTION, SOLUTION INTRAVENOUS
Status: COMPLETED | OUTPATIENT
Start: 2023-06-07 | End: 2023-06-07

## 2023-06-07 RX ORDER — HEPARIN 100 UNIT/ML
500 SYRINGE INTRAVENOUS
OUTPATIENT
Start: 2023-07-05

## 2023-06-07 RX ORDER — HEPARIN 100 UNIT/ML
500 SYRINGE INTRAVENOUS
Status: CANCELLED | OUTPATIENT
Start: 2023-07-13

## 2023-06-07 RX ORDER — NALOXONE HYDROCHLORIDE 4 MG/.1ML
SPRAY NASAL
Qty: 1 EACH | Refills: 11 | Status: SHIPPED | OUTPATIENT
Start: 2023-06-07

## 2023-06-07 RX ORDER — LOSARTAN POTASSIUM 100 MG/1
100 TABLET ORAL DAILY
Qty: 30 TABLET | Refills: 2 | Status: SHIPPED | OUTPATIENT
Start: 2023-06-07 | End: 2023-09-05

## 2023-06-07 RX ORDER — SODIUM CHLORIDE 0.9 % (FLUSH) 0.9 %
10 SYRINGE (ML) INJECTION
OUTPATIENT
Start: 2023-07-05

## 2023-06-07 RX ORDER — CYANOCOBALAMIN 1000 UG/ML
1000 INJECTION, SOLUTION INTRAMUSCULAR; SUBCUTANEOUS
Status: CANCELLED | OUTPATIENT
Start: 2023-06-07

## 2023-06-07 RX ORDER — DOXYCYCLINE 100 MG/1
100 CAPSULE ORAL 2 TIMES DAILY
Qty: 14 CAPSULE | Refills: 0 | Status: SHIPPED | OUTPATIENT
Start: 2023-06-07 | End: 2023-06-14

## 2023-06-07 RX ORDER — CYANOCOBALAMIN 1000 UG/ML
1000 INJECTION, SOLUTION INTRAMUSCULAR; SUBCUTANEOUS
Status: COMPLETED | OUTPATIENT
Start: 2023-06-07 | End: 2023-06-07

## 2023-06-07 RX ORDER — SODIUM CHLORIDE 0.9 % (FLUSH) 0.9 %
10 SYRINGE (ML) INJECTION
Status: CANCELLED | OUTPATIENT
Start: 2023-07-13

## 2023-06-07 RX ORDER — SODIUM CHLORIDE 0.9 % (FLUSH) 0.9 %
10 SYRINGE (ML) INJECTION
Status: DISCONTINUED | OUTPATIENT
Start: 2023-06-07 | End: 2023-06-07 | Stop reason: HOSPADM

## 2023-06-07 RX ORDER — OXYCODONE HYDROCHLORIDE 5 MG/1
5 TABLET ORAL EVERY 6 HOURS PRN
Qty: 120 TABLET | Refills: 0 | Status: SHIPPED | OUTPATIENT
Start: 2023-06-07 | End: 2023-07-03

## 2023-06-07 RX ADMIN — ZOLEDRONIC ACID 4 MG: 0.04 INJECTION, SOLUTION INTRAVENOUS at 09:06

## 2023-06-07 RX ADMIN — SODIUM CHLORIDE 1000 ML: 9 INJECTION, SOLUTION INTRAVENOUS at 08:06

## 2023-06-07 RX ADMIN — Medication 10 ML: at 09:06

## 2023-06-07 RX ADMIN — HEPARIN 500 UNITS: 100 SYRINGE at 09:06

## 2023-06-07 RX ADMIN — CYANOCOBALAMIN 1000 MCG: 1000 INJECTION, SOLUTION INTRAMUSCULAR at 08:06

## 2023-06-07 NOTE — PROGRESS NOTES
INFECTIOUS DISEASE CLINIC  5/17/23     Subjective:      Chief Complaint:   Chief Complaint   Patient presents with    MAC       History of Present Illness:    Patient Juan Gillespie is a 58 y.o. male with h/o metastatic SCLC on chemo who presents today as referral from [facility] for nasal swab positive for MAC. Reports some fatigue. Denies cough, phlegm, hemoptysis.    No positive micro     Last CT chest 3/10/23  1. Overall findings suggest a mixed response to therapy.  2. 1.5 x 2.1-cm (previously 3.4 x 3.7-cm) right cervical abnormally enlarged lymph node.  3. 2.0 x 2.8-cm (previously 3.6 x 4.3-cm) superior mediastinal abnormally enlarged lymph node.  4. Right paratracheal/pretracheal abnormally enlarged lymph node is not significantly changed.  5. 2.9 x 2.1-cm (previously 3.2 x 2.3-cm) soft tissue mass in the medial segment of the right middle lobe.  6. Decreased consolidation in the right lower lobe.  7. Multiple subtle hepatic hypodense nodules and masses that are not abnormally characterize on current exam but appear overall stable in number and size.  8. Significant diffuse interval worsening of predominantly sclerotic osseous metastatic lesions throughout the axial and proximal appendicular skeleton with new and enlarging osseous lesions noted throughout.      Review of Symptoms:  Constitutional: Denies fevers, chills, or weakness.  ENT: Denies dysphagia, nasal discharge, ear pain or discharge.  Cardiovascular: Denies chest pain, palpitations, orthopnea, or claudication.  Respiratory: Denies shortness of breath, cough, hemoptysis, or wheezing.  GI: Denies nausea/vomitting, hematochezia, melena, abd pain, or changes in appetite.  : Denies dysuria, incontinence, or hematuria.  Musculoskeletal: Denies joint pain or myalgias.  Skin/breast: Denies rashes, lumps, lesions, or discharge.  Neurologic: Denies headache, dizziness, vertigo, or paresthesias.    Past Medical History:   Diagnosis Date    Cancer     Small Cell  -Stage 4 Lung Cancer    Diabetes mellitus, type 2     Hypertension        Past Surgical History:   Procedure Laterality Date    ENDOBRONCHIAL ULTRASOUND N/A 3/22/2022    Procedure: ENDOBRONCHIAL ULTRASOUND (EBUS);  Surgeon: Kristin Samuels MD;  Location: [facility] OR [address] [place] [address];  Service: Endoscopy;  Laterality: N/A;    KNEE SURGERY         Family History   Problem Relation Age of Onset    Diabetes Mellitus Mother     Pacemaker/defibrilator Father     Lung cancer Father        Social History     Socioeconomic History    Marital status:    Tobacco Use    Smoking status: Former     Types: Cigarettes    Smokeless tobacco: Former    Tobacco comments:     stop smoking 26 years ago   Substance and Sexual Activity    Alcohol use: No    Drug use: No    Sexual activity: Yes       Review of patient's allergies indicates:  No Known Allergies      Objective:   VS (24h):   Vitals:    05/17/23 0856   BP: 137/86   Pulse: 105     [unfilled]  General: Afebrile, alert, comfortable, no acute distress.   HEENT: CLARISSA. EOMI, no scleral icterus.   Pulmonary: Non labored,clear to auscultation A/P/L. No wheezing, crackles, or rhonchi.  Cardiac: normal S1 & S2 w/o rubs/murmurs/gallops.   Abdominal: Non-tender, non-distended.  Extremities: Moves all extremities x 4.   Skin: No jaundice, rashes, or visible lesions.   Neurological:  Alert and oriented x 4.     Labs:    Glucose   Date Value Ref Range Status   06/07/2023 148 (H) 70 - 110 mg/dL Final   05/31/2023 106 70 - 110 mg/dL Final   05/17/2023 142 (H) 70 - 110 mg/dL Final       Calcium   Date Value Ref Range Status   06/07/2023 10.7 (H) 8.7 - 10.5 mg/dL Final   05/31/2023 9.9 8.7 - 10.5 mg/dL Final   05/17/2023 9.8 8.7 - 10.5 mg/dL Final       Albumin   Date Value Ref Range Status   06/07/2023 4.0 3.5 - 5.2 g/dL Final   05/31/2023 3.6 3.5 - 5.2 g/dL Final   05/17/2023 3.8 3.5 - 5.2 g/dL Final       Total Protein   Date Value Ref Range Status   06/07/2023 7.6 6.0 - 8.4 g/dL Final    05/31/2023 7.1 6.0 - 8.4 g/dL Final   05/17/2023 7.2 6.0 - 8.4 g/dL Final       Sodium   Date Value Ref Range Status   06/07/2023 140 136 - 145 mmol/L Final   05/31/2023 139 136 - 145 mmol/L Final   05/17/2023 141 136 - 145 mmol/L Final       Potassium   Date Value Ref Range Status   06/07/2023 5.3 (H) 3.5 - 5.1 mmol/L Final   05/31/2023 4.6 3.5 - 5.1 mmol/L Final   05/17/2023 4.4 3.5 - 5.1 mmol/L Final       CO2   Date Value Ref Range Status   06/07/2023 27 23 - 29 mmol/L Final   05/31/2023 27 23 - 29 mmol/L Final   05/17/2023 25 23 - 29 mmol/L Final       Chloride   Date Value Ref Range Status   06/07/2023 102 95 - 110 mmol/L Final   05/31/2023 105 95 - 110 mmol/L Final   05/17/2023 107 95 - 110 mmol/L Final       BUN   Date Value Ref Range Status   06/07/2023 18 6 - 20 mg/dL Final   05/31/2023 16 6 - 20 mg/dL Final   05/17/2023 16 6 - 20 mg/dL Final       Creatinine   Date Value Ref Range Status   06/07/2023 1.2 0.5 - 1.4 mg/dL Final   05/31/2023 1.0 0.5 - 1.4 mg/dL Final   05/17/2023 1.0 0.5 - 1.4 mg/dL Final       Alkaline Phosphatase   Date Value Ref Range Status   06/07/2023 461 (H) 55 - 135 U/L Final   05/31/2023 451 (H) 55 - 135 U/L Final   05/17/2023 407 (H) 55 - 135 U/L Final       ALT   Date Value Ref Range Status   06/07/2023 100 (H) 10 - 44 U/L Final   05/31/2023 109 (H) 10 - 44 U/L Final   05/17/2023 71 (H) 10 - 44 U/L Final       AST   Date Value Ref Range Status   06/07/2023 71 (H) 10 - 40 U/L Final   05/31/2023 77 (H) 10 - 40 U/L Final   05/17/2023 52 (H) 10 - 40 U/L Final       Total Bilirubin   Date Value Ref Range Status   06/07/2023 1.1 (H) 0.1 - 1.0 mg/dL Final     Comment:     For infants and newborns, interpretation of results should be based  on gestational age, weight and in agreement with clinical  observations.    Premature Infant recommended reference ranges:  Up to 24 hours.............<8.0 mg/dL  Up to 48 hours............<12.0 mg/dL  3-5 days..................<15.0 mg/dL  6-29  days.................<15.0 mg/dL     05/31/2023 0.5 0.1 - 1.0 mg/dL Final     Comment:     For infants and newborns, interpretation of results should be based  on gestational age, weight and in agreement with clinical  observations.    Premature Infant recommended reference ranges:  Up to 24 hours.............<8.0 mg/dL  Up to 48 hours............<12.0 mg/dL  3-5 days..................<15.0 mg/dL  6-29 days.................<15.0 mg/dL     05/17/2023 0.7 0.1 - 1.0 mg/dL Final     Comment:     For infants and newborns, interpretation of results should be based  on gestational age, weight and in agreement with clinical  observations.    Premature Infant recommended reference ranges:  Up to 24 hours.............<8.0 mg/dL  Up to 48 hours............<12.0 mg/dL  3-5 days..................<15.0 mg/dL  6-29 days.................<15.0 mg/dL         WBC   Date Value Ref Range Status   06/07/2023 4.60 3.90 - 12.70 K/uL Final   05/31/2023 2.40 (L) 3.90 - 12.70 K/uL Final   05/17/2023 4.07 3.90 - 12.70 K/uL Final       Hemoglobin   Date Value Ref Range Status   06/07/2023 14.8 14.0 - 18.0 g/dL Final   05/31/2023 13.3 (L) 14.0 - 18.0 g/dL Final   05/17/2023 13.5 (L) 14.0 - 18.0 g/dL Final       POC Hematocrit   Date Value Ref Range Status   03/19/2022 50 36 - 54 %PCV Final     Hematocrit   Date Value Ref Range Status   06/07/2023 45.8 40.0 - 54.0 % Final   05/31/2023 39.5 (L) 40.0 - 54.0 % Final   05/17/2023 42.0 40.0 - 54.0 % Final       MCV   Date Value Ref Range Status   06/07/2023 89 82 - 98 fL Final   05/31/2023 87 82 - 98 fL Final   05/17/2023 89 82 - 98 fL Final       Platelets   Date Value Ref Range Status   06/07/2023 309 150 - 450 K/uL Final   05/31/2023 221 150 - 450 K/uL Final   05/17/2023 310 150 - 450 K/uL Final     No results found for: CHOL  No results found for: HDL  No results found for: LDLCALC  No results found for: TRIG  No results found for: CHOLHDL  No results found for: RPR  No results found for:  QUANTIFERON    Medications:  Current Outpatient Medications on File Prior to Visit   Medication Sig Dispense Refill    albuterol (ACCUNEB) 1.25 mg/3 mL Nebu Use 1 vial (1.25 mg total) by nebulization 3 (three) times daily as needed (shortness of breath). Rescue 90 mL 2    allopurinoL (ZYLOPRIM) 300 MG tablet Take 1 tablet (300 mg total) by mouth once daily. 60 tablet 3    benzonatate (TESSALON) 100 MG capsule Take 1 capsule by mouth as needed.      benzonatate (TESSALON) 200 MG capsule Take one capsule three times daily as needed for cough not to exceed 3 capsules daily 60 capsule 0    cetirizine (ZYRTEC) 10 MG tablet Take 1 tablet by mouth as needed.      dapagliflozin (FARXIGA) 10 mg tablet Take 1 tablet by mouth once daily.      insulin aspart U-100 (NOVOLOG) 100 unit/mL (3 mL) InPn pen Inject 1 Units into the skin as needed. Inject into skin if over 200.      montelukast (SINGULAIR) 10 mg tablet Take 10 mg by mouth every evening.      morphine (MS CONTIN) 30 MG 12 hr tablet Take 1 tablet (30 mg total) by mouth every 12 (twelve) hours. 60 tablet 0    promethazine-codeine 6.25-10 mg/5 ml (PHENERGAN WITH CODEINE) 6.25-10 mg/5 mL syrup Take 5 ml every 6 hours as needed for cough 240 mL 0    semaglutide (OZEMPIC) 0.25 mg or 0.5 mg(2 mg/1.5 mL) pen injector Inject 0.25 mg into the skin once a week.      senna-docusate 8.6-50 mg (PERICOLACE) 8.6-50 mg per tablet Take 1 tablet by mouth daily as needed for Constipation. 30 tablet 2    TRUE METRIX GLUCOSE TEST STRIP Strp       TRUEPLUS LANCETS 33 gauge Misc       [DISCONTINUED] losartan (COZAAR) 100 MG tablet Take 1 tablet (100 mg total) by mouth once daily. 30 tablet 0    [DISCONTINUED] albuterol-ipratropium (DUO-NEB) 2.5 mg-0.5 mg/3 mL nebulizer solution Take 3 mLs by nebulization every 4 (four) hours. Rescue 90 mL 3    [DISCONTINUED] azelastine (ASTELIN) 137 mcg (0.1 %) nasal spray 1 spray by Nasal route 2 (two) times daily.       No current facility-administered  medications on file prior to visit.       Antibiotics:   Antibiotics (From admission, onward)      None            HIV: No components found for: HIV 1/2 AG/AB  Hepatitis C IgG: No components found for: HEPATITIS C  Syphilis: No results found for: RPR    Hepatitis A IgG: No components found for: HEPATITIS A IGG  Hepatitis Bc IgG: No components found for: HEPATITIS B CORE IGG  Hepatitis Bs IgG:  Quantiferon: No results found for: QUANTIFERON  VZV IgG: No components found for: VARICELLA IGG    No components found for: SEDIMENTATION RATE  No components found for: C-REACTIVE PROTEIN      Microbiology x 7d:   Microbiology Results (last 7 days)       ** No results found for the last 168 hours. **              There is no immunization history on file for this patient.      Reviewed records today as well as relevant labs, cultures, and imaging    Assessment:     Reportedly positive AFB nasal swab; no results available to review (had requested fax; no AFB culture in [facility] or epic)  H/o metastatic lung cancer on chemo      Suspect the MAC nasal swab represents colonization. No current sinus symptoms. If there is concern for intranasal pathology, would need sinus debridement and repeat cultures and notify ID. Counseled against using tap water in claudia pot (sounds like he only uses sterile water).    Ct chest with some areas of consolidation, which sounds like hematology suspects is related to malignancy. Will monitor AFB sputum cultures       Plan:     Discussed importance of airway clearance. Encouraged use of Aerobika and nebulized hypertonic saline twice a day. hypertonic saline is hostile environment for mycobacteria. Recommended decreasing duration of hot showers, avoid soil and water aerosols, use fans in shower area, avoid fine mist shower heads (heavy droplets better), avoid bubbling water (hot tubs, humidifiers), flush water heater frequently to avoid biofilm buildup    Prevent reflux- avoid stomach sleeping.  Sleep inclined. Famotidine/tums preferrable to PPI if needed. Stop liquids 3hr before bedtime.     Monitor AFB sputum monthly     - The following labs were ordered:    Orders Placed This Encounter   Procedures    AFB Culture & Smear     Standing Status:   Future     Standing Expiration Date:   7/15/2024       I have sent communication to the referring physician and/or primary care provider.     The total time for evaluation and management services performed on 5/17/23 was greater than 35 minutes.  This includes face to face time and non-face to face time preparing to see the patient (eg, review of tests), obtaining and/or reviewing separately obtained history, documenting clinical information in the electronic or other health record, independently interpreting results, and communicating results to the patient/family/caregiver, or care coordination.             Sima Young MD, MPH  Infectious Disease

## 2023-06-07 NOTE — PROGRESS NOTES
[facility]  Hematology/Medical Oncology Clinic       PATIENT: Juan Gillespie  MRN: [id number]  DATE: 6/7/2023    Reason for referral: Extensive stage small cell lung ca    Initial History: Juan Gillespie is a 58 year old man with extensive stage SCLC who presents to clinic for evaluation prior to treatment  of topotecan.          Oncological History:  -Started Carbo/Etop/Atez 4/5/22  -Maintenance atezolizumab 7/5/22  -Consolidative thoracic radiation 8/4/22-8/18/22  -topotecan started 10/24/23-3/10/22(missed Nov tx)-mixed resposne  -Lurbinectadin started 4/05/23 to 6/2023    Interval History:     Pt presents for MD visit after ED visit for PE.  Pt is complaining of fatigue and worsening bone pain at time of visit.  CT unfortunately demonstrated progression and reviewed with pt.  Will see if pt a candidate for clinical trial and additional palliative XRT.  If not pt may need hospice.  Zometa and fluids given today.        His wife accompanies him at this visit.  Past Medical History:   Past Medical History:   Diagnosis Date    Cancer     Small Cell -Stage 4 Lung Cancer    Diabetes mellitus, type 2     Hypertension        Past Surgical HIstory:   Past Surgical History:   Procedure Laterality Date    ENDOBRONCHIAL ULTRASOUND N/A 3/22/2022    Procedure: ENDOBRONCHIAL ULTRASOUND (EBUS);  Surgeon: Kristin Samuels MD;  Location: [address];  Service: Endoscopy;  Laterality: N/A;    KNEE SURGERY         Family History:   Family History   Problem Relation Age of Onset    Diabetes Mellitus Mother     Pacemaker/defibrilator Father     Lung cancer Father        Social History:  reports that he has quit smoking. His smoking use included cigarettes. He has quit using smokeless tobacco. He reports that he does not drink alcohol and does not use drugs.    Allergies:  Review of patient's allergies indicates:  No Known Allergies    Medications:  Current Outpatient Medications   Medication Sig Dispense Refill     albuterol (ACCUNEB) 1.25 mg/3 mL Nebu Use 1 vial (1.25 mg total) by nebulization 3 (three) times daily as needed (shortness of breath). Rescue 90 mL 2    allopurinoL (ZYLOPRIM) 300 MG tablet Take 1 tablet (300 mg total) by mouth once daily. 60 tablet 3    apixaban (ELIQUIS DVT-PE TREAT 30D START) 5 mg (74 tabs) DsPk For the first 7 days take two 5 mg tablets twice daily.  After 7 days take one 5 mg tablet twice daily. 74 tablet 0    benzonatate (TESSALON) 100 MG capsule Take 1 capsule by mouth as needed.      benzonatate (TESSALON) 200 MG capsule Take one capsule three times daily as needed for cough not to exceed 3 capsules daily 60 capsule 0    cetirizine (ZYRTEC) 10 MG tablet Take 1 tablet by mouth as needed.      dapagliflozin (FARXIGA) 10 mg tablet Take 1 tablet by mouth once daily.      insulin aspart U-100 (NOVOLOG) 100 unit/mL (3 mL) InPn pen Inject 1 Units into the skin as needed. Inject into skin if over 200.      montelukast (SINGULAIR) 10 mg tablet Take 10 mg by mouth every evening.      morphine (MS CONTIN) 30 MG 12 hr tablet Take 1 tablet (30 mg total) by mouth every 12 (twelve) hours. 60 tablet 0    promethazine-codeine 6.25-10 mg/5 ml (PHENERGAN WITH CODEINE) 6.25-10 mg/5 mL syrup Take 5 ml every 6 hours as needed for cough 240 mL 0    semaglutide (OZEMPIC) 0.25 mg or 0.5 mg(2 mg/1.5 mL) pen injector Inject 0.25 mg into the skin once a week.      senna-docusate 8.6-50 mg (PERICOLACE) 8.6-50 mg per tablet Take 1 tablet by mouth daily as needed for Constipation. 30 tablet 2    sodium chloride 3% 3 % nebulizer solution Take 4 mLs by nebulization 2 (two) times a day. 240 mL 11    TRUE METRIX GLUCOSE TEST STRIP Strp       TRUEPLUS LANCETS 33 gauge Misc       doxycycline (VIBRAMYCIN) 100 MG Cap Take 1 capsule (100 mg total) by mouth 2 (two) times daily. for 7 days 14 capsule 0    losartan (COZAAR) 100 MG tablet Take 1 tablet (100 mg total) by mouth once daily. 30 tablet 2    naloxone (NARCAN) 4  "mg/actuation Spry 4mg by nasal route as needed for opioid overdose; may repeat every 2-3 minutes in alternating nostrils until medical help arrives. Call 911 1 each 11    oxyCODONE (ROXICODONE) 5 MG immediate release tablet Take 1 tablet (5 mg total) by mouth every 6 (six) hours as needed for Pain. 120 tablet 0     No current facility-administered medications for this visit.       Review of Systems   Constitutional:  Positive for fatigue. Negative for chills and fever.        Rash   HENT:  Negative for congestion, sinus pressure and trouble swallowing.    Respiratory:  Negative for cough, chest tightness and shortness of breath.    Cardiovascular:  Negative for chest pain.   Gastrointestinal:  Negative for abdominal pain and blood in stool.   Endocrine: Negative for cold intolerance and heat intolerance.   Genitourinary:  Negative for hematuria.   Musculoskeletal:  Positive for arthralgias. Negative for back pain and myalgias.   Skin:  Negative for rash.   Neurological:  Negative for weakness.   Hematological:  Negative for adenopathy. Does not bruise/bleed easily.   Psychiatric/Behavioral:  Negative for dysphoric mood. The patient is not nervous/anxious.    ECOG Performance Status:   ECOG SCORE             Objective:      Vitals:   Vitals:    06/07/23 0816   BP: 137/85   BP Location: Left arm   Patient Position: Sitting   BP Method: Large (Automatic)   Pulse: 106   SpO2: 97%   Weight: 98.7 kg (217 lb 9.5 oz)   Height: 5' 7" (1.702 m)         telemedicine    Physical Exam  Constitutional:       General: He is not in acute distress.     Appearance: Normal appearance. He is not ill-appearing.   HENT:      Head: Normocephalic and atraumatic.      Nose: Nose normal.      Mouth/Throat:      Mouth: Mucous membranes are moist.      Pharynx: Oropharynx is clear. No oropharyngeal exudate or posterior oropharyngeal erythema.   Eyes:      General: No scleral icterus.     Extraocular Movements: Extraocular movements intact.     "  Conjunctiva/sclera: Conjunctivae normal.      Pupils: Pupils are equal, round, and reactive to light.   Pulmonary:      Effort: Pulmonary effort is normal. No respiratory distress.   Abdominal:      General: Abdomen is flat.      Palpations: Abdomen is soft.   Musculoskeletal:         General: No swelling. Normal range of motion.      Cervical back: Normal range of motion.      Right lower leg: No edema.      Left lower leg: No edema.   Skin:     General: Skin is warm and dry.      Coloration: Skin is not jaundiced.   Neurological:      General: No focal deficit present.      Mental Status: He is alert.   Psychiatric:         Mood and Affect: Mood normal.         Behavior: Behavior normal.         Thought Content: Thought content normal.         Judgment: Judgment normal.     Laboratory Data:  Recent Results (from the past 168 hour(s))   Comprehensive metabolic panel    Collection Time: 05/31/23 12:32 PM   Result Value Ref Range    Sodium 139 136 - 145 mmol/L    Potassium 4.6 3.5 - 5.1 mmol/L    Chloride 105 95 - 110 mmol/L    CO2 27 23 - 29 mmol/L    Glucose 106 70 - 110 mg/dL    BUN 16 6 - 20 mg/dL    Creatinine 1.0 0.5 - 1.4 mg/dL    Calcium 9.9 8.7 - 10.5 mg/dL    Total Protein 7.1 6.0 - 8.4 g/dL    Albumin 3.6 3.5 - 5.2 g/dL    Total Bilirubin 0.5 0.1 - 1.0 mg/dL    Alkaline Phosphatase 451 (H) 55 - 135 U/L    AST 77 (H) 10 - 40 U/L     (H) 10 - 44 U/L    Anion Gap 7 (L) 8 - 16 mmol/L    eGFR >60 >60 mL/min/1.73 m^2   CBC auto differential    Collection Time: 05/31/23 12:32 PM   Result Value Ref Range    WBC 2.40 (L) 3.90 - 12.70 K/uL    RBC 4.55 (L) 4.60 - 6.20 M/uL    Hemoglobin 13.3 (L) 14.0 - 18.0 g/dL    Hematocrit 39.5 (L) 40.0 - 54.0 %    MCV 87 82 - 98 fL    MCH 29.2 27.0 - 31.0 pg    MCHC 33.7 32.0 - 36.0 g/dL    RDW 15.1 (H) 11.5 - 14.5 %    Platelets 221 150 - 450 K/uL    MPV 10.0 9.2 - 12.9 fL    Immature Granulocytes 0.8 (H) 0.0 - 0.5 %    Gran # (ANC) 1.6 (L) 1.8 - 7.7 K/uL    Immature  Grans (Abs) 0.02 0.00 - 0.04 K/uL    Lymph # 0.4 (L) 1.0 - 4.8 K/uL    Mono # 0.3 0.3 - 1.0 K/uL    Eos # 0.1 0.0 - 0.5 K/uL    Baso # 0.01 0.00 - 0.20 K/uL    nRBC 0 0 /100 WBC    Gran % 65.0 38.0 - 73.0 %    Lymph % 16.7 (L) 18.0 - 48.0 %    Mono % 13.8 4.0 - 15.0 %    Eosinophil % 3.3 0.0 - 8.0 %    Basophil % 0.4 0.0 - 1.9 %    Differential Method Automated    Magnesium    Collection Time: 05/31/23 12:32 PM   Result Value Ref Range    Magnesium 2.0 1.6 - 2.6 mg/dL   Brain natriuretic peptide    Collection Time: 05/31/23 12:32 PM   Result Value Ref Range    BNP 60 0 - 99 pg/mL   Troponin I    Collection Time: 05/31/23 12:32 PM   Result Value Ref Range    Troponin I <0.006 0.000 - 0.026 ng/mL   COMPREHENSIVE METABOLIC PANEL    Collection Time: 06/07/23  7:08 AM   Result Value Ref Range    Sodium 140 136 - 145 mmol/L    Potassium 5.3 (H) 3.5 - 5.1 mmol/L    Chloride 102 95 - 110 mmol/L    CO2 27 23 - 29 mmol/L    Glucose 148 (H) 70 - 110 mg/dL    BUN 18 6 - 20 mg/dL    Creatinine 1.2 0.5 - 1.4 mg/dL    Calcium 10.7 (H) 8.7 - 10.5 mg/dL    Total Protein 7.6 6.0 - 8.4 g/dL    Albumin 4.0 3.5 - 5.2 g/dL    Total Bilirubin 1.1 (H) 0.1 - 1.0 mg/dL    Alkaline Phosphatase 461 (H) 55 - 135 U/L    AST 71 (H) 10 - 40 U/L     (H) 10 - 44 U/L    Anion Gap 11 8 - 16 mmol/L    eGFR >60 >60 mL/min/1.73 m^2   CBC W/ AUTO DIFFERENTIAL    Collection Time: 06/07/23  7:08 AM   Result Value Ref Range    WBC 4.60 3.90 - 12.70 K/uL    RBC 5.13 4.60 - 6.20 M/uL    Hemoglobin 14.8 14.0 - 18.0 g/dL    Hematocrit 45.8 40.0 - 54.0 %    MCV 89 82 - 98 fL    MCH 28.8 27.0 - 31.0 pg    MCHC 32.3 32.0 - 36.0 g/dL    RDW 15.6 (H) 11.5 - 14.5 %    Platelets 309 150 - 450 K/uL    MPV 9.3 9.2 - 12.9 fL    Immature Granulocytes 2.2 (H) 0.0 - 0.5 %    Gran # (ANC) 2.8 1.8 - 7.7 K/uL    Immature Grans (Abs) 0.10 (H) 0.00 - 0.04 K/uL    Lymph # 0.6 (L) 1.0 - 4.8 K/uL    Mono # 1.1 (H) 0.3 - 1.0 K/uL    Eos # 0.1 0.0 - 0.5 K/uL    Baso # 0.07 0.00  - 0.20 K/uL    nRBC 0 0 /100 WBC    Gran % 60.0 38.0 - 73.0 %    Lymph % 12.0 (L) 18.0 - 48.0 %    Mono % 22.8 (H) 4.0 - 15.0 %    Eosinophil % 1.5 0.0 - 8.0 %    Basophil % 1.5 0.0 - 1.9 %    Differential Method Automated    Magnesium    Collection Time: 06/07/23  7:08 AM   Result Value Ref Range    Magnesium 2.0 1.6 - 2.6 mg/dL         Imaging:   MRI Brain W WO Contrast    Result Date: 10/11/2022  EXAMINATION: MRI BRAIN W WO CONTRAST CLINICAL HISTORY: Small cell lung cancer, brain re-staging; Malignant neoplasm of unspecified part of unspecified bronchus or lung TECHNIQUE: Sagittal and axial T1, axial T2, axial FLAIR, axial gradient, axial diffusion imaging of the whole brain pre-contrast with postcontrast axial T1 and axial spoiled gradient imaging reformatted in the coronal and sagittal planes.. Ten ml of Gadavist injected intravenously. COMPARISON: 07/12/2022 FINDINGS: Interval 1.2 cm enhancing lesion within the left anterior inferior frontal lobe which prominently the ring-enhancing measuring 1.2 x 0.9 x 0.9 cm in AP by TV by CC dimensions respectively in light of history concerning for parenchymal metastases the with question trace susceptibility associated with this lesion. Previously identified heterogeneous enhancing sellar lesion is again seen allowing for routine brain technique with lesion measuring approximately 1.3 cm craniocaudal this may represent pituitary adenoma though indeterminate.  Previous intraluminal filling defect within the right jugular foramen is no longer seen.  There is however diffusion signal abnormality with ill-defined T1 hypointensity within the right occipital condyle concerning for possible osseous metastases the clinical correlation and further evaluation with nuclear medicine imaging advised. There is continued slight prominent leptomeningeal enhancement along the left cerebellum which raises concern for possible underlying vascular malformation such as a dural AV fistula  with collateral vascular engorgement.  There is no restricted diffusion to suggest acute infarction.  Ventricles stable without hydrocephalus.  There is mild edema signal surrounding the left frontal enhancing lesion with continued few scattered punctate size regions of T2 FLAIR signal hyperintensity elsewhere supratentorial white matter which are nonspecific and may represent mild chronic ischemic change. This report was flagged in Epic as abnormal.     Interval development of a small ring-enhancing lesion left anterior inferior frontal lobe concerning for parenchymal metastases in light of history.  Clinical correlation and follow-up advised There is continue though relatively stable heterogeneous enhancement and enlargement of the material within the sella which may represent pituitary adenoma though indeterminate. Previous right internal jugular vein thrombus no longer seen. Abnormal signal along the right occipital condyle concerning for possible osseous metastases clinical correlation and further evaluation with nuclear medicine imaging advised Continued prominent vascularity left cerebellar folia raising concern for possible underlying vascular malformation unchanged.  Further evaluation with noncontrast MRA head may be of further diagnostic value. Electronically signed by: Jonel Lawrence DO Date:    10/11/2022 Time:    10:05    CT Chest Abdomen Pelvis With Contrast (xpd)    Result Date: 10/18/2022  EXAMINATION: CT CHEST ABDOMEN PELVIS WITH CONTRAST (XPD) CLINICAL HISTORY: metastatic small cell lung cancer on maintenance immunotherapy, eval response; Malignant neoplasm of unspecified part of unspecified bronchus or lung TECHNIQUE: Low dose axial images, sagittal and coronal reformations were obtained from the thoracic inlet to the pubic symphysis following the IV administration of 100 mL of Omnipaque 350 COMPARISON: 07/26/2022 FINDINGS: Right IJ venous shunt tip superior aspect right atrium, absence of clot in  included upper right IJ. Fatty infiltration of liver, additional diminished attenuation space-occupying lesions of liver, largest central right hepatic lobe 3.6 cm image 103 series 3.  Stable.  Spleen, adrenal glands, pancreas, gallbladder and biliary tree normal. Urinary bladder normal.  Prostate gland very small 4 cm versus postop prostatectomy.  No free fluid or retroperitoneal adenopathy.  GI track normal. Tiny nonobstructive right lower and left upper renal pole stones. New lymph node left retro manubrial 3.4 cm image 28 series 3 appearing in the interim.  Paratracheal conglomerate adenopathy 2.1 x 2.5 cm, was 1.6 x 3 cm.  Subcarinal adenopathy stable.  No new hilar or mediastinal adenopathy. Degenerative disc spondylosis cervicothoracic junction., focal osteoblastic opacities involve humeral heads both clavicle superimposed on erosive DJD more prominent on right.  Additional focal osteoblastic opacities sternum, ribs, dorsal lumbar sacral spine pelvis and both femoral head and trochanteric regions.  Moderate anterior spondylosis dorsal spine and degenerative disc spondylosis facet joint arthropathy lumbosacral junction with DJD SI joints.  Fracture defects left lower anterior chest wall stable. Scattered calcified plaque great vessels aorta iliac femoral arteries. Dominant medial segment right middle lobe mass 1.1 x 2.7 cm image 69 series 3, was 1.6 x 3.4 cm.  Additional patchy nodular lesions right lower lobe posterior basilar segments, largest 1.4 cm image 66 series 3 suspicious for metastatic disease and/or superimposed inflammatory and infectious process.  Additional patchy infiltrates medial posterior segment right upper lobe and right lower lobe particularly medial basilar segment image 91 series 3.  No new pleural reaction.  Tracheobronchial tree normal. .     1. New presumed metastatic node anterior superior left mediastinum, paratracheal adenopathy otherwise stable. 2. Decreased size in right middle  lobe mass with new evidence of metastatic disease right lower lobe versus superimposed inflammatory infectious process discussed above. 3. New developing metastatic lesions liver. 4. Bony universal metastatic disease stable. 5. Recist summary: 6. Compare with previous CT chest abdomen pelvis 07/26/2022 7. Lesion 1: Right middle lobe mass 1.1 x 2.7 cm was 1.6 x 13.4 cm. 8. Lesion 2: Right paratracheal conned Willett a adenopathy 2.1 x 2.5 cm, was 1.6 x 3 cm. 9. Lesion 3: Dome 1.9 cm stable.  (New progressive liver metastatic disease noted elsewhere) 10. Lesion 4: Caudal aspect right hepatic lobe 1 cm, stable 11.  This report was flagged in Epic as abnormal. Electronically signed by: Dewayne Hutchinson MD Date:    10/18/2022 Time:    09:28       Assessment and Plan          ECOG 0      Extensive stage small cell lung cancer with brain mets  -Initially diagnosed with extensive stage small cell lung cancer in 03/2022 who was treated with carbo/etop/atezolizumab from 04/05/2022 - 06/14/22 with partial response. He subsequently completed consolidation thoracic RT 8/4/22 - 8/18/22, and he was on maintenance atezolizumab.   -10/18/22 showing progression of disease in lungs and liver.  Asymptomatic.  -Dr. Sosa discussed with him plan for topotecan and pt was consented  -Pt tolerated cycle 1 well but delayed C2 due to being out of town  -Scans in December 2022 largely stable with exception of  Right middle lobe lung mass.  3.4 cm, still demonstrated widespread disease in bones  -Admitted for pain control and palliative XRT on 12/07/22  -Pt had been off of tx in Nov 2022 due to going on vacation, as symptoms initially improved drastically after restarting tx  Plan 06/07/23  -RTC in 3 weeks to start next tx cycle, give C3 lurbinectadin today  -Will discuss with rad/onc to see if pt may benefit from additional palliative radiation  -Will hold lurbinectadin as pt has progressed  -Will discuss with main [place] to see if clinical  trials are available, if not hospice would be his best option  -Follow up with palliative care  -Give fluids and zometa today        Cancer related pain/palliative care by specialist  -Pain well controlled and is not using pain meds since restarting tx and palliative XRT      DM2  -Controlled, following with PCP      Hypercalcemia/Mets to bone and spine  -Pt still has not gotten dental clearance and is aware of risk of jaw osteonecrosis but would like to start zometa today due to metastatic disease  -Continue calcium supplements while receiving zometa      PE  -on 5/31/23  -Most likely from his cancer  -Continue lifelong anticoagulation        Time spent with pt: 40 minutes      Problem List Items Addressed This Visit          Cardiac/Vascular    HTN (hypertension)    Relevant Medications    losartan (COZAAR) 100 MG tablet       Oncology    Small cell lung cancer (Chronic)    Overview     Initially diagnosed with extensive stage small cell lung cancer in 03/2022 who was treated with carbo/etop/atezolizumab from 04/05/2022 - 06/14/22 with partial response. He subsequently completed consolidation thoracic RT 8/4/22 - 8/18/22, and he was on maintenance atezolizumab.          Relevant Medications    losartan (COZAAR) 100 MG tablet    oxyCODONE (ROXICODONE) 5 MG immediate release tablet    doxycycline (VIBRAMYCIN) 100 MG Cap    naloxone (NARCAN) 4 mg/actuation Martinez Irving MD  Hematology Oncology

## 2023-06-07 NOTE — TELEPHONE ENCOUNTER
Spoke with patient wife (Isabela) regarding scheduled appts on 06/08/23. Date, times, and location was confirmed.

## 2023-06-07 NOTE — Clinical Note
Dr. Irving,  Thank you for sending this patient for review at the [facility] Tumor Board. We think this patient is a possible candidate for our [id number] study (BiTE molecule targeting delta-like ligand 3 (DLL3). We are confirming now if we can start enrolling. In the meantime, we would like to schedule a visit with the patient to screen the patient further and determine interest in participating.  Da - please schedule intake with Fernando barker available.    Thank You, Karina Cross NP    Complex Repair And Skin Substitute Graft Text: The defect edges were debeveled with a #15 scalpel blade.  The primary defect was closed partially with a complex linear closure.  Given the location of the remaining defect, shape of the defect and the proximity to free margins a skin substitute graft was deemed most appropriate to repair the remaining defect.  The graft was trimmed to fit the size of the remaining defect.  The graft was then placed in the primary defect, oriented appropriately, and sutured into place.

## 2023-06-07 NOTE — PROGRESS NOTES
[facility] Tumor [facility]    Date: 6/7/2023    Patient Name: Juan Gillespie    MRN: [id number]    Diagnosis: Extensive Stage Small Cell Lung Cancer - Diagnosed in 3/2022    Referring Provider: Dr. Irving    Present PCTP Providers:     Dr. Blaise Wills, Dr. Eugene De La Rosa, Karina Cross, NP    Patient Summary:  Pathology:    Has failed Chemotherapy  Failed chemotherapy: 1. Started Carbo/Etop/Atez 4/5/22 >Maintenance atezolizumab 7/5/22 >Consolidative thoracic radiation 8/4/22-8/18/22 - progression 10/2022 in lungs and liver.2. Topotecan (10/24/23-3/10/22) - mixed response. 3. Lurbinectadin started (4/05/23 to present) - recent scans show progression.    Current treatment(s):    ECOG: Restricted in physically strenuous activity but ambulatory and able to carry out work of a light or sedentary nature, e.g., light house work, office work    Molecular Workup:            Board Recommendations:    Standard of care recommendations:     Trial recommendations: Late phase: No current late phase trials for SCLC.  Early phase: [id number] - screen and intake visit.

## 2023-06-07 NOTE — PLAN OF CARE
Pt arrived to unit from 's office. Due to recent progression, pt no longer will be receiving his Zepzelca. Arrived for his IVFs, B12 injection, and a one time dose of Zometa. VSS. Still continues with fatigue, lack of appetite, and difficulty eating. Also endorses a rash to the right side of his neck.  aware of the rash and will prescribe a cream for it. Pt tolerated all medications well. Has his next upcoming appointments through [facility]. Discharged from unit in [facility].

## 2023-06-08 ENCOUNTER — HOSPITAL ENCOUNTER (OUTPATIENT)
Dept: RADIATION THERAPY | Facility: HOSPITAL | Age: 58
Discharge: HOME | End: 2023-06-08
Attending: RADIOLOGY
Payer: MEDICAID

## 2023-06-08 ENCOUNTER — OFFICE VISIT (OUTPATIENT)
Dept: RADIATION ONCOLOGY | Facility: CLINIC | Age: 58
End: 2023-06-08
Payer: MEDICAID

## 2023-06-08 VITALS
BODY MASS INDEX: 35.84 KG/M2 | RESPIRATION RATE: 16 BRPM | HEART RATE: 113 BPM | TEMPERATURE: 98 F | DIASTOLIC BLOOD PRESSURE: 84 MMHG | SYSTOLIC BLOOD PRESSURE: 129 MMHG | HEIGHT: 67 IN | WEIGHT: 228.38 LBS

## 2023-06-08 DIAGNOSIS — C34.90 LUNG CANCER METASTATIC TO BONE: Primary | ICD-10-CM

## 2023-06-08 DIAGNOSIS — B02.9 HERPES ZOSTER WITHOUT COMPLICATION: ICD-10-CM

## 2023-06-08 DIAGNOSIS — C79.51 LUNG CANCER METASTATIC TO BONE: Primary | ICD-10-CM

## 2023-06-08 PROCEDURE — 4010F ACE/ARB THERAPY RXD/TAKEN: CPT | Mod: CPTII,,, | Performed by: RADIOLOGY

## 2023-06-08 PROCEDURE — 77290 THER RAD SIMULAJ FIELD CPLX: CPT | Mod: 26,,, | Performed by: RADIOLOGY

## 2023-06-08 PROCEDURE — 1159F MED LIST DOCD IN RCRD: CPT | Mod: CPTII,,, | Performed by: RADIOLOGY

## 2023-06-08 PROCEDURE — 4010F PR ACE/ARB THEARPY RXD/TAKEN: ICD-10-PCS | Mod: CPTII,,, | Performed by: RADIOLOGY

## 2023-06-08 PROCEDURE — 3008F BODY MASS INDEX DOCD: CPT | Mod: CPTII,,, | Performed by: RADIOLOGY

## 2023-06-08 PROCEDURE — 3079F DIAST BP 80-89 MM HG: CPT | Mod: CPTII,,, | Performed by: RADIOLOGY

## 2023-06-08 PROCEDURE — 3074F PR MOST RECENT SYSTOLIC BLOOD PRESSURE < 130 MM HG: ICD-10-PCS | Mod: CPTII,,, | Performed by: RADIOLOGY

## 2023-06-08 PROCEDURE — 77290 PR  SET RADN THERAPY FIELD COMPLEX: ICD-10-PCS | Mod: 26,,, | Performed by: RADIOLOGY

## 2023-06-08 PROCEDURE — 99024 POSTOP FOLLOW-UP VISIT: CPT | Mod: ,,, | Performed by: RADIOLOGY

## 2023-06-08 PROCEDURE — 1159F PR MEDICATION LIST DOCUMENTED IN MEDICAL RECORD: ICD-10-PCS | Mod: CPTII,,, | Performed by: RADIOLOGY

## 2023-06-08 PROCEDURE — 77263 THER RADIOLOGY TX PLNG CPLX: CPT | Mod: ,,, | Performed by: RADIOLOGY

## 2023-06-08 PROCEDURE — 99999 PR PBB SHADOW E&M-EST. PATIENT-LVL IV: CPT | Mod: PBBFAC,,, | Performed by: RADIOLOGY

## 2023-06-08 PROCEDURE — 99214 OFFICE O/P EST MOD 30 MIN: CPT | Mod: PBBFAC | Performed by: RADIOLOGY

## 2023-06-08 PROCEDURE — 77263 PR  RADIATION THERAPY PLAN COMPLEX: ICD-10-PCS | Mod: ,,, | Performed by: RADIOLOGY

## 2023-06-08 PROCEDURE — 77014 HC CT GUIDANCE RADIATION THERAPY FLDS PLACEMENT: CPT | Mod: TC | Performed by: RADIOLOGY

## 2023-06-08 PROCEDURE — 99024 PR POST-OP FOLLOW-UP VISIT: ICD-10-PCS | Mod: ,,, | Performed by: RADIOLOGY

## 2023-06-08 PROCEDURE — 3074F SYST BP LT 130 MM HG: CPT | Mod: CPTII,,, | Performed by: RADIOLOGY

## 2023-06-08 PROCEDURE — 3008F PR BODY MASS INDEX (BMI) DOCUMENTED: ICD-10-PCS | Mod: CPTII,,, | Performed by: RADIOLOGY

## 2023-06-08 PROCEDURE — 3079F PR MOST RECENT DIASTOLIC BLOOD PRESSURE 80-89 MM HG: ICD-10-PCS | Mod: CPTII,,, | Performed by: RADIOLOGY

## 2023-06-08 PROCEDURE — 77290 THER RAD SIMULAJ FIELD CPLX: CPT | Mod: TC | Performed by: RADIOLOGY

## 2023-06-08 PROCEDURE — 99999 PR PBB SHADOW E&M-EST. PATIENT-LVL IV: ICD-10-PCS | Mod: PBBFAC,,, | Performed by: RADIOLOGY

## 2023-06-08 RX ORDER — BUDESONIDE AND FORMOTEROL FUMARATE DIHYDRATE 160; 4.5 UG/1; UG/1
AEROSOL RESPIRATORY (INHALATION)
COMMUNITY
Start: 2023-05-30

## 2023-06-08 RX ORDER — ONDANSETRON HYDROCHLORIDE 8 MG/1
8 TABLET, FILM COATED ORAL EVERY 8 HOURS PRN
COMMUNITY
Start: 2023-02-24 | End: 2023-06-19 | Stop reason: SDUPTHER

## 2023-06-08 RX ORDER — VALACYCLOVIR HYDROCHLORIDE 1 G/1
1000 TABLET, FILM COATED ORAL 3 TIMES DAILY
Qty: 21 TABLET | Refills: 0 | Status: SHIPPED | OUTPATIENT
Start: 2023-06-08 | End: 2023-06-26

## 2023-06-08 NOTE — PROGRESS NOTES
6/8/2023    Radiation Oncology Follow-Up Visit    Prior Radiation History:    Course 1:  Site  Technique  Energy  Dose/Fx (Gy)  #Fx  Total Dose (Gy)  Start Date  End Date  Elapsed Days    Mediastinum  3D  18X  3  10 / 10  30  8/4/2022 8/18/2022  14      Course 2:   Site  Technique  Energy  Dose/Fx (Gy)  #Fx  Total Dose (Gy)  Start Date  End Date  Elapsed Days    Lt Frontal PTV  SRS  6X  20  1 / 1  20  10/25/2022  10/25/2022  0      Course 3:   Site  Technique  Energy  Dose/Fx (Gy)  #Fx  Total Dose (Gy)  Start Date  End Date  Elapsed Days    T11-L1  AP/PA  18X  8  1 / 1  8  12/9/2022 12/9/2022  0    Rt Ilium  BUI/LPO  18X  8  1 / 1  8  12/9/2022 12/9/2022  0    Rt Hip  AP/PA  18X  8  1 / 1  8  12/9/2022 12/9/2022  0      Course 4:    Site  Technique  Energy  Dose/Fx (Gy)  #Fx  Total Dose (Gy)  Start Date  End Date  Elapsed Days    Brain - 2 PTV  SRS  6X  22  1 / 1 22 4/13/2023 4/13/2023  0     * Rt Internal capsule and left temporal metastases     Assessment   This is a 58 y.o. 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 Staging MRI Brain demonstrated an enhancing sellar mass c/w pituitary adenoma. He received chemo-immunotherapy. Re-staging extracranial imaging CT C/A/P 7/26/22 demonstrated partial response with decrease in size of RML lung primary and mediastinal adenopathy. He has completed chemotherapy and is now on maintenance immunotherapy. He completed thoracic consolidation RT 30 Gy in 10 fx on 8/18/22. MRI Brain 10/2022 demonstrated a single Left frontal metastasis that was treated with SRS 20 Gy x1 on 10/25/22. He received palliative RT 8 Gy x1 to T11-L1, Rt ilium, and Rt hip on 12/9/22. 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  The Lt temporal and Rt internal capsule mets were treated with SRS 22 Gy x1 on 4/13/23.    Has systemic progression on 3rd line therapy. I discussed palliative radiation to Left hip 8 Gy x1 since imaging demonstrates some increased sclerotic disease in acetabulum and proximal femur. Discussed potential risks of radiation. He was in agreement with proceeding.     His rash likely caused by shingles given onset of pain preceding rash and dermatomal appearance.        Plan   1) CT Simulation today for palliative radiation to Left hip.   2) Shingles: valtrex 1000 mg tid x7 days.          CHIEF COMPLAINT: Worsening bone pain    HPI:  CT C/A/P 6/2/23 with worsening disease. He does note new Left hip pain. Areas previously irradiated are not painful.  Main issue recently was new Right neck/upper chest stabbing pain for which he went to ED ~1 week ago. Since that time developed rash in this region.          Past Medical History:   Diagnosis Date    Cancer     Small Cell -Stage 4 Lung Cancer    Diabetes mellitus, type 2     Hypertension        Past Surgical History:   Procedure Laterality Date    ENDOBRONCHIAL ULTRASOUND N/A 3/22/2022    Procedure: ENDOBRONCHIAL ULTRASOUND (EBUS);  Surgeon: Kristin Samuels MD;  Location: [facility] OR [address] [place] [address];  Service: Endoscopy;  Laterality: N/A;    KNEE SURGERY         Social History     Tobacco Use    Smoking status: Former     Types: Cigarettes    Smokeless tobacco: Former    Tobacco comments:     stop smoking 26 years ago   Substance Use Topics    Alcohol use: No    Drug use: No       Cancer-related family history includes Lung cancer in his father.    Current Outpatient Medications on File Prior to Visit   Medication Sig Dispense Refill    albuterol (ACCUNEB) 1.25 mg/3 mL Nebu Use 1 vial (1.25 mg total) by nebulization 3 (three) times daily as needed (shortness of breath). Rescue 90 mL 2    allopurinoL (ZYLOPRIM) 300 MG tablet Take 1  tablet (300 mg total) by mouth once daily. 60 tablet 3    apixaban (ELIQUIS DVT-PE TREAT 30D START) 5 mg (74 tabs) DsPk For the first 7 days take two 5 mg tablets twice daily.  After 7 days take one 5 mg tablet twice daily. 74 tablet 0    benzonatate (TESSALON) 100 MG capsule Take 1 capsule by mouth as needed.      cetirizine (ZYRTEC) 10 MG tablet Take 1 tablet by mouth as needed.      dapagliflozin (FARXIGA) 10 mg tablet Take 1 tablet by mouth once daily.      doxycycline (VIBRAMYCIN) 100 MG Cap Take 1 capsule (100 mg total) by mouth 2 (two) times daily. for 7 days 14 capsule 0    insulin aspart U-100 (NOVOLOG) 100 unit/mL (3 mL) InPn pen Inject 1 Units into the skin as needed. Inject into skin if over 200.      losartan (COZAAR) 100 MG tablet Take 1 tablet (100 mg total) by mouth once daily. 30 tablet 2    morphine (MS CONTIN) 30 MG 12 hr tablet Take 1 tablet (30 mg total) by mouth every 12 (twelve) hours. 60 tablet 0    naloxone (NARCAN) 4 mg/actuation Spry 4mg by nasal route as needed for opioid overdose; may repeat every 2-3 minutes in alternating nostrils until medical help arrives. Call 911 1 each 11    ondansetron (ZOFRAN) 8 MG tablet Take 8 mg by mouth every 8 (eight) hours as needed.      oxyCODONE (ROXICODONE) 5 MG immediate release tablet Take 1 tablet (5 mg total) by mouth every 6 (six) hours as needed for Pain. 120 tablet 0    semaglutide (OZEMPIC) 0.25 mg or 0.5 mg(2 mg/1.5 mL) pen injector Inject 0.25 mg into the skin once a week.      senna-docusate 8.6-50 mg (PERICOLACE) 8.6-50 mg per tablet Take 1 tablet by mouth daily as needed for Constipation. 30 tablet 2    sodium chloride 3% 3 % nebulizer solution Take 4 mLs by nebulization 2 (two) times a day. 240 mL 11    SYMBICORT 160-4.5 mcg/actuation HFAA Inhale into the lungs.      TRUE METRIX GLUCOSE TEST STRIP Strp       TRUEPLUS LANCETS 33 gauge Misc       [DISCONTINUED] albuterol-ipratropium (DUO-NEB) 2.5 mg-0.5 mg/3 mL nebulizer solution Take 3 mLs  "by nebulization every 4 (four) hours. Rescue 90 mL 3    [DISCONTINUED] azelastine (ASTELIN) 137 mcg (0.1 %) nasal spray 1 spray by Nasal route 2 (two) times daily.      [DISCONTINUED] benzonatate (TESSALON) 200 MG capsule Take one capsule three times daily as needed for cough not to exceed 3 capsules daily 60 capsule 0    [DISCONTINUED] montelukast (SINGULAIR) 10 mg tablet Take 10 mg by mouth every evening.      [DISCONTINUED] promethazine-codeine 6.25-10 mg/5 ml (PHENERGAN WITH CODEINE) 6.25-10 mg/5 mL syrup Take 5 ml every 6 hours as needed for cough 240 mL 0     Current Facility-Administered Medications on File Prior to Visit   Medication Dose Route Frequency Provider Last Rate Last Admin    [DISCONTINUED] heparin, porcine (PF) 100 unit/mL injection flush 500 Units  500 Units Intravenous PRN Soledad Irving MD   500 Units at 06/07/23 0950    [DISCONTINUED] sodium chloride 0.9% flush 10 mL  10 mL Intravenous PRN Soledad Irving MD   10 mL at 06/07/23 0950       Review of patient's allergies indicates:  No Known Allergies      Vital Signs: /84 (BP Location: Right arm, Patient Position: Sitting, BP Method: Large (Automatic))   Pulse (!) 113   Temp 97.9 °F (36.6 °C) (Oral)   Resp 16   Ht 5' 7" (1.702 m)   Wt 103.6 kg (228 lb 6.4 oz)   BMI 35.77 kg/m²     ECOG Performance Status: 2    Physical Exam  Vitals reviewed.   Constitutional:       Appearance: Normal appearance.   HENT:      Head: Normocephalic and atraumatic.   Eyes:      General: No scleral icterus.     Extraocular Movements: Extraocular movements intact.   Pulmonary:      Effort: Pulmonary effort is normal. No respiratory distress.   Abdominal:      General: There is no distension.   Musculoskeletal:      Cervical back: Neck supple.   Lymphadenopathy:      Cervical: No cervical adenopathy.   Skin:     General: Skin is warm.      Findings: Rash (Rash along Right neck, occiput, upper chest wall) present.   Neurological:      General: No focal deficit " present.      Mental Status: He is alert and oriented to person, place, and time.      Cranial Nerves: No cranial nerve deficit.   Psychiatric:         Mood and Affect: Mood normal.         Behavior: Behavior normal.         Judgment: Judgment normal.        Labs:    Imaging: I have personally reviewed the patient's available images and reports and summarized pertinent findings above in HPI.     Pathology: No new path

## 2023-06-09 ENCOUNTER — TELEPHONE (OUTPATIENT)
Dept: HEMATOLOGY/ONCOLOGY | Facility: CLINIC | Age: 58
End: 2023-06-09
Payer: MEDICAID

## 2023-06-12 ENCOUNTER — OFFICE VISIT (OUTPATIENT)
Dept: HEMATOLOGY/ONCOLOGY | Facility: CLINIC | Age: 58
End: 2023-06-12
Payer: MEDICAID

## 2023-06-12 ENCOUNTER — INFUSION (OUTPATIENT)
Dept: INFUSION THERAPY | Facility: HOSPITAL | Age: 58
End: 2023-06-12
Attending: STUDENT IN AN ORGANIZED HEALTH CARE EDUCATION/TRAINING PROGRAM
Payer: MEDICAID

## 2023-06-12 VITALS
BODY MASS INDEX: 35.71 KG/M2 | DIASTOLIC BLOOD PRESSURE: 87 MMHG | HEIGHT: 67 IN | SYSTOLIC BLOOD PRESSURE: 146 MMHG | HEART RATE: 98 BPM | OXYGEN SATURATION: 95 % | WEIGHT: 227.5 LBS | RESPIRATION RATE: 16 BRPM

## 2023-06-12 VITALS
RESPIRATION RATE: 18 BRPM | TEMPERATURE: 98 F | HEART RATE: 80 BPM | DIASTOLIC BLOOD PRESSURE: 82 MMHG | OXYGEN SATURATION: 97 % | SYSTOLIC BLOOD PRESSURE: 143 MMHG

## 2023-06-12 DIAGNOSIS — E11.9 TYPE 2 DIABETES MELLITUS, WITHOUT LONG-TERM CURRENT USE OF INSULIN: Primary | ICD-10-CM

## 2023-06-12 DIAGNOSIS — D84.81 IMMUNODEFICIENCY SECONDARY TO NEOPLASM: ICD-10-CM

## 2023-06-12 DIAGNOSIS — C34.90 SMALL CELL LUNG CANCER: Primary | ICD-10-CM

## 2023-06-12 DIAGNOSIS — B02.9 HERPES ZOSTER WITHOUT COMPLICATION: ICD-10-CM

## 2023-06-12 DIAGNOSIS — E11.69 TYPE 2 DIABETES MELLITUS WITH OTHER SPECIFIED COMPLICATION, WITHOUT LONG-TERM CURRENT USE OF INSULIN: ICD-10-CM

## 2023-06-12 DIAGNOSIS — D49.9 IMMUNODEFICIENCY SECONDARY TO NEOPLASM: ICD-10-CM

## 2023-06-12 DIAGNOSIS — D84.821 IMMUNODEFICIENCY SECONDARY TO CHEMOTHERAPY: ICD-10-CM

## 2023-06-12 DIAGNOSIS — C79.31 SECONDARY MALIGNANT NEOPLASM OF BRAIN: ICD-10-CM

## 2023-06-12 DIAGNOSIS — C78.7 SECONDARY MALIGNANT NEOPLASM OF LIVER: ICD-10-CM

## 2023-06-12 DIAGNOSIS — T45.1X5A IMMUNODEFICIENCY SECONDARY TO CHEMOTHERAPY: ICD-10-CM

## 2023-06-12 DIAGNOSIS — Z79.899 IMMUNODEFICIENCY SECONDARY TO CHEMOTHERAPY: ICD-10-CM

## 2023-06-12 PROCEDURE — 4010F ACE/ARB THERAPY RXD/TAKEN: CPT | Mod: CPTII,,, | Performed by: INTERNAL MEDICINE

## 2023-06-12 PROCEDURE — 77295 PR 3D RADIOTHERAPY PLAN: ICD-10-PCS | Mod: 26,,, | Performed by: RADIOLOGY

## 2023-06-12 PROCEDURE — 99999 PR PBB SHADOW E&M-EST. PATIENT-LVL IV: CPT | Mod: PBBFAC,,, | Performed by: INTERNAL MEDICINE

## 2023-06-12 PROCEDURE — 3077F SYST BP >= 140 MM HG: CPT | Mod: CPTII,,, | Performed by: INTERNAL MEDICINE

## 2023-06-12 PROCEDURE — 99215 PR OFFICE/OUTPT VISIT, EST, LEVL V, 40-54 MIN: ICD-10-PCS | Mod: S$PBB,,, | Performed by: INTERNAL MEDICINE

## 2023-06-12 PROCEDURE — 77300 PR RADIATION THERAPY,DOSIMETRY PLAN: ICD-10-PCS | Mod: 26,,, | Performed by: RADIOLOGY

## 2023-06-12 PROCEDURE — 63600175 PHARM REV CODE 636 W HCPCS: Performed by: STUDENT IN AN ORGANIZED HEALTH CARE EDUCATION/TRAINING PROGRAM

## 2023-06-12 PROCEDURE — 77300 RADIATION THERAPY DOSE PLAN: CPT | Mod: TC | Performed by: RADIOLOGY

## 2023-06-12 PROCEDURE — 4010F PR ACE/ARB THEARPY RXD/TAKEN: ICD-10-PCS | Mod: CPTII,,, | Performed by: INTERNAL MEDICINE

## 2023-06-12 PROCEDURE — 77300 RADIATION THERAPY DOSE PLAN: CPT | Mod: 26,,, | Performed by: RADIOLOGY

## 2023-06-12 PROCEDURE — 77334 RADIATION TREATMENT AID(S): CPT | Mod: TC | Performed by: RADIOLOGY

## 2023-06-12 PROCEDURE — 3079F PR MOST RECENT DIASTOLIC BLOOD PRESSURE 80-89 MM HG: ICD-10-PCS | Mod: CPTII,,, | Performed by: INTERNAL MEDICINE

## 2023-06-12 PROCEDURE — 3008F BODY MASS INDEX DOCD: CPT | Mod: CPTII,,, | Performed by: INTERNAL MEDICINE

## 2023-06-12 PROCEDURE — 99999 PR PBB SHADOW E&M-EST. PATIENT-LVL IV: ICD-10-PCS | Mod: PBBFAC,,, | Performed by: INTERNAL MEDICINE

## 2023-06-12 PROCEDURE — 99215 OFFICE O/P EST HI 40 MIN: CPT | Mod: S$PBB,,, | Performed by: INTERNAL MEDICINE

## 2023-06-12 PROCEDURE — 77334 PR  RADN TREATMENT AID(S) COMPLX: ICD-10-PCS | Mod: 26,,, | Performed by: RADIOLOGY

## 2023-06-12 PROCEDURE — 1159F MED LIST DOCD IN RCRD: CPT | Mod: CPTII,,, | Performed by: INTERNAL MEDICINE

## 2023-06-12 PROCEDURE — 3079F DIAST BP 80-89 MM HG: CPT | Mod: CPTII,,, | Performed by: INTERNAL MEDICINE

## 2023-06-12 PROCEDURE — 77334 RADIATION TREATMENT AID(S): CPT | Mod: 26,,, | Performed by: RADIOLOGY

## 2023-06-12 PROCEDURE — 25000003 PHARM REV CODE 250: Performed by: STUDENT IN AN ORGANIZED HEALTH CARE EDUCATION/TRAINING PROGRAM

## 2023-06-12 PROCEDURE — 1159F PR MEDICATION LIST DOCUMENTED IN MEDICAL RECORD: ICD-10-PCS | Mod: CPTII,,, | Performed by: INTERNAL MEDICINE

## 2023-06-12 PROCEDURE — 77295 3-D RADIOTHERAPY PLAN: CPT | Mod: 26,,, | Performed by: RADIOLOGY

## 2023-06-12 PROCEDURE — 3008F PR BODY MASS INDEX (BMI) DOCUMENTED: ICD-10-PCS | Mod: CPTII,,, | Performed by: INTERNAL MEDICINE

## 2023-06-12 PROCEDURE — 99214 OFFICE O/P EST MOD 30 MIN: CPT | Mod: PBBFAC,25 | Performed by: INTERNAL MEDICINE

## 2023-06-12 PROCEDURE — 3077F PR MOST RECENT SYSTOLIC BLOOD PRESSURE >= 140 MM HG: ICD-10-PCS | Mod: CPTII,,, | Performed by: INTERNAL MEDICINE

## 2023-06-12 PROCEDURE — 77295 3-D RADIOTHERAPY PLAN: CPT | Mod: TC | Performed by: RADIOLOGY

## 2023-06-12 PROCEDURE — 96360 HYDRATION IV INFUSION INIT: CPT

## 2023-06-12 PROCEDURE — A4216 STERILE WATER/SALINE, 10 ML: HCPCS | Performed by: STUDENT IN AN ORGANIZED HEALTH CARE EDUCATION/TRAINING PROGRAM

## 2023-06-12 RX ORDER — SODIUM CHLORIDE 0.9 % (FLUSH) 0.9 %
10 SYRINGE (ML) INJECTION
Status: DISCONTINUED | OUTPATIENT
Start: 2023-06-12 | End: 2023-06-12 | Stop reason: HOSPADM

## 2023-06-12 RX ORDER — HEPARIN 100 UNIT/ML
500 SYRINGE INTRAVENOUS
Status: DISCONTINUED | OUTPATIENT
Start: 2023-06-12 | End: 2023-06-12 | Stop reason: HOSPADM

## 2023-06-12 RX ADMIN — SODIUM CHLORIDE 1000 ML: 9 INJECTION, SOLUTION INTRAVENOUS at 12:06

## 2023-06-12 RX ADMIN — Medication 10 ML: at 01:06

## 2023-06-12 RX ADMIN — HEPARIN 500 UNITS: 100 SYRINGE at 01:06

## 2023-06-12 NOTE — Clinical Note
Kade Rowe,  Unfortunately we do not have a slot at the moment open for our  study for SCLC. We are going to put him on the wait list.  I would get another plan together for him to start treatment in the next week or so.  If a slot does not open up before this, go ahead and treat with standard of care.  You could try a taxane like docetaxel, but unfortunately it looks like his SCLC seems pretty resistant to chemo.  Best supportive care would be reasonable as well.  Thanks for referral, Fernando

## 2023-06-12 NOTE — PLAN OF CARE
Patient presented to unit for IVFs. VSS. Patient reports starting radiation therapy next week for hip pain management. Patient's Ozempic held by provider due to increased weight loss and GI discomfort. Patient was treated for shingles recently with symptoms fully resolved. IVFs tolerated well. Patient discharged from unit in no acute signs of distress.

## 2023-06-12 NOTE — PROGRESS NOTES
NEW PRECISION CANCER THERAPIES PROGRAM VISIT    Reason for visit: New Medical Oncology visit-evaluation for clinical trials    Cancer/Stage/TNM:    Cancer Staging   Small cell lung cancer  Staging form: Lung, AJCC 8th Edition  - Clinical stage from 4/8/2022: Stage IVB (cT2b, cN0, pM1c) - Signed by Stephen Jacome MD on 4/8/2022       Oncology History   Small cell lung cancer   3/30/2022 Initial Diagnosis    Small cell lung cancer     4/5/2022 - 9/27/2022 Chemotherapy    Treatment Summary   Plan Name: OP ATEZOLIZUMAB CARBOPLATIN ETOPOSIDE Q3W  Treatment Goal: Palliative  Status: Inactive  Start Date: 4/5/2022  End Date: 9/27/2022  Provider: Antolin Sosa MD  Chemotherapy: CARBOplatin (PARAPLATIN) 750 mg in sodium chloride 0.9% 250 mL chemo infusion, 750 mg (100 % of original dose 750 mg), Intravenous, Clinic/HOD 1 time, 4 of 4 cycles  Dose modification:   (original dose 750 mg, Cycle 1)  Administration: 750 mg (4/5/2022), 640 mg (4/27/2022), 640 mg (5/18/2022), 750 mg (6/14/2022)  etoposide (VEPESID) 100 mg/m2 = 236 mg in sodium chloride 0.9% 500 mL chemo infusion, 100 mg/m2 = 236 mg, Intravenous, Clinic/HOD 1 time, 4 of 4 cycles  Administration: 236 mg (4/5/2022), 236 mg (4/6/2022), 236 mg (4/27/2022), 236 mg (4/28/2022), 236 mg (4/7/2022), 236 mg (4/29/2022), 236 mg (5/18/2022), 236 mg (5/19/2022), 236 mg (5/20/2022), 236 mg (6/14/2022), 236 mg (6/15/2022), 236 mg (6/16/2022)     4/8/2022 Cancer Staged    Staging form: Lung, AJCC 8th Edition  - Clinical stage from 4/8/2022: Stage IVB (cT2b, cN0, pM1c)     8/4/2022 - 8/18/2022 Radiation Therapy    Treating physician: Stephen Jacome    Site Technique Energy Dose/Fx (Gy) #Fx Total Dose (Gy)   Mediastinum 3D 18X 3 10 / 10 30      10/24/2022 - 3/10/2023 Chemotherapy    Treatment Summary   Plan Name: OP SCLC TOPOTECAN Q3W  Treatment Goal: Palliative  Status: Inactive  Start Date: 10/24/2022  End Date: 3/10/2023  Provider: Antolin Sosa MD  Chemotherapy: topotecan  (HYCAMTIN) 3.59 mg in sodium chloride 0.9% 100 mL chemo infusion, 1.5 mg/m2 = 3.59 mg, Intravenous, [facility] 1 time, 6 of 6 cycles  Administration: 3.59 mg (10/24/2022), 3.59 mg (10/25/2022), 3.59 mg (10/26/2022), 3.59 mg (10/27/2022), 3.48 mg (12/12/2022), 3.48 mg (12/13/2022), 3.48 mg (12/14/2022), 3.48 mg (12/15/2022), 3.47 mg (1/3/2023), 3.47 mg (1/4/2023), 3.47 mg (1/5/2023), 3.47 mg (1/6/2023), 3.5 mg (1/23/2023), 3.5 mg (1/24/2023), 3.5 mg (1/25/2023), 3.5 mg (1/26/2023), 3.47 mg (2/13/2023), 3.47 mg (2/14/2023), 3.47 mg (2/15/2023), 3.47 mg (2/16/2023), 3.47 mg (3/6/2023), 3.47 mg (3/7/2023), 3.47 mg (3/8/2023), 3.47 mg (3/9/2023)     10/25/2022 - 10/25/2022 Radiation Therapy    Treating physician: Stephen Jacome    Site Technique Energy Dose/Fx (Gy) #Fx Total Dose (Gy)   Lt Frontal PTV SRS 6X 20 1 / 1 20      12/9/2022 - 12/9/2022 Radiation Therapy    Treating physician: Stephen Jacome    Site Technique Energy Dose/Fx (Gy) #Fx Total Dose (Gy)   T11-L1 AP/PA 18X 8 1 / 1 8   Rt Ilium BUI/LPO 18X 8 1 / 1 8   Rt Hip AP/PA 18X 8 1 / 1 8      4/5/2023 - 5/17/2023 Chemotherapy    Treatment Summary   Plan Name: OP LURBINECTEDIN Q3W  Treatment Goal: Control  Status: Inactive  Start Date: 4/5/2023  End Date: 5/17/2023  Provider: Soledad Irving MD  Chemotherapy: lurbinectedin 7.5 mg in dextrose 5 % (D5W) SolP 300 mL chemo infusion, 3.2 mg/m2 = 7.5 mg, Intravenous, Clinic/HOD 1 time, 3 of 18 cycles  Administration: 7.5 mg (4/5/2023), 7.5 mg (4/26/2023), 7.5 mg (5/17/2023)     4/13/2023 - 4/13/2023 Radiation Therapy    Treating physician: Stephen Jacome    Site Technique Energy Dose/Fx (Gy) #Fx Total Dose (Gy)   Brain - 2 PTV SRS 6X 22 1 / 1 22      Secondary malignant neoplasm of liver   3/30/2022 Initial Diagnosis    Secondary malignant neoplasm of liver     4/5/2022 - 9/27/2022 Chemotherapy    Treatment Summary   Plan Name: OP ATEZOLIZUMAB CARBOPLATIN ETOPOSIDE Q3W  Treatment Goal: Palliative  Status:  Inactive  Start Date: 4/5/2022  End Date: 9/27/2022  Provider: Antolin Sosa MD  Chemotherapy: CARBOplatin (PARAPLATIN) 750 mg in sodium chloride 0.9% 250 mL chemo infusion, 750 mg (100 % of original dose 750 mg), Intravenous, Clinic/HOD 1 time, 4 of 4 cycles  Dose modification:   (original dose 750 mg, Cycle 1)  Administration: 750 mg (4/5/2022), 640 mg (4/27/2022), 640 mg (5/18/2022), 750 mg (6/14/2022)  etoposide (VEPESID) 100 mg/m2 = 236 mg in sodium chloride 0.9% 500 mL chemo infusion, 100 mg/m2 = 236 mg, Intravenous, Clinic/HOD 1 time, 4 of 4 cycles  Administration: 236 mg (4/5/2022), 236 mg (4/6/2022), 236 mg (4/27/2022), 236 mg (4/28/2022), 236 mg (4/7/2022), 236 mg (4/29/2022), 236 mg (5/18/2022), 236 mg (5/19/2022), 236 mg (5/20/2022), 236 mg (6/14/2022), 236 mg (6/15/2022), 236 mg (6/16/2022)     10/24/2022 - 3/10/2023 Chemotherapy    Treatment Summary   Plan Name: OP SCLC TOPOTECAN Q3W  Treatment Goal: Palliative  Status: Inactive  Start Date: 10/24/2022  End Date: 3/10/2023  Provider: Antolin Sosa MD  Chemotherapy: topotecan (HYCAMTIN) 3.59 mg in sodium chloride 0.9% 100 mL chemo infusion, 1.5 mg/m2 = 3.59 mg, Intravenous, Clinic/HOD 1 time, 6 of 6 cycles  Administration: 3.59 mg (10/24/2022), 3.59 mg (10/25/2022), 3.59 mg (10/26/2022), 3.59 mg (10/27/2022), 3.48 mg (12/12/2022), 3.48 mg (12/13/2022), 3.48 mg (12/14/2022), 3.48 mg (12/15/2022), 3.47 mg (1/3/2023), 3.47 mg (1/4/2023), 3.47 mg (1/5/2023), 3.47 mg (1/6/2023), 3.5 mg (1/23/2023), 3.5 mg (1/24/2023), 3.5 mg (1/25/2023), 3.5 mg (1/26/2023), 3.47 mg (2/13/2023), 3.47 mg (2/14/2023), 3.47 mg (2/15/2023), 3.47 mg (2/16/2023), 3.47 mg (3/6/2023), 3.47 mg (3/7/2023), 3.47 mg (3/8/2023), 3.47 mg (3/9/2023)     4/5/2023 - 5/17/2023 Chemotherapy    Treatment Summary   Plan Name: OP LURBINECTEDIN Q3W  Treatment Goal: Control  Status: Inactive  Start Date: 4/5/2023  End Date: 5/17/2023  Provider: Soledad Irving MD  Chemotherapy: lurbinectedin 7.5 mg in  dextrose 5 % (D5W) SolP 300 mL chemo infusion, 3.2 mg/m2 = 7.5 mg, Intravenous, Clinic/HOD 1 time, 3 of 18 cycles  Administration: 7.5 mg (4/5/2023), 7.5 mg (4/26/2023), 7.5 mg (5/17/2023)     Secondary malignant neoplasm of brain   10/11/2022 Initial Diagnosis    Secondary malignant neoplasm of brain          HPI:   58 y.o. male who presents for evaluation and treatment recommendations. Oncologic history as detailed above. Patient is being evaluated for 4th line treatment for ES-SCLC. Over the last few weeks he has had bad sleep due to cough and more fatigue.  Now napping throughout the day, but up and active > 50% of the day - ECOG PS 2. He has R hip pain that is controlled with opiates, but getting palliative RT soon. He says that MS Contin and oxycodone at night last night, helped dramatically with cough and sleep.    ROS:   A complete 12-point review of systems was reviewed and is negative except as mentioned above.     Past Medical History:   Past Medical History:   Diagnosis Date    Cancer     Small Cell -Stage 4 Lung Cancer    Diabetes mellitus, type 2     Hypertension         Past Surgical History:   Past Surgical History:   Procedure Laterality Date    ENDOBRONCHIAL ULTRASOUND N/A 3/22/2022    Procedure: ENDOBRONCHIAL ULTRASOUND (EBUS);  Surgeon: Kristin Samuels MD;  Location: [facility] OR [address] [place] [address];  Service: Endoscopy;  Laterality: N/A;    KNEE SURGERY          Family History:   Family History   Problem Relation Age of Onset    Diabetes Mellitus Mother     Pacemaker/defibrilator Father     Lung cancer Father         Social History:   Social History     Tobacco Use    Smoking status: Former     Types: Cigarettes    Smokeless tobacco: Former    Tobacco comments:     stop smoking 26 years ago   Substance Use Topics    Alcohol use: No        Allergies:   Review of patient's allergies indicates:  No Known Allergies     Medications:   Current Outpatient Medications   Medication Sig Dispense Refill    albuterol  (ACCUNEB) 1.25 mg/3 mL Nebu Use 1 vial (1.25 mg total) by nebulization 3 (three) times daily as needed (shortness of breath). Rescue 90 mL 2    allopurinoL (ZYLOPRIM) 300 MG tablet Take 1 tablet (300 mg total) by mouth once daily. 60 tablet 3    apixaban (ELIQUIS DVT-PE TREAT 30D START) 5 mg (74 tabs) DsPk For the first 7 days take two 5 mg tablets twice daily.  After 7 days take one 5 mg tablet twice daily. 74 tablet 0    benzonatate (TESSALON) 100 MG capsule Take 1 capsule by mouth as needed.      cetirizine (ZYRTEC) 10 MG tablet Take 1 tablet by mouth as needed.      dapagliflozin (FARXIGA) 10 mg tablet Take 1 tablet by mouth once daily.      doxycycline (VIBRAMYCIN) 100 MG Cap Take 1 capsule (100 mg total) by mouth 2 (two) times daily. for 7 days 14 capsule 0    insulin aspart U-100 (NOVOLOG) 100 unit/mL (3 mL) InPn pen Inject 1 Units into the skin as needed. Inject into skin if over 200.      losartan (COZAAR) 100 MG tablet Take 1 tablet (100 mg total) by mouth once daily. 30 tablet 2    morphine (MS CONTIN) 30 MG 12 hr tablet Take 1 tablet (30 mg total) by mouth every 12 (twelve) hours. 60 tablet 0    naloxone (NARCAN) 4 mg/actuation Spry 4mg by nasal route as needed for opioid overdose; may repeat every 2-3 minutes in alternating nostrils until medical help arrives. Call 911 1 each 11    ondansetron (ZOFRAN) 8 MG tablet Take 8 mg by mouth every 8 (eight) hours as needed.      oxyCODONE (ROXICODONE) 5 MG immediate release tablet Take 1 tablet (5 mg total) by mouth every 6 (six) hours as needed for Pain. 120 tablet 0    semaglutide (OZEMPIC) 0.25 mg or 0.5 mg(2 mg/1.5 mL) pen injector Inject 0.25 mg into the skin once a week.      senna-docusate 8.6-50 mg (PERICOLACE) 8.6-50 mg per tablet Take 1 tablet by mouth daily as needed for Constipation. 30 tablet 2    sodium chloride 3% 3 % nebulizer solution Take 4 mLs by nebulization 2 (two) times a day. 240 mL 11    SYMBICORT 160-4.5 mcg/actuation HFAA Inhale into  "the lungs.      TRUE METRIX GLUCOSE TEST STRIP Strp       TRUEPLUS LANCETS 33 gauge Misc       valACYclovir (VALTREX) 1000 MG tablet Take 1 tablet (1,000 mg total) by mouth 3 (three) times daily. for 7 days 21 tablet 0     No current facility-administered medications for this visit.        Physical Exam:   BP (!) 146/87 (BP Location: Right arm, Patient Position: Sitting, BP Method: Medium (Automatic))   Pulse 98   Resp 16   Ht 5' 7" (1.702 m)   Wt 103.2 kg (227 lb 8.2 oz)   SpO2 95%   BMI 35.63 kg/m²      ECOG Performance status: 2          Patient has life expectancy > 12 weeks    Physical Exam      Labs:   No results found for this or any previous visit (from the past 48 hour(s)).     Imaging:   Results for orders placed or performed during the hospital encounter of 06/02/23 (from the past 2160 hour(s))   CT Chest Abdomen Pelvis With Contrast (xpd)    Narrative    EXAMINATION:  CT CHEST ABDOMEN PELVIS WITH CONTRAST (XPD)    CLINICAL HISTORY:  Small cell lung cancer (SCLC), assess treatment response; Malignant neoplasm of unspecified part of unspecified bronchus or lung    TECHNIQUE:  Low dose axial images, sagittal and coronal reformations were obtained from the thoracic inlet to the pubic symphysis following the IV administration of 100 mL of Omnipaque 350 and the oral administration of 15-cc of Omnipaque 300.    COMPARISON:  CT chest/abdomen/pelvis 03/10/2023    FINDINGS:  Chest:    - Base of the neck: Right-sided infusion port catheter with tip terminating at the cavoatrial junction, unchanged.  1.5-cm (previously 1.2-cm) right cervical abnormally enlarged lymph node.    - Axilla/mediastinum/tea: 2.2-cm (previously 2.0-cm) superior mediastinal abnormally enlarged lymph node.  Right paratracheal/pretracheal abnormally enlarged lymph node is not significantly changed.    - Heart: Heart is not enlarged. No significant pericardial thickening. No appreciable coronary artery calcifications. No patel aneurysmal " degeneration of the thoracic aorta.    - Airways: Trachea and mainstem bronchi are patent.  Attenuation of the right-sided lobar bronchi of both the upper and lower lobes is slightly progressed when compared to prior.    - Lungs: 3.0 x 2.6-cm (previously 2.9 x 2.3-cm) soft tissue mass in the medial segment of the right middle lobe (2:56).  Significant interval increase in extent/volume and density of consolidation in the right upper, middle and lower lobes with air bronchograms. No overt interstitial edema, sizable pleural effusion or pneumothorax.    Abdomen:    - Liver: Generalized interval increase in size and number of previously noted multiple subtle hepatic hypodense nodules and masses.  No intrahepatic/extrahepatic biliary dilatation.    - Gallbladder: No radiodense gallstones, mural thickening, pericholecystic fluid or pericholecystic fat stranding.    - Pancreas: Pancreas is normal in size and attenuation without the surrounding inflammatory fat stranding, suspicious mass or fluid/fluid collection.    - Spleen: Spleen is normal in size, morphology and attenuation without appreciable suspicious lesion.    - Adrenals: Bilateral adrenal glands are normal in size and morphology without appreciable nodularity.    - Kidneys: Punctate renal cortical hypodensities bilaterally, too small to accurately characterize. Otherwise, the kidneys are normal in size, location, morphology and attenuation. No obstructing nephroliths or hydroureteronephrosis bilaterally.  Urinary bladder is well distended and grossly unremarkable without mural thickening or appreciable nodularity.    - Bowel/Mesentery: The enhanced but unopacified stomach, small bowel and colon are normal in course and caliber.  No evidence of small bowel obstruction, significant inflammatory fat stranding, free air or free fluid.  Appendix is not visualized.  Bowel mesentery is grossly unremarkable.    - Retroperitoneum:  Aorta is normal in course and caliber  without evidence of aneurysmal degeneration.  No sizable retroperitoneal mass or fluid collection.    Pelvis:    - Reproductive organs: Reproductive organs are within normal limits.  No suspicious sizable pelvic mass, lymphadenopathy or fluid.    - Soft tissues:  Imaged soft tissues are grossly unremarkable.    - Bones:  Stability to minimal interval worsening of predominantly sclerotic osseous metastatic lesions throughout the axial and proximal appendicular skeleton with new and enlarged osseous lesions noted throughout.      Impression    1. Overall findings suggest a negative response to therapy.  2. 1.5-cm (previously 1.2-cm) right cervical abnormally enlarged lymph node.  3. 2.2-cm (previously 2.0-cm) superior mediastinal abnormally enlarged lymph node.  4. Right paratracheal/pretracheal abnormally enlarged lymph node is not significantly changed.  5. 3.0 x 2.6-cm (previously 2.9 x 2.3-cm) soft tissue mass in the medial segment of the right middle lobe.  6. Significant interval increase in extent/volume and density of consolidation in the right upper, middle and lower lobes with air bronchograms.  7. Generalized interval increase in size and number of previously noted multiple subtle hepatic hypodense nodules and masses.  8. Stability to minimal interval worsening of predominantly sclerotic osseous metastatic lesions throughout the axial and proximal appendicular skeleton.  This report was flagged in Epic as abnormal.      Electronically signed by: Chadd Hernandes  Date:    06/02/2023  Time:    16:42   Results for orders placed or performed during the hospital encounter of 05/31/23 (from the past 2160 hour(s))   CTA Chest Non-Coronary (PE Studies)    Narrative    EXAMINATION:  CTA CHEST NON CORONARY (PE STUDIES)    CLINICAL HISTORY:  Pulmonary embolism (PE) suspected, high prob;chest pain, back pain, left neck pain, h/o cancer;    TECHNIQUE:  Low dose axial images, sagittal and coronal reformations were obtained  from the thoracic inlet to the lung bases following the IV administration of 75 mL of Omnipaque 350.  Contrast timing was optimized to evaluate the pulmonary arteries.  MIP images were performed.    COMPARISON:  Multiple CTs, most recent 03/10/2023    FINDINGS:  LINES/TUBES:  Right chest wall Port-A-Cath terminates at the cavoatrial junction.    SOFT TISSUES: No axillary or subpectoral lymphadenopathy.  The thyroid gland and supraclavicular regions are difficult to evaluate due to streak artifact and image noise.    HEART AND MEDIASTINUM: There is a 2.4 cm prevascular lymph node (2:99), previously 2.0 cm.  There is confluent soft tissue in the paratracheal, subcarinal, and right hilar stations, which is difficult to measure but has increased significantly in size.  There is at least abutment of the esophagus, which is inseparable from the soft tissue mass.  Heart is normal in size.  No CT evidence of right heart strain.  No pericardial fluid.  Mild atherosclerosis.  The main pulmonary artery is normal in size.  There is a filling defect involving a subsegmental branch of the right lower lobe pulmonary artery (2:248).    PLEURA: No pleural effusion or pneumothorax.    LUNGS AND AIRWAYS: Central airways are patent.  The right hilar mass encases adjacent pulmonary vessels and airways with associated bronchiectasis.  There is a 3.1 x 2.6 cm paramediastinal mass in the right middle lobe (2:223), previously 2.7 x 2.2 cm.  New ground-glass opacities in the superior left lower lobe.    UPPER ABDOMEN: There are multiple liver lesions, although difficult to compare to the prior exam.  No other abnormality in the visualized upper abdomen.    BONES: Innumerable lytic and sclerotic lesions throughout the chest, similar in size and distribution to prior.  There are subacute left-sided rib fractures.  No new fracture.      Impression    Filling defect involving a subsegmental branch of the right lower lobe pulmonary artery,  concerning for pulmonary thromboembolism.  Direct invasion by tumor is less likely.  No CT evidence of right heart strain.    Progression of metastatic disease:    *Enlarging prevascular lymph node.  *Enlarging ill-defined mass in the mediastinum and right hilum.  *Enlarging 3.1 cm right middle lobe mass.  *Widespread osseous metastases.  *Multiple liver metastases, difficult to compared to the prior exam.  This report was flagged in Epic as abnormal.      Electronically signed by: Sagar Gore  Date:    05/31/2023  Time:    15:27           Assessment:       1. Small cell lung cancer    2. Secondary malignant neoplasm of liver    3. Secondary malignant neoplasm of brain    4. 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 Herpes zoster without complication    7. Type 2 diabetes mellitus with other specified complication, without long-term current use of insulin          Plan:       1,2 Extensive stage small cell lung cancer  We will put patient on waiting list for  study, which is a DLL-3 BITE for small cell lung cancer. Unfortunately slots are not available at this time. Would recommend 4th line docetaxel if patient maintains performance status. We will update patient and oncologist if a slot opens up    3 Would need to prove stability of brain mets prior to enrollment on [employer] study if a slot opens    4-6 Continue valtrex, lesions crusted and healing.    7 Told to stop ozempic as he has significant constitutional symptoms including weight loss from cancer.      45 minutes total time spent with patient, 25 minutes were spent face to face with the patient and his family to discuss the disease, natural history, treatment options and survival statistics. Greater than 50% of this time was for counseling.  20 minutes of chart review and coordination of care. I have provided the patient with an opportunity to ask questions and have all questions answered to his  satisfaction.       Eugene De La Rosa M.D.  Hematology/Oncology Attending  [facility] Therapies Program  [facility]        Diagnosis  Specialty Problems          Oncology    Secondary malignant neoplasm of liver        Small cell lung cancer        Secondary malignant neoplasm of intrathoracic lymph nodes        Secondary malignant neoplasm of brain        Lung cancer metastatic to bone            Stage of disease  Stage IV      Line of therapy  Fourth Line and beyond    Research Study Trials  PCTP

## 2023-06-13 PROCEDURE — 77417 THER RADIOLOGY PORT IMAGE(S): CPT | Performed by: RADIOLOGY

## 2023-06-13 PROCEDURE — 77387 GUIDANCE FOR RADJ TX DLVR: CPT | Mod: 26,,, | Performed by: RADIOLOGY

## 2023-06-13 PROCEDURE — 77387 PR GUIDANCE FOR RADIATION TREATMENT DELIVERY: ICD-10-PCS | Mod: 26,,, | Performed by: RADIOLOGY

## 2023-06-13 PROCEDURE — 77412 RADIATION TX DELIVERY LVL 3: CPT | Performed by: RADIOLOGY

## 2023-06-13 PROCEDURE — 77387 GUIDANCE FOR RADJ TX DLVR: CPT | Mod: TC | Performed by: RADIOLOGY

## 2023-06-19 ENCOUNTER — OFFICE VISIT (OUTPATIENT)
Dept: HEMATOLOGY/ONCOLOGY | Facility: CLINIC | Age: 58
End: 2023-06-19
Payer: MEDICAID

## 2023-06-19 ENCOUNTER — INFUSION (OUTPATIENT)
Dept: INFUSION THERAPY | Facility: HOSPITAL | Age: 58
End: 2023-06-19
Attending: STUDENT IN AN ORGANIZED HEALTH CARE EDUCATION/TRAINING PROGRAM
Payer: MEDICAID

## 2023-06-19 VITALS
OXYGEN SATURATION: 95 % | DIASTOLIC BLOOD PRESSURE: 65 MMHG | RESPIRATION RATE: 18 BRPM | TEMPERATURE: 98 F | HEART RATE: 91 BPM | SYSTOLIC BLOOD PRESSURE: 117 MMHG

## 2023-06-19 VITALS
HEART RATE: 94 BPM | HEIGHT: 67 IN | WEIGHT: 227.5 LBS | DIASTOLIC BLOOD PRESSURE: 85 MMHG | BODY MASS INDEX: 35.71 KG/M2 | SYSTOLIC BLOOD PRESSURE: 142 MMHG | OXYGEN SATURATION: 98 %

## 2023-06-19 DIAGNOSIS — Z09 FOLLOW-UP EXAM: ICD-10-CM

## 2023-06-19 DIAGNOSIS — T45.1X5A IMMUNODEFICIENCY SECONDARY TO CHEMOTHERAPY: ICD-10-CM

## 2023-06-19 DIAGNOSIS — Z79.899 IMMUNODEFICIENCY SECONDARY TO CHEMOTHERAPY: ICD-10-CM

## 2023-06-19 DIAGNOSIS — E11.9 TYPE 2 DIABETES MELLITUS, WITHOUT LONG-TERM CURRENT USE OF INSULIN: Primary | ICD-10-CM

## 2023-06-19 DIAGNOSIS — Z51.5 PALLIATIVE CARE BY SPECIALIST: ICD-10-CM

## 2023-06-19 DIAGNOSIS — D84.821 IMMUNODEFICIENCY SECONDARY TO CHEMOTHERAPY: ICD-10-CM

## 2023-06-19 DIAGNOSIS — I10 HYPERTENSION, UNSPECIFIED TYPE: ICD-10-CM

## 2023-06-19 DIAGNOSIS — E11.69 TYPE 2 DIABETES MELLITUS WITH OTHER SPECIFIED COMPLICATION, WITHOUT LONG-TERM CURRENT USE OF INSULIN: ICD-10-CM

## 2023-06-19 DIAGNOSIS — Z79.01 ANTICOAGULATED: ICD-10-CM

## 2023-06-19 DIAGNOSIS — C79.31 SECONDARY MALIGNANT NEOPLASM OF BRAIN: ICD-10-CM

## 2023-06-19 DIAGNOSIS — C34.90 SMALL CELL LUNG CANCER: Primary | ICD-10-CM

## 2023-06-19 DIAGNOSIS — Z76.0 MEDICATION REFILL: ICD-10-CM

## 2023-06-19 DIAGNOSIS — B02.9 HERPES ZOSTER WITHOUT COMPLICATION: ICD-10-CM

## 2023-06-19 PROCEDURE — 3077F PR MOST RECENT SYSTOLIC BLOOD PRESSURE >= 140 MM HG: ICD-10-PCS | Mod: CPTII,,, | Performed by: STUDENT IN AN ORGANIZED HEALTH CARE EDUCATION/TRAINING PROGRAM

## 2023-06-19 PROCEDURE — 96360 HYDRATION IV INFUSION INIT: CPT

## 2023-06-19 PROCEDURE — 3008F PR BODY MASS INDEX (BMI) DOCUMENTED: ICD-10-PCS | Mod: CPTII,,, | Performed by: STUDENT IN AN ORGANIZED HEALTH CARE EDUCATION/TRAINING PROGRAM

## 2023-06-19 PROCEDURE — 99999 PR PBB SHADOW E&M-EST. PATIENT-LVL II: ICD-10-PCS | Mod: PBBFAC,,, | Performed by: STUDENT IN AN ORGANIZED HEALTH CARE EDUCATION/TRAINING PROGRAM

## 2023-06-19 PROCEDURE — 96372 THER/PROPH/DIAG INJ SC/IM: CPT | Mod: 59

## 2023-06-19 PROCEDURE — 3077F SYST BP >= 140 MM HG: CPT | Mod: CPTII,,, | Performed by: STUDENT IN AN ORGANIZED HEALTH CARE EDUCATION/TRAINING PROGRAM

## 2023-06-19 PROCEDURE — A4216 STERILE WATER/SALINE, 10 ML: HCPCS | Performed by: STUDENT IN AN ORGANIZED HEALTH CARE EDUCATION/TRAINING PROGRAM

## 2023-06-19 PROCEDURE — 99999 PR PBB SHADOW E&M-EST. PATIENT-LVL II: CPT | Mod: PBBFAC,,, | Performed by: STUDENT IN AN ORGANIZED HEALTH CARE EDUCATION/TRAINING PROGRAM

## 2023-06-19 PROCEDURE — 4010F ACE/ARB THERAPY RXD/TAKEN: CPT | Mod: CPTII,,, | Performed by: STUDENT IN AN ORGANIZED HEALTH CARE EDUCATION/TRAINING PROGRAM

## 2023-06-19 PROCEDURE — 63600175 PHARM REV CODE 636 W HCPCS: Performed by: STUDENT IN AN ORGANIZED HEALTH CARE EDUCATION/TRAINING PROGRAM

## 2023-06-19 PROCEDURE — 3079F PR MOST RECENT DIASTOLIC BLOOD PRESSURE 80-89 MM HG: ICD-10-PCS | Mod: CPTII,,, | Performed by: STUDENT IN AN ORGANIZED HEALTH CARE EDUCATION/TRAINING PROGRAM

## 2023-06-19 PROCEDURE — 99212 OFFICE O/P EST SF 10 MIN: CPT | Mod: PBBFAC,25 | Performed by: STUDENT IN AN ORGANIZED HEALTH CARE EDUCATION/TRAINING PROGRAM

## 2023-06-19 PROCEDURE — 99215 OFFICE O/P EST HI 40 MIN: CPT | Mod: S$PBB,,, | Performed by: STUDENT IN AN ORGANIZED HEALTH CARE EDUCATION/TRAINING PROGRAM

## 2023-06-19 PROCEDURE — 3008F BODY MASS INDEX DOCD: CPT | Mod: CPTII,,, | Performed by: STUDENT IN AN ORGANIZED HEALTH CARE EDUCATION/TRAINING PROGRAM

## 2023-06-19 PROCEDURE — 99215 PR OFFICE/OUTPT VISIT, EST, LEVL V, 40-54 MIN: ICD-10-PCS | Mod: S$PBB,,, | Performed by: STUDENT IN AN ORGANIZED HEALTH CARE EDUCATION/TRAINING PROGRAM

## 2023-06-19 PROCEDURE — 3079F DIAST BP 80-89 MM HG: CPT | Mod: CPTII,,, | Performed by: STUDENT IN AN ORGANIZED HEALTH CARE EDUCATION/TRAINING PROGRAM

## 2023-06-19 PROCEDURE — 4010F PR ACE/ARB THEARPY RXD/TAKEN: ICD-10-PCS | Mod: CPTII,,, | Performed by: STUDENT IN AN ORGANIZED HEALTH CARE EDUCATION/TRAINING PROGRAM

## 2023-06-19 PROCEDURE — 25000003 PHARM REV CODE 250: Performed by: STUDENT IN AN ORGANIZED HEALTH CARE EDUCATION/TRAINING PROGRAM

## 2023-06-19 RX ORDER — CYANOCOBALAMIN 1000 UG/ML
1000 INJECTION, SOLUTION INTRAMUSCULAR; SUBCUTANEOUS
Status: COMPLETED | OUTPATIENT
Start: 2023-06-19 | End: 2023-06-19

## 2023-06-19 RX ORDER — ONDANSETRON HYDROCHLORIDE 8 MG/1
8 TABLET, FILM COATED ORAL EVERY 8 HOURS PRN
Qty: 90 TABLET | Refills: 3 | Status: SHIPPED | OUTPATIENT
Start: 2023-06-19 | End: 2023-10-17

## 2023-06-19 RX ORDER — CYANOCOBALAMIN 1000 UG/ML
1000 INJECTION, SOLUTION INTRAMUSCULAR; SUBCUTANEOUS
Status: CANCELLED | OUTPATIENT
Start: 2023-06-19

## 2023-06-19 RX ORDER — SODIUM CHLORIDE 0.9 % (FLUSH) 0.9 %
10 SYRINGE (ML) INJECTION
Status: DISCONTINUED | OUTPATIENT
Start: 2023-06-19 | End: 2023-06-19 | Stop reason: HOSPADM

## 2023-06-19 RX ORDER — HEPARIN 100 UNIT/ML
500 SYRINGE INTRAVENOUS
Status: DISCONTINUED | OUTPATIENT
Start: 2023-06-19 | End: 2023-06-19 | Stop reason: HOSPADM

## 2023-06-19 RX ORDER — BENZONATATE 100 MG/1
100 CAPSULE ORAL 3 TIMES DAILY PRN
Qty: 90 CAPSULE | Refills: 3 | Status: SHIPPED | OUTPATIENT
Start: 2023-06-19 | End: 2023-10-17

## 2023-06-19 RX ORDER — APIXABAN 5 MG (74)
KIT ORAL
Qty: 74 TABLET | Refills: 0 | Status: CANCELLED | OUTPATIENT
Start: 2023-06-19

## 2023-06-19 RX ADMIN — SODIUM CHLORIDE 1000 ML: 9 INJECTION, SOLUTION INTRAVENOUS at 10:06

## 2023-06-19 RX ADMIN — CYANOCOBALAMIN 1000 MCG: 1000 INJECTION, SOLUTION INTRAMUSCULAR at 11:06

## 2023-06-19 RX ADMIN — Medication 10 ML: at 11:06

## 2023-06-19 RX ADMIN — HEPARIN 500 UNITS: 100 SYRINGE at 11:06

## 2023-06-19 NOTE — Clinical Note
-RTC in 2 weeks for single agent docetaxel tx initiation with labs CMP , CBC, mg day before with MD visit

## 2023-06-19 NOTE — PROGRESS NOTES
[facility]  Hematology/Medical Oncology Clinic       PATIENT: Juan Gillespie  MRN: [id number]  DATE: 6/19/2023    Reason for referral: Extensive stage small cell lung ca    Initial History: Juan Gillespie is a 58 year old man with extensive stage SCLC who presents to clinic for evaluation prior to treatment  of topotecan.          Oncological History:  -Started Carbo/Etop/Atez 4/5/22  -Maintenance atezolizumab 7/5/22  -Consolidative thoracic radiation 8/4/22-8/18/22  -topotecan started 10/24/23-3/10/22(missed Nov tx)-mixed resposne  -Lurbinectadin started 4/05/23 to 6/2023  -Awaiting clinical trial single agent docetaxel started    Interval History:     Pt presents for MD and feeling well with no new complaints but is awaiting clinical trial opening so single agent docetaxel planned and consents signed.  Chemo school scheduled.  Rash has largely resolve and meds eliquis and tessalon refilled with zofran.      His wife accompanies him at this visit.  Past Medical History:   Past Medical History:   Diagnosis Date    Cancer     Small Cell -Stage 4 Lung Cancer    Diabetes mellitus, type 2     Hypertension        Past Surgical HIstory:   Past Surgical History:   Procedure Laterality Date    ENDOBRONCHIAL ULTRASOUND N/A 3/22/2022    Procedure: ENDOBRONCHIAL ULTRASOUND (EBUS);  Surgeon: Kristin Samuels MD;  Location: [facility] OR [address] [place] [address];  Service: Endoscopy;  Laterality: N/A;    KNEE SURGERY         Family History:   Family History   Problem Relation Age of Onset    Diabetes Mellitus Mother     Pacemaker/defibrilator Father     Lung cancer Father        Social History:  reports that he has quit smoking. His smoking use included cigarettes. He has quit using smokeless tobacco. He reports that he does not drink alcohol and does not use drugs.    Allergies:  Review of patient's allergies indicates:  No Known Allergies    Medications:  Current Outpatient Medications   Medication Sig Dispense Refill    albuterol  (ACCUNEB) 1.25 mg/3 mL Nebu Use 1 vial (1.25 mg total) by nebulization 3 (three) times daily as needed (shortness of breath). Rescue 90 mL 2    allopurinoL (ZYLOPRIM) 300 MG tablet Take 1 tablet (300 mg total) by mouth once daily. 60 tablet 3    apixaban (ELIQUIS DVT-PE TREAT 30D START) 5 mg (74 tabs) DsPk For the first 7 days take two 5 mg tablets twice daily.  After 7 days take one 5 mg tablet twice daily. 74 tablet 0    benzonatate (TESSALON) 100 MG capsule Take 1 capsule by mouth as needed.      cetirizine (ZYRTEC) 10 MG tablet Take 1 tablet by mouth as needed.      dapagliflozin (FARXIGA) 10 mg tablet Take 1 tablet by mouth once daily.      insulin aspart U-100 (NOVOLOG) 100 unit/mL (3 mL) InPn pen Inject 1 Units into the skin as needed. Inject into skin if over 200.      losartan (COZAAR) 100 MG tablet Take 1 tablet (100 mg total) by mouth once daily. 30 tablet 2    morphine (MS CONTIN) 30 MG 12 hr tablet Take 1 tablet (30 mg total) by mouth every 12 (twelve) hours. 60 tablet 0    naloxone (NARCAN) 4 mg/actuation Spry 4mg by nasal route as needed for opioid overdose; may repeat every 2-3 minutes in alternating nostrils until medical help arrives. Call 911 1 each 11    ondansetron (ZOFRAN) 8 MG tablet Take 8 mg by mouth every 8 (eight) hours as needed.      oxyCODONE (ROXICODONE) 5 MG immediate release tablet Take 1 tablet (5 mg total) by mouth every 6 (six) hours as needed for Pain. 120 tablet 0    semaglutide (OZEMPIC) 0.25 mg or 0.5 mg(2 mg/1.5 mL) pen injector Inject 0.25 mg into the skin once a week.      senna-docusate 8.6-50 mg (PERICOLACE) 8.6-50 mg per tablet Take 1 tablet by mouth daily as needed for Constipation. 30 tablet 2    SYMBICORT 160-4.5 mcg/actuation HFAA Inhale into the lungs.      TRUE METRIX GLUCOSE TEST STRIP Strp       TRUEPLUS LANCETS 33 gauge Misc       valACYclovir (VALTREX) 1000 MG tablet Take 1 tablet (1,000 mg total) by mouth 3 (three) times daily. for 7 days 21 tablet 0     No  "current facility-administered medications for this visit.       Review of Systems   Constitutional:  Positive for fatigue. Negative for chills and fever.        Rash   HENT:  Negative for congestion, sinus pressure and trouble swallowing.    Respiratory:  Negative for cough, chest tightness and shortness of breath.    Cardiovascular:  Negative for chest pain.   Gastrointestinal:  Negative for abdominal pain and blood in stool.   Endocrine: Negative for cold intolerance and heat intolerance.   Genitourinary:  Negative for hematuria.   Musculoskeletal:  Positive for arthralgias. Negative for back pain and myalgias.   Skin:  Negative for rash.   Neurological:  Negative for weakness.   Hematological:  Negative for adenopathy. Does not bruise/bleed easily.   Psychiatric/Behavioral:  Negative for dysphoric mood. The patient is not nervous/anxious.    ECOG Performance Status:   ECOG SCORE             Objective:      Vitals:   Vitals:    06/19/23 0935   BP: (!) 142/85   Pulse: 94   SpO2: 98%   Weight: 103.2 kg (227 lb 8.2 oz)   Height: 5' 7" (1.702 m)         telemedicine    Physical Exam  Constitutional:       General: He is not in acute distress.     Appearance: Normal appearance. He is not ill-appearing.   HENT:      Head: Normocephalic and atraumatic.      Nose: Nose normal.      Mouth/Throat:      Mouth: Mucous membranes are moist.      Pharynx: Oropharynx is clear. No oropharyngeal exudate or posterior oropharyngeal erythema.   Eyes:      General: No scleral icterus.     Extraocular Movements: Extraocular movements intact.      Conjunctiva/sclera: Conjunctivae normal.      Pupils: Pupils are equal, round, and reactive to light.   Pulmonary:      Effort: Pulmonary effort is normal. No respiratory distress.   Abdominal:      General: Abdomen is flat.      Palpations: Abdomen is soft.   Musculoskeletal:         General: No swelling. Normal range of motion.      Cervical back: Normal range of motion.      Right lower leg: " No edema.      Left lower leg: No edema.   Skin:     General: Skin is warm and dry.      Coloration: Skin is not jaundiced.   Neurological:      General: No focal deficit present.      Mental Status: He is alert.   Psychiatric:         Mood and Affect: Mood normal.         Behavior: Behavior normal.         Thought Content: Thought content normal.         Judgment: Judgment normal.     Laboratory Data:  No results found for this or any previous visit (from the past 168 hour(s)).        Imaging:   MRI Brain W WO Contrast    Result Date: 10/11/2022  EXAMINATION: MRI BRAIN W WO CONTRAST CLINICAL HISTORY: Small cell lung cancer, brain re-staging; Malignant neoplasm of unspecified part of unspecified bronchus or lung TECHNIQUE: Sagittal and axial T1, axial T2, axial FLAIR, axial gradient, axial diffusion imaging of the whole brain pre-contrast with postcontrast axial T1 and axial spoiled gradient imaging reformatted in the coronal and sagittal planes.. Ten ml of Gadavist injected intravenously. COMPARISON: 07/12/2022 FINDINGS: Interval 1.2 cm enhancing lesion within the left anterior inferior frontal lobe which prominently the ring-enhancing measuring 1.2 x 0.9 x 0.9 cm in AP by TV by CC dimensions respectively in light of history concerning for parenchymal metastases the with question trace susceptibility associated with this lesion. Previously identified heterogeneous enhancing sellar lesion is again seen allowing for routine brain technique with lesion measuring approximately 1.3 cm craniocaudal this may represent pituitary adenoma though indeterminate.  Previous intraluminal filling defect within the right jugular foramen is no longer seen.  There is however diffusion signal abnormality with ill-defined T1 hypointensity within the right occipital condyle concerning for possible osseous metastases the clinical correlation and further evaluation with nuclear medicine imaging advised. There is continued slight prominent  leptomeningeal enhancement along the left cerebellum which raises concern for possible underlying vascular malformation such as a dural AV fistula with collateral vascular engorgement.  There is no restricted diffusion to suggest acute infarction.  Ventricles stable without hydrocephalus.  There is mild edema signal surrounding the left frontal enhancing lesion with continued few scattered punctate size regions of T2 FLAIR signal hyperintensity elsewhere supratentorial white matter which are nonspecific and may represent mild chronic ischemic change. This report was flagged in Epic as abnormal.     Interval development of a small ring-enhancing lesion left anterior inferior frontal lobe concerning for parenchymal metastases in light of history.  Clinical correlation and follow-up advised There is continue though relatively stable heterogeneous enhancement and enlargement of the material within the sella which may represent pituitary adenoma though indeterminate. Previous right internal jugular vein thrombus no longer seen. Abnormal signal along the right occipital condyle concerning for possible osseous metastases clinical correlation and further evaluation with nuclear medicine imaging advised Continued prominent vascularity left cerebellar folia raising concern for possible underlying vascular malformation unchanged.  Further evaluation with noncontrast MRA head may be of further diagnostic value. Electronically signed by: Jonel Lawrence DO Date:    10/11/2022 Time:    10:05    CT Chest Abdomen Pelvis With Contrast (xpd)    Result Date: 10/18/2022  EXAMINATION: CT CHEST ABDOMEN PELVIS WITH CONTRAST (XPD) CLINICAL HISTORY: metastatic small cell lung cancer on maintenance immunotherapy, eval response; Malignant neoplasm of unspecified part of unspecified bronchus or lung TECHNIQUE: Low dose axial images, sagittal and coronal reformations were obtained from the thoracic inlet to the pubic symphysis following the IV  administration of 100 mL of Omnipaque 350 COMPARISON: 07/26/2022 FINDINGS: Right IJ venous shunt tip superior aspect right atrium, absence of clot in included upper right IJ. Fatty infiltration of liver, additional diminished attenuation space-occupying lesions of liver, largest central right hepatic lobe 3.6 cm image 103 series 3.  Stable.  Spleen, adrenal glands, pancreas, gallbladder and biliary tree normal. Urinary bladder normal.  Prostate gland very small 4 cm versus postop prostatectomy.  No free fluid or retroperitoneal adenopathy.  GI track normal. Tiny nonobstructive right lower and left upper renal pole stones. New lymph node left retro manubrial 3.4 cm image 28 series 3 appearing in the interim.  Paratracheal conglomerate adenopathy 2.1 x 2.5 cm, was 1.6 x 3 cm.  Subcarinal adenopathy stable.  No new hilar or mediastinal adenopathy. Degenerative disc spondylosis cervicothoracic junction., focal osteoblastic opacities involve humeral heads both clavicle superimposed on erosive DJD more prominent on right.  Additional focal osteoblastic opacities sternum, ribs, dorsal lumbar sacral spine pelvis and both femoral head and trochanteric regions.  Moderate anterior spondylosis dorsal spine and degenerative disc spondylosis facet joint arthropathy lumbosacral junction with DJD SI joints.  Fracture defects left lower anterior chest wall stable. Scattered calcified plaque great vessels aorta iliac femoral arteries. Dominant medial segment right middle lobe mass 1.1 x 2.7 cm image 69 series 3, was 1.6 x 3.4 cm.  Additional patchy nodular lesions right lower lobe posterior basilar segments, largest 1.4 cm image 66 series 3 suspicious for metastatic disease and/or superimposed inflammatory and infectious process.  Additional patchy infiltrates medial posterior segment right upper lobe and right lower lobe particularly medial basilar segment image 91 series 3.  No new pleural reaction.  Tracheobronchial tree normal. .      1. New presumed metastatic node anterior superior left mediastinum, paratracheal adenopathy otherwise stable. 2. Decreased size in right middle lobe mass with new evidence of metastatic disease right lower lobe versus superimposed inflammatory infectious process discussed above. 3. New developing metastatic lesions liver. 4. Bony universal metastatic disease stable. 5. Recist summary: 6. Compare with previous CT chest abdomen pelvis 07/26/2022 7. Lesion 1: Right middle lobe mass 1.1 x 2.7 cm was 1.6 x 13.4 cm. 8. Lesion 2: Right paratracheal conned Willett a adenopathy 2.1 x 2.5 cm, was 1.6 x 3 cm. 9. Lesion 3: Dome 1.9 cm stable.  (New progressive liver metastatic disease noted elsewhere) 10. Lesion 4: Caudal aspect right hepatic lobe 1 cm, stable 11.  This report was flagged in Epic as abnormal. Electronically signed by: Dewayne Hutchinson MD Date:    10/18/2022 Time:    09:28       Assessment and Plan          ECOG 0      Extensive stage small cell lung cancer with brain mets  -Initially diagnosed with extensive stage small cell lung cancer in 03/2022 who was treated with carbo/etop/atezolizumab from 04/05/2022 - 06/14/22 with partial response. He subsequently completed consolidation thoracic RT 8/4/22 - 8/18/22, and he was on maintenance atezolizumab.   -10/18/22 showing progression of disease in lungs and liver.  Asymptomatic.  -Dr. Sosa discussed with him plan for topotecan and pt was consented  -Pt tolerated cycle 1 well but delayed C2 due to being out of town  -Scans in December 2022 largely stable with exception of  Right middle lobe lung mass.  3.4 cm, still demonstrated widespread disease in bones  -Admitted for pain control and palliative XRT on 12/07/22  -Pt had been off of tx in Nov 2022 due to going on vacation, as symptoms initially improved drastically after restarting tx  Plan 06/19/23  -Pt awaiting clinical trial opening until then will arrange single agent taxane tx  -Consents signed for  docetaxel  -Follow up with palliative care  -Zofran and tessalon refilled        Cancer related pain/palliative care by specialist  -Pain well controlled and is not using pain meds since restarting tx and palliative XRT      DM2  -Controlled, following with PCP      Hypercalcemia/Mets to bone and spine  -Pt still has not gotten dental clearance and is aware of risk of jaw osteonecrosis but would like to start zometa today due to metastatic disease  -Continue calcium supplements while receiving zometa      PE  -on 5/31/23  -Most likely from his cancer  -Continue lifelong anticoagulation  -Eliquis refilled        Time spent with pt: 40 minutes      Problem List Items Addressed This Visit    None              Soledad Irving MD  Hematology Oncology

## 2023-06-23 ENCOUNTER — TELEPHONE (OUTPATIENT)
Dept: HEMATOLOGY/ONCOLOGY | Facility: CLINIC | Age: 58
End: 2023-06-23
Payer: MEDICAID

## 2023-06-23 DIAGNOSIS — C34.90 LUNG CANCER METASTATIC TO BONE: ICD-10-CM

## 2023-06-23 DIAGNOSIS — R64 CANCER CACHEXIA: ICD-10-CM

## 2023-06-23 DIAGNOSIS — C79.31 SECONDARY MALIGNANT NEOPLASM OF BRAIN: ICD-10-CM

## 2023-06-23 DIAGNOSIS — T45.1X5A CHEMOTHERAPY INDUCED NAUSEA AND VOMITING: Primary | ICD-10-CM

## 2023-06-23 DIAGNOSIS — C79.51 LUNG CANCER METASTATIC TO BONE: ICD-10-CM

## 2023-06-23 DIAGNOSIS — R11.2 CHEMOTHERAPY INDUCED NAUSEA AND VOMITING: Primary | ICD-10-CM

## 2023-06-23 DIAGNOSIS — C34.90 SMALL CELL LUNG CANCER: Primary | Chronic | ICD-10-CM

## 2023-06-23 RX ORDER — CYPROHEPTADINE HYDROCHLORIDE 4 MG/1
4 TABLET ORAL 3 TIMES DAILY PRN
Qty: 60 TABLET | Refills: 1 | Status: SHIPPED | OUTPATIENT
Start: 2023-06-23 | End: 2023-07-23

## 2023-06-23 RX ORDER — ONDANSETRON HYDROCHLORIDE 8 MG/1
8 TABLET, FILM COATED ORAL EVERY 12 HOURS PRN
Qty: 30 TABLET | Refills: 2 | Status: ON HOLD | OUTPATIENT
Start: 2023-06-23 | End: 2023-07-20 | Stop reason: HOSPADM

## 2023-06-23 NOTE — TELEPHONE ENCOUNTER
Patient and spouse presented for chemo class.. Nurse reviewed in detail the following: benefits and side effects of chemotherapy.  The following was discussed : the necessity of limiting exposure to anyone with an active illness, such as flu,virus,temperature or infectious disease such as pneumonia,meningitis ,TBC, hepatitis etc.   Explained that their immune system is compromised and it is important they avoid  contact with infectious illnesses due to the potential harm to themselves .    Proper hand washing hygiene reviewed with patient and requested they review this procedure with all coming in contact with them especially in immediate household. Reviewed neutropenic precautions with patient informing patient that their white count may decrease due to his chemotherapy and this will put them at a higher risk of infection .   Instructed patient to contact physician if they develop a temperature, uncontrolled N,V,or D  that last 24 hours or greater. Instructions for  anti emetics N&V reviewed in detail   Instructions for taliiknf pain medication reviewed in detail Pt expresses concern over becoming addicted to pain medication Long discussion with patient re: benefits os taking medication as prescribed     If a new pain develops or existing pain is not controlled by current  prescribed medication to contact physician for recommendation. Advised patient that all fruits and vegetables are to be washed thoroughly prior to eating. It is recommended that they not eat at buffet bars ,eat sushi,or consume oysters in any form cooked or not due to potential bacteria. Pt informed they  are allowed to eat salad at home after it is washed thoroughly.   Explained that it is important to use sunscreen as chemo causes individuals to be sun sensitive and  we want to avoid sunburns which has the potential for infection.   Instructed patient to use gloves if working in a garden due to bacteria in potting soil and dirt. Advised when  participating in outdoor activities such as hunting and fishing to use precautions not to be exposed to marine bacteria such as let someone else bait fishing hook,clean fish and take fish off line.  During hunting let someone else deal with cleaning animals.    Sexual activity information sheets given to  and reviewed with patient.   Explained that prior to any dental care physician should be notified as to what is to be performed so the physician may make recommendations .   Instructed  pt to contact  physician if mouth sores develop for advise. Recipe for baking soda and saline mouthwash reviewed with patient   Alopecia discussed with pt.  Advised patient that if their newly diagnosed disease is creating undue stress,anxiety or fears, the  nurse can facilitate locating a psychiatrist or psychologist for them to speak with.   Importance of keeping all scheduled appointments with physician and ancillary testing.    Patient presented opportunity to ask questions . All questions answered in detail and pt acknowledged  understanding of information given during presentation.  Nurse instructed pt how to contact her with any concerns, questions or needs that may arise.   Total time 1 hr 5 minutes

## 2023-06-26 ENCOUNTER — INFUSION (OUTPATIENT)
Dept: INFUSION THERAPY | Facility: HOSPITAL | Age: 58
End: 2023-06-26
Attending: STUDENT IN AN ORGANIZED HEALTH CARE EDUCATION/TRAINING PROGRAM
Payer: MEDICAID

## 2023-06-26 VITALS
SYSTOLIC BLOOD PRESSURE: 101 MMHG | OXYGEN SATURATION: 94 % | DIASTOLIC BLOOD PRESSURE: 61 MMHG | TEMPERATURE: 99 F | HEART RATE: 107 BPM | RESPIRATION RATE: 18 BRPM

## 2023-06-26 DIAGNOSIS — C34.90 MALIGNANT NEOPLASM OF UNSPECIFIED PART OF UNSPECIFIED BRONCHUS OR LUNG: Primary | ICD-10-CM

## 2023-06-26 DIAGNOSIS — E11.9 TYPE 2 DIABETES MELLITUS, WITHOUT LONG-TERM CURRENT USE OF INSULIN: ICD-10-CM

## 2023-06-26 DIAGNOSIS — B02.9 HERPES ZOSTER WITHOUT COMPLICATION: ICD-10-CM

## 2023-06-26 PROCEDURE — 25000003 PHARM REV CODE 250: Performed by: STUDENT IN AN ORGANIZED HEALTH CARE EDUCATION/TRAINING PROGRAM

## 2023-06-26 PROCEDURE — 63600175 PHARM REV CODE 636 W HCPCS: Performed by: STUDENT IN AN ORGANIZED HEALTH CARE EDUCATION/TRAINING PROGRAM

## 2023-06-26 PROCEDURE — 96374 THER/PROPH/DIAG INJ IV PUSH: CPT

## 2023-06-26 PROCEDURE — A4216 STERILE WATER/SALINE, 10 ML: HCPCS | Performed by: STUDENT IN AN ORGANIZED HEALTH CARE EDUCATION/TRAINING PROGRAM

## 2023-06-26 PROCEDURE — 96361 HYDRATE IV INFUSION ADD-ON: CPT

## 2023-06-26 RX ORDER — VALACYCLOVIR HYDROCHLORIDE 1 G/1
1000 TABLET, FILM COATED ORAL 3 TIMES DAILY
Qty: 21 TABLET | Refills: 0 | Status: ON HOLD | OUTPATIENT
Start: 2023-06-26 | End: 2023-07-20 | Stop reason: HOSPADM

## 2023-06-26 RX ORDER — VALACYCLOVIR HYDROCHLORIDE 1 G/1
TABLET, FILM COATED ORAL
Qty: 21 TABLET | Refills: 0 | Status: SHIPPED | OUTPATIENT
Start: 2023-06-26 | End: 2023-06-26 | Stop reason: SDUPTHER

## 2023-06-26 RX ORDER — HEPARIN 100 UNIT/ML
500 SYRINGE INTRAVENOUS
Status: DISCONTINUED | OUTPATIENT
Start: 2023-06-26 | End: 2023-06-26 | Stop reason: HOSPADM

## 2023-06-26 RX ORDER — SODIUM CHLORIDE 0.9 % (FLUSH) 0.9 %
10 SYRINGE (ML) INJECTION
Status: DISCONTINUED | OUTPATIENT
Start: 2023-06-26 | End: 2023-06-26 | Stop reason: HOSPADM

## 2023-06-26 RX ORDER — ONDANSETRON 2 MG/ML
8 INJECTION INTRAMUSCULAR; INTRAVENOUS ONCE
Status: COMPLETED | OUTPATIENT
Start: 2023-06-26 | End: 2023-06-26

## 2023-06-26 RX ADMIN — Medication 10 ML: at 11:06

## 2023-06-26 RX ADMIN — SODIUM CHLORIDE 1000 ML: 9 INJECTION, SOLUTION INTRAVENOUS at 10:06

## 2023-06-26 RX ADMIN — ONDANSETRON 8 MG: 2 INJECTION INTRAMUSCULAR; INTRAVENOUS at 10:06

## 2023-06-26 RX ADMIN — HEPARIN 500 UNITS: 100 SYRINGE at 11:06

## 2023-06-26 NOTE — PROGRESS NOTES
6/27/2023    Radiation Oncology Follow-Up Visit    Prior Radiation History:    Course 1:  Site  Technique  Energy  Dose/Fx (Gy)  #Fx  Total Dose (Gy)  Start Date  End Date  Elapsed Days    Mediastinum  3D  18X  3  10 / 10  30  8/4/2022 8/18/2022  14      Course 2:   Site  Technique  Energy  Dose/Fx (Gy)  #Fx  Total Dose (Gy)  Start Date  End Date  Elapsed Days    Lt Frontal PTV  SRS  6X  20  1 / 1  20  10/25/2022  10/25/2022  0      Course 3:   Site  Technique  Energy  Dose/Fx (Gy)  #Fx  Total Dose (Gy)  Start Date  End Date  Elapsed Days    T11-L1  AP/PA  18X  8  1 / 1  8  12/9/2022 12/9/2022  0    Rt Ilium  BUI/LPO  18X  8  1 / 1  8  12/9/2022 12/9/2022  0    Rt Hip  AP/PA  18X  8  1 / 1  8  12/9/2022 12/9/2022  0      Course 4:    Site  Technique  Energy  Dose/Fx (Gy)  #Fx  Total Dose (Gy)  Start Date  End Date  Elapsed Days    Brain - 2 PTV  SRS  6X  22  1 / 1  22  4/13/2023 4/13/2023  0     * Rt Internal capsule and left temporal metastases     Course 5:   Site Technique Energy Dose/Fx (Gy) #Fx Total Dose (Gy) Start Date End Date Elapsed Days   Lt Hip AP-PA 18X 8 1 / 1 8 6/13/2023 6/13/2023 0     Assessment   This is a 58 y.o. 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 Staging MRI Brain demonstrated an enhancing sellar mass c/w pituitary adenoma. He received chemo-immunotherapy. Re-staging extracranial imaging CT C/A/P 7/26/22 demonstrated partial response with decrease in size of RML lung primary and mediastinal adenopathy. He has completed chemotherapy and is now on maintenance immunotherapy. He completed thoracic consolidation RT 30 Gy in 10 fx on 8/18/22. MRI Brain 10/2022 demonstrated a single Left frontal metastasis that was treated with SRS 20 Gy x1 on 10/25/22. He received palliative RT 8 Gy x1 to T11-L1, Rt ilium, and Rt hip on 12/9/22. 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  The Lt temporal and Rt internal capsule mets were treated with SRS 22 Gy x1 on 4/13/23. He received palliative RT 8 Gy x1 to Left hip on 6/13/23.     +Fatigue, generalized pain, dyspnea, gait imbalance.     MRI Brain today 6/27/23 demonstrates multiple new enhancing brain lesions including one involving the brainstem; worsening of Left frontal necrotic lesion w/ vasogenic edema. He is no longer a candidate for radiosurgery given the volume of disease, so I discussed treating to 30 Gy in 10 fractions with whole brain radiation therapy. Potential side effects include hair loss, dermatitis, parotitis, fatigue, and long-term side effects of impaired short-term memory and attention and chronic fatigue. Alternatively, I discussed focusing on comfort only as his life expectancy even with treatment is expected to be on the order of 3-6 months now on 4th line therapy and with progressive brain metastases.         Plan   1) Will trial decadron 4 mg bid given MRI findings today. Advised to monitor blood glucose. Discontinue if no improvement in symptoms after 1-2 days. Start PPI or H2-inhibitor while on dexamethasone.   2) Patient and family will discuss whether to undergo WBRT or hospice. If he elects WBRT, will plan to start on 7/10 (1 week after his chemo scheduled for 7/3 and between cycles).          CHIEF COMPLAINT: Follow-up for brain metastases    HPI:           Past Medical History:   Diagnosis Date    Cancer     Small Cell -Stage 4 Lung Cancer    Diabetes mellitus, type 2     Hypertension        Past Surgical History:   Procedure Laterality Date    ENDOBRONCHIAL ULTRASOUND N/A 3/22/2022    Procedure: ENDOBRONCHIAL ULTRASOUND (EBUS);  Surgeon: Kristin Samuels MD;  Location: [facility] OR [address] [place] [address];  Service:  Endoscopy;  Laterality: N/A;    KNEE SURGERY         Social History     Tobacco Use    Smoking status: Former     Types: Cigarettes    Smokeless tobacco: Former    Tobacco comments:     stop smoking 26 years ago   Substance Use Topics    Alcohol use: No    Drug use: No       Cancer-related family history includes Lung cancer in his father.    Current Outpatient Medications on File Prior to Visit   Medication Sig Dispense Refill    albuterol (ACCUNEB) 1.25 mg/3 mL Nebu Use 1 vial (1.25 mg total) by nebulization 3 (three) times daily as needed (shortness of breath). Rescue 90 mL 2    allopurinoL (ZYLOPRIM) 300 MG tablet Take 1 tablet (300 mg total) by mouth once daily. 60 tablet 3    apixaban (ELIQUIS) 5 mg Tab Take 1 tablet (5 mg total) by mouth 2 (two) times daily. 60 tablet 5    benzonatate (TESSALON) 100 MG capsule Take 1 capsule (100 mg total) by mouth 3 (three) times daily as needed for Cough. 90 capsule 3    cetirizine (ZYRTEC) 10 MG tablet Take 1 tablet by mouth as needed.      cyproheptadine (PERIACTIN) 4 mg tablet Take 1 tablet (4 mg total) by mouth 3 (three) times daily as needed (appetite). 60 tablet 1    dapagliflozin (FARXIGA) 10 mg tablet Take 1 tablet by mouth once daily.      insulin aspart U-100 (NOVOLOG) 100 unit/mL (3 mL) InPn pen Inject 1 Units into the skin as needed. Inject into skin if over 200.      losartan (COZAAR) 100 MG tablet Take 1 tablet (100 mg total) by mouth once daily. 30 tablet 2    morphine (MS CONTIN) 30 MG 12 hr tablet Take 1 tablet (30 mg total) by mouth every 12 (twelve) hours. 60 tablet 0    naloxone (NARCAN) 4 mg/actuation Spry 4mg by nasal route as needed for opioid overdose; may repeat every 2-3 minutes in alternating nostrils until medical help arrives. Call 911 1 each 11    ondansetron (ZOFRAN) 8 MG tablet Take 1 tablet (8 mg total) by mouth every 8 (eight) hours as needed for Nausea. 90 tablet 3    ondansetron (ZOFRAN) 8 MG tablet Take 1 tablet (8 mg total) by mouth  "every 12 (twelve) hours as needed for Nausea. 30 tablet 2    oxyCODONE (ROXICODONE) 5 MG immediate release tablet Take 1 tablet (5 mg total) by mouth every 6 (six) hours as needed for Pain. 120 tablet 0    semaglutide (OZEMPIC) 0.25 mg or 0.5 mg(2 mg/1.5 mL) pen injector Inject 0.25 mg into the skin once a week.      senna-docusate 8.6-50 mg (PERICOLACE) 8.6-50 mg per tablet Take 1 tablet by mouth daily as needed for Constipation. 30 tablet 2    SYMBICORT 160-4.5 mcg/actuation HFAA Inhale into the lungs.      TRUE METRIX GLUCOSE TEST STRIP Strp       TRUEPLUS LANCETS 33 gauge Misc       valACYclovir (VALTREX) 1000 MG tablet Take 1 tablet (1,000 mg total) by mouth 3 (three) times daily. for seven days 21 tablet 0    [DISCONTINUED] albuterol-ipratropium (DUO-NEB) 2.5 mg-0.5 mg/3 mL nebulizer solution Take 3 mLs by nebulization every 4 (four) hours. Rescue 90 mL 3    [DISCONTINUED] azelastine (ASTELIN) 137 mcg (0.1 %) nasal spray 1 spray by Nasal route 2 (two) times daily.       Current Facility-Administered Medications on File Prior to Visit   Medication Dose Route Frequency Provider Last Rate Last Admin    [COMPLETED] gadobutroL (GADAVIST) injection 10 mL  10 mL Intravenous ONCE PRN Stephen Jacome MD   10 mL at 06/27/23 1216    [DISCONTINUED] heparin, porcine (PF) 100 unit/mL injection flush 500 Units  500 Units Intravenous PRN Soledad Irving MD   500 Units at 06/26/23 1102    [DISCONTINUED] sodium chloride 0.9% flush 10 mL  10 mL Intravenous PRN Soledad Irving MD   10 mL at 06/26/23 1102       Review of patient's allergies indicates:  No Known Allergies      Vital Signs: BP (!) 140/78 (BP Location: Left arm, Patient Position: Sitting)   Pulse 106   Temp 97.6 °F (36.4 °C)   Resp 18   Ht 5' 7" (1.702 m)   Wt 102.4 kg (225 lb 12 oz)   SpO2 97%   BMI 35.36 kg/m²     ECOG Performance Status: 2    Physical Exam  Vitals reviewed.   Constitutional:       Appearance: Normal appearance.   HENT:      Head: " Normocephalic and atraumatic.   Eyes:      General: No scleral icterus.     Extraocular Movements: Extraocular movements intact.   Pulmonary:      Effort: Pulmonary effort is normal. No respiratory distress.   Abdominal:      General: There is no distension.   Musculoskeletal:      Cervical back: Neck supple.   Lymphadenopathy:      Cervical: No cervical adenopathy.   Skin:     General: Skin is warm.      Findings: Rash (improving from prior) present.   Neurological:      General: No focal deficit present.      Mental Status: He is alert and oriented to person, place, and time.      Cranial Nerves: No cranial nerve deficit.   Psychiatric:         Mood and Affect: Mood normal.         Behavior: Behavior normal.         Judgment: Judgment normal.        Labs:    Imaging: I have personally reviewed the patient's available images and reports and summarized pertinent findings above in HPI.     Pathology: No new path

## 2023-06-26 NOTE — PLAN OF CARE
Patient presented to unit for IVFs. Reports itching from shingle rash around neck. MD Maria Fernanda notified. Antivirals medication refilled by prescribing provider. IVFs infused by gravity. Patient later reported nausea. 8mg Zofran administered. IVFs tolerated well. Discharged from unit in NAD.

## 2023-06-27 ENCOUNTER — HOSPITAL ENCOUNTER (OUTPATIENT)
Dept: RADIOLOGY | Facility: HOSPITAL | Age: 58
Discharge: HOME | End: 2023-06-27
Attending: RADIOLOGY
Payer: MEDICAID

## 2023-06-27 ENCOUNTER — OFFICE VISIT (OUTPATIENT)
Dept: RADIATION ONCOLOGY | Facility: CLINIC | Age: 58
End: 2023-06-27
Payer: MEDICAID

## 2023-06-27 VITALS
BODY MASS INDEX: 35.43 KG/M2 | RESPIRATION RATE: 18 BRPM | TEMPERATURE: 98 F | OXYGEN SATURATION: 97 % | DIASTOLIC BLOOD PRESSURE: 78 MMHG | SYSTOLIC BLOOD PRESSURE: 140 MMHG | HEART RATE: 106 BPM | HEIGHT: 67 IN | WEIGHT: 225.75 LBS

## 2023-06-27 DIAGNOSIS — G89.3 CANCER RELATED PAIN: ICD-10-CM

## 2023-06-27 DIAGNOSIS — C79.31 SECONDARY MALIGNANT NEOPLASM OF BRAIN: ICD-10-CM

## 2023-06-27 DIAGNOSIS — G93.6 VASOGENIC EDEMA: Primary | ICD-10-CM

## 2023-06-27 PROCEDURE — 3077F PR MOST RECENT SYSTOLIC BLOOD PRESSURE >= 140 MM HG: ICD-10-PCS | Mod: CPTII,,, | Performed by: RADIOLOGY

## 2023-06-27 PROCEDURE — 70553 MRI BRAIN STEM W/O & W/DYE: CPT | Mod: 26,,, | Performed by: RADIOLOGY

## 2023-06-27 PROCEDURE — 70553 MRI BRAIN STEM W/O & W/DYE: CPT | Mod: TC

## 2023-06-27 PROCEDURE — 99999 PR PBB SHADOW E&M-EST. PATIENT-LVL IV: CPT | Mod: PBBFAC,,, | Performed by: RADIOLOGY

## 2023-06-27 PROCEDURE — 1159F PR MEDICATION LIST DOCUMENTED IN MEDICAL RECORD: ICD-10-PCS | Mod: CPTII,,, | Performed by: RADIOLOGY

## 2023-06-27 PROCEDURE — 99024 PR POST-OP FOLLOW-UP VISIT: ICD-10-PCS | Mod: ,,, | Performed by: RADIOLOGY

## 2023-06-27 PROCEDURE — 25500020 PHARM REV CODE 255: Performed by: RADIOLOGY

## 2023-06-27 PROCEDURE — 3078F DIAST BP <80 MM HG: CPT | Mod: CPTII,,, | Performed by: RADIOLOGY

## 2023-06-27 PROCEDURE — 4010F PR ACE/ARB THEARPY RXD/TAKEN: ICD-10-PCS | Mod: CPTII,,, | Performed by: RADIOLOGY

## 2023-06-27 PROCEDURE — 1159F MED LIST DOCD IN RCRD: CPT | Mod: CPTII,,, | Performed by: RADIOLOGY

## 2023-06-27 PROCEDURE — 4010F ACE/ARB THERAPY RXD/TAKEN: CPT | Mod: CPTII,,, | Performed by: RADIOLOGY

## 2023-06-27 PROCEDURE — 70553 MRI BRAIN W WO CONTRAST: ICD-10-PCS | Mod: 26,,, | Performed by: RADIOLOGY

## 2023-06-27 PROCEDURE — 3008F PR BODY MASS INDEX (BMI) DOCUMENTED: ICD-10-PCS | Mod: CPTII,,, | Performed by: RADIOLOGY

## 2023-06-27 PROCEDURE — 99999 PR PBB SHADOW E&M-EST. PATIENT-LVL IV: ICD-10-PCS | Mod: PBBFAC,,, | Performed by: RADIOLOGY

## 2023-06-27 PROCEDURE — A9585 GADOBUTROL INJECTION: HCPCS | Performed by: RADIOLOGY

## 2023-06-27 PROCEDURE — 3078F PR MOST RECENT DIASTOLIC BLOOD PRESSURE < 80 MM HG: ICD-10-PCS | Mod: CPTII,,, | Performed by: RADIOLOGY

## 2023-06-27 PROCEDURE — 3077F SYST BP >= 140 MM HG: CPT | Mod: CPTII,,, | Performed by: RADIOLOGY

## 2023-06-27 PROCEDURE — 3008F BODY MASS INDEX DOCD: CPT | Mod: CPTII,,, | Performed by: RADIOLOGY

## 2023-06-27 PROCEDURE — 99214 OFFICE O/P EST MOD 30 MIN: CPT | Mod: PBBFAC,25 | Performed by: RADIOLOGY

## 2023-06-27 PROCEDURE — 99024 POSTOP FOLLOW-UP VISIT: CPT | Mod: ,,, | Performed by: RADIOLOGY

## 2023-06-27 RX ORDER — GADOBUTROL 604.72 MG/ML
10 INJECTION INTRAVENOUS
Status: COMPLETED | OUTPATIENT
Start: 2023-06-27 | End: 2023-06-27

## 2023-06-27 RX ORDER — DEXAMETHASONE 4 MG/1
4 TABLET ORAL 2 TIMES DAILY
Qty: 60 TABLET | Refills: 2 | Status: SHIPPED | OUTPATIENT
Start: 2023-06-27

## 2023-06-27 RX ADMIN — GADOBUTROL 10 ML: 604.72 INJECTION INTRAVENOUS at 12:06

## 2023-06-27 NOTE — Clinical Note
Dr. Maria Fernanda BERMUDEZ. Worsening brain mets. I think benefit of WBRT is probably low, but I don't think they are quite ready to agree to hospice. They are going to discuss with their sons and let me know what they decide.

## 2023-06-30 RX ORDER — HEPARIN 100 UNIT/ML
500 SYRINGE INTRAVENOUS
Status: CANCELLED | OUTPATIENT
Start: 2023-07-03

## 2023-06-30 RX ORDER — SODIUM CHLORIDE 0.9 % (FLUSH) 0.9 %
10 SYRINGE (ML) INJECTION
Status: CANCELLED | OUTPATIENT
Start: 2023-07-03

## 2023-07-03 ENCOUNTER — INFUSION (OUTPATIENT)
Dept: INFUSION THERAPY | Facility: HOSPITAL | Age: 58
End: 2023-07-03
Attending: STUDENT IN AN ORGANIZED HEALTH CARE EDUCATION/TRAINING PROGRAM
Payer: MEDICAID

## 2023-07-03 VITALS
DIASTOLIC BLOOD PRESSURE: 82 MMHG | OXYGEN SATURATION: 95 % | RESPIRATION RATE: 18 BRPM | TEMPERATURE: 98 F | SYSTOLIC BLOOD PRESSURE: 155 MMHG | HEART RATE: 92 BPM

## 2023-07-03 DIAGNOSIS — C78.7 SECONDARY MALIGNANT NEOPLASM OF LIVER: ICD-10-CM

## 2023-07-03 DIAGNOSIS — E11.9 TYPE 2 DIABETES MELLITUS, WITHOUT LONG-TERM CURRENT USE OF INSULIN: ICD-10-CM

## 2023-07-03 DIAGNOSIS — C34.90 SMALL CELL LUNG CANCER: Primary | ICD-10-CM

## 2023-07-03 PROCEDURE — 25000003 PHARM REV CODE 250: Performed by: STUDENT IN AN ORGANIZED HEALTH CARE EDUCATION/TRAINING PROGRAM

## 2023-07-03 PROCEDURE — 96375 TX/PRO/DX INJ NEW DRUG ADDON: CPT

## 2023-07-03 PROCEDURE — 63600175 PHARM REV CODE 636 W HCPCS: Performed by: STUDENT IN AN ORGANIZED HEALTH CARE EDUCATION/TRAINING PROGRAM

## 2023-07-03 PROCEDURE — A4216 STERILE WATER/SALINE, 10 ML: HCPCS | Performed by: STUDENT IN AN ORGANIZED HEALTH CARE EDUCATION/TRAINING PROGRAM

## 2023-07-03 PROCEDURE — 96413 CHEMO IV INFUSION 1 HR: CPT

## 2023-07-03 PROCEDURE — 96367 TX/PROPH/DG ADDL SEQ IV INF: CPT

## 2023-07-03 RX ORDER — SODIUM CHLORIDE 0.9 % (FLUSH) 0.9 %
10 SYRINGE (ML) INJECTION
Status: DISCONTINUED | OUTPATIENT
Start: 2023-07-03 | End: 2023-07-03 | Stop reason: HOSPADM

## 2023-07-03 RX ORDER — HYDROMORPHONE HYDROCHLORIDE 2 MG/1
2 TABLET ORAL EVERY 6 HOURS PRN
Qty: 42 TABLET | Refills: 0 | Status: SHIPPED | OUTPATIENT
Start: 2023-07-03 | End: 2023-07-17

## 2023-07-03 RX ORDER — HYDROMORPHONE HYDROCHLORIDE 1 MG/ML
1 INJECTION, SOLUTION INTRAMUSCULAR; INTRAVENOUS; SUBCUTANEOUS
Status: COMPLETED | OUTPATIENT
Start: 2023-07-03 | End: 2023-07-03

## 2023-07-03 RX ORDER — SODIUM CHLORIDE 0.9 % (FLUSH) 0.9 %
10 SYRINGE (ML) INJECTION
Status: CANCELLED | OUTPATIENT
Start: 2023-11-15

## 2023-07-03 RX ORDER — HEPARIN 100 UNIT/ML
500 SYRINGE INTRAVENOUS
Status: DISCONTINUED | OUTPATIENT
Start: 2023-07-03 | End: 2023-07-03 | Stop reason: HOSPADM

## 2023-07-03 RX ORDER — HEPARIN 100 UNIT/ML
500 SYRINGE INTRAVENOUS
Status: CANCELLED | OUTPATIENT
Start: 2023-11-15

## 2023-07-03 RX ADMIN — DOCETAXEL 132 MG: 20 INJECTION, SOLUTION, CONCENTRATE INTRAVENOUS at 01:07

## 2023-07-03 RX ADMIN — SODIUM CHLORIDE 1000 ML: 9 INJECTION, SOLUTION INTRAVENOUS at 01:07

## 2023-07-03 RX ADMIN — HYDROMORPHONE HYDROCHLORIDE 1 MG: 1 INJECTION, SOLUTION INTRAMUSCULAR; INTRAVENOUS; SUBCUTANEOUS at 12:07

## 2023-07-03 RX ADMIN — Medication 10 ML: at 02:07

## 2023-07-03 RX ADMIN — DEXAMETHASONE SODIUM PHOSPHATE 20 MG: 10 INJECTION, SOLUTION INTRAMUSCULAR; INTRAVENOUS at 12:07

## 2023-07-03 RX ADMIN — HEPARIN 500 UNITS: 100 SYRINGE at 02:07

## 2023-07-05 ENCOUNTER — PATIENT MESSAGE (OUTPATIENT)
Dept: HEMATOLOGY/ONCOLOGY | Facility: CLINIC | Age: 58
End: 2023-07-05
Payer: MEDICAID

## 2023-07-06 ENCOUNTER — HOSPITAL ENCOUNTER (OUTPATIENT)
Dept: RADIATION THERAPY | Facility: HOSPITAL | Age: 58
Discharge: HOME | End: 2023-07-06
Attending: RADIOLOGY
Payer: MEDICAID

## 2023-07-06 PROCEDURE — 77263 THER RADIOLOGY TX PLNG CPLX: CPT | Mod: ,,, | Performed by: RADIOLOGY

## 2023-07-06 PROCEDURE — 77014 HC CT GUIDANCE RADIATION THERAPY FLDS PLACEMENT: CPT | Mod: TC | Performed by: RADIOLOGY

## 2023-07-06 PROCEDURE — 77300 PR RADIATION THERAPY,DOSIMETRY PLAN: ICD-10-PCS | Mod: 26,,, | Performed by: RADIOLOGY

## 2023-07-06 PROCEDURE — 77290 PR  SET RADN THERAPY FIELD COMPLEX: ICD-10-PCS | Mod: 26,,, | Performed by: RADIOLOGY

## 2023-07-06 PROCEDURE — 77334 PR  RADN TREATMENT AID(S) COMPLX: ICD-10-PCS | Mod: 26,,, | Performed by: RADIOLOGY

## 2023-07-06 PROCEDURE — 77334 RADIATION TREATMENT AID(S): CPT | Mod: TC | Performed by: RADIOLOGY

## 2023-07-06 PROCEDURE — 77300 RADIATION THERAPY DOSE PLAN: CPT | Mod: TC | Performed by: RADIOLOGY

## 2023-07-06 PROCEDURE — 77295 3-D RADIOTHERAPY PLAN: CPT | Mod: 26,,, | Performed by: RADIOLOGY

## 2023-07-06 PROCEDURE — 77295 3-D RADIOTHERAPY PLAN: CPT | Mod: TC | Performed by: RADIOLOGY

## 2023-07-06 PROCEDURE — 77290 THER RAD SIMULAJ FIELD CPLX: CPT | Mod: 26,,, | Performed by: RADIOLOGY

## 2023-07-06 PROCEDURE — 77290 THER RAD SIMULAJ FIELD CPLX: CPT | Mod: TC | Performed by: RADIOLOGY

## 2023-07-06 PROCEDURE — 77295 PR 3D RADIOTHERAPY PLAN: ICD-10-PCS | Mod: 26,,, | Performed by: RADIOLOGY

## 2023-07-06 PROCEDURE — 77300 RADIATION THERAPY DOSE PLAN: CPT | Mod: 26,,, | Performed by: RADIOLOGY

## 2023-07-06 PROCEDURE — 77334 RADIATION TREATMENT AID(S): CPT | Mod: 26,,, | Performed by: RADIOLOGY

## 2023-07-06 PROCEDURE — 77263 PR  RADIATION THERAPY PLAN COMPLEX: ICD-10-PCS | Mod: ,,, | Performed by: RADIOLOGY

## 2023-07-10 PROCEDURE — 77336 RADIATION PHYSICS CONSULT: CPT | Performed by: RADIOLOGY

## 2023-07-10 PROCEDURE — 77412 RADIATION TX DELIVERY LVL 3: CPT | Performed by: RADIOLOGY

## 2023-07-10 PROCEDURE — 77417 THER RADIOLOGY PORT IMAGE(S): CPT | Performed by: RADIOLOGY

## 2023-07-11 PROCEDURE — 77412 RADIATION TX DELIVERY LVL 3: CPT | Performed by: RADIOLOGY

## 2023-07-12 DIAGNOSIS — K12.30 ORAL MUCOSITIS: Primary | ICD-10-CM

## 2023-07-12 PROCEDURE — 77412 RADIATION TX DELIVERY LVL 3: CPT | Performed by: RADIOLOGY

## 2023-07-13 ENCOUNTER — TELEPHONE (OUTPATIENT)
Dept: HEMATOLOGY/ONCOLOGY | Facility: CLINIC | Age: 58
End: 2023-07-13

## 2023-07-13 ENCOUNTER — LAB VISIT (OUTPATIENT)
Dept: LAB | Facility: HOSPITAL | Age: 58
End: 2023-07-13
Attending: STUDENT IN AN ORGANIZED HEALTH CARE EDUCATION/TRAINING PROGRAM
Payer: MEDICAID

## 2023-07-13 ENCOUNTER — OFFICE VISIT (OUTPATIENT)
Dept: HEMATOLOGY/ONCOLOGY | Facility: CLINIC | Age: 58
End: 2023-07-13
Payer: MEDICAID

## 2023-07-13 ENCOUNTER — INFUSION (OUTPATIENT)
Dept: INFUSION THERAPY | Facility: HOSPITAL | Age: 58
End: 2023-07-13
Attending: STUDENT IN AN ORGANIZED HEALTH CARE EDUCATION/TRAINING PROGRAM
Payer: MEDICAID

## 2023-07-13 VITALS
SYSTOLIC BLOOD PRESSURE: 111 MMHG | WEIGHT: 217.81 LBS | DIASTOLIC BLOOD PRESSURE: 75 MMHG | DIASTOLIC BLOOD PRESSURE: 60 MMHG | BODY MASS INDEX: 34.19 KG/M2 | SYSTOLIC BLOOD PRESSURE: 119 MMHG | TEMPERATURE: 99 F | HEART RATE: 90 BPM | HEART RATE: 94 BPM | HEIGHT: 67 IN | OXYGEN SATURATION: 100 % | RESPIRATION RATE: 18 BRPM | OXYGEN SATURATION: 97 %

## 2023-07-13 DIAGNOSIS — C79.31 SECONDARY MALIGNANT NEOPLASM OF BRAIN: ICD-10-CM

## 2023-07-13 DIAGNOSIS — C78.7 SECONDARY MALIGNANT NEOPLASM OF LIVER: ICD-10-CM

## 2023-07-13 DIAGNOSIS — Z09 FOLLOW-UP EXAM: ICD-10-CM

## 2023-07-13 DIAGNOSIS — C79.31 SECONDARY MALIGNANT NEOPLASM OF BRAIN: Primary | ICD-10-CM

## 2023-07-13 DIAGNOSIS — I10 HYPERTENSION, UNSPECIFIED TYPE: ICD-10-CM

## 2023-07-13 DIAGNOSIS — C34.90 SMALL CELL LUNG CANCER: Primary | ICD-10-CM

## 2023-07-13 DIAGNOSIS — D84.821 IMMUNODEFICIENCY SECONDARY TO CHEMOTHERAPY: ICD-10-CM

## 2023-07-13 DIAGNOSIS — I10 PRIMARY HYPERTENSION: ICD-10-CM

## 2023-07-13 DIAGNOSIS — Z79.01 ANTICOAGULATED: ICD-10-CM

## 2023-07-13 DIAGNOSIS — G89.3 CANCER RELATED PAIN: ICD-10-CM

## 2023-07-13 DIAGNOSIS — T45.1X5A IMMUNODEFICIENCY SECONDARY TO CHEMOTHERAPY: ICD-10-CM

## 2023-07-13 DIAGNOSIS — C34.90 SMALL CELL LUNG CANCER: ICD-10-CM

## 2023-07-13 DIAGNOSIS — C77.1 SECONDARY AND UNSPECIFIED MALIGNANT NEOPLASM OF INTRATHORACIC LYMPH NODES: ICD-10-CM

## 2023-07-13 DIAGNOSIS — G89.3 CANCER RELATED PAIN: Primary | ICD-10-CM

## 2023-07-13 DIAGNOSIS — Z79.899 IMMUNODEFICIENCY SECONDARY TO CHEMOTHERAPY: ICD-10-CM

## 2023-07-13 DIAGNOSIS — E11.69 TYPE 2 DIABETES MELLITUS WITH OTHER SPECIFIED COMPLICATION, WITHOUT LONG-TERM CURRENT USE OF INSULIN: ICD-10-CM

## 2023-07-13 DIAGNOSIS — Z76.0 MEDICATION REFILL: ICD-10-CM

## 2023-07-13 DIAGNOSIS — E11.9 TYPE 2 DIABETES MELLITUS, WITHOUT LONG-TERM CURRENT USE OF INSULIN: ICD-10-CM

## 2023-07-13 DIAGNOSIS — R74.01 ELEVATED ALANINE AMINOTRANSFERASE (ALT) LEVEL: ICD-10-CM

## 2023-07-13 DIAGNOSIS — Z51.5 PALLIATIVE CARE BY SPECIALIST: ICD-10-CM

## 2023-07-13 LAB
ALBUMIN SERPL BCP-MCNC: 3.2 G/DL (ref 3.5–5.2)
ALP SERPL-CCNC: 593 U/L (ref 55–135)
ALT SERPL W/O P-5'-P-CCNC: 272 U/L (ref 10–44)
ANION GAP SERPL CALC-SCNC: 12 MMOL/L (ref 8–16)
ANISOCYTOSIS BLD QL SMEAR: SLIGHT
AST SERPL-CCNC: 133 U/L (ref 10–40)
BASOPHILS # BLD AUTO: ABNORMAL K/UL (ref 0–0.2)
BASOPHILS NFR BLD: 0 % (ref 0–1.9)
BILIRUB SERPL-MCNC: 1.3 MG/DL (ref 0.1–1)
BUN SERPL-MCNC: 18 MG/DL (ref 6–20)
CALCIUM SERPL-MCNC: 9 MG/DL (ref 8.7–10.5)
CHLORIDE SERPL-SCNC: 100 MMOL/L (ref 95–110)
CO2 SERPL-SCNC: 24 MMOL/L (ref 23–29)
CREAT SERPL-MCNC: 1 MG/DL (ref 0.5–1.4)
DIFFERENTIAL METHOD BLD: ABNORMAL
EOSINOPHIL # BLD AUTO: ABNORMAL K/UL (ref 0–0.5)
EOSINOPHIL NFR BLD: 0 % (ref 0–8)
ERYTHROCYTE [DISTWIDTH] IN BLOOD BY AUTOMATED COUNT: 17.1 % (ref 11.5–14.5)
EST. GFR  (NO RACE VARIABLE): >60 ML/MIN/1.73 M^2
GLUCOSE SERPL-MCNC: 150 MG/DL (ref 70–110)
HCT VFR BLD AUTO: 42.7 % (ref 40–54)
HGB BLD-MCNC: 13.5 G/DL (ref 14–18)
IMM GRANULOCYTES # BLD AUTO: ABNORMAL K/UL (ref 0–0.04)
IMM GRANULOCYTES NFR BLD AUTO: ABNORMAL % (ref 0–0.5)
LYMPHOCYTES # BLD AUTO: ABNORMAL K/UL (ref 1–4.8)
LYMPHOCYTES NFR BLD: 53.3 % (ref 18–48)
MAGNESIUM SERPL-MCNC: 2.2 MG/DL (ref 1.6–2.6)
MCH RBC QN AUTO: 28.5 PG (ref 27–31)
MCHC RBC AUTO-ENTMCNC: 31.6 G/DL (ref 32–36)
MCV RBC AUTO: 90 FL (ref 82–98)
MONOCYTES # BLD AUTO: ABNORMAL K/UL (ref 0.3–1)
MONOCYTES NFR BLD: 6.7 % (ref 4–15)
NEUTROPHILS NFR BLD: 30 % (ref 38–73)
NEUTS BAND NFR BLD MANUAL: 10 %
NRBC BLD-RTO: 0 /100 WBC
PLATELET # BLD AUTO: 174 K/UL (ref 150–450)
PLATELET BLD QL SMEAR: ABNORMAL
PMV BLD AUTO: 10.1 FL (ref 9.2–12.9)
POTASSIUM SERPL-SCNC: 4.4 MMOL/L (ref 3.5–5.1)
PROT SERPL-MCNC: 6.7 G/DL (ref 6–8.4)
RBC # BLD AUTO: 4.74 M/UL (ref 4.6–6.2)
SODIUM SERPL-SCNC: 136 MMOL/L (ref 136–145)
WBC # BLD AUTO: 1.04 K/UL (ref 3.9–12.7)

## 2023-07-13 PROCEDURE — 99999 PR PBB SHADOW E&M-EST. PATIENT-LVL III: CPT | Mod: PBBFAC,,, | Performed by: STUDENT IN AN ORGANIZED HEALTH CARE EDUCATION/TRAINING PROGRAM

## 2023-07-13 PROCEDURE — 99215 OFFICE O/P EST HI 40 MIN: CPT | Mod: S$PBB,,, | Performed by: STUDENT IN AN ORGANIZED HEALTH CARE EDUCATION/TRAINING PROGRAM

## 2023-07-13 PROCEDURE — 4010F PR ACE/ARB THEARPY RXD/TAKEN: ICD-10-PCS | Mod: CPTII,,, | Performed by: STUDENT IN AN ORGANIZED HEALTH CARE EDUCATION/TRAINING PROGRAM

## 2023-07-13 PROCEDURE — 63600175 PHARM REV CODE 636 W HCPCS: Performed by: STUDENT IN AN ORGANIZED HEALTH CARE EDUCATION/TRAINING PROGRAM

## 2023-07-13 PROCEDURE — 3078F PR MOST RECENT DIASTOLIC BLOOD PRESSURE < 80 MM HG: ICD-10-PCS | Mod: CPTII,,, | Performed by: STUDENT IN AN ORGANIZED HEALTH CARE EDUCATION/TRAINING PROGRAM

## 2023-07-13 PROCEDURE — 3008F PR BODY MASS INDEX (BMI) DOCUMENTED: ICD-10-PCS | Mod: CPTII,,, | Performed by: STUDENT IN AN ORGANIZED HEALTH CARE EDUCATION/TRAINING PROGRAM

## 2023-07-13 PROCEDURE — 96372 THER/PROPH/DIAG INJ SC/IM: CPT | Mod: 59

## 2023-07-13 PROCEDURE — 36415 COLL VENOUS BLD VENIPUNCTURE: CPT | Performed by: STUDENT IN AN ORGANIZED HEALTH CARE EDUCATION/TRAINING PROGRAM

## 2023-07-13 PROCEDURE — 85027 COMPLETE CBC AUTOMATED: CPT | Performed by: STUDENT IN AN ORGANIZED HEALTH CARE EDUCATION/TRAINING PROGRAM

## 2023-07-13 PROCEDURE — 85007 BL SMEAR W/DIFF WBC COUNT: CPT | Performed by: STUDENT IN AN ORGANIZED HEALTH CARE EDUCATION/TRAINING PROGRAM

## 2023-07-13 PROCEDURE — 99213 OFFICE O/P EST LOW 20 MIN: CPT | Mod: PBBFAC | Performed by: STUDENT IN AN ORGANIZED HEALTH CARE EDUCATION/TRAINING PROGRAM

## 2023-07-13 PROCEDURE — 3078F DIAST BP <80 MM HG: CPT | Mod: CPTII,,, | Performed by: STUDENT IN AN ORGANIZED HEALTH CARE EDUCATION/TRAINING PROGRAM

## 2023-07-13 PROCEDURE — 3008F BODY MASS INDEX DOCD: CPT | Mod: CPTII,,, | Performed by: STUDENT IN AN ORGANIZED HEALTH CARE EDUCATION/TRAINING PROGRAM

## 2023-07-13 PROCEDURE — 99215 PR OFFICE/OUTPT VISIT, EST, LEVL V, 40-54 MIN: ICD-10-PCS | Mod: S$PBB,,, | Performed by: STUDENT IN AN ORGANIZED HEALTH CARE EDUCATION/TRAINING PROGRAM

## 2023-07-13 PROCEDURE — 3074F PR MOST RECENT SYSTOLIC BLOOD PRESSURE < 130 MM HG: ICD-10-PCS | Mod: CPTII,,, | Performed by: STUDENT IN AN ORGANIZED HEALTH CARE EDUCATION/TRAINING PROGRAM

## 2023-07-13 PROCEDURE — 96360 HYDRATION IV INFUSION INIT: CPT

## 2023-07-13 PROCEDURE — 4010F ACE/ARB THERAPY RXD/TAKEN: CPT | Mod: CPTII,,, | Performed by: STUDENT IN AN ORGANIZED HEALTH CARE EDUCATION/TRAINING PROGRAM

## 2023-07-13 PROCEDURE — 83735 ASSAY OF MAGNESIUM: CPT | Performed by: STUDENT IN AN ORGANIZED HEALTH CARE EDUCATION/TRAINING PROGRAM

## 2023-07-13 PROCEDURE — 3074F SYST BP LT 130 MM HG: CPT | Mod: CPTII,,, | Performed by: STUDENT IN AN ORGANIZED HEALTH CARE EDUCATION/TRAINING PROGRAM

## 2023-07-13 PROCEDURE — 99999 PR PBB SHADOW E&M-EST. PATIENT-LVL III: ICD-10-PCS | Mod: PBBFAC,,, | Performed by: STUDENT IN AN ORGANIZED HEALTH CARE EDUCATION/TRAINING PROGRAM

## 2023-07-13 PROCEDURE — 77412 RADIATION TX DELIVERY LVL 3: CPT | Performed by: RADIOLOGY

## 2023-07-13 PROCEDURE — 25000003 PHARM REV CODE 250: Performed by: STUDENT IN AN ORGANIZED HEALTH CARE EDUCATION/TRAINING PROGRAM

## 2023-07-13 PROCEDURE — 80053 COMPREHEN METABOLIC PANEL: CPT | Performed by: STUDENT IN AN ORGANIZED HEALTH CARE EDUCATION/TRAINING PROGRAM

## 2023-07-13 RX ORDER — LEVOFLOXACIN 500 MG/1
500 TABLET, FILM COATED ORAL DAILY
Qty: 7 TABLET | Refills: 0 | Status: SHIPPED | OUTPATIENT
Start: 2023-07-13 | End: 2023-07-20

## 2023-07-13 RX ORDER — HEPARIN 100 UNIT/ML
500 SYRINGE INTRAVENOUS
Status: DISCONTINUED | OUTPATIENT
Start: 2023-07-13 | End: 2023-07-13 | Stop reason: HOSPADM

## 2023-07-13 RX ORDER — MORPHINE SULFATE 30 MG/1
30 TABLET, FILM COATED, EXTENDED RELEASE ORAL 2 TIMES DAILY
Qty: 28 TABLET | Refills: 0 | Status: SHIPPED | OUTPATIENT
Start: 2023-07-13 | End: 2023-07-13 | Stop reason: SDUPTHER

## 2023-07-13 RX ORDER — CYANOCOBALAMIN 1000 UG/ML
1000 INJECTION, SOLUTION INTRAMUSCULAR; SUBCUTANEOUS
Status: CANCELLED | OUTPATIENT
Start: 2023-07-13

## 2023-07-13 RX ORDER — MORPHINE SULFATE 30 MG/1
30 TABLET, FILM COATED, EXTENDED RELEASE ORAL 2 TIMES DAILY
Qty: 28 TABLET | Refills: 0 | Status: SHIPPED | OUTPATIENT
Start: 2023-07-13 | End: 2023-07-13

## 2023-07-13 RX ORDER — CYANOCOBALAMIN 1000 UG/ML
1000 INJECTION, SOLUTION INTRAMUSCULAR; SUBCUTANEOUS
Status: COMPLETED | OUTPATIENT
Start: 2023-07-13 | End: 2023-07-13

## 2023-07-13 RX ORDER — MORPHINE SULFATE 30 MG/1
TABLET, FILM COATED, EXTENDED RELEASE ORAL
Qty: 28 TABLET | Refills: 0 | Status: ON HOLD | OUTPATIENT
Start: 2023-07-13 | End: 2023-07-20 | Stop reason: HOSPADM

## 2023-07-13 RX ADMIN — SODIUM CHLORIDE 1000 ML: 9 INJECTION, SOLUTION INTRAVENOUS at 08:07

## 2023-07-13 RX ADMIN — HEPARIN 500 UNITS: 100 SYRINGE at 09:07

## 2023-07-13 RX ADMIN — CYANOCOBALAMIN 1000 MCG: 1000 INJECTION, SOLUTION INTRAMUSCULAR at 08:07

## 2023-07-13 NOTE — TELEPHONE ENCOUNTER
[employer] insurance  Spoke w/ Madonna to obtain auth for MS Contin  After review of information  and very lengthy conversation supplying appropriate information She informed nurse that the PA will be [place] [id number]  It in the process of being approved or denied and pharmacy will need to check q/ 4 hours as their system updates.   Dimitri at [facility] advised of such  Total time on phone 33:45

## 2023-07-13 NOTE — TELEPHONE ENCOUNTER
Dimitri at [facility] pharmacy called to let the doctor know that insurance will not pay for mr Gillespie's morphine prescription. He suggested that the doctor change the medication or send to a different pharmacy.

## 2023-07-14 PROCEDURE — 77412 RADIATION TX DELIVERY LVL 3: CPT | Performed by: RADIOLOGY

## 2023-07-17 PROCEDURE — 77336 RADIATION PHYSICS CONSULT: CPT | Performed by: RADIOLOGY

## 2023-07-19 ENCOUNTER — HOSPITAL ENCOUNTER (OUTPATIENT)
Facility: HOSPITAL | Age: 58
Discharge: HOME | End: 2023-07-20
Attending: STUDENT IN AN ORGANIZED HEALTH CARE EDUCATION/TRAINING PROGRAM | Admitting: HOSPITALIST
Payer: MEDICAID

## 2023-07-19 ENCOUNTER — OFFICE VISIT (OUTPATIENT)
Dept: HEMATOLOGY/ONCOLOGY | Facility: CLINIC | Age: 58
End: 2023-07-19
Payer: MEDICAID

## 2023-07-19 VITALS
SYSTOLIC BLOOD PRESSURE: 130 MMHG | HEART RATE: 103 BPM | DIASTOLIC BLOOD PRESSURE: 88 MMHG | HEIGHT: 67 IN | OXYGEN SATURATION: 98 % | BODY MASS INDEX: 34.11 KG/M2

## 2023-07-19 DIAGNOSIS — Z79.899 IMMUNODEFICIENCY SECONDARY TO CHEMOTHERAPY: ICD-10-CM

## 2023-07-19 DIAGNOSIS — D84.821 IMMUNODEFICIENCY SECONDARY TO CHEMOTHERAPY: ICD-10-CM

## 2023-07-19 DIAGNOSIS — Z71.89 GOALS OF CARE, COUNSELING/DISCUSSION: Primary | ICD-10-CM

## 2023-07-19 DIAGNOSIS — R52 PAIN: ICD-10-CM

## 2023-07-19 DIAGNOSIS — C79.51 LUNG CANCER METASTATIC TO BONE: ICD-10-CM

## 2023-07-19 DIAGNOSIS — I10 HYPERTENSION, UNSPECIFIED TYPE: ICD-10-CM

## 2023-07-19 DIAGNOSIS — Z79.01 ANTICOAGULATED: ICD-10-CM

## 2023-07-19 DIAGNOSIS — C34.90 LUNG CANCER METASTATIC TO BONE: ICD-10-CM

## 2023-07-19 DIAGNOSIS — G89.3 CANCER RELATED PAIN: ICD-10-CM

## 2023-07-19 DIAGNOSIS — R74.01 ELEVATED ALANINE AMINOTRANSFERASE (ALT) LEVEL: ICD-10-CM

## 2023-07-19 DIAGNOSIS — C77.1 SECONDARY AND UNSPECIFIED MALIGNANT NEOPLASM OF INTRATHORACIC LYMPH NODES: ICD-10-CM

## 2023-07-19 DIAGNOSIS — C79.31 SECONDARY MALIGNANT NEOPLASM OF BRAIN: ICD-10-CM

## 2023-07-19 DIAGNOSIS — Z09 FOLLOW-UP EXAM: ICD-10-CM

## 2023-07-19 DIAGNOSIS — T45.1X5A IMMUNODEFICIENCY SECONDARY TO CHEMOTHERAPY: ICD-10-CM

## 2023-07-19 DIAGNOSIS — C78.7 SECONDARY MALIGNANT NEOPLASM OF LIVER: ICD-10-CM

## 2023-07-19 DIAGNOSIS — C34.90 SMALL CELL LUNG CANCER: Chronic | ICD-10-CM

## 2023-07-19 DIAGNOSIS — C34.90 SMALL CELL LUNG CANCER: Primary | ICD-10-CM

## 2023-07-19 DIAGNOSIS — E11.69 TYPE 2 DIABETES MELLITUS WITH OTHER SPECIFIED COMPLICATION, WITHOUT LONG-TERM CURRENT USE OF INSULIN: ICD-10-CM

## 2023-07-19 PROBLEM — I26.99 PULMONARY EMBOLISM: Status: ACTIVE | Noted: 2023-07-19

## 2023-07-19 PROBLEM — R79.89 ELEVATED LFTS: Status: ACTIVE | Noted: 2023-07-19

## 2023-07-19 LAB
ALBUMIN SERPL BCP-MCNC: 3 G/DL (ref 3.5–5.2)
ALP SERPL-CCNC: 646 U/L (ref 55–135)
ALT SERPL W/O P-5'-P-CCNC: 202 U/L (ref 10–44)
ANION GAP SERPL CALC-SCNC: 9 MMOL/L (ref 8–16)
APTT PPP: 29 SEC (ref 21–32)
AST SERPL-CCNC: 97 U/L (ref 10–40)
BASOPHILS # BLD AUTO: ABNORMAL K/UL (ref 0–0.2)
BASOPHILS NFR BLD: 0 % (ref 0–1.9)
BILIRUB SERPL-MCNC: 1.6 MG/DL (ref 0.1–1)
BILIRUB UR QL STRIP: NEGATIVE
BUN SERPL-MCNC: 19 MG/DL (ref 6–20)
CALCIUM SERPL-MCNC: 8.9 MG/DL (ref 8.7–10.5)
CHLORIDE SERPL-SCNC: 100 MMOL/L (ref 95–110)
CLARITY UR: CLEAR
CO2 SERPL-SCNC: 27 MMOL/L (ref 23–29)
COLOR UR: YELLOW
CREAT SERPL-MCNC: 0.8 MG/DL (ref 0.5–1.4)
DIFFERENTIAL METHOD: ABNORMAL
EOSINOPHIL # BLD AUTO: ABNORMAL K/UL (ref 0–0.5)
EOSINOPHIL NFR BLD: 0 % (ref 0–8)
ERYTHROCYTE [DISTWIDTH] IN BLOOD BY AUTOMATED COUNT: 17.5 % (ref 11.5–14.5)
EST. GFR  (NO RACE VARIABLE): >60 ML/MIN/1.73 M^2
GLUCOSE SERPL-MCNC: 144 MG/DL (ref 70–110)
GLUCOSE UR QL STRIP: NEGATIVE
HCT VFR BLD AUTO: 41.5 % (ref 40–54)
HGB BLD-MCNC: 13.5 G/DL (ref 14–18)
HGB UR QL STRIP: NEGATIVE
IMM GRANULOCYTES # BLD AUTO: ABNORMAL K/UL (ref 0–0.04)
IMM GRANULOCYTES NFR BLD AUTO: ABNORMAL % (ref 0–0.5)
INR PPP: 1.1 (ref 0.8–1.2)
KETONES UR QL STRIP: NEGATIVE
LEUKOCYTE ESTERASE UR QL STRIP: NEGATIVE
LIPASE SERPL-CCNC: 7 U/L (ref 4–60)
LYMPHOCYTES # BLD AUTO: ABNORMAL K/UL (ref 1–4.8)
LYMPHOCYTES NFR BLD: 5 % (ref 18–48)
MCH RBC QN AUTO: 29.3 PG (ref 27–31)
MCHC RBC AUTO-ENTMCNC: 32.5 G/DL (ref 32–36)
MCV RBC AUTO: 90 FL (ref 82–98)
METAMYELOCYTES NFR BLD MANUAL: 1 %
MONOCYTES # BLD AUTO: ABNORMAL K/UL (ref 0.3–1)
MONOCYTES NFR BLD: 8 % (ref 4–15)
MYELOCYTES NFR BLD MANUAL: 2 %
NEUTROPHILS NFR BLD: 78 % (ref 38–73)
NEUTS BAND NFR BLD MANUAL: 6 %
NITRITE UR QL STRIP: NEGATIVE
NRBC BLD-RTO: 0 /100 WBC
PH UR STRIP: 6 [PH] (ref 5–8)
PLATELET # BLD AUTO: 200 K/UL (ref 150–450)
PLATELET BLD QL SMEAR: ABNORMAL
PMV BLD AUTO: 10.6 FL (ref 9.2–12.9)
POCT GLUCOSE: 135 MG/DL (ref 70–110)
POCT GLUCOSE: 158 MG/DL (ref 70–110)
POCT GLUCOSE: 170 MG/DL (ref 70–110)
POCT GLUCOSE: 94 MG/DL (ref 70–110)
POTASSIUM SERPL-SCNC: 4.6 MMOL/L (ref 3.5–5.1)
PROT SERPL-MCNC: 6.9 G/DL (ref 6–8.4)
PROT UR QL STRIP: ABNORMAL
PROTHROMBIN TIME: 11.6 SEC (ref 9–12.5)
RBC # BLD AUTO: 4.61 M/UL (ref 4.6–6.2)
SODIUM SERPL-SCNC: 136 MMOL/L (ref 136–145)
SP GR UR STRIP: 1.02 (ref 1–1.03)
URN SPEC COLLECT METH UR: ABNORMAL
UROBILINOGEN UR STRIP-ACNC: NEGATIVE EU/DL
WBC # BLD AUTO: 9.7 K/UL (ref 3.9–12.7)

## 2023-07-19 PROCEDURE — 99285 EMERGENCY DEPT VISIT HI MDM: CPT | Mod: 27,25

## 2023-07-19 PROCEDURE — 93010 ELECTROCARDIOGRAM REPORT: CPT | Mod: ,,, | Performed by: INTERNAL MEDICINE

## 2023-07-19 PROCEDURE — 4010F PR ACE/ARB THEARPY RXD/TAKEN: ICD-10-PCS | Mod: CPTII,,, | Performed by: STUDENT IN AN ORGANIZED HEALTH CARE EDUCATION/TRAINING PROGRAM

## 2023-07-19 PROCEDURE — 99999 PR PBB SHADOW E&M-EST. PATIENT-LVL II: ICD-10-PCS | Mod: PBBFAC,,, | Performed by: STUDENT IN AN ORGANIZED HEALTH CARE EDUCATION/TRAINING PROGRAM

## 2023-07-19 PROCEDURE — 3079F PR MOST RECENT DIASTOLIC BLOOD PRESSURE 80-89 MM HG: ICD-10-PCS | Mod: CPTII,,, | Performed by: STUDENT IN AN ORGANIZED HEALTH CARE EDUCATION/TRAINING PROGRAM

## 2023-07-19 PROCEDURE — 3008F BODY MASS INDEX DOCD: CPT | Mod: CPTII,,, | Performed by: STUDENT IN AN ORGANIZED HEALTH CARE EDUCATION/TRAINING PROGRAM

## 2023-07-19 PROCEDURE — 25000003 PHARM REV CODE 250: Performed by: STUDENT IN AN ORGANIZED HEALTH CARE EDUCATION/TRAINING PROGRAM

## 2023-07-19 PROCEDURE — 93010 EKG 12-LEAD: ICD-10-PCS | Mod: ,,, | Performed by: INTERNAL MEDICINE

## 2023-07-19 PROCEDURE — 63600175 PHARM REV CODE 636 W HCPCS: Performed by: NURSE PRACTITIONER

## 2023-07-19 PROCEDURE — 99214 PR OFFICE/OUTPT VISIT, EST, LEVL IV, 30-39 MIN: ICD-10-PCS | Mod: S$PBB,,, | Performed by: STUDENT IN AN ORGANIZED HEALTH CARE EDUCATION/TRAINING PROGRAM

## 2023-07-19 PROCEDURE — 99212 OFFICE O/P EST SF 10 MIN: CPT | Mod: PBBFAC,25 | Performed by: STUDENT IN AN ORGANIZED HEALTH CARE EDUCATION/TRAINING PROGRAM

## 2023-07-19 PROCEDURE — 85007 BL SMEAR W/DIFF WBC COUNT: CPT | Performed by: EMERGENCY MEDICINE

## 2023-07-19 PROCEDURE — 99999 PR PBB SHADOW E&M-EST. PATIENT-LVL II: CPT | Mod: PBBFAC,,, | Performed by: STUDENT IN AN ORGANIZED HEALTH CARE EDUCATION/TRAINING PROGRAM

## 2023-07-19 PROCEDURE — 96374 THER/PROPH/DIAG INJ IV PUSH: CPT

## 2023-07-19 PROCEDURE — G0378 HOSPITAL OBSERVATION PER HR: HCPCS

## 2023-07-19 PROCEDURE — 25000003 PHARM REV CODE 250: Performed by: NURSE PRACTITIONER

## 2023-07-19 PROCEDURE — 96374 THER/PROPH/DIAG INJ IV PUSH: CPT | Mod: 59

## 2023-07-19 PROCEDURE — 80053 COMPREHEN METABOLIC PANEL: CPT | Performed by: EMERGENCY MEDICINE

## 2023-07-19 PROCEDURE — 3075F PR MOST RECENT SYSTOLIC BLOOD PRESS GE 130-139MM HG: ICD-10-PCS | Mod: CPTII,,, | Performed by: STUDENT IN AN ORGANIZED HEALTH CARE EDUCATION/TRAINING PROGRAM

## 2023-07-19 PROCEDURE — 3075F SYST BP GE 130 - 139MM HG: CPT | Mod: CPTII,,, | Performed by: STUDENT IN AN ORGANIZED HEALTH CARE EDUCATION/TRAINING PROGRAM

## 2023-07-19 PROCEDURE — 81003 URINALYSIS AUTO W/O SCOPE: CPT | Performed by: EMERGENCY MEDICINE

## 2023-07-19 PROCEDURE — 4010F ACE/ARB THERAPY RXD/TAKEN: CPT | Mod: CPTII,,, | Performed by: STUDENT IN AN ORGANIZED HEALTH CARE EDUCATION/TRAINING PROGRAM

## 2023-07-19 PROCEDURE — 83690 ASSAY OF LIPASE: CPT | Performed by: EMERGENCY MEDICINE

## 2023-07-19 PROCEDURE — 99214 OFFICE O/P EST MOD 30 MIN: CPT | Mod: S$PBB,,, | Performed by: STUDENT IN AN ORGANIZED HEALTH CARE EDUCATION/TRAINING PROGRAM

## 2023-07-19 PROCEDURE — 82962 GLUCOSE BLOOD TEST: CPT

## 2023-07-19 PROCEDURE — 83036 HEMOGLOBIN GLYCOSYLATED A1C: CPT | Performed by: NURSE PRACTITIONER

## 2023-07-19 PROCEDURE — 96361 HYDRATE IV INFUSION ADD-ON: CPT

## 2023-07-19 PROCEDURE — 85027 COMPLETE CBC AUTOMATED: CPT | Performed by: EMERGENCY MEDICINE

## 2023-07-19 PROCEDURE — 63600175 PHARM REV CODE 636 W HCPCS: Performed by: STUDENT IN AN ORGANIZED HEALTH CARE EDUCATION/TRAINING PROGRAM

## 2023-07-19 PROCEDURE — 3079F DIAST BP 80-89 MM HG: CPT | Mod: CPTII,,, | Performed by: STUDENT IN AN ORGANIZED HEALTH CARE EDUCATION/TRAINING PROGRAM

## 2023-07-19 PROCEDURE — 85610 PROTHROMBIN TIME: CPT | Performed by: STUDENT IN AN ORGANIZED HEALTH CARE EDUCATION/TRAINING PROGRAM

## 2023-07-19 PROCEDURE — 3008F PR BODY MASS INDEX (BMI) DOCUMENTED: ICD-10-PCS | Mod: CPTII,,, | Performed by: STUDENT IN AN ORGANIZED HEALTH CARE EDUCATION/TRAINING PROGRAM

## 2023-07-19 PROCEDURE — 85730 THROMBOPLASTIN TIME PARTIAL: CPT | Performed by: STUDENT IN AN ORGANIZED HEALTH CARE EDUCATION/TRAINING PROGRAM

## 2023-07-19 PROCEDURE — 93005 ELECTROCARDIOGRAM TRACING: CPT

## 2023-07-19 RX ORDER — SODIUM CHLORIDE 0.9 % (FLUSH) 0.9 %
10 SYRINGE (ML) INJECTION
Status: DISCONTINUED | OUTPATIENT
Start: 2023-07-19 | End: 2023-07-20 | Stop reason: HOSPADM

## 2023-07-19 RX ORDER — GLUCAGON 1 MG
1 KIT INJECTION
Status: DISCONTINUED | OUTPATIENT
Start: 2023-07-19 | End: 2023-07-20 | Stop reason: HOSPADM

## 2023-07-19 RX ORDER — GUAIFENESIN 600 MG/1
1200 TABLET, EXTENDED RELEASE ORAL 2 TIMES DAILY
COMMUNITY

## 2023-07-19 RX ORDER — INSULIN ASPART 100 [IU]/ML
0-5 INJECTION, SOLUTION INTRAVENOUS; SUBCUTANEOUS
Status: DISCONTINUED | OUTPATIENT
Start: 2023-07-19 | End: 2023-07-20 | Stop reason: HOSPADM

## 2023-07-19 RX ORDER — MORPHINE SULFATE 15 MG/1
45 TABLET, FILM COATED, EXTENDED RELEASE ORAL EVERY 12 HOURS
Status: DISCONTINUED | OUTPATIENT
Start: 2023-07-19 | End: 2023-07-20 | Stop reason: HOSPADM

## 2023-07-19 RX ORDER — IBUPROFEN 200 MG
16 TABLET ORAL
Status: DISCONTINUED | OUTPATIENT
Start: 2023-07-19 | End: 2023-07-20 | Stop reason: HOSPADM

## 2023-07-19 RX ORDER — HYDROMORPHONE HYDROCHLORIDE 1 MG/ML
2 INJECTION, SOLUTION INTRAMUSCULAR; INTRAVENOUS; SUBCUTANEOUS EVERY 6 HOURS PRN
Status: DISCONTINUED | OUTPATIENT
Start: 2023-07-19 | End: 2023-07-20 | Stop reason: HOSPADM

## 2023-07-19 RX ORDER — IBUPROFEN 200 MG
24 TABLET ORAL
Status: DISCONTINUED | OUTPATIENT
Start: 2023-07-19 | End: 2023-07-20 | Stop reason: HOSPADM

## 2023-07-19 RX ORDER — CYCLOBENZAPRINE HCL 5 MG
5 TABLET ORAL 2 TIMES DAILY
Status: DISCONTINUED | OUTPATIENT
Start: 2023-07-19 | End: 2023-07-20 | Stop reason: HOSPADM

## 2023-07-19 RX ORDER — HYDROMORPHONE HYDROCHLORIDE 1 MG/ML
2 INJECTION, SOLUTION INTRAMUSCULAR; INTRAVENOUS; SUBCUTANEOUS
Status: COMPLETED | OUTPATIENT
Start: 2023-07-19 | End: 2023-07-19

## 2023-07-19 RX ADMIN — HYDROMORPHONE HYDROCHLORIDE 2 MG: 1 INJECTION, SOLUTION INTRAMUSCULAR; INTRAVENOUS; SUBCUTANEOUS at 10:07

## 2023-07-19 RX ADMIN — HYDROMORPHONE HYDROCHLORIDE 2 MG: 1 INJECTION, SOLUTION INTRAMUSCULAR; INTRAVENOUS; SUBCUTANEOUS at 06:07

## 2023-07-19 RX ADMIN — SODIUM CHLORIDE 2000 ML: 9 INJECTION, SOLUTION INTRAVENOUS at 12:07

## 2023-07-19 RX ADMIN — CYCLOBENZAPRINE HYDROCHLORIDE 5 MG: 5 TABLET, FILM COATED ORAL at 03:07

## 2023-07-19 RX ADMIN — APIXABAN 5 MG: 5 TABLET, FILM COATED ORAL at 10:07

## 2023-07-19 RX ADMIN — CYCLOBENZAPRINE HYDROCHLORIDE 5 MG: 5 TABLET, FILM COATED ORAL at 10:07

## 2023-07-19 RX ADMIN — MORPHINE SULFATE 45 MG: 15 TABLET, EXTENDED RELEASE ORAL at 03:07

## 2023-07-19 RX ADMIN — MORPHINE SULFATE 45 MG: 15 TABLET, EXTENDED RELEASE ORAL at 10:07

## 2023-07-19 NOTE — ASSESSMENT & PLAN NOTE
"Sent from Oncology clinic to ED for intractable pain "all over " due to mets disease. Recent switch from dilaudid 2 mg to morphine 30 BID  Pain better controlled after dilaudid in ED but only last about 2 hrs  Increase morphine 45  mg BID, dilaudid 2 mg q 6 hr breakthough pain  Add flexiril 5 mg bid  Consult palliative care    "

## 2023-07-19 NOTE — NURSING
Received report from OUMAR Parra from ED. AAOX4. Pain meds given. /1, HR 88, O2 96% on RA, T 98.1, R 20, . Awaiting pt's arrival to the floor.

## 2023-07-19 NOTE — ASSESSMENT & PLAN NOTE
Patient's FSGs are controlled on current medication regimen.  Last A1c reviewed-   Lab Results   Component Value Date    HGBA1C 6.9 (H) 12/07/2022     Most recent fingerstick glucose reviewed-   Recent Labs   Lab 07/19/23  0908   POCTGLUCOSE 135*     Current correctional scale  Low  Maintain anti-hyperglycemic dose as follows-   Antihyperglycemics (From admission, onward)    None      SSI

## 2023-07-19 NOTE — ASSESSMENT & PLAN NOTE
Was seen in clinic by Dr. Irving today  SCLC stage 4 mets to brain, liver, bone  -Initially diagnosed with extensive stage small cell lung cancer in 03/2022 who was treated with carbo/etop/atezolizumab from 04/05/2022 - 06/14/22 with partial response. He subsequently completed consolidation thoracic RT 8/4/22 - 8/18/22, and he was on maintenance atezolizumab.   -10/18/22 showing progression of disease in lungs and liver.  Asymptomatic.  -Dr. Sosa discussed with him plan for topotecan and pt was consented  -Pt tolerated cycle 1 well but delayed C2 due to being out of town  -Scans in December 2022 largely stable with exception of  Right middle lobe lung mass.  3.4 cm, still demonstrated widespread disease in bones  -Admitted for pain control and palliative XRT on 12/07/22  -Pt had been off of tx in Nov 2022 due to going on vacation, as symptoms initially improved drastically after restarting tx  Plan 07/19/23  -Hold chemo today due to elevated LFTs  -Admit for pain control and pt is considering hospice as it does not appear that he is responding to tx and LFT rise is preventing tx at present time  -RTC in 1 week for MD follow up  Consult palliative care team

## 2023-07-19 NOTE — HPI
"Juan Gillespie is a 59 yo male with hypertension, former smoker, DMT2, SCLC stage 4 dx 3/3022 with mets to bone/spine/brain/liver, hypercalcemia on zometa, PE 5/31/23 on eliquis, chemo related neutropenia now improved   who was sent to ED from Oncologist Dr. Irving office for intractable pain. Patient office visit plan to hold chemotherapy due to elevated LFT's.  Recent pain regimen switch from Dilaudid 2 mg  to Morphine 30 mg BID 6 days ago and continues with intractable pain. He have not slept in days. He would nap for 2 hrs then wake up in pain "all over." Pain more so in whole back, thighs and shoulders. No additional treatment at home for his pain. Slightest movement like positioning in bed worsen pain. No chest pain, abdominal pain , dizziness, nausea or vomiting.     In ED, worsen LFT's from last month. INR 1.1.     5/4/2023 MRI brain showed metastatic disease progression    Per Dr. Irving clinic note today 7/19/2023  Extensive stage small cell lung cancer with brain mets  -Initially diagnosed with extensive stage small cell lung cancer in 03/2022 who was treated with carbo/etop/atezolizumab from 04/05/2022 - 06/14/22 with partial response. He subsequently completed consolidation thoracic RT 8/4/22 - 8/18/22, and he was on maintenance atezolizumab.   -10/18/22 showing progression of disease in lungs and liver.  Asymptomatic.  -Dr. Sosa discussed with him plan for topotecan and pt was consented  -Pt tolerated cycle 1 well but delayed C2 due to being out of town  -Scans in December 2022 largely stable with exception of  Right middle lobe lung mass.  3.4 cm, still demonstrated widespread disease in bones  -Admitted for pain control and palliative XRT on 12/07/22  -Pt had been off of tx in Nov 2022 due to going on vacation, as symptoms initially improved drastically after restarting tx  Plan 07/19/23  -Hold chemo today due to elevated LFTs  -Admit for pain control and pt is considering hospice as it does not appear that " he is responding to tx and LFT rise is preventing tx at present time  -RTC in 1 week for MD follow up

## 2023-07-19 NOTE — PLAN OF CARE
Case Management Assessment     PCP: Soledad Irving MD      Pharmacy:   [facility] - [place] - [address]  [address]  [place] [address]  Phone: [phone] Fax: [phone]    [id number] - [place] - [address] AT [facility] [address]  [address]  [place] [address]  Phone: [phone] Fax: [phone]    [facility] [address] [address]  Phone: [phone] Fax: [phone]        Patient Arrived From: Home  Existing Help at Home: Wife    Barriers to Discharge: None     Discharge Plan:    A. Home   B. Home       07/19/23 1440   Discharge Planning   Assessment Type Discharge Planning Brief Assessment   Resource/Environmental Concerns none   Support Systems Spouse/significant other   Equipment Currently Used at Home cane, straight;nebulizer   Current Living Arrangements home   Patient/Family Anticipates Transition to home   Patient/Family Anticipated Services at Transition none   DME Needed Upon Discharge  none   Discharge Plan A Home   Discharge Plan B Home

## 2023-07-19 NOTE — NURSING
Upon arrival to floor: patient oriented to room, call bell in reach and bed in lowest position. Patient arrived to floor in stable condition. Visualized patient and assessed patient's overall condition and appearance. No complaints. NAD noted. Will continue to monitor.

## 2023-07-19 NOTE — ED TRIAGE NOTES
Juan Gillespie, a 58 y.o. male presents to the ED w/ complaint of intractable pain approx 5 days with accompanying SOB. Pt states he was at oncologist appointment when they sent pt over here to be admitted for pain management. Pt states he has been in pain since the weekend and he last took 30mg morphine at 7am today with no relief. Pt reports pain is in his back and legs. Pt has a hx of lung cancer that has metastasized to liver, brain, and bone. Pt last had chemo 3 weeks ago and radiation everyday last week. Pt denies any other s/s. Pt is AAOX4 and VSS.      Triage note:  Chief Complaint   Patient presents with    Intactable pain     Pt brought over from oncologist appt for intractable pt to be admitted. . Pt with small cell lung cancer (dx 3/2022) with mets to all liver, brain, bone. Pain medication not helping, Last dose of morphine at 0700.       Review of patient's allergies indicates:  No Known Allergies  Past Medical History:   Diagnosis Date    Cancer     Small Cell -Stage 4 Lung Cancer    Diabetes mellitus, type 2     Gout, unspecified     Hypertension

## 2023-07-19 NOTE — PLAN OF CARE
Problem: Adult Inpatient Plan of Care  Goal: Plan of Care Review  Outcome: Ongoing, Progressing     Problem: Coping Ineffective  Goal: Effective Coping  Outcome: Ongoing, Progressing     Problem: Pain Acute  Goal: Acceptable Pain Control and Functional Ability  Outcome: Ongoing, Progressing     Problem: Diabetes Comorbidity  Goal: Blood Glucose Level Within Targeted Range  Outcome: Ongoing, Progressing

## 2023-07-19 NOTE — NURSING
[facility]  Nurses Note -- 4 Eyes      7/19/2023       Skin assessed on: Admit      [x] No Pressure Injuries Present    [x]Prevention Measures Documented    [] Yes LDA  for Pressure Injury Previously documented     [] Yes New Pressure Injury Discovered   [] LDA for New Pressure Injury Added      Attending RN:  Latasha Glover LPN     Second RN:  Sarah Corley,PCT

## 2023-07-19 NOTE — ED NOTES
Pt complaining of pain at this time. JOE Morel informed and states she will be down shortly to see pt.

## 2023-07-19 NOTE — CONSULTS
Palliative consult received.  Patient is known to me and has missed 2 palliative clinic appts.   Discussed with Dr mendez oncology  Discussed with Loulou MUNOZ NP  Patient on MS contin 30 mg q 12 hours  Recommend Continuing hydromorphone at 2 mg IV q 4 hours prn pain 7-10   I will f/u tomorrow once he is on the floor

## 2023-07-19 NOTE — Clinical Note
-Please take to ED to admit for pain control -RTC in 1 week for MD visit with Dr Irving if he does not go to hospice

## 2023-07-19 NOTE — PROGRESS NOTES
[facility]  Hematology/Medical Oncology Clinic       PATIENT: Juan Gillespie  MRN: [id number]  DATE: 7/19/2023    Reason for referral: Extensive stage small cell lung ca    Initial History: Juan Gillespie is a 58 year old man with extensive stage SCLC who presents to clinic for evaluation prior to treatment  of topotecan.          Oncological History:  -Started Carbo/Etop/Atez 4/5/22  -Maintenance atezolizumab 7/5/22  -Consolidative thoracic radiation 8/4/22-8/18/22  -topotecan started 10/24/23-3/10/22(missed Nov tx)-mixed resposne  -Lurbinectadin started 4/05/23 to 6/2023  -Awaiting clinical trial single agent docetaxel started    Interval History:     Pt presents for MD and ffeeling much worse than last visit with generalized fatigue and pain.  LFTS are still elevated and WBC low.  End of life discussions and hospice options discussed with pt to which he is considering.  Will hold chemo due to elevated LFTs and admit for pain control.    His wife accompanies him at this visit.  Past Medical History:   Past Medical History:   Diagnosis Date    Cancer     Small Cell -Stage 4 Lung Cancer    Diabetes mellitus, type 2     Hypertension        Past Surgical HIstory:   Past Surgical History:   Procedure Laterality Date    ENDOBRONCHIAL ULTRASOUND N/A 3/22/2022    Procedure: ENDOBRONCHIAL ULTRASOUND (EBUS);  Surgeon: Kristin Samuels MD;  Location: [address];  Service: Endoscopy;  Laterality: N/A;    KNEE SURGERY         Family History:   Family History   Problem Relation Age of Onset    Diabetes Mellitus Mother     Pacemaker/defibrilator Father     Lung cancer Father        Social History:  reports that he has quit smoking. His smoking use included cigarettes. He has quit using smokeless tobacco. He reports that he does not drink alcohol and does not use drugs.    Allergies:  Review of patient's allergies indicates:  No Known Allergies    Medications:  Current Outpatient Medications   Medication Sig  Dispense Refill    (Magic mouthwash) 1:1:1 diphenhydrAMINE(Benadryl) 12.5mg/5ml liq, aluminum & magnesium hydroxide-simethicone (Maalox), LIDOcaine viscous 2% Swish and spit 5 mLs every 4 (four) hours as needed. for mouth sores 450 mL 1    albuterol (ACCUNEB) 1.25 mg/3 mL Nebu Use 1 vial (1.25 mg total) by nebulization 3 (three) times daily as needed (shortness of breath). Rescue 90 mL 2    allopurinoL (ZYLOPRIM) 300 MG tablet Take 1 tablet (300 mg total) by mouth once daily. 60 tablet 3    apixaban (ELIQUIS) 5 mg Tab Take 1 tablet (5 mg total) by mouth 2 (two) times daily. 60 tablet 5    benzonatate (TESSALON) 100 MG capsule Take 1 capsule (100 mg total) by mouth 3 (three) times daily as needed for Cough. 90 capsule 3    cetirizine (ZYRTEC) 10 MG tablet Take 1 tablet by mouth as needed.      cyproheptadine (PERIACTIN) 4 mg tablet Take 1 tablet (4 mg total) by mouth 3 (three) times daily as needed (appetite). 60 tablet 1    dapagliflozin (FARXIGA) 10 mg tablet Take 1 tablet by mouth once daily.      dexAMETHasone (DECADRON) 4 MG Tab Take 1 tablet (4 mg total) by mouth 2 (two) times daily. 60 tablet 2    insulin aspart U-100 (NOVOLOG) 100 unit/mL (3 mL) InPn pen Inject 1 Units into the skin as needed. Inject into skin if over 200.      levoFLOXacin (LEVAQUIN) 500 MG tablet Take 1 tablet (500 mg total) by mouth once daily. for 7 days 7 tablet 0    losartan (COZAAR) 100 MG tablet Take 1 tablet (100 mg total) by mouth once daily. 30 tablet 2    morphine (MS CONTIN) 30 MG 12 hr tablet Take 1 tablet by mouth every 12 hours for pain control 28 tablet 0    naloxone (NARCAN) 4 mg/actuation Spry 4mg by nasal route as needed for opioid overdose; may repeat every 2-3 minutes in alternating nostrils until medical help arrives. Call 911 1 each 11    ondansetron (ZOFRAN) 8 MG tablet Take 1 tablet (8 mg total) by mouth every 8 (eight) hours as needed for Nausea. 90 tablet 3    ondansetron (ZOFRAN) 8 MG tablet Take 1 tablet (8 mg  total) by mouth every 12 (twelve) hours as needed for Nausea. 30 tablet 2    semaglutide (OZEMPIC) 0.25 mg or 0.5 mg(2 mg/1.5 mL) pen injector Inject 0.25 mg into the skin once a week.      senna-docusate 8.6-50 mg (PERICOLACE) 8.6-50 mg per tablet Take 1 tablet by mouth daily as needed for Constipation. 30 tablet 2    SYMBICORT 160-4.5 mcg/actuation HFAA Inhale into the lungs.      TRUE METRIX GLUCOSE TEST STRIP Strp       TRUEPLUS LANCETS 33 gauge Misc       valACYclovir (VALTREX) 1000 MG tablet Take 1 tablet (1,000 mg total) by mouth 3 (three) times daily. for seven days 21 tablet 0     No current facility-administered medications for this visit.       Review of Systems   Constitutional:  Positive for fatigue. Negative for chills and fever.        Rash   HENT:  Negative for congestion, sinus pressure and trouble swallowing.    Respiratory:  Negative for cough, chest tightness and shortness of breath.    Cardiovascular:  Negative for chest pain.   Gastrointestinal:  Negative for abdominal pain and blood in stool.   Endocrine: Negative for cold intolerance and heat intolerance.   Genitourinary:  Negative for hematuria.   Musculoskeletal:  Positive for arthralgias. Negative for back pain and myalgias.   Skin:  Negative for rash.   Neurological:  Negative for weakness.   Hematological:  Negative for adenopathy. Does not bruise/bleed easily.   Psychiatric/Behavioral:  Negative for dysphoric mood. The patient is not nervous/anxious.    ECOG Performance Status:   ECOG SCORE             Objective:      Vitals:   There were no vitals filed for this visit.        telemedicine    Physical Exam  Constitutional:       General: He is not in acute distress.     Appearance: Normal appearance. He is not ill-appearing.   HENT:      Head: Normocephalic and atraumatic.      Nose: Nose normal.      Mouth/Throat:      Mouth: Mucous membranes are moist.      Pharynx: Oropharynx is clear. No oropharyngeal exudate or posterior  oropharyngeal erythema.   Eyes:      General: No scleral icterus.     Extraocular Movements: Extraocular movements intact.      Conjunctiva/sclera: Conjunctivae normal.      Pupils: Pupils are equal, round, and reactive to light.   Pulmonary:      Effort: Pulmonary effort is normal. No respiratory distress.   Abdominal:      General: Abdomen is flat.      Palpations: Abdomen is soft.   Musculoskeletal:         General: No swelling. Normal range of motion.      Cervical back: Normal range of motion.      Right lower leg: No edema.      Left lower leg: No edema.   Skin:     General: Skin is warm and dry.      Coloration: Skin is not jaundiced.   Neurological:      General: No focal deficit present.      Mental Status: He is alert.   Psychiatric:         Mood and Affect: Mood normal.         Behavior: Behavior normal.         Thought Content: Thought content normal.         Judgment: Judgment normal.     Laboratory Data:  Recent Results (from the past 168 hour(s))   CBC W/ AUTO DIFFERENTIAL    Collection Time: 07/13/23  7:07 AM   Result Value Ref Range    WBC 1.04 (LL) 3.90 - 12.70 K/uL    RBC 4.74 4.60 - 6.20 M/uL    Hemoglobin 13.5 (L) 14.0 - 18.0 g/dL    Hematocrit 42.7 40.0 - 54.0 %    MCV 90 82 - 98 fL    MCH 28.5 27.0 - 31.0 pg    MCHC 31.6 (L) 32.0 - 36.0 g/dL    RDW 17.1 (H) 11.5 - 14.5 %    Platelets 174 150 - 450 K/uL    MPV 10.1 9.2 - 12.9 fL    Immature Granulocytes CANCELED 0.0 - 0.5 %    Immature Grans (Abs) CANCELED 0.00 - 0.04 K/uL    Lymph # CANCELED 1.0 - 4.8 K/uL    Mono # CANCELED 0.3 - 1.0 K/uL    Eos # CANCELED 0.0 - 0.5 K/uL    Baso # CANCELED 0.00 - 0.20 K/uL    nRBC 0 0 /100 WBC    Gran % 30.0 (L) 38.0 - 73.0 %    Lymph % 53.3 (H) 18.0 - 48.0 %    Mono % 6.7 4.0 - 15.0 %    Eosinophil % 0.0 0.0 - 8.0 %    Basophil % 0.0 0.0 - 1.9 %    Bands 10.0 %    Platelet Estimate Appears normal     Aniso Slight     Differential Method Manual    COMPREHENSIVE METABOLIC PANEL    Collection Time: 07/13/23   7:07 AM   Result Value Ref Range    Sodium 136 136 - 145 mmol/L    Potassium 4.4 3.5 - 5.1 mmol/L    Chloride 100 95 - 110 mmol/L    CO2 24 23 - 29 mmol/L    Glucose 150 (H) 70 - 110 mg/dL    BUN 18 6 - 20 mg/dL    Creatinine 1.0 0.5 - 1.4 mg/dL    Calcium 9.0 8.7 - 10.5 mg/dL    Total Protein 6.7 6.0 - 8.4 g/dL    Albumin 3.2 (L) 3.5 - 5.2 g/dL    Total Bilirubin 1.3 (H) 0.1 - 1.0 mg/dL    Alkaline Phosphatase 593 (H) 55 - 135 U/L     (H) 10 - 40 U/L     (H) 10 - 44 U/L    eGFR >60 >60 mL/min/1.73 m^2    Anion Gap 12 8 - 16 mmol/L   Magnesium    Collection Time: 07/13/23  7:07 AM   Result Value Ref Range    Magnesium 2.2 1.6 - 2.6 mg/dL   CBC W/ AUTO DIFFERENTIAL    Collection Time: 07/19/23  7:37 AM   Result Value Ref Range    WBC 9.87 3.90 - 12.70 K/uL    RBC 4.76 4.60 - 6.20 M/uL    Hemoglobin 13.8 (L) 14.0 - 18.0 g/dL    Hematocrit 43.3 40.0 - 54.0 %    MCV 91 82 - 98 fL    MCH 29.0 27.0 - 31.0 pg    MCHC 31.9 (L) 32.0 - 36.0 g/dL    RDW 17.8 (H) 11.5 - 14.5 %    Platelets 196 150 - 450 K/uL    MPV 9.3 9.2 - 12.9 fL           Imaging:   MRI Brain W WO Contrast    Result Date: 10/11/2022  EXAMINATION: MRI BRAIN W WO CONTRAST CLINICAL HISTORY: Small cell lung cancer, brain re-staging; Malignant neoplasm of unspecified part of unspecified bronchus or lung TECHNIQUE: Sagittal and axial T1, axial T2, axial FLAIR, axial gradient, axial diffusion imaging of the whole brain pre-contrast with postcontrast axial T1 and axial spoiled gradient imaging reformatted in the coronal and sagittal planes.. Ten ml of Gadavist injected intravenously. COMPARISON: 07/12/2022 FINDINGS: Interval 1.2 cm enhancing lesion within the left anterior inferior frontal lobe which prominently the ring-enhancing measuring 1.2 x 0.9 x 0.9 cm in AP by TV by CC dimensions respectively in light of history concerning for parenchymal metastases the with question trace susceptibility associated with this lesion. Previously identified  heterogeneous enhancing sellar lesion is again seen allowing for routine brain technique with lesion measuring approximately 1.3 cm craniocaudal this may represent pituitary adenoma though indeterminate.  Previous intraluminal filling defect within the right jugular foramen is no longer seen.  There is however diffusion signal abnormality with ill-defined T1 hypointensity within the right occipital condyle concerning for possible osseous metastases the clinical correlation and further evaluation with nuclear medicine imaging advised. There is continued slight prominent leptomeningeal enhancement along the left cerebellum which raises concern for possible underlying vascular malformation such as a dural AV fistula with collateral vascular engorgement.  There is no restricted diffusion to suggest acute infarction.  Ventricles stable without hydrocephalus.  There is mild edema signal surrounding the left frontal enhancing lesion with continued few scattered punctate size regions of T2 FLAIR signal hyperintensity elsewhere supratentorial white matter which are nonspecific and may represent mild chronic ischemic change. This report was flagged in Epic as abnormal.     Interval development of a small ring-enhancing lesion left anterior inferior frontal lobe concerning for parenchymal metastases in light of history.  Clinical correlation and follow-up advised There is continue though relatively stable heterogeneous enhancement and enlargement of the material within the sella which may represent pituitary adenoma though indeterminate. Previous right internal jugular vein thrombus no longer seen. Abnormal signal along the right occipital condyle concerning for possible osseous metastases clinical correlation and further evaluation with nuclear medicine imaging advised Continued prominent vascularity left cerebellar folia raising concern for possible underlying vascular malformation unchanged.  Further evaluation with  noncontrast MRA head may be of further diagnostic value. Electronically signed by: Jonel Lawrence DO Date:    10/11/2022 Time:    10:05    CT Chest Abdomen Pelvis With Contrast (xpd)    Result Date: 10/18/2022  EXAMINATION: CT CHEST ABDOMEN PELVIS WITH CONTRAST (XPD) CLINICAL HISTORY: metastatic small cell lung cancer on maintenance immunotherapy, eval response; Malignant neoplasm of unspecified part of unspecified bronchus or lung TECHNIQUE: Low dose axial images, sagittal and coronal reformations were obtained from the thoracic inlet to the pubic symphysis following the IV administration of 100 mL of Omnipaque 350 COMPARISON: 07/26/2022 FINDINGS: Right IJ venous shunt tip superior aspect right atrium, absence of clot in included upper right IJ. Fatty infiltration of liver, additional diminished attenuation space-occupying lesions of liver, largest central right hepatic lobe 3.6 cm image 103 series 3.  Stable.  Spleen, adrenal glands, pancreas, gallbladder and biliary tree normal. Urinary bladder normal.  Prostate gland very small 4 cm versus postop prostatectomy.  No free fluid or retroperitoneal adenopathy.  GI track normal. Tiny nonobstructive right lower and left upper renal pole stones. New lymph node left retro manubrial 3.4 cm image 28 series 3 appearing in the interim.  Paratracheal conglomerate adenopathy 2.1 x 2.5 cm, was 1.6 x 3 cm.  Subcarinal adenopathy stable.  No new hilar or mediastinal adenopathy. Degenerative disc spondylosis cervicothoracic junction., focal osteoblastic opacities involve humeral heads both clavicle superimposed on erosive DJD more prominent on right.  Additional focal osteoblastic opacities sternum, ribs, dorsal lumbar sacral spine pelvis and both femoral head and trochanteric regions.  Moderate anterior spondylosis dorsal spine and degenerative disc spondylosis facet joint arthropathy lumbosacral junction with DJD SI joints.  Fracture defects left lower anterior chest wall  stable. Scattered calcified plaque great vessels aorta iliac femoral arteries. Dominant medial segment right middle lobe mass 1.1 x 2.7 cm image 69 series 3, was 1.6 x 3.4 cm.  Additional patchy nodular lesions right lower lobe posterior basilar segments, largest 1.4 cm image 66 series 3 suspicious for metastatic disease and/or superimposed inflammatory and infectious process.  Additional patchy infiltrates medial posterior segment right upper lobe and right lower lobe particularly medial basilar segment image 91 series 3.  No new pleural reaction.  Tracheobronchial tree normal. .     1. New presumed metastatic node anterior superior left mediastinum, paratracheal adenopathy otherwise stable. 2. Decreased size in right middle lobe mass with new evidence of metastatic disease right lower lobe versus superimposed inflammatory infectious process discussed above. 3. New developing metastatic lesions liver. 4. Bony universal metastatic disease stable. 5. Recist summary: 6. Compare with previous CT chest abdomen pelvis 07/26/2022 7. Lesion 1: Right middle lobe mass 1.1 x 2.7 cm was 1.6 x 13.4 cm. 8. Lesion 2: Right paratracheal conned Willett a adenopathy 2.1 x 2.5 cm, was 1.6 x 3 cm. 9. Lesion 3: Dome 1.9 cm stable.  (New progressive liver metastatic disease noted elsewhere) 10. Lesion 4: Caudal aspect right hepatic lobe 1 cm, stable 11.  This report was flagged in Epic as abnormal. Electronically signed by: Dewayne Hutchinson MD Date:    10/18/2022 Time:    09:28       Assessment and Plan          ECOG 0      Extensive stage small cell lung cancer with brain mets  -Initially diagnosed with extensive stage small cell lung cancer in 03/2022 who was treated with carbo/etop/atezolizumab from 04/05/2022 - 06/14/22 with partial response. He subsequently completed consolidation thoracic RT 8/4/22 - 8/18/22, and he was on maintenance atezolizumab.   -10/18/22 showing progression of disease in lungs and liver.  Asymptomatic.  -  Sheila discussed with him plan for topotecan and pt was consented  -Pt tolerated cycle 1 well but delayed C2 due to being out of town  -Scans in December 2022 largely stable with exception of  Right middle lobe lung mass.  3.4 cm, still demonstrated widespread disease in bones  -Admitted for pain control and palliative XRT on 12/07/22  -Pt had been off of tx in Nov 2022 due to going on vacation, as symptoms initially improved drastically after restarting tx  Plan 07/19/23  -Hold chemo today due to elevated LFTs  -Admit for pain control and pt is considering hospice as it does not appear that he is responding to tx and LFT rise is preventing tx at present time  -RTC in 1 week for MD follow up      Cancer related pain/palliative care by specialist  -Pain has become increasing worse over the past few weeks.  Recent increase in pain meds only controlled pain over the weekend    DM2  -Controlled, following with PCP      Hypercalcemia/Mets to bone and spine  -Pt still has not gotten dental clearance and is aware of risk of jaw osteonecrosis but would like to start zometa today due to metastatic disease  -Continue calcium supplements while receiving zometa      PE  -on 5/31/23  -Most likely from his cancer  -Continue lifelong anticoagulation  -Eliquis refilled previous visit      Chemo related neutropenia  -secondary to cancer and docetaxel  -Continue to monitor      Time spent with pt: 30 minutes      Problem List Items Addressed This Visit    None                Soledad Irving MD  Hematology Oncology

## 2023-07-19 NOTE — ASSESSMENT & PLAN NOTE
Advance Care Planning     Date: 07/19/2023  Patient wishes continue treatment under direction of Oncologist, pain control and full code for now  Palliative Care consult

## 2023-07-19 NOTE — ED NOTES
Pt aware for need of urine sample but denies having to urinate at this time. Urinal left at pt bedside in attempt to collect urine for UA.

## 2023-07-19 NOTE — ED PROVIDER NOTES
Encounter Date: 7/19/2023    SCRIBE #1 NOTE: I, Milagros Encinas, am scribing for, and in the presence of,  Meenakshi Andres DO. I have scribed the following portions of the note - Other sections scribed: HPI, ROS, MDM.     History     Chief Complaint   Patient presents with    Intactable pain     Pt brought over from oncologist appt for intractable pt to be admitted. . Pt with small cell lung cancer (dx 3/2022) with mets to all liver, brain, bone. Pain medication not helping, Last dose of morphine at 0700.       Juan Gillespie is a 58 y.o. male, with a PMHx of HTN, Type II DM, metastatic small cell carcinoma, who presents to the ED with intractable pain that began last weekend. Patient reports he is having pain in his back, thighs, knees, pelvic area, which he rates as a 10/10, and some milder pain in his abdomen. Pt is also complaining of generalized dryness of skin and mouth and weakness. Also notes he experiences some coughing due to metastasis in chest. Additional history is provided by independent historian: pt's wife, who states pt was at an oncologist appointment today when he was directed to the ED for his pain. Wife reports pt endorsed 30 mg of morphine at home at 7 AM today, and usually takes it every 12 hrs. Wife states pt has not slept because of his pain except for approx. 2 hours yesterday during the day. Pt had full brain radiation last week Mon-Fri. Pt was also on chemo, with the last dose being a few weeks ago, but was directed to stop by his oncologist based on his condition. No other exacerbating or alleviating factors. Was on steroids last week, so denies any trouble eating or drinking. Denies fever, chills, shortness of breath, dysuria, or other associated symptoms.       The history is provided by the patient and the spouse. No  was used.   Review of patient's allergies indicates:  No Known Allergies  Past Medical History:   Diagnosis Date    Cancer 03/2022    Small  Cell -Stage 4 Lung Cancer    Diabetes mellitus, type 2     Gout, unspecified     Hypertension      Past Surgical History:   Procedure Laterality Date    ENDOBRONCHIAL ULTRASOUND N/A 2022    Procedure: ENDOBRONCHIAL ULTRASOUND (EBUS);  Surgeon: Kristin Samuels MD;  Location: [facility] OR [address] [place] [address];  Service: Endoscopy;  Laterality: N/A;    INSERTION OF TUNNELED CENTRAL VENOUS CATHETER (CVC) WITH SUBCUTANEOUS PORT Right 2022    KNEE SURGERY       Family History   Problem Relation Age of Onset    Diabetes Mellitus Mother     Pacemaker/defibrilator Father     Lung cancer Father      Social History     Tobacco Use    Smoking status: Former     Types: Cigarettes     Quit date:      Years since quittin.5    Smokeless tobacco: Former    Tobacco comments:     stop smoking 26 years ago   Substance Use Topics    Alcohol use: No    Drug use: No     Review of Systems   Constitutional:  Negative for chills and fever.   HENT:  Negative for congestion, postnasal drip, rhinorrhea, sinus pressure and sore throat.    Eyes:  Negative for pain and redness.   Respiratory:  Negative for apnea, choking, chest tightness, wheezing and stridor.    Cardiovascular:  Positive for chest pain. Negative for palpitations and leg swelling.   Gastrointestinal:  Positive for abdominal pain (mild). Negative for diarrhea, nausea and vomiting.   Genitourinary:  Negative for dysuria, flank pain, penile pain and urgency.        (+) Pelvic area pain.   Musculoskeletal:  Positive for arthralgias (knees), back pain and myalgias (thighs). Negative for gait problem, joint swelling, neck pain and neck stiffness.   Skin:  Negative for color change, pallor, rash and wound.   Neurological:  Positive for weakness. Negative for dizziness, tremors, seizures, syncope and light-headedness.   Psychiatric/Behavioral:  Negative for confusion. The patient is not nervous/anxious.      Physical Exam     Initial Vitals [23 0838]   BP Pulse Resp Temp SpO2    (!) 151/80 101 (!) 22 98.5 °F (36.9 °C) 98 %      MAP       --         Physical Exam    Nursing note and vitals reviewed.  Constitutional: He appears well-developed. He is not diaphoretic. He appears ill.   Uncomfortable due to pain.   HENT:   Head: Normocephalic and atraumatic.   Right Ear: External ear normal.   Left Ear: External ear normal.   Dry mucous membranes.    Eyes: Conjunctivae and EOM are normal. Pupils are equal, round, and reactive to light. Right eye exhibits no discharge. Left eye exhibits no discharge. No scleral icterus.   Neck: Neck supple. No thyromegaly present. No tracheal deviation present. No JVD present.   Normal range of motion.  Cardiovascular:  Normal rate, regular rhythm, normal heart sounds and intact distal pulses.     Exam reveals no gallop and no friction rub.       No murmur heard.  Pulmonary/Chest: Breath sounds normal. No stridor. No respiratory distress. He has no wheezes. He has no rhonchi. He has no rales.   Abdominal: He exhibits no distension and no mass. There is abdominal tenderness (bilateral, mild, lower quad). There is no rebound and no guarding.   Musculoskeletal:         General: No edema.      Cervical back: Normal range of motion and neck supple.     Lymphadenopathy:     He has no cervical adenopathy.   Neurological: He is alert and oriented to person, place, and time. He has normal strength.   Moves all extremities, follows all commands, no focal neurologic deficits.      Skin: Skin is warm. No rash and no abscess noted. No erythema. No pallor.   Psychiatric: He exhibits a depressed mood.       ED Course   Procedures  Labs Reviewed   CBC W/ AUTO DIFFERENTIAL - Abnormal; Notable for the following components:       Result Value    Hemoglobin 13.5 (*)     RDW 17.5 (*)     Gran % 78.0 (*)     Lymph % 5.0 (*)     All other components within normal limits   COMPREHENSIVE METABOLIC PANEL - Abnormal; Notable for the following components:    Glucose 144 (*)     Albumin  3.0 (*)     Total Bilirubin 1.6 (*)     Alkaline Phosphatase 646 (*)     AST 97 (*)      (*)     All other components within normal limits   URINALYSIS, REFLEX TO URINE CULTURE - Abnormal; Notable for the following components:    Protein, UA Trace (*)     All other components within normal limits    Narrative:     Specimen Source->Urine   POCT GLUCOSE - Abnormal; Notable for the following components:    POCT Glucose 135 (*)     All other components within normal limits   LIPASE   PROTIME-INR   APTT   POCT GLUCOSE MONITORING CONTINUOUS     EKG Readings: (Independently Interpreted)   Normal sinus rhythm with a rate of 97 beats per minute, normal axis and intervals no acute ST segment changes.  First-degree AV block resolved when compared to previous EKG from May 2023.     Imaging Results    None          Medications   morphine 12 hr tablet 45 mg (45 mg Oral Given 7/19/23 1519)   sodium chloride 0.9% flush 10 mL (has no administration in time range)   HYDROmorphone injection 2 mg (has no administration in time range)   cyclobenzaprine tablet 5 mg (5 mg Oral Given 7/19/23 1519)   apixaban tablet 5 mg (has no administration in time range)   glucose chewable tablet 16 g (has no administration in time range)   glucose chewable tablet 24 g (has no administration in time range)   dextrose 10% bolus 125 mL 125 mL (has no administration in time range)   dextrose 10% bolus 250 mL 250 mL (has no administration in time range)   glucagon (human recombinant) injection 1 mg (has no administration in time range)   insulin aspart U-100 pen 0-5 Units (has no administration in time range)   HYDROmorphone injection 2 mg (2 mg Intravenous Given 7/19/23 1034)   sodium chloride 0.9% bolus 2,000 mL 2,000 mL (0 mLs Intravenous Stopped 7/19/23 1300)     Medical Decision Making:   History:   Old Medical Records: I decided to obtain old medical records.  Clinical Tests:   Lab Tests: Ordered and Reviewed  The following lab test(s) were  unremarkable: CBC, Lipase and CMP  ED Management:   [facility]  This is an emergent evaluation of a 58 y.o. M with intractable pain. Initial vitals in the ED [07/19/23 0838]  BP: (!) 151/80  Pulse: 101  Resp: (!) 22  Temp: 98.5 °F (36.9 °C)  SpO2: 98 % .     Physical exam noted above. DDx includes but is not limited to chronic pain, progression of metastatic lung cancer. Also considered but clinically less likely to be PE, dissection, ACS, stroke. Will obtain labs and imaging including CBC, CMP, lipase, UA, point of care glucose, EKG. Will also provide IV fluids and IV dilaudid for pain. Will continue to monitor and frequently reassess pending results of labs, treatments and final disposition.    Patient is aware of plan and is amenable.     Meenakshi Andres D.O  EMERGENCY MEDICINE  11:13 AM 07/19/2023    UPDATE:  Labs reveal a mild anemia with a hemoglobin of 13.5.  Stable elevated LFTs with a total bili of 1.6, alk-phos 646, AST 97 with a ALT of 202.  Remainder of labs unremarkable. EKG shows no acute STEMI.  On reassessment, the patient states that his symptoms were improved.  Will admit to observation for pain control with palliative care consult.  Case was discussed with [facility], Loulou Livingston, and the patient was placed on the observation service.  The patient and his wife are aware and agreeable plan.    This chart was completed using dictation software, as a result there may be some transcription errors          Scribe Attestation:   Scribe #1: I performed the above scribed service and the documentation accurately describes the services I performed. I attest to the accuracy of the note.                 I, Meenakshi Andres, DO, personally performed the services described in this documentation. All medical record entries made by the scribe were at my direction and in my presence.  I have reviewed the chart and agree that the record reflects my personal performance and is accurate and  complete.    Clinical Impression:   Final diagnoses:  [R52] Pain        ED Disposition Condition    Observation Stable                Meenakshi Andres, DO  07/19/23 1537

## 2023-07-19 NOTE — SUBJECTIVE & OBJECTIVE
Past Medical History:   Diagnosis Date    Cancer 03/2022    Small Cell -Stage 4 Lung Cancer    Diabetes mellitus, type 2     Gout, unspecified     Hypertension        Past Surgical History:   Procedure Laterality Date    ENDOBRONCHIAL ULTRASOUND N/A 03/22/2022    Procedure: ENDOBRONCHIAL ULTRASOUND (EBUS);  Surgeon: Kristin Samuels MD;  Location: [facility] OR [address] [place] [address];  Service: Endoscopy;  Laterality: N/A;    INSERTION OF TUNNELED CENTRAL VENOUS CATHETER (CVC) WITH SUBCUTANEOUS PORT Right 04/01/2022    KNEE SURGERY         Review of patient's allergies indicates:  No Known Allergies    No current facility-administered medications on file prior to encounter.     Current Outpatient Medications on File Prior to Encounter   Medication Sig    (Magic mouthwash) 1:1:1 diphenhydrAMINE(Benadryl) 12.5mg/5ml liq, aluminum & magnesium hydroxide-simethicone (Maalox), LIDOcaine viscous 2% Swish and spit 5 mLs every 4 (four) hours as needed. for mouth sores    albuterol (ACCUNEB) 1.25 mg/3 mL Nebu Use 1 vial (1.25 mg total) by nebulization 3 (three) times daily as needed (shortness of breath). Rescue    allopurinoL (ZYLOPRIM) 300 MG tablet Take 1 tablet (300 mg total) by mouth once daily.    apixaban (ELIQUIS) 5 mg Tab Take 1 tablet (5 mg total) by mouth 2 (two) times daily.    ascorbic acid (VITAMIN C ORAL) Take 1 tablet by mouth once daily.    benzonatate (TESSALON) 100 MG capsule Take 1 capsule (100 mg total) by mouth 3 (three) times daily as needed for Cough.    cholecalciferol, vitamin D3, (VITAMIN D3 ORAL) Take 1 tablet by mouth once daily.    CYANOCOBALAMIN, VITAMIN B-12, ORAL Take by mouth.    cyproheptadine (PERIACTIN) 4 mg tablet Take 1 tablet (4 mg total) by mouth 3 (three) times daily as needed (appetite).    dapagliflozin (FARXIGA) 10 mg tablet Take 1 tablet by mouth once daily.    dexAMETHasone (DECADRON) 4 MG Tab Take 1 tablet (4 mg total) by mouth 2 (two) times daily.    guaiFENesin (MUCINEX) 600 mg 12 hr tablet Take  1,200 mg by mouth 2 (two) times daily.    insulin aspart U-100 (NOVOLOG) 100 unit/mL (3 mL) InPn pen Inject 1 Units into the skin as needed. Inject into skin if over 200.    levoFLOXacin (LEVAQUIN) 500 MG tablet Take 1 tablet (500 mg total) by mouth once daily. for 7 days    losartan (COZAAR) 100 MG tablet Take 1 tablet (100 mg total) by mouth once daily.    MAGNESIUM ORAL Take 1 capsule by mouth once daily.    morphine (MS CONTIN) 30 MG 12 hr tablet Take 1 tablet by mouth every 12 hours for pain control    naloxone (NARCAN) 4 mg/actuation Spry 4mg by nasal route as needed for opioid overdose; may repeat every 2-3 minutes in alternating nostrils until medical help arrives. Call 911    ondansetron (ZOFRAN) 8 MG tablet Take 1 tablet (8 mg total) by mouth every 8 (eight) hours as needed for Nausea.    ondansetron (ZOFRAN) 8 MG tablet Take 1 tablet (8 mg total) by mouth every 12 (twelve) hours as needed for Nausea.    POTASSIUM ORAL Take 1 tablet by mouth once daily.    senna-docusate 8.6-50 mg (PERICOLACE) 8.6-50 mg per tablet Take 1 tablet by mouth daily as needed for Constipation.    valACYclovir (VALTREX) 1000 MG tablet Take 1 tablet (1,000 mg total) by mouth 3 (three) times daily. for seven days    ZINC ORAL Take by mouth.    cetirizine (ZYRTEC) 10 MG tablet Take 1 tablet by mouth as needed.    semaglutide (OZEMPIC) 0.25 mg or 0.5 mg(2 mg/1.5 mL) pen injector Inject 0.25 mg into the skin once a week.    SYMBICORT 160-4.5 mcg/actuation HFAA Inhale into the lungs.    TRUE METRIX GLUCOSE TEST STRIP Strp     TRUEPLUS LANCETS 33 gauge Misc     [DISCONTINUED] albuterol-ipratropium (DUO-NEB) 2.5 mg-0.5 mg/3 mL nebulizer solution Take 3 mLs by nebulization every 4 (four) hours. Rescue    [DISCONTINUED] azelastine (ASTELIN) 137 mcg (0.1 %) nasal spray 1 spray by Nasal route 2 (two) times daily.     Family History       Problem Relation (Age of Onset)    Diabetes Mellitus Mother    Lung cancer Father     Pacemaker/defibrilator Father          Tobacco Use    Smoking status: Former     Types: Cigarettes     Quit date:      Years since quittin.5    Smokeless tobacco: Former    Tobacco comments:     stop smoking 26 years ago   Substance and Sexual Activity    Alcohol use: No    Drug use: No    Sexual activity: Yes     Partners: Female     Review of Systems   Constitutional:  Positive for activity change, appetite change and fatigue. Negative for diaphoresis and fever.   HENT: Negative.     Eyes: Negative.    Respiratory: Negative.     Cardiovascular: Negative.    Gastrointestinal: Negative.    Genitourinary: Negative.    Musculoskeletal:  Positive for arthralgias, back pain and neck pain.   Skin: Negative.    Neurological: Negative.    Hematological: Negative.    Psychiatric/Behavioral: Negative.     Objective:     Vital Signs (Most Recent):  Temp: 98.5 °F (36.9 °C) (23 1323)  Pulse: 88 (23 1332)  Resp: 20 (23 1332)  BP: (!) 145/81 (23 1332)  SpO2: 96 % (23 1332) Vital Signs (24h Range):  Temp:  [98.5 °F (36.9 °C)] 98.5 °F (36.9 °C)  Pulse:  [] 88  Resp:  [14-22] 20  SpO2:  [95 %-99 %] 96 %  BP: (114-173)/(59-88) 145/81     Weight: 98 kg (216 lb)  Body mass index is 33.83 kg/m².     Physical Exam  Constitutional:       Appearance: Normal appearance. He is not ill-appearing.   HENT:      Head: Normocephalic and atraumatic.      Nose: Nose normal.   Eyes:      Extraocular Movements: Extraocular movements intact.   Cardiovascular:      Rate and Rhythm: Normal rate and regular rhythm.   Pulmonary:      Comments: diminished  Chest:      Chest wall: No tenderness.   Abdominal:      General: There is no distension.      Palpations: Abdomen is soft.   Musculoskeletal:         General: Tenderness (shoulders, mid back, thighs TTP) present. No deformity. Normal range of motion.      Cervical back: Normal range of motion.      Right lower leg: No edema.      Left lower leg: No edema.    Skin:     General: Skin is warm and dry.   Neurological:      General: No focal deficit present.      Mental Status: He is alert and oriented to person, place, and time. Mental status is at baseline.      Sensory: No sensory deficit.      Motor: No weakness.              Significant Labs: All pertinent labs within the past 24 hours have been reviewed.    Significant Imaging: I have reviewed all pertinent imaging results/findings within the past 24 hours.

## 2023-07-19 NOTE — H&P
"[facility] Medicine  History & Physical    Patient Name: Juan Gillespie  MRN: [id number]  Patient Class: OP- Observation  Admission Date: 7/19/2023  Attending Physician: Robert Zhong MD   Primary Care Provider: Soledad Irving MD         Patient information was obtained from patient and ER records.     Subjective:     Principal Problem:Intractable pain    Chief Complaint:   Chief Complaint   Patient presents with    Intactable pain     Pt brought over from oncologist appt for intractable pt to be admitted. . Pt with small cell lung cancer (dx 3/2022) with mets to all liver, brain, bone. Pain medication not helping, Last dose of morphine at 0700.          HPI: Juan Gillespie is a 57 yo male with hypertension, former smoker, DMT2, SCLC stage 4 dx 3/3022 with mets to bone/spine/brain/liver, hypercalcemia on zometa, PE 5/31/23 on eliquis, chemo related neutropenia now improved   who was sent to ED from Oncologist Dr. Irving office for intractable pain. Patient office visit plan to hold chemotherapy due to elevated LFT's.  Recent pain regimen switch from Dilaudid 2 mg  to Morphine 30 mg BID 6 days ago and continues with intractable pain. He have not slept in days. He would nap for 2 hrs then wake up in pain "all over." Pain more so in whole back, thighs and shoulders. No additional treatment at home for his pain. Slightest movement like positioning in bed worsen pain. No chest pain, abdominal pain , dizziness, nausea or vomiting.     In ED, worsen LFT's from last month. INR 1.1.     5/4/2023 MRI brain showed metastatic disease progression    Per Dr. Irving clinic note today 7/19/2023  Extensive stage small cell lung cancer with brain mets  -Initially diagnosed with extensive stage small cell lung cancer in 03/2022 who was treated with carbo/etop/atezolizumab from 04/05/2022 - 06/14/22 with partial response. He subsequently completed consolidation thoracic RT 8/4/22 - 8/18/22, and he was on maintenance " atezolizumab.   -10/18/22 showing progression of disease in lungs and liver.  Asymptomatic.  -Dr. Sosa discussed with him plan for topotecan and pt was consented  -Pt tolerated cycle 1 well but delayed C2 due to being out of town  -Scans in December 2022 largely stable with exception of  Right middle lobe lung mass.  3.4 cm, still demonstrated widespread disease in bones  -Admitted for pain control and palliative XRT on 12/07/22  -Pt had been off of tx in Nov 2022 due to going on vacation, as symptoms initially improved drastically after restarting tx  Plan 07/19/23  -Hold chemo today due to elevated LFTs  -Admit for pain control and pt is considering hospice as it does not appear that he is responding to tx and LFT rise is preventing tx at present time  -RTC in 1 week for MD follow up      Past Medical History:   Diagnosis Date    Cancer 03/2022    Small Cell -Stage 4 Lung Cancer    Diabetes mellitus, type 2     Gout, unspecified     Hypertension        Past Surgical History:   Procedure Laterality Date    ENDOBRONCHIAL ULTRASOUND N/A 03/22/2022    Procedure: ENDOBRONCHIAL ULTRASOUND (EBUS);  Surgeon: Kristin Samuels MD;  Location: [facility] OR [address] [place] [address];  Service: Endoscopy;  Laterality: N/A;    INSERTION OF TUNNELED CENTRAL VENOUS CATHETER (CVC) WITH SUBCUTANEOUS PORT Right 04/01/2022    KNEE SURGERY         Review of patient's allergies indicates:  No Known Allergies    No current facility-administered medications on file prior to encounter.     Current Outpatient Medications on File Prior to Encounter   Medication Sig    (Magic mouthwash) 1:1:1 diphenhydrAMINE(Benadryl) 12.5mg/5ml liq, aluminum & magnesium hydroxide-simethicone (Maalox), LIDOcaine viscous 2% Swish and spit 5 mLs every 4 (four) hours as needed. for mouth sores    albuterol (ACCUNEB) 1.25 mg/3 mL Nebu Use 1 vial (1.25 mg total) by nebulization 3 (three) times daily as needed (shortness of breath). Rescue    allopurinoL (ZYLOPRIM) 300 MG tablet  Take 1 tablet (300 mg total) by mouth once daily.    apixaban (ELIQUIS) 5 mg Tab Take 1 tablet (5 mg total) by mouth 2 (two) times daily.    ascorbic acid (VITAMIN C ORAL) Take 1 tablet by mouth once daily.    benzonatate (TESSALON) 100 MG capsule Take 1 capsule (100 mg total) by mouth 3 (three) times daily as needed for Cough.    cholecalciferol, vitamin D3, (VITAMIN D3 ORAL) Take 1 tablet by mouth once daily.    CYANOCOBALAMIN, VITAMIN B-12, ORAL Take by mouth.    cyproheptadine (PERIACTIN) 4 mg tablet Take 1 tablet (4 mg total) by mouth 3 (three) times daily as needed (appetite).    dapagliflozin (FARXIGA) 10 mg tablet Take 1 tablet by mouth once daily.    dexAMETHasone (DECADRON) 4 MG Tab Take 1 tablet (4 mg total) by mouth 2 (two) times daily.    guaiFENesin (MUCINEX) 600 mg 12 hr tablet Take 1,200 mg by mouth 2 (two) times daily.    insulin aspart U-100 (NOVOLOG) 100 unit/mL (3 mL) InPn pen Inject 1 Units into the skin as needed. Inject into skin if over 200.    levoFLOXacin (LEVAQUIN) 500 MG tablet Take 1 tablet (500 mg total) by mouth once daily. for 7 days    losartan (COZAAR) 100 MG tablet Take 1 tablet (100 mg total) by mouth once daily.    MAGNESIUM ORAL Take 1 capsule by mouth once daily.    morphine (MS CONTIN) 30 MG 12 hr tablet Take 1 tablet by mouth every 12 hours for pain control    naloxone (NARCAN) 4 mg/actuation Spry 4mg by nasal route as needed for opioid overdose; may repeat every 2-3 minutes in alternating nostrils until medical help arrives. Call 911    ondansetron (ZOFRAN) 8 MG tablet Take 1 tablet (8 mg total) by mouth every 8 (eight) hours as needed for Nausea.    ondansetron (ZOFRAN) 8 MG tablet Take 1 tablet (8 mg total) by mouth every 12 (twelve) hours as needed for Nausea.    POTASSIUM ORAL Take 1 tablet by mouth once daily.    senna-docusate 8.6-50 mg (PERICOLACE) 8.6-50 mg per tablet Take 1 tablet by mouth daily as needed for Constipation.    valACYclovir  (VALTREX) 1000 MG tablet Take 1 tablet (1,000 mg total) by mouth 3 (three) times daily. for seven days    ZINC ORAL Take by mouth.    cetirizine (ZYRTEC) 10 MG tablet Take 1 tablet by mouth as needed.    semaglutide (OZEMPIC) 0.25 mg or 0.5 mg(2 mg/1.5 mL) pen injector Inject 0.25 mg into the skin once a week.    SYMBICORT 160-4.5 mcg/actuation HFAA Inhale into the lungs.    TRUE METRIX GLUCOSE TEST STRIP Strp     TRUEPLUS LANCETS 33 gauge Misc     [DISCONTINUED] albuterol-ipratropium (DUO-NEB) 2.5 mg-0.5 mg/3 mL nebulizer solution Take 3 mLs by nebulization every 4 (four) hours. Rescue    [DISCONTINUED] azelastine (ASTELIN) 137 mcg (0.1 %) nasal spray 1 spray by Nasal route 2 (two) times daily.     Family History       Problem Relation (Age of Onset)    Diabetes Mellitus Mother    Lung cancer Father    Pacemaker/defibrilator Father          Tobacco Use    Smoking status: Former     Types: Cigarettes     Quit date:      Years since quittin.5    Smokeless tobacco: Former    Tobacco comments:     stop smoking 26 years ago   Substance and Sexual Activity    Alcohol use: No    Drug use: No    Sexual activity: Yes     Partners: Female     Review of Systems   Constitutional:  Positive for activity change, appetite change and fatigue. Negative for diaphoresis and fever.   HENT: Negative.     Eyes: Negative.    Respiratory: Negative.     Cardiovascular: Negative.    Gastrointestinal: Negative.    Genitourinary: Negative.    Musculoskeletal:  Positive for arthralgias, back pain and neck pain.   Skin: Negative.    Neurological: Negative.    Hematological: Negative.    Psychiatric/Behavioral: Negative.     Objective:     Vital Signs (Most Recent):  Temp: 98.5 °F (36.9 °C) (23 1323)  Pulse: 88 (23 1332)  Resp: 20 (23 1332)  BP: (!) 145/81 (23 1332)  SpO2: 96 % (23 133) Vital Signs (24h Range):  Temp:  [98.5 °F (36.9 °C)] 98.5 °F (36.9 °C)  Pulse:  [] 88  Resp:  [14-22]  "20  SpO2:  [95 %-99 %] 96 %  BP: (114-173)/(59-88) 145/81     Weight: 98 kg (216 lb)  Body mass index is 33.83 kg/m².     Physical Exam  Constitutional:       Appearance: Normal appearance. He is not ill-appearing.   HENT:      Head: Normocephalic and atraumatic.      Nose: Nose normal.   Eyes:      Extraocular Movements: Extraocular movements intact.   Cardiovascular:      Rate and Rhythm: Normal rate and regular rhythm.   Pulmonary:      Comments: diminished  Chest:      Chest wall: No tenderness.   Abdominal:      General: There is no distension.      Palpations: Abdomen is soft.   Musculoskeletal:         General: Tenderness (shoulders, mid back, thighs TTP) present. No deformity. Normal range of motion.      Cervical back: Normal range of motion.      Right lower leg: No edema.      Left lower leg: No edema.   Skin:     General: Skin is warm and dry.   Neurological:      General: No focal deficit present.      Mental Status: He is alert and oriented to person, place, and time. Mental status is at baseline.      Sensory: No sensory deficit.      Motor: No weakness.              Significant Labs: All pertinent labs within the past 24 hours have been reviewed.    Significant Imaging: I have reviewed all pertinent imaging results/findings within the past 24 hours.    Assessment/Plan:     * Intractable pain  Sent from Oncology clinic to ED for intractable pain "all over " due to mets disease. Recent switch from dilaudid 2 mg to morphine 30 BID  Pain better controlled after dilaudid in ED but only last about 2 hrs  Increase morphine 45  mg BID, dilaudid 2 mg q 6 hr breakthough pain  Add flexiril 5 mg bid  Consult palliative care      Small cell lung cancer  Was seen in clinic by Dr. Irving today  SCLC stage 4 mets to brain, liver, bone  -Initially diagnosed with extensive stage small cell lung cancer in 03/2022 who was treated with carbo/etop/atezolizumab from 04/05/2022 - 06/14/22 with partial response. He " subsequently completed consolidation thoracic RT 8/4/22 - 8/18/22, and he was on maintenance atezolizumab.   -10/18/22 showing progression of disease in lungs and liver.  Asymptomatic.  -Dr. Sosa discussed with him plan for topotecan and pt was consented  -Pt tolerated cycle 1 well but delayed C2 due to being out of town  -Scans in December 2022 largely stable with exception of  Right middle lobe lung mass.  3.4 cm, still demonstrated widespread disease in bones  -Admitted for pain control and palliative XRT on 12/07/22  -Pt had been off of tx in Nov 2022 due to going on vacation, as symptoms initially improved drastically after restarting tx  Plan 07/19/23  -Hold chemo today due to elevated LFTs  -Admit for pain control and pt is considering hospice as it does not appear that he is responding to tx and LFT rise is preventing tx at present time  -RTC in 1 week for MD follow up  Consult palliative care team      Goals of care, counseling/discussion  Advance Care Planning     Date: 07/19/2023  Patient wishes continue treatment under direction of Oncologist, pain control and full code for now  Palliative Care consult            Pulmonary embolism  5/2023  Current no cp or sob  Continue eliquis      Elevated LFTs  Mets to Liver- see above #1      HTN (hypertension)  Controlled.   Resume home losartan      Type 2 diabetes mellitus, without long-term current use of insulin  Patient's FSGs are controlled on current medication regimen.  Last A1c reviewed-   Lab Results   Component Value Date    HGBA1C 6.9 (H) 12/07/2022     Most recent fingerstick glucose reviewed-   Recent Labs   Lab 07/19/23  0908   POCTGLUCOSE 135*     Current correctional scale  Low  Maintain anti-hyperglycemic dose as follows-   Antihyperglycemics (From admission, onward)    None      SSI    VTE Risk Mitigation (From admission, onward)         Ordered     apixaban tablet 5 mg  2 times daily         07/19/23 1503     IP VTE HIGH RISK PATIENT  Once          07/19/23 1439     Place sequential compression device  Until discontinued         07/19/23 1439                   On 07/19/2023, patient should be placed in hospital observation services under my care in collaboration with Robert Zhong.      Loulou Morel NP  Department of Hospital Medicine  [facility] - Telemetry

## 2023-07-20 VITALS
RESPIRATION RATE: 18 BRPM | TEMPERATURE: 98 F | HEIGHT: 67 IN | OXYGEN SATURATION: 98 % | DIASTOLIC BLOOD PRESSURE: 79 MMHG | HEART RATE: 92 BPM | WEIGHT: 216 LBS | SYSTOLIC BLOOD PRESSURE: 137 MMHG | BODY MASS INDEX: 33.9 KG/M2

## 2023-07-20 LAB
ALBUMIN SERPL BCP-MCNC: 2.8 G/DL (ref 3.5–5.2)
ALP SERPL-CCNC: 626 U/L (ref 55–135)
ALT SERPL W/O P-5'-P-CCNC: 164 U/L (ref 10–44)
ANION GAP SERPL CALC-SCNC: 11 MMOL/L (ref 8–16)
AST SERPL-CCNC: 78 U/L (ref 10–40)
BILIRUB SERPL-MCNC: 1.6 MG/DL (ref 0.1–1)
BUN SERPL-MCNC: 15 MG/DL (ref 6–20)
CALCIUM SERPL-MCNC: 8.7 MG/DL (ref 8.7–10.5)
CHLORIDE SERPL-SCNC: 101 MMOL/L (ref 95–110)
CO2 SERPL-SCNC: 24 MMOL/L (ref 23–29)
CREAT SERPL-MCNC: 0.8 MG/DL (ref 0.5–1.4)
EST. GFR  (NO RACE VARIABLE): >60 ML/MIN/1.73 M^2
ESTIMATED AVG GLUCOSE: 128 MG/DL (ref 68–131)
GLUCOSE SERPL-MCNC: 102 MG/DL (ref 70–110)
HBA1C MFR BLD: 6.1 % (ref 4–5.6)
POCT GLUCOSE: 111 MG/DL (ref 70–110)
POCT GLUCOSE: 91 MG/DL (ref 70–110)
POTASSIUM SERPL-SCNC: 4.2 MMOL/L (ref 3.5–5.1)
PROT SERPL-MCNC: 6.7 G/DL (ref 6–8.4)
SODIUM SERPL-SCNC: 136 MMOL/L (ref 136–145)

## 2023-07-20 PROCEDURE — 25000003 PHARM REV CODE 250: Performed by: NURSE PRACTITIONER

## 2023-07-20 PROCEDURE — 63600175 PHARM REV CODE 636 W HCPCS: Performed by: NURSE PRACTITIONER

## 2023-07-20 PROCEDURE — 96376 TX/PRO/DX INJ SAME DRUG ADON: CPT

## 2023-07-20 PROCEDURE — G0378 HOSPITAL OBSERVATION PER HR: HCPCS

## 2023-07-20 PROCEDURE — 36415 COLL VENOUS BLD VENIPUNCTURE: CPT | Performed by: NURSE PRACTITIONER

## 2023-07-20 PROCEDURE — 99215 PR OFFICE/OUTPT VISIT, EST, LEVL V, 40-54 MIN: ICD-10-PCS | Mod: ,,, | Performed by: NURSE PRACTITIONER

## 2023-07-20 PROCEDURE — 99215 OFFICE O/P EST HI 40 MIN: CPT | Mod: ,,, | Performed by: NURSE PRACTITIONER

## 2023-07-20 PROCEDURE — 80053 COMPREHEN METABOLIC PANEL: CPT | Performed by: NURSE PRACTITIONER

## 2023-07-20 RX ORDER — ONDANSETRON 4 MG/1
8 TABLET, ORALLY DISINTEGRATING ORAL 2 TIMES DAILY
Qty: 20 TABLET | Refills: 1 | Status: SHIPPED | OUTPATIENT
Start: 2023-07-20

## 2023-07-20 RX ORDER — HYDROMORPHONE HYDROCHLORIDE 2 MG/1
2 TABLET ORAL EVERY 6 HOURS PRN
Qty: 28 TABLET | Refills: 0
Start: 2023-07-20

## 2023-07-20 RX ORDER — HYDROMORPHONE HYDROCHLORIDE 2 MG/1
2 TABLET ORAL EVERY 6 HOURS PRN
Qty: 28 TABLET | Refills: 0 | Status: SHIPPED | OUTPATIENT
Start: 2023-07-20 | End: 2023-07-20 | Stop reason: SDUPTHER

## 2023-07-20 RX ORDER — CYCLOBENZAPRINE HCL 5 MG
5 TABLET ORAL 2 TIMES DAILY
Qty: 60 TABLET | Refills: 1 | Status: SHIPPED | OUTPATIENT
Start: 2023-07-20

## 2023-07-20 RX ORDER — CYCLOBENZAPRINE HCL 5 MG
5 TABLET ORAL 2 TIMES DAILY
Qty: 60 TABLET | Refills: 0 | Status: SHIPPED | OUTPATIENT
Start: 2023-07-20 | End: 2023-07-20 | Stop reason: SDUPTHER

## 2023-07-20 RX ORDER — MORPHINE SULFATE 15 MG/1
45 TABLET, FILM COATED, EXTENDED RELEASE ORAL 2 TIMES DAILY
Qty: 60 TABLET | Refills: 0 | Status: SHIPPED | OUTPATIENT
Start: 2023-07-20 | End: 2023-07-20 | Stop reason: SDUPTHER

## 2023-07-20 RX ORDER — LOSARTAN POTASSIUM 25 MG/1
100 TABLET ORAL DAILY
Status: DISCONTINUED | OUTPATIENT
Start: 2023-07-20 | End: 2023-07-20 | Stop reason: HOSPADM

## 2023-07-20 RX ORDER — HYDROMORPHONE HYDROCHLORIDE 2 MG/1
2 TABLET ORAL ONCE
Status: COMPLETED | OUTPATIENT
Start: 2023-07-20 | End: 2023-07-20

## 2023-07-20 RX ORDER — MORPHINE SULFATE 15 MG/1
45 TABLET, FILM COATED, EXTENDED RELEASE ORAL 2 TIMES DAILY
Qty: 60 TABLET | Refills: 0 | Status: SHIPPED | OUTPATIENT
Start: 2023-07-20

## 2023-07-20 RX ADMIN — LOSARTAN POTASSIUM 100 MG: 25 TABLET, FILM COATED ORAL at 09:07

## 2023-07-20 RX ADMIN — HYDROMORPHONE HYDROCHLORIDE 2 MG: 1 INJECTION, SOLUTION INTRAMUSCULAR; INTRAVENOUS; SUBCUTANEOUS at 09:07

## 2023-07-20 RX ADMIN — HYDROMORPHONE HYDROCHLORIDE 2 MG: 1 INJECTION, SOLUTION INTRAMUSCULAR; INTRAVENOUS; SUBCUTANEOUS at 04:07

## 2023-07-20 RX ADMIN — APIXABAN 5 MG: 5 TABLET, FILM COATED ORAL at 09:07

## 2023-07-20 RX ADMIN — MORPHINE SULFATE 45 MG: 15 TABLET, EXTENDED RELEASE ORAL at 09:07

## 2023-07-20 RX ADMIN — HYDROMORPHONE HYDROCHLORIDE 2 MG: 2 TABLET ORAL at 03:07

## 2023-07-20 RX ADMIN — CYCLOBENZAPRINE HYDROCHLORIDE 5 MG: 5 TABLET, FILM COATED ORAL at 09:07

## 2023-07-20 NOTE — NURSING
Reviewed AVS discharge instructions with patient and wife Isabela. Reviewed medication list, diet, FU visits, A1C. All questions answered. Wife, Isabela verbalizes understanding.

## 2023-07-20 NOTE — DISCHARGE SUMMARY
"[facility] Medicine  Discharge Summary      Patient Name: Juan Gillespie  MRN: [id number]  DANDY: [id number]  Patient Class: OP- Observation  Admission Date: 7/19/2023  Hospital Length of Stay: 0 days  Discharge Date and Time:  07/20/2023 3:28 PM  Attending Physician: Robert Zhong MD   Discharging Provider: Loulou Wells NP  Primary Care Provider: Soledad Irving MD    Primary Care Team: LOULOU WELLS    HPI:   Juan Gillespie is a 59 yo male with hypertension, former smoker, DMT2, SCLC stage 4 dx 3/3022 with mets to bone/spine/brain/liver, hypercalcemia on zometa, PE 5/31/23 on eliquis, chemo related neutropenia now improved   who was sent to ED from Oncologist Dr. Irving office for intractable pain. Patient office visit plan to hold chemotherapy due to elevated LFT's.  Recent pain regimen switch from Dilaudid 2 mg  to Morphine 30 mg BID 6 days ago and continues with intractable pain. He have not slept in days. He would nap for 2 hrs then wake up in pain "all over." Pain more so in whole back, thighs and shoulders. No additional treatment at home for his pain. Slightest movement like positioning in bed worsen pain. No chest pain, abdominal pain , dizziness, nausea or vomiting.     In ED, worsen LFT's from last month. INR 1.1.     5/4/2023 MRI brain showed metastatic disease progression    Per Dr. Irving clinic note today 7/19/2023  Extensive stage small cell lung cancer with brain mets  -Initially diagnosed with extensive stage small cell lung cancer in 03/2022 who was treated with carbo/etop/atezolizumab from 04/05/2022 - 06/14/22 with partial response. He subsequently completed consolidation thoracic RT 8/4/22 - 8/18/22, and he was on maintenance atezolizumab.   -10/18/22 showing progression of disease in lungs and liver.  Asymptomatic.  -Dr. Sosa discussed with him plan for topotecan and pt was consented  -Pt tolerated cycle 1 well but delayed C2 due to being out of town  -Scans in December 2022 " largely stable with exception of  Right middle lobe lung mass.  3.4 cm, still demonstrated widespread disease in bones  -Admitted for pain control and palliative XRT on 12/07/22  -Pt had been off of tx in Nov 2022 due to going on vacation, as symptoms initially improved drastically after restarting tx  Plan 07/19/23  -Hold chemo today due to elevated LFTs  -Admit for pain control and pt is considering hospice as it does not appear that he is responding to tx and LFT rise is preventing tx at present time  -RTC in 1 week for MD follow up      * No surgery found *      Hospital Course:   Admission to observation for intractable cancer-related pain. Have small cell lung cancer stage IV with Mets to brain liver bone.  Increased home morphine 30 a 5 mg b.i.d. and add Flexeril 5 mg b.i.d. with dilaudid 2 mg IV for breakthrough with improvement. Patient will follow-up with Dr. Irving regarding chemotherapy continuation and follow up LFT's. Seen by Palliative Care team and patient does not want hospice at this time. Patient remains full code. Discharge home on flexeril 5 mg bid and morphine 45 mg BID (both Rx filled at [facility] prior to discharge) and dilaudid 2 mg q 6 hrs for breakthrough (already have dilaudid at home). Patient stable for discharge home with close follow up Oncologist and Palliative Care.        Goals of Care Treatment Preferences:  Code Status: Full Code        Consults:   Consults (From admission, onward)        Status Ordering Provider     Inpatient consult to Palliative Care  Once        Provider:  Pippa Noel NP    Completed STEPHEN WELLS          No new Assessment & Plan notes have been filed under this hospital service since the last note was generated.  Service: Hospital Medicine    Final Active Diagnoses:    Diagnosis Date Noted POA    PRINCIPAL PROBLEM:  Intractable pain [R52] 07/19/2023 Yes    Small cell lung cancer [C34.90] 03/30/2022 Yes     Chronic    Elevated LFTs [R79.89]  07/19/2023 Yes    Pulmonary embolism [I26.99] 07/19/2023 Yes    Goals of care, counseling/discussion [Z71.89] 07/19/2023 Not Applicable    HTN (hypertension) [I10] 12/12/2022 Yes    Type 2 diabetes mellitus, without long-term current use of insulin [E11.9] 03/20/2022 Yes      Problems Resolved During this Admission:       Discharged Condition: stable    Disposition: Home or Self Care    Follow Up:   Follow-up Information     Pippa Noel NP Follow up.    Specialty: Palliative Medicine  Contact information:  [address] [place] [id number] [phone]             Soledad Irving MD Follow up.    Specialty: Hematology and Oncology  Contact information:  [address]  [address]  [facility] [address] [phone]                       Patient Instructions:      Ambulatory referral/consult to [facility] - Medical & Palliative   Standing Status: Future   Referral Priority: Routine Referral Type: Consultation   Referral Reason: Specialty Services Required   Number of Visits Requested: 1     Ambulatory referral/consult to [facility] Pall[facility]   Standing Status: Future   Referral Priority: Routine Referral Type: Consultation   Referral Reason: Specialty Services Required   Number of Visits Requested: 1     Diet Adult Regular     Notify your health care provider if you experience any of the following:  temperature >100.4     Notify your health care provider if you experience any of the following:  difficulty breathing or increased cough     Notify your health care provider if you experience any of the following:  increased confusion or weakness     Activity as tolerated       Significant Diagnostic Studies: N/A    Pending Diagnostic Studies:     None         Medications:  Reconciled Home Medications:      Medication List      START taking these medications    cyclobenzaprine 5 MG tablet  Commonly known as: FLEXERIL  Take 1 tablet (5 mg total) by mouth 2 (two) times a day.      HYDROmorphone 2 MG tablet  Commonly known as: DILAUDID  Take 1 tablet (2 mg total) by mouth every 6 (six) hours as needed for Pain (severe pain). Home medication     ondansetron 4 MG Tbdl  Commonly known as: ZOFRAN-ODT  Dissolve 2 tablets (8 mg total) by mouth 2 (two) times daily.        CHANGE how you take these medications    morphine 15 MG 12 hr tablet  Commonly known as: MS CONTIN  Take 3 tablets (45 mg total) by mouth 2 (two) times daily.  What changed:   · medication strength  · how much to take  · how to take this  · when to take this  · additional instructions     ondansetron 8 MG tablet  Commonly known as: ZOFRAN  Take 1 tablet (8 mg total) by mouth every 8 (eight) hours as needed for Nausea.  What changed: Another medication with the same name was removed. Continue taking this medication, and follow the directions you see here.        CONTINUE taking these medications    (MAGIC MOUTHWASH) 1:1:1 BENADRYL 12.5MG/5ML LIQ, ALUMINUM & MAGNESIUM  Swish and spit 5 mLs every 4 (four) hours as needed. for mouth sores     albuterol 1.25 mg/3 mL Nebu  Commonly known as: ACCUNEB  Use 1 vial (1.25 mg total) by nebulization 3 (three) times daily as needed (shortness of breath). Rescue     allopurinoL 300 MG tablet  Commonly known as: ZYLOPRIM  Take 1 tablet (300 mg total) by mouth once daily.     apixaban 5 mg Tab  Commonly known as: ELIQUIS  Take 1 tablet (5 mg total) by mouth 2 (two) times daily.     benzonatate 100 MG capsule  Commonly known as: TESSALON  Take 1 capsule (100 mg total) by mouth 3 (three) times daily as needed for Cough.     cetirizine 10 MG tablet  Commonly known as: ZYRTEC  Take 1 tablet by mouth as needed.     CYANOCOBALAMIN (VITAMIN B-12) ORAL  Take by mouth.     cyproheptadine 4 mg tablet  Commonly known as: PERIACTIN  Take 1 tablet (4 mg total) by mouth 3 (three) times daily as needed (appetite).     dexAMETHasone 4 MG Tab  Commonly known as: DECADRON  Take 1 tablet (4 mg total) by mouth 2  (two) times daily.     FARXIGA 10 mg tablet  Generic drug: dapagliflozin propanediol  Take 1 tablet by mouth once daily.     guaiFENesin 600 mg 12 hr tablet  Commonly known as: MUCINEX  Take 1,200 mg by mouth 2 (two) times daily.     insulin aspart U-100 100 unit/mL (3 mL) Inpn pen  Commonly known as: NovoLOG  Inject 1 Units into the skin as needed. Inject into skin if over 200.     levoFLOXacin 500 MG tablet  Commonly known as: LEVAQUIN  Take 1 tablet (500 mg total) by mouth once daily. for 7 days     losartan 100 MG tablet  Commonly known as: COZAAR  Take 1 tablet (100 mg total) by mouth once daily.     MAGNESIUM ORAL  Take 1 capsule by mouth once daily.     naloxone 4 mg/actuation Spry  Commonly known as: NARCAN  4mg by nasal route as needed for opioid overdose; may repeat every 2-3 minutes in alternating nostrils until medical help arrives. Call 911     POTASSIUM ORAL  Take 1 tablet by mouth once daily.     senna-docusate 8.6-50 mg 8.6-50 mg per tablet  Commonly known as: PERICOLACE  Take 1 tablet by mouth daily as needed for Constipation.     SYMBICORT 160-4.5 mcg/actuation Hfaa  Generic drug: budesonide-formoterol 160-4.5 mcg  Inhale into the lungs.     TRUE METRIX GLUCOSE TEST STRIP Strp  Generic drug: blood sugar diagnostic     TRUEPLUS LANCETS 33 gauge Misc  Generic drug: lancets     VITAMIN C ORAL  Take 1 tablet by mouth once daily.     VITAMIN D3 ORAL  Take 1 tablet by mouth once daily.     ZINC ORAL  Take by mouth.        STOP taking these medications    OZEMPIC 0.25 mg or 0.5 mg(2 mg/1.5 mL) pen injector  Generic drug: semaglutide     valACYclovir 1000 MG tablet  Commonly known as: VALTREX            Indwelling Lines/Drains at time of discharge:   Lines/Drains/Airways     Central Venous Catheter Line  Duration                PowerPort A Cath Single Lumen 04/01/22 1048 right atrial 475 days                Time spent on the discharge of patient: 35 minutes         Loulou Morel NP  Department of  [facility] - Telemetry

## 2023-07-20 NOTE — PLAN OF CARE
07/20/23 0842   Final Note   Assessment Type Final Discharge Note   Anticipated Discharge Disposition Home   What phone number can be called within the next 1-3 days to see how you are doing after discharge? [phone]   Hospital Resources/Appts/Education Provided Appointments scheduled and added to AVS;Appointments scheduled by Navigator/Coordinator   Post-Acute Status   Discharge Delays (!) Waiting for Provider to Speak to Patient  (Pending Palliative visit.)     SW secure chat nurse Latasha: Patient cleared from case management standpoint once palliative visits patient.

## 2023-07-20 NOTE — PLAN OF CARE
Problem: Adult Inpatient Plan of Care  Goal: Plan of Care Review  Outcome: Met  Goal: Patient-Specific Goal (Individualized)  Outcome: Met  Goal: Absence of Hospital-Acquired Illness or Injury  Outcome: Met  Goal: Optimal Comfort and Wellbeing  Outcome: Met  Goal: Readiness for Transition of Care  Outcome: Met     Problem: Coping Ineffective  Goal: Effective Coping  Outcome: Met     Problem: Pain Acute  Goal: Acceptable Pain Control and Functional Ability  Outcome: Met     Problem: Diabetes Comorbidity  Goal: Blood Glucose Level Within Targeted Range  Outcome: Met

## 2023-07-20 NOTE — HOSPITAL COURSE
Admission to observation for intractable cancer-related pain. Have small cell lung cancer stage IV with Mets to brain liver bone.  Increased home morphine 30 a 5 mg b.i.d. and add Flexeril 5 mg b.i.d. with dilaudid 2 mg IV for breakthrough with improvement. Patient will follow-up with Dr. Irving regarding chemotherapy continuation and follow up LFT's. Discharge home on flexeril 5 mg bid and morphine 45 mg BID (both Rx filled at [facility] prior to discharge) and dilaudid 2 mg q 6 hrs for breakthrough (already have dilaudid at home). Patient stable for discharge home with close follow up Oncologist and Palliative Care.

## 2023-07-20 NOTE — CONSULTS
"[facility] - Telemetry  Palliative Medicine  Consult Note    Patient Name: Juan Gillespie  MRN: [id number]  Admission Date: 7/19/2023  Hospital Length of Stay: 0 days  Code Status: Full Code   Attending Provider: Robert Zhong MD  Consulting Provider: Pippa Noel NP  Primary Care Physician: Soledad Irving MD  Principal Problem:Intractable pain    Patient information was obtained from patient, spouse/SO, past medical records, ER records and primary team.      Inpatient consult to Palliative Care  Consult performed by: Pippa Noel NP  Consult ordered by: Loulou Morel NP  Reason for consult: pain control/GOC        Assessment/Plan:   Palliative encounter:  Advance Care Planning   -Palliative consult received for pain control  -Patient was scheduled to see me in Palliative outpatient clinic but cancelled x 2.   -Discussed with Dr Irving oncology who saw the patient in clinic and sent him over the the ED for pain control and increased LFTs.   -he is in OBS currently; discussed with  JOE Jamil    -at time of consult patient in bed and reports he has some pain but that he did better during the night with the increased MS contin and the IV hydromorphone 2 mg x 1. All of a sudden he moans and draws his legs up in severe pain. -His spouse at bedside reports " the pain comes on suddenly and goes from 0-20 in seconds."  -Asked nurse to give IV hydromorphone. Patient was amendable to increase in 12 hour pain medication if this continues to happen throughout the day. If he does not require another dose of IV Hydromorphone today he can be discharged and f/u in clinic.  -we did discuss his current clinical condition and that he will have chemo on hold for now but that I was concerned that he will no longer be a candidate for any tx with continued increase in disease progression.   - His spouse asked about hospice as an option for symptom mgmt and QOL and he states he does not want to talk about QOL and dying. I did " discuss what hospice could offer him at this time in his illness but he chooses to continue to see oncology outpatient and will f/u with me for pain control.       Intractable pain see above  -continue MS contin 45 mg one tablet q 12 hours  -continue dilaudid 2mg IV q 4 hours prn breakthrough pain   -will order Dilaudid 2 mg PO for discharge if he continues to have good pain control  -f/u with Palliative outpatient clinic     Addendum:  -spoke to  JOE Jamil and patient is now sitting up on side of bed able to eat lunch        Extensive stage small cell lung cancer with brain mets  Per Dr. Irving clinic note today 7/19/2023  -Initially diagnosed with extensive stage small cell lung cancer in 03/2022 who was treated with carbo/etop/atezolizumab from 04/05/2022 - 06/14/22 with partial response. He subsequently completed consolidation thoracic RT 8/4/22 - 8/18/22, and he was on maintenance atezolizumab.   -10/18/22 showing progression of disease in lungs and liver.  Asymptomatic.  -Dr. Sosa discussed with him plan for topotecan and pt was consented  -Pt tolerated cycle 1 well but delayed C2 due to being out of town  -Scans in December 2022 largely stable with exception of  Right middle lobe lung mass.  3.4 cm, still demonstrated widespread disease in bones  -Admitted for pain control and palliative XRT on 12/07/22  -Pt had been off of tx in Nov 2022 due to going on vacation, as symptoms initially improved drastically after restarting tx  Plan 07/19/23  -Hold chemo today due to elevated LFTs  -Admit for pain control and pt is considering hospice as it does not appear that he is responding to tx and LFT rise is preventing tx at present time  -RTC in 1 week for MD follow up                          Thank you for your consult.     Subjective:        HPI: Juan Gillespie is a 59 yo male with hypertension, former smoker, DMT2, SCLC stage 4 dx 3/3022 with mets to bone/spine/brain/liver, hypercalcemia on zometa, PE 5/31/23 on eliquis,  "chemo related neutropenia now improved   who was sent to ED from Oncologist Dr. Irving office for intractable pain. Patient office visit plan to hold chemotherapy due to elevated LFT's.  Recent pain regimen switch from Dilaudid 2 mg  to Morphine 30 mg BID 6 days ago and continues with intractable pain. He have not slept in days. He would nap for 2 hrs then wake up in pain "all over." Pain more so in whole back, thighs and shoulders. No additional treatment at home for his pain. Slightest movement like positioning in bed worsen pain. No chest pain, abdominal pain , dizziness, nausea or vomiting.      In ED, worsen LFT's from last month. INR 1.1.      2023 MRI brain showed metastatic disease progression         Hospital Course:  No notes on file        Past Medical History:   Diagnosis Date    Cancer 2022    Small Cell -Stage 4 Lung Cancer    Diabetes mellitus, type 2     Gout, unspecified     Hypertension        Past Surgical History:   Procedure Laterality Date    ENDOBRONCHIAL ULTRASOUND N/A 2022    Procedure: ENDOBRONCHIAL ULTRASOUND (EBUS);  Surgeon: Kristin Samuels MD;  Location: [facility] OR [address] [place] [address];  Service: Endoscopy;  Laterality: N/A;    INSERTION OF TUNNELED CENTRAL VENOUS CATHETER (CVC) WITH SUBCUTANEOUS PORT Right 2022    KNEE SURGERY         Review of patient's allergies indicates:  No Known Allergies    Medications:  Continuous Infusions:  Scheduled Meds:   apixaban  5 mg Oral BID    cyclobenzaprine  5 mg Oral BID    losartan  100 mg Oral Daily    morphine  45 mg Oral Q12H     PRN Meds:dextrose 10%, dextrose 10%, glucagon (human recombinant), glucose, glucose, HYDROmorphone, insulin aspart U-100, sodium chloride 0.9%    Family History       Problem Relation (Age of Onset)    Diabetes Mellitus Mother    Lung cancer Father    Pacemaker/defibrilator Father          Tobacco Use    Smoking status: Former     Types: Cigarettes     Quit date:      Years since quittin.5    Smokeless " tobacco: Former    Tobacco comments:     stop smoking 26 years ago   Substance and Sexual Activity    Alcohol use: No    Drug use: No    Sexual activity: Yes     Partners: Female       Review of Systems   Constitutional:  Positive for activity change and appetite change.   Respiratory:  Negative for shortness of breath.    Cardiovascular:  Negative for chest pain and leg swelling.   Gastrointestinal:  Negative for abdominal pain.   Musculoskeletal:  Positive for back pain and myalgias.   Neurological:  Positive for weakness.   Objective:     Vital Signs (Most Recent):  Temp: 97.6 °F (36.4 °C) (07/20/23 0741)  Pulse: 85 (07/20/23 0741)  Resp: 18 (07/20/23 0954)  BP: (!) 158/88 (07/20/23 0954)  SpO2: 98 % (07/20/23 0741) Vital Signs (24h Range):  Temp:  [97.6 °F (36.4 °C)-99.2 °F (37.3 °C)] 97.6 °F (36.4 °C)  Pulse:  [85-99] 85  Resp:  [14-20] 18  SpO2:  [93 %-98 %] 98 %  BP: (126-169)/(71-96) 158/88     Weight: 98 kg (216 lb)  Body mass index is 33.83 kg/m².       Physical Exam  Constitutional:       General: He is not in acute distress.     Appearance: He is obese. He is ill-appearing. He is not toxic-appearing or diaphoretic.   HENT:      Head: Normocephalic and atraumatic.      Right Ear: External ear normal.      Left Ear: External ear normal.      Nose: Nose normal.   Eyes:      General:         Right eye: No discharge.         Left eye: No discharge.   Cardiovascular:      Rate and Rhythm: Tachycardia present.   Pulmonary:      Effort: Pulmonary effort is normal.   Abdominal:      General: There is no distension.   Musculoskeletal:      Right lower leg: No edema.      Left lower leg: No edema.   Skin:     General: Skin is warm and dry.   Neurological:      Mental Status: He is alert and oriented to person, place, and time.      Motor: Weakness present.   Psychiatric:         Behavior: Behavior is agitated.      Comments: Patient having severe pain              Advance Care Planning   Advance Directives:   Do  Not Resuscitate Status: No      Decision Making:  Patient answered questions and Family answered questions  Goals of Care: The patient and spouse endorses that what is most important right now is to focus on symptom/pain control and comfort and QOL     Accordingly, we have decided that the best plan to meet the patient's goals includes continuing with care       Significant Labs: All pertinent labs within the past 24 hours have been reviewed.  CBC:   Recent Labs   Lab 07/19/23  0905   WBC 9.70   HGB 13.5*   HCT 41.5   MCV 90        BMP:  Recent Labs   Lab 07/20/23  0455         K 4.2      CO2 24   BUN 15   CREATININE 0.8   CALCIUM 8.7     LFT:  Lab Results   Component Value Date    AST 78 (H) 07/20/2023    ALKPHOS 626 (H) 07/20/2023    BILITOT 1.6 (H) 07/20/2023     Albumin:   Albumin   Date Value Ref Range Status   07/20/2023 2.8 (L) 3.5 - 5.2 g/dL Final     Protein:   Total Protein   Date Value Ref Range Status   07/20/2023 6.7 6.0 - 8.4 g/dL Final     Lactic acid:   No results found for: LACTATE    Significant Imaging: none        I spent a total of 50 minutes on the day of the visit. This includes face to face time in discussion of goals of care, symptom assessment, coordination of care and emotional support.  This also includes non-face to face time preparing to see the patient (eg, review of tests/imaging), obtaining and/or reviewing separately obtained history, documenting clinical information in the electronic or other health record, independently interpreting results and communicating results to the patient/family/caregiver, or care coordinator.    Pippa Noel, NP  Palliative Medicine  [facility]

## 2023-07-20 NOTE — NURSING
Bedside Report given to night nurse OUMAR Benitez. Walking rounds completed. Visualized and assessed patient NAD noted. Safety precautions maintained and call light within reach.     Chart check completed.

## 2023-07-20 NOTE — SUBJECTIVE & OBJECTIVE
Past Medical History:   Diagnosis Date    Cancer 2022    Small Cell -Stage 4 Lung Cancer    Diabetes mellitus, type 2     Gout, unspecified     Hypertension        Past Surgical History:   Procedure Laterality Date    ENDOBRONCHIAL ULTRASOUND N/A 2022    Procedure: ENDOBRONCHIAL ULTRASOUND (EBUS);  Surgeon: Kristin Samuels MD;  Location: [facility] OR [address] [place] [address];  Service: Endoscopy;  Laterality: N/A;    INSERTION OF TUNNELED CENTRAL VENOUS CATHETER (CVC) WITH SUBCUTANEOUS PORT Right 2022    KNEE SURGERY         Review of patient's allergies indicates:  No Known Allergies    Medications:  Continuous Infusions:  Scheduled Meds:   apixaban  5 mg Oral BID    cyclobenzaprine  5 mg Oral BID    losartan  100 mg Oral Daily    morphine  45 mg Oral Q12H     PRN Meds:dextrose 10%, dextrose 10%, glucagon (human recombinant), glucose, glucose, HYDROmorphone, insulin aspart U-100, sodium chloride 0.9%    Family History       Problem Relation (Age of Onset)    Diabetes Mellitus Mother    Lung cancer Father    Pacemaker/defibrilator Father          Tobacco Use    Smoking status: Former     Types: Cigarettes     Quit date:      Years since quittin.5    Smokeless tobacco: Former    Tobacco comments:     stop smoking 26 years ago   Substance and Sexual Activity    Alcohol use: No    Drug use: No    Sexual activity: Yes     Partners: Female       Review of Systems   Constitutional:  Positive for activity change and appetite change.   Respiratory:  Negative for shortness of breath.    Cardiovascular:  Negative for chest pain and leg swelling.   Gastrointestinal:  Negative for abdominal pain.   Musculoskeletal:  Positive for back pain and myalgias.   Neurological:  Positive for weakness.   Objective:     Vital Signs (Most Recent):  Temp: 97.6 °F (36.4 °C) (23)  Pulse: 85 (23)  Resp: 18 (23)  BP: (!) 158/88 (23)  SpO2: 98 % (23) Vital Signs (24h Range):  Temp:   [97.6 °F (36.4 °C)-99.2 °F (37.3 °C)] 97.6 °F (36.4 °C)  Pulse:  [85-99] 85  Resp:  [14-20] 18  SpO2:  [93 %-98 %] 98 %  BP: (126-169)/(71-96) 158/88     Weight: 98 kg (216 lb)  Body mass index is 33.83 kg/m².       Physical Exam  Constitutional:       General: He is not in acute distress.     Appearance: He is obese. He is ill-appearing. He is not toxic-appearing or diaphoretic.   HENT:      Head: Normocephalic and atraumatic.      Right Ear: External ear normal.      Left Ear: External ear normal.      Nose: Nose normal.   Eyes:      General:         Right eye: No discharge.         Left eye: No discharge.   Cardiovascular:      Rate and Rhythm: Tachycardia present.   Pulmonary:      Effort: Pulmonary effort is normal.   Abdominal:      General: There is no distension.   Musculoskeletal:      Right lower leg: No edema.      Left lower leg: No edema.   Skin:     General: Skin is warm and dry.   Neurological:      Mental Status: He is alert and oriented to person, place, and time.      Motor: Weakness present.   Psychiatric:         Behavior: Behavior is agitated.      Comments: Patient having severe pain              Advance Care Planning   Advance Directives:   Do Not Resuscitate Status: No      Decision Making:  Patient answered questions and Family answered questions  Goals of Care: The patient and spouse endorses that what is most important right now is to focus on symptom/pain control and comfort and QOL     Accordingly, we have decided that the best plan to meet the patient's goals includes continuing with care       Significant Labs: All pertinent labs within the past 24 hours have been reviewed.  CBC:   Recent Labs   Lab 07/19/23  0905   WBC 9.70   HGB 13.5*   HCT 41.5   MCV 90        BMP:  Recent Labs   Lab 07/20/23  0455         K 4.2      CO2 24   BUN 15   CREATININE 0.8   CALCIUM 8.7     LFT:  Lab Results   Component Value Date    AST 78 (H) 07/20/2023    ALKPHOS 626 (H)  07/20/2023    BILITOT 1.6 (H) 07/20/2023     Albumin:   Albumin   Date Value Ref Range Status   07/20/2023 2.8 (L) 3.5 - 5.2 g/dL Final     Protein:   Total Protein   Date Value Ref Range Status   07/20/2023 6.7 6.0 - 8.4 g/dL Final     Lactic acid:   No results found for: LACTATE    Significant Imaging: none

## 2023-07-20 NOTE — NURSING
[facility] [place]  Nurses Note -- 4 Eyes      7/19/2023       Skin assessed on: Q Shift      [x] No Pressure Injuries Present    [x]Prevention Measures Documented    [] Yes LDA  for Pressure Injury Previously documented     [] Yes New Pressure Injury Discovered   [] LDA for New Pressure Injury Added      Attending RN:  LELIA BLAKELY, RN     Second RN:  Latasha KEY

## 2023-07-20 NOTE — HPI
"HPI: Juan Gillespie is a 59 yo male with hypertension, former smoker, DMT2, SCLC stage 4 dx 3/3022 with mets to bone/spine/brain/liver, hypercalcemia on zometa, PE 5/31/23 on eliquis, chemo related neutropenia now improved   who was sent to ED from Oncologist Dr. Irving office for intractable pain. Patient office visit plan to hold chemotherapy due to elevated LFT's.  Recent pain regimen switch from Dilaudid 2 mg  to Morphine 30 mg BID 6 days ago and continues with intractable pain. He have not slept in days. He would nap for 2 hrs then wake up in pain "all over." Pain more so in whole back, thighs and shoulders. No additional treatment at home for his pain. Slightest movement like positioning in bed worsen pain. No chest pain, abdominal pain , dizziness, nausea or vomiting.      In ED, worsen LFT's from last month. INR 1.1.      5/4/2023 MRI brain showed metastatic disease progression     "

## 2023-07-20 NOTE — NURSING
[facility], [facility]  Nurses Note -- 4 Eyes      7/20/2023       Skin assessed on: Q Shift      [x] No Pressure Injuries Present    [x]Prevention Measures Documented    [] Yes LDA  for Pressure Injury Previously documented     [] Yes New Pressure Injury Discovered   [] LDA for New Pressure Injury Added      Attending RN:  Latasha Glover LPN     Second RN:  Eli CollierRN

## 2023-07-21 ENCOUNTER — PES CALL (OUTPATIENT)
Dept: ADMINISTRATIVE | Facility: CLINIC | Age: 58
End: 2023-07-21
Payer: MEDICAID

## 2023-07-24 ENCOUNTER — INFUSION (OUTPATIENT)
Dept: INFUSION THERAPY | Facility: HOSPITAL | Age: 58
End: 2023-07-24
Attending: STUDENT IN AN ORGANIZED HEALTH CARE EDUCATION/TRAINING PROGRAM
Payer: MEDICAID

## 2023-07-24 VITALS
DIASTOLIC BLOOD PRESSURE: 72 MMHG | TEMPERATURE: 98 F | SYSTOLIC BLOOD PRESSURE: 142 MMHG | RESPIRATION RATE: 18 BRPM | HEART RATE: 102 BPM | OXYGEN SATURATION: 95 %

## 2023-07-24 DIAGNOSIS — C34.90 SMALL CELL LUNG CANCER: ICD-10-CM

## 2023-07-24 DIAGNOSIS — C79.31 SECONDARY MALIGNANT NEOPLASM OF BRAIN: ICD-10-CM

## 2023-07-24 DIAGNOSIS — E11.9 TYPE 2 DIABETES MELLITUS, WITHOUT LONG-TERM CURRENT USE OF INSULIN: Primary | ICD-10-CM

## 2023-07-24 LAB
ALBUMIN SERPL BCP-MCNC: 2.7 G/DL (ref 3.5–5.2)
ALP SERPL-CCNC: 791 U/L (ref 55–135)
ALT SERPL W/O P-5'-P-CCNC: 176 U/L (ref 10–44)
ANION GAP SERPL CALC-SCNC: 11 MMOL/L (ref 8–16)
AST SERPL-CCNC: 111 U/L (ref 10–40)
BASOPHILS # BLD AUTO: 0.06 K/UL (ref 0–0.2)
BASOPHILS NFR BLD: 0.5 % (ref 0–1.9)
BILIRUB SERPL-MCNC: 1.4 MG/DL (ref 0.1–1)
BUN SERPL-MCNC: 21 MG/DL (ref 6–20)
CALCIUM SERPL-MCNC: 9.2 MG/DL (ref 8.7–10.5)
CHLORIDE SERPL-SCNC: 102 MMOL/L (ref 95–110)
CO2 SERPL-SCNC: 26 MMOL/L (ref 23–29)
CREAT SERPL-MCNC: 0.9 MG/DL (ref 0.5–1.4)
DIFFERENTIAL METHOD: ABNORMAL
EOSINOPHIL # BLD AUTO: 0 K/UL (ref 0–0.5)
EOSINOPHIL NFR BLD: 0.1 % (ref 0–8)
ERYTHROCYTE [DISTWIDTH] IN BLOOD BY AUTOMATED COUNT: 17.6 % (ref 11.5–14.5)
EST. GFR  (NO RACE VARIABLE): >60 ML/MIN/1.73 M^2
GLUCOSE SERPL-MCNC: 142 MG/DL (ref 70–110)
HCT VFR BLD AUTO: 38.3 % (ref 40–54)
HGB BLD-MCNC: 12.1 G/DL (ref 14–18)
IMM GRANULOCYTES # BLD AUTO: 0.38 K/UL (ref 0–0.04)
IMM GRANULOCYTES NFR BLD AUTO: 3.2 % (ref 0–0.5)
LYMPHOCYTES # BLD AUTO: 0.3 K/UL (ref 1–4.8)
LYMPHOCYTES NFR BLD: 2.8 % (ref 18–48)
MCH RBC QN AUTO: 28.9 PG (ref 27–31)
MCHC RBC AUTO-ENTMCNC: 31.6 G/DL (ref 32–36)
MCV RBC AUTO: 91 FL (ref 82–98)
MONOCYTES # BLD AUTO: 0.6 K/UL (ref 0.3–1)
MONOCYTES NFR BLD: 5.1 % (ref 4–15)
NEUTROPHILS # BLD AUTO: 10.4 K/UL (ref 1.8–7.7)
NEUTROPHILS NFR BLD: 88.3 % (ref 38–73)
NRBC BLD-RTO: 0 /100 WBC
PLATELET # BLD AUTO: 182 K/UL (ref 150–450)
PMV BLD AUTO: 10 FL (ref 9.2–12.9)
POTASSIUM SERPL-SCNC: 4 MMOL/L (ref 3.5–5.1)
PROT SERPL-MCNC: 6.6 G/DL (ref 6–8.4)
RBC # BLD AUTO: 4.19 M/UL (ref 4.6–6.2)
SODIUM SERPL-SCNC: 139 MMOL/L (ref 136–145)
WBC # BLD AUTO: 11.73 K/UL (ref 3.9–12.7)

## 2023-07-24 PROCEDURE — 80053 COMPREHEN METABOLIC PANEL: CPT | Performed by: STUDENT IN AN ORGANIZED HEALTH CARE EDUCATION/TRAINING PROGRAM

## 2023-07-24 PROCEDURE — 25000003 PHARM REV CODE 250: Performed by: STUDENT IN AN ORGANIZED HEALTH CARE EDUCATION/TRAINING PROGRAM

## 2023-07-24 PROCEDURE — 96360 HYDRATION IV INFUSION INIT: CPT

## 2023-07-24 PROCEDURE — 63600175 PHARM REV CODE 636 W HCPCS: Performed by: STUDENT IN AN ORGANIZED HEALTH CARE EDUCATION/TRAINING PROGRAM

## 2023-07-24 PROCEDURE — 85025 COMPLETE CBC W/AUTO DIFF WBC: CPT | Performed by: STUDENT IN AN ORGANIZED HEALTH CARE EDUCATION/TRAINING PROGRAM

## 2023-07-24 PROCEDURE — 96372 THER/PROPH/DIAG INJ SC/IM: CPT

## 2023-07-24 PROCEDURE — A4216 STERILE WATER/SALINE, 10 ML: HCPCS | Performed by: STUDENT IN AN ORGANIZED HEALTH CARE EDUCATION/TRAINING PROGRAM

## 2023-07-24 RX ORDER — CYANOCOBALAMIN 1000 UG/ML
1000 INJECTION, SOLUTION INTRAMUSCULAR; SUBCUTANEOUS
OUTPATIENT
Start: 2023-07-24

## 2023-07-24 RX ORDER — CYANOCOBALAMIN 1000 UG/ML
1000 INJECTION, SOLUTION INTRAMUSCULAR; SUBCUTANEOUS
Status: COMPLETED | OUTPATIENT
Start: 2023-07-24 | End: 2023-07-24

## 2023-07-24 RX ORDER — SODIUM CHLORIDE 0.9 % (FLUSH) 0.9 %
10 SYRINGE (ML) INJECTION
Status: CANCELLED | OUTPATIENT
Start: 2024-07-03

## 2023-07-24 RX ORDER — HEPARIN 100 UNIT/ML
500 SYRINGE INTRAVENOUS
Status: CANCELLED | OUTPATIENT
Start: 2024-07-03

## 2023-07-24 RX ORDER — SODIUM CHLORIDE 0.9 % (FLUSH) 0.9 %
10 SYRINGE (ML) INJECTION
Status: DISCONTINUED | OUTPATIENT
Start: 2023-07-24 | End: 2023-07-24 | Stop reason: HOSPADM

## 2023-07-24 RX ORDER — HEPARIN 100 UNIT/ML
500 SYRINGE INTRAVENOUS
Status: DISCONTINUED | OUTPATIENT
Start: 2023-07-24 | End: 2023-07-24 | Stop reason: HOSPADM

## 2023-07-24 RX ADMIN — CYANOCOBALAMIN 1000 MCG: 1000 INJECTION, SOLUTION INTRAMUSCULAR at 12:07

## 2023-07-24 RX ADMIN — SODIUM CHLORIDE 1000 ML: 9 INJECTION, SOLUTION INTRAVENOUS at 12:07

## 2023-07-24 RX ADMIN — Medication 10 ML: at 01:07

## 2023-07-24 RX ADMIN — HEPARIN 500 UNITS: 100 SYRINGE at 01:07

## 2023-07-24 NOTE — PLAN OF CARE
Pt arrived to unit for weekly IVFs and his B12 injection. Recently discharged from the hospital last week. Now on palliative care. Still continues with pain and fatigue along with difficulty getting around. Pt now has Dilaudid pills to take at home. Took a muscle relaxer on his way here. VSS. Plan of care reviewed. IVFs and B12 injection given without issues. Labs for his upcoming follow-up with  drawn. Pt and spouse understand to return next Monday, 7/31 for IVFs. Discharged from unit in wheelchair accompanied by spouse in NAD.

## 2023-07-27 ENCOUNTER — OFFICE VISIT (OUTPATIENT)
Dept: HEMATOLOGY/ONCOLOGY | Facility: CLINIC | Age: 58
End: 2023-07-27
Payer: MEDICAID

## 2023-07-27 VITALS
BODY MASS INDEX: 33.67 KG/M2 | DIASTOLIC BLOOD PRESSURE: 93 MMHG | OXYGEN SATURATION: 98 % | WEIGHT: 214.5 LBS | HEIGHT: 67 IN | SYSTOLIC BLOOD PRESSURE: 157 MMHG | HEART RATE: 106 BPM

## 2023-07-27 DIAGNOSIS — G89.3 CANCER RELATED PAIN: ICD-10-CM

## 2023-07-27 DIAGNOSIS — C79.31 SECONDARY MALIGNANT NEOPLASM OF BRAIN: ICD-10-CM

## 2023-07-27 DIAGNOSIS — C79.51 LUNG CANCER METASTATIC TO BONE: ICD-10-CM

## 2023-07-27 DIAGNOSIS — E11.69 TYPE 2 DIABETES MELLITUS WITH OTHER SPECIFIED COMPLICATION, WITHOUT LONG-TERM CURRENT USE OF INSULIN: ICD-10-CM

## 2023-07-27 DIAGNOSIS — Z09 FOLLOW-UP EXAM: ICD-10-CM

## 2023-07-27 DIAGNOSIS — C77.1 SECONDARY AND UNSPECIFIED MALIGNANT NEOPLASM OF INTRATHORACIC LYMPH NODES: ICD-10-CM

## 2023-07-27 DIAGNOSIS — I10 PRIMARY HYPERTENSION: ICD-10-CM

## 2023-07-27 DIAGNOSIS — T45.1X5A IMMUNODEFICIENCY SECONDARY TO CHEMOTHERAPY: ICD-10-CM

## 2023-07-27 DIAGNOSIS — Z79.01 ANTICOAGULATED: ICD-10-CM

## 2023-07-27 DIAGNOSIS — C34.90 SMALL CELL LUNG CANCER: Primary | ICD-10-CM

## 2023-07-27 DIAGNOSIS — D84.821 IMMUNODEFICIENCY SECONDARY TO CHEMOTHERAPY: ICD-10-CM

## 2023-07-27 DIAGNOSIS — Z79.899 IMMUNODEFICIENCY SECONDARY TO CHEMOTHERAPY: ICD-10-CM

## 2023-07-27 DIAGNOSIS — C34.90 LUNG CANCER METASTATIC TO BONE: ICD-10-CM

## 2023-07-27 DIAGNOSIS — B02.9 HERPES ZOSTER WITHOUT COMPLICATION: ICD-10-CM

## 2023-07-27 DIAGNOSIS — I10 HYPERTENSION, UNSPECIFIED TYPE: ICD-10-CM

## 2023-07-27 DIAGNOSIS — C78.7 SECONDARY MALIGNANT NEOPLASM OF LIVER: ICD-10-CM

## 2023-07-27 DIAGNOSIS — Z51.5 PALLIATIVE CARE BY SPECIALIST: ICD-10-CM

## 2023-07-27 DIAGNOSIS — R74.01 ELEVATED ALANINE AMINOTRANSFERASE (ALT) LEVEL: ICD-10-CM

## 2023-07-27 PROCEDURE — 3044F PR MOST RECENT HEMOGLOBIN A1C LEVEL <7.0%: ICD-10-PCS | Mod: CPTII,,, | Performed by: STUDENT IN AN ORGANIZED HEALTH CARE EDUCATION/TRAINING PROGRAM

## 2023-07-27 PROCEDURE — 3080F DIAST BP >= 90 MM HG: CPT | Mod: CPTII,,, | Performed by: STUDENT IN AN ORGANIZED HEALTH CARE EDUCATION/TRAINING PROGRAM

## 2023-07-27 PROCEDURE — 3044F HG A1C LEVEL LT 7.0%: CPT | Mod: CPTII,,, | Performed by: STUDENT IN AN ORGANIZED HEALTH CARE EDUCATION/TRAINING PROGRAM

## 2023-07-27 PROCEDURE — 3077F SYST BP >= 140 MM HG: CPT | Mod: CPTII,,, | Performed by: STUDENT IN AN ORGANIZED HEALTH CARE EDUCATION/TRAINING PROGRAM

## 2023-07-27 PROCEDURE — 99213 OFFICE O/P EST LOW 20 MIN: CPT | Mod: PBBFAC | Performed by: STUDENT IN AN ORGANIZED HEALTH CARE EDUCATION/TRAINING PROGRAM

## 2023-07-27 PROCEDURE — 99999 PR PBB SHADOW E&M-EST. PATIENT-LVL III: ICD-10-PCS | Mod: PBBFAC,,, | Performed by: STUDENT IN AN ORGANIZED HEALTH CARE EDUCATION/TRAINING PROGRAM

## 2023-07-27 PROCEDURE — 3008F BODY MASS INDEX DOCD: CPT | Mod: CPTII,,, | Performed by: STUDENT IN AN ORGANIZED HEALTH CARE EDUCATION/TRAINING PROGRAM

## 2023-07-27 PROCEDURE — 3008F PR BODY MASS INDEX (BMI) DOCUMENTED: ICD-10-PCS | Mod: CPTII,,, | Performed by: STUDENT IN AN ORGANIZED HEALTH CARE EDUCATION/TRAINING PROGRAM

## 2023-07-27 PROCEDURE — 99999 PR PBB SHADOW E&M-EST. PATIENT-LVL III: CPT | Mod: PBBFAC,,, | Performed by: STUDENT IN AN ORGANIZED HEALTH CARE EDUCATION/TRAINING PROGRAM

## 2023-07-27 PROCEDURE — 99215 PR OFFICE/OUTPT VISIT, EST, LEVL V, 40-54 MIN: ICD-10-PCS | Mod: S$PBB,,, | Performed by: STUDENT IN AN ORGANIZED HEALTH CARE EDUCATION/TRAINING PROGRAM

## 2023-07-27 PROCEDURE — 99215 OFFICE O/P EST HI 40 MIN: CPT | Mod: S$PBB,,, | Performed by: STUDENT IN AN ORGANIZED HEALTH CARE EDUCATION/TRAINING PROGRAM

## 2023-07-27 PROCEDURE — 4010F PR ACE/ARB THEARPY RXD/TAKEN: ICD-10-PCS | Mod: CPTII,,, | Performed by: STUDENT IN AN ORGANIZED HEALTH CARE EDUCATION/TRAINING PROGRAM

## 2023-07-27 PROCEDURE — 4010F ACE/ARB THERAPY RXD/TAKEN: CPT | Mod: CPTII,,, | Performed by: STUDENT IN AN ORGANIZED HEALTH CARE EDUCATION/TRAINING PROGRAM

## 2023-07-27 PROCEDURE — 3080F PR MOST RECENT DIASTOLIC BLOOD PRESSURE >= 90 MM HG: ICD-10-PCS | Mod: CPTII,,, | Performed by: STUDENT IN AN ORGANIZED HEALTH CARE EDUCATION/TRAINING PROGRAM

## 2023-07-27 PROCEDURE — 3077F PR MOST RECENT SYSTOLIC BLOOD PRESSURE >= 140 MM HG: ICD-10-PCS | Mod: CPTII,,, | Performed by: STUDENT IN AN ORGANIZED HEALTH CARE EDUCATION/TRAINING PROGRAM

## 2023-07-27 RX ORDER — VALACYCLOVIR HYDROCHLORIDE 500 MG/1
500 TABLET, FILM COATED ORAL EVERY 12 HOURS
Qty: 60 TABLET | Refills: 2 | Status: SHIPPED | OUTPATIENT
Start: 2023-07-27 | End: 2023-10-25

## 2023-07-27 RX ORDER — VALACYCLOVIR HYDROCHLORIDE 1 G/1
500 TABLET, FILM COATED ORAL EVERY 12 HOURS
Qty: 30 TABLET | Refills: 2 | Status: SHIPPED | OUTPATIENT
Start: 2023-07-27 | End: 2023-07-27

## 2023-07-27 NOTE — PROGRESS NOTES
[facility]  Hematology/Medical Oncology Clinic       PATIENT: Juan Gillespie  MRN: [id number]  DATE: 7/27/2023    Reason for referral: Extensive stage small cell lung ca    Initial History: Juan Gillespie is a 58 year old man with extensive stage SCLC who presents to clinic for evaluation prior to treatment  of topotecan.          Oncological History:  -Started Carbo/Etop/Atez 4/5/22  -Maintenance atezolizumab 7/5/22  -Consolidative thoracic radiation 8/4/22-8/18/22  -topotecan started 10/24/23-3/10/22(missed Nov tx)-mixed resposne  -Lurbinectadin started 4/05/23 to 6/2023  -Docetaxel held after 1 cycle on 7/03/23 due to worsening LFTs    Interval History:     Pt presents for MD visit and labs with LFTs continuing to rise most likely from liver mets.  Pt informed that hospice would be the best option at this point but pt is not ready yet and agreeable to case discussion with MD Tan Peer to peer.  Wheelchair prescription provided and palliative care service following.    His wife accompanies him at this visit.  Past Medical History:   Past Medical History:   Diagnosis Date    Cancer 03/2022    Small Cell -Stage 4 Lung Cancer    Diabetes mellitus, type 2     Gout, unspecified     Hypertension        Past Surgical HIstory:   Past Surgical History:   Procedure Laterality Date    ENDOBRONCHIAL ULTRASOUND N/A 03/22/2022    Procedure: ENDOBRONCHIAL ULTRASOUND (EBUS);  Surgeon: Kristin Samuels MD;  Location: [facility] OR [address] [place] [address];  Service: Endoscopy;  Laterality: N/A;    INSERTION OF TUNNELED CENTRAL VENOUS CATHETER (CVC) WITH SUBCUTANEOUS PORT Right 04/01/2022    KNEE SURGERY         Family History:   Family History   Problem Relation Age of Onset    Diabetes Mellitus Mother     Pacemaker/defibrilator Father     Lung cancer Father        Social History:  reports that he quit smoking about 27 years ago. His smoking use included cigarettes. He has quit using smokeless tobacco. He reports that he does not drink  alcohol and does not use drugs.    Allergies:  Review of patient's allergies indicates:  No Known Allergies    Medications:  Current Outpatient Medications   Medication Sig Dispense Refill    (Magic mouthwash) 1:1:1 diphenhydrAMINE(Benadryl) 12.5mg/5ml liq, aluminum & magnesium hydroxide-simethicone (Maalox), LIDOcaine viscous 2% Swish and spit 5 mLs every 4 (four) hours as needed. for mouth sores 450 mL 1    albuterol (ACCUNEB) 1.25 mg/3 mL Nebu Use 1 vial (1.25 mg total) by nebulization 3 (three) times daily as needed (shortness of breath). Rescue 90 mL 2    allopurinoL (ZYLOPRIM) 300 MG tablet Take 1 tablet (300 mg total) by mouth once daily. 60 tablet 3    apixaban (ELIQUIS) 5 mg Tab Take 1 tablet (5 mg total) by mouth 2 (two) times daily. 60 tablet 5    ascorbic acid (VITAMIN C ORAL) Take 1 tablet by mouth once daily.      benzonatate (TESSALON) 100 MG capsule Take 1 capsule (100 mg total) by mouth 3 (three) times daily as needed for Cough. 90 capsule 3    cetirizine (ZYRTEC) 10 MG tablet Take 1 tablet by mouth as needed.      cholecalciferol, vitamin D3, (VITAMIN D3 ORAL) Take 1 tablet by mouth once daily.      CYANOCOBALAMIN, VITAMIN B-12, ORAL Take by mouth.      cyclobenzaprine (FLEXERIL) 5 MG tablet Take 1 tablet (5 mg total) by mouth 2 (two) times a day. 60 tablet 1    dapagliflozin (FARXIGA) 10 mg tablet Take 1 tablet by mouth once daily.      dexAMETHasone (DECADRON) 4 MG Tab Take 1 tablet (4 mg total) by mouth 2 (two) times daily. 60 tablet 2    guaiFENesin (MUCINEX) 600 mg 12 hr tablet Take 1,200 mg by mouth 2 (two) times daily.      HYDROmorphone (DILAUDID) 2 MG tablet Take 1 tablet (2 mg total) by mouth every 6 (six) hours as needed for Pain (severe pain). Home medication 28 tablet 0    insulin aspart U-100 (NOVOLOG) 100 unit/mL (3 mL) InPn pen Inject 1 Units into the skin as needed. Inject into skin if over 200.      losartan (COZAAR) 100 MG tablet Take 1 tablet (100 mg total) by mouth once daily.  30 tablet 2    MAGNESIUM ORAL Take 1 capsule by mouth once daily.      morphine (MS CONTIN) 15 MG 12 hr tablet Take 3 tablets (45 mg total) by mouth 2 (two) times daily. 60 tablet 0    naloxone (NARCAN) 4 mg/actuation Spry 4mg by nasal route as needed for opioid overdose; may repeat every 2-3 minutes in alternating nostrils until medical help arrives. Call 911 1 each 11    ondansetron (ZOFRAN) 8 MG tablet Take 1 tablet (8 mg total) by mouth every 8 (eight) hours as needed for Nausea. 90 tablet 3    ondansetron (ZOFRAN-ODT) 4 MG TbDL Dissolve 2 tablets (8 mg total) by mouth 2 (two) times daily. 20 tablet 1    POTASSIUM ORAL Take 1 tablet by mouth once daily.      senna-docusate 8.6-50 mg (PERICOLACE) 8.6-50 mg per tablet Take 1 tablet by mouth daily as needed for Constipation. 30 tablet 2    SYMBICORT 160-4.5 mcg/actuation HFAA Inhale into the lungs.      TRUE METRIX GLUCOSE TEST STRIP Strp       TRUEPLUS LANCETS 33 gauge Misc       ZINC ORAL Take by mouth.       No current facility-administered medications for this visit.       Review of Systems   Constitutional:  Positive for fatigue. Negative for chills and fever.        Rash   HENT:  Negative for congestion, sinus pressure and trouble swallowing.    Respiratory:  Negative for cough, chest tightness and shortness of breath.    Cardiovascular:  Negative for chest pain.   Gastrointestinal:  Negative for abdominal pain and blood in stool.   Endocrine: Negative for cold intolerance and heat intolerance.   Genitourinary:  Negative for hematuria.   Musculoskeletal:  Positive for arthralgias. Negative for back pain and myalgias.   Skin:  Negative for rash.   Neurological:  Negative for weakness.   Hematological:  Negative for adenopathy. Does not bruise/bleed easily.   Psychiatric/Behavioral:  Negative for dysphoric mood. The patient is not nervous/anxious.    ECOG Performance Status:   ECOG SCORE             Objective:      Vitals:   There were no vitals filed for this  visit.        telemedicine    Physical Exam  Constitutional:       General: He is not in acute distress.     Appearance: Normal appearance. He is not ill-appearing.   HENT:      Head: Normocephalic and atraumatic.      Nose: Nose normal.      Mouth/Throat:      Mouth: Mucous membranes are moist.      Pharynx: Oropharynx is clear. No oropharyngeal exudate or posterior oropharyngeal erythema.   Eyes:      General: No scleral icterus.     Extraocular Movements: Extraocular movements intact.      Conjunctiva/sclera: Conjunctivae normal.      Pupils: Pupils are equal, round, and reactive to light.   Pulmonary:      Effort: Pulmonary effort is normal. No respiratory distress.   Abdominal:      General: Abdomen is flat.      Palpations: Abdomen is soft.   Musculoskeletal:         General: No swelling. Normal range of motion.      Cervical back: Normal range of motion.      Right lower leg: No edema.      Left lower leg: No edema.   Skin:     General: Skin is warm and dry.      Coloration: Skin is not jaundiced.   Neurological:      General: No focal deficit present.      Mental Status: He is alert.   Psychiatric:         Mood and Affect: Mood normal.         Behavior: Behavior normal.         Thought Content: Thought content normal.         Judgment: Judgment normal.     Laboratory Data:  Recent Results (from the past 168 hour(s))   POCT glucose    Collection Time: 07/20/23 11:54 AM   Result Value Ref Range    POCT Glucose 111 (H) 70 - 110 mg/dL   CBC W/ AUTO DIFFERENTIAL    Collection Time: 07/24/23 12:30 PM   Result Value Ref Range    WBC 11.73 3.90 - 12.70 K/uL    RBC 4.19 (L) 4.60 - 6.20 M/uL    Hemoglobin 12.1 (L) 14.0 - 18.0 g/dL    Hematocrit 38.3 (L) 40.0 - 54.0 %    MCV 91 82 - 98 fL    MCH 28.9 27.0 - 31.0 pg    MCHC 31.6 (L) 32.0 - 36.0 g/dL    RDW 17.6 (H) 11.5 - 14.5 %    Platelets 182 150 - 450 K/uL    MPV 10.0 9.2 - 12.9 fL    Immature Granulocytes 3.2 (H) 0.0 - 0.5 %    Gran # (ANC) 10.4 (H) 1.8 - 7.7 K/uL     Immature Grans (Abs) 0.38 (H) 0.00 - 0.04 K/uL    Lymph # 0.3 (L) 1.0 - 4.8 K/uL    Mono # 0.6 0.3 - 1.0 K/uL    Eos # 0.0 0.0 - 0.5 K/uL    Baso # 0.06 0.00 - 0.20 K/uL    nRBC 0 0 /100 WBC    Gran % 88.3 (H) 38.0 - 73.0 %    Lymph % 2.8 (L) 18.0 - 48.0 %    Mono % 5.1 4.0 - 15.0 %    Eosinophil % 0.1 0.0 - 8.0 %    Basophil % 0.5 0.0 - 1.9 %    Differential Method Automated    COMPREHENSIVE METABOLIC PANEL    Collection Time: 07/24/23 12:30 PM   Result Value Ref Range    Sodium 139 136 - 145 mmol/L    Potassium 4.0 3.5 - 5.1 mmol/L    Chloride 102 95 - 110 mmol/L    CO2 26 23 - 29 mmol/L    Glucose 142 (H) 70 - 110 mg/dL    BUN 21 (H) 6 - 20 mg/dL    Creatinine 0.9 0.5 - 1.4 mg/dL    Calcium 9.2 8.7 - 10.5 mg/dL    Total Protein 6.6 6.0 - 8.4 g/dL    Albumin 2.7 (L) 3.5 - 5.2 g/dL    Total Bilirubin 1.4 (H) 0.1 - 1.0 mg/dL    Alkaline Phosphatase 791 (H) 55 - 135 U/L     (H) 10 - 40 U/L     (H) 10 - 44 U/L    eGFR >60 >60 mL/min/1.73 m^2    Anion Gap 11 8 - 16 mmol/L           Imaging:   MRI Brain W WO Contrast    Result Date: 10/11/2022  EXAMINATION: MRI BRAIN W WO CONTRAST CLINICAL HISTORY: Small cell lung cancer, brain re-staging; Malignant neoplasm of unspecified part of unspecified bronchus or lung TECHNIQUE: Sagittal and axial T1, axial T2, axial FLAIR, axial gradient, axial diffusion imaging of the whole brain pre-contrast with postcontrast axial T1 and axial spoiled gradient imaging reformatted in the coronal and sagittal planes.. Ten ml of Gadavist injected intravenously. COMPARISON: 07/12/2022 FINDINGS: Interval 1.2 cm enhancing lesion within the left anterior inferior frontal lobe which prominently the ring-enhancing measuring 1.2 x 0.9 x 0.9 cm in AP by TV by CC dimensions respectively in light of history concerning for parenchymal metastases the with question trace susceptibility associated with this lesion. Previously identified heterogeneous enhancing sellar lesion is again seen  allowing for routine brain technique with lesion measuring approximately 1.3 cm craniocaudal this may represent pituitary adenoma though indeterminate.  Previous intraluminal filling defect within the right jugular foramen is no longer seen.  There is however diffusion signal abnormality with ill-defined T1 hypointensity within the right occipital condyle concerning for possible osseous metastases the clinical correlation and further evaluation with nuclear medicine imaging advised. There is continued slight prominent leptomeningeal enhancement along the left cerebellum which raises concern for possible underlying vascular malformation such as a dural AV fistula with collateral vascular engorgement.  There is no restricted diffusion to suggest acute infarction.  Ventricles stable without hydrocephalus.  There is mild edema signal surrounding the left frontal enhancing lesion with continued few scattered punctate size regions of T2 FLAIR signal hyperintensity elsewhere supratentorial white matter which are nonspecific and may represent mild chronic ischemic change. This report was flagged in Epic as abnormal.     Interval development of a small ring-enhancing lesion left anterior inferior frontal lobe concerning for parenchymal metastases in light of history.  Clinical correlation and follow-up advised There is continue though relatively stable heterogeneous enhancement and enlargement of the material within the sella which may represent pituitary adenoma though indeterminate. Previous right internal jugular vein thrombus no longer seen. Abnormal signal along the right occipital condyle concerning for possible osseous metastases clinical correlation and further evaluation with nuclear medicine imaging advised Continued prominent vascularity left cerebellar folia raising concern for possible underlying vascular malformation unchanged.  Further evaluation with noncontrast MRA head may be of further diagnostic value.  Electronically signed by: Jonel Lawrence DO Date:    10/11/2022 Time:    10:05    CT Chest Abdomen Pelvis With Contrast (xpd)    Result Date: 10/18/2022  EXAMINATION: CT CHEST ABDOMEN PELVIS WITH CONTRAST (XPD) CLINICAL HISTORY: metastatic small cell lung cancer on maintenance immunotherapy, eval response; Malignant neoplasm of unspecified part of unspecified bronchus or lung TECHNIQUE: Low dose axial images, sagittal and coronal reformations were obtained from the thoracic inlet to the pubic symphysis following the IV administration of 100 mL of Omnipaque 350 COMPARISON: 07/26/2022 FINDINGS: Right IJ venous shunt tip superior aspect right atrium, absence of clot in included upper right IJ. Fatty infiltration of liver, additional diminished attenuation space-occupying lesions of liver, largest central right hepatic lobe 3.6 cm image 103 series 3.  Stable.  Spleen, adrenal glands, pancreas, gallbladder and biliary tree normal. Urinary bladder normal.  Prostate gland very small 4 cm versus postop prostatectomy.  No free fluid or retroperitoneal adenopathy.  GI track normal. Tiny nonobstructive right lower and left upper renal pole stones. New lymph node left retro manubrial 3.4 cm image 28 series 3 appearing in the interim.  Paratracheal conglomerate adenopathy 2.1 x 2.5 cm, was 1.6 x 3 cm.  Subcarinal adenopathy stable.  No new hilar or mediastinal adenopathy. Degenerative disc spondylosis cervicothoracic junction., focal osteoblastic opacities involve humeral heads both clavicle superimposed on erosive DJD more prominent on right.  Additional focal osteoblastic opacities sternum, ribs, dorsal lumbar sacral spine pelvis and both femoral head and trochanteric regions.  Moderate anterior spondylosis dorsal spine and degenerative disc spondylosis facet joint arthropathy lumbosacral junction with DJD SI joints.  Fracture defects left lower anterior chest wall stable. Scattered calcified plaque great vessels aorta iliac  femoral arteries. Dominant medial segment right middle lobe mass 1.1 x 2.7 cm image 69 series 3, was 1.6 x 3.4 cm.  Additional patchy nodular lesions right lower lobe posterior basilar segments, largest 1.4 cm image 66 series 3 suspicious for metastatic disease and/or superimposed inflammatory and infectious process.  Additional patchy infiltrates medial posterior segment right upper lobe and right lower lobe particularly medial basilar segment image 91 series 3.  No new pleural reaction.  Tracheobronchial tree normal. .     1. New presumed metastatic node anterior superior left mediastinum, paratracheal adenopathy otherwise stable. 2. Decreased size in right middle lobe mass with new evidence of metastatic disease right lower lobe versus superimposed inflammatory infectious process discussed above. 3. New developing metastatic lesions liver. 4. Bony universal metastatic disease stable. 5. Recist summary: 6. Compare with previous CT chest abdomen pelvis 07/26/2022 7. Lesion 1: Right middle lobe mass 1.1 x 2.7 cm was 1.6 x 13.4 cm. 8. Lesion 2: Right paratracheal conned Willett a adenopathy 2.1 x 2.5 cm, was 1.6 x 3 cm. 9. Lesion 3: Dome 1.9 cm stable.  (New progressive liver metastatic disease noted elsewhere) 10. Lesion 4: Caudal aspect right hepatic lobe 1 cm, stable 11.  This report was flagged in Epic as abnormal. Electronically signed by: Dewayne Hutchinson MD Date:    10/18/2022 Time:    09:28       Assessment and Plan          ECOG 1      Extensive stage small cell lung cancer with bone, liver and brain mets  -Initially diagnosed with extensive stage small cell lung cancer in 03/2022 who was treated with carbo/etop/atezolizumab from 04/05/2022 - 06/14/22 with partial response. He subsequently completed consolidation thoracic RT 8/4/22 - 8/18/22, and he was on maintenance atezolizumab.   -10/18/22 showing progression of disease in lungs and liver.  Asymptomatic.  -Scans in December 2022 largely stable with  exception of  Right middle lobe lung mass.  3.4 cm, still demonstrated widespread disease in bones  -Admitted for pain control and palliative XRT to spine on 12/07/22  -Pt had been off of tx in Nov 2022 due to going on vacation, as symptoms initially improved drastically after restarting tx  -Pt has continued to progress despite tx   -Followed by Dr. Jacome at [facility] rad/onc for whole brain radiation   Plan 07/27/23  -Hold chemo today due to elevated LFTs most likely secondary to liver mets  -Due to cancer becoming chemo resistant and worsening labs and pain, hospice discussed but pt refused for now  -Will discuss case as peer to peer with MD Tan per pt request  -RTC in 2 weeks for MD visit       Cancer related pain/palliative care by specialist  -Pain under better control with current regimen  -Continue to follow with palliative      DM2  -Controlled, following with PCP      Hypercalcemia/Mets to bone and spine  -Pt still has not gotten dental clearance and is aware of risk of jaw osteonecrosis but would like to start zometa today due to metastatic disease  -Continue calcium supplements while receiving zometa      PE  -on 5/31/23  -Most likely from his cancer  -Continue lifelong anticoagulation  -Eliquis refilled previous visit      Chemo related neutropenia-resolved  -secondary to cancer and docetaxel  -Continue to monitor      Time spent with pt: 40 minutes      Problem List Items Addressed This Visit    None                Soledad Irving MD  Hematology Oncology

## 2023-08-02 ENCOUNTER — INFUSION (OUTPATIENT)
Dept: INFUSION THERAPY | Facility: HOSPITAL | Age: 58
End: 2023-08-02
Attending: STUDENT IN AN ORGANIZED HEALTH CARE EDUCATION/TRAINING PROGRAM
Payer: MEDICAID

## 2023-08-02 VITALS
DIASTOLIC BLOOD PRESSURE: 63 MMHG | SYSTOLIC BLOOD PRESSURE: 109 MMHG | HEART RATE: 115 BPM | RESPIRATION RATE: 18 BRPM | TEMPERATURE: 98 F | OXYGEN SATURATION: 98 %

## 2023-08-02 DIAGNOSIS — E11.9 TYPE 2 DIABETES MELLITUS, WITHOUT LONG-TERM CURRENT USE OF INSULIN: Primary | ICD-10-CM

## 2023-08-02 PROCEDURE — 96374 THER/PROPH/DIAG INJ IV PUSH: CPT

## 2023-08-02 PROCEDURE — 96361 HYDRATE IV INFUSION ADD-ON: CPT

## 2023-08-02 PROCEDURE — 63600175 PHARM REV CODE 636 W HCPCS: Performed by: STUDENT IN AN ORGANIZED HEALTH CARE EDUCATION/TRAINING PROGRAM

## 2023-08-02 PROCEDURE — A4216 STERILE WATER/SALINE, 10 ML: HCPCS | Performed by: STUDENT IN AN ORGANIZED HEALTH CARE EDUCATION/TRAINING PROGRAM

## 2023-08-02 PROCEDURE — 25000003 PHARM REV CODE 250: Performed by: STUDENT IN AN ORGANIZED HEALTH CARE EDUCATION/TRAINING PROGRAM

## 2023-08-02 RX ORDER — ONDANSETRON 2 MG/ML
8 INJECTION INTRAMUSCULAR; INTRAVENOUS ONCE
Status: COMPLETED | OUTPATIENT
Start: 2023-08-02 | End: 2023-08-02

## 2023-08-02 RX ORDER — SODIUM CHLORIDE 0.9 % (FLUSH) 0.9 %
10 SYRINGE (ML) INJECTION
Status: DISCONTINUED | OUTPATIENT
Start: 2023-08-02 | End: 2023-08-02 | Stop reason: HOSPADM

## 2023-08-02 RX ORDER — SODIUM CHLORIDE 0.9 % (FLUSH) 0.9 %
10 SYRINGE (ML) INJECTION
OUTPATIENT
Start: 2024-09-18

## 2023-08-02 RX ORDER — HEPARIN 100 UNIT/ML
500 SYRINGE INTRAVENOUS
Status: COMPLETED | OUTPATIENT
Start: 2023-08-02 | End: 2023-08-02

## 2023-08-02 RX ORDER — HEPARIN 100 UNIT/ML
500 SYRINGE INTRAVENOUS
OUTPATIENT
Start: 2024-09-18

## 2023-08-02 RX ADMIN — ONDANSETRON 8 MG: 2 INJECTION INTRAMUSCULAR; INTRAVENOUS at 12:08

## 2023-08-02 RX ADMIN — HEPARIN 500 UNITS: 100 SYRINGE at 01:08

## 2023-08-02 RX ADMIN — SODIUM CHLORIDE 1000 ML: 9 INJECTION, SOLUTION INTRAVENOUS at 12:08

## 2023-08-02 RX ADMIN — Medication 10 ML: at 01:08

## 2023-08-02 NOTE — PLAN OF CARE
Pt arrived to unit for weekly IVFs. VSS. Still continues with lack of appetite, difficulty eating, pain, and nausea. Plan of care reviewed. IVFs infused without issues. Pt also given 8mg Zofran IVP. Has his next appointments through [facility]. Discharged from unit in NAD.

## 2023-08-03 ENCOUNTER — OFFICE VISIT (OUTPATIENT)
Dept: HOME HEALTH SERVICES | Facility: CLINIC | Age: 58
End: 2023-08-03
Payer: MEDICAID

## 2023-08-03 DIAGNOSIS — C34.90 SMALL CELL LUNG CANCER: Primary | Chronic | ICD-10-CM

## 2023-08-03 DIAGNOSIS — Z71.89 GOALS OF CARE, COUNSELING/DISCUSSION: ICD-10-CM

## 2023-08-03 PROCEDURE — 4010F ACE/ARB THERAPY RXD/TAKEN: CPT | Mod: CPTII,S$GLB,, | Performed by: NURSE PRACTITIONER

## 2023-08-03 PROCEDURE — 1159F PR MEDICATION LIST DOCUMENTED IN MEDICAL RECORD: ICD-10-PCS | Mod: CPTII,S$GLB,, | Performed by: NURSE PRACTITIONER

## 2023-08-03 PROCEDURE — 3079F DIAST BP 80-89 MM HG: CPT | Mod: CPTII,S$GLB,, | Performed by: NURSE PRACTITIONER

## 2023-08-03 PROCEDURE — 3044F HG A1C LEVEL LT 7.0%: CPT | Mod: CPTII,S$GLB,, | Performed by: NURSE PRACTITIONER

## 2023-08-03 PROCEDURE — 99497 PR ADVNCD CARE PLAN 30 MIN: ICD-10-PCS | Mod: S$GLB,,, | Performed by: NURSE PRACTITIONER

## 2023-08-03 PROCEDURE — 4010F PR ACE/ARB THEARPY RXD/TAKEN: ICD-10-PCS | Mod: CPTII,S$GLB,, | Performed by: NURSE PRACTITIONER

## 2023-08-03 PROCEDURE — 3044F PR MOST RECENT HEMOGLOBIN A1C LEVEL <7.0%: ICD-10-PCS | Mod: CPTII,S$GLB,, | Performed by: NURSE PRACTITIONER

## 2023-08-03 PROCEDURE — 1160F RVW MEDS BY RX/DR IN RCRD: CPT | Mod: CPTII,S$GLB,, | Performed by: NURSE PRACTITIONER

## 2023-08-03 PROCEDURE — 99350 PR HOME VISIT,ESTAB PATIENT,LEVEL IV: ICD-10-PCS | Mod: S$GLB,,, | Performed by: NURSE PRACTITIONER

## 2023-08-03 PROCEDURE — 3077F PR MOST RECENT SYSTOLIC BLOOD PRESSURE >= 140 MM HG: ICD-10-PCS | Mod: CPTII,S$GLB,, | Performed by: NURSE PRACTITIONER

## 2023-08-03 PROCEDURE — 3079F PR MOST RECENT DIASTOLIC BLOOD PRESSURE 80-89 MM HG: ICD-10-PCS | Mod: CPTII,S$GLB,, | Performed by: NURSE PRACTITIONER

## 2023-08-03 PROCEDURE — 3077F SYST BP >= 140 MM HG: CPT | Mod: CPTII,S$GLB,, | Performed by: NURSE PRACTITIONER

## 2023-08-03 PROCEDURE — 99350 HOME/RES VST EST HIGH MDM 60: CPT | Mod: S$GLB,,, | Performed by: NURSE PRACTITIONER

## 2023-08-03 PROCEDURE — 99497 ADVNCD CARE PLAN 30 MIN: CPT | Mod: S$GLB,,, | Performed by: NURSE PRACTITIONER

## 2023-08-03 PROCEDURE — 1159F MED LIST DOCD IN RCRD: CPT | Mod: CPTII,S$GLB,, | Performed by: NURSE PRACTITIONER

## 2023-08-03 PROCEDURE — 1160F PR REVIEW ALL MEDS BY PRESCRIBER/CLIN PHARMACIST DOCUMENTED: ICD-10-PCS | Mod: CPTII,S$GLB,, | Performed by: NURSE PRACTITIONER

## 2023-08-07 VITALS
OXYGEN SATURATION: 97 % | TEMPERATURE: 98 F | HEART RATE: 75 BPM | RESPIRATION RATE: 16 BRPM | DIASTOLIC BLOOD PRESSURE: 80 MMHG | SYSTOLIC BLOOD PRESSURE: 142 MMHG

## 2023-08-07 NOTE — PROGRESS NOTES
"  [facility]  Transition of Care Home Visit    Visit Date: 8/3/2023  Encounter Provider: Roberto Cohn   PCP:  Soledad Irivng MD    PRESENTING HISTORY      Patient ID: Juan Gillespie is a 58 y.o. male.    Consult Requested By:  Loulou Morel  Reason for Consult:  Hospital Follow Up.    Juan is being seen at home due to being seen at home due to physical debility that presents a taxing effort to leave the home, to mitigate high risk of hospital readmission and/or due to the limited availability of reliable or safe options for transportation to the point of access to the provider. Prior to treatment on this visit the chart was reviewed and patient verbal consent was obtained.      Chief Complaint: Transitional Care        History of Present Illness: Mr. Juan Gillespie is a 58 y.o. male who was recently admitted to the hospital.    HPI:   Juan Gillespie is a 59 yo male with hypertension, former smoker, DMT2, SCLC stage 4 dx 3/3022 with mets to bone/spine/brain/liver, hypercalcemia on zometa, PE 5/31/23 on eliquis, chemo related neutropenia now improved   who was sent to ED from Oncologist Dr. Irving office for intractable pain. Patient office visit plan to hold chemotherapy due to elevated LFT's.  Recent pain regimen switch from Dilaudid 2 mg  to Morphine 30 mg BID 6 days ago and continues with intractable pain. He have not slept in days. He would nap for 2 hrs then wake up in pain "all over." Pain more so in whole back, thighs and shoulders. No additional treatment at home for his pain. Slightest movement like positioning in bed worsen pain. No chest pain, abdominal pain , dizziness, nausea or vomiting.      In ED, worsen LFT's from last month. INR 1.1.      5/4/2023 MRI brain showed metastatic disease progression     Per Dr. Irving clinic note today 7/19/2023  Extensive stage small cell lung cancer with brain mets  -Initially diagnosed with extensive stage small cell lung cancer in 03/2022 who was treated with " carbo/etop/atezolizumab from 04/05/2022 - 06/14/22 with partial response. He subsequently completed consolidation thoracic RT 8/4/22 - 8/18/22, and he was on maintenance atezolizumab.   -10/18/22 showing progression of disease in lungs and liver.  Asymptomatic.  -Dr. Sosa discussed with him plan for topotecan and pt was consented  -Pt tolerated cycle 1 well but delayed C2 due to being out of town  -Scans in December 2022 largely stable with exception of  Right middle lobe lung mass.  3.4 cm, still demonstrated widespread disease in bones  -Admitted for pain control and palliative XRT on 12/07/22  -Pt had been off of tx in Nov 2022 due to going on vacation, as symptoms initially improved drastically after restarting tx  Plan 07/19/23  -Hold chemo today due to elevated LFTs  -Admit for pain control and pt is considering hospice as it does not appear that he is responding to tx and LFT rise is preventing tx at present time  -RTC in 1 week for MD follow up        * No surgery found *       Hospital Course:   Admission to observation for intractable cancer-related pain. Have small cell lung cancer stage IV with Mets to brain liver bone.  Increased home morphine 30 a 5 mg b.i.d. and add Flexeril 5 mg b.i.d. with dilaudid 2 mg IV for breakthrough with improvement. Patient will follow-up with Dr. Irving regarding chemotherapy continuation and follow up LFT's. Seen by Palliative Care team and patient does not want hospice at this time. Patient remains full code. Discharge home on flexeril 5 mg bid and morphine 45 mg BID (both Rx filled at [facility] prior to discharge) and dilaudid 2 mg q 6 hrs for breakthrough (already have dilaudid at home). Patient stable for discharge home with close follow up Oncologist and Palliative Care.         Goals of Care Treatment Preferences:  Code Status: Full Code  ___________________________________________________________________    Today: With this visit today patient is found lying down in bed  with his wife present for the visit.  Reports pain is better controlled since hospital discharge.  Would like to continue full treatment for cancer.  Not amenable to palliative care or hospice at this time.Patient requires maximum assistance with ADLs. Endorses eating x 1-3 meals per day, daily BMs, and adequate sleep pattern. Denies falls.  Denies smoking, alcohol abuse, illicit drug use.  Discussed home health palliative care program with wife and patient; information given for future use. No additional needs at this this time. All of my information was given to the patient and family if any questions or concerns arise.    VSS. Denies fever, chest pain, shortness of breath, nausea, vomiting, diarrhea. Risks of environmental exposure to coronavirus discussed including: social distancing, hand hygiene, and limiting departures from the home for necessities only.  Reports understanding and willingness to comply.  All hospital discharge orders reviewed and being followed, all medications reconciled and reviewed, patient and family verbalized understanding. No other needs identified at this time.      I initiated the process of advance care planning today and explained the importance of this process to the patient and family.  I introduced the concept of advance directives to the patient, as well. The patient has not previously appointed a HCPOA. Then the patient received detailed information about the importance of designating a Health Care Power of  (HCPOA). The patient was also instructed to communicate with this person about their wishes for future healthcare, should patient become sick and lose decision-making capacity. We spoke about ACP for 30 minutes.    Review of Systems   Constitutional:  Positive for diaphoresis (night sweats) and fatigue. Negative for activity change and appetite change.   HENT:  Negative for congestion and dental problem.    Eyes:  Negative for discharge and itching.   Respiratory:   Negative for choking and chest tightness.    Cardiovascular:  Negative for chest pain and palpitations.   Gastrointestinal:  Positive for nausea. Negative for rectal pain and vomiting.   Endocrine: Negative for cold intolerance and heat intolerance.   Genitourinary:  Negative for enuresis and flank pain.   Musculoskeletal:  Positive for arthralgias and gait problem. Negative for myalgias and neck pain.   Skin:  Negative for color change and wound.   Allergic/Immunologic: Positive for immunocompromised state. Negative for environmental allergies and food allergies.   Neurological:  Positive for weakness. Negative for tremors and syncope.   Hematological:  Does not bruise/bleed easily.   Psychiatric/Behavioral:  Negative for decreased concentration and dysphoric mood.    Is  PAST HISTORY:     Past Medical History:   Diagnosis Date    Cancer 2022    Small Cell -Stage 4 Lung Cancer    Diabetes mellitus, type 2     Gout, unspecified     Hypertension        Past Surgical History:   Procedure Laterality Date    ENDOBRONCHIAL ULTRASOUND N/A 2022    Procedure: ENDOBRONCHIAL ULTRASOUND (EBUS);  Surgeon: Kristin Samuels MD;  Location: [facility] OR [address] [place] [address];  Service: Endoscopy;  Laterality: N/A;    INSERTION OF TUNNELED CENTRAL VENOUS CATHETER (CVC) WITH SUBCUTANEOUS PORT Right 2022    KNEE SURGERY         Family History   Problem Relation Age of Onset    Diabetes Mellitus Mother     Pacemaker/defibrilator Father     Lung cancer Father        Social History     Socioeconomic History    Marital status:    Tobacco Use    Smoking status: Former     Current packs/day: 0.00     Types: Cigarettes     Quit date:      Years since quittin.6    Smokeless tobacco: Former    Tobacco comments:     stop smoking 26 years ago   Substance and Sexual Activity    Alcohol use: No    Drug use: No    Sexual activity: Yes     Partners: Female       MEDICATIONS & ALLERGIES:     Current Outpatient Medications on File Prior to  Visit   Medication Sig Dispense Refill    (Magic mouthwash) 1:1:1 diphenhydrAMINE(Benadryl) 12.5mg/5ml liq, aluminum & magnesium hydroxide-simethicone (Maalox), LIDOcaine viscous 2% Swish and spit 5 mLs every 4 (four) hours as needed. for mouth sores 450 mL 1    albuterol (ACCUNEB) 1.25 mg/3 mL Nebu Use 1 vial (1.25 mg total) by nebulization 3 (three) times daily as needed (shortness of breath). Rescue 90 mL 2    allopurinoL (ZYLOPRIM) 300 MG tablet Take 1 tablet (300 mg total) by mouth once daily. 60 tablet 3    apixaban (ELIQUIS) 5 mg Tab Take 1 tablet (5 mg total) by mouth 2 (two) times daily. 60 tablet 5    ascorbic acid (VITAMIN C ORAL) Take 1 tablet by mouth once daily.      benzonatate (TESSALON) 100 MG capsule Take 1 capsule (100 mg total) by mouth 3 (three) times daily as needed for Cough. 90 capsule 3    cetirizine (ZYRTEC) 10 MG tablet Take 1 tablet by mouth as needed.      cholecalciferol, vitamin D3, (VITAMIN D3 ORAL) Take 1 tablet by mouth once daily.      CYANOCOBALAMIN, VITAMIN B-12, ORAL Take by mouth.      cyclobenzaprine (FLEXERIL) 5 MG tablet Take 1 tablet (5 mg total) by mouth 2 (two) times a day. 60 tablet 1    dapagliflozin (FARXIGA) 10 mg tablet Take 1 tablet by mouth once daily.      dexAMETHasone (DECADRON) 4 MG Tab Take 1 tablet (4 mg total) by mouth 2 (two) times daily. 60 tablet 2    guaiFENesin (MUCINEX) 600 mg 12 hr tablet Take 1,200 mg by mouth 2 (two) times daily.      HYDROmorphone (DILAUDID) 2 MG tablet Take 1 tablet (2 mg total) by mouth every 6 (six) hours as needed for Pain (severe pain). Home medication 28 tablet 0    insulin aspart U-100 (NOVOLOG) 100 unit/mL (3 mL) InPn pen Inject 1 Units into the skin as needed. Inject into skin if over 200.      losartan (COZAAR) 100 MG tablet Take 1 tablet (100 mg total) by mouth once daily. 30 tablet 2    MAGNESIUM ORAL Take 1 capsule by mouth once daily.      morphine (MS CONTIN) 15 MG 12 hr tablet Take 3 tablets (45 mg total) by mouth 2  (two) times daily. 60 tablet 0    naloxone (NARCAN) 4 mg/actuation Spry 4mg by nasal route as needed for opioid overdose; may repeat every 2-3 minutes in alternating nostrils until medical help arrives. Call 911 1 each 11    ondansetron (ZOFRAN) 8 MG tablet Take 1 tablet (8 mg total) by mouth every 8 (eight) hours as needed for Nausea. 90 tablet 3    ondansetron (ZOFRAN-ODT) 4 MG TbDL Dissolve 2 tablets (8 mg total) by mouth 2 (two) times daily. 20 tablet 1    POTASSIUM ORAL Take 1 tablet by mouth once daily.      senna-docusate 8.6-50 mg (PERICOLACE) 8.6-50 mg per tablet Take 1 tablet by mouth daily as needed for Constipation. 30 tablet 2    SYMBICORT 160-4.5 mcg/actuation HFAA Inhale into the lungs.      TRUE METRIX GLUCOSE TEST STRIP Strp       TRUEPLUS LANCETS 33 gauge Misc       valACYclovir (VALTREX) 500 MG tablet Take 1 tablet (500 mg total) by mouth every 12 (twelve) hours. for seven days 60 tablet 2    ZINC ORAL Take by mouth.      [DISCONTINUED] albuterol-ipratropium (DUO-NEB) 2.5 mg-0.5 mg/3 mL nebulizer solution Take 3 mLs by nebulization every 4 (four) hours. Rescue 90 mL 3    [DISCONTINUED] azelastine (ASTELIN) 137 mcg (0.1 %) nasal spray 1 spray by Nasal route 2 (two) times daily.       No current facility-administered medications on file prior to visit.        Review of patient's allergies indicates:  No Known Allergies    OBJECTIVE:     Vital Signs:  Vitals:    08/03/23 1000   BP: (!) 142/80   Pulse: 75   Resp: 16   Temp: 98 °F (36.7 °C)     There is no height or weight on file to calculate BMI.     Physical Exam:  Physical Exam  Vitals and nursing note reviewed.   Constitutional:       Appearance: He is well-developed.   HENT:      Head: Normocephalic and atraumatic.   Eyes:      General: Lids are normal.      Pupils: Pupils are equal, round, and reactive to light.   Cardiovascular:      Rate and Rhythm: Normal rate.   Pulmonary:      Effort: Pulmonary effort is normal.      Breath sounds: Normal  "breath sounds and air entry.   Abdominal:      General: Bowel sounds are normal. There is no distension.      Palpations: Abdomen is soft.   Musculoskeletal:         General: Normal range of motion.      Cervical back: Normal range of motion and neck supple.   Skin:     General: Skin is warm and dry.   Neurological:      Mental Status: He is alert. Mental status is at baseline.      GCS: GCS eye subscore is 4. GCS verbal subscore is 5. GCS motor subscore is 6.   Psychiatric:         Attention and Perception: Attention normal.         Mood and Affect: Mood normal.         Speech: Speech normal.     Laboratory  Lab Results   Component Value Date    WBC 11.73 07/24/2023    HGB 12.1 (L) 07/24/2023    HCT 38.3 (L) 07/24/2023    MCV 91 07/24/2023     07/24/2023     Lab Results   Component Value Date    INR 1.1 07/19/2023    INR 1.0 04/01/2022     Lab Results   Component Value Date    HGBA1C 6.1 (H) 07/19/2023     No results for input(s): "POCTGLUCOSE" in the last 72 hours.        TRANSITION OF CARE:     Family and/or Caretaker present at visit?  Yes.  Diagnostic tests reviewed/disposition: No diagnosic tests pending after this hospitalization.  Disease/illness education: Importance of compliance with all prescribed medication and treatments, COVID precautions/Social Distancing/Mask Use  Home health/community services discussion/referrals: Patient has home health established at unknown .   Establishment or re-establishment of referral orders for community resources: No other necessary community resources.   Discussion with other health care providers: No discussion with other health care providers necessary.     Transition of Care Visit:  I have reviewed and updated the history and problem list.  I have reconciled the medication list.  I have discussed the hospitalization and current medical issues, prognosis and plans with the patient/family.  I  spent more than 50% of time discussing the care with the " patient/family.  Total Face-to-Face Encounter: 60 minutes.    Medications Reconciliation:   I have reconciled the patient's home medications and discharge medications with the patient/family. I have updated all changes.  Refer to After-Visit Medication List.    I have discussed discharge plans, follow-up instructions, future appointments, provider contact information, indicators to seek medical emergency treatment, and advisement to call with additional questions or concerns. Patient and caregiver verbalize understanding.    ASSESSMENT & PLAN:       1. Small cell lung cancer  Overview:  Initially diagnosed with extensive stage small cell lung cancer in 03/2022 who was treated with carbo/etop/atezolizumab from 04/05/2022 - 06/14/22 with partial response. He subsequently completed consolidation thoracic RT 8/4/22 - 8/18/22, and he was on maintenance atezolizumab.       2. Goals of care, counseling/discussion  Assessment & Plan:  --patient wishes to continue full treatment at this time for small-cell lung cancer  --palliative care and hospice discussed with patient and family; not amenable at this time    Orders:  -     Ambulatory referral/consult to [facility]         Were controlled substances prescribed?  No    Instructions for the patient:    Scheduled Follow-up :  Future Appointments   Date Time Provider Department Center   8/10/2023  8:30 AM Soledad Irving MD [facility] HEM ON[facility]   8/10/2023  9:00 AM CHAIR 08 [facility]   8/16/2023 12:15 PM CHAIR 05 [facility]   8/23/2023 12:15 PM CHAIR 05 [facility]   8/30/2023 12:15 PM CHAIR 05 [facility]   9/6/2023 12:15 PM CHAIR 05 [facility]   9/13/2023 12:15 PM CHAIR 05 [facility]   10/17/2023  1:00 PM [facility]   10/17/2023  2:30 PM Stephen Jacome MD [facility]       After Visit  Medication List :     Medication List            Accurate as of August 3, 2023 11:59 PM. If you have any questions, ask your nurse or doctor.                CONTINUE taking these medications      (MAGIC MOUTHWASH) 1:1:1 BENADRYL 12.5MG/5ML LIQ, ALUMINUM & MAGNESIUM  Swish and spit 5 mLs every 4 (four) hours as needed. for mouth sores     albuterol 1.25 mg/3 mL Nebu  Commonly known as: ACCUNEB  Use 1 vial (1.25 mg total) by nebulization 3 (three) times daily as needed (shortness of breath). Rescue     allopurinoL 300 MG tablet  Commonly known as: ZYLOPRIM  Take 1 tablet (300 mg total) by mouth once daily.     apixaban 5 mg Tab  Commonly known as: ELIQUIS  Take 1 tablet (5 mg total) by mouth 2 (two) times daily.     benzonatate 100 MG capsule  Commonly known as: TESSALON  Take 1 capsule (100 mg total) by mouth 3 (three) times daily as needed for Cough.     cetirizine 10 MG tablet  Commonly known as: ZYRTEC     CYANOCOBALAMIN (VITAMIN B-12) ORAL     cyclobenzaprine 5 MG tablet  Commonly known as: FLEXERIL  Take 1 tablet (5 mg total) by mouth 2 (two) times a day.     dexAMETHasone 4 MG Tab  Commonly known as: DECADRON  Take 1 tablet (4 mg total) by mouth 2 (two) times daily.     FARXIGA 10 mg tablet  Generic drug: dapagliflozin propanediol     guaiFENesin 600 mg 12 hr tablet  Commonly known as: MUCINEX     HYDROmorphone 2 MG tablet  Commonly known as: DILAUDID  Take 1 tablet (2 mg total) by mouth every 6 (six) hours as needed for Pain (severe pain). Home medication     insulin aspart U-100 100 unit/mL (3 mL) Inpn pen  Commonly known as: NovoLOG     losartan 100 MG tablet  Commonly known as: COZAAR  Take 1 tablet (100 mg total) by mouth once daily.     MAGNESIUM ORAL     morphine 15 MG 12 hr tablet  Commonly known as: MS CONTIN  Take 3 tablets (45 mg total) by mouth 2 (two) times daily.     naloxone 4 mg/actuation Spry  Commonly known as: NARCAN  4mg by nasal route as needed for opioid overdose; may repeat every 2-3  minutes in alternating nostrils until medical help arrives. Call 911     ondansetron 4 MG Tbdl  Commonly known as: ZOFRAN-ODT  Dissolve 2 tablets (8 mg total) by mouth 2 (two) times daily.     ondansetron 8 MG tablet  Commonly known as: ZOFRAN  Take 1 tablet (8 mg total) by mouth every 8 (eight) hours as needed for Nausea.     POTASSIUM ORAL     senna-docusate 8.6-50 mg 8.6-50 mg per tablet  Commonly known as: PERICOLACE  Take 1 tablet by mouth daily as needed for Constipation.     SYMBICORT 160-4.5 mcg/actuation Hfaa  Generic drug: budesonide-formoterol 160-4.5 mcg     TRUE METRIX GLUCOSE TEST STRIP Strp  Generic drug: blood sugar diagnostic     TRUEPLUS LANCETS 33 gauge Misc  Generic drug: lancets     valACYclovir 500 MG tablet  Commonly known as: VALTREX  Take 1 tablet (500 mg total) by mouth every 12 (twelve) hours. for seven days     VITAMIN C ORAL     VITAMIN D3 ORAL     ZINC ORAL              Signature: Roberto Cohn NP

## 2023-08-07 NOTE — ASSESSMENT & PLAN NOTE
--patient wishes to continue full treatment at this time for small-cell lung cancer  --palliative care and hospice discussed with patient and family; not amenable at this time

## 2023-08-10 ENCOUNTER — TELEPHONE (OUTPATIENT)
Dept: HEMATOLOGY/ONCOLOGY | Facility: CLINIC | Age: 58
End: 2023-08-10
Payer: MEDICAID

## 2023-08-10 NOTE — TELEPHONE ENCOUNTER
[facility]  She agreed

## 2023-08-10 NOTE — TELEPHONE ENCOUNTER
Patient wife called to cancel appointment for this morning. Pt is very weak he cannot get out of bed she said and he was up all night having conversation with people in his head the wife said. She asked if the palliative nurse does home visit? Pt wife would like to talk to either the doctor or his nurse.

## 2023-10-23 PROBLEM — I26.99 PULMONARY EMBOLISM: Status: RESOLVED | Noted: 2023-07-19 | Resolved: 2023-10-23

## 2024-05-02 NOTE — PLAN OF CARE
Pt arrived to unit for his first cycle of Taxotere and IVFs. Most recent labs along with plan of care reviewed. VSS. Plan of care reviewed with pt and spouse. Pt endorses pain all up and down his back. Per , okay to give pt 1mg of Dilaudid IVP. Pre-med of Dexamethasone given. Taxotere infused without issues. Pt reported some relief from pain from the Dilaudid. Sent a message to  asking if pt can have some Dilaudid pills sent in.  sent in his rx to the pharmacy here. Pt and spouse notified. Have their next upcoming appointments through [facility]. Discharged from unit in NAD.  
non-tender

## (undated) DEVICE — SEE MEDLINE ITEM 157131

## (undated) DEVICE — ALCOHOL BLEND 95%

## (undated) DEVICE — SEE MEDLINE ITEM 157117

## (undated) DEVICE — PACK SET UP CONVERTORS

## (undated) DEVICE — SYR SLIP TIP 20CC

## (undated) DEVICE — PENCIL GOLF STERILE

## (undated) DEVICE — GAUZE SPONGE 4X4 12PLY

## (undated) DEVICE — LUBRICANT SURGILUBE 2 OZ

## (undated) DEVICE — CATH BRONCHOSCOPE F/BF

## (undated) DEVICE — SYS LABEL CORRECT MED

## (undated) DEVICE — CONTAINER SPECIMEN STRL 4OZ

## (undated) DEVICE — SYR 10CC LUER LOCK

## (undated) DEVICE — CYTOSPIN COLLECTION FLUID BLT

## (undated) DEVICE — ADAPTER SWIVEL

## (undated) DEVICE — TUBING SUC UNIV W/CONN 12FT

## (undated) DEVICE — BOWL STERILE LARGE 32OZ